# Patient Record
Sex: MALE | Race: WHITE | NOT HISPANIC OR LATINO | Employment: FULL TIME | URBAN - METROPOLITAN AREA
[De-identification: names, ages, dates, MRNs, and addresses within clinical notes are randomized per-mention and may not be internally consistent; named-entity substitution may affect disease eponyms.]

---

## 2017-03-16 ENCOUNTER — APPOINTMENT (EMERGENCY)
Dept: CT IMAGING | Facility: HOSPITAL | Age: 52
DRG: 494 | End: 2017-03-16
Payer: COMMERCIAL

## 2017-03-16 ENCOUNTER — APPOINTMENT (EMERGENCY)
Dept: RADIOLOGY | Facility: HOSPITAL | Age: 52
DRG: 494 | End: 2017-03-16
Payer: COMMERCIAL

## 2017-03-16 ENCOUNTER — HOSPITAL ENCOUNTER (INPATIENT)
Facility: HOSPITAL | Age: 52
LOS: 1 days | Discharge: HOME | DRG: 494 | End: 2017-03-17
Attending: EMERGENCY MEDICINE | Admitting: ORTHOPAEDIC SURGERY
Payer: COMMERCIAL

## 2017-03-16 ENCOUNTER — ANESTHESIA EVENT (INPATIENT)
Dept: PERIOP | Facility: HOSPITAL | Age: 52
DRG: 494 | End: 2017-03-16
Payer: COMMERCIAL

## 2017-03-16 ENCOUNTER — APPOINTMENT (INPATIENT)
Dept: RADIOLOGY | Facility: HOSPITAL | Age: 52
DRG: 494 | End: 2017-03-16
Payer: COMMERCIAL

## 2017-03-16 ENCOUNTER — ANESTHESIA (INPATIENT)
Dept: PERIOP | Facility: HOSPITAL | Age: 52
DRG: 494 | End: 2017-03-16
Payer: COMMERCIAL

## 2017-03-16 DIAGNOSIS — S82.251A CLOSED DISPLACED COMMINUTED FRACTURE OF SHAFT OF RIGHT TIBIA, INITIAL ENCOUNTER: Primary | ICD-10-CM

## 2017-03-16 PROBLEM — S82.891A CLOSED RIGHT ANKLE FRACTURE: Status: ACTIVE | Noted: 2017-03-16

## 2017-03-16 LAB
ABO GROUP BLD: NORMAL
ANION GAP SERPL CALCULATED.3IONS-SCNC: 7 MMOL/L (ref 4–13)
APTT PPP: 32 SECONDS (ref 24–36)
ATRIAL RATE: 96 BPM
BASOPHILS # BLD AUTO: 0.03 THOUSANDS/ΜL (ref 0–0.1)
BASOPHILS NFR BLD AUTO: 0 % (ref 0–1)
BLD GP AB SCN SERPL QL: NEGATIVE
BUN SERPL-MCNC: 19 MG/DL (ref 5–25)
CALCIUM SERPL-MCNC: 8.6 MG/DL (ref 8.3–10.1)
CHLORIDE SERPL-SCNC: 106 MMOL/L (ref 100–108)
CO2 SERPL-SCNC: 26 MMOL/L (ref 21–32)
CREAT SERPL-MCNC: 0.8 MG/DL (ref 0.6–1.3)
EOSINOPHIL # BLD AUTO: 0.09 THOUSAND/ΜL (ref 0–0.61)
EOSINOPHIL NFR BLD AUTO: 1 % (ref 0–6)
ERYTHROCYTE [DISTWIDTH] IN BLOOD BY AUTOMATED COUNT: 13.3 % (ref 11.6–15.1)
GFR SERPL CREATININE-BSD FRML MDRD: >60 ML/MIN/1.73SQ M
GLUCOSE SERPL-MCNC: 90 MG/DL (ref 65–140)
HCT VFR BLD AUTO: 47.9 % (ref 36.5–49.3)
HGB BLD-MCNC: 16.6 G/DL (ref 12–17)
INR PPP: 0.95 (ref 0.86–1.16)
LYMPHOCYTES # BLD AUTO: 1.92 THOUSANDS/ΜL (ref 0.6–4.47)
LYMPHOCYTES NFR BLD AUTO: 16 % (ref 14–44)
MCH RBC QN AUTO: 33.1 PG (ref 26.8–34.3)
MCHC RBC AUTO-ENTMCNC: 34.7 G/DL (ref 31.4–37.4)
MCV RBC AUTO: 95 FL (ref 82–98)
MONOCYTES # BLD AUTO: 0.73 THOUSAND/ΜL (ref 0.17–1.22)
MONOCYTES NFR BLD AUTO: 6 % (ref 4–12)
NEUTROPHILS # BLD AUTO: 9.34 THOUSANDS/ΜL (ref 1.85–7.62)
NEUTS SEG NFR BLD AUTO: 77 % (ref 43–75)
NRBC BLD AUTO-RTO: 0 /100 WBCS
P AXIS: 50 DEGREES
PLATELET # BLD AUTO: 201 THOUSANDS/UL (ref 149–390)
PMV BLD AUTO: 10.9 FL (ref 8.9–12.7)
POTASSIUM SERPL-SCNC: 4.2 MMOL/L (ref 3.5–5.3)
PR INTERVAL: 162 MS
PROTHROMBIN TIME: 12.7 SECONDS (ref 12–14.3)
QRS AXIS: 61 DEGREES
QRSD INTERVAL: 96 MS
QT INTERVAL: 336 MS
QTC INTERVAL: 424 MS
RBC # BLD AUTO: 5.02 MILLION/UL (ref 3.88–5.62)
RH BLD: POSITIVE
SODIUM SERPL-SCNC: 139 MMOL/L (ref 136–145)
T WAVE AXIS: 29 DEGREES
VENTRICULAR RATE: 96 BPM
WBC # BLD AUTO: 12.11 THOUSAND/UL (ref 4.31–10.16)

## 2017-03-16 PROCEDURE — C1713 ANCHOR/SCREW BN/BN,TIS/BN: HCPCS | Performed by: ORTHOPAEDIC SURGERY

## 2017-03-16 PROCEDURE — 86900 BLOOD TYPING SEROLOGIC ABO: CPT | Performed by: EMERGENCY MEDICINE

## 2017-03-16 PROCEDURE — 96374 THER/PROPH/DIAG INJ IV PUSH: CPT

## 2017-03-16 PROCEDURE — 73590 X-RAY EXAM OF LOWER LEG: CPT

## 2017-03-16 PROCEDURE — 0QSG36Z REPOSITION RIGHT TIBIA WITH INTRAMEDULLARY INTERNAL FIXATION DEVICE, PERCUTANEOUS APPROACH: ICD-10-PCS | Performed by: ORTHOPAEDIC SURGERY

## 2017-03-16 PROCEDURE — 85730 THROMBOPLASTIN TIME PARTIAL: CPT | Performed by: EMERGENCY MEDICINE

## 2017-03-16 PROCEDURE — 86850 RBC ANTIBODY SCREEN: CPT | Performed by: EMERGENCY MEDICINE

## 2017-03-16 PROCEDURE — 93005 ELECTROCARDIOGRAM TRACING: CPT | Performed by: EMERGENCY MEDICINE

## 2017-03-16 PROCEDURE — 96375 TX/PRO/DX INJ NEW DRUG ADDON: CPT

## 2017-03-16 PROCEDURE — 86901 BLOOD TYPING SEROLOGIC RH(D): CPT | Performed by: EMERGENCY MEDICINE

## 2017-03-16 PROCEDURE — 96376 TX/PRO/DX INJ SAME DRUG ADON: CPT

## 2017-03-16 PROCEDURE — 85610 PROTHROMBIN TIME: CPT | Performed by: EMERGENCY MEDICINE

## 2017-03-16 PROCEDURE — 99285 EMERGENCY DEPT VISIT HI MDM: CPT

## 2017-03-16 PROCEDURE — C1769 GUIDE WIRE: HCPCS | Performed by: ORTHOPAEDIC SURGERY

## 2017-03-16 PROCEDURE — 71010 HB CHEST X-RAY 1 VIEW FRONTAL: CPT

## 2017-03-16 PROCEDURE — 80048 BASIC METABOLIC PNL TOTAL CA: CPT | Performed by: EMERGENCY MEDICINE

## 2017-03-16 PROCEDURE — 36415 COLL VENOUS BLD VENIPUNCTURE: CPT | Performed by: EMERGENCY MEDICINE

## 2017-03-16 PROCEDURE — 73700 CT LOWER EXTREMITY W/O DYE: CPT

## 2017-03-16 PROCEDURE — 85025 COMPLETE CBC W/AUTO DIFF WBC: CPT | Performed by: EMERGENCY MEDICINE

## 2017-03-16 DEVICE — 5.0MM TI LOCKING SCREW W/T25 STARDRIVE 40MM F/IM NAIL-STER: Type: IMPLANTABLE DEVICE | Site: LEG | Status: FUNCTIONAL

## 2017-03-16 DEVICE — 10MM TI CANN TIBIAL NAIL-EX W/PROX BEND 360MM-STERILE
Type: IMPLANTABLE DEVICE | Site: LEG | Status: FUNCTIONAL
Brand: EXPERT NAIL

## 2017-03-16 DEVICE — 5.0MM TI LOCKING SCREW W/T25 STARDRIVE 34MM F/IM NAIL-STER: Type: IMPLANTABLE DEVICE | Site: LEG | Status: FUNCTIONAL

## 2017-03-16 DEVICE — 5.0MM TI LOCKING SCREW W/T25 STARDRIVE 48MM F/IM NAIL-STER: Type: IMPLANTABLE DEVICE | Site: LEG | Status: FUNCTIONAL

## 2017-03-16 RX ORDER — SODIUM CHLORIDE 9 MG/ML
125 INJECTION, SOLUTION INTRAVENOUS CONTINUOUS
Status: DISCONTINUED | OUTPATIENT
Start: 2017-03-16 | End: 2017-03-16

## 2017-03-16 RX ORDER — MIDAZOLAM HYDROCHLORIDE 1 MG/ML
INJECTION INTRAMUSCULAR; INTRAVENOUS AS NEEDED
Status: DISCONTINUED | OUTPATIENT
Start: 2017-03-16 | End: 2017-03-16 | Stop reason: SURG

## 2017-03-16 RX ORDER — FENTANYL CITRATE 50 UG/ML
INJECTION, SOLUTION INTRAMUSCULAR; INTRAVENOUS AS NEEDED
Status: DISCONTINUED | OUTPATIENT
Start: 2017-03-16 | End: 2017-03-16 | Stop reason: SURG

## 2017-03-16 RX ORDER — LORAZEPAM 2 MG/ML
0.5 INJECTION INTRAMUSCULAR 4 TIMES DAILY PRN
Status: DISCONTINUED | OUTPATIENT
Start: 2017-03-16 | End: 2017-03-16

## 2017-03-16 RX ORDER — QUETIAPINE 200 MG/1
200 TABLET, FILM COATED, EXTENDED RELEASE ORAL
Status: DISCONTINUED | OUTPATIENT
Start: 2017-03-16 | End: 2017-03-16

## 2017-03-16 RX ORDER — HYDRALAZINE HYDROCHLORIDE 20 MG/ML
10 INJECTION INTRAMUSCULAR; INTRAVENOUS EVERY 6 HOURS PRN
Status: DISCONTINUED | OUTPATIENT
Start: 2017-03-16 | End: 2017-03-16

## 2017-03-16 RX ORDER — ONDANSETRON 2 MG/ML
4 INJECTION INTRAMUSCULAR; INTRAVENOUS EVERY 4 HOURS PRN
Status: DISCONTINUED | OUTPATIENT
Start: 2017-03-16 | End: 2017-03-16

## 2017-03-16 RX ORDER — SENNOSIDES 8.6 MG
1 TABLET ORAL
Status: DISCONTINUED | OUTPATIENT
Start: 2017-03-16 | End: 2017-03-17 | Stop reason: HOSPADM

## 2017-03-16 RX ORDER — CLONAZEPAM 1 MG/1
1 TABLET ORAL 3 TIMES DAILY PRN
Status: DISCONTINUED | OUTPATIENT
Start: 2017-03-16 | End: 2017-03-16

## 2017-03-16 RX ORDER — ACETAMINOPHEN 325 MG/1
650 TABLET ORAL EVERY 6 HOURS PRN
Status: DISCONTINUED | OUTPATIENT
Start: 2017-03-16 | End: 2017-03-17 | Stop reason: HOSPADM

## 2017-03-16 RX ORDER — QUETIAPINE 200 MG/1
200 TABLET, FILM COATED, EXTENDED RELEASE ORAL
Status: DISCONTINUED | OUTPATIENT
Start: 2017-03-16 | End: 2017-03-17 | Stop reason: HOSPADM

## 2017-03-16 RX ORDER — LIDOCAINE HYDROCHLORIDE 10 MG/ML
INJECTION, SOLUTION INFILTRATION; PERINEURAL AS NEEDED
Status: DISCONTINUED | OUTPATIENT
Start: 2017-03-16 | End: 2017-03-16 | Stop reason: SURG

## 2017-03-16 RX ORDER — MAGNESIUM HYDROXIDE 1200 MG/15ML
LIQUID ORAL AS NEEDED
Status: DISCONTINUED | OUTPATIENT
Start: 2017-03-16 | End: 2017-03-16 | Stop reason: HOSPADM

## 2017-03-16 RX ORDER — OXYCODONE HYDROCHLORIDE 10 MG/1
10 TABLET ORAL
Status: DISCONTINUED | OUTPATIENT
Start: 2017-03-16 | End: 2017-03-17 | Stop reason: HOSPADM

## 2017-03-16 RX ORDER — ONDANSETRON 2 MG/ML
INJECTION INTRAMUSCULAR; INTRAVENOUS
Status: COMPLETED
Start: 2017-03-16 | End: 2017-03-16

## 2017-03-16 RX ORDER — BISACODYL 10 MG
10 SUPPOSITORY, RECTAL RECTAL DAILY PRN
Status: DISCONTINUED | OUTPATIENT
Start: 2017-03-16 | End: 2017-03-17 | Stop reason: HOSPADM

## 2017-03-16 RX ORDER — ONDANSETRON 2 MG/ML
4 INJECTION INTRAMUSCULAR; INTRAVENOUS ONCE
Status: COMPLETED | OUTPATIENT
Start: 2017-03-16 | End: 2017-03-16

## 2017-03-16 RX ORDER — FAMOTIDINE 20 MG/1
20 TABLET, FILM COATED ORAL DAILY
Status: DISCONTINUED | OUTPATIENT
Start: 2017-03-17 | End: 2017-03-16

## 2017-03-16 RX ORDER — NICOTINE 21 MG/24HR
21 PATCH, TRANSDERMAL 24 HOURS TRANSDERMAL DAILY
Status: DISCONTINUED | OUTPATIENT
Start: 2017-03-16 | End: 2017-03-17 | Stop reason: HOSPADM

## 2017-03-16 RX ORDER — METOCLOPRAMIDE HYDROCHLORIDE 5 MG/ML
10 INJECTION INTRAMUSCULAR; INTRAVENOUS ONCE AS NEEDED
Status: DISCONTINUED | OUTPATIENT
Start: 2017-03-16 | End: 2017-03-16

## 2017-03-16 RX ORDER — ROCURONIUM BROMIDE 10 MG/ML
INJECTION, SOLUTION INTRAVENOUS AS NEEDED
Status: DISCONTINUED | OUTPATIENT
Start: 2017-03-16 | End: 2017-03-16 | Stop reason: SURG

## 2017-03-16 RX ORDER — FENTANYL CITRATE 50 UG/ML
100 INJECTION, SOLUTION INTRAMUSCULAR; INTRAVENOUS ONCE
Status: COMPLETED | OUTPATIENT
Start: 2017-03-16 | End: 2017-03-16

## 2017-03-16 RX ORDER — GLYCOPYRROLATE 0.2 MG/ML
INJECTION INTRAMUSCULAR; INTRAVENOUS AS NEEDED
Status: DISCONTINUED | OUTPATIENT
Start: 2017-03-16 | End: 2017-03-16 | Stop reason: SURG

## 2017-03-16 RX ORDER — FENTANYL CITRATE/PF 50 MCG/ML
50 SYRINGE (ML) INJECTION
Status: DISCONTINUED | OUTPATIENT
Start: 2017-03-16 | End: 2017-03-16

## 2017-03-16 RX ORDER — PROPOFOL 10 MG/ML
INJECTION, EMULSION INTRAVENOUS AS NEEDED
Status: DISCONTINUED | OUTPATIENT
Start: 2017-03-16 | End: 2017-03-16 | Stop reason: SURG

## 2017-03-16 RX ORDER — SODIUM CHLORIDE 9 MG/ML
100 INJECTION, SOLUTION INTRAVENOUS CONTINUOUS
Status: DISCONTINUED | OUTPATIENT
Start: 2017-03-16 | End: 2017-03-17 | Stop reason: HOSPADM

## 2017-03-16 RX ORDER — FAMOTIDINE 20 MG/1
20 TABLET, FILM COATED ORAL DAILY
Status: DISCONTINUED | OUTPATIENT
Start: 2017-03-17 | End: 2017-03-17 | Stop reason: HOSPADM

## 2017-03-16 RX ORDER — ONDANSETRON 2 MG/ML
4 INJECTION INTRAMUSCULAR; INTRAVENOUS EVERY 6 HOURS PRN
Status: DISCONTINUED | OUTPATIENT
Start: 2017-03-16 | End: 2017-03-17 | Stop reason: HOSPADM

## 2017-03-16 RX ORDER — ONDANSETRON 2 MG/ML
4 INJECTION INTRAMUSCULAR; INTRAVENOUS ONCE
Status: DISCONTINUED | OUTPATIENT
Start: 2017-03-16 | End: 2017-03-16

## 2017-03-16 RX ORDER — DOCUSATE SODIUM 100 MG/1
100 CAPSULE, LIQUID FILLED ORAL 2 TIMES DAILY
Status: DISCONTINUED | OUTPATIENT
Start: 2017-03-16 | End: 2017-03-17 | Stop reason: HOSPADM

## 2017-03-16 RX ORDER — VENLAFAXINE 75 MG/1
75 TABLET ORAL 2 TIMES DAILY WITH MEALS
Status: DISCONTINUED | OUTPATIENT
Start: 2017-03-17 | End: 2017-03-17 | Stop reason: HOSPADM

## 2017-03-16 RX ORDER — HYDROMORPHONE HYDROCHLORIDE 2 MG/ML
INJECTION, SOLUTION INTRAMUSCULAR; INTRAVENOUS; SUBCUTANEOUS AS NEEDED
Status: DISCONTINUED | OUTPATIENT
Start: 2017-03-16 | End: 2017-03-16 | Stop reason: SURG

## 2017-03-16 RX ORDER — RANITIDINE 150 MG/1
150 CAPSULE ORAL DAILY
COMMUNITY
End: 2019-05-23 | Stop reason: HOSPADM

## 2017-03-16 RX ORDER — CLONAZEPAM 1 MG/1
1 TABLET ORAL 3 TIMES DAILY PRN
Status: DISCONTINUED | OUTPATIENT
Start: 2017-03-16 | End: 2017-03-17 | Stop reason: HOSPADM

## 2017-03-16 RX ORDER — OXYCODONE HYDROCHLORIDE 5 MG/1
5 TABLET ORAL
Status: DISCONTINUED | OUTPATIENT
Start: 2017-03-16 | End: 2017-03-17 | Stop reason: HOSPADM

## 2017-03-16 RX ORDER — VENLAFAXINE 75 MG/1
75 TABLET ORAL 2 TIMES DAILY
Status: DISCONTINUED | OUTPATIENT
Start: 2017-03-16 | End: 2017-03-16

## 2017-03-16 RX ORDER — HYDROMORPHONE HCL 110MG/55ML
0.5 PATIENT CONTROLLED ANALGESIA SYRINGE INTRAVENOUS
Status: DISCONTINUED | OUTPATIENT
Start: 2017-03-16 | End: 2017-03-17 | Stop reason: HOSPADM

## 2017-03-16 RX ADMIN — SODIUM CHLORIDE: 0.9 INJECTION, SOLUTION INTRAVENOUS at 18:29

## 2017-03-16 RX ADMIN — CEFAZOLIN SODIUM 2000 MG: 2 SOLUTION INTRAVENOUS at 16:16

## 2017-03-16 RX ADMIN — Medication 1 MG: at 07:25

## 2017-03-16 RX ADMIN — FENTANYL CITRATE 50 MCG: 50 INJECTION INTRAMUSCULAR; INTRAVENOUS at 20:53

## 2017-03-16 RX ADMIN — FENTANYL CITRATE 100 MCG: 50 INJECTION, SOLUTION INTRAMUSCULAR; INTRAVENOUS at 16:10

## 2017-03-16 RX ADMIN — PROPOFOL 200 MG: 10 INJECTION, EMULSION INTRAVENOUS at 16:21

## 2017-03-16 RX ADMIN — GLYCOPYRROLATE 0.4 MG: 0.2 INJECTION, SOLUTION INTRAMUSCULAR; INTRAVENOUS at 19:19

## 2017-03-16 RX ADMIN — Medication 1 MG: at 08:43

## 2017-03-16 RX ADMIN — DEXAMETHASONE SODIUM PHOSPHATE 8 MG: 10 INJECTION INTRAMUSCULAR; INTRAVENOUS at 19:02

## 2017-03-16 RX ADMIN — HYDROMORPHONE HYDROCHLORIDE 0.5 MG: 1 INJECTION, SOLUTION INTRAMUSCULAR; INTRAVENOUS; SUBCUTANEOUS at 21:09

## 2017-03-16 RX ADMIN — SODIUM CHLORIDE: 0.9 INJECTION, SOLUTION INTRAVENOUS at 17:10

## 2017-03-16 RX ADMIN — ROCURONIUM BROMIDE 50 MG: 10 INJECTION, SOLUTION INTRAVENOUS at 16:21

## 2017-03-16 RX ADMIN — PROPOFOL 100 MG: 10 INJECTION, EMULSION INTRAVENOUS at 19:32

## 2017-03-16 RX ADMIN — HYDROMORPHONE HYDROCHLORIDE 1 MG: 1 INJECTION, SOLUTION INTRAMUSCULAR; INTRAVENOUS; SUBCUTANEOUS at 08:43

## 2017-03-16 RX ADMIN — FENTANYL CITRATE 50 MCG: 50 INJECTION INTRAMUSCULAR; INTRAVENOUS at 20:43

## 2017-03-16 RX ADMIN — CEFAZOLIN SODIUM 2000 MG: 2 SOLUTION INTRAVENOUS at 22:09

## 2017-03-16 RX ADMIN — QUETIAPINE 200 MG: 200 TABLET, EXTENDED RELEASE ORAL at 23:54

## 2017-03-16 RX ADMIN — FENTANYL CITRATE 100 MCG: 50 INJECTION INTRAMUSCULAR; INTRAVENOUS at 12:48

## 2017-03-16 RX ADMIN — Medication 1 MG: at 09:17

## 2017-03-16 RX ADMIN — LORAZEPAM 0.5 MG: 2 INJECTION INTRAMUSCULAR; INTRAVENOUS at 15:01

## 2017-03-16 RX ADMIN — SODIUM CHLORIDE 125 ML/HR: 0.9 INJECTION, SOLUTION INTRAVENOUS at 15:55

## 2017-03-16 RX ADMIN — ONDANSETRON 4 MG: 2 INJECTION INTRAMUSCULAR; INTRAVENOUS at 07:25

## 2017-03-16 RX ADMIN — HYDROMORPHONE HYDROCHLORIDE 1 MG: 2 INJECTION, SOLUTION INTRAMUSCULAR; INTRAVENOUS; SUBCUTANEOUS at 17:47

## 2017-03-16 RX ADMIN — OXYCODONE HYDROCHLORIDE 5 MG: 10 TABLET ORAL at 23:55

## 2017-03-16 RX ADMIN — ONDANSETRON HYDROCHLORIDE 8 MG: 2 INJECTION, SOLUTION INTRAVENOUS at 19:02

## 2017-03-16 RX ADMIN — HYDROMORPHONE HYDROCHLORIDE 1 MG: 1 INJECTION, SOLUTION INTRAMUSCULAR; INTRAVENOUS; SUBCUTANEOUS at 07:25

## 2017-03-16 RX ADMIN — SODIUM CHLORIDE 100 ML/HR: 0.9 INJECTION, SOLUTION INTRAVENOUS at 21:30

## 2017-03-16 RX ADMIN — NEOSTIGMINE METHYLSULFATE 3 MG: 1 INJECTION INTRAMUSCULAR; INTRAVENOUS; SUBCUTANEOUS at 19:19

## 2017-03-16 RX ADMIN — DOCUSATE SODIUM 100 MG: 100 CAPSULE, LIQUID FILLED ORAL at 23:54

## 2017-03-16 RX ADMIN — HYDROMORPHONE HYDROCHLORIDE 1 MG: 1 INJECTION, SOLUTION INTRAMUSCULAR; INTRAVENOUS; SUBCUTANEOUS at 07:57

## 2017-03-16 RX ADMIN — LIDOCAINE HYDROCHLORIDE 80 MG: 10 INJECTION, SOLUTION INFILTRATION; PERINEURAL at 16:21

## 2017-03-16 RX ADMIN — HYDROMORPHONE HYDROCHLORIDE 0.5 MG: 2 INJECTION, SOLUTION INTRAMUSCULAR; INTRAVENOUS; SUBCUTANEOUS at 19:32

## 2017-03-16 RX ADMIN — ROCURONIUM BROMIDE 20 MG: 10 INJECTION, SOLUTION INTRAVENOUS at 16:50

## 2017-03-16 RX ADMIN — HYDROMORPHONE HYDROCHLORIDE 1 MG: 1 INJECTION, SOLUTION INTRAMUSCULAR; INTRAVENOUS; SUBCUTANEOUS at 09:17

## 2017-03-16 RX ADMIN — FENTANYL CITRATE 50 MCG: 50 INJECTION, SOLUTION INTRAMUSCULAR; INTRAVENOUS at 17:45

## 2017-03-16 RX ADMIN — HYDROMORPHONE HYDROCHLORIDE 1 MG: 1 INJECTION, SOLUTION INTRAMUSCULAR; INTRAVENOUS; SUBCUTANEOUS at 10:58

## 2017-03-16 RX ADMIN — FENTANYL CITRATE 50 MCG: 50 INJECTION, SOLUTION INTRAMUSCULAR; INTRAVENOUS at 16:52

## 2017-03-16 RX ADMIN — MIDAZOLAM HYDROCHLORIDE 2 MG: 1 INJECTION, SOLUTION INTRAMUSCULAR; INTRAVENOUS at 16:10

## 2017-03-16 RX ADMIN — HYDROMORPHONE HYDROCHLORIDE 0.5 MG: 2 INJECTION, SOLUTION INTRAMUSCULAR; INTRAVENOUS; SUBCUTANEOUS at 22:01

## 2017-03-17 VITALS
WEIGHT: 222 LBS | BODY MASS INDEX: 31.08 KG/M2 | HEIGHT: 71 IN | SYSTOLIC BLOOD PRESSURE: 107 MMHG | OXYGEN SATURATION: 96 % | DIASTOLIC BLOOD PRESSURE: 62 MMHG | HEART RATE: 86 BPM | RESPIRATION RATE: 16 BRPM | TEMPERATURE: 98.9 F

## 2017-03-17 LAB
ANION GAP SERPL CALCULATED.3IONS-SCNC: 11 MMOL/L (ref 4–13)
BUN SERPL-MCNC: 16 MG/DL (ref 5–25)
CALCIUM SERPL-MCNC: 8.2 MG/DL (ref 8.3–10.1)
CHLORIDE SERPL-SCNC: 107 MMOL/L (ref 100–108)
CO2 SERPL-SCNC: 22 MMOL/L (ref 21–32)
CREAT SERPL-MCNC: 0.93 MG/DL (ref 0.6–1.3)
ERYTHROCYTE [DISTWIDTH] IN BLOOD BY AUTOMATED COUNT: 13.1 % (ref 11.6–15.1)
GFR SERPL CREATININE-BSD FRML MDRD: >60 ML/MIN/1.73SQ M
GLUCOSE SERPL-MCNC: 106 MG/DL (ref 65–140)
HCT VFR BLD AUTO: 44.3 % (ref 36.5–49.3)
HGB BLD-MCNC: 14.4 G/DL (ref 12–17)
MCH RBC QN AUTO: 31.2 PG (ref 26.8–34.3)
MCHC RBC AUTO-ENTMCNC: 32.5 G/DL (ref 31.4–37.4)
MCV RBC AUTO: 96 FL (ref 82–98)
PLATELET # BLD AUTO: 222 THOUSANDS/UL (ref 149–390)
PMV BLD AUTO: 11.1 FL (ref 8.9–12.7)
POTASSIUM SERPL-SCNC: 4.5 MMOL/L (ref 3.5–5.3)
RBC # BLD AUTO: 4.62 MILLION/UL (ref 3.88–5.62)
SODIUM SERPL-SCNC: 140 MMOL/L (ref 136–145)
WBC # BLD AUTO: 16.57 THOUSAND/UL (ref 4.31–10.16)

## 2017-03-17 PROCEDURE — 85027 COMPLETE CBC AUTOMATED: CPT | Performed by: ORTHOPAEDIC SURGERY

## 2017-03-17 PROCEDURE — 97110 THERAPEUTIC EXERCISES: CPT

## 2017-03-17 PROCEDURE — G8978 MOBILITY CURRENT STATUS: HCPCS

## 2017-03-17 PROCEDURE — 80048 BASIC METABOLIC PNL TOTAL CA: CPT | Performed by: ORTHOPAEDIC SURGERY

## 2017-03-17 PROCEDURE — G8979 MOBILITY GOAL STATUS: HCPCS

## 2017-03-17 PROCEDURE — 97162 PT EVAL MOD COMPLEX 30 MIN: CPT

## 2017-03-17 RX ORDER — OXYCODONE HYDROCHLORIDE 5 MG/1
5 TABLET ORAL ONCE
Status: COMPLETED | OUTPATIENT
Start: 2017-03-17 | End: 2017-03-17

## 2017-03-17 RX ORDER — OXYCODONE HYDROCHLORIDE 5 MG/1
TABLET ORAL
Qty: 60 TABLET | Refills: 0 | Status: SHIPPED | OUTPATIENT
Start: 2017-03-17 | End: 2019-09-04

## 2017-03-17 RX ORDER — ASPIRIN 325 MG
325 TABLET, DELAYED RELEASE (ENTERIC COATED) ORAL 2 TIMES DAILY
Qty: 60 TABLET | Refills: 0 | Status: SHIPPED | OUTPATIENT
Start: 2017-03-17 | End: 2018-05-20

## 2017-03-17 RX ORDER — DOCUSATE SODIUM 100 MG/1
100 CAPSULE, LIQUID FILLED ORAL 2 TIMES DAILY PRN
Qty: 60 CAPSULE | Refills: 0 | Status: SHIPPED | OUTPATIENT
Start: 2017-03-17 | End: 2018-05-20

## 2017-03-17 RX ADMIN — HYDROMORPHONE HYDROCHLORIDE 0.5 MG: 2 INJECTION, SOLUTION INTRAMUSCULAR; INTRAVENOUS; SUBCUTANEOUS at 05:02

## 2017-03-17 RX ADMIN — OXYCODONE HYDROCHLORIDE 10 MG: 10 TABLET ORAL at 08:14

## 2017-03-17 RX ADMIN — ACETAMINOPHEN 650 MG: 325 TABLET, FILM COATED ORAL at 08:13

## 2017-03-17 RX ADMIN — VENLAFAXINE HYDROCHLORIDE 75 MG: 75 TABLET ORAL at 08:13

## 2017-03-17 RX ADMIN — HYDROMORPHONE HYDROCHLORIDE 0.5 MG: 2 INJECTION, SOLUTION INTRAMUSCULAR; INTRAVENOUS; SUBCUTANEOUS at 01:27

## 2017-03-17 RX ADMIN — OXYCODONE HYDROCHLORIDE 10 MG: 10 TABLET ORAL at 02:58

## 2017-03-17 RX ADMIN — OXYCODONE HYDROCHLORIDE 10 MG: 10 TABLET ORAL at 13:07

## 2017-03-17 RX ADMIN — CEFAZOLIN SODIUM 2000 MG: 2 SOLUTION INTRAVENOUS at 05:53

## 2017-03-17 RX ADMIN — DOCUSATE SODIUM 100 MG: 100 CAPSULE, LIQUID FILLED ORAL at 08:14

## 2017-03-17 RX ADMIN — FAMOTIDINE 20 MG: 20 TABLET ORAL at 08:14

## 2017-03-17 RX ADMIN — ENOXAPARIN SODIUM 40 MG: 40 INJECTION SUBCUTANEOUS at 08:13

## 2017-03-17 RX ADMIN — CLONAZEPAM 1 MG: 1 TABLET ORAL at 09:22

## 2017-03-17 RX ADMIN — OXYCODONE HYDROCHLORIDE 5 MG: 5 TABLET ORAL at 10:06

## 2017-03-31 ENCOUNTER — HOSPITAL ENCOUNTER (OUTPATIENT)
Dept: RADIOLOGY | Facility: CLINIC | Age: 52
Discharge: HOME | End: 2017-03-31
Payer: COMMERCIAL

## 2017-03-31 ENCOUNTER — ALLSCRIPTS OFFICE VISIT (OUTPATIENT)
Dept: OTHER | Facility: OTHER | Age: 52
End: 2017-03-31

## 2017-03-31 DIAGNOSIS — S82.251D CLOSED DISPLACED COMMINUTED FRACTURE OF SHAFT OF RIGHT TIBIA WITH ROUTINE HEALING: ICD-10-CM

## 2017-03-31 DIAGNOSIS — Z98.890 OTHER SPECIFIED POSTPROCEDURAL STATES: ICD-10-CM

## 2017-03-31 PROCEDURE — 73590 X-RAY EXAM OF LOWER LEG: CPT

## 2017-04-14 ENCOUNTER — HOSPITAL ENCOUNTER (OUTPATIENT)
Dept: RADIOLOGY | Facility: CLINIC | Age: 52
Discharge: HOME | End: 2017-04-14
Payer: COMMERCIAL

## 2017-04-14 ENCOUNTER — ALLSCRIPTS OFFICE VISIT (OUTPATIENT)
Dept: OTHER | Facility: OTHER | Age: 52
End: 2017-04-14

## 2017-04-14 DIAGNOSIS — S82.251D CLOSED DISPLACED COMMINUTED FRACTURE OF SHAFT OF RIGHT TIBIA WITH ROUTINE HEALING: ICD-10-CM

## 2017-04-14 PROCEDURE — 73590 X-RAY EXAM OF LOWER LEG: CPT

## 2017-04-28 ENCOUNTER — HOSPITAL ENCOUNTER (OUTPATIENT)
Dept: RADIOLOGY | Facility: CLINIC | Age: 52
Discharge: HOME | End: 2017-04-28
Payer: COMMERCIAL

## 2017-04-28 ENCOUNTER — ALLSCRIPTS OFFICE VISIT (OUTPATIENT)
Dept: OTHER | Facility: OTHER | Age: 52
End: 2017-04-28

## 2017-04-28 DIAGNOSIS — S82.251D CLOSED DISPLACED COMMINUTED FRACTURE OF SHAFT OF RIGHT TIBIA WITH ROUTINE HEALING: ICD-10-CM

## 2017-04-28 PROCEDURE — 73590 X-RAY EXAM OF LOWER LEG: CPT

## 2017-05-17 ENCOUNTER — APPOINTMENT (OUTPATIENT)
Dept: PHYSICAL THERAPY | Age: 52
End: 2017-05-17
Payer: COMMERCIAL

## 2017-05-19 ENCOUNTER — ALLSCRIPTS OFFICE VISIT (OUTPATIENT)
Dept: OTHER | Facility: OTHER | Age: 52
End: 2017-05-19

## 2017-05-19 ENCOUNTER — HOSPITAL ENCOUNTER (OUTPATIENT)
Dept: RADIOLOGY | Facility: CLINIC | Age: 52
Discharge: HOME | End: 2017-05-19
Payer: COMMERCIAL

## 2017-05-19 DIAGNOSIS — S82.251D CLOSED DISPLACED COMMINUTED FRACTURE OF SHAFT OF RIGHT TIBIA WITH ROUTINE HEALING: ICD-10-CM

## 2017-05-19 DIAGNOSIS — Z98.890 OTHER SPECIFIED POSTPROCEDURAL STATES: ICD-10-CM

## 2017-05-19 PROCEDURE — 73590 X-RAY EXAM OF LOWER LEG: CPT

## 2017-06-09 ENCOUNTER — ALLSCRIPTS OFFICE VISIT (OUTPATIENT)
Dept: OTHER | Facility: OTHER | Age: 52
End: 2017-06-09

## 2017-06-09 ENCOUNTER — HOSPITAL ENCOUNTER (OUTPATIENT)
Dept: RADIOLOGY | Facility: CLINIC | Age: 52
Discharge: HOME | End: 2017-06-09
Payer: COMMERCIAL

## 2017-06-09 DIAGNOSIS — S82.251D CLOSED DISPLACED COMMINUTED FRACTURE OF SHAFT OF RIGHT TIBIA WITH ROUTINE HEALING: ICD-10-CM

## 2017-06-09 PROCEDURE — 73590 X-RAY EXAM OF LOWER LEG: CPT

## 2017-06-21 ENCOUNTER — GENERIC CONVERSION - ENCOUNTER (OUTPATIENT)
Dept: OTHER | Facility: OTHER | Age: 52
End: 2017-06-21

## 2017-07-21 ENCOUNTER — APPOINTMENT (OUTPATIENT)
Dept: RADIOLOGY | Facility: CLINIC | Age: 52
End: 2017-07-21
Payer: COMMERCIAL

## 2017-07-21 ENCOUNTER — ALLSCRIPTS OFFICE VISIT (OUTPATIENT)
Dept: OTHER | Facility: OTHER | Age: 52
End: 2017-07-21

## 2017-07-21 DIAGNOSIS — S82.251D CLOSED DISPLACED COMMINUTED FRACTURE OF SHAFT OF RIGHT TIBIA WITH ROUTINE HEALING: ICD-10-CM

## 2017-07-21 PROCEDURE — 73590 X-RAY EXAM OF LOWER LEG: CPT

## 2017-08-16 ENCOUNTER — TRANSCRIBE ORDERS (OUTPATIENT)
Dept: ADMINISTRATIVE | Facility: HOSPITAL | Age: 52
End: 2017-08-16

## 2017-08-16 DIAGNOSIS — M54.16 LUMBAR RADICULOPATHY: Primary | ICD-10-CM

## 2017-08-22 ENCOUNTER — HOSPITAL ENCOUNTER (OUTPATIENT)
Dept: MRI IMAGING | Facility: HOSPITAL | Age: 52
Discharge: HOME | End: 2017-08-22
Payer: COMMERCIAL

## 2017-08-22 DIAGNOSIS — M54.16 LUMBAR RADICULOPATHY: ICD-10-CM

## 2017-08-22 PROCEDURE — A9585 GADOBUTROL INJECTION: HCPCS | Performed by: RADIOLOGY

## 2017-08-22 PROCEDURE — 72158 MRI LUMBAR SPINE W/O & W/DYE: CPT

## 2017-08-22 RX ADMIN — GADOBUTROL 10 ML: 604.72 INJECTION INTRAVENOUS at 20:49

## 2017-10-02 ENCOUNTER — ALLSCRIPTS OFFICE VISIT (OUTPATIENT)
Dept: OTHER | Facility: OTHER | Age: 52
End: 2017-10-02
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
## 2018-01-12 VITALS
DIASTOLIC BLOOD PRESSURE: 110 MMHG | BODY MASS INDEX: 33.48 KG/M2 | WEIGHT: 239.13 LBS | SYSTOLIC BLOOD PRESSURE: 157 MMHG | HEIGHT: 71 IN | HEART RATE: 123 BPM

## 2018-01-12 VITALS
WEIGHT: 236.13 LBS | BODY MASS INDEX: 33.06 KG/M2 | HEIGHT: 71 IN | DIASTOLIC BLOOD PRESSURE: 83 MMHG | SYSTOLIC BLOOD PRESSURE: 126 MMHG | HEART RATE: 125 BPM

## 2018-01-13 VITALS
BODY MASS INDEX: 32.06 KG/M2 | WEIGHT: 229 LBS | HEIGHT: 71 IN | HEART RATE: 109 BPM | SYSTOLIC BLOOD PRESSURE: 146 MMHG | DIASTOLIC BLOOD PRESSURE: 101 MMHG

## 2018-01-13 VITALS
HEART RATE: 125 BPM | WEIGHT: 232.38 LBS | HEIGHT: 71 IN | SYSTOLIC BLOOD PRESSURE: 138 MMHG | DIASTOLIC BLOOD PRESSURE: 88 MMHG | BODY MASS INDEX: 32.53 KG/M2

## 2018-01-13 VITALS
DIASTOLIC BLOOD PRESSURE: 90 MMHG | BODY MASS INDEX: 31.83 KG/M2 | HEIGHT: 71 IN | HEART RATE: 97 BPM | SYSTOLIC BLOOD PRESSURE: 134 MMHG | WEIGHT: 227.38 LBS

## 2018-01-13 VITALS
DIASTOLIC BLOOD PRESSURE: 87 MMHG | WEIGHT: 239.13 LBS | BODY MASS INDEX: 33.48 KG/M2 | SYSTOLIC BLOOD PRESSURE: 140 MMHG | HEIGHT: 71 IN | HEART RATE: 101 BPM
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
## 2018-01-14 VITALS — SYSTOLIC BLOOD PRESSURE: 160 MMHG | DIASTOLIC BLOOD PRESSURE: 87 MMHG | HEIGHT: 71 IN | HEART RATE: 110 BPM

## 2018-01-15 NOTE — MISCELLANEOUS
Message  Return to work or school:        Mr Janny Larson may return to work on sedentary duty only until follow up          Signatures   Electronically signed by : Kristin Raymundo DO; Jun 21 2017  3:38PM EST                       (Author)
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
## 2018-01-16 NOTE — MISCELLANEOUS
Provider Comments  Provider Comments:   pt was a no show today      Signatures   Electronically signed by : Jose F Bennett, ; Apr 13 2016  4:37PM EST                       (Author)    Electronically signed by : MARLY Pinedo ; Apr 13 2016  6:07PM EST                       (Author)    Electronically signed by : Kristian Rodríguez DO;  Apr 15 2016  1:28PM EST                       (Author)

## 2018-05-20 ENCOUNTER — APPOINTMENT (EMERGENCY)
Dept: CT IMAGING | Facility: HOSPITAL | Age: 53
DRG: 194 | End: 2018-05-20
Payer: COMMERCIAL

## 2018-05-20 ENCOUNTER — HOSPITAL ENCOUNTER (INPATIENT)
Facility: HOSPITAL | Age: 53
LOS: 3 days | Discharge: HOME | DRG: 194 | End: 2018-05-23
Attending: EMERGENCY MEDICINE | Admitting: HOSPITALIST
Payer: COMMERCIAL

## 2018-05-20 ENCOUNTER — APPOINTMENT (EMERGENCY)
Dept: RADIOLOGY | Facility: HOSPITAL | Age: 53
DRG: 194 | End: 2018-05-20
Payer: COMMERCIAL

## 2018-05-20 DIAGNOSIS — J98.4 CAVITARY PNEUMONIA: ICD-10-CM

## 2018-05-20 DIAGNOSIS — J18.9 CAVITARY PNEUMONIA: ICD-10-CM

## 2018-05-20 DIAGNOSIS — I48.92 ATRIAL FLUTTER (HCC): ICD-10-CM

## 2018-05-20 DIAGNOSIS — Z72.0 TOBACCO ABUSE: ICD-10-CM

## 2018-05-20 DIAGNOSIS — J18.9 PNEUMONIA: Primary | ICD-10-CM

## 2018-05-20 PROBLEM — R07.9 CHEST PAIN: Status: ACTIVE | Noted: 2018-05-20

## 2018-05-20 PROBLEM — D35.02 ADENOMA OF LEFT ADRENAL GLAND: Status: ACTIVE | Noted: 2018-05-20

## 2018-05-20 LAB
ALBUMIN SERPL BCP-MCNC: 2.9 G/DL (ref 3.5–5)
ALP SERPL-CCNC: 205 U/L (ref 46–116)
ALT SERPL W P-5'-P-CCNC: 71 U/L (ref 12–78)
ANION GAP SERPL CALCULATED.3IONS-SCNC: 9 MMOL/L (ref 4–13)
APTT PPP: 41 SECONDS (ref 24–36)
AST SERPL W P-5'-P-CCNC: 27 U/L (ref 5–45)
BASOPHILS # BLD AUTO: 0.02 THOUSANDS/ΜL (ref 0–0.1)
BASOPHILS NFR BLD AUTO: 0 % (ref 0–1)
BILIRUB SERPL-MCNC: 0.74 MG/DL (ref 0.2–1)
BUN SERPL-MCNC: 15 MG/DL (ref 5–25)
CALCIUM SERPL-MCNC: 9 MG/DL (ref 8.3–10.1)
CHLORIDE SERPL-SCNC: 102 MMOL/L (ref 100–108)
CO2 SERPL-SCNC: 26 MMOL/L (ref 21–32)
CREAT SERPL-MCNC: 1.12 MG/DL (ref 0.6–1.3)
EOSINOPHIL # BLD AUTO: 0.05 THOUSAND/ΜL (ref 0–0.61)
EOSINOPHIL NFR BLD AUTO: 0 % (ref 0–6)
ERYTHROCYTE [DISTWIDTH] IN BLOOD BY AUTOMATED COUNT: 12.8 % (ref 11.6–15.1)
ERYTHROCYTE [DISTWIDTH] IN BLOOD BY AUTOMATED COUNT: 12.8 % (ref 11.6–15.1)
GFR SERPL CREATININE-BSD FRML MDRD: 76 ML/MIN/1.73SQ M
GLUCOSE SERPL-MCNC: 130 MG/DL (ref 65–140)
HCT VFR BLD AUTO: 44.3 % (ref 36.5–49.3)
HCT VFR BLD AUTO: 47.2 % (ref 36.5–49.3)
HGB BLD-MCNC: 15.1 G/DL (ref 12–17)
HGB BLD-MCNC: 16.4 G/DL (ref 12–17)
INR PPP: 1.16 (ref 0.86–1.17)
LACTATE SERPL-SCNC: 0.7 MMOL/L (ref 0.5–2)
LYMPHOCYTES # BLD AUTO: 1.92 THOUSANDS/ΜL (ref 0.6–4.47)
LYMPHOCYTES NFR BLD AUTO: 13 % (ref 14–44)
MAGNESIUM SERPL-MCNC: 1.9 MG/DL (ref 1.6–2.6)
MCH RBC QN AUTO: 31.3 PG (ref 26.8–34.3)
MCH RBC QN AUTO: 31.9 PG (ref 26.8–34.3)
MCHC RBC AUTO-ENTMCNC: 34.1 G/DL (ref 31.4–37.4)
MCHC RBC AUTO-ENTMCNC: 34.7 G/DL (ref 31.4–37.4)
MCV RBC AUTO: 92 FL (ref 82–98)
MCV RBC AUTO: 92 FL (ref 82–98)
MONOCYTES # BLD AUTO: 1.3 THOUSAND/ΜL (ref 0.17–1.22)
MONOCYTES NFR BLD AUTO: 9 % (ref 4–12)
NEUTROPHILS # BLD AUTO: 11.16 THOUSANDS/ΜL (ref 1.85–7.62)
NEUTS SEG NFR BLD AUTO: 77 % (ref 43–75)
NRBC BLD AUTO-RTO: 0 /100 WBCS
PLATELET # BLD AUTO: 281 THOUSANDS/UL (ref 149–390)
PLATELET # BLD AUTO: 300 THOUSANDS/UL (ref 149–390)
PMV BLD AUTO: 11.5 FL (ref 8.9–12.7)
PMV BLD AUTO: 11.6 FL (ref 8.9–12.7)
POTASSIUM SERPL-SCNC: 3.7 MMOL/L (ref 3.5–5.3)
PROT SERPL-MCNC: 7.8 G/DL (ref 6.4–8.2)
PROTHROMBIN TIME: 14.9 SECONDS (ref 11.8–14.2)
RBC # BLD AUTO: 4.83 MILLION/UL (ref 3.88–5.62)
RBC # BLD AUTO: 5.14 MILLION/UL (ref 3.88–5.62)
SODIUM SERPL-SCNC: 137 MMOL/L (ref 136–145)
TROPONIN I SERPL-MCNC: <0.02 NG/ML
WBC # BLD AUTO: 11.34 THOUSAND/UL (ref 4.31–10.16)
WBC # BLD AUTO: 14.45 THOUSAND/UL (ref 4.31–10.16)

## 2018-05-20 PROCEDURE — 85027 COMPLETE CBC AUTOMATED: CPT | Performed by: PHYSICIAN ASSISTANT

## 2018-05-20 PROCEDURE — 85730 THROMBOPLASTIN TIME PARTIAL: CPT | Performed by: PHYSICIAN ASSISTANT

## 2018-05-20 PROCEDURE — 36415 COLL VENOUS BLD VENIPUNCTURE: CPT

## 2018-05-20 PROCEDURE — 85610 PROTHROMBIN TIME: CPT | Performed by: PHYSICIAN ASSISTANT

## 2018-05-20 PROCEDURE — 96375 TX/PRO/DX INJ NEW DRUG ADDON: CPT

## 2018-05-20 PROCEDURE — 83735 ASSAY OF MAGNESIUM: CPT | Performed by: EMERGENCY MEDICINE

## 2018-05-20 PROCEDURE — 83605 ASSAY OF LACTIC ACID: CPT | Performed by: EMERGENCY MEDICINE

## 2018-05-20 PROCEDURE — 96366 THER/PROPH/DIAG IV INF ADDON: CPT

## 2018-05-20 PROCEDURE — 71046 X-RAY EXAM CHEST 2 VIEWS: CPT

## 2018-05-20 PROCEDURE — 96361 HYDRATE IV INFUSION ADD-ON: CPT

## 2018-05-20 PROCEDURE — 96365 THER/PROPH/DIAG IV INF INIT: CPT

## 2018-05-20 PROCEDURE — 71275 CT ANGIOGRAPHY CHEST: CPT

## 2018-05-20 PROCEDURE — 85025 COMPLETE CBC W/AUTO DIFF WBC: CPT

## 2018-05-20 PROCEDURE — 93005 ELECTROCARDIOGRAM TRACING: CPT

## 2018-05-20 PROCEDURE — 80053 COMPREHEN METABOLIC PANEL: CPT

## 2018-05-20 PROCEDURE — 84484 ASSAY OF TROPONIN QUANT: CPT

## 2018-05-20 PROCEDURE — 96376 TX/PRO/DX INJ SAME DRUG ADON: CPT

## 2018-05-20 PROCEDURE — 87040 BLOOD CULTURE FOR BACTERIA: CPT | Performed by: EMERGENCY MEDICINE

## 2018-05-20 RX ORDER — HEPARIN SODIUM 5000 [USP'U]/ML
5000 INJECTION, SOLUTION INTRAVENOUS; SUBCUTANEOUS EVERY 8 HOURS SCHEDULED
Status: DISCONTINUED | OUTPATIENT
Start: 2018-05-20 | End: 2018-05-20 | Stop reason: SDUPTHER

## 2018-05-20 RX ORDER — SODIUM CHLORIDE 9 MG/ML
100 INJECTION, SOLUTION INTRAVENOUS CONTINUOUS
Status: DISCONTINUED | OUTPATIENT
Start: 2018-05-20 | End: 2018-05-23 | Stop reason: HOSPADM

## 2018-05-20 RX ORDER — FAMOTIDINE 20 MG/1
20 TABLET, FILM COATED ORAL DAILY
Status: DISCONTINUED | OUTPATIENT
Start: 2018-05-21 | End: 2018-05-23 | Stop reason: HOSPADM

## 2018-05-20 RX ORDER — HEPARIN SODIUM 1000 [USP'U]/ML
8000 INJECTION, SOLUTION INTRAVENOUS; SUBCUTANEOUS ONCE
Status: COMPLETED | OUTPATIENT
Start: 2018-05-20 | End: 2018-05-20

## 2018-05-20 RX ORDER — METOPROLOL TARTRATE 5 MG/5ML
5 INJECTION INTRAVENOUS ONCE
Status: COMPLETED | OUTPATIENT
Start: 2018-05-20 | End: 2018-05-20

## 2018-05-20 RX ORDER — CLONAZEPAM 0.5 MG/1
0.5 TABLET ORAL 3 TIMES DAILY PRN
Status: DISCONTINUED | OUTPATIENT
Start: 2018-05-20 | End: 2018-05-22

## 2018-05-20 RX ORDER — LEVALBUTEROL 1.25 MG/.5ML
1.25 SOLUTION, CONCENTRATE RESPIRATORY (INHALATION)
Status: DISCONTINUED | OUTPATIENT
Start: 2018-05-20 | End: 2018-05-23 | Stop reason: HOSPADM

## 2018-05-20 RX ORDER — QUETIAPINE 200 MG/1
200 TABLET, FILM COATED, EXTENDED RELEASE ORAL
Status: DISCONTINUED | OUTPATIENT
Start: 2018-05-20 | End: 2018-05-23 | Stop reason: HOSPADM

## 2018-05-20 RX ORDER — SODIUM CHLORIDE 9 MG/ML
125 INJECTION, SOLUTION INTRAVENOUS CONTINUOUS
Status: DISCONTINUED | OUTPATIENT
Start: 2018-05-20 | End: 2018-05-20

## 2018-05-20 RX ORDER — ACETAMINOPHEN 325 MG/1
650 TABLET ORAL EVERY 6 HOURS PRN
Status: DISCONTINUED | OUTPATIENT
Start: 2018-05-20 | End: 2018-05-22

## 2018-05-20 RX ORDER — VENLAFAXINE 75 MG/1
150 TABLET ORAL 2 TIMES DAILY
Status: DISCONTINUED | OUTPATIENT
Start: 2018-05-21 | End: 2018-05-23 | Stop reason: HOSPADM

## 2018-05-20 RX ORDER — NICOTINE 21 MG/24HR
1 PATCH, TRANSDERMAL 24 HOURS TRANSDERMAL DAILY
Status: DISCONTINUED | OUTPATIENT
Start: 2018-05-21 | End: 2018-05-23 | Stop reason: HOSPADM

## 2018-05-20 RX ORDER — ONDANSETRON 2 MG/ML
4 INJECTION INTRAMUSCULAR; INTRAVENOUS EVERY 6 HOURS PRN
Status: DISCONTINUED | OUTPATIENT
Start: 2018-05-20 | End: 2018-05-23 | Stop reason: HOSPADM

## 2018-05-20 RX ORDER — HEPARIN SODIUM 10000 [USP'U]/100ML
3-30 INJECTION, SOLUTION INTRAVENOUS
Status: DISCONTINUED | OUTPATIENT
Start: 2018-05-20 | End: 2018-05-21

## 2018-05-20 RX ORDER — DILTIAZEM HYDROCHLORIDE 5 MG/ML
15 INJECTION INTRAVENOUS ONCE
Status: COMPLETED | OUTPATIENT
Start: 2018-05-20 | End: 2018-05-20

## 2018-05-20 RX ADMIN — CEFEPIME HYDROCHLORIDE 2000 MG: 2 INJECTION, POWDER, FOR SOLUTION INTRAVENOUS at 20:44

## 2018-05-20 RX ADMIN — DILTIAZEM HYDROCHLORIDE 10 MG/HR: 5 INJECTION INTRAVENOUS at 18:20

## 2018-05-20 RX ADMIN — LEVALBUTEROL HYDROCHLORIDE 1.25 MG: 1.25 SOLUTION, CONCENTRATE RESPIRATORY (INHALATION) at 23:29

## 2018-05-20 RX ADMIN — IOHEXOL 100 ML: 350 INJECTION, SOLUTION INTRAVENOUS at 19:23

## 2018-05-20 RX ADMIN — SODIUM CHLORIDE 1000 ML: 0.9 INJECTION, SOLUTION INTRAVENOUS at 16:42

## 2018-05-20 RX ADMIN — DILTIAZEM HYDROCHLORIDE 15 MG: 5 INJECTION INTRAVENOUS at 16:41

## 2018-05-20 RX ADMIN — METOPROLOL TARTRATE 5 MG: 5 INJECTION, SOLUTION INTRAVENOUS at 17:06

## 2018-05-20 RX ADMIN — HEPARIN SODIUM 8000 UNITS: 1000 INJECTION, SOLUTION INTRAVENOUS; SUBCUTANEOUS at 23:39

## 2018-05-20 RX ADMIN — FAMOTIDINE 20 MG: 10 INJECTION, SOLUTION INTRAVENOUS at 19:34

## 2018-05-20 RX ADMIN — SODIUM CHLORIDE 50 ML/HR: 0.9 INJECTION, SOLUTION INTRAVENOUS at 23:28

## 2018-05-20 RX ADMIN — SODIUM CHLORIDE 125 ML/HR: 0.9 INJECTION, SOLUTION INTRAVENOUS at 19:50

## 2018-05-20 NOTE — ED NOTES
Spoke with physician , aware patient HR remains elevated in 160's, awaiting new orders        Patricia Castaneda RN  05/20/18 [id number]

## 2018-05-20 NOTE — ED PROVIDER NOTES
History  Chief Complaint   Patient presents with    Chest Pain     Patient reports left sided chest pain  Patient reports pain is mostly occurs during coughing and also during deep breathing  Patient blood is present in mucous during cough  48 YO male presents with chest discomfort and shortness of breath with cough  States this has been present for the last 3 days, constant, unchanged  States the chest discomfort has been aching, non-radiating, no known exacerbating or alleviating factors  Denies fevers or chills, no known sick contacts  Pt states he became concerned this evening when he noticed blood in his expectorant  Pt states symptoms are consistent with previous PNA  Pt denies F/C/N/V/D/C, no dysuria, burning on urination or blood in urine  History provided by:  Patient and spouse   used: No    Chest Pain   Pain location:  L chest  Pain quality: aching    Pain radiates to:  Does not radiate  Pain severity:  Mild  Onset quality:  Gradual  Duration:  3 days  Timing:  Constant  Progression:  Waxing and waning  Chronicity:  New  Relieved by:  Nothing  Worsened by:  Nothing tried  Ineffective treatments:  None tried  Associated symptoms: cough and shortness of breath    Associated symptoms: no abdominal pain, no anorexia, no claudication, no dizziness, no dysphagia, no fatigue, no fever, no heartburn, no nausea, no numbness, no palpitations, not vomiting and no weakness    Cough:     Cough characteristics:  Productive    Sputum characteristics:  Bloody    Severity:  Moderate    Onset quality:  Gradual    Duration:  3 days    Timing:  Intermittent    Progression:  Waxing and waning    Chronicity:  New  Shortness of breath:     Severity:  Moderate    Onset quality:  Gradual    Duration:  3 days    Timing:  Intermittent    Progression:  Waxing and waning      Prior to Admission Medications   Prescriptions Last Dose Informant Patient Reported? Taking?    QUEtiapine (SEROquel XR) 150 mg 24 hr tablet   Yes Yes   Sig: Take 200 mg by mouth daily at bedtime     clonazePAM (KlonoPIN) 1 mg tablet   Yes Yes   Sig: Take 1 mg by mouth 3 (three) times a day as needed for seizures  oxyCODONE (ROXICODONE) 5 mg immediate release tablet   No Yes   Sig: Earliest Fill Date: 3/17/17 Take 1 to 2 tablets by mouth every 4 hours as needed for pain   Patient taking differently: 10 mg Take 1 to 2 tablets by mouth every 4 hours as needed for pain    ranitidine (ZANTAC) 150 MG capsule   Yes Yes   Sig: Take 150 mg by mouth daily   venlafaxine (EFFEXOR) 75 mg tablet   Yes Yes   Sig: Take 150 mg by mouth 2 (two) times a day        Facility-Administered Medications: None       Past Medical History:   Diagnosis Date    Anxiety     GERD (gastroesophageal reflux disease)     History of ileus     Insomnia     MDD (major depressive disorder)     Tobacco abuse        Past Surgical History:   Procedure Laterality Date    BACK SURGERY  2015    Lumbar    KNEE SURGERY      right    ME TREAT TIBIAL SHAFT FX, INTRAMED IMPLANT Left 3/16/2017    Procedure: INSERTION NAIL IM TIBIA;  Surgeon: Mara Espinoza DO;  Location: [facility];  Service: Orthopedics       Family History   Problem Relation Age of Onset    Pancreatic cancer Mother     COPD Father     Heart attack Brother 64     decreased    Narcolepsy Brother      I have reviewed and agree with the history as documented  Social History   Substance Use Topics    Smoking status: Current Every Day Smoker     Packs/day: 1 00    Smokeless tobacco: Never Used      Comment: Pt would like counseling    Alcohol use No        Review of Systems   Constitutional: Negative for fatigue and fever  HENT: Negative for dental problem and trouble swallowing  Eyes: Negative for visual disturbance  Respiratory: Positive for cough and shortness of breath  Cardiovascular: Positive for chest pain  Negative for palpitations and claudication     Gastrointestinal: Negative for abdominal pain, anorexia, heartburn, nausea and vomiting  Genitourinary: Negative for dysuria and frequency  Musculoskeletal: Negative for neck pain and neck stiffness  Skin: Negative for rash  Neurological: Negative for dizziness, weakness, light-headedness and numbness  Psychiatric/Behavioral: Negative for agitation, behavioral problems and confusion  All other systems reviewed and are negative  Physical Exam  Physical Exam   Constitutional: He is oriented to person, place, and time  He appears well-developed and well-nourished  HENT:   Head: Normocephalic and atraumatic  Eyes: EOM are normal    Neck: Normal range of motion  Cardiovascular: Regular rhythm and normal heart sounds  Tachycardia present  Pulmonary/Chest: Effort normal and breath sounds normal    Abdominal: Soft  There is no tenderness  Musculoskeletal: Normal range of motion  Neurological: He is alert and oriented to person, place, and time  Skin: Skin is warm and dry  Psychiatric: He has a normal mood and affect  His behavior is normal    Nursing note and vitals reviewed        Vital Signs  ED Triage Vitals [05/20/18 1620]   Temperature Pulse Respirations Blood Pressure SpO2   98 2 °F (36 8 °C) 83 18 125/85 96 %      Temp Source Heart Rate Source Patient Position - Orthostatic VS BP Location FiO2 (%)   Temporal Monitor Sitting Right arm --      Pain Score       8           Vitals:    05/21/18 0045 05/21/18 0115 05/21/18 0200 05/21/18 0231   BP: 112/81 111/71 103/74 115/65   Pulse: (!) 162 (!) 162 (!) 160 (!) 162   Patient Position - Orthostatic VS: Lying Lying Lying Lying       Visual Acuity      ED Medications  Medications   cefepime (MAXIPIME) 2 g/50 mL dextrose IVPB (not administered)   diltiazem (CARDIZEM) 125 mg in sodium chloride 0 9 % 125 mL infusion (not administered)   sodium chloride 0 9 % infusion (50 mL/hr Intravenous New Bag 5/20/18 7272)   venlafaxine (EFFEXOR) tablet 150 mg (not administered) famotidine (PEPCID) tablet 20 mg (not administered)   QUEtiapine (SEROquel XR) 24 hr tablet 200 mg (200 mg Oral Given 5/21/18 0040)   clonazePAM (KlonoPIN) tablet 0 5 mg (0 5 mg Oral Given 5/21/18 0000)   nicotine (NICODERM CQ) 14 mg/24hr TD 24 hr patch 1 patch (not administered)   ondansetron (ZOFRAN) injection 4 mg (not administered)   acetaminophen (TYLENOL) tablet 650 mg (not administered)   vancomycin (VANCOCIN) 1,750 mg in sodium chloride 0 9 % 500 mL IVPB (1,750 mg Intravenous New Bag 5/21/18 0058)   levalbuterol (XOPENEX) inhalation solution 1 25 mg (1 25 mg Nebulization Given 5/20/18 2329)   heparin (porcine) 25,000 units in 250 mL infusion (premix) (18 Units/kg/hr × 100 kg (Order-Specific) Intravenous New Bag 5/21/18 0006)   tuberculin injection 5 Units (5 Units Intradermal Given 5/21/18 0107)   amiodarone 150 mg in dextrose 5 % 100 mL IV bolus (150 mg Intravenous New Bag 5/21/18 0104)     Followed by   amiodarone (CORDARONE) 900 mg in dextrose 5 % 500 mL infusion (1 mg/min Intravenous New Bag 5/21/18 0203)     Followed by   amiodarone (CORDARONE) 900 mg in dextrose 5 % 500 mL infusion (not administered)   sodium chloride 0 9 % bolus 1,000 mL (0 mL Intravenous Stopped 5/20/18 1950)   diltiazem (CARDIZEM) injection 15 mg (15 mg Intravenous Given 5/20/18 1641)   metoprolol (LOPRESSOR) injection 5 mg (5 mg Intravenous Given 5/20/18 1706)   diltiazem (CARDIZEM) 125 mg in sodium chloride 0 9 % 125 mL infusion (7 5 mg/hr Intravenous Rate/Dose Change 5/21/18 0206)   iohexol (OMNIPAQUE) 350 MG/ML injection (MULTI-DOSE) 100 mL (100 mL Intravenous Given 5/20/18 1923)   famotidine (PEPCID) injection 20 mg (20 mg Intravenous Given 5/20/18 1934)   cefepime (MAXIPIME) 2 g/50 mL dextrose IVPB (0 mg Intravenous Stopped 5/20/18 2115)   heparin (porcine) injection 8,000 Units (8,000 Units Intravenous Given 5/20/18 2769)   oxyCODONE (ROXICODONE) immediate release tablet 10 mg (10 mg Oral Given 5/21/18 0041) Diagnostic Studies  Results Reviewed     Procedure Component Value Units Date/Time    Strep Pneumoniae, Urine [[id number]] Collected:  05/21/18 0051    Lab Status: In process Specimen:  Urine from Urine, Clean Catch Updated:  05/21/18 0101    AFB Culture with Stain [[id number]] Collected:  05/21/18 0051    Lab Status: In process Specimen:  Other from Expectorated Sputum Updated:  05/21/18 0101    Legionella antigen, urine [[id number]] Collected:  05/21/18 0051    Lab Status: In process Specimen:  Urine from Urine, Clean Catch Updated:  05/21/18 0101    Troponin I [[id number]]  (Normal) Collected:  05/21/18 0006    Lab Status:  Final result Specimen:  Blood from Arm, Left Updated:  05/21/18 0035     Troponin I <0 02 ng/mL     Narrative:         S[employer] Chemistry analyzer 99% cutoff is > 0 04 ng/mL in network labs    o cTnI 99% cutoff is useful only when applied to patients in the clinical setting of myocardial ischemia  o cTnI 99% cutoff should be interpreted in the context of clinical history, ECG findings and possibly cardiac imaging to establish correct diagnosis  o cTnI 99% cutoff may be suggestive but clearly not indicative of a coronary event without the clinical setting of myocardial ischemia      Platelet count [[id number]]  (Normal) Collected:  05/21/18 0006    Lab Status:  Final result Specimen:  Blood from Arm, Left Updated:  05/21/18 0015     Platelets 170 Thousands/uL      MPV 11 5 fL     Troponin I [[id number]]     Lab Status:  No result Specimen:  Blood     APTT [[id number]]  (Abnormal) Collected:  05/20/18 2246    Lab Status:  Final result Specimen:  Blood from Arm, Left Updated:  05/20/18 2322     PTT 41 (H) seconds     Protime-INR [[id number]]  (Abnormal) Collected:  05/20/18 2246    Lab Status:  Final result Specimen:  Blood from Arm, Left Updated:  05/20/18 2321     Protime 14 9 (H) seconds      INR 1 16    CBC [[id number]]  (Abnormal) Collected:  05/20/18 2246    Lab Status:  Final result Specimen:  Blood from Arm, Left Updated:  05/20/18 2252     WBC 11 34 (H) Thousand/uL      RBC 4 83 Million/uL      Hemoglobin 15 1 g/dL      Hematocrit 44 3 %      MCV 92 fL      MCH 31 3 pg      MCHC 34 1 g/dL      RDW 12 8 %      Platelets 854 Thousands/uL      MPV 11 6 fL     Lactic acid, plasma [[id number]]  (Normal) Collected:  05/20/18 1943    Lab Status:  Final result Specimen:  Blood from Hand, Left Updated:  05/20/18 2013     LACTIC ACID 0 7 mmol/L     Narrative:         Result may be elevated if tourniquet was used during collection  Blood culture #2 [[id number]] Collected:  05/20/18 1943    Lab Status: In process Specimen:  Blood from Arm, Right Updated:  05/20/18 1947    Blood culture #1 [[id number]] Collected:  05/20/18 1943    Lab Status: In process Specimen:  Blood from Hand, Left Updated:  05/20/18 1947    Comprehensive metabolic panel [[id number]]  (Abnormal) Collected:  05/20/18 1633    Lab Status:  Final result Specimen:  Blood from Arm, Left Updated:  05/20/18 1706     Sodium 137 mmol/L      Potassium 3 7 mmol/L      Chloride 102 mmol/L      CO2 26 mmol/L      Anion Gap 9 mmol/L      BUN 15 mg/dL      Creatinine 1 12 mg/dL      Glucose 130 mg/dL      Calcium 9 0 mg/dL      AST 27 U/L      ALT 71 U/L      Alkaline Phosphatase 205 (H) U/L      Total Protein 7 8 g/dL      Albumin 2 9 (L) g/dL      Total Bilirubin 0 74 mg/dL      eGFR 76 ml/min/1 73sq m     Narrative:         National Kidney Disease Education Program recommendations are as follows:  GFR calculation is accurate only with a steady state creatinine  Chronic Kidney disease less than 60 ml/min/1 73 sq  meters  Kidney failure less than 15 ml/min/1 73 sq  meters      Magnesium [[id number]]  (Normal) Collected:  05/20/18 1633    Lab Status:  Final result Specimen:  Blood from Arm, Left Updated:  05/20/18 1706     Magnesium 1 9 mg/dL     Troponin I [[id number]]  (Normal) Collected:  05/20/18 1633    Lab Status:  Final result Specimen:  Blood from Arm, Left Updated: 05/20/18 1701     Troponin I <0 02 ng/mL     Narrative:         [employer] Chemistry analyzer 99% cutoff is > 0 04 ng/mL in network labs    o cTnI 99% cutoff is useful only when applied to patients in the clinical setting of myocardial ischemia  o cTnI 99% cutoff should be interpreted in the context of clinical history, ECG findings and possibly cardiac imaging to establish correct diagnosis  o cTnI 99% cutoff may be suggestive but clearly not indicative of a coronary event without the clinical setting of myocardial ischemia  CBC and differential [[id number]]  (Abnormal) Collected:  05/20/18 1633    Lab Status:  Final result Specimen:  Blood from Arm, Left Updated:  05/20/18 1645     WBC 14 45 (H) Thousand/uL      RBC 5 14 Million/uL      Hemoglobin 16 4 g/dL      Hematocrit 47 2 %      MCV 92 fL      MCH 31 9 pg      MCHC 34 7 g/dL      RDW 12 8 %      MPV 11 5 fL      Platelets 366 Thousands/uL      nRBC 0 /100 WBCs      Neutrophils Relative 77 (H) %      Lymphocytes Relative 13 (L) %      Monocytes Relative 9 %      Eosinophils Relative 0 %      Basophils Relative 0 %      Neutrophils Absolute 11 16 (H) Thousands/µL      Lymphocytes Absolute 1 92 Thousands/µL      Monocytes Absolute 1 30 (H) Thousand/µL      Eosinophils Absolute 0 05 Thousand/µL      Basophils Absolute 0 02 Thousands/µL                  CTA ED chest PE study   Final Result by Kimmie Recio MD (05/20 2130)      1  Left upper lobe airspace disease with central cavitary change  Findings most suggestive of a cavitary/necrotizing pneumonia  Tuberculosis precautions recommended until proven otherwise  Follow-up recommended to assure resolution   2  Mediastinal and left hilar adenopathy likely reactive, which can also be reassessed during follow-up   3   Benign left adrenal adenoma       [facility] Júnior Chavarria on 5/20/2018 at 7:48 PM    Tuberculosis pathway alerted                  Workstation performed: [id number] X-ray chest 2 views    (Results Pending)              Procedures  ECG 12 Lead Documentation  Date/Time: 5/20/2018 4:38 PM  Performed by: David Eng by: Cosme Blank     ECG reviewed by me, the ED Provider: yes    Patient location:  ED  Interpretation:     Interpretation: normal    Rate:     ECG rate:  164    ECG rate assessment: tachycardic    Rhythm:     Rhythm: atrial flutter    QRS:     QRS axis:  Normal    QRS intervals:  Normal  Conduction:     Conduction: normal    ST segments:     ST segments:  Normal  T waves:     T waves: normal               Phone Contacts  ED Phone Contact    ED Course  ED Course as of May 21 0235   Sun May 20, 2018   1756 Spoke with Dr Jess Townsend, recommends diltiazem drip and admission  MDM  Number of Diagnoses or Management Options  Atrial flutter [facility]): new and requires workup  Pneumonia: new and requires workup  Diagnosis management comments: 1  Atrial flutter - Pt with new onset atrial flutter, chest discomfort but appears generally well  Will check troponin for ACS, CXR, electrolytes, CBC  Will try diltiazem  Pt was found to have what appeared to be a cavitary lesion on CXR, a CT was ordered  Pt states his current symptoms are similar to previous PNA  Will treat PNA, continue medications for atrial flutter, this has not appeared to change despite bolus of cardizem and metoprolol and now cardizem drip  Pt will require further evaluation and management as an admission         Amount and/or Complexity of Data Reviewed  Clinical lab tests: ordered and reviewed  Tests in the radiology section of CPT®: ordered and reviewed  Obtain history from someone other than the patient: yes  Discuss the patient with other providers: yes  Independent visualization of images, tracings, or specimens: yes    Patient Progress  Patient progress: stable    CritCare Time    Disposition  Final diagnoses:   Pneumonia - With cavitary lesion rule out tuberculosis Atrial flutter ([facility] [address] )     Time reflects when diagnosis was documented in both MDM as applicable and the Disposition within this note     Time User Action Codes Description Comment    5/20/2018  8:41 PM Cleavon Randy E Add [J18 9] Pneumonia     5/20/2018  8:41 PM Cleavon Randy E Add [I48 [id number]] Atrial flutter ([facility] [address] )     5/20/2018 11:41 PM Robin Pikjey [J18 9] Pneumonia With cavitary lesion rule out tuberculosis      ED Disposition     ED Disposition Condition Comment    Admit  Case was discussed with Danielle and the patient's admission status was agreed to be Admission Status: inpatient status to the service of Dr Lucero Turcios   Follow-up Information    None         Patient's Medications   Discharge Prescriptions    No medications on file     No discharge procedures on file      ED Provider  Electronically Signed by           Sary Parker MD  05/21/18 0854

## 2018-05-21 ENCOUNTER — APPOINTMENT (INPATIENT)
Dept: NON INVASIVE DIAGNOSTICS | Facility: HOSPITAL | Age: 53
DRG: 194 | End: 2018-05-21
Payer: COMMERCIAL

## 2018-05-21 PROBLEM — J98.4 CAVITARY PNEUMONIA: Status: ACTIVE | Noted: 2018-05-20

## 2018-05-21 LAB
APTT PPP: 60 SECONDS (ref 24–36)
APTT PPP: >210 SECONDS (ref 24–36)
ATRIAL RATE: 164 BPM
ATRIAL RATE: 168 BPM
ATRIAL RATE: 328 BPM
ATRIAL RATE: 357 BPM
L PNEUMO1 AG UR QL IA.RAPID: NEGATIVE
P AXIS: -85 DEGREES
P AXIS: -87 DEGREES
P AXIS: 241 DEGREES
P AXIS: 268 DEGREES
PLATELET # BLD AUTO: 286 THOUSANDS/UL (ref 149–390)
PMV BLD AUTO: 11.5 FL (ref 8.9–12.7)
PR INTERVAL: 124 MS
QRS AXIS: 53 DEGREES
QRS AXIS: 62 DEGREES
QRS AXIS: 83 DEGREES
QRS AXIS: 91 DEGREES
QRSD INTERVAL: 78 MS
QRSD INTERVAL: 82 MS
QRSD INTERVAL: 82 MS
QRSD INTERVAL: 92 MS
QT INTERVAL: 254 MS
QT INTERVAL: 256 MS
QT INTERVAL: 256 MS
QT INTERVAL: 308 MS
QTC INTERVAL: 408 MS
QTC INTERVAL: 411 MS
QTC INTERVAL: 419 MS
QTC INTERVAL: 508 MS
S PNEUM AG UR QL: NEGATIVE
T WAVE AXIS: -14 DEGREES
T WAVE AXIS: -16 DEGREES
T WAVE AXIS: -31 DEGREES
T WAVE AXIS: -71 DEGREES
TROPONIN I SERPL-MCNC: <0.02 NG/ML
TROPONIN I SERPL-MCNC: <0.02 NG/ML
VENTRICULAR RATE: 153 BPM
VENTRICULAR RATE: 155 BPM
VENTRICULAR RATE: 164 BPM
VENTRICULAR RATE: 164 BPM

## 2018-05-21 PROCEDURE — 87081 CULTURE SCREEN ONLY: CPT | Performed by: INTERNAL MEDICINE

## 2018-05-21 PROCEDURE — 87147 CULTURE TYPE IMMUNOLOGIC: CPT | Performed by: INTERNAL MEDICINE

## 2018-05-21 PROCEDURE — 87556 M.TUBERCULO DNA AMP PROBE: CPT

## 2018-05-21 PROCEDURE — 93010 ELECTROCARDIOGRAM REPORT: CPT | Performed by: INTERNAL MEDICINE

## 2018-05-21 PROCEDURE — 87116 MYCOBACTERIA CULTURE: CPT | Performed by: HOSPITALIST

## 2018-05-21 PROCEDURE — 99254 IP/OBS CNSLTJ NEW/EST MOD 60: CPT | Performed by: INTERNAL MEDICINE

## 2018-05-21 PROCEDURE — 85049 AUTOMATED PLATELET COUNT: CPT | Performed by: PHYSICIAN ASSISTANT

## 2018-05-21 PROCEDURE — 87116 MYCOBACTERIA CULTURE: CPT | Performed by: INTERNAL MEDICINE

## 2018-05-21 PROCEDURE — 87070 CULTURE OTHR SPECIMN AEROBIC: CPT | Performed by: INTERNAL MEDICINE

## 2018-05-21 PROCEDURE — G8989 SELF CARE D/C STATUS: HCPCS

## 2018-05-21 PROCEDURE — 93306 TTE W/DOPPLER COMPLETE: CPT

## 2018-05-21 PROCEDURE — 97167 OT EVAL HIGH COMPLEX 60 MIN: CPT

## 2018-05-21 PROCEDURE — G8987 SELF CARE CURRENT STATUS: HCPCS

## 2018-05-21 PROCEDURE — 87206 SMEAR FLUORESCENT/ACID STAI: CPT | Performed by: HOSPITALIST

## 2018-05-21 PROCEDURE — 84484 ASSAY OF TROPONIN QUANT: CPT | Performed by: PHYSICIAN ASSISTANT

## 2018-05-21 PROCEDURE — 94640 AIRWAY INHALATION TREATMENT: CPT

## 2018-05-21 PROCEDURE — G8979 MOBILITY GOAL STATUS: HCPCS

## 2018-05-21 PROCEDURE — 99232 SBSQ HOSP IP/OBS MODERATE 35: CPT | Performed by: INTERNAL MEDICINE

## 2018-05-21 PROCEDURE — 97163 PT EVAL HIGH COMPLEX 45 MIN: CPT

## 2018-05-21 PROCEDURE — 87556 M.TUBERCULO DNA AMP PROBE: CPT | Performed by: INTERNAL MEDICINE

## 2018-05-21 PROCEDURE — 93306 TTE W/DOPPLER COMPLETE: CPT | Performed by: INTERNAL MEDICINE

## 2018-05-21 PROCEDURE — 99285 EMERGENCY DEPT VISIT HI MDM: CPT

## 2018-05-21 PROCEDURE — 87449 NOS EACH ORGANISM AG IA: CPT | Performed by: PHYSICIAN ASSISTANT

## 2018-05-21 PROCEDURE — 93005 ELECTROCARDIOGRAM TRACING: CPT

## 2018-05-21 PROCEDURE — 85730 THROMBOPLASTIN TIME PARTIAL: CPT | Performed by: HOSPITALIST

## 2018-05-21 PROCEDURE — G8980 MOBILITY D/C STATUS: HCPCS

## 2018-05-21 PROCEDURE — G8988 SELF CARE GOAL STATUS: HCPCS

## 2018-05-21 PROCEDURE — 87186 SC STD MICRODIL/AGAR DIL: CPT | Performed by: INTERNAL MEDICINE

## 2018-05-21 PROCEDURE — 87206 SMEAR FLUORESCENT/ACID STAI: CPT | Performed by: INTERNAL MEDICINE

## 2018-05-21 PROCEDURE — G8978 MOBILITY CURRENT STATUS: HCPCS

## 2018-05-21 PROCEDURE — 87205 SMEAR GRAM STAIN: CPT | Performed by: INTERNAL MEDICINE

## 2018-05-21 PROCEDURE — 36415 COLL VENOUS BLD VENIPUNCTURE: CPT | Performed by: PHYSICIAN ASSISTANT

## 2018-05-21 RX ORDER — OXYCODONE HYDROCHLORIDE 10 MG/1
10 TABLET ORAL ONCE
Status: COMPLETED | OUTPATIENT
Start: 2018-05-21 | End: 2018-05-21

## 2018-05-21 RX ORDER — METOPROLOL TARTRATE 5 MG/5ML
5 INJECTION INTRAVENOUS ONCE
Status: DISCONTINUED | OUTPATIENT
Start: 2018-05-21 | End: 2018-05-22

## 2018-05-21 RX ORDER — DILTIAZEM HYDROCHLORIDE 5 MG/ML
15 INJECTION INTRAVENOUS ONCE
Status: COMPLETED | OUTPATIENT
Start: 2018-05-21 | End: 2018-05-21

## 2018-05-21 RX ORDER — SODIUM CHLORIDE FOR INHALATION 0.9 %
3 VIAL, NEBULIZER (ML) INHALATION
Status: DISCONTINUED | OUTPATIENT
Start: 2018-05-21 | End: 2018-05-23 | Stop reason: HOSPADM

## 2018-05-21 RX ORDER — OXYCODONE HYDROCHLORIDE 10 MG/1
10 TABLET ORAL EVERY 6 HOURS PRN
Status: DISCONTINUED | OUTPATIENT
Start: 2018-05-21 | End: 2018-05-23 | Stop reason: HOSPADM

## 2018-05-21 RX ADMIN — OXYCODONE HYDROCHLORIDE 10 MG: 10 TABLET ORAL at 00:41

## 2018-05-21 RX ADMIN — OXYCODONE HYDROCHLORIDE 10 MG: 10 TABLET ORAL at 13:42

## 2018-05-21 RX ADMIN — CLONAZEPAM 0.5 MG: 0.5 TABLET ORAL at 10:15

## 2018-05-21 RX ADMIN — VENLAFAXINE 150 MG: 75 TABLET ORAL at 08:02

## 2018-05-21 RX ADMIN — CLONAZEPAM 0.5 MG: 0.5 TABLET ORAL at 20:42

## 2018-05-21 RX ADMIN — QUETIAPINE 200 MG: 200 TABLET, EXTENDED RELEASE ORAL at 21:24

## 2018-05-21 RX ADMIN — LEVALBUTEROL HYDROCHLORIDE 1.25 MG: 1.25 SOLUTION, CONCENTRATE RESPIRATORY (INHALATION) at 19:02

## 2018-05-21 RX ADMIN — ISODIUM CHLORIDE 3 ML: 0.03 SOLUTION RESPIRATORY (INHALATION) at 19:02

## 2018-05-21 RX ADMIN — DILTIAZEM HYDROCHLORIDE 15 MG/HR: 5 INJECTION INTRAVENOUS at 11:05

## 2018-05-21 RX ADMIN — OXYCODONE HYDROCHLORIDE 10 MG: 10 TABLET ORAL at 20:43

## 2018-05-21 RX ADMIN — METOPROLOL TARTRATE 25 MG: 25 TABLET ORAL at 20:44

## 2018-05-21 RX ADMIN — FAMOTIDINE 20 MG: 20 TABLET ORAL at 08:01

## 2018-05-21 RX ADMIN — HEPARIN SODIUM AND DEXTROSE 18 UNITS/KG/HR: 10000; 5 INJECTION INTRAVENOUS at 00:06

## 2018-05-21 RX ADMIN — VANCOMYCIN HYDROCHLORIDE 1750 MG: 1 INJECTION, POWDER, LYOPHILIZED, FOR SOLUTION INTRAVENOUS at 00:58

## 2018-05-21 RX ADMIN — AMIODARONE HYDROCHLORIDE 150 MG: 50 INJECTION, SOLUTION INTRAVENOUS at 01:04

## 2018-05-21 RX ADMIN — OXYCODONE HYDROCHLORIDE 10 MG: 10 TABLET ORAL at 05:54

## 2018-05-21 RX ADMIN — CEFTRIAXONE 1000 MG: 1 INJECTION, SOLUTION INTRAVENOUS at 11:53

## 2018-05-21 RX ADMIN — CLONAZEPAM 0.5 MG: 0.5 TABLET ORAL at 00:00

## 2018-05-21 RX ADMIN — CLONAZEPAM 0.5 MG: 0.5 TABLET ORAL at 17:03

## 2018-05-21 RX ADMIN — LEVALBUTEROL HYDROCHLORIDE 1.25 MG: 1.25 SOLUTION, CONCENTRATE RESPIRATORY (INHALATION) at 08:07

## 2018-05-21 RX ADMIN — DILTIAZEM HYDROCHLORIDE 15 MG: 5 INJECTION INTRAVENOUS at 06:50

## 2018-05-21 RX ADMIN — LEVALBUTEROL HYDROCHLORIDE 1.25 MG: 1.25 SOLUTION, CONCENTRATE RESPIRATORY (INHALATION) at 12:58

## 2018-05-21 RX ADMIN — ISODIUM CHLORIDE 3 ML: 0.03 SOLUTION RESPIRATORY (INHALATION) at 08:08

## 2018-05-21 RX ADMIN — DILTIAZEM HYDROCHLORIDE 15 MG/HR: 5 INJECTION INTRAVENOUS at 05:28

## 2018-05-21 RX ADMIN — AMIODARONE HYDROCHLORIDE 1 MG/MIN: 50 INJECTION, SOLUTION INTRAVENOUS at 02:03

## 2018-05-21 RX ADMIN — ISODIUM CHLORIDE 3 ML: 0.03 SOLUTION RESPIRATORY (INHALATION) at 12:58

## 2018-05-21 RX ADMIN — METOPROLOL TARTRATE 25 MG: 25 TABLET ORAL at 10:08

## 2018-05-21 RX ADMIN — QUETIAPINE 200 MG: 200 TABLET, EXTENDED RELEASE ORAL at 00:40

## 2018-05-21 RX ADMIN — APIXABAN 5 MG: 5 TABLET, FILM COATED ORAL at 18:58

## 2018-05-21 RX ADMIN — TUBERCULIN PURIFIED PROTEIN DERIVATIVE 5 UNITS: 5 INJECTION, SOLUTION INTRADERMAL at 01:07

## 2018-05-21 RX ADMIN — SODIUM CHLORIDE 100 ML/HR: 0.9 INJECTION, SOLUTION INTRAVENOUS at 11:11

## 2018-05-21 RX ADMIN — VENLAFAXINE 150 MG: 75 TABLET ORAL at 17:03

## 2018-05-21 RX ADMIN — APIXABAN 5 MG: 5 TABLET, FILM COATED ORAL at 11:53

## 2018-05-21 RX ADMIN — SODIUM CHLORIDE 500 ML: 0.9 INJECTION, SOLUTION INTRAVENOUS at 06:12

## 2018-05-21 NOTE — ED NOTES
Per [facility] admit Dr Santos, cardizem drip is to be titrated down to 10mg/hr then to be decreased by 2 5mg until drip can be discontinued        Osmar Dailey RN  05/21/18 9763

## 2018-05-21 NOTE — H&P
History and Physical - [facility] Internal Medicine    Patient Information: Julio Vee  47 y o  male MRN: [id number]  Unit/Bed#: ED 16 Encounter: [id number]  Admitting Physician: Mojgan Sultana MD  PCP: Sofi Louie MD  Date of Admission:  05/20/18    Assessment/Plan:    Hospital Problem List:     Principal Problem:    Atrial flutter [facility])  Active Problems:    Chest pain    Pneumonia    Adenoma of left adrenal gland      Plan for the Primary Problem(s):  Principal problem:  Atrial flutter :  Current hemodynamically stable however given propensity for hypertension and complications patient to be monitored in ICU setting  Cardiology is aware  S/p  15 mg of Cardizem bolus and metoprolol 5 mg IV x1 in the emergency  Presently on Cardizem drip which is continued  Amiodarone bolus initiated, continue his current IV amiodarone drip as per Cards consultation    1  Chest pain rule out ACS:  Appear to be likely secondary to cavitary lesion  Initial troponin is normal, will follow up on serum troponin  2   Left upper lobe cavitary pneumonia rule out TB:  Status post vancomycin and cefepime  Will continue with vancomycin and cefepime  Will place PPD and follow-up on a early morning sputum expectorant for a AFB  X3 days  Was to follow up on blood culture  Consult pulmonology, and consider  consultation with ID if warranted  Will need negative air pressure room    3  Atrial flutter with rapid ventricular response:  S/p  15 mg of Cardizem bolus and metoprolol 5 mg IV x1 in the emergency  Presently on Cardizem drip which is continued  Will place order for echocardiogram and consult Cardiology  4   Left adrenal adenoma:  Incidental finding  Will need further follow-up as outpatient  Plan for Additional Problems:   1  Depression; continue home medication with venlafaxine, quetiapine  2  Anxiety :  Will continue home medication as well with Klonopin  3  Gastroesophageal reflux disease   Appear to be stable  Continue ranitidine  4  Chronic back pain  :  Continue home oxycodone p r n  VTE Prophylaxis: Heparin Drip  / sequential compression device   Code Status:  Full code  POLST: There is no POLST form on file for this patient (pre-hospital)    Anticipated Length of Stay:  Patient will be admitted on an Inpatient basis with an anticipated length of stay of  3 2 midnights  Justification for Hospital Stay:  IV antibiotics, Cardizem drip, rule out tuberculosis    Total Time for Visit, including Counseling / Coordination of Care: 45 minutes  Greater than 50% of this total time spent on direct patient counseling and coordination of care  Chief Complaint:   Chest pain, shortness of breath, hemoptysis  History of Present Illness:  Very pleasant 69-year-old with anxiety, hypertension hyperlipidemia history of ileus history of major depressive disorder presented to us with chest pain shortness of breath as he was found to be in atrial flutter at emergency department level  Patient also reported chest discomfort and shortness of breath with cough  States this has been present for the last 3 days, constant, unchanged  States the chest discomfort has been aching, non-radiating, no known exacerbating or alleviating factors  Denies fevers or chills, no known sick contacts  Pt states he became concerned this evening when he noticed blood in his expectorant  Pt states symptoms are consistent with previous PNA  Pt denies F/C/N/V/D/C, no dysuria, burning on urination or blood in urine  Cardiology was reached out at emergency department level due to ongoing arrhythmia resistant to initial efforts patient was started on IV amiodarone drip after bolus, and diltiazem  Due to complexity of the case decision was made to monitor patient in ICU setting    Patient was also found to have cavitary lesion on CT chest on left upper lobe, placed on isolation for active TB, pulmonology consultation obtained, infectious Disease consultation will be added as well  Clyde Mac denies current weight loss nausea vomiting denies any changes in bowel or urinary habits denies fever  Review of Systems:  Twelve point system was reviewed all negative except as per HPI  Review of Systems    Past Medical and Surgical History:     Past Medical History:   Diagnosis Date    Anxiety     GERD (gastroesophageal reflux disease)     History of ileus     Insomnia     MDD (major depressive disorder)     Tobacco abuse        Past Surgical History:   Procedure Laterality Date    BACK SURGERY  2015    Lumbar    KNEE SURGERY      right    IA TREAT TIBIAL SHAFT FX, INTRAMED IMPLANT Left 3/16/2017    Procedure: INSERTION NAIL IM TIBIA;  Surgeon: Dodie Ruano DO;  Location: [facility];  Service: Orthopedics       Meds/Allergies:    Prior to Admission medications    Medication Sig Start Date End Date Taking? Authorizing Provider   clonazePAM (KlonoPIN) 1 mg tablet Take 1 mg by mouth 3 (three) times a day as needed for seizures     Yes Historical Provider, MD   oxyCODONE (ROXICODONE) 5 mg immediate release tablet Earliest Fill Date: 3/17/17 Take 1 to 2 tablets by mouth every 4 hours as needed for pain  Patient taking differently: 10 mg Take 1 to 2 tablets by mouth every 4 hours as needed for pain  3/17/17  Yes Edna Abrams DO   QUEtiapine (SEROquel XR) 150 mg 24 hr tablet Take 200 mg by mouth daily at bedtime     Yes Historical Provider, MD   ranitidine (ZANTAC) 150 MG capsule Take 150 mg by mouth daily   Yes Historical Provider, MD   venlafaxine (EFFEXOR) 75 mg tablet Take 150 mg by mouth 2 (two) times a day     Yes Historical Provider, MD   aspirin (ECOTRIN) 325 mg EC tablet Take 1 tablet by mouth 2 (two) times a day for 30 days 3/17/17 5/20/18  Dodie Ruano DO   docusate sodium (COLACE) 100 mg capsule Take 1 capsule by mouth 2 (two) times a day as needed for constipation for up to 30 days 3/17/17 5/20/18  Dodie Ruano DO     I have reviewed home medications with patient personally  Allergies: No Known Allergies    Social History:     Marital Status: Single   Occupation:  Former in the [employer]  Patient Pre-hospital Living Situation:  Lives with his wife  Patient Pre-hospital Level of Mobility:  More via  Patient Pre-hospital Diet Restrictions:  No restriction  Substance Use History:   History   Alcohol Use No     History   Smoking Status    Current Every Day Smoker    Packs/day: 1 00   Smokeless Tobacco    Never Used     Comment: Pt would like counseling     History   Drug Use No       Family History:    COPD father  Heart attack  Brother  Narcolepsy Brother  Pancreatic cancer mother    Physical Exam:     Vitals:   Blood Pressure: 108/76 (05/20/18 2315)  Pulse: (!) 150 (05/20/18 2315)  Temperature: 98 2 °F (36 8 °C) (05/20/18 1620)  Temp Source: Temporal (05/20/18 1620)  Respirations: 22 (05/20/18 2315)  SpO2: 94 % (05/20/18 2315)    Physical Exam    Physical Exam  General patient is alert awake oriented x3 in a mild to moderate distress   HEENT normocephalic atraumatic pupils equal and reactive, extraocular muscle intact  Cardiovascular; irregularly irregular  Lungs:  Clear to auscultation bilaterally no rales or wheezes  Abdomen:  Obese soft nontender, bowel sounds positive  Extremities:  No pedal edema or cyanosis, peripheral pulses are palpable no cuff tenderness  Neurologic:  No focal deficit, motor strength exam is grossly intact, extraocular muscles intact  Psychiatric:  Mood and affect appear appropriate  Additional Data:     Lab Results: I have personally reviewed pertinent reports          Results from last 7 days  Lab Units 05/20/18  2246 05/20/18  1633   WBC Thousand/uL 11 34* 14 45*   HEMOGLOBIN g/dL 15 1 16 4   HEMATOCRIT % 44 3 47 2   PLATELETS Thousands/uL 281 300   NEUTROS PCT %  --  77*   LYMPHS PCT %  --  13*   MONOS PCT %  --  9   EOS PCT %  --  0       Results from last 7 days  Lab Units 05/20/18  1633   SODIUM mmol/L 137   POTASSIUM mmol/L 3 7   CHLORIDE mmol/L 102   CO2 mmol/L 26   BUN mg/dL 15   CREATININE mg/dL 1 12   CALCIUM mg/dL 9 0   TOTAL PROTEIN g/dL 7 8   BILIRUBIN TOTAL mg/dL 0 74   ALK PHOS U/L 205*   ALT U/L 71   AST U/L 27   GLUCOSE RANDOM mg/dL 130       Results from last 7 days  Lab Units 05/20/18  2246   INR  1 16       Imaging:     Cta Ed Chest Pe Study    Result Date: 5/20/2018  Narrative: CTA - CHEST WITH IV CONTRAST - PULMONARY ANGIOGRAM INDICATION:   chest pain  Coughing COMPARISON: No prior CT studies of the chest TECHNIQUE: CTA examination of the chest was performed using angiographic technique according to a protocol specifically tailored to evaluate for pulmonary embolism  Axial, sagittal, and coronal 2D reformatted images were created from the source data and  submitted for interpretation  In addition, coronal 3D MIP postprocessing was performed on the acquisition scanner  Radiation dose length product (DLP) for this visit:  508 mGy-cm   This examination, like all CT scans performed in the [facility], was performed utilizing techniques to minimize radiation dose exposure, including the use of iterative reconstruction and automated exposure control  IV Contrast:  100 mL of iohexol (OMNIPAQUE)  FINDINGS: PULMONARY ARTERIAL TREE:  No pulmonary embolus is seen  LUNGS:  There is airspace opacification identified within the left upper lobe  Centrally within the airspace disease, cavitary changes identified, on image 3/14 measuring 1 7 x 1 5 cm in size  Additional patchy foci of groundglass opacity are seen surrounding the central opacification  PLEURA:  Unremarkable  HEART/AORTA:  Unremarkable for patient's age  MEDIASTINUM AND AYSE:  There is mediastinal and left hilar adenopathy  For example, AP window node on image 2/86 measures 2 0 x 1 3 cm  CHEST WALL AND LOWER NECK:   Unremarkable   VISUALIZED STRUCTURES IN THE UPPER ABDOMEN:  Hypodense nodule left adrenal gland measuring 2 3 cm consistent with adenoma OSSEOUS STRUCTURES:  No acute fracture or destructive osseous lesion  Impression: 1  Left upper lobe airspace disease with central cavitary change  Findings most suggestive of a cavitary/necrotizing pneumonia  Tuberculosis precautions recommended until proven otherwise  Follow-up recommended to assure resolution 2  Mediastinal and left hilar adenopathy likely reactive, which can also be reassessed during follow-up 3  Benign left adrenal adenoma  I personally discussed this study with Karolina Lala on 5/20/2018 at 7:48 PM  Tuberculosis pathway alerted Workstation performed: [id number]       EKG, Pathology, and Other Studies Reviewed on Admission:   · EKG:   a flutter with rapid response    Allscripts / Epic Records Reviewed: No     ** Please Note: This note has been constructed using a voice recognition system   **

## 2018-05-21 NOTE — CASE MANAGEMENT
Initial Clinical Review    Admission: Date/Time/Statement: 5/20/18 @ 2042     Orders Placed This Encounter   Procedures    Inpatient Admission (expected length of stay for this patient is greater than two midnights)     Standing Status:   Standing     Number of Occurrences:   1     Order Specific Question:   Admitting Physician     Answer:   Belen Concepcion [[id number]]     Order Specific Question:   Level of Care     Answer:   Med Surg [16]     Order Specific Question:   Estimated length of stay     Answer:   More than 2 Midnights     Order Specific Question:   Certification     Answer:   I certify that inpatient services are medically necessary for this patient for a duration of greater than two midnights  See H&P and MD Progress Notes for additional information about the patient's course of treatment  ED: Date/Time/Mode of Arrival:   ED Arrival Information     Expected Arrival Acuity Means of Arrival Escorted By Service Admission Type    - 5/20/2018 16:11 Urgent [facility] Urgent    Arrival Complaint    Cough          Chief Complaint:   Chief Complaint   Patient presents with    Chest Pain     Patient reports left sided chest pain  Patient reports pain is mostly occurs during coughing and also during deep breathing  Patient blood is present in mucous during cough  History of Illness:    Very pleasant 52-year-old with anxiety, hypertension hyperlipidemia history of ileus history of major depressive disorder presented to us with chest pain shortness of breath as he was found to be in atrial flutter at emergency department level  Patient also reported chest discomfort and shortness of breath with cough  States this has been present for the last 3 days, constant, unchanged  States the chest discomfort has been aching, non-radiating, no known exacerbating or alleviating factors  Denies fevers or chills, no known sick contacts   Pt states he became concerned this evening when he noticed blood in his expectorant  Pt states symptoms are consistent with previous PNA  Pt denies F/C/N/V/D/C, no dysuria, burning on urination or blood in urine  Cardiology was reached out at emergency department level due to ongoing arrhythmia resistant to initial efforts patient was started on IV amiodarone drip after bolus, and diltiazem  Due to complexity of the case decision was made to monitor patient in ICU setting  Patient was also found to have cavitary lesion on CT chest on left upper lobe, placed on isolation for active TB, pulmonology consultation obtained, infectious Disease consultation will be added as well  Royer Mae denies current weight loss nausea vomiting denies any changes in bowel or urinary habits denies fever    ED Vital Signs:   ED Triage Vitals [05/20/18 1620]   Temperature Pulse Respirations Blood Pressure SpO2   98 2 °F (36 8 °C) 83 18 125/85 96 %      Temp Source Heart Rate Source Patient Position - Orthostatic VS BP Location FiO2 (%)   Temporal Monitor Sitting Right arm --      Pain Score       8        Wt Readings from Last 1 Encounters:   05/21/18 101 kg (222 lb 7 1 oz)       Vital Signs (abnormal):    above    Abnormal Labs/Diagnostic Test Results: Albumin    2 9  Alk phos    205  WBC   14 45  Abs  neutro   11 16  Ct  Chest:    Left upper lobe airspace disease with central cavitary change   Findings most suggestive of a cavitary/necrotizing pneumonia   Tuberculosis precautions recommended until proven otherwise   Follow-up recommended to assure resolution  2  Mediastinal and left hilar adenopathy likely reactive, which can also be reassessed during follow-up  3   Benign left adrenal adenoma  CXR:     Thick-walled irregular cavitating left upper lobe lesion with air-fluid level   See above for further details        ED Treatment:   Medication Administration from 05/20/2018 1611 to 05/21/2018 0455       Date/Time Order Dose Route Action Action by Comments     05/20/2018 1950 sodium chloride 0 9 % bolus 1,000 mL 0 mL Intravenous Stopped Bernardine Kaela, RN      05/20/2018 1642 sodium chloride 0 9 % bolus 1,000 mL 1,000 mL Intravenous New Bag [facility], RN      05/20/2018 1641 diltiazem (CARDIZEM) injection 15 mg 15 mg Intravenous Given [facility], RN      05/20/2018 1706 metoprolol (LOPRESSOR) injection 5 mg 5 mg Intravenous Given [facility], RN      05/21/2018 0435 diltiazem (CARDIZEM) 125 mg in sodium chloride 0 9 % 125 mL infusion 7 5 mg/hr Intravenous Rate/Dose Change Zebedee Mainland, RN      05/21/2018 5935 diltiazem (CARDIZEM) 125 mg in sodium chloride 0 9 % 125 mL infusion 5 mg/hr Intravenous Rate/Dose Change Zebedee Mainland, RN      05/21/2018 0206 diltiazem (CARDIZEM) 125 mg in sodium chloride 0 9 % 125 mL infusion 7 5 mg/hr Intravenous Rate/Dose Change Zebedee Mainland, RN      05/21/2018 0104 diltiazem (CARDIZEM) 125 mg in sodium chloride 0 9 % 125 mL infusion 10 mg/hr Intravenous Rate/Dose Change Zebedee Mainland, RN      05/20/2018 1927 diltiazem (CARDIZEM) 125 mg in sodium chloride 0 9 % 125 mL infusion 15 mg/hr Intravenous Rate/Dose Change Bernardine Kaela, RN      05/20/2018 1820 diltiazem (CARDIZEM) 125 mg in sodium chloride 0 9 % 125 mL infusion 10 mg/hr Intravenous [address] [facility]      05/20/2018 1923 iohexol (OMNIPAQUE) 350 MG/ML injection (MULTI-DOSE) 100 mL 100 mL Intravenous Given Zenia Flegeal      05/20/2018 1934 famotidine (PEPCID) injection 20 mg 20 mg Intravenous Given Bernardine Kaela, RN      05/20/2018 2327 sodium chloride 0 9 % infusion 0 mL/hr Intravenous Stopped [facility]      05/20/2018 1950 sodium chloride 0 9 % infusion 125 mL/hr Intravenous [address] [facility]      05/20/2018 2115 cefepime (MAXIPIME) 2 g/50 mL dextrose IVPB 0 mg Intravenous Stopped Cirilo Ashraf RN      05/20/2018 2044 cefepime (MAXIPIME) 2 g/50 mL dextrose IVPB 2,000 mg Intravenous New Bag Cora Sandifer M ADAM Dillon      05/20/2018 2328 sodium chloride 0 9 % infusion 50 mL/hr Intravenous [address] [facility]      05/21/2018 0040 QUEtiapine (SEROquel XR) 24 hr tablet 200 mg 200 mg Oral Given Anika Felder RN waiting on rx     05/21/2018 0000 clonazePAM (KlonoPIN) tablet 0 5 mg 0 5 mg Oral Given Anika Felder RN      05/21/2018 0018 heparin (porcine) subcutaneous injection 5,000 Units   Subcutaneous Canceled Entry Anika Felder RN      05/21/2018 0436 vancomycin (VANCOCIN) 1,750 mg in sodium chloride 0 9 % 500 mL IVPB 0 mg/kg Intravenous Stopped Anika Felder RN      05/21/2018 0058 vancomycin (VANCOCIN) 1,750 mg in sodium chloride 0 9 % 500 mL IVPB 1,750 mg Intravenous [address] Anika Felder [facility]      05/20/2018 2329 levalbuterol [facility]) inhalation solution 1 25 mg 1 25 mg Nebulization Given Anika Felder RN      05/20/2018 2339 heparin (porcine) injection 8,000 Units 8,000 Units Intravenous Given Anika Felder RN awaiting lab results     05/21/2018 0006 heparin (porcine) 25,000 units in 250 mL infusion (premix) 18 Units/kg/hr Intravenous [address] [facility]      05/21/2018 0107 tuberculin injection 5 Units 5 Units Intradermal Given Anika Felder RN      05/21/2018 0041 oxyCODONE (ROXICODONE) immediate release tablet 10 mg 10 mg Oral Given Anika Felder RN      05/21/2018 0115 amiodarone 150 mg in dextrose 5 % 100 mL IV bolus 0 mg Intravenous Stopped Anika Felder RN      05/21/2018 0104 amiodarone 150 mg in dextrose 5 % 100 mL IV bolus 150 mg Intravenous [address] Anika Felder [facility]      05/21/2018 0203 amiodarone (CORDARONE) 900 mg in dextrose 5 % 500 mL infusion 1 mg/min Intravenous New Bag Anika Felder RN           Past Medical/Surgical History:    Active Ambulatory Problems     Diagnosis Date Noted    MDD (major depressive disorder)     Insomnia     Anxiety     Kidney stone on right side 01/31/2016    Ileus ([facility] [address] ) 51/20/5140    Umbilical hernia without obstruction and without gangrene 02/01/2016    Gallbladder calculus without cholecystitis 02/01/2016    GERD (gastroesophageal reflux disease)     Tobacco abuse     Closed right ankle fracture 03/16/2017    History of ileus      Resolved Ambulatory Problems     Diagnosis Date Noted    No Resolved Ambulatory Problems     Past Medical History:   Diagnosis Date    Anxiety     GERD (gastroesophageal reflux disease)     History of ileus     Insomnia     MDD (major depressive disorder)     Tobacco abuse        Admitting Diagnosis: Atrial flutter (HCC) [I48 92]  Cough [R05]  Pneumonia [J18 9]    Age/Sex: 47 y o  male    Assessment/Plan:    Principal problem:  Atrial flutter :  Current hemodynamically stable however given propensity for hypertension and complications patient to be monitored in ICU setting  Cardiology is aware  S/p  15 mg of Cardizem bolus and metoprolol 5 mg IV x1 in the emergency  Presently on Cardizem drip which is continued  Amiodarone bolus initiated, continue his current IV amiodarone drip as per Cards consultation     1  Chest pain rule out ACS:  Appear to be likely secondary to cavitary lesion  Initial troponin is normal, will follow up on serum troponin      2   Left upper lobe cavitary pneumonia rule out TB:  Status post vancomycin and cefepime  Will continue with vancomycin and cefepime  Will place PPD and follow-up on a early morning sputum expectorant for a AFB  X3 days  Was to follow up on blood culture  Consult pulmonology, and consider  consultation with ID if warranted  Will need negative air pressure room     3  Atrial flutter with rapid ventricular response:  S/p  15 mg of Cardizem bolus and metoprolol 5 mg IV x1 in the emergency  Presently on Cardizem drip which is continued  Will place order for echocardiogram and consult Cardiology    4   Left adrenal adenoma:  Incidental finding  Will need further follow-up as outpatient      Plan for Additional Problems:   1  Depression; continue home medication with venlafaxine, quetiapine  2  Anxiety :  Will continue home medication as well with Klonopin  3  Gastroesophageal reflux disease  Appear to be stable  Continue ranitidine  4  Chronic back pain  :  Continue home oxycodone p r n      VTE Prophylaxis: Heparin Drip  / sequential compression device   Code Status:  Full code  POLST: There is no POLST form on file for this patient (pre-hospital)     Anticipated Length of Stay:  Patient will be admitted on an Inpatient basis with an anticipated length of stay of  3 2 midnights     Justification for Hospital Stay:  IV antibiotics, Cardizem drip, rule out tuberculosis    Admission Orders:   IP    5/20  @    2043  Scheduled Meds:   Current Facility-Administered Medications:  acetaminophen 650 mg Oral Q6H PRN SIENNA Ace-C    cefepime 2,000 mg Intravenous Q24H Shellye Fili, CRNP    clonazePAM 0 5 mg Oral TID PRN SIENNA Martinez-ELIZABETH    diltiazem 1-15 mg/hr Intravenous Titrated SIENNA Martinez-ELIZABETH Last Rate: 15 mg/hr (05/21/18 0528)   famotidine 20 mg Oral Daily SIENNA Ace-ELIZABETH    heparin (porcine) 3-30 Units/kg/hr (Order-Specific) Intravenous Titrated Mavis Mullins PA-ELIZABETH Last Rate: 18 Units/kg/hr (05/21/18 0725)   levalbuterol 1 25 mg Nebulization TID Mavis Mullins PA-C    nicotine 1 patch Transdermal Daily Ofe Casas PA-C    ondansetron 4 mg Intravenous Q6H PRN SIENNA Martinez-ELIZABETH    oxyCODONE 10 mg Oral Q6H PRN Gage Gonzalez MD    QUEtiapine 200 mg Oral HS SIENNA Ace-C    sodium chloride 100 mL/hr Intravenous Continuous Shellye Fili, CRNP Last Rate: 100 mL/hr (05/21/18 0718)   sodium chloride 3 mL Nebulization TID Jo Ann Alejandro MD    tuberculin 5 Units Intradermal Once Jo Ann Alejandro MD    vancomycin 20 mg/kg (Adjusted) Intravenous Q12H Mavis Mullins, PA-C Last Rate: Stopped (05/21/18 0436)   venlafaxine 150 mg Oral BID SIENNA Martinez-C Continuous Infusions:   diltiazem 1-15 mg/hr Last Rate: 15 mg/hr (05/21/18 0528)   heparin (porcine) 3-30 Units/kg/hr (Order-Specific) Last Rate: 18 Units/kg/hr (05/21/18 0725)   sodium chloride 100 mL/hr Last Rate: 100 mL/hr (05/21/18 0718)     PRN Meds:   acetaminophen    clonazePAM    ondansetron    oxyCODONE     Airborne  Precautions  NPO  Aspiration precautions  PT/OT  Cons cardiology  Cons pulmonary  Serial troponin  Cons  ID    Thank you,  [id number] in the [facility] by [facility] for 2017  Network Utilization Review Department  Phone: [phone]; Fax [phone]  ATTENTION: The Network Utilization Review Department is now centralized for our 7 Facilities  Make a note that we have a new phone and fax numbers for our Department  Please call with any questions or concerns to [phone] and carefully follow the prompts so that you are directed to the right person  All voicemails are confidential  Fax any determinations, approvals, denials, and requests for initial or continue stay review clinical to [phone]  Due to HIGH CALL volume, it would be easier if you could please send faxed requests to expedite your requests and in part, help us provide discharge notifications faster

## 2018-05-21 NOTE — OCCUPATIONAL THERAPY NOTE
[address]  Evaluation     Patient Name: Blaze Williamson  Today's Date: 5/21/2018  Problem List  Patient Active Problem List   Diagnosis    MDD (major depressive disorder)    Insomnia    Anxiety    Kidney stone on right side    Ileus ([address] )    Umbilical hernia without obstruction and without gangrene    Gallbladder calculus without cholecystitis    GERD (gastroesophageal reflux disease)    Tobacco abuse    Closed right ankle fracture    History of ileus    Chest pain    Pneumonia    Atrial flutter (HCC)    Adenoma of left adrenal gland     Past Medical History  Past Medical History:   Diagnosis Date    Anxiety     GERD (gastroesophageal reflux disease)     History of ileus     Insomnia     MDD (major depressive disorder)     Tobacco abuse      Past Surgical History  Past Surgical History:   Procedure Laterality Date    BACK SURGERY  2015    Lumbar    KNEE SURGERY      right    [place] TREAT TIBIAL SHAFT FX, INTRAMED IMPLANT Left 3/16/2017    Procedure: INSERTION NAIL IM TIBIA;  Surgeon: Alfredo Aragon DO;  Location: [facility];  Service: Orthopedics         05/21/18 0855   Note Type   Note type Eval only   Restrictions/Precautions   Weight Bearing Precautions Per Order No   Other Precautions Fall Risk;Telemetry;Multiple lines; Airborne/isolation  (r/o TB)   Pain Assessment   Pain Assessment 0-10   Pain Score 3   Pain Type Acute pain   Pain Location Chest   Pain Orientation Left   Pain Descriptors Sharp   Pain Frequency Intermittent   Effect of Pain on Daily Activities No change in pain reported w/ activity   Patient's Stated Pain Goal No pain   Hospital Pain Intervention(s) Repositioned; Ambulation/increased activity; Emotional support   Response to Interventions Tolerated   Home Living   Type of [address] [place] [address] Two level;Bed/bath upstairs;Stairs to enter with rails  (4 TRACY; 1 FOS to bed/bath on 2nd floor)   Bathroom Shower/Tub Tub/shower unit   Bathroom Toilet Standard [facility] [address]   [address] Walker;Cane  (however reports no AD use PTA)   Additional Comments Pt lives with significant other in a two story home w/ 4 TRACY and FOS to bed/bath on 2nd floor  Pt reports significant other does not work and is able to assist as needed  Prior Function   Level of [place] Independent with ADLs and functional mobility   Lives With Significant other   Receives Help From Other (Comment)  (Significant other, Cleavon Crisp)   ADL Assistance Independent   IADLs Independent   Falls in the last 6 months 0   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[employer])   Comments At baseline, pt was I w/ ADLs, IADLs, and functional mobility/transfers w/o use of DME/AD, (+) , FT employed, and reports 0 falls PTA  Lifestyle   Autonomy At baseline, pt was I w/ ADLs, IADLs, and functional mobility/transfers w/o use of DME/AD, (+) , FT employed, and reports 0 falls PTA  Reciprocal Relationships Lives with significant other   Service 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[place] [address] Spending time with 2 grandchildren   Psychosocial   Psychosocial (WDL) WDL   ADL   Eating Assistance 7  Independent   Grooming Assistance 7  [facility] 7  Independent   [facility] 6  [facility] 6  Modified independent   [address]  6  Modified independent   Bed Mobility   Supine to Sit 7  Independent   Additional items Increased time required   Sit to Supine 7  Independent   Additional items Increased time required   Additional Comments Pt's vital signs at start of session while lying supine were as follows: BP-98/78, HR-159, O2-98% on RA; Pt's vitals at end of session while lying supine were as follows: BP-129/75, HR-156 and O2- 94% on RA   Pt lying supine at end of session per pt's request  Call bell and phone within reach  All needs met and pt reports no further questions for OT at this time   Transfers   Sit to Stand 6  Modified independent   Additional items Increased time required   Stand to Sit 6  Modified independent   Additional items Increased time required   Functional Mobility   Functional Mobility 6  Modified independent   Additional Comments w/o use of AD   Balance   Static Sitting Normal   Dynamic Sitting Good   Static Standing Fair +   Dynamic Standing Fair +   Ambulatory Fair +   Activity Tolerance   Activity Tolerance Patient tolerated treatment well   Nurse Made Aware Pt able to be seen per RN Ruby   RUE Assessment   RUE Assessment WFL   RUE Strength   RUE Overall Strength Within Functional Limits - able to perform ADL tasks with strength  (4/5)   LUE Assessment   LUE Assessment WFL   LUE Strength   LUE Overall Strength Within Functional Limits - able to perform ADL tasks with strength  (4/5)   Hand Function   Gross Motor Coordination Functional   Fine Motor Coordination Functional   Sensation   Light Touch No apparent deficits   Sharp/Dull No apparent deficits   Vision - Complex Assessment   Ocular Range of Motion WFL   Acuity Able to read clock/calendar on wall without difficulty   Perception   Inattention/Neglect Appears intact   Cognition   Overall Cognitive Status [facility]   Arousal/Participation Alert; Cooperative   Attention Within functional limits   Orientation Level Oriented X4   Memory Within functional limits   Following Commands Follows all commands and directions without difficulty   Assessment   Prognosis Fair   Assessment Pt is a 47 y o  male seen for OT evaluation s/p adm to [facility] on [facility]/20/2018 w/ increased SOB and chest pain and dx'd w/ Atrial Flutter  Pt also found to have cavitary lesion on CT of chest on L upper lobe, therefore placed on Airborne precautions for r/o TB   Comorbidities affecting pts functional performance include a significant PMH of anxiety, hx of ileus, MDD, HTN, HLD, and tobacco abuse  Pt with active OT orders and activity orders for Up and OOB as tolerated   Pt lives with significant other in a two story home w/ 4 TRACY and FOS to bed/bath on 2nd floor  Pt reports significant other does not work and is able to assist as needed  At baseline, pt was I w/ ADLs, IADLs, and functional mobility/transfers w/o use of DME/AD, (+) , FT employed, and reports 0 falls PTA  Upon evaluation, pt currently functioning at a Mod I-Independent level for overall ADLs and Mod I for functional mobility/transfers 2* the following deficits impacting occupational performance: weakness, decreased tolerance and increased pain  These impairments, however do not limit pts ability to safely engage in all baseline areas of occupation, including grooming, bathing, dressing, toileting, functional mobility/transfers, community mobility, house maintenance, social participation  and leisure activities  as pt is functioning at baseline level of performance and reports no concerns about going home  Pt scored overall 75/100 on the Barthel Index  Based on the aforementioned OT evaluation, functional performance deficits, and assessments, pt has been identified as a high complexity evaluation  No further acute OT needs identified at this time  Recommend continued mobilization with hospital staff while in the hospital to increase pts endurance and strength upon D/C  From OT standpoint, recommend D/C home with family support when medically cleared  D/C pt from OT caseload at this time      Goals   Patient Goals to get some sleep   Plan   OT Frequency Eval only   Recommendation   OT Discharge Recommendation Home with family support   OT - OK to Discharge Yes  (when medically cleared)   Barthel Index   Feeding 10   Bathing 5   Grooming Score 5   Dressing Score 10   Bladder Score 10   Bowels Score 10   Toilet Use Score 10   Transfers (Bed/Chair) Score 15   Mobility (Level Surface) Score 0   Stairs Score 0   Barthel Index Score 75   Modified [place] Scale   Modified [place] Scale 1        Antwon Romo, OTR/L

## 2018-05-21 NOTE — ED NOTES
Admitting at bedside     Lupillo Casillas, [address] [facility]  05/20/18 9560

## 2018-05-21 NOTE — PHYSICAL THERAPY NOTE
PT EVALUATION  Time-In:0845  Time-Out:0856  Total Time:11 minutes    47 y o     [id number]    Atrial flutter (HCC) [I48 92]  Cough [R05]  Pneumonia [J18 9]    Past Medical History:   Diagnosis Date    Anxiety     GERD (gastroesophageal reflux disease)     History of ileus     Insomnia     MDD (major depressive disorder)     Tobacco abuse          Past Surgical History:   Procedure Laterality Date    BACK SURGERY  2015    Lumbar    KNEE SURGERY      right    [place] TREAT TIBIAL SHAFT FX, INTRAMED IMPLANT Left 3/16/2017    Procedure: INSERTION NAIL IM TIBIA;  Surgeon: Mara Espinoza DO;  [facility];  Service: Orthopedics      05/21/18 0856   Note Type   Note type Eval only   Pain Assessment   Pain Assessment 0-10   Pain Score 3   Pain Type Acute pain   Pain Location Chest   Pain Orientation Left   Pain Descriptors Sharp   Pain Frequency Intermittent   Effect of Pain on Daily Activities no reported change w/ activity   Patient's Stated Pain Goal No pain   Hospital Pain Intervention(s) Repositioned; Ambulation/increased activity; Rest;Emotional support   Response to Interventions tolerated PT   Multiple Pain Sites No   Home Living   Type of [address] Two [address];Bed/bath upstairs;Stairs to enter with rails  (4 TRACY; 1 FOS to second floor)   Home Equipment Walker;Cane   Prior Function   Level of [place] Independent with ADLs and functional mobility   Lives With Significant other  Jacqui Ochoa, Girlfriend)   Receives Help From Jacqui Ochoa, Girlfriend)   ADL Assistance Independent   IADLs Independent   Falls in the last 6 months 0   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[address])   Comments PTA pt lives w/ significant other [facility] does not work and is able to help as needed   Pt (I) functional mobility, IADLs, and ADLS w/o need for DME, (-) recent falls, (+) drives, (+) full time employment (+) stairs   Restrictions/Precautions   Weight Bearing Precautions Per Order No   Other Precautions Fall Risk;Telemetry;Pain;Multiple lines;Contact/isolation  (r/o TB Simultaneous filing  User may not have seen previous data )   General   Family/Caregiver Present No   Cognition   Overall Cognitive Status WFL   Arousal/Participation Alert   Orientation Level Oriented X4   Memory Within functional limits   Following Commands Follows all commands and directions without difficulty   RUE Assessment   RUE Assessment WFL  (see OT eval for details)   LUE Assessment   LUE Assessment WFL  (see OT eval for details)   RLE Assessment   RLE Assessment WFL   Strength RLE   RLE Overall Strength 4+/5   LLE Assessment   LLE Assessment WFL   Strength LLE   LLE Overall Strength 4+/5   Coordination   Movements are Fluid and Coordinated 1   Sensation WFL   Light Touch   RLE Light Touch Grossly intact   LLE Light Touch Grossly intact   Sharp/Dull   RLE Sharp/Dull Not tested   LLE Sharp/Dull Not tested   Proprioception   RLE Proprioception Grossly intact   LLE Proprioception Grossly Intact   Bed Mobility   Supine to Sit 7  Independent   Additional items Increased time required   Sit to Supine 7  Independent   Additional items Increased time required   Transfers   Sit to Stand 6  Modified independent   Additional items Increased time required   Stand to Sit 6  Modified independent   Additional items Increased time required   Ambulation/Elevation   Gait pattern Wide TIEN; Decreased foot clearance; Short stride   Gait Assistance 6  Modified independent   Additional items Verbal cues  (VC's for safety of multiple line management)   Assistive Device None   Distance 8 ft just around room; limited d/t on airborne precautions   Balance   Static Sitting Normal   Dynamic Sitting Good   Static Standing Fair +   Dynamic Standing Fair +   Ambulatory Fair +   Endurance Deficit   Endurance Deficit Yes   Endurance Deficit Description general fatigue, low BP, and elevated HR   Activity Tolerance   Activity Tolerance Patient limited by fatigue;Treatment limited secondary to medical complications (Comment)   Nurse Made Aware Memo Ling RN   Assessment   Prognosis Good   Problem List Decreased endurance   Assessment Pt is 48 yo male admitted from home w/ significant other, Noa Hicks, for atrial flutter  Pt presenting to hospital w/ c/o chest pain  Pt currently on airborne precautions to r/o TB and has been having low BP and elevated HR for the AM  PTA pt (I) functional mobility, ADLS, and IADLs w/o need for DME  Pt has 2 story home w/ 4 TRACY, (-) recent falls, (+) driving, (+) full time employed in [employer] in [employer]  Pt currently demonstrates mod (I)/(I) for all functional w/o AD  Limited evaluation at this time d/t airborne precautions  Pt denies any needs for skilled PT at this time  He feels confident in his gait, balance, mobility, and stair negotiation  Pt demonstrates decreased functional endurance, but has all needs to improve endurance (I) on his own  At this time no deficits warranting skilled PT  PT discharge from service at this time  Recommend d/c home w/ family support once medically stable  PT/OT/RN and patient in agreement  Goals   Patient Goals "to get some rest"   Plan   Treatment/Interventions Spoke to nursing;Bed mobility;Gait training; Endurance training;Functional transfer training   PT Frequency One time visit   Recommendation   Recommendation Home with family support   Equipment Recommended (none at this time)   PT - OK to Discharge Yes  (once medically stable)   Modified [place] Scale   Modified [place] Scale 1   Barthel Index   Feeding 10   Bathing 5   Grooming Score 5   Dressing Score 10   Bladder Score 10   Bowels Score 10   Toilet Use Score 10   Transfers (Bed/Chair) Score 15   Mobility (Level Surface) Score 0   Stairs Score 0   Barthel Index Score 75   History: co - morbidities, fall risk, 2 story home, 4 TRACY, multiple lines  Exam: impairments in musculoskeletal, cardiac, pulmonary, functional endurance  Clinical: unstable/unpredictable; airborne precautions, multiple lines, chest pain, elevated HR, low BP  Complexity:high    At end of PT evaluation pt left supine in bed w/ all needs in reach, call bell and phone in hand, all lines intact, B SCDS donned  No further questions, concerns, or complaints for PT at this time       Vital Signs prior to PT:  BP: 98/78  HR: 159  O2 sat: 98% room air    Vital Signs post PT:  BP:129/75  HR: 156  O2 sat: 94% room air      Trent Grider, PT ,DPT

## 2018-05-21 NOTE — CONSULTS
Pulmonary Consultation   Marycarmen Espinoza  47 y o  male MRN: [id number]  Unit/Bed#: ICU 15 Encounter: [id number]      Reason for consultation:  Pneumonia    Requesting physician: Dr Diamond Pruitt    Impressions/Recommendations:    1  Abnormal chest x-ray-noted left upper lobe cavitary lesion, TB versus necrotizing pneumonia  1  Droplet isolation for rule out TB  2  AFB culture x3-will consider bronchoscopy s/p AFB negative x 3  3  Legionella and strep negative  4  Continue current antibiotic therapy, Infectious Disease consult placed  5  Check procalcitonin  6  Monitor WBC and temperatures  2  Atrial flutter with tachycardia  1  Management per Cardiology and Internal Medicine  3  Chest pain-Likely secondary cavitary lesion, although Cardiology consult placed to rule out ACS  4  Hemoptysis-none noted for the last 24 hr      History of Present Illness   HPI:  Marycarmen Espinoza  is a 47 y o  male who presented to the hospital on 05/20/2018 chief complaint of cough and shortness of Breath  He has a past medical history significant for:  Severe acid reflux, current everyday smoker 1 pack per day with a 30 pack year smoking history, depression, insomnia, hypertension, hyperlipidemia and anxiety  He reports he started to have hemoptysis this past Thursday and Friday mixed with mucus as well as left-sided chest pain  He reports that the chest pain became severe and persistent which prompted him to come to the ED on the 20th  He currently reports cough that is productive of white mucus, denies hemoptysis since the time of admission  CT completed in the ED demonstrated a left upper lobe cavitary lesion with mediastinal on left hilar adenopathy  He was also noted to be hypertensive and tachycardic, and noted to be in atrial fibrillation  He has [facility] remained afebrile since time of admission, the noted leukocytosis of 11 34  Legionella and strep antigens negative, AFB pending inpatient and contact isolation for rule out TB  He is currently on cefepime and vancomycin  At the time of assessment under was seen in bed comfortable, denied acute overnight events  Reports chest pain currently 3-4  He feels that this chest pain is similar in nature to the pain he felt when he had pneumonia in 2004  He does report night sweats but this is normal at baseline  He currently denies:  Fevers, chills, headache, dizziness, bronchospasm, or hemoptysis  He does report significant reflux but this is at his baseline  Denies any recent weight change unintentional or intentional   Denies sinus congestion or sore throat or difficulty with eating or drinking  From pulmonary standpoint he is a current 1 pack per day smoker with a 30 pack-year smoking history  He he has followed with Dr Craig Abad in past, but his last visit over 4 years prior  He denies a formal lung diagnosis, or nebulizer inhaler regimen  He also denies ever having from pulmonary function test   He has never been intubated, required prednisone treatment for his breathing  He currently reports persistent severe acid reflux symptom for which he takes Zantac only at night  He does report noted reflux with coughing in the middle of the night  He does not wear oxygen at baseline or dizzy of a diagnosis of obstructive sleep apnea require CPAP/BiPAP mask  Prior to this admission he denies any limitations in his exercise tolerance due to breathing issues  Per his past medical history his father had a diagnosis of COPD  He denies any recent sick exposures or exposures to tuberculosis  No recent travel within the and states around the [place]  Review of systems:  12 point review of systems was completed and was otherwise negative except as listed in HPI        Historical Information   Past Medical History:   Diagnosis Date    Anxiety     GERD (gastroesophageal reflux disease)     History of ileus     Insomnia     MDD (major depressive disorder)     Tobacco abuse Past Surgical History:   Procedure Laterality Date    BACK SURGERY  2015    Lumbar    KNEE SURGERY      right    MD TREAT TIBIAL SHAFT FX, INTRAMED IMPLANT Left 3/16/2017    Procedure: INSERTION NAIL IM TIBIA;  Surgeon: Avelino Butler DO;  Location: [facility];  Service: Orthopedics     Family History   Problem Relation Age of Onset    Pancreatic cancer Mother     COPD Father     Heart attack Brother 64     decreased    Narcolepsy Brother        Occupational history:  Hematuria management, 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    Tobacco history:   1 pack per day smoker, With a 30 pack year smoking history    Meds/Allergies   Current Facility-Administered Medications   Medication Dose Route Frequency    acetaminophen (TYLENOL) tablet 650 mg  650 mg Oral Q6H PRN    apixaban (ELIQUIS) tablet 5 mg  5 mg Oral BID    cefepime (MAXIPIME) 2 g/50 mL dextrose IVPB  2,000 mg Intravenous Q24H    clonazePAM (KlonoPIN) tablet 0 5 mg  0 5 mg Oral TID PRN    diltiazem (CARDIZEM) 125 mg in sodium chloride 0 9 % 125 mL infusion  1-15 mg/hr Intravenous Titrated    famotidine (PEPCID) tablet 20 mg  20 mg Oral Daily    levalbuterol (XOPENEX) inhalation solution 1 25 mg  1 25 mg Nebulization TID    metoprolol (LOPRESSOR) injection 5 mg  5 mg Intravenous Once    metoprolol tartrate (LOPRESSOR) tablet 25 mg  25 mg Oral TID    nicotine (NICODERM CQ) 14 mg/24hr TD 24 hr patch 1 patch  1 patch Transdermal Daily    ondansetron (ZOFRAN) injection 4 mg  4 mg Intravenous Q6H PRN    oxyCODONE (ROXICODONE) immediate release tablet 10 mg  10 mg Oral Q6H PRN    QUEtiapine (SEROquel XR) 24 hr tablet 200 mg  200 mg Oral HS    sodium chloride 0 9 % infusion  100 mL/hr Intravenous Continuous    sodium chloride 0 9 % inhalation solution 3 mL  3 mL Nebulization TID    tuberculin injection 5 Units  5 Units Intradermal Once    vancomycin (VANCOCIN) 1,750 mg in sodium chloride 0 9 % 500 mL IVPB  20 mg/kg (Adjusted) Intravenous Q12H    venlafaxine [facility]) tablet 150 mg  150 mg Oral BID     Prescriptions Prior to Admission   Medication    clonazePAM (KlonoPIN) 1 mg tablet    oxyCODONE (ROXICODONE) 5 mg immediate release tablet    QUEtiapine (SEROquel XR) 150 mg 24 hr tablet    ranitidine (ZANTAC) 150 MG capsule    venlafaxine (EFFEXOR) 75 mg tablet     No Known Allergies    Vitals: Blood pressure 109/79, pulse (!) 156, temperature 98 7 °F (37 1 °C), temperature source Oral, resp  rate (!) 31, height 5' 10" (1 778 m), weight 101 kg (222 lb 7 1 oz), SpO2 94 % , RA, Body mass index is 31 92 kg/m²  Intake/Output Summary (Last 24 hours) at 05/21/18 1026  Last data filed at 05/21/18 1011   Gross per 24 hour   Intake          3748 36 ml   Output             1550 ml   Net          2198 36 ml       Physical exam:    General Appearance:    Alert, cooperative, no conversational dyspnea or accessory     muscle use       Head/eyes:    Normocephalic, without obvious abnormality, atraumatic,         PERRL, extraocular muscles intact, no scleral icterus    Nose:   Nares normal, septum midline, mucosa normal, no drainage    or sinus tenderness   Throat:   Moist mucous membranes, no thrush   Neck:   Supple, trachea midline, no adenopathy; no carotid    bruit or JVD   Lungs:     Clear to auscultation bilaterally, no wheezes rhonchi or rales  Chest Wall:    No tenderness or deformity    Heart:   Atrial flutter, tachycardia, no murmur, rub   or gallop   Abdomen:     Soft, non-tender, bowel sounds active all four quadrants,     no masses, no organomegaly   Extremities:   Extremities normal, atraumatic, no cyanosis or edema   Skin:   Warm, dry, turgor normal, no rashes or lesions   Lymph nodes:   Cervical and supraclavicular nodes normal   Neurologic:   CNII-XII intact,  non-focal         Labs: I have personally reviewed pertinent lab results  , BNP: No results found for: BNP, CBC:   Lab Results   Component Value Date    WBC 11 34 (H) 05/20/2018    HGB 15 1 05/20/2018 HCT 44 3 05/20/2018    MCV 92 05/20/2018     05/21/2018    MCH 31 3 05/20/2018    MCHC 34 1 05/20/2018    RDW 12 8 05/20/2018    MPV 11 5 05/21/2018    NRBC 0 05/20/2018   , CMP:   Lab Results   Component Value Date     05/20/2018    K 3 7 05/20/2018     05/20/2018    CO2 26 05/20/2018    ANIONGAP 9 05/20/2018    BUN 15 05/20/2018    CREATININE 1 12 05/20/2018    GLUCOSE 130 05/20/2018    CALCIUM 9 0 05/20/2018    AST 27 05/20/2018    ALT 71 05/20/2018    ALKPHOS 205 (H) 05/20/2018    PROT 7 8 05/20/2018    BILITOT 0 74 05/20/2018    EGFR 76 05/20/2018   , PT/INR:   Lab Results   Component Value Date    INR 1 16 05/20/2018   , Troponin:   Lab Results   Component Value Date    TROPONINI <0 02 05/21/2018       Imaging and other studies: I have personally reviewed pertinent reports  and I have personally reviewed pertinent films in PACS    05/20/2018 chest x-ray:       FINDINGS:     Cardiomediastinal silhouette appears unremarkable      Thick-walled cavitating lesion with layering air-fluid levels seen in the left upper lobe  Findings suspicious for either necrotizing pneumonia or cavitating mass    05/20/2018 CT 80 chest PE study:      FINDINGS:     PULMONARY ARTERIAL TREE:  No pulmonary embolus is seen       LUNGS:  There is airspace opacification identified within the left upper lobe  Centrally within the airspace disease, cavitary changes identified, on image 3/14 measuring 1 7 x 1 5 cm in size  Additional patchy foci of groundglass opacity are seen   surrounding the central opacification      PLEURA:  Unremarkable      HEART/AORTA:  Unremarkable for patient's age      MEDIASTINUM AND AYSE:  There is mediastinal and left hilar adenopathy  For example, AP window node on image 2/86 measures 2 0 x 1 3 cm      CHEST WALL AND LOWER NECK:   Unremarkable      Pulmonary function testing: none    EKG, Pathology, and Other Studies: I have personally reviewed pertinent reports     and I have personally reviewed pertinent films in PACS  05/21/2018 telemetry, atrial flutter with heart rate 150s  Code Status: Level 1 - Full Code      SYEDA Perez

## 2018-05-21 NOTE — PROGRESS NOTES
[facility] Progress Note  Patient: Brissa Max  47 y o  male   MRN: [id number]  PCP: Estella Doyle MD  Unit/Bed#: ICU 15 Encounter: [id number]  Date Of Visit: 18    Assessment and plan:    Principal Problem:    Cavitary pneumonia- likely bacterial, community-acquired rule out TB  Continue isolation until TB is ruled out  Active Problems:    Atrial flutter (HCC)-currently in sinus rhythm  Now fully anticoagulated on Xarelto  Continue metoprolol 25 mg t i d  Depression with anxiety-mood is stable on Seroquel and clonazepam    Gastroesophageal reflux disease without esophagitis- continue famotidine     Adenoma of left adrenal gland- op follow up           VTE Pharmacologic Prophylaxis:   Pharmacologic: Apixaban (Eliquis)  Mechanical VTE Prophylaxis in Place: Yes    Discussions with Specialists or Other Care Team Provider: H&P and consults reviewed    Education and Discussions with Family / Patient: discussed with patient    Time Spent for Care: 20 minutes  More than 50% of total time spent on counseling and coordination of care as described above  Current Length of Stay: 1 day(s)    Current Patient Status: Inpatient   Certification Statement: The patient will continue to require additional inpatient hospital stay due to pneumonia    Discharge Plan: anticipate transfer to med surg tomorrow    Code Status: Level 1 - Full Code      Subjective:   Improved chest pain, palpitations, shortness of breath  No hemoptysis    Objective:     Vitals:   Temp (24hrs), Av 3 °F (36 8 °C), Min:97 4 °F (36 3 °C), Max:98 7 °F (37 1 °C)    HR:  [] 78  Resp:  [18-40] 24  BP: ()/(54-86) 108/61  SpO2:  [92 %-99 %] 94 %  Body mass index is 31 92 kg/m²  Input and Output Summary (last 24 hours):        Intake/Output Summary (Last 24 hours) at 18 1834  Last data filed at 18 1543   Gross per 24 hour   Intake          4428 37 ml   Output             2000 ml   Net 2428 37 ml       Physical Exam:     Physical Exam   Constitutional: He appears well-developed and well-nourished  HENT:   Head: Normocephalic and atraumatic  Eyes: No scleral icterus  Cardiovascular: Regular rhythm  No murmur heard  Pulmonary/Chest: No stridor  Course crackles and the left upper lung field   Abdominal: Soft  Bowel sounds are normal    Musculoskeletal: He exhibits no edema  Neurological: He is alert  Skin: Skin is warm and dry  Psychiatric: He has a normal mood and affect  Additional Data:     Labs:      Results from last 7 days  Lab Units 05/21/18  0006 05/20/18  2246 05/20/18  1633   WBC Thousand/uL  --  11 34* 14 45*   HEMOGLOBIN g/dL  --  15 1 16 4   HEMATOCRIT %  --  44 3 47 2   PLATELETS Thousands/uL 286 281 300   NEUTROS PCT %  --   --  77*   LYMPHS PCT %  --   --  13*   MONOS PCT %  --   --  9   EOS PCT %  --   --  0       Results from last 7 days  Lab Units 05/20/18  1633   SODIUM mmol/L 137   POTASSIUM mmol/L 3 7   CHLORIDE mmol/L 102   CO2 mmol/L 26   BUN mg/dL 15   CREATININE mg/dL 1 12   CALCIUM mg/dL 9 0   TOTAL PROTEIN g/dL 7 8   BILIRUBIN TOTAL mg/dL 0 74   ALK PHOS U/L 205*   ALT U/L 71   AST U/L 27   GLUCOSE RANDOM mg/dL 130       Results from last 7 days  Lab Units 05/20/18  2246   INR  1 16       * I Have Reviewed All Lab Data Listed Above  * Additional Pertinent Lab Tests Reviewed:  All Labs Within Last 24 Hours Reviewed    Imaging:    Imaging Reports Reviewed Today Include: ct chest with GUME cavitary infiltrate      Recent Cultures (last 7 days):       Results from last 7 days  Lab Units 05/21/18  1103 05/21/18  0051   GRAM STAIN RESULT  Rare Epithelial Cells  2+ Polys  1+ Gram positive cocci in clusters  --    LEGIONELLA URINARY ANTIGEN   --  Negative       Last 24 Hours Medication List:     Current Facility-Administered Medications:  acetaminophen 650 mg Oral Q6H PRN Ofe Casas PA-C    apixaban 5 mg Oral BID Gregory Tranel, DO    cefTRIAXone 1,000 mg Intravenous Q24H Yves Rand MD Last Rate: Stopped (05/21/18 1223)   clonazePAM 0 5 mg Oral TID PRN Ghanshyam Salinas PA-C    diltiazem 1-15 mg/hr Intravenous Titrated Ghanshyam Salinas PA-C Last Rate: Stopped (05/21/18 1543)   famotidine 20 mg Oral Daily Ofe Casas PA-C    levalbuterol 1 25 mg Nebulization TID Ghanshyam Salinas PA-C    metoprolol 5 mg Intravenous Once Rujul Smith, DO    metoprolol tartrate 25 mg Oral TID Rujul Smith, DO    nicotine 1 patch Transdermal Daily Ofe Csaas PA-C    ondansetron 4 mg Intravenous Q6H PRN Ghanshyam Salinas PA-C    oxyCODONE 10 mg Oral Q6H PRN Gage Caba MD    QUEtiapine 200 mg Oral HS Ofe Casas PA-C    sodium chloride 100 mL/hr Intravenous Continuous Janee, SYEDA Last Rate: 100 mL/hr (05/21/18 1223)   sodium chloride 3 mL Nebulization TID Devon Blandon MD    tuberculin 5 Units Intradermal Once Devon Blandon MD    venlafaxine 150 mg Oral BID Ghanshyam Salinas PA-C         Today, Patient Was Seen By: Carson Hughes MD    ** Please Note: Dragon 360 Dictation voice to text software may have been used in the creation of this document   **

## 2018-05-21 NOTE — ED NOTES
Spoke with [facility] regarding patients continued HR in the 160s   Orders received to begin titrating the cardizem back up until target HR achieved      Osmar Dailey RN  05/21/18 0287

## 2018-05-21 NOTE — CONSULTS
Consultation - Infectious Disease   Carlos Sanz  47 y o  male MRN: [id number]  Unit/Bed#: ICU 15 Encounter: [id number]      IMPRESSION & RECOMMENDATIONS:   Impression/Recommendations:  1  Sepsis  POA:  Leukocytosis and tachycardia  Due to pneumonia  Blood cultures pending  Clinically stable and nontoxic  Rec:  · Continue antibiotics as below  · Follow up blood cultures from admission  · Follow temperatures closely  · Check CBC in AM  Further workup and ultimate treatment will be determined by the above-mentioned studies and the patient's overall clinical course    2  GUME cavitary pneumonia  Acute onset suggests typical bacterial pathogen  No known risk factors for tuberculosis  Legionella and pneumococcal urinary antigens negative  No underlying immunosuppression  Stable on room air  Rec:  · Attempted tailor antibiotics to ceftriaxone  · Check sputum culture and MRSA nasal swab  · Check AFB x3 and TB PCR on sputum  Continue airborne precautions until AFB negative x3  · Trend procalcitonin  · Check HIV to risk stratify to which the patient is agreeable  · Follow respiratory status closely    3  Tobacco use  Risk factor for pneumonia  Rec:  · Patient states he is quitting tobacco as of today    Antibiotics:  Cefepime # 2   Vancomycin # 1    Thank you for this consultation  We will follow along with you  HISTORY OF PRESENT ILLNESS:  Reason for Consult: Pneumonia    HPI: Carlos Sanz  is a 47 y o  male active smoker who presented to the emergency department yesterday complaining of 3 days of acute onset of cough, shortness of breath and left-sided chest pain  He denied any fevers or chills  He did have some mild blood tinged sputum  Prior to this he was his usual self and denies any recent night sweats or weight loss  Upon presentation he was noted to be afebrile but had tachycardia and tachypnea  He had a mild leukocytosis    He underwent a chest x-ray which showed a thick walled irregular cavitating left upper lobe lesion with an air-fluid level  He subsequently underwent a CT of the chest which showed left upper lobe airspace disease with central cavity concerning for cavitary / necrotizing pneumonia  He was started on vancomycin and cefepime  We are asked to comment on further evaluation and management  Patient denies any preceding upper respiratory infection although states there was a cold going around his place of work  Patient was in long-term for several months approximately 10 years ago  He recalls having a TB skin test which was negative  He did serve in the [employer] but was stationed in [place]  He never resided overseas, and is homeless shelter, or in a nursing facility  He does not recall any prior TB exposures  He does not drink alcohol, denies any drug use or recent passing out, and has no recent dental complaints  REVIEW OF SYSTEMS:  As per HPI  A complete system-based review of systems is otherwise negative  PAST MEDICAL HISTORY:  Past Medical History:   Diagnosis Date    Anxiety     GERD (gastroesophageal reflux disease)     History of ileus     Insomnia     MDD (major depressive disorder)     Tobacco abuse      Past Surgical History:   Procedure Laterality Date    BACK SURGERY  2015    Lumbar    KNEE SURGERY      right    [place] TREAT TIBIAL SHAFT FX, INTRAMED IMPLANT Left 3/16/2017    Procedure: INSERTION NAIL IM TIBIA;  Surgeon: Consuelo Quiles DO;  Location: [facility];  Service: Orthopedics       FAMILY HISTORY:  Non-contributory    SOCIAL HISTORY:  History   Alcohol Use No     History   Drug Use No     History   Smoking Status    Current Every Day Smoker    Packs/day: 1 00   Smokeless Tobacco    Never Used     Comment: Pt would like counseling       ALLERGIES:  No Known Allergies    MEDICATIONS:  All current active medications have been reviewed      PHYSICAL EXAM:  Vitals:  HR:  [] 156  Resp:  [14-40] 31  BP: ()/(54-85) 124/83  SpO2:  [93 %-99 %] 94 %  Temp (24hrs), Av 5 °F (36 9 °C), Min:98 2 °F (36 8 °C), Max:98 7 °F (37 1 °C)  Current: Temperature: 98 7 °F (37 1 °C)     Physical Exam:  General:  Well-nourished, well-developed, in no acute distress, although appears diaphoretic  Eyes:  Conjunctive clear with no hemorrhages or effusions  Oropharynx:  No ulcers, no lesions  Neck:  Supple, no lymphadenopathy  Lungs:  Course left upper lobe breath sounds, no accessory muscle use  Cardiac:  Regular rate and rhythm, no murmurs  Abdomen:  Soft, non-tender, non-distended  Extremities:  No peripheral cyanosis, clubbing, or edema  Skin:  No rashes, no ulcers  Neurological:  Moves all four extremities spontaneously, sensation grossly intact    LABS, IMAGING, & OTHER STUDIES:  Lab Results:  I have personally reviewed pertinent labs  Results from last 7 days  Lab Units 18  1633   SODIUM mmol/L 137   POTASSIUM mmol/L 3 7   CHLORIDE mmol/L 102   CO2 mmol/L 26   ANION GAP mmol/L 9   BUN mg/dL 15   CREATININE mg/dL 1 12   EGFR ml/min/1 73sq m 76   GLUCOSE RANDOM mg/dL 130   CALCIUM mg/dL 9 0   AST U/L 27   ALT U/L 71   ALK PHOS U/L 205*   TOTAL PROTEIN g/dL 7 8   BILIRUBIN TOTAL mg/dL 0 74       Results from last 7 days  Lab Units 18  0006 18  2246 18  1633   WBC Thousand/uL  --  11 34* 14 45*   HEMOGLOBIN g/dL  --  15 1 16 4   PLATELETS Thousands/uL 286 281 300       Results from last 7 days  Lab Units 18  0051   LEGIONELLA URINARY ANTIGEN  Negative       Imaging Studies:   I have personally reviewed pertinent imaging study reports and images in PACS  CT chest Left upper lobe airspace disease with central cavitary change  Findings most suggestive of a cavitary/necrotizing pneumonia  EKG, Pathology, and Other Studies:   I have personally reviewed pertinent reports

## 2018-05-21 NOTE — ED NOTES
Per [facility] admitting Dr Santos, SBP is to remain above [address] [facility], [address] [place] [address]  05/21/18 1031

## 2018-05-21 NOTE — CONSULTS
Cardiology Consult  05/21/18    Referring Physian: MD LO DiehlIM    Chief Complain/Reason for Referal:     IMPRESSION/RECOMMENDATIONS/DISCUSSION:  1  Atrial flutter with 2-1 av block  2  Left upper lobe cavitary pneumonia, rule out tuberculosis    · Continue IV diltiazem infusion to attempt rate control  Occasionally rate does slow down to the 110's to 130s  Will give 1 dose intravenous Lopressor for now and start metoprolol 25 mg p o  t i d   Will add digoxin IV load for this afternoon, if persistent 2:1 AVB  If persistent atrial flutter, will plan for NIKOLAS/cardioversion in 24-48 hours  Discontinue IV heparin, start Eliquis 5 mg p o  b i d  to continue for 1 month post cardioversion  Chads Vasc score is 0, therefore will not a chronically  · Tuberculosis workup underway, continue tx of pneumonia which likely has triggered atrial dysrhythmia  · Echocardiogram pending        ======================================================    HPI:  I am seeing this patient in cardiology consultation for:  Atrial flutter    Ata Davis  is a 47 y o  male presenting to the emergency department yesterday evening with several days of fevers, chills, night sweats at home along with reproducible left-sided chest pain  He was noted to have leukocytosis on presentation with chest x-ray revealing left upper lobe cavitary lesion, confirmed by CT chest   He has been placed on tuberculosis precautions, and the treatment for pneumonia has been initiated  He was also noted to have atrial flutter with 2-1 av block, and has been started on diltiazem infusion  He denies any prior cardiac history, specifically denying any myocardial infarction, stroke, dysrhythmia, peripheral arterial disease, congestive heart failure  He also denies diabetes or history of thromboembolic complications    He has not had advanced cardiac testing in the past         Past Medical History:   Diagnosis Date    Anxiety     GERD (gastroesophageal reflux disease)     History of ileus     Insomnia     MDD (major depressive disorder)     Tobacco abuse          Scheduled Meds:  Current Facility-Administered Medications:  acetaminophen 650 mg Oral Q6H PRN Ofe Casas PA-C    cefepime 2,000 mg Intravenous Q24H SYEDA Warren    clonazePAM 0 5 mg Oral TID PRN SIENNA Rao-ELIZABETH    diltiazem 1-15 mg/hr Intravenous Titrated SIENNA Rao-C Last Rate: 15 mg/hr (05/21/18 0528)   famotidine 20 mg Oral Daily Ofe Casas PA-C    heparin (porcine) 3-30 Units/kg/hr (Order-Specific) Intravenous Titrated Alea Delaney PA-C Last Rate: 18 Units/kg/hr (05/21/18 0725)   levalbuterol 1 25 mg Nebulization TID Karel Casas PA-C    metoprolol 5 mg Intravenous Once Rujul Smith, DO    metoprolol tartrate 25 mg Oral TID Rujul Smith, DO    nicotine 1 patch Transdermal Daily Ofe Casas PA-C    ondansetron 4 mg Intravenous Q6H PRN SIENNA Rao-ELIZABETH    oxyCODONE 10 mg Oral Q6H PRN Gage Berrios MD    QUEtiapine 200 mg Oral HS Ofe Casas PA-C    sodium chloride 100 mL/hr Intravenous Continuous SYEDA Lopez Last Rate: 100 mL/hr (05/21/18 0718)   sodium chloride 3 mL Nebulization TID Loan Apodaca MD    tuberculin 5 Units Intradermal Once Loan Apodaca MD    vancomycin 20 mg/kg (Adjusted) Intravenous Q12H Alea Baltazar PA-C Last Rate: Stopped (05/21/18 0436)   venlafaxine 150 mg Oral BID Ofe Casas PA-C      Continuous Infusions:  diltiazem 1-15 mg/hr Last Rate: 15 mg/hr (05/21/18 0528)   heparin (porcine) 3-30 Units/kg/hr (Order-Specific) Last Rate: 18 Units/kg/hr (05/21/18 0725)   sodium chloride 100 mL/hr Last Rate: 100 mL/hr (05/21/18 0718)     PRN Meds:   acetaminophen    clonazePAM    ondansetron    oxyCODONE  No Known Allergies  I reviewed the Home Medication list in the chart       Family History   Problem Relation Age of Onset    Pancreatic cancer Mother     COPD Father     Heart attack Brother 64 decreased    Narcolepsy Brother        Social History     Social History    Marital status: Single     Spouse name: N/A    Number of children: N/A    Years of education: N/A     Occupational History   [address]     Social History Main Topics    Smoking status: Current Every Day Smoker     Packs/day: 1 00    Smokeless tobacco: Never Used      Comment: Pt would like counseling    Alcohol use No    Drug use: No    Sexual activity: Not on file     Other Topics Concern    Not on file     Social History Narrative    Denies pmh       Review of Systems - as per HPI, all others reviewed and negative  Vitals:    05/21/18 0900   BP: 124/83   Pulse: (!) 156   Resp: (!) 31   Temp:    SpO2: 94%     I/O       05/19 0701 - 05/20 0700 05/20 0701 - 05/21 0700 05/21 0701 - 05/22 0700    I V  (mL/kg)  1178 4 (11 7)     IV Piggyback  2070 500    Total Intake(mL/kg)  3248 4 (32 2) 500 (5)    Urine (mL/kg/hr)  800     Total Output   800      Net   +2448 4 +500               Weight (last 2 days)     Date/Time   Weight    05/21/18 0512  101 (222 44)              GEN: NAD, Alert  HEENT: Mucus membranes moist, pink conjunctivae  EYES: Pupils equal, sclera anicteric  NECK: No JVD/HJR, no carotid bruit  CARDIOVASCULAR:  Regular rhythm with some irregularity, tachycardic, No murmur, rub, gallops S1,S2, no parasternal heave/thrill  LUNGS: Clear To auscultation bilaterally  ABDOMEN: Soft, nondistended, no hepatic systolic pulsation  EXTREMITIES/VASCULAR: No edema    PSYCH: Normal Affect by limited examination  NEURO: Grossly intact by limited examination    HEME: No significant bleeding, bruising, petechia by limited examination  SKIN: No significant rashes by limited examination  MSK:  Normal upper extremity and trunk strengths by limited examination      EKG:  Atrial flutter  TELE:  With 2-1 av block atrial flutter with 2-1 av block  CXR:  Left upper lobe cavitary lesion    ECHO:  Pending       Results from last 7 days  Lab Units 05/21/18  0006 05/20/18  2246 05/20/18  1633   WBC Thousand/uL  --  11 34* 14 45*   HEMOGLOBIN g/dL  --  15 1 16 4   HEMATOCRIT %  --  44 3 47 2   PLATELETS Thousands/uL 286 281 300   NEUTROS PCT %  --   --  77*   MONOS PCT %  --   --  9       Results from last 7 days  Lab Units 05/20/18  1633   SODIUM mmol/L 137   POTASSIUM mmol/L 3 7   CHLORIDE mmol/L 102   CO2 mmol/L 26   BUN mg/dL 15   CREATININE mg/dL 1 12   GLUCOSE RANDOM mg/dL 130   CALCIUM mg/dL 9 0       Results from last 7 days  Lab Units 05/20/18  1633   SODIUM mmol/L 137   POTASSIUM mmol/L 3 7   CHLORIDE mmol/L 102   CO2 mmol/L 26   BUN mg/dL 15   CREATININE mg/dL 1 12   CALCIUM mg/dL 9 0   TOTAL PROTEIN g/dL 7 8   BILIRUBIN TOTAL mg/dL 0 74   ALK PHOS U/L 205*   ALT U/L 71   AST U/L 27   GLUCOSE RANDOM mg/dL 130     No results found for: TROPONINT        Results from last 7 days  Lab Units 05/21/18  0251   TROPONIN I ng/mL <0 02           Results from last 7 days  Lab Units 05/20/18  2246   INR  1 16               I have personally reviewed the EKG, CXR and Telemetry images directly        Patient Active Problem List    Diagnosis Date Noted    Chest pain 05/20/2018     Priority: Low    Pneumonia 05/20/2018     Priority: Low    [facility] 05/20/2018     Priority: Low    Adenoma of left adrenal gland 05/20/2018     Priority: Low    Closed right ankle fracture 03/16/2017     Priority: Low    GERD (gastroesophageal reflux disease)      Priority: Low    Tobacco abuse      Priority: Low    History of ileus      Priority: Low    Umbilical hernia without obstruction and without gangrene 02/01/2016     Priority: Low    Gallbladder calculus without cholecystitis 02/01/2016     Priority: Low    Kidney stone on right side 01/31/2016     Priority: Low    Ileus ([facility] ) 01/31/2016     Priority: Low    MDD (major depressive disorder)      Priority: Low    Insomnia      Priority: Low    Anxiety      Priority: Low       Portions of the record may have been created with voice recognition software  Occasional wrong word or "sound a like" substitutions may have occurred due to the inherent limitations of voice recognition software  Read the chart carefully and recognize, using context, where substitutions have occurred

## 2018-05-22 LAB
HIV 1+2 AB+HIV1 P24 AG SERPL QL IA: NORMAL
HIV1 P24 AG SER QL: NORMAL
MRSA NOSE QL CULT: NORMAL
PROCALCITONIN SERPL-MCNC: <0.05 NG/ML

## 2018-05-22 PROCEDURE — 99233 SBSQ HOSP IP/OBS HIGH 50: CPT | Performed by: INTERNAL MEDICINE

## 2018-05-22 PROCEDURE — 94640 AIRWAY INHALATION TREATMENT: CPT

## 2018-05-22 PROCEDURE — 99232 SBSQ HOSP IP/OBS MODERATE 35: CPT | Performed by: INTERNAL MEDICINE

## 2018-05-22 PROCEDURE — 94760 N-INVAS EAR/PLS OXIMETRY 1: CPT

## 2018-05-22 PROCEDURE — 87806 HIV AG W/HIV1&2 ANTB W/OPTIC: CPT | Performed by: INTERNAL MEDICINE

## 2018-05-22 PROCEDURE — 84145 PROCALCITONIN (PCT): CPT | Performed by: INTERNAL MEDICINE

## 2018-05-22 RX ORDER — CLONAZEPAM 1 MG/1
1 TABLET ORAL 3 TIMES DAILY PRN
Status: DISCONTINUED | OUTPATIENT
Start: 2018-05-22 | End: 2018-05-22

## 2018-05-22 RX ORDER — MAGNESIUM HYDROXIDE/ALUMINUM HYDROXICE/SIMETHICONE 120; 1200; 1200 MG/30ML; MG/30ML; MG/30ML
30 SUSPENSION ORAL 3 TIMES DAILY PRN
Status: DISCONTINUED | OUTPATIENT
Start: 2018-05-22 | End: 2018-05-23 | Stop reason: HOSPADM

## 2018-05-22 RX ORDER — CLONAZEPAM 1 MG/1
1 TABLET ORAL 3 TIMES DAILY
Status: DISCONTINUED | OUTPATIENT
Start: 2018-05-22 | End: 2018-05-23 | Stop reason: HOSPADM

## 2018-05-22 RX ADMIN — VENLAFAXINE 150 MG: 75 TABLET ORAL at 17:12

## 2018-05-22 RX ADMIN — OXYCODONE HYDROCHLORIDE 10 MG: 10 TABLET ORAL at 05:13

## 2018-05-22 RX ADMIN — ISODIUM CHLORIDE 3 ML: 0.03 SOLUTION RESPIRATORY (INHALATION) at 19:35

## 2018-05-22 RX ADMIN — LEVALBUTEROL HYDROCHLORIDE 1.25 MG: 1.25 SOLUTION, CONCENTRATE RESPIRATORY (INHALATION) at 19:35

## 2018-05-22 RX ADMIN — VANCOMYCIN HYDROCHLORIDE 1500 MG: 1 INJECTION, POWDER, LYOPHILIZED, FOR SOLUTION INTRAVENOUS at 11:25

## 2018-05-22 RX ADMIN — LEVALBUTEROL HYDROCHLORIDE 1.25 MG: 1.25 SOLUTION, CONCENTRATE RESPIRATORY (INHALATION) at 07:35

## 2018-05-22 RX ADMIN — APIXABAN 5 MG: 5 TABLET, FILM COATED ORAL at 08:23

## 2018-05-22 RX ADMIN — CEFTRIAXONE 1000 MG: 1 INJECTION, SOLUTION INTRAVENOUS at 10:55

## 2018-05-22 RX ADMIN — ALUMINUM HYDROXIDE, MAGNESIUM HYDROXIDE, AND SIMETHICONE 30 ML: 200; 200; 20 SUSPENSION ORAL at 22:34

## 2018-05-22 RX ADMIN — CLONAZEPAM 0.5 MG: 0.5 TABLET ORAL at 08:24

## 2018-05-22 RX ADMIN — METOPROLOL TARTRATE 25 MG: 25 TABLET ORAL at 21:56

## 2018-05-22 RX ADMIN — CLONAZEPAM 1 MG: 1 TABLET ORAL at 17:12

## 2018-05-22 RX ADMIN — OXYCODONE HYDROCHLORIDE 10 MG: 10 TABLET ORAL at 11:17

## 2018-05-22 RX ADMIN — CLONAZEPAM 1 MG: 1 TABLET ORAL at 21:55

## 2018-05-22 RX ADMIN — SODIUM CHLORIDE 100 ML/HR: 0.9 INJECTION, SOLUTION INTRAVENOUS at 01:18

## 2018-05-22 RX ADMIN — FAMOTIDINE 20 MG: 20 TABLET ORAL at 08:23

## 2018-05-22 RX ADMIN — SODIUM CHLORIDE 100 ML/HR: 0.9 INJECTION, SOLUTION INTRAVENOUS at 10:54

## 2018-05-22 RX ADMIN — QUETIAPINE 200 MG: 200 TABLET, EXTENDED RELEASE ORAL at 22:35

## 2018-05-22 RX ADMIN — LEVALBUTEROL HYDROCHLORIDE 1.25 MG: 1.25 SOLUTION, CONCENTRATE RESPIRATORY (INHALATION) at 13:08

## 2018-05-22 RX ADMIN — METOPROLOL TARTRATE 25 MG: 25 TABLET ORAL at 08:23

## 2018-05-22 RX ADMIN — VENLAFAXINE 150 MG: 75 TABLET ORAL at 08:24

## 2018-05-22 RX ADMIN — VANCOMYCIN HYDROCHLORIDE 1500 MG: 1 INJECTION, POWDER, LYOPHILIZED, FOR SOLUTION INTRAVENOUS at 22:35

## 2018-05-22 RX ADMIN — APIXABAN 5 MG: 5 TABLET, FILM COATED ORAL at 17:12

## 2018-05-22 RX ADMIN — ISODIUM CHLORIDE 3 ML: 0.03 SOLUTION RESPIRATORY (INHALATION) at 13:08

## 2018-05-22 RX ADMIN — OXYCODONE HYDROCHLORIDE 10 MG: 10 TABLET ORAL at 17:12

## 2018-05-22 RX ADMIN — ISODIUM CHLORIDE 3 ML: 0.03 SOLUTION RESPIRATORY (INHALATION) at 07:35

## 2018-05-22 NOTE — PROGRESS NOTES
Progress Note - Cardiology   [facility]  47 y o  male MRN: [id number]  Unit/Bed#: ICU 15 Encounter: [id number]      Assessment/Recommendations/Discussion:     1  Atrial flutter, back in sinus rhythm now  2  Left upper lobe cavitary pneumonia    · Patient converted back to sinus rhythm around 1:00 p m  yesterday with about 3 second conversion pause, no symptoms  · Will decrease metoprolol to 25 mg twice daily  · Echocardiogram reviewed, normal left ventricular systolic function, no significant valvular disease  · Would continue Eliquis for 1 month    Chads Vasc score is 0, therefore will not need chronic anticoagulation        Subjective:  Patient seen and examined, feels well, somewhat better      Physical Exam:  GEN:  NAD  HEENT:  MMM, NCAT, pink conjunctiva, EOMI, nonicteric sclera  CV:  NO JVD/HJR, RR, NO M/R/G, +S1/S2, NO PARASTERNAL HEAVE/THRILL, NO LE EDEMA, NO HEPATIC SYSTOLIC PULSATION, WARM EXTREMITIES  RESP:  CTAB/L  ABD:  SOFT, NT, NO GROSS ORGANOMEGALY        Vitals:   /67   Pulse 98   Temp 98 4 °F (36 9 °C) (Oral)   Resp (!) 30   Ht 5' 10" (1 778 m)   Wt 101 kg (222 lb 7 1 oz)   SpO2 95%   BMI 31 92 kg/m²   Vitals:    05/21/18 0512   Weight: 101 kg (222 lb 7 1 oz)       Intake/Output Summary (Last 24 hours) at 05/22/18 0914  Last data filed at 05/22/18 0801   Gross per 24 hour   Intake          2443 35 ml   Output             2750 ml   Net          -306 65 ml         TELEMETRY:  Sinus rhythm, 3 3 second conversion pause noted  Lab Results:    Results from last 7 days  Lab Units 05/21/18  0006 05/20/18  2246   WBC Thousand/uL  --  11 34*   HEMOGLOBIN g/dL  --  15 1   HEMATOCRIT %  --  44 3   PLATELETS Thousands/uL 286 281       Results from last 7 days  Lab Units 05/20/18  1633   SODIUM mmol/L 137   POTASSIUM mmol/L 3 7   CHLORIDE mmol/L 102   CO2 mmol/L 26   BUN mg/dL 15   CREATININE mg/dL 1 12   CALCIUM mg/dL 9 0   TOTAL PROTEIN g/dL 7 8   BILIRUBIN TOTAL mg/dL 0 74   ALK PHOS U/L 205*   ALT U/L 71   AST U/L 27   GLUCOSE RANDOM mg/dL 130       Results from last 7 days  Lab Units 05/20/18  1633   SODIUM mmol/L 137   POTASSIUM mmol/L 3 7   CHLORIDE mmol/L 102   CO2 mmol/L 26   BUN mg/dL 15   CREATININE mg/dL 1 12   GLUCOSE RANDOM mg/dL 130   CALCIUM mg/dL 9 0             Medications:    Current Facility-Administered Medications:     acetaminophen (TYLENOL) tablet 650 mg, 650 mg, Oral, Q6H PRN, Dagoberto Javier PA-C    apixaban (ELIQUIS) tablet 5 mg, 5 mg, Oral, BID, Rujul Smith, DO, 5 mg at 05/22/18 9509    cefTRIAXone (ROCEPHIN) IVPB (premix) 1,000 mg, 1,000 mg, Intravenous, Q24H, Bayron Burgos MD, Stopped at 05/21/18 1223    clonazePAM (KlonoPIN) tablet 0 5 mg, 0 5 mg, Oral, TID PRN, Dagoberto Javier PA-C, 0 5 mg at 05/22/18 0824    diltiazem (CARDIZEM) 125 mg in sodium chloride 0 9 % 125 mL infusion, 1-15 mg/hr, Intravenous, Titrated, Ofe Casas PA-C, Stopped at 05/21/18 1543    famotidine (PEPCID) tablet 20 mg, 20 mg, Oral, Daily, Ofe Casas PA-C, 20 mg at 05/22/18 3943    levalbuterol (XOPENEX) inhalation solution 1 25 mg, 1 25 mg, Nebulization, TID, Ofe Casas PA-C, 1 25 mg at 05/22/18 0735    metoprolol (LOPRESSOR) injection 5 mg, 5 mg, Intravenous, Once, Rujul Smith, DO    metoprolol tartrate (LOPRESSOR) tablet 25 mg, 25 mg, Oral, TID, Rujul Smith, DO, 25 mg at 05/22/18 0823    nicotine (NICODERM CQ) 14 mg/24hr TD 24 hr patch 1 patch, 1 patch, Transdermal, Daily, Ofe Casas PA-C    ondansetron (ZOFRAN) injection 4 mg, 4 mg, Intravenous, Q6H PRN, Dagoberto Javier PA-C    oxyCODONE (ROXICODONE) immediate release tablet 10 mg, 10 mg, Oral, Q6H PRN, Toribio Vernon MD, 10 mg at 05/22/18 0513    QUEtiapine (SEROquel XR) 24 hr tablet 200 mg, 200 mg, Oral, HS, Ofe Casas PA-C, 200 mg at 05/21/18 2124    sodium chloride 0 9 % infusion, 100 mL/hr, Intravenous, Continuous, SYEDA Warren, Last Rate: 100 mL/hr at 05/22/18 0601, 100 mL/hr at 05/22/18 0601   sodium chloride 0 9 % inhalation solution 3 mL, 3 mL, Nebulization, TID, Gage Cortez MD, 3 mL at 05/22/18 0735    tuberculin injection 5 Units, 5 Units, Intradermal, Once, Amanda Mckenzie MD, 5 Units at 05/21/18 0107    venlafaxine [facility]) tablet 150 mg, 150 mg, Oral, BID, Ofe Casas PA-C, 150 mg at 05/22/18 6303    Portions of the record may have been created with voice recognition software  Occasional wrong word or "sound a like" substitutions may have occurred due to the inherent limitations of voice recognition software  Read the chart carefully and recognize, using context, where substitutions have occurred

## 2018-05-22 NOTE — PROGRESS NOTES
Progress Note - Infectious Disease   Joao Arrington  47 y o  male MRN: [id number]  Unit/Bed#: ICU 15 Encounter: [id number]      Impression/Recommendations:  1  Sepsis  POA:  Leukocytosis and tachycardia  Due to pneumonia  Blood cultures pending  Procalcitnonin low, perhaps due to lack of profound sepsis response  Clinically stable and nontoxic  Rec:  ? Continue antibiotics as below  ? Follow up final blood cultures from admission  ? Follow temperatures closely     2  GUME cavitary pneumonia  Acute onset suggests typical bacterial pathogen  No known risk factors for tuberculosis  Legionella and pneumococcal urinary antigens negative  Sputum Gram stain with GPCs in clusters; Consider MRSA  HIV negative  Stable on room air  Rec:  ? Continue ceftriaxone for now  ? Add back vancomycin  ? Follow up sputum culture and MRSA nasal swab  ? Follow up AFB x3  If negative can D/C airborne precautions  ? Follow up TB PCR although low suspicion for positivity  ? Follow respiratory status closely     3  Tobacco use  Risk factor for pneumonia  Rec:  ? Patient states he is quitting tobacco as of this admission    Discussed in detail with the patient, Dr Nissa Chapman, and Dr Paola Tate     Antibiotics:  Ceftriaxone # 2   Antibiotics #3    Subjective:  Patient seen on AM rounds  Feels about the same, no specific improvement  Still some intermittent sweats although not currently  Denies any nausea, vomiting, or diarrhea  24 Hour Events:  No documented fevers, chills, sweats, nausea, vomiting, or diarrhea  AFB negative x1  Two more AFBs pending      Objective:  Vitals:  HR:  [] 88  Resp:  [18-37] 30  BP: ()/(44-86) 117/67  SpO2:  [92 %-95 %] 95 %  Temp (24hrs), Av 2 °F (36 8 °C), Min:97 4 °F (36 3 °C), Max:98 6 °F (37 °C)  Current: Temperature: 98 6 °F (37 °C)    Physical Exam:   General:  No acute distress  Eyes:  Normal lids and conjunctivae  ENT:  Normal external ears and nose  Neck:  Neck symmetric with midline trachea  Pulmonary:  Normal respiratory effort without accessory muscle use  Cardiovascular:  Regular rate and rhythm; no peripheral edema  Gastrointestinal:  No tenderness or distention  Musculoskeletal:  No digital clubbing or cyanosis  Skin:  No visible rashes; No palpable nodules  Neurologic:  Sensation grossly intact to light touch  Psychiatric:  Alert and oriented; Normal mood    Lab Results:  I have personally reviewed pertinent labs  Results from last 7 days  Lab Units 05/20/18  1633   SODIUM mmol/L 137   POTASSIUM mmol/L 3 7   CHLORIDE mmol/L 102   CO2 mmol/L 26   ANION GAP mmol/L 9   BUN mg/dL 15   CREATININE mg/dL 1 12   EGFR ml/min/1 73sq m 76   GLUCOSE RANDOM mg/dL 130   CALCIUM mg/dL 9 0   AST U/L 27   ALT U/L 71   ALK PHOS U/L 205*   TOTAL PROTEIN g/dL 7 8   BILIRUBIN TOTAL mg/dL 0 74       Results from last 7 days  Lab Units 05/21/18  0006 05/20/18  2246 05/20/18  1633   WBC Thousand/uL  --  11 34* 14 45*   HEMOGLOBIN g/dL  --  15 1 16 4   PLATELETS Thousands/uL 286 281 300       Results from last 7 days  Lab Units 05/21/18  1103 05/21/18  0051 05/20/18  1943   BLOOD CULTURE   --   --  No Growth at 24 hrs  No Growth at 24 hrs  GRAM STAIN RESULT  Rare Epithelial Cells  2+ Polys  1+ Gram positive cocci in clusters  --   --    LEGIONELLA URINARY ANTIGEN   --  Negative  --      Procalc <0 05    Imaging Studies:   I have personally reviewed pertinent imaging study reports and images in PACS  EKG, Pathology, and Other Studies:   I have personally reviewed pertinent reports

## 2018-05-22 NOTE — PROGRESS NOTES
[address] Internal Medicine Progress Note  Patient: Desean Bolton  47 y o  male   MRN: [id number]  PCP: Anna Lee MD  Unit/Bed#: ICU 15 Encounter: [id number]  Date Of Visit: 18    Assessment and plan:    Principal Problem:    Cavitary pneumonia-community-acquired with cavitation, currently ruling out pulmonary TB due to location  The patient now ever does not have TB risk factors  Continue empiric ceftriaxone  Vancomycin restarted due to gram-positive cocci in sputum cultures  AFB still in process  Active Problems:    Atrial flutter (HCC)-now back in sinus rhythm  Plan is for anticoagulation with Eliquis for 1 month per Cardiology  Rate controlled with metoprolol    Depression with anxiety-mood is stable on Seroquel and Effexor  PD MP website reviewed  He is on Klonopin 1 mg t i d       Will increase Klonopin to home dose 1 mg t i d  round the clock    Gastroesophageal reflux disease without esophagitis-stable on famotidine    Adenoma of left adrenal gland-outpatient follow-up/workup when acute illness has subsided          VTE Pharmacologic Prophylaxis:   Pharmacologic: Apixaban (Eliquis)  Mechanical VTE Prophylaxis in Place: No    Discussions with Specialists or Other Care Team Provider:  Discussed with Infectious Disease    Education and Discussions with Family / Patient:  Discussed with significant other at bedside    Time Spent for Care: 25 minutes  More than 50% of total time spent on counseling and coordination of care as described above  Current Length of Stay: 2 day(s)    Current Patient Status: Inpatient   Certification Statement: The patient will continue to require additional inpatient hospital stay due to Pneumonia    Discharge Plan:  Not ready for discharge    Code Status: Level 1 - Full Code      Subjective:   Improved cough fever and chills  No events overnight   No hemoptysis    Objective:     Vitals:   Temp (24hrs), Av 2 °F (36 8 °C), Min:97 4 °F (36 3 °C), Max:98 6 °F (37 °C)    HR:  [] 98  Resp:  [18-31] 30  BP: ()/(44-86) 117/67  SpO2:  [93 %-96 %] 95 %  Body mass index is 31 92 kg/m²  Input and Output Summary (last 24 hours): Intake/Output Summary (Last 24 hours) at 05/22/18 1115  Last data filed at 05/22/18 0801   Gross per 24 hour   Intake          2393 25 ml   Output             2000 ml   Net           393 25 ml       Physical Exam:     Physical Exam   Constitutional: He appears well-developed and well-nourished  HENT:   Head: Normocephalic and atraumatic  Eyes: No scleral icterus  Cardiovascular: Regular rhythm  Pulmonary/Chest: Effort normal  No stridor  No respiratory distress  He has no wheezes  He has no rales  Abdominal: Soft  Musculoskeletal: He exhibits no edema  Neurological: He is alert  Skin: Skin is warm  Psychiatric: He has a normal mood and affect  Additional Data:     Labs:      Results from last 7 days  Lab Units 05/21/18  0006 05/20/18  2246 05/20/18  1633   WBC Thousand/uL  --  11 34* 14 45*   HEMOGLOBIN g/dL  --  15 1 16 4   HEMATOCRIT %  --  44 3 47 2   PLATELETS Thousands/uL 286 281 300   NEUTROS PCT %  --   --  77*   LYMPHS PCT %  --   --  13*   MONOS PCT %  --   --  9   EOS PCT %  --   --  0       Results from last 7 days  Lab Units 05/20/18  1633   SODIUM mmol/L 137   POTASSIUM mmol/L 3 7   CHLORIDE mmol/L 102   CO2 mmol/L 26   BUN mg/dL 15   CREATININE mg/dL 1 12   CALCIUM mg/dL 9 0   TOTAL PROTEIN g/dL 7 8   BILIRUBIN TOTAL mg/dL 0 74   ALK PHOS U/L 205*   ALT U/L 71   AST U/L 27   GLUCOSE RANDOM mg/dL 130       Results from last 7 days  Lab Units 05/20/18  2246   INR  1 16       * I Have Reviewed All Lab Data Listed Above  * Additional Pertinent Lab Tests Reviewed:  All Labs Within Last 24 Hours Reviewed    Imaging:    Imaging Reports Reviewed Today Include:  No new imaging      Recent Cultures (last 7 days):       Results from last 7 days  Lab Units 05/21/18  1103 05/21/18  0051 05/20/18  1943 BLOOD CULTURE   --   --  No Growth at 24 hrs  No Growth at 24 hrs  SPUTUM CULTURE  Culture results to follow  --   --    GRAM STAIN RESULT  Rare Epithelial Cells  2+ Polys  1+ Gram positive cocci in clusters  --   --    LEGIONELLA URINARY ANTIGEN   --  Negative  --        Last 24 Hours Medication List:     Current Facility-Administered Medications:  acetaminophen 650 mg Oral Q6H PRN Timothy Keller PA-C    apixaban 5 mg Oral BID Gregory Smith DO    cefTRIAXone 1,000 mg Intravenous Q24H Archie Rodriguez MD Last Rate: 1,000 mg (05/22/18 1055)   clonazePAM 0 5 mg Oral TID PRN Timothy Keller PA-C    diltiazem 1-15 mg/hr Intravenous Titrated Timothy Keller PA-C Last Rate: Stopped (05/21/18 1543)   famotidine 20 mg Oral Daily Ofe Casas PA-C    levalbuterol 1 25 mg Nebulization TID [facility] Merritt Casas PA-C    metoprolol tartrate 25 mg Oral Q12H [facility] Gregory Smith DO    nicotine 1 patch Transdermal Daily Ofe Casas PA-C    ondansetron 4 mg Intravenous Q6H PRN Timothy Keller PA-C    oxyCODONE 10 mg Oral Q6H PRN Gage Pulliam MD    QUEtiapine 200 mg Oral HS Ofe Casas PA-C    sodium chloride 100 mL/hr Intravenous Continuous SYEDA Becker Last Rate: 100 mL/hr (05/22/18 1054)   sodium chloride 3 mL Nebulization TID Vin Dhaliwal MD    tuberculin 5 Units Intradermal Once Vin Dhaliwal MD    vancomycin 15 mg/kg Intravenous Q12H Archie Rodriguez MD    venlafaxine 150 mg Oral BID Timothy Keller PA-C         Today, Patient Was Seen By: Sam Ortega MD    ** Please Note: Dragon 360 Dictation voice to text software may have been used in the creation of this document   **

## 2018-05-22 NOTE — PROGRESS NOTES
Progress Note - Pulmonary   Marycarmen Espinoza  47 y o  male MRN: [id number]  Unit/Bed#: ICU 15 Encounter: [id number]      Assessment/Plan:    1  Abnormal chest x-ray-left upper lobe cavitary lesion, TB versus necrotizing pneumonia  1  Continue droplet isolation -rule out TB  2  AFB culture x3-will consider bronchoscopy if no clinical improvement --AFB x1 negative  3  Legionella and strep negative  4  Continue current antibiotic therapy, Infectious Disease following and added vancomycin due to sputum gram stain results  5  Procalcitonin low  6  Monitor WBC and temperatures  7  Follow blood cultures  2  Atrial flutter with tachycardia  1  Management per Cardiology and Internal Medicine  3  Chest pain-Likely secondary cavitary lesion, although Cardiology consult placed to rule out ACS  1  Pain management per primary  4  Hemoptysis-none noted since admission    *Appropriate for transfer to medical surgical bed from pulmonary standpoint    Subjective:   Meaghan Dan was seen sitting in bed upon entering the room  He denies any acute overnight events  He does report improvement in symptoms today  He does continue to have right-sided chest pain that increases with inspiration and is currently reported 4/10 in severity  He denies:  Fevers, chills, night sweats, nausea, vomiting secondary to headache or dizziness, bronchospasm, or hemoptysis    Objective:         Vitals: Blood pressure 117/67, pulse 98, temperature 98 4 °F (36 9 °C), temperature source Oral, resp  rate (!) 30, height 5' 10" (1 778 m), weight 101 kg (222 lb 7 1 oz), SpO2 99 % , RA, Body mass index is 31 92 kg/m²        Intake/Output Summary (Last 24 hours) at 05/22/18 1428  Last data filed at 05/22/18 1200   Gross per 24 hour   Intake          2319 17 ml   Output             1975 ml   Net           344 17 ml         Physical Exam  Gen: Awake, alert, oriented x 3, no acute distress  HEENT: Mucous membranes moist, no oral lesions, no thrush  NECK: No accessory muscle use, JVP not elevated  Cardiac: Regular, single S1, single S2, no murmurs, no rubs, no gallops  Lungs:  Lung sounds clear bilaterally, no wheezes rhonchi or rales appreciated  Abdomen: normoactive bowel sounds, soft nontender, nondistended, no rebound or rigidity, no guarding  Extremities: no cyanosis, no clubbing, no edema    Labs: I have personally reviewed pertinent lab results  ,     None     Imaging and other studies:  none       July Townsend

## 2018-05-22 NOTE — PROGRESS NOTES
Patient transferred from ICU to E-4  Welcomed to unit  Alert and oriented  Able to ambulate independently  Call bell and phone by bedside  Sitting comfortably in [place]

## 2018-05-23 VITALS
RESPIRATION RATE: 18 BRPM | HEART RATE: 81 BPM | SYSTOLIC BLOOD PRESSURE: 133 MMHG | WEIGHT: 222.44 LBS | BODY MASS INDEX: 31.85 KG/M2 | HEIGHT: 70 IN | OXYGEN SATURATION: 94 % | TEMPERATURE: 98.7 F | DIASTOLIC BLOOD PRESSURE: 78 MMHG

## 2018-05-23 PROBLEM — I48.92 ATRIAL FLUTTER (HCC): Status: RESOLVED | Noted: 2018-05-20 | Resolved: 2018-05-23

## 2018-05-23 LAB
ANION GAP SERPL CALCULATED.3IONS-SCNC: 7 MMOL/L (ref 4–13)
BACTERIA SPT RESP CULT: ABNORMAL
BACTERIA SPT RESP CULT: ABNORMAL
BASOPHILS # BLD AUTO: 0.02 THOUSANDS/ΜL (ref 0–0.1)
BASOPHILS NFR BLD AUTO: 0 % (ref 0–1)
BUN SERPL-MCNC: 15 MG/DL (ref 5–25)
CALCIUM SERPL-MCNC: 8.6 MG/DL (ref 8.3–10.1)
CHLORIDE SERPL-SCNC: 105 MMOL/L (ref 100–108)
CO2 SERPL-SCNC: 26 MMOL/L (ref 21–32)
CREAT SERPL-MCNC: 0.88 MG/DL (ref 0.6–1.3)
EOSINOPHIL # BLD AUTO: 0.15 THOUSAND/ΜL (ref 0–0.61)
EOSINOPHIL NFR BLD AUTO: 2 % (ref 0–6)
ERYTHROCYTE [DISTWIDTH] IN BLOOD BY AUTOMATED COUNT: 12.8 % (ref 11.6–15.1)
GFR SERPL CREATININE-BSD FRML MDRD: 99 ML/MIN/1.73SQ M
GLUCOSE SERPL-MCNC: 96 MG/DL (ref 65–140)
GRAM STN SPEC: ABNORMAL
HCT VFR BLD AUTO: 37.7 % (ref 36.5–49.3)
HGB BLD-MCNC: 12.4 G/DL (ref 12–17)
LYMPHOCYTES # BLD AUTO: 0.85 THOUSANDS/ΜL (ref 0.6–4.47)
LYMPHOCYTES NFR BLD AUTO: 11 % (ref 14–44)
MCH RBC QN AUTO: 30.5 PG (ref 26.8–34.3)
MCHC RBC AUTO-ENTMCNC: 32.9 G/DL (ref 31.4–37.4)
MCV RBC AUTO: 93 FL (ref 82–98)
MONOCYTES # BLD AUTO: 0.74 THOUSAND/ΜL (ref 0.17–1.22)
MONOCYTES NFR BLD AUTO: 9 % (ref 4–12)
NEUTROPHILS # BLD AUTO: 6.12 THOUSANDS/ΜL (ref 1.85–7.62)
NEUTS SEG NFR BLD AUTO: 78 % (ref 43–75)
NRBC BLD AUTO-RTO: 0 /100 WBCS
PLATELET # BLD AUTO: 221 THOUSANDS/UL (ref 149–390)
PMV BLD AUTO: 11.3 FL (ref 8.9–12.7)
POTASSIUM SERPL-SCNC: 4 MMOL/L (ref 3.5–5.3)
RBC # BLD AUTO: 4.06 MILLION/UL (ref 3.88–5.62)
SODIUM SERPL-SCNC: 138 MMOL/L (ref 136–145)
WBC # BLD AUTO: 7.88 THOUSAND/UL (ref 4.31–10.16)

## 2018-05-23 PROCEDURE — 80048 BASIC METABOLIC PNL TOTAL CA: CPT | Performed by: INTERNAL MEDICINE

## 2018-05-23 PROCEDURE — 99239 HOSP IP/OBS DSCHRG MGMT >30: CPT | Performed by: HOSPITALIST

## 2018-05-23 PROCEDURE — 85025 COMPLETE CBC W/AUTO DIFF WBC: CPT | Performed by: INTERNAL MEDICINE

## 2018-05-23 PROCEDURE — 94760 N-INVAS EAR/PLS OXIMETRY 1: CPT

## 2018-05-23 PROCEDURE — 99233 SBSQ HOSP IP/OBS HIGH 50: CPT | Performed by: INTERNAL MEDICINE

## 2018-05-23 PROCEDURE — 94640 AIRWAY INHALATION TREATMENT: CPT

## 2018-05-23 RX ORDER — NICOTINE 21 MG/24HR
1 PATCH, TRANSDERMAL 24 HOURS TRANSDERMAL DAILY
Qty: 28 PATCH | Refills: 0 | Status: SHIPPED | OUTPATIENT
Start: 2018-05-24 | End: 2019-05-19

## 2018-05-23 RX ORDER — DOXYCYCLINE HYCLATE 100 MG/1
100 CAPSULE ORAL EVERY 12 HOURS SCHEDULED
Qty: 20 CAPSULE | Refills: 0 | Status: SHIPPED | OUTPATIENT
Start: 2018-05-23 | End: 2018-06-02

## 2018-05-23 RX ORDER — DOXYCYCLINE HYCLATE 100 MG/1
100 CAPSULE ORAL EVERY 12 HOURS SCHEDULED
Status: DISCONTINUED | OUTPATIENT
Start: 2018-05-23 | End: 2018-05-23 | Stop reason: HOSPADM

## 2018-05-23 RX ADMIN — LEVALBUTEROL HYDROCHLORIDE 1.25 MG: 1.25 SOLUTION, CONCENTRATE RESPIRATORY (INHALATION) at 07:15

## 2018-05-23 RX ADMIN — SODIUM CHLORIDE 100 ML/HR: 0.9 INJECTION, SOLUTION INTRAVENOUS at 01:58

## 2018-05-23 RX ADMIN — DOXYCYCLINE 100 MG: 100 CAPSULE ORAL at 12:23

## 2018-05-23 RX ADMIN — OXYCODONE HYDROCHLORIDE 10 MG: 10 TABLET ORAL at 08:22

## 2018-05-23 RX ADMIN — VENLAFAXINE 150 MG: 75 TABLET ORAL at 08:23

## 2018-05-23 RX ADMIN — CLONAZEPAM 1 MG: 1 TABLET ORAL at 08:22

## 2018-05-23 RX ADMIN — OXYCODONE HYDROCHLORIDE 10 MG: 10 TABLET ORAL at 01:55

## 2018-05-23 RX ADMIN — FAMOTIDINE 20 MG: 20 TABLET ORAL at 08:22

## 2018-05-23 RX ADMIN — APIXABAN 5 MG: 5 TABLET, FILM COATED ORAL at 08:22

## 2018-05-23 RX ADMIN — METOPROLOL TARTRATE 25 MG: 25 TABLET ORAL at 08:23

## 2018-05-23 RX ADMIN — ISODIUM CHLORIDE 3 ML: 0.03 SOLUTION RESPIRATORY (INHALATION) at 07:15

## 2018-05-23 NOTE — PROGRESS NOTES
Progress Note - Infectious Disease   Ravi Mora  47 y o  male MRN: [id number]  Unit/Bed#: E4 -01 Encounter: [id number]      Impression/Recommendations: 1    Sepsis  POA:  Leukocytosis and tachycardia  Due to pneumonia  Blood cultures negative  Procalcitonin low, perhaps due to lack of profound sepsis response  Clinically stable and nontoxic; improved  Rec:  ? Continue antibiotics as below  ? Follow temperatures closely     2   GUME MRSA cavitary pneumonia  Stable on room air  Markedly improved symptoms since admission  Rec:  ? Change to PO doxycycline to continue for 14 days total through 18   ? Should have repeat CXR in 4-6 weeks to ensure clinical resolution  ? Outpatient follow-up with PMD     3  Tobacco use  Risk factor for pneumonia  Rec:  ? Patient states he is quitting tobacco as of this admission     The patient is stable from an ID standpoint for D/C home today  Discussed in detail with the patient and Dr Angelique Cabot     Antibiotics:  Vancomycin # 2  Antibiotics #4    Subjective:  Patient seen on AM rounds  Feels much better  Denies fevers, chills, sweats, nausea, vomiting, or diarrhea  24 Hour Events:  No documented fevers, chills, sweats, nausea, vomiting, or diarrhea  Sputum now growing Staph aureus and ceftriaxone discontinued by me yesterday      Objective:  Vitals:  HR:  [76-98] 81  Resp:  [17-18] 18  BP: (106-133)/(60-84) 133/78  SpO2:  [94 %-99 %] 94 %  Temp (24hrs), Av 5 °F (36 9 °C), Min:97 7 °F (36 5 °C), Max:99 2 °F (37 3 °C)  Current: Temperature: 98 7 °F (37 1 °C)    Physical Exam:   General:  No acute distress  Eyes:  Normal lids and conjunctivae  ENT:  Normal external ears and nose  Neck:  Neck symmetric with midline trachea  Pulmonary:  Normal respiratory effort without accessory muscle use, clear to auscultation bilaterally  Cardiovascular:  Regular rate and rhythm; no peripheral edema  Gastrointestinal:  No tenderness or distention  Musculoskeletal:  No digital clubbing or cyanosis  Skin:  No visible rashes; No palpable nodules  Neurologic:  Sensation grossly intact to light touch  Psychiatric:  Alert and oriented; Normal mood    Lab Results:  I have personally reviewed pertinent labs  Results from last 7 days  Lab Units 05/23/18  0525 05/20/18  1633   SODIUM mmol/L 138 137   POTASSIUM mmol/L 4 0 3 7   CHLORIDE mmol/L 105 102   CO2 mmol/L 26 26   ANION GAP mmol/L 7 9   BUN mg/dL 15 15   CREATININE mg/dL 0 88 1 12   EGFR ml/min/1 73sq m 99 76   GLUCOSE RANDOM mg/dL 96 130   CALCIUM mg/dL 8 6 9 0   AST U/L  --  27   ALT U/L  --  71   ALK PHOS U/L  --  205*   TOTAL PROTEIN g/dL  --  7 8   BILIRUBIN TOTAL mg/dL  --  0 74       Results from last 7 days  Lab Units 05/23/18  0525 05/21/18  0006 05/20/18  2246 05/20/18  1633   WBC Thousand/uL 7 88  --  11 34* 14 45*   HEMOGLOBIN g/dL 12 4  --  15 1 16 4   PLATELETS Thousands/uL 221 286 281 300       Results from last 7 days  Lab Units 05/21/18  1105 05/21/18  1103 05/21/18  0051 05/20/18  1943   BLOOD CULTURE   --   --   --  No Growth at 48 hrs  No Growth at 48 hrs  SPUTUM CULTURE   --  3+ Growth of Staphylococcus aureus*  3+ Growth of   --   --    GRAM STAIN RESULT   --  Rare Epithelial Cells  2+ Polys  1+ Gram positive cocci in clusters  --   --    MRSA CULTURE ONLY  No Methicillin Resistant Staphlyococcus aureus (MRSA) isolated  --   --   --    LEGIONELLA URINARY ANTIGEN   --   --  Negative  --        Imaging Studies:   I have personally reviewed pertinent imaging study reports and images in PACS  EKG, Pathology, and Other Studies:   I have personally reviewed pertinent reports

## 2018-05-23 NOTE — DISCHARGE INSTRUCTIONS
Repeat an x-ray chest in 4-6 weeks time(your primary care physician will receive a discharge summary requesting this test)    He will also need outpatient testing for an adrenal adenoma that was found incidentally    Take Eliquis 5 mg twice a day for 1 month then stop  Stop smoking

## 2018-05-23 NOTE — NURSING NOTE
I reviewed all Rx and discharge instructions with the patient his IVs were removed and the patient walked to the lobby with his wife

## 2018-05-23 NOTE — DISCHARGE SUMMARY
Discharge Summary - Medical Desean Bolton  47 y o  male MRN: [id number]    [address] [address] MED SURG Room / Bed: [address]/E4 -* Encounter: [id number]    BRIEF OVERVIEW      Admission Date: 5/20/2018       Discharge Date:  05/23/2018      Admitting Diagnosis:      Primary Discharge Diagnosis  Principal Problem:    Cavitary pneumonia  Active Problems:    Depression with anxiety    Gastroesophageal reflux disease without esophagitis    Atrial flutter ([address] )    Adenoma of left adrenal gland      Service:  [facility]    Consulting Providers   Pulm-Dr Kylie Freire  Cardiology-Dr Moises Stubbs  ID-Dr Bach        Assessment and plan on date of discharge  Cavitary pneumonia-community-acquired with cavitation, -much improved  Sputum AFB negative  Antibiotic changed to oral doxycycline to continue through 06/02/2018  Patient will need a repeat x-ray chest in 4-6 weeks to ensure resolution of the pneumonia  Cleared for discharge by Infectious Disease      Atrial flutter (HCC)-now back in sinus rhythm  Plan is for anticoagulation with Eliquis for 1 month per Cardiology  Neita Augie controlled with metoprolol      Depression with anxiety-mood is stable on Seroquel and Effexor   PD MP website reviewed   He is on Klonopin 1 mg t i d   Will increase Klonopin to home dose 1 mg t i d  round the clock       Gastroesophageal reflux disease without esophagitis-stable on famotidine       Adenoma of left adrenal gland-outpatient follow-up/workup when acute illness has subsided    Tobacco use-patient counseled with regards to cessation  He promises to quit  Continue nicotine patch on discharge      Stable for discharge home      Will follow up with PMD for repeat x-ray in 4-6 weeks time as well as for workup of his left adrenal gland adenoma    Discharge Condition: Improved    Discharge Disposition: Home/Self Care    Discharge summary:   Penelope Rosas is a 50-year-old male who was admitted to [facility] on 05/20/2018 with cough and shortness of breath  A CT scan in the ED demonstrated a left upper lobe cavitated lesion with mediastinal left hilar adenopathy  He was also noted to be in atrial flutter with a 2 in 1 AV block which spontaneously reverted back to normal sinus rhythm  His echocardiogram revealed normal ventricular function  The plan was for him to continue Eliquis for 1 month and since his chads Vasc score was 0 he will not need chronic anticoagulation  He was evaluated by pulmonology who felt that his findings were consistent with acute bacterial pneumonia and he was at a low risk for tuberculosis and none of the findings on the imaging were consistent with tuberculosis hence his antibiotic was changed to ceftriaxone  His symptoms have improved markedly since admission  He has been changed to doxycycline through 06/02/2018  He will need a repeat x-ray chest in 4-6 weeks time to ensure resolution of the pneumonia  He was also found to have a left at null adenoma as an incidental finding which will need workup as an outpatient to evaluate whether this is a functioning adenoma or a benign one  He remains stable for discharge          Discharge Medications   Please see Medical Reconciliation Discharge Form    Discharge Follow Up Appointments:   PCP  Pulm    Discharge  Statement   Total Time Spent today including physical exam, discussion with patient and family, and discharge arrangements/care 42 minutes

## 2018-05-23 NOTE — PROGRESS NOTES
Progress Note - Murphy Glasgow  47 y o  male MRN: [id number]    Unit/Bed#: E4 -01 Encounter: [id number]    Principal Problem:    Cavitary pneumonia  Active Problems:    Depression with anxiety    Gastroesophageal reflux disease without esophagitis    Atrial flutter (HCC)    Adenoma of left adrenal gland      Assessment/Plan:  Cavitary pneumonia-community-acquired with cavitation, -much improved  Sputum AFB negative  Antibiotic changed to oral doxycycline to continue through 06/02/2018  Patient will need a repeat x-ray chest in 4-6 weeks to ensure resolution of the pneumonia  Cleared for discharge by Infectious Disease  Atrial flutter (HCC)-now back in sinus rhythm  Plan is for anticoagulation with Eliquis for 1 month per Cardiology  Rate controlled with metoprolol     Depression with anxiety-mood is stable on Seroquel and Effexor  PD MP website reviewed  He is on Klonopin 1 mg t i d   Will increase Klonopin to home dose 1 mg t i d  round the clock      Gastroesophageal reflux disease without esophagitis-stable on famotidine      Adenoma of left adrenal gland-outpatient follow-up/workup when acute illness has subsided    Stable for discharge home  Will follow up with PMD for repeat x-ray in 4-6 weeks time as well as for workup of his left adrenal gland adenoma      Subjective:  Patient doing well  No fever or leukocytosis now  Blood cultures are negative  Procalcitonin negative  Antibiotic changed to oral doxycycline to continue through 06/02/2018  Will have repeat x-ray chest in 4-6 weeks to ensure resolution of pneumonia  Patient cleared for discharge home  Physical Exam:   Vitals: Blood pressure 133/78, pulse 81, temperature 98 7 °F (37 1 °C), temperature source Tympanic, resp  rate 18, height 5' 10" (1 778 m), weight 101 kg (222 lb 7 1 oz), SpO2 94 %  ,Body mass index is 31 92 kg/m²  Gen:  Pleasant, non-tachypnic, non-dyspnic  Conversant    Heart: regular rate and rhythm, S1S2 present, no murmur, rub or gallop  Lungs: clear to ausculatation bilaterally  No wheezing, crackless, or rhonchi  No accessory muscle use or respiratory distress  Abd: soft, non-tender, non-distended  NABS, no guarding, rebound or peritoneal signs  Extremities: no clubbing, cyanosis or edema  2+pedal pulses bilaterally  Full range of motion  Neuro: awake, alert and oriented  Cranial nerves 2-12 intact  Strength and sensation grossly intact  Skin: warm and dry: no petechiae, purpura and rash      LABS:     Results from last 7 days  Lab Units 05/23/18  0525 05/21/18  0006 05/20/18  2246 05/20/18  1633   WBC Thousand/uL 7 88  --  11 34* 14 45*   HEMOGLOBIN g/dL 12 4  --  15 1 16 4   HEMATOCRIT % 37 7  --  44 3 47 2   PLATELETS Thousands/uL 221 286 281 300       Results from last 7 days  Lab Units 05/23/18  0525 05/20/18  1633   SODIUM mmol/L 138 137   POTASSIUM mmol/L 4 0 3 7   CHLORIDE mmol/L 105 102   CO2 mmol/L 26 26   BUN mg/dL 15 15   CREATININE mg/dL 0 88 1 12   GLUCOSE RANDOM mg/dL 96 130   CALCIUM mg/dL 8 6 9 0       Intake/Output Summary (Last 24 hours) at 05/23/18 1201  Last data filed at 05/23/18 0157   Gross per 24 hour   Intake          1553 33 ml   Output              150 ml   Net          1403 33 ml           Current Facility-Administered Medications:  aluminum-magnesium hydroxide-simethicone 30 mL Oral TID PRN SYEDA Reina    apixaban 5 mg Oral BID Rujul Smith, DO    clonazePAM 1 mg Oral TID Supriya Gallardo MD    diltiazem 1-15 mg/hr Intravenous Titrated Oswald Dennis PA-C Last Rate: Stopped (05/21/18 1543)   doxycycline hyclate 100 mg Oral Q12H Luisito Rainey MD    famotidine 20 mg Oral Daily Ofe Casas PA-C    levalbuterol 1 25 mg Nebulization TID Oswald Dennis PA-C    metoprolol tartrate 25 mg Oral Q12H [address] Rujul Smith, DO    nicotine 1 patch Transdermal Daily Ofe Casas PA-C    ondansetron 4 mg Intravenous Q6H PRN Ofe Casas PA-C    oxyCODONE 10 mg Oral Q6H PRN Gage Cory Berrios MD    QUEtiapine 200 mg Oral HS Ofe Casas PA-C    sodium chloride 100 mL/hr Intravenous Continuous SYEDA Lopez Last Rate: 100 mL/hr (05/23/18 0158)   sodium chloride 3 mL Nebulization TID Loan Apodaca MD    tuberculin 5 Units Intradermal Once Loan Apodaca MD    venlafaxine 150 mg Oral BID Alea Baltazar PA-C

## 2018-05-25 LAB
BACTERIA BLD CULT: NORMAL
BACTERIA BLD CULT: NORMAL

## 2018-05-29 LAB — MISCELLANEOUS LAB TEST RESULT: NORMAL

## 2018-05-30 LAB
MYCOBACTERIUM SPEC CULT: NORMAL
RHODAMINE-AURAMINE STN SPEC: NORMAL

## 2018-07-10 LAB
MYCOBACTERIUM SPEC CULT: NORMAL
MYCOBACTERIUM SPEC CULT: NORMAL
RHODAMINE-AURAMINE STN SPEC: NORMAL
RHODAMINE-AURAMINE STN SPEC: NORMAL

## 2018-07-17 LAB — MYCOBACTERIUM SPEC CULT: NORMAL

## 2019-05-19 ENCOUNTER — APPOINTMENT (EMERGENCY)
Dept: CT IMAGING | Facility: HOSPITAL | Age: 54
DRG: 195 | End: 2019-05-19
Payer: COMMERCIAL

## 2019-05-19 ENCOUNTER — HOSPITAL ENCOUNTER (INPATIENT)
Facility: HOSPITAL | Age: 54
LOS: 4 days | Discharge: HOME | DRG: 195 | End: 2019-05-23
Attending: EMERGENCY MEDICINE | Admitting: INTERNAL MEDICINE
Payer: COMMERCIAL

## 2019-05-19 DIAGNOSIS — J98.4 CAVITARY PNEUMONIA: Primary | ICD-10-CM

## 2019-05-19 DIAGNOSIS — Z72.0 TOBACCO ABUSE: ICD-10-CM

## 2019-05-19 DIAGNOSIS — Z86.14 HISTORY OF MRSA INFECTION OF LUNGS: ICD-10-CM

## 2019-05-19 DIAGNOSIS — A41.9 SEPSIS (HCC): ICD-10-CM

## 2019-05-19 DIAGNOSIS — D72.829 LEUKOCYTOSIS, UNSPECIFIED TYPE: ICD-10-CM

## 2019-05-19 DIAGNOSIS — J18.9 CAVITARY PNEUMONIA: Primary | ICD-10-CM

## 2019-05-19 LAB
ALBUMIN SERPL BCP-MCNC: 3 G/DL (ref 3.5–5)
ALP SERPL-CCNC: 120 U/L (ref 46–116)
ALT SERPL W P-5'-P-CCNC: 39 U/L (ref 12–78)
ANION GAP SERPL CALCULATED.3IONS-SCNC: 11 MMOL/L (ref 4–13)
AST SERPL W P-5'-P-CCNC: 19 U/L (ref 5–45)
ATRIAL RATE: 98 BPM
BACTERIA UR QL AUTO: ABNORMAL /HPF
BASOPHILS # BLD AUTO: 0.03 THOUSANDS/ΜL (ref 0–0.1)
BASOPHILS NFR BLD AUTO: 0 % (ref 0–1)
BILIRUB SERPL-MCNC: 1.05 MG/DL (ref 0.2–1)
BILIRUB UR QL STRIP: NEGATIVE
BUN SERPL-MCNC: 15 MG/DL (ref 5–25)
CALCIUM SERPL-MCNC: 9.4 MG/DL (ref 8.3–10.1)
CHLORIDE SERPL-SCNC: 101 MMOL/L (ref 100–108)
CLARITY UR: CLEAR
CO2 SERPL-SCNC: 24 MMOL/L (ref 21–32)
COLOR UR: ABNORMAL
COLOR, POC: ABNORMAL
CREAT SERPL-MCNC: 0.95 MG/DL (ref 0.6–1.3)
EOSINOPHIL # BLD AUTO: 0.02 THOUSAND/ΜL (ref 0–0.61)
EOSINOPHIL NFR BLD AUTO: 0 % (ref 0–6)
ERYTHROCYTE [DISTWIDTH] IN BLOOD BY AUTOMATED COUNT: 12.7 % (ref 11.6–15.1)
GFR SERPL CREATININE-BSD FRML MDRD: 92 ML/MIN/1.73SQ M
GLUCOSE SERPL-MCNC: 107 MG/DL (ref 65–140)
GLUCOSE UR STRIP-MCNC: NEGATIVE MG/DL
HCT VFR BLD AUTO: 46.7 % (ref 36.5–49.3)
HGB BLD-MCNC: 15.5 G/DL (ref 12–17)
HGB UR QL STRIP.AUTO: ABNORMAL
IMM GRANULOCYTES # BLD AUTO: 0.05 THOUSAND/UL (ref 0–0.2)
IMM GRANULOCYTES NFR BLD AUTO: 0 % (ref 0–2)
KETONES UR STRIP-MCNC: NEGATIVE MG/DL
LACTATE SERPL-SCNC: 0.9 MMOL/L (ref 0.5–2)
LEUKOCYTE ESTERASE UR QL STRIP: NEGATIVE
LIPASE SERPL-CCNC: 60 U/L (ref 73–393)
LYMPHOCYTES # BLD AUTO: 0.75 THOUSANDS/ΜL (ref 0.6–4.47)
LYMPHOCYTES NFR BLD AUTO: 6 % (ref 14–44)
MCH RBC QN AUTO: 31.8 PG (ref 26.8–34.3)
MCHC RBC AUTO-ENTMCNC: 33.2 G/DL (ref 31.4–37.4)
MCV RBC AUTO: 96 FL (ref 82–98)
MONOCYTES # BLD AUTO: 0.84 THOUSAND/ΜL (ref 0.17–1.22)
MONOCYTES NFR BLD AUTO: 6 % (ref 4–12)
NEUTROPHILS # BLD AUTO: 11.57 THOUSANDS/ΜL (ref 1.85–7.62)
NEUTS SEG NFR BLD AUTO: 88 % (ref 43–75)
NITRITE UR QL STRIP: NEGATIVE
NON-SQ EPI CELLS URNS QL MICRO: ABNORMAL /HPF
NRBC BLD AUTO-RTO: 0 /100 WBCS
P AXIS: 47 DEGREES
PH UR STRIP.AUTO: 5.5 [PH] (ref 4.5–8)
PLATELET # BLD AUTO: 219 THOUSANDS/UL (ref 149–390)
PMV BLD AUTO: 11.2 FL (ref 8.9–12.7)
POTASSIUM SERPL-SCNC: 4.1 MMOL/L (ref 3.5–5.3)
PR INTERVAL: 162 MS
PROCALCITONIN SERPL-MCNC: <0.05 NG/ML
PROT SERPL-MCNC: 7.7 G/DL (ref 6.4–8.2)
PROT UR STRIP-MCNC: NEGATIVE MG/DL
QRS AXIS: 52 DEGREES
QRSD INTERVAL: 94 MS
QT INTERVAL: 350 MS
QTC INTERVAL: 446 MS
RBC # BLD AUTO: 4.88 MILLION/UL (ref 3.88–5.62)
RBC #/AREA URNS AUTO: ABNORMAL /HPF
SODIUM SERPL-SCNC: 136 MMOL/L (ref 136–145)
SP GR UR STRIP.AUTO: >=1.03 (ref 1–1.03)
T WAVE AXIS: 48 DEGREES
UROBILINOGEN UR QL STRIP.AUTO: 1 E.U./DL
VENTRICULAR RATE: 98 BPM
WBC # BLD AUTO: 13.26 THOUSAND/UL (ref 4.31–10.16)
WBC #/AREA URNS AUTO: ABNORMAL /HPF

## 2019-05-19 PROCEDURE — 74177 CT ABD & PELVIS W/CONTRAST: CPT

## 2019-05-19 PROCEDURE — 80053 COMPREHEN METABOLIC PANEL: CPT | Performed by: EMERGENCY MEDICINE

## 2019-05-19 PROCEDURE — 96376 TX/PRO/DX INJ SAME DRUG ADON: CPT

## 2019-05-19 PROCEDURE — 87070 CULTURE OTHR SPECIMN AEROBIC: CPT | Performed by: PHYSICIAN ASSISTANT

## 2019-05-19 PROCEDURE — 84145 PROCALCITONIN (PCT): CPT | Performed by: EMERGENCY MEDICINE

## 2019-05-19 PROCEDURE — 87040 BLOOD CULTURE FOR BACTERIA: CPT | Performed by: EMERGENCY MEDICINE

## 2019-05-19 PROCEDURE — 71275 CT ANGIOGRAPHY CHEST: CPT

## 2019-05-19 PROCEDURE — 93005 ELECTROCARDIOGRAM TRACING: CPT

## 2019-05-19 PROCEDURE — 83605 ASSAY OF LACTIC ACID: CPT | Performed by: EMERGENCY MEDICINE

## 2019-05-19 PROCEDURE — 87205 SMEAR GRAM STAIN: CPT | Performed by: PHYSICIAN ASSISTANT

## 2019-05-19 PROCEDURE — 99245 OFF/OP CONSLTJ NEW/EST HI 55: CPT | Performed by: INTERNAL MEDICINE

## 2019-05-19 PROCEDURE — 93010 ELECTROCARDIOGRAM REPORT: CPT | Performed by: INTERNAL MEDICINE

## 2019-05-19 PROCEDURE — 85025 COMPLETE CBC W/AUTO DIFF WBC: CPT | Performed by: EMERGENCY MEDICINE

## 2019-05-19 PROCEDURE — 99285 EMERGENCY DEPT VISIT HI MDM: CPT

## 2019-05-19 PROCEDURE — 81001 URINALYSIS AUTO W/SCOPE: CPT

## 2019-05-19 PROCEDURE — 96361 HYDRATE IV INFUSION ADD-ON: CPT

## 2019-05-19 PROCEDURE — 99223 1ST HOSP IP/OBS HIGH 75: CPT | Performed by: HOSPITALIST

## 2019-05-19 PROCEDURE — 99285 EMERGENCY DEPT VISIT HI MDM: CPT | Performed by: EMERGENCY MEDICINE

## 2019-05-19 PROCEDURE — 86480 TB TEST CELL IMMUN MEASURE: CPT | Performed by: PHYSICIAN ASSISTANT

## 2019-05-19 PROCEDURE — 94640 AIRWAY INHALATION TREATMENT: CPT

## 2019-05-19 PROCEDURE — 96374 THER/PROPH/DIAG INJ IV PUSH: CPT

## 2019-05-19 PROCEDURE — 83690 ASSAY OF LIPASE: CPT | Performed by: EMERGENCY MEDICINE

## 2019-05-19 PROCEDURE — 36415 COLL VENOUS BLD VENIPUNCTURE: CPT | Performed by: EMERGENCY MEDICINE

## 2019-05-19 RX ORDER — ALBUTEROL SULFATE 2.5 MG/3ML
5 SOLUTION RESPIRATORY (INHALATION) ONCE
Status: COMPLETED | OUTPATIENT
Start: 2019-05-19 | End: 2019-05-19

## 2019-05-19 RX ORDER — CLONAZEPAM 1 MG/1
1 TABLET ORAL 3 TIMES DAILY
Status: DISCONTINUED | OUTPATIENT
Start: 2019-05-19 | End: 2019-05-23 | Stop reason: HOSPADM

## 2019-05-19 RX ORDER — VENLAFAXINE 75 MG/1
150 TABLET ORAL 2 TIMES DAILY
Status: DISCONTINUED | OUTPATIENT
Start: 2019-05-19 | End: 2019-05-23 | Stop reason: HOSPADM

## 2019-05-19 RX ORDER — KETOROLAC TROMETHAMINE 30 MG/ML
15 INJECTION, SOLUTION INTRAMUSCULAR; INTRAVENOUS ONCE
Status: COMPLETED | OUTPATIENT
Start: 2019-05-19 | End: 2019-05-19

## 2019-05-19 RX ORDER — QUETIAPINE 200 MG/1
200 TABLET, FILM COATED, EXTENDED RELEASE ORAL
Status: DISCONTINUED | OUTPATIENT
Start: 2019-05-19 | End: 2019-05-23 | Stop reason: HOSPADM

## 2019-05-19 RX ORDER — FAMOTIDINE 20 MG/1
20 TABLET, FILM COATED ORAL DAILY
Status: DISCONTINUED | OUTPATIENT
Start: 2019-05-19 | End: 2019-05-23 | Stop reason: HOSPADM

## 2019-05-19 RX ORDER — LEVOFLOXACIN 5 MG/ML
750 INJECTION, SOLUTION INTRAVENOUS
Status: DISCONTINUED | OUTPATIENT
Start: 2019-05-19 | End: 2019-05-20

## 2019-05-19 RX ADMIN — OXYCODONE HYDROCHLORIDE 15 MG: 10 TABLET ORAL at 23:14

## 2019-05-19 RX ADMIN — ALBUTEROL SULFATE 5 MG: 2.5 SOLUTION RESPIRATORY (INHALATION) at 15:39

## 2019-05-19 RX ADMIN — QUETIAPINE FUMARATE 200 MG: 200 TABLET, EXTENDED RELEASE ORAL at 21:53

## 2019-05-19 RX ADMIN — IPRATROPIUM BROMIDE 0.5 MG: 0.5 SOLUTION RESPIRATORY (INHALATION) at 15:39

## 2019-05-19 RX ADMIN — ALBUTEROL SULFATE 5 MG: 2.5 SOLUTION RESPIRATORY (INHALATION) at 14:26

## 2019-05-19 RX ADMIN — METRONIDAZOLE 500 MG: 500 INJECTION, SOLUTION INTRAVENOUS at 21:53

## 2019-05-19 RX ADMIN — VANCOMYCIN HYDROCHLORIDE 1500 MG: 1 INJECTION, POWDER, LYOPHILIZED, FOR SOLUTION INTRAVENOUS at 17:07

## 2019-05-19 RX ADMIN — IPRATROPIUM BROMIDE 0.5 MG: 0.5 SOLUTION RESPIRATORY (INHALATION) at 14:26

## 2019-05-19 RX ADMIN — FAMOTIDINE 20 MG: 20 TABLET ORAL at 19:10

## 2019-05-19 RX ADMIN — IOHEXOL 85 ML: 350 INJECTION, SOLUTION INTRAVENOUS at 14:53

## 2019-05-19 RX ADMIN — SODIUM CHLORIDE 1000 ML: 0.9 INJECTION, SOLUTION INTRAVENOUS at 14:18

## 2019-05-19 RX ADMIN — LEVOFLOXACIN 750 MG: 5 INJECTION, SOLUTION INTRAVENOUS at 19:08

## 2019-05-19 RX ADMIN — CLONAZEPAM 1 MG: 1 TABLET ORAL at 21:53

## 2019-05-19 RX ADMIN — OXYCODONE HYDROCHLORIDE 15 MG: 10 TABLET ORAL at 19:08

## 2019-05-19 RX ADMIN — KETOROLAC TROMETHAMINE 15 MG: 30 INJECTION, SOLUTION INTRAMUSCULAR; INTRAVENOUS at 14:25

## 2019-05-19 RX ADMIN — CEFTRIAXONE 1000 MG: 1 INJECTION, POWDER, FOR SOLUTION INTRAMUSCULAR; INTRAVENOUS at 16:31

## 2019-05-19 RX ADMIN — KETOROLAC TROMETHAMINE 15 MG: 30 INJECTION, SOLUTION INTRAMUSCULAR; INTRAVENOUS at 15:37

## 2019-05-20 PROBLEM — D72.829 LEUKOCYTOSIS: Status: ACTIVE | Noted: 2019-05-20

## 2019-05-20 PROBLEM — Z86.14 HISTORY OF MRSA INFECTION OF LUNGS: Status: ACTIVE | Noted: 2019-05-20

## 2019-05-20 LAB
IGA SERPL-MCNC: 212 MG/DL (ref 70–400)
IGG SERPL-MCNC: 788 MG/DL (ref 700–1600)
IGM SERPL-MCNC: 131 MG/DL (ref 40–230)
L PNEUMO1 AG UR QL IA.RAPID: NEGATIVE
S PNEUM AG UR QL: NEGATIVE

## 2019-05-20 PROCEDURE — 82784 ASSAY IGA/IGD/IGG/IGM EACH: CPT | Performed by: PHYSICIAN ASSISTANT

## 2019-05-20 PROCEDURE — 99232 SBSQ HOSP IP/OBS MODERATE 35: CPT | Performed by: PHYSICIAN ASSISTANT

## 2019-05-20 PROCEDURE — 99254 IP/OBS CNSLTJ NEW/EST MOD 60: CPT | Performed by: INTERNAL MEDICINE

## 2019-05-20 PROCEDURE — 87116 MYCOBACTERIA CULTURE: CPT | Performed by: PHYSICIAN ASSISTANT

## 2019-05-20 PROCEDURE — 86255 FLUORESCENT ANTIBODY SCREEN: CPT | Performed by: PHYSICIAN ASSISTANT

## 2019-05-20 PROCEDURE — 99232 SBSQ HOSP IP/OBS MODERATE 35: CPT | Performed by: INTERNAL MEDICINE

## 2019-05-20 PROCEDURE — 87449 NOS EACH ORGANISM AG IA: CPT | Performed by: PHYSICIAN ASSISTANT

## 2019-05-20 PROCEDURE — 87389 HIV-1 AG W/HIV-1&-2 AB AG IA: CPT | Performed by: PHYSICIAN ASSISTANT

## 2019-05-20 PROCEDURE — 87206 SMEAR FLUORESCENT/ACID STAI: CPT | Performed by: PHYSICIAN ASSISTANT

## 2019-05-20 RX ORDER — ACETAMINOPHEN 325 MG/1
650 TABLET ORAL EVERY 6 HOURS PRN
Status: DISCONTINUED | OUTPATIENT
Start: 2019-05-20 | End: 2019-05-23 | Stop reason: HOSPADM

## 2019-05-20 RX ORDER — HEPARIN SODIUM 5000 [USP'U]/ML
5000 INJECTION, SOLUTION INTRAVENOUS; SUBCUTANEOUS EVERY 8 HOURS SCHEDULED
Status: DISCONTINUED | OUTPATIENT
Start: 2019-05-20 | End: 2019-05-23 | Stop reason: HOSPADM

## 2019-05-20 RX ORDER — PANTOPRAZOLE SODIUM 40 MG/1
40 TABLET, DELAYED RELEASE ORAL
Status: DISCONTINUED | OUTPATIENT
Start: 2019-05-21 | End: 2019-05-23 | Stop reason: HOSPADM

## 2019-05-20 RX ADMIN — OXYCODONE HYDROCHLORIDE 15 MG: 10 TABLET ORAL at 16:50

## 2019-05-20 RX ADMIN — OXYCODONE HYDROCHLORIDE 15 MG: 10 TABLET ORAL at 03:11

## 2019-05-20 RX ADMIN — OXYCODONE HYDROCHLORIDE 15 MG: 10 TABLET ORAL at 21:28

## 2019-05-20 RX ADMIN — OXYCODONE HYDROCHLORIDE 15 MG: 10 TABLET ORAL at 12:49

## 2019-05-20 RX ADMIN — METRONIDAZOLE 500 MG: 500 INJECTION, SOLUTION INTRAVENOUS at 05:40

## 2019-05-20 RX ADMIN — CLONAZEPAM 1 MG: 1 TABLET ORAL at 08:17

## 2019-05-20 RX ADMIN — HEPARIN SODIUM 5000 UNITS: 5000 INJECTION INTRAVENOUS; SUBCUTANEOUS at 21:28

## 2019-05-20 RX ADMIN — CLONAZEPAM 1 MG: 1 TABLET ORAL at 16:50

## 2019-05-20 RX ADMIN — FAMOTIDINE 20 MG: 20 TABLET ORAL at 08:17

## 2019-05-20 RX ADMIN — OXYCODONE HYDROCHLORIDE 15 MG: 10 TABLET ORAL at 08:17

## 2019-05-20 RX ADMIN — AMPICILLIN SODIUM AND SULBACTAM SODIUM 3 G: 2; 1 INJECTION, POWDER, FOR SOLUTION INTRAMUSCULAR; INTRAVENOUS at 17:34

## 2019-05-20 RX ADMIN — CLONAZEPAM 1 MG: 1 TABLET ORAL at 21:28

## 2019-05-20 RX ADMIN — QUETIAPINE FUMARATE 200 MG: 200 TABLET, EXTENDED RELEASE ORAL at 21:29

## 2019-05-20 RX ADMIN — VENLAFAXINE 150 MG: 75 TABLET ORAL at 16:50

## 2019-05-20 RX ADMIN — HEPARIN SODIUM 5000 UNITS: 5000 INJECTION INTRAVENOUS; SUBCUTANEOUS at 13:36

## 2019-05-20 RX ADMIN — VENLAFAXINE 150 MG: 75 TABLET ORAL at 08:17

## 2019-05-20 RX ADMIN — AMPICILLIN SODIUM AND SULBACTAM SODIUM 3 G: 2; 1 INJECTION, POWDER, FOR SOLUTION INTRAMUSCULAR; INTRAVENOUS at 13:36

## 2019-05-21 LAB
ANION GAP SERPL CALCULATED.3IONS-SCNC: 10 MMOL/L (ref 4–13)
BASOPHILS # BLD AUTO: 0.05 THOUSANDS/ΜL (ref 0–0.1)
BASOPHILS NFR BLD AUTO: 1 % (ref 0–1)
BUN SERPL-MCNC: 17 MG/DL (ref 5–25)
CALCIUM SERPL-MCNC: 9.2 MG/DL (ref 8.3–10.1)
CHLORIDE SERPL-SCNC: 103 MMOL/L (ref 100–108)
CO2 SERPL-SCNC: 25 MMOL/L (ref 21–32)
CREAT SERPL-MCNC: 0.92 MG/DL (ref 0.6–1.3)
EOSINOPHIL # BLD AUTO: 0.32 THOUSAND/ΜL (ref 0–0.61)
EOSINOPHIL NFR BLD AUTO: 4 % (ref 0–6)
ERYTHROCYTE [DISTWIDTH] IN BLOOD BY AUTOMATED COUNT: 12.8 % (ref 11.6–15.1)
GFR SERPL CREATININE-BSD FRML MDRD: 95 ML/MIN/1.73SQ M
GLUCOSE SERPL-MCNC: 97 MG/DL (ref 65–140)
HCT VFR BLD AUTO: 45.2 % (ref 36.5–49.3)
HGB BLD-MCNC: 14.7 G/DL (ref 12–17)
IMM GRANULOCYTES # BLD AUTO: 0.04 THOUSAND/UL (ref 0–0.2)
IMM GRANULOCYTES NFR BLD AUTO: 1 % (ref 0–2)
LYMPHOCYTES # BLD AUTO: 0.81 THOUSANDS/ΜL (ref 0.6–4.47)
LYMPHOCYTES NFR BLD AUTO: 10 % (ref 14–44)
MCH RBC QN AUTO: 31.7 PG (ref 26.8–34.3)
MCHC RBC AUTO-ENTMCNC: 32.5 G/DL (ref 31.4–37.4)
MCV RBC AUTO: 97 FL (ref 82–98)
MONOCYTES # BLD AUTO: 0.67 THOUSAND/ΜL (ref 0.17–1.22)
MONOCYTES NFR BLD AUTO: 8 % (ref 4–12)
NEUTROPHILS # BLD AUTO: 6.57 THOUSANDS/ΜL (ref 1.85–7.62)
NEUTS SEG NFR BLD AUTO: 76 % (ref 43–75)
NRBC BLD AUTO-RTO: 0 /100 WBCS
PLATELET # BLD AUTO: 222 THOUSANDS/UL (ref 149–390)
PMV BLD AUTO: 11 FL (ref 8.9–12.7)
POTASSIUM SERPL-SCNC: 4 MMOL/L (ref 3.5–5.3)
RBC # BLD AUTO: 4.64 MILLION/UL (ref 3.88–5.62)
SODIUM SERPL-SCNC: 138 MMOL/L (ref 136–145)
WBC # BLD AUTO: 8.46 THOUSAND/UL (ref 4.31–10.16)

## 2019-05-21 PROCEDURE — 80048 BASIC METABOLIC PNL TOTAL CA: CPT | Performed by: PHYSICIAN ASSISTANT

## 2019-05-21 PROCEDURE — 94760 N-INVAS EAR/PLS OXIMETRY 1: CPT

## 2019-05-21 PROCEDURE — 94640 AIRWAY INHALATION TREATMENT: CPT

## 2019-05-21 PROCEDURE — 85025 COMPLETE CBC W/AUTO DIFF WBC: CPT | Performed by: PHYSICIAN ASSISTANT

## 2019-05-21 PROCEDURE — 87116 MYCOBACTERIA CULTURE: CPT | Performed by: INTERNAL MEDICINE

## 2019-05-21 PROCEDURE — 99232 SBSQ HOSP IP/OBS MODERATE 35: CPT | Performed by: INTERNAL MEDICINE

## 2019-05-21 PROCEDURE — 87206 SMEAR FLUORESCENT/ACID STAI: CPT | Performed by: INTERNAL MEDICINE

## 2019-05-21 PROCEDURE — 99232 SBSQ HOSP IP/OBS MODERATE 35: CPT | Performed by: NURSE PRACTITIONER

## 2019-05-21 PROCEDURE — 99232 SBSQ HOSP IP/OBS MODERATE 35: CPT | Performed by: PHYSICIAN ASSISTANT

## 2019-05-21 RX ORDER — AMOXICILLIN AND CLAVULANATE POTASSIUM 875; 125 MG/1; MG/1
1 TABLET, FILM COATED ORAL EVERY 12 HOURS SCHEDULED
Status: DISCONTINUED | OUTPATIENT
Start: 2019-05-22 | End: 2019-05-23 | Stop reason: HOSPADM

## 2019-05-21 RX ORDER — SODIUM CHLORIDE FOR INHALATION 0.9 %
VIAL, NEBULIZER (ML) INHALATION
Status: DISPENSED
Start: 2019-05-21 | End: 2019-05-22

## 2019-05-21 RX ADMIN — CLONAZEPAM 1 MG: 1 TABLET ORAL at 15:07

## 2019-05-21 RX ADMIN — OXYCODONE HYDROCHLORIDE 15 MG: 10 TABLET ORAL at 16:48

## 2019-05-21 RX ADMIN — CLONAZEPAM 1 MG: 1 TABLET ORAL at 08:11

## 2019-05-21 RX ADMIN — HEPARIN SODIUM 5000 UNITS: 5000 INJECTION INTRAVENOUS; SUBCUTANEOUS at 15:07

## 2019-05-21 RX ADMIN — OXYCODONE HYDROCHLORIDE 15 MG: 10 TABLET ORAL at 08:11

## 2019-05-21 RX ADMIN — AMPICILLIN SODIUM AND SULBACTAM SODIUM 3 G: 2; 1 INJECTION, POWDER, FOR SOLUTION INTRAMUSCULAR; INTRAVENOUS at 00:11

## 2019-05-21 RX ADMIN — CLONAZEPAM 1 MG: 1 TABLET ORAL at 21:23

## 2019-05-21 RX ADMIN — AMPICILLIN SODIUM AND SULBACTAM SODIUM 3 G: 2; 1 INJECTION, POWDER, FOR SOLUTION INTRAMUSCULAR; INTRAVENOUS at 12:36

## 2019-05-21 RX ADMIN — OXYCODONE HYDROCHLORIDE 15 MG: 10 TABLET ORAL at 12:40

## 2019-05-21 RX ADMIN — OXYCODONE HYDROCHLORIDE 15 MG: 10 TABLET ORAL at 21:23

## 2019-05-21 RX ADMIN — HEPARIN SODIUM 5000 UNITS: 5000 INJECTION INTRAVENOUS; SUBCUTANEOUS at 21:23

## 2019-05-21 RX ADMIN — VENLAFAXINE 150 MG: 75 TABLET ORAL at 08:12

## 2019-05-21 RX ADMIN — OXYCODONE HYDROCHLORIDE 15 MG: 10 TABLET ORAL at 03:13

## 2019-05-21 RX ADMIN — VENLAFAXINE 150 MG: 75 TABLET ORAL at 18:17

## 2019-05-21 RX ADMIN — PANTOPRAZOLE SODIUM 40 MG: 40 TABLET, DELAYED RELEASE ORAL at 06:37

## 2019-05-21 RX ADMIN — HEPARIN SODIUM 5000 UNITS: 5000 INJECTION INTRAVENOUS; SUBCUTANEOUS at 06:37

## 2019-05-21 RX ADMIN — QUETIAPINE FUMARATE 200 MG: 200 TABLET, EXTENDED RELEASE ORAL at 21:23

## 2019-05-21 RX ADMIN — AMPICILLIN SODIUM AND SULBACTAM SODIUM 3 G: 2; 1 INJECTION, POWDER, FOR SOLUTION INTRAMUSCULAR; INTRAVENOUS at 06:37

## 2019-05-21 RX ADMIN — FAMOTIDINE 20 MG: 20 TABLET ORAL at 08:11

## 2019-05-21 RX ADMIN — AMPICILLIN SODIUM AND SULBACTAM SODIUM 3 G: 2; 1 INJECTION, POWDER, FOR SOLUTION INTRAMUSCULAR; INTRAVENOUS at 18:17

## 2019-05-22 LAB
ALBUMIN SERPL BCP-MCNC: 2.7 G/DL (ref 3.5–5)
ALP SERPL-CCNC: 120 U/L (ref 46–116)
ALT SERPL W P-5'-P-CCNC: 55 U/L (ref 12–78)
ANION GAP SERPL CALCULATED.3IONS-SCNC: 8 MMOL/L (ref 4–13)
AST SERPL W P-5'-P-CCNC: 25 U/L (ref 5–45)
BACTERIA SPT RESP CULT: ABNORMAL
BACTERIA SPT RESP CULT: ABNORMAL
BILIRUB SERPL-MCNC: 0.4 MG/DL (ref 0.2–1)
BUN SERPL-MCNC: 19 MG/DL (ref 5–25)
C-ANCA TITR SER IF: NORMAL TITER
CALCIUM SERPL-MCNC: 9.6 MG/DL (ref 8.3–10.1)
CHLORIDE SERPL-SCNC: 104 MMOL/L (ref 100–108)
CO2 SERPL-SCNC: 27 MMOL/L (ref 21–32)
CREAT SERPL-MCNC: 1.05 MG/DL (ref 0.6–1.3)
GAMMA INTERFERON BACKGROUND BLD IA-ACNC: 0.02 IU/ML
GFR SERPL CREATININE-BSD FRML MDRD: 81 ML/MIN/1.73SQ M
GLUCOSE SERPL-MCNC: 100 MG/DL (ref 65–140)
GRAM STN SPEC: ABNORMAL
HIV 1+2 AB+HIV1 P24 AG SERPL QL IA: NORMAL
M TB IFN-G BLD-IMP: NEGATIVE
M TB IFN-G CD4+ BCKGRND COR BLD-ACNC: -0.01 IU/ML
M TB IFN-G CD4+ BCKGRND COR BLD-ACNC: -0.01 IU/ML
MITOGEN IGNF BCKGRD COR BLD-ACNC: 0.74 IU/ML
MYELOPEROXIDASE AB SER IA-ACNC: <9 U/ML (ref 0–9)
P-ANCA ATYPICAL TITR SER IF: NORMAL TITER
P-ANCA TITR SER IF: NORMAL TITER
POTASSIUM SERPL-SCNC: 4.3 MMOL/L (ref 3.5–5.3)
PROT SERPL-MCNC: 7.4 G/DL (ref 6.4–8.2)
PROTEINASE3 AB SER IA-ACNC: <3.5 U/ML (ref 0–3.5)
SODIUM SERPL-SCNC: 139 MMOL/L (ref 136–145)

## 2019-05-22 PROCEDURE — 99232 SBSQ HOSP IP/OBS MODERATE 35: CPT | Performed by: INTERNAL MEDICINE

## 2019-05-22 PROCEDURE — 80053 COMPREHEN METABOLIC PANEL: CPT | Performed by: INTERNAL MEDICINE

## 2019-05-22 RX ADMIN — CLONAZEPAM 1 MG: 1 TABLET ORAL at 20:54

## 2019-05-22 RX ADMIN — OXYCODONE HYDROCHLORIDE 15 MG: 10 TABLET ORAL at 12:22

## 2019-05-22 RX ADMIN — OXYCODONE HYDROCHLORIDE 15 MG: 10 TABLET ORAL at 17:20

## 2019-05-22 RX ADMIN — OXYCODONE HYDROCHLORIDE 15 MG: 10 TABLET ORAL at 21:08

## 2019-05-22 RX ADMIN — HEPARIN SODIUM 5000 UNITS: 5000 INJECTION INTRAVENOUS; SUBCUTANEOUS at 05:25

## 2019-05-22 RX ADMIN — QUETIAPINE FUMARATE 200 MG: 200 TABLET, EXTENDED RELEASE ORAL at 22:31

## 2019-05-22 RX ADMIN — AMOXICILLIN AND CLAVULANATE POTASSIUM 1 TABLET: 875; 125 TABLET, FILM COATED ORAL at 08:11

## 2019-05-22 RX ADMIN — HEPARIN SODIUM 5000 UNITS: 5000 INJECTION INTRAVENOUS; SUBCUTANEOUS at 14:52

## 2019-05-22 RX ADMIN — VENLAFAXINE 150 MG: 75 TABLET ORAL at 08:12

## 2019-05-22 RX ADMIN — OXYCODONE HYDROCHLORIDE 15 MG: 10 TABLET ORAL at 03:34

## 2019-05-22 RX ADMIN — AMOXICILLIN AND CLAVULANATE POTASSIUM 1 TABLET: 875; 125 TABLET, FILM COATED ORAL at 20:54

## 2019-05-22 RX ADMIN — VENLAFAXINE 150 MG: 75 TABLET ORAL at 17:20

## 2019-05-22 RX ADMIN — OXYCODONE HYDROCHLORIDE 15 MG: 10 TABLET ORAL at 08:11

## 2019-05-22 RX ADMIN — HEPARIN SODIUM 5000 UNITS: 5000 INJECTION INTRAVENOUS; SUBCUTANEOUS at 22:31

## 2019-05-22 RX ADMIN — FAMOTIDINE 20 MG: 20 TABLET ORAL at 08:11

## 2019-05-22 RX ADMIN — PANTOPRAZOLE SODIUM 40 MG: 40 TABLET, DELAYED RELEASE ORAL at 05:25

## 2019-05-22 RX ADMIN — CLONAZEPAM 1 MG: 1 TABLET ORAL at 17:19

## 2019-05-22 RX ADMIN — CLONAZEPAM 1 MG: 1 TABLET ORAL at 08:11

## 2019-05-23 ENCOUNTER — TRANSITIONAL CARE MANAGEMENT (OUTPATIENT)
Dept: FAMILY MEDICINE CLINIC | Facility: CLINIC | Age: 54
End: 2019-05-23

## 2019-05-23 VITALS
OXYGEN SATURATION: 95 % | TEMPERATURE: 97.8 F | BODY MASS INDEX: 31.95 KG/M2 | RESPIRATION RATE: 18 BRPM | WEIGHT: 222.66 LBS | HEART RATE: 87 BPM | DIASTOLIC BLOOD PRESSURE: 94 MMHG | SYSTOLIC BLOOD PRESSURE: 130 MMHG

## 2019-05-23 DIAGNOSIS — J18.9 CAVITARY PNEUMONIA: Primary | ICD-10-CM

## 2019-05-23 DIAGNOSIS — J98.4 CAVITARY PNEUMONIA: Primary | ICD-10-CM

## 2019-05-23 LAB
ALBUMIN SERPL BCP-MCNC: 2.7 G/DL (ref 3.5–5)
ALP SERPL-CCNC: 120 U/L (ref 46–116)
ALT SERPL W P-5'-P-CCNC: 59 U/L (ref 12–78)
ANION GAP SERPL CALCULATED.3IONS-SCNC: 9 MMOL/L (ref 4–13)
AST SERPL W P-5'-P-CCNC: 24 U/L (ref 5–45)
BASOPHILS # BLD AUTO: 0.03 THOUSANDS/ΜL (ref 0–0.1)
BASOPHILS NFR BLD AUTO: 1 % (ref 0–1)
BILIRUB SERPL-MCNC: 0.25 MG/DL (ref 0.2–1)
BUN SERPL-MCNC: 22 MG/DL (ref 5–25)
CALCIUM SERPL-MCNC: 9.1 MG/DL (ref 8.3–10.1)
CHLORIDE SERPL-SCNC: 108 MMOL/L (ref 100–108)
CO2 SERPL-SCNC: 25 MMOL/L (ref 21–32)
CREAT SERPL-MCNC: 1.06 MG/DL (ref 0.6–1.3)
EOSINOPHIL # BLD AUTO: 0.18 THOUSAND/ΜL (ref 0–0.61)
EOSINOPHIL NFR BLD AUTO: 3 % (ref 0–6)
ERYTHROCYTE [DISTWIDTH] IN BLOOD BY AUTOMATED COUNT: 12.4 % (ref 11.6–15.1)
GFR SERPL CREATININE-BSD FRML MDRD: 80 ML/MIN/1.73SQ M
GLUCOSE SERPL-MCNC: 121 MG/DL (ref 65–140)
HCT VFR BLD AUTO: 45.5 % (ref 36.5–49.3)
HGB BLD-MCNC: 15 G/DL (ref 12–17)
IMM GRANULOCYTES # BLD AUTO: 0.02 THOUSAND/UL (ref 0–0.2)
IMM GRANULOCYTES NFR BLD AUTO: 0 % (ref 0–2)
LYMPHOCYTES # BLD AUTO: 1.15 THOUSANDS/ΜL (ref 0.6–4.47)
LYMPHOCYTES NFR BLD AUTO: 22 % (ref 14–44)
MCH RBC QN AUTO: 32 PG (ref 26.8–34.3)
MCHC RBC AUTO-ENTMCNC: 33 G/DL (ref 31.4–37.4)
MCV RBC AUTO: 97 FL (ref 82–98)
MONOCYTES # BLD AUTO: 0.44 THOUSAND/ΜL (ref 0.17–1.22)
MONOCYTES NFR BLD AUTO: 8 % (ref 4–12)
NEUTROPHILS # BLD AUTO: 3.46 THOUSANDS/ΜL (ref 1.85–7.62)
NEUTS SEG NFR BLD AUTO: 66 % (ref 43–75)
NRBC BLD AUTO-RTO: 0 /100 WBCS
PLATELET # BLD AUTO: 239 THOUSANDS/UL (ref 149–390)
PMV BLD AUTO: 11.2 FL (ref 8.9–12.7)
POTASSIUM SERPL-SCNC: 3.9 MMOL/L (ref 3.5–5.3)
PROT SERPL-MCNC: 7.3 G/DL (ref 6.4–8.2)
RBC # BLD AUTO: 4.69 MILLION/UL (ref 3.88–5.62)
SODIUM SERPL-SCNC: 142 MMOL/L (ref 136–145)
WBC # BLD AUTO: 5.28 THOUSAND/UL (ref 4.31–10.16)

## 2019-05-23 PROCEDURE — 99239 HOSP IP/OBS DSCHRG MGMT >30: CPT | Performed by: INTERNAL MEDICINE

## 2019-05-23 PROCEDURE — 85025 COMPLETE CBC W/AUTO DIFF WBC: CPT | Performed by: INTERNAL MEDICINE

## 2019-05-23 PROCEDURE — 80053 COMPREHEN METABOLIC PANEL: CPT | Performed by: INTERNAL MEDICINE

## 2019-05-23 RX ORDER — AMOXICILLIN AND CLAVULANATE POTASSIUM 875; 125 MG/1; MG/1
1 TABLET, FILM COATED ORAL EVERY 12 HOURS SCHEDULED
Qty: 54 TABLET | Refills: 0 | Status: SHIPPED | OUTPATIENT
Start: 2019-05-23 | End: 2019-06-18 | Stop reason: SDUPTHER

## 2019-05-23 RX ORDER — PANTOPRAZOLE SODIUM 40 MG/1
40 TABLET, DELAYED RELEASE ORAL DAILY
Qty: 30 TABLET | Refills: 0 | Status: SHIPPED | OUTPATIENT
Start: 2019-05-23 | End: 2019-08-22 | Stop reason: ALTCHOICE

## 2019-05-23 RX ADMIN — OXYCODONE HYDROCHLORIDE 15 MG: 10 TABLET ORAL at 04:59

## 2019-05-23 RX ADMIN — HEPARIN SODIUM 5000 UNITS: 5000 INJECTION INTRAVENOUS; SUBCUTANEOUS at 05:02

## 2019-05-23 RX ADMIN — CLONAZEPAM 1 MG: 1 TABLET ORAL at 08:21

## 2019-05-23 RX ADMIN — FAMOTIDINE 20 MG: 20 TABLET ORAL at 08:21

## 2019-05-23 RX ADMIN — AMOXICILLIN AND CLAVULANATE POTASSIUM 1 TABLET: 875; 125 TABLET, FILM COATED ORAL at 08:21

## 2019-05-23 RX ADMIN — PANTOPRAZOLE SODIUM 40 MG: 40 TABLET, DELAYED RELEASE ORAL at 05:03

## 2019-05-24 LAB
BACTERIA BLD CULT: NORMAL
BACTERIA BLD CULT: NORMAL

## 2019-05-28 ENCOUNTER — TELEPHONE (OUTPATIENT)
Dept: INFECTIOUS DISEASES | Facility: CLINIC | Age: 54
End: 2019-05-28

## 2019-06-04 ENCOUNTER — TELEPHONE (OUTPATIENT)
Dept: OTHER | Facility: HOSPITAL | Age: 54
End: 2019-06-04

## 2019-06-07 ENCOUNTER — TELEPHONE (OUTPATIENT)
Dept: INFECTIOUS DISEASES | Facility: CLINIC | Age: 54
End: 2019-06-07

## 2019-06-07 LAB — MISCELLANEOUS LAB TEST RESULT: NORMAL

## 2019-06-11 ENCOUNTER — TELEPHONE (OUTPATIENT)
Dept: INFECTIOUS DISEASES | Facility: CLINIC | Age: 54
End: 2019-06-11

## 2019-06-18 ENCOUNTER — TELEPHONE (OUTPATIENT)
Dept: FAMILY MEDICINE CLINIC | Facility: CLINIC | Age: 54
End: 2019-06-18

## 2019-06-18 ENCOUNTER — TELEPHONE (OUTPATIENT)
Dept: INFECTIOUS DISEASES | Facility: CLINIC | Age: 54
End: 2019-06-18

## 2019-06-18 DIAGNOSIS — J18.9 CAVITARY PNEUMONIA: ICD-10-CM

## 2019-06-18 DIAGNOSIS — J98.4 CAVITARY PNEUMONIA: ICD-10-CM

## 2019-06-18 DIAGNOSIS — D72.829 LEUKOCYTOSIS, UNSPECIFIED TYPE: ICD-10-CM

## 2019-06-18 DIAGNOSIS — Z72.0 TOBACCO ABUSE: ICD-10-CM

## 2019-06-18 RX ORDER — AMOXICILLIN AND CLAVULANATE POTASSIUM 875; 125 MG/1; MG/1
1 TABLET, FILM COATED ORAL EVERY 12 HOURS SCHEDULED
Qty: 20 TABLET | Refills: 0 | Status: SHIPPED | OUTPATIENT
Start: 2019-06-18 | End: 2019-06-24 | Stop reason: SDUPTHER

## 2019-06-24 DIAGNOSIS — J98.4 CAVITARY PNEUMONIA: ICD-10-CM

## 2019-06-24 DIAGNOSIS — Z72.0 TOBACCO ABUSE: ICD-10-CM

## 2019-06-24 DIAGNOSIS — D72.829 LEUKOCYTOSIS, UNSPECIFIED TYPE: ICD-10-CM

## 2019-06-24 DIAGNOSIS — J18.9 CAVITARY PNEUMONIA: ICD-10-CM

## 2019-06-24 RX ORDER — AMOXICILLIN AND CLAVULANATE POTASSIUM 875; 125 MG/1; MG/1
1 TABLET, FILM COATED ORAL EVERY 12 HOURS SCHEDULED
Qty: 22 TABLET | Refills: 0 | Status: SHIPPED | OUTPATIENT
Start: 2019-06-29 | End: 2019-07-10

## 2019-06-26 ENCOUNTER — HOSPITAL ENCOUNTER (OUTPATIENT)
Dept: RADIOLOGY | Age: 54
Discharge: HOME | End: 2019-06-26
Payer: COMMERCIAL

## 2019-06-26 DIAGNOSIS — J18.9 CAVITARY PNEUMONIA: ICD-10-CM

## 2019-06-26 DIAGNOSIS — J98.4 CAVITARY PNEUMONIA: ICD-10-CM

## 2019-06-26 PROCEDURE — 71250 CT THORAX DX C-: CPT

## 2019-07-02 ENCOUNTER — TELEPHONE (OUTPATIENT)
Dept: INFECTIOUS DISEASES | Facility: CLINIC | Age: 54
End: 2019-07-02

## 2019-07-02 NOTE — TELEPHONE ENCOUNTER
Dr Jaspal Nayak reviewed pt's CT results- radiographically much improved and pt can discontinue po abx  No office f/u needed  I spoke to pt to let him know of Dr Chandler Victor orders  Verbalized understanding

## 2019-07-09 LAB
MYCOBACTERIUM SPEC CULT: NORMAL
RHODAMINE-AURAMINE STN SPEC: NORMAL

## 2019-08-07 ENCOUNTER — OFFICE VISIT (OUTPATIENT)
Dept: FAMILY MEDICINE CLINIC | Facility: CLINIC | Age: 54
End: 2019-08-07

## 2019-08-07 VITALS
DIASTOLIC BLOOD PRESSURE: 100 MMHG | BODY MASS INDEX: 32.21 KG/M2 | SYSTOLIC BLOOD PRESSURE: 160 MMHG | RESPIRATION RATE: 18 BRPM | WEIGHT: 225 LBS | TEMPERATURE: 98.7 F | HEIGHT: 70 IN | HEART RATE: 88 BPM

## 2019-08-07 DIAGNOSIS — F33.1 MODERATE EPISODE OF RECURRENT MAJOR DEPRESSIVE DISORDER (HCC): ICD-10-CM

## 2019-08-07 DIAGNOSIS — G47.09 OTHER INSOMNIA: Primary | ICD-10-CM

## 2019-08-07 PROCEDURE — 99214 OFFICE O/P EST MOD 30 MIN: CPT | Performed by: NURSE PRACTITIONER

## 2019-08-07 RX ORDER — ZOLPIDEM TARTRATE 10 MG/1
5 TABLET ORAL
Qty: 15 TABLET | Refills: 0 | Status: SHIPPED | OUTPATIENT
Start: 2019-08-07 | End: 2019-08-22 | Stop reason: ALTCHOICE

## 2019-08-07 RX ORDER — VENLAFAXINE 75 MG/1
75 TABLET ORAL 2 TIMES DAILY
Qty: 60 TABLET | Refills: 0 | Status: SHIPPED | OUTPATIENT
Start: 2019-08-07 | End: 2019-09-11 | Stop reason: SDUPTHER

## 2019-08-07 NOTE — ASSESSMENT & PLAN NOTE
Referral given for psychiatry  RX Ambien for sleep to use for short term  Refilled Effexor and advised to restart today  PHQ-7  No thoughts of hurting self or others

## 2019-08-07 NOTE — PROGRESS NOTES
Assessment/Plan:    MDD (major depressive disorder)  Referral given for psychiatry  RX Ambien for sleep to use for short term  Refilled Effexor and advised to restart today  PHQ-7  No thoughts of hurting self or others  Problem List Items Addressed This Visit        Other    Other insomnia - Primary    Relevant Medications    zolpidem (AMBIEN) 10 mg tablet    Moderate episode of recurrent major depressive disorder (HCC)    Relevant Medications    venlafaxine (EFFEXOR) 75 mg tablet    zolpidem (AMBIEN) 10 mg tablet    Other Relevant Orders    Ambulatory referral to Psychiatry            Subjective:      Patient ID: Rafaela Odom  is a 49 y o  male  49year old presents c/o unable to sleep due to stress  His ex-girlfriend issued a PFA on him and it was denied but he is under stress dealing with the situation  He is currently living with his son  Moved out of his apartment and states he does not have his medication  He was seeing psychiatry, Dr Lewis Us but he does not want to see him any longer because he missed his appointments and owes him money  He is out of Seroquel and Effexor and he needs a refill on Effexor  He would like to get medication for sleep because he has not been able to sleep for the past few weeks  He will also need to get a psych referral   He continues to work full time as a   The following portions of the patient's history were reviewed and updated as appropriate: allergies, current medications, past family history, past medical history, past social history, past surgical history and problem list     Review of Systems   Constitutional: Negative  HENT: Negative  Respiratory: Negative  Cardiovascular: Negative  Neurological: Negative  Psychiatric/Behavioral: Positive for sleep disturbance  Negative for self-injury and suicidal ideas  The patient is nervous/anxious            Objective:      /100   Pulse 88   Temp 98 7 °F (37 1 °C) Resp 18   Ht 5' 10" (1 778 m)   Wt 102 kg (225 lb)   BMI 32 28 kg/m²          Physical Exam   Constitutional: He is oriented to person, place, and time  He appears well-developed and well-nourished  HENT:   Head: Normocephalic and atraumatic  Cardiovascular: Normal rate, regular rhythm and normal heart sounds  Pulmonary/Chest: Effort normal and breath sounds normal    Neurological: He is alert and oriented to person, place, and time  Psychiatric: His speech is normal and behavior is normal  Judgment and thought content normal  His mood appears anxious (crying)   Cognition and memory are normal

## 2019-08-07 NOTE — PATIENT INSTRUCTIONS
Call and make appt   With psychiatrist asap  I RX Ambien for sleep to use if needed  I refilled Effexor and can start it today

## 2019-08-22 ENCOUNTER — OFFICE VISIT (OUTPATIENT)
Dept: FAMILY MEDICINE CLINIC | Facility: CLINIC | Age: 54
End: 2019-08-22

## 2019-08-22 VITALS
HEIGHT: 70 IN | HEART RATE: 88 BPM | TEMPERATURE: 99.2 F | RESPIRATION RATE: 16 BRPM | DIASTOLIC BLOOD PRESSURE: 90 MMHG | WEIGHT: 220.4 LBS | BODY MASS INDEX: 31.55 KG/M2 | SYSTOLIC BLOOD PRESSURE: 130 MMHG

## 2019-08-22 DIAGNOSIS — F41.9 ANXIETY: Primary | ICD-10-CM

## 2019-08-22 PROCEDURE — 99213 OFFICE O/P EST LOW 20 MIN: CPT | Performed by: FAMILY MEDICINE

## 2019-08-22 RX ORDER — QUETIAPINE FUMARATE 50 MG/1
250 TABLET, FILM COATED ORAL
COMMUNITY
Start: 2019-08-15 | End: 2019-09-04 | Stop reason: ALTCHOICE

## 2019-08-22 RX ORDER — RANITIDINE HCL 75 MG
75 TABLET ORAL 2 TIMES DAILY
COMMUNITY
End: 2020-04-29

## 2019-08-22 RX ORDER — CLONAZEPAM 1 MG/1
1 TABLET ORAL 2 TIMES DAILY
Qty: 30 TABLET | Refills: 0 | Status: SHIPPED | OUTPATIENT
Start: 2019-08-22 | End: 2019-09-11 | Stop reason: SDUPTHER

## 2019-08-22 RX ORDER — BUSPIRONE HYDROCHLORIDE 10 MG/1
10 TABLET ORAL 2 TIMES DAILY
Qty: 90 TABLET | Refills: 1 | Status: SHIPPED | OUTPATIENT
Start: 2019-08-22 | End: 2019-10-09

## 2019-08-22 NOTE — PROGRESS NOTES
Assessment/Plan:       Diagnoses and all orders for this visit:    Anxiety:  · Worsening anxiety, DARREL score: 16  Denies any suicidal or homicidal ideation  · Check PD MP, last refill of clonazepam was on 8/15/2019, 15 pills  · Stop gabapentin as not helping with anxiety  · Prescribed BuSpar 10 mg b i d  For 1 week and increase to 20 mg b i d  Starting from next week  · Continue Effexor 75 mg b i d  And Seroquel 250 mg once daily  · Discussed with Darien Gómez (Office staff) to schedule patient with psychiatry  She will schedule patient with Psychiatry and will call patient to let him know about his appointment  · Also given other behavioral health providers information in this area to patient in case patient does not get sooner appointment with Psychiatry  · Discussed return to ER and office precautions with patient  · Follow-up with PCP in 2 weeks    -     clonazePAM (KlonoPIN) 1 mg tablet; Take 1 tablet (1 mg total) by mouth 2 (two) times a day  -     busPIRone (BUSPAR) 10 mg tablet; Take 1 tablet (10 mg total) by mouth 2 (two) times a day              Subjective:      Patient ID: Verona Gomez  is a 49 y o  male here for hospital follow-up  HPI     49 old male was admitted to adult inpatient unit on voluntary commitment for suicidal ideation  Patient was continued on Effexor, Seroquel was increased  Gabapentin was started off label for anxiety as well as for pain  Patient's symptoms improved during hospitalization  He is on gabapentin 100 m capsules t i d , quetiapine 250 mg at bedtime, venlafaxine 75 mg once daily, clonazepam 0 5 mg b i d  As needed for anxiety  At today's visit, patient reports very bad anxiety  He reports that he is not able to do his work because of anxiety  He works as supervisor at [employer]  He has been taking clonazepam 0 5 mg b i d  Was prescribed 15 tablets  He was discharged from hospital on 08/15/19    He reports that he has been struggling with anxiety since 2006  Patient reports that he was following with outpatient psychiatry but now they do not take his insurance anymore  At last visit patient was referred to psychiatry  He called to make appointment, but was not able to receive call back as he was at work  He reports that he was started on new medication gabapentin 200 mg t i d  He reports that it is not helping with anxiety and he feels sedated  He reports that previously he was on clonazepam 1 mg t i d  At today's visit, he denies any suicidal or homicidal ideation  DARREL-7 Flowsheet Screening      Most Recent Value   Over the last two weeks, how often have you been bothered by the following problems? Feeling nervous, anxious, or on edge  3   Not being able to stop or control worrying  3   Worrying too much about different things  3   Trouble relaxing   3   Being so restless that it's hard to sit still  3   Becoming easily annoyed or irritable   1   Feeling afraid as if something awful might happen  0   How difficult have these problems made it for you to do your work, take care of things at home, or get along with other people?    Very difficult   DARREL Score   16          The following portions of the patient's history were reviewed and updated as appropriate: allergies, current medications, past family history, past medical history, past social history, past surgical history and problem list       Current Outpatient Medications:     clonazePAM (KlonoPIN) 1 mg tablet, Take 1 tablet (1 mg total) by mouth 2 (two) times a day, Disp: 30 tablet, Rfl: 0    oxyCODONE (ROXICODONE) 5 mg immediate release tablet, Earliest Fill Date: 3/17/17 Take 1 to 2 tablets by mouth every 4 hours as needed for pain (Patient taking differently: 15 mg 3 (three) times a day Take 1 to 2 tablets by mouth every 4 hours as needed for pain), Disp: 60 tablet, Rfl: 0    QUEtiapine (SEROquel) 50 mg tablet, Take 250 mg by mouth, Disp: , Rfl:     ranitidine (ZANTAC) 75 MG tablet, Take 75 mg by mouth 2 (two) times a day, Disp: , Rfl:     venlafaxine (EFFEXOR) 75 mg tablet, Take 1 tablet (75 mg total) by mouth 2 (two) times a day, Disp: 60 tablet, Rfl: 0    busPIRone (BUSPAR) 10 mg tablet, Take 1 tablet (10 mg total) by mouth 2 (two) times a day, Disp: 90 tablet, Rfl: 1     Review of Systems   Constitutional: Negative for chills, fatigue and fever  Respiratory: Negative for cough, shortness of breath and wheezing  Cardiovascular: Negative for chest pain, palpitations and leg swelling  Gastrointestinal: Negative for abdominal pain, diarrhea, nausea and vomiting  Psychiatric/Behavioral: Positive for decreased concentration, dysphoric mood and sleep disturbance  Negative for agitation and suicidal ideas  The patient is nervous/anxious  Objective:      /90   Pulse 88   Temp 99 2 °F (37 3 °C)   Resp 16   Ht 5' 10 2" (1 783 m)   Wt 100 kg (220 lb 6 4 oz)   BMI 31 44 kg/m²          Physical Exam   Constitutional: He appears well-developed and well-nourished  HENT:   Head: Normocephalic  Eyes: Conjunctivae are normal    Neck: Neck supple  Cardiovascular: Normal rate, regular rhythm, normal heart sounds and intact distal pulses  Pulmonary/Chest: Effort normal and breath sounds normal    Musculoskeletal: Normal range of motion  Neurological: He is alert  Psychiatric: His mood appears anxious  He is hyperactive  He expresses no homicidal and no suicidal ideation  Patient appears very anxious, moving his legs and shaking while talking

## 2019-08-23 ENCOUNTER — TELEPHONE (OUTPATIENT)
Dept: FAMILY MEDICINE CLINIC | Facility: CLINIC | Age: 54
End: 2019-08-23

## 2019-08-23 NOTE — TELEPHONE ENCOUNTER
This patient has been referred to [address] [place] [address] Psychiatry by Adolfo Nicholson  I attempted to schedule appt  With Dr Uziel Dhillon  This provider is not seeing patient's at this time  He is a 2st  Year  I've call ed  Psychiatry to schedule an appt  I've left a message for them to give me a call back

## 2019-09-04 ENCOUNTER — OFFICE VISIT (OUTPATIENT)
Dept: FAMILY MEDICINE CLINIC | Facility: CLINIC | Age: 54
End: 2019-09-04

## 2019-09-04 VITALS
HEART RATE: 84 BPM | SYSTOLIC BLOOD PRESSURE: 132 MMHG | RESPIRATION RATE: 16 BRPM | HEIGHT: 70 IN | BODY MASS INDEX: 30.32 KG/M2 | DIASTOLIC BLOOD PRESSURE: 82 MMHG | TEMPERATURE: 99.7 F | WEIGHT: 211.8 LBS

## 2019-09-04 DIAGNOSIS — F41.8 DEPRESSION WITH ANXIETY: Primary | ICD-10-CM

## 2019-09-04 DIAGNOSIS — G47.09 OTHER INSOMNIA: ICD-10-CM

## 2019-09-04 PROCEDURE — 99213 OFFICE O/P EST LOW 20 MIN: CPT | Performed by: NURSE PRACTITIONER

## 2019-09-04 RX ORDER — QUETIAPINE FUMARATE 300 MG/1
300 TABLET, FILM COATED ORAL
Qty: 30 TABLET | Refills: 1 | Status: SHIPPED | OUTPATIENT
Start: 2019-09-04 | End: 2019-10-09 | Stop reason: SDUPTHER

## 2019-09-04 RX ORDER — OXYCODONE HYDROCHLORIDE 10 MG/1
10 TABLET ORAL EVERY 6 HOURS PRN
Refills: 0 | COMMUNITY
Start: 2019-08-30 | End: 2019-09-04 | Stop reason: ALTCHOICE

## 2019-09-04 NOTE — PATIENT INSTRUCTIONS
I started you on 300 mg of Seroquel at night and stay on all meds as directed  Follow up with counselor and psychiatry for mental health  Call here if you cannot get in to see a psychiatrist before meds run out

## 2019-09-04 NOTE — PROGRESS NOTES
Assessment/Plan:    Depression with anxiety  Continue with Buspar and Effexor  Follow up with mental health for assessment and medication management  Other insomnia  Increased Seroquel to 300 mg at hs         Problem List Items Addressed This Visit        Other    Other insomnia     Increased Seroquel to 300 mg at hs         Relevant Medications    QUEtiapine (SEROquel) 300 mg tablet    Depression with anxiety - Primary     Continue with Buspar and Effexor  Follow up with mental health for assessment and medication management  Relevant Medications    QUEtiapine (SEROquel) 300 mg tablet            Subjective:      Patient ID: Addi Calix  is a 49 y o  male  49year old presents for management of anxiety and depression and insomnia  He is set up with a counselor this week and then will be scheduled with psychiatry  He is taking meds as directed but would like to increase Seroquel to 300 mg at night  He was on this dose in the past and it was effected for sleep  Overall his home situation is working out  He is still living with his son and he will be going back to his own home soon  He was here last week and put on Buspar and it is helping him control anxiety  He is going to [employer] for [facility],  states he could not get into [employer]  He states he called them many times and they did not return the call  The following portions of the patient's history were reviewed and updated as appropriate: allergies, current medications, past family history, past medical history, past social history, past surgical history and problem list     Review of Systems   Constitutional: Negative  Respiratory: Negative  Cardiovascular: Negative  Psychiatric/Behavioral: Positive for sleep disturbance  Negative for suicidal ideas  The patient is nervous/anxious            Objective:      /82 (BP Location: Left arm, Patient Position: Sitting, Cuff Size: Large)   Pulse 84   Temp 99 7 °F (37 6 °C) (Tympanic)   Resp 16   Ht 5' 10" (1 778 m)   Wt 96 1 kg (211 lb 12 8 oz)   BMI 30 39 kg/m²          Physical Exam   Constitutional: He appears well-developed and well-nourished  Cardiovascular: Normal rate, regular rhythm and normal heart sounds  Pulmonary/Chest: Effort normal and breath sounds normal    Psychiatric: His speech is normal and behavior is normal  Thought content normal  His mood appears anxious   Cognition and memory are normal

## 2019-09-11 DIAGNOSIS — F33.1 MODERATE EPISODE OF RECURRENT MAJOR DEPRESSIVE DISORDER (HCC): ICD-10-CM

## 2019-09-11 DIAGNOSIS — F41.9 ANXIETY: ICD-10-CM

## 2019-09-11 RX ORDER — VENLAFAXINE 75 MG/1
75 TABLET ORAL 2 TIMES DAILY
Qty: 60 TABLET | Refills: 0 | Status: SHIPPED | OUTPATIENT
Start: 2019-09-11 | End: 2019-10-09 | Stop reason: SDUPTHER

## 2019-09-11 RX ORDER — CLONAZEPAM 1 MG/1
1 TABLET ORAL 2 TIMES DAILY
Qty: 30 TABLET | Refills: 0 | Status: SHIPPED | OUTPATIENT
Start: 2019-09-11 | End: 2019-09-27 | Stop reason: SDUPTHER

## 2019-09-11 NOTE — TELEPHONE ENCOUNTER
Patient has appointment scheduled in 3 weeks with psychiatrist  Requesting refill of Effexor and Klonopin  Order entered for sign off if you approve

## 2019-09-16 NOTE — TELEPHONE ENCOUNTER
Spoke to Antionette Mendez, He will be filling out a Release of health form for 09/20 so we can receive records

## 2019-09-16 NOTE — TELEPHONE ENCOUNTER
PATIENT SCHEDULED APPT FOR 09/20 AT [facility]  PATIENT NEEDS TO SIGN A RELEASE OF HEALTH FORM FOR OUR OFFICE WHEN HE GOES TO THE APPT

## 2019-09-25 ENCOUNTER — TELEPHONE (OUTPATIENT)
Dept: FAMILY MEDICINE CLINIC | Facility: CLINIC | Age: 54
End: 2019-09-25

## 2019-09-25 DIAGNOSIS — F41.9 ANXIETY: ICD-10-CM

## 2019-09-26 ENCOUNTER — TELEPHONE (OUTPATIENT)
Dept: FAMILY MEDICINE CLINIC | Facility: CLINIC | Age: 54
End: 2019-09-26

## 2019-09-26 NOTE — TELEPHONE ENCOUNTER
Patient calling for update on his refill for Clonazapam 1mg  Patient indicated that the earliest appointment he was able to schedule for the Psychologist was 11/12/2019 at [facility]   Patient does not have enough medication until the appointment for 11/12/2019

## 2019-09-26 NOTE — TELEPHONE ENCOUNTER
On [facility] behalf,    Checked PDMP    Last fill was on 9/12/2019  30 tablets  15 days  By Ronny Coe  Please refill if you are willing to hold him over till 11/12 appt  Thanks!

## 2019-09-27 RX ORDER — CLONAZEPAM 1 MG/1
1 TABLET ORAL 2 TIMES DAILY PRN
Qty: 30 TABLET | Refills: 0 | Status: SHIPPED | OUTPATIENT
Start: 2019-09-27 | End: 2019-10-09

## 2019-09-27 NOTE — TELEPHONE ENCOUNTER
Please schedule an appointment with his PCP on a monthly basis at least until he is established with psychiatry  Reviewing his PDMP and he is also on chronic narcotics and the combination with his clonazepam is not advised  Please schedule an appointment for him to re-evaluate this medication choice as the discharge paperwork from the hospital wanted him to take is as needed, not twice daily every day as he has been  30 tabs ordered, but needs appt for further refills

## 2019-10-09 ENCOUNTER — OFFICE VISIT (OUTPATIENT)
Dept: FAMILY MEDICINE CLINIC | Facility: CLINIC | Age: 54
End: 2019-10-09

## 2019-10-09 VITALS
WEIGHT: 214.2 LBS | DIASTOLIC BLOOD PRESSURE: 86 MMHG | HEIGHT: 70 IN | SYSTOLIC BLOOD PRESSURE: 138 MMHG | RESPIRATION RATE: 18 BRPM | BODY MASS INDEX: 30.67 KG/M2 | HEART RATE: 88 BPM | TEMPERATURE: 98.6 F

## 2019-10-09 DIAGNOSIS — G47.09 OTHER INSOMNIA: ICD-10-CM

## 2019-10-09 DIAGNOSIS — F33.1 MODERATE EPISODE OF RECURRENT MAJOR DEPRESSIVE DISORDER (HCC): ICD-10-CM

## 2019-10-09 DIAGNOSIS — F41.9 ANXIETY: ICD-10-CM

## 2019-10-09 PROCEDURE — 99213 OFFICE O/P EST LOW 20 MIN: CPT | Performed by: NURSE PRACTITIONER

## 2019-10-09 RX ORDER — QUETIAPINE FUMARATE 300 MG/1
300 TABLET, FILM COATED ORAL
Qty: 30 TABLET | Refills: 1 | Status: SHIPPED | OUTPATIENT
Start: 2019-10-09 | End: 2021-01-08 | Stop reason: SDUPTHER

## 2019-10-09 RX ORDER — VENLAFAXINE 75 MG/1
75 TABLET ORAL 2 TIMES DAILY
Qty: 60 TABLET | Refills: 0 | Status: SHIPPED | OUTPATIENT
Start: 2019-10-09 | End: 2019-11-06 | Stop reason: SDUPTHER

## 2019-10-09 RX ORDER — CLONAZEPAM 1 MG/1
1 TABLET ORAL 2 TIMES DAILY
Qty: 30 TABLET | Refills: 1 | Status: SHIPPED | OUTPATIENT
Start: 2019-10-09 | End: 2019-11-06 | Stop reason: SDUPTHER

## 2019-10-09 NOTE — PROGRESS NOTES
Assessment/Plan:    Anxiety  Continue with Klonopin 1 mg BID  PDMP reviewed  Moderate episode of recurrent major depressive disorder ([address] )  Continue with Effexor 75 mg BID and follow up with counselor and psychiatry  Will refill meds until he sees psychiatry in 5 weeks  This was the earliest appointment that he could get  He continues to see a counselor weekly  Problem List Items Addressed This Visit        Other    Other insomnia    Relevant Medications    QUEtiapine (SEROquel) 300 mg tablet    Anxiety     Continue with Klonopin 1 mg BID  PDMP reviewed  Relevant Medications    clonazePAM (KlonoPIN) 1 mg tablet    MDD (major depressive disorder)    Relevant Medications    venlafaxine (EFFEXOR) 75 mg tablet    QUEtiapine (SEROquel) 300 mg tablet            Subjective:      Patient ID: Hudson Peng  is a 49 y o  male  Patient is seeing psychiatry on 11/12/19 and continues to see counselor  He is taking Effexor 75 mg BID and he is using Klonopin 1 mg BID and Seroquel 300 mg at bedtime  We are covering this these meds until he sees psychiatry  He continues to have anxiety and has a history of depression  States medications are working well  He was on this plan for several years, insurance was changed and he needed to find another psychiatrist  No thoughts of hurting self or others  His home situation is improving and he continues to work full-time  He is not taking opiods any longer for chronic back pain  The following portions of the patient's history were reviewed and updated as appropriate: allergies, current medications, past family history, past medical history, past social history, past surgical history and problem list     Review of Systems   Constitutional: Negative  Respiratory: Negative  Cardiovascular: Negative  Psychiatric/Behavioral: Positive for sleep disturbance   Negative for behavioral problems, confusion, decreased concentration, dysphoric mood, hallucinations, self-injury and suicidal ideas  The patient is nervous/anxious  The patient is not hyperactive  Objective:      /86   Pulse 88   Temp 98 6 °F (37 °C) (Tympanic)   Resp 18   Ht 5' 10" (1 778 m)   Wt 97 2 kg (214 lb 3 2 oz)   BMI 30 73 kg/m²          Physical Exam   Constitutional: He is oriented to person, place, and time  He appears well-developed and well-nourished  Cardiovascular: Normal rate, regular rhythm and normal heart sounds  Pulmonary/Chest: Effort normal and breath sounds normal    Neurological: He is alert and oriented to person, place, and time  Skin: Skin is warm and dry  Psychiatric: He has a normal mood and affect   His behavior is normal  Judgment and thought content normal

## 2019-10-10 NOTE — ASSESSMENT & PLAN NOTE
Continue with Effexor 75 mg BID and follow up with counselor and psychiatry  Will refill meds until he sees psychiatry in 5 weeks  This was the earliest appointment that he could get  He continues to see a counselor weekly

## 2019-11-05 RX ORDER — CLONAZEPAM 1 MG/1
TABLET ORAL
Refills: 0 | COMMUNITY
Start: 2019-09-12 | End: 2019-11-06

## 2019-11-06 ENCOUNTER — OFFICE VISIT (OUTPATIENT)
Dept: FAMILY MEDICINE CLINIC | Facility: CLINIC | Age: 54
End: 2019-11-06

## 2019-11-06 VITALS
WEIGHT: 222 LBS | DIASTOLIC BLOOD PRESSURE: 90 MMHG | BODY MASS INDEX: 31.78 KG/M2 | HEIGHT: 70 IN | HEART RATE: 82 BPM | SYSTOLIC BLOOD PRESSURE: 140 MMHG | RESPIRATION RATE: 18 BRPM | TEMPERATURE: 98.3 F

## 2019-11-06 DIAGNOSIS — Z12.11 COLON CANCER SCREENING: ICD-10-CM

## 2019-11-06 DIAGNOSIS — R03.0 ELEVATED BLOOD PRESSURE READING: ICD-10-CM

## 2019-11-06 DIAGNOSIS — F41.9 ANXIETY: Primary | ICD-10-CM

## 2019-11-06 DIAGNOSIS — F33.1 MODERATE EPISODE OF RECURRENT MAJOR DEPRESSIVE DISORDER (HCC): ICD-10-CM

## 2019-11-06 PROCEDURE — 99213 OFFICE O/P EST LOW 20 MIN: CPT | Performed by: NURSE PRACTITIONER

## 2019-11-06 PROCEDURE — 3008F BODY MASS INDEX DOCD: CPT | Performed by: NURSE PRACTITIONER

## 2019-11-06 RX ORDER — CLONAZEPAM 1 MG/1
1 TABLET ORAL 2 TIMES DAILY
Qty: 30 TABLET | Refills: 0 | Status: SHIPPED | OUTPATIENT
Start: 2019-11-06 | End: 2021-01-08 | Stop reason: ALTCHOICE

## 2019-11-06 RX ORDER — VENLAFAXINE 75 MG/1
75 TABLET ORAL 2 TIMES DAILY
Qty: 60 TABLET | Refills: 0 | Status: SHIPPED | OUTPATIENT
Start: 2019-11-06 | End: 2020-09-21 | Stop reason: ALTCHOICE

## 2019-11-06 NOTE — PROGRESS NOTES
Assessment/Plan: Moderate episode of recurrent major depressive disorder ([address] )  Continue on present meds and follow up with psychiatry next week and will  continue with weekly  Counseling  Getting his personal life stressors under control and doing well on current medications  Elevated blood pressure reading  Advised to lose weight and exercise  Low salt diet  Will reevaluate in 3 months  Problem List Items Addressed This Visit        Other    Anxiety - Primary    Relevant Medications    clonazePAM (KlonoPIN) 1 mg tablet    MDD (major depressive disorder)    Relevant Medications    venlafaxine (EFFEXOR) 75 mg tablet    Colon cancer screening    Relevant Orders    Ambulatory referral to colonoscopy screening    Elevated blood pressure reading     Advised to lose weight and exercise  Low salt diet  Will reevaluate in 3 months  Subjective:      Patient ID: Suraj Woody  is a 49 y o  male  49year old male presents for follow up anxiety and depression  He sees a counselor weekly and has an appointment with psychiatry next Tuesday with Dr Tiffanie Ye at [facility]  He needs a refill on Effexor and Klonopin since he will not have enough meds to cover him until Tuesday  No new c/o      The following portions of the patient's history were reviewed and updated as appropriate: allergies, current medications, past family history, past medical history, past social history, past surgical history and problem list     Review of Systems   Constitutional: Negative  Respiratory: Negative  Cardiovascular: Negative  Psychiatric/Behavioral: Negative for agitation, behavioral problems, self-injury, sleep disturbance and suicidal ideas  The patient is nervous/anxious  All other systems reviewed and are negative          Objective:      /90 (BP Location: Right arm, Patient Position: Sitting, Cuff Size: Large)   Pulse 82   Temp 98 3 °F (36 8 °C) (Tympanic)   Resp 18   Ht 5' 10" (1 778 m)   Wt 101 kg (222 lb)   BMI 31 85 kg/m²          Physical Exam   Constitutional: He is oriented to person, place, and time  He appears well-developed and well-nourished  HENT:   Head: Normocephalic and atraumatic  Cardiovascular: Normal rate, regular rhythm and normal heart sounds  Pulmonary/Chest: Effort normal and breath sounds normal    Neurological: He is alert and oriented to person, place, and time  Psychiatric: He has a normal mood and affect   His behavior is normal  Judgment and thought content normal

## 2019-11-07 NOTE — ASSESSMENT & PLAN NOTE
Continue on present meds and follow up with psychiatry next week and will  continue with weekly  Counseling  Getting his personal life stressors under control and doing well on current medications

## 2019-11-26 ENCOUNTER — TRANSCRIBE ORDERS (OUTPATIENT)
Dept: FAMILY MEDICINE CLINIC | Facility: CLINIC | Age: 54
End: 2019-11-26

## 2019-11-26 DIAGNOSIS — Z12.11 COLON CANCER SCREENING: Primary | ICD-10-CM

## 2020-04-28 ENCOUNTER — TELEMEDICINE (OUTPATIENT)
Dept: FAMILY MEDICINE CLINIC | Facility: CLINIC | Age: 55
End: 2020-04-28

## 2020-04-28 ENCOUNTER — TELEPHONE (OUTPATIENT)
Dept: FAMILY MEDICINE CLINIC | Facility: CLINIC | Age: 55
End: 2020-04-28

## 2020-04-28 ENCOUNTER — AMB VIDEO VISIT (OUTPATIENT)
Dept: OTHER | Facility: HOSPITAL | Age: 55
End: 2020-04-28

## 2020-04-28 VITALS — BODY MASS INDEX: 31.85 KG/M2 | HEIGHT: 70 IN

## 2020-04-28 DIAGNOSIS — Z20.828 EXPOSURE TO SARS-ASSOCIATED CORONAVIRUS: Primary | ICD-10-CM

## 2020-04-28 DIAGNOSIS — Z20.828 EXPOSURE TO SARS-ASSOCIATED CORONAVIRUS: ICD-10-CM

## 2020-04-28 PROCEDURE — EVISIT: Performed by: FAMILY MEDICINE

## 2020-04-28 PROCEDURE — 99214 OFFICE O/P EST MOD 30 MIN: CPT | Performed by: FAMILY MEDICINE

## 2020-04-28 PROCEDURE — U0003 INFECTIOUS AGENT DETECTION BY NUCLEIC ACID (DNA OR RNA); SEVERE ACUTE RESPIRATORY SYNDROME CORONAVIRUS 2 (SARS-COV-2) (CORONAVIRUS DISEASE [COVID-19]), AMPLIFIED PROBE TECHNIQUE, MAKING USE OF HIGH THROUGHPUT TECHNOLOGIES AS DESCRIBED BY CMS-2020-01-R: HCPCS

## 2020-04-29 ENCOUNTER — TELEMEDICINE (OUTPATIENT)
Dept: FAMILY MEDICINE CLINIC | Facility: CLINIC | Age: 55
End: 2020-04-29

## 2020-04-29 VITALS — HEIGHT: 70 IN | WEIGHT: 222 LBS | BODY MASS INDEX: 31.78 KG/M2

## 2020-04-29 DIAGNOSIS — Z20.828 EXPOSURE TO SARS-ASSOCIATED CORONAVIRUS: Primary | ICD-10-CM

## 2020-04-29 LAB — SARS-COV-2 RNA SPEC QL NAA+PROBE: NOT DETECTED

## 2020-04-29 PROCEDURE — 99213 OFFICE O/P EST LOW 20 MIN: CPT | Performed by: FAMILY MEDICINE

## 2020-04-29 RX ORDER — ALBUTEROL SULFATE 90 UG/1
2 AEROSOL, METERED RESPIRATORY (INHALATION) EVERY 6 HOURS PRN
COMMUNITY
End: 2021-06-09 | Stop reason: SDUPTHER

## 2020-04-30 ENCOUNTER — TELEMEDICINE (OUTPATIENT)
Dept: FAMILY MEDICINE CLINIC | Facility: CLINIC | Age: 55
End: 2020-04-30

## 2020-04-30 ENCOUNTER — TELEPHONE (OUTPATIENT)
Dept: FAMILY MEDICINE CLINIC | Facility: CLINIC | Age: 55
End: 2020-04-30

## 2020-04-30 DIAGNOSIS — Z20.828 EXPOSURE TO SARS-ASSOCIATED CORONAVIRUS: Primary | ICD-10-CM

## 2020-04-30 PROCEDURE — 99442 PR PHYS/QHP TELEPHONE EVALUATION 11-20 MIN: CPT | Performed by: FAMILY MEDICINE

## 2020-05-04 ENCOUNTER — TELEMEDICINE (OUTPATIENT)
Dept: FAMILY MEDICINE CLINIC | Facility: CLINIC | Age: 55
End: 2020-05-04

## 2020-05-04 DIAGNOSIS — Z20.828 EXPOSURE TO SARS-ASSOCIATED CORONAVIRUS: Primary | ICD-10-CM

## 2020-05-04 PROCEDURE — 99441 PR PHYS/QHP TELEPHONE EVALUATION 5-10 MIN: CPT | Performed by: FAMILY MEDICINE

## 2020-06-11 ENCOUNTER — OFFICE VISIT (OUTPATIENT)
Dept: GASTROENTEROLOGY | Facility: CLINIC | Age: 55
End: 2020-06-11
Payer: COMMERCIAL

## 2020-06-11 VITALS
SYSTOLIC BLOOD PRESSURE: 191 MMHG | TEMPERATURE: 99 F | HEART RATE: 89 BPM | BODY MASS INDEX: 35.14 KG/M2 | WEIGHT: 251 LBS | HEIGHT: 71 IN | DIASTOLIC BLOOD PRESSURE: 124 MMHG

## 2020-06-11 DIAGNOSIS — Z12.11 COLON CANCER SCREENING: ICD-10-CM

## 2020-06-11 DIAGNOSIS — K21.9 GASTROESOPHAGEAL REFLUX DISEASE WITHOUT ESOPHAGITIS: Primary | ICD-10-CM

## 2020-06-11 PROCEDURE — 3008F BODY MASS INDEX DOCD: CPT | Performed by: INTERNAL MEDICINE

## 2020-06-11 PROCEDURE — 3008F BODY MASS INDEX DOCD: CPT | Performed by: FAMILY MEDICINE

## 2020-06-11 PROCEDURE — 99204 OFFICE O/P NEW MOD 45 MIN: CPT | Performed by: INTERNAL MEDICINE

## 2020-06-11 RX ORDER — VENLAFAXINE HYDROCHLORIDE 150 MG/1
CAPSULE, EXTENDED RELEASE ORAL
COMMUNITY
Start: 2020-05-30 | End: 2021-01-08 | Stop reason: SDUPTHER

## 2020-06-11 RX ORDER — SODIUM, POTASSIUM,MAG SULFATES 17.5-3.13G
180 SOLUTION, RECONSTITUTED, ORAL ORAL ONCE
Qty: 180 ML | Refills: 0 | Status: SHIPPED | OUTPATIENT
Start: 2020-06-11 | End: 2020-09-21 | Stop reason: ALTCHOICE

## 2020-06-16 ENCOUNTER — TELEPHONE (OUTPATIENT)
Dept: GASTROENTEROLOGY | Facility: CLINIC | Age: 55
End: 2020-06-16

## 2020-09-09 ENCOUNTER — ANESTHESIA EVENT (OUTPATIENT)
Dept: GASTROENTEROLOGY | Facility: AMBULARY SURGERY CENTER | Age: 55
End: 2020-09-09

## 2020-09-20 NOTE — ANESTHESIA PREPROCEDURE EVALUATION
Procedure:  COLONOSCOPY    Relevant Problems   ANESTHESIA (within normal limits)      CARDIO (within normal limits)      ENDO (within normal limits)      GI/HEPATIC   (+) GERD (gastroesophageal reflux disease)   (+) Ileus (HCC)      /RENAL   (+) Kidney stone on right side      HEMATOLOGY (within normal limits)      NEURO/PSYCH   (+) Anxiety   (+) Depression with anxiety   (+) History of MRSA infection of lungs   (+) History of ileus   (+) MDD (major depressive disorder)   (+) Moderate episode of recurrent major depressive disorder (HCC)      PULMONARY   (+) Asthma (PRN albuterol approximately 1x per week, last used 5 days ago)   (+) Cavitary pneumonia      Other   (+) Adenoma of left adrenal gland   (+) Tobacco abuse      EKG 5/2019:  Normal sinus rhythm  Normal ECG  When compared with ECG of 21-MAY-2018 02:51,  Sinus rhythm has replaced Atrial flutter  Vent  rate has decreased BY  55 BPM    TTE 5/2018:  LEFT VENTRICLE:  Systolic function was normal  Ejection fraction was estimated to be 60 %  There were no regional wall motion abnormalities  Wall thickness was mildly increased    There was mild concentric hypertrophy      LEFT ATRIUM:  The atrium was mildly dilated      MITRAL VALVE:  There was mild regurgitation    Lab Results   Component Value Date    WBC 5 28 05/23/2019    HGB 15 0 05/23/2019    HCT 45 5 05/23/2019    MCV 97 05/23/2019     05/23/2019     Lab Results   Component Value Date    SODIUM 142 05/23/2019    K 3 9 05/23/2019     05/23/2019    CO2 25 05/23/2019    BUN 22 05/23/2019    CREATININE 1 06 05/23/2019    GLUC 121 05/23/2019    CALCIUM 9 1 05/23/2019     Lab Results   Component Value Date    INR 1 16 05/20/2018    INR 0 95 03/16/2017    INR 1 02 08/24/2015    PROTIME 14 9 (H) 05/20/2018    PROTIME 12 7 03/16/2017    PROTIME 13 2 08/24/2015     Lab Results   Component Value Date    HGBA1C 4 8 08/12/2019          Physical Exam    Airway    Mallampati score: II  TM Distance: >3 FB  Neck ROM: full     Dental       Cardiovascular  Cardiovascular exam normal    Pulmonary  Pulmonary exam normal     Other Findings        Anesthesia Plan  ASA Score- 3     Anesthesia Type- IV sedation with anesthesia with ASA Monitors  Additional Monitors:   Airway Plan:           Plan Factors-Exercise tolerance (METS): >4 METS  Chart reviewed  EKG reviewed  Existing labs reviewed  Patient summary reviewed  Induction- intravenous  Postoperative Plan-     Informed Consent- Anesthetic plan and risks discussed with patient  I personally reviewed this patient with the CRNA  Discussed and agreed on the Anesthesia Plan with the CRNA  Karrie Nissen

## 2020-09-21 ENCOUNTER — ANESTHESIA (OUTPATIENT)
Dept: GASTROENTEROLOGY | Facility: AMBULARY SURGERY CENTER | Age: 55
End: 2020-09-21

## 2020-09-21 ENCOUNTER — HOSPITAL ENCOUNTER (OUTPATIENT)
Dept: GASTROENTEROLOGY | Facility: AMBULARY SURGERY CENTER | Age: 55
Setting detail: OUTPATIENT SURGERY
Discharge: HOME | End: 2020-09-21
Attending: INTERNAL MEDICINE
Payer: COMMERCIAL

## 2020-09-21 VITALS
DIASTOLIC BLOOD PRESSURE: 97 MMHG | BODY MASS INDEX: 34.3 KG/M2 | RESPIRATION RATE: 18 BRPM | HEIGHT: 71 IN | OXYGEN SATURATION: 96 % | WEIGHT: 245 LBS | SYSTOLIC BLOOD PRESSURE: 180 MMHG | HEART RATE: 82 BPM | TEMPERATURE: 97.2 F

## 2020-09-21 VITALS — HEART RATE: 80 BPM

## 2020-09-21 DIAGNOSIS — Z12.11 COLON CANCER SCREENING: ICD-10-CM

## 2020-09-21 PROBLEM — J45.909 ASTHMA: Status: ACTIVE | Noted: 2020-09-21

## 2020-09-21 PROCEDURE — 45380 COLONOSCOPY AND BIOPSY: CPT | Performed by: INTERNAL MEDICINE

## 2020-09-21 PROCEDURE — 45385 COLONOSCOPY W/LESION REMOVAL: CPT | Performed by: INTERNAL MEDICINE

## 2020-09-21 PROCEDURE — 88305 TISSUE EXAM BY PATHOLOGIST: CPT | Performed by: PATHOLOGY

## 2020-09-21 RX ORDER — SODIUM CHLORIDE, SODIUM LACTATE, POTASSIUM CHLORIDE, CALCIUM CHLORIDE 600; 310; 30; 20 MG/100ML; MG/100ML; MG/100ML; MG/100ML
INJECTION, SOLUTION INTRAVENOUS CONTINUOUS PRN
Status: DISCONTINUED | OUTPATIENT
Start: 2020-09-21 | End: 2020-09-21

## 2020-09-21 RX ORDER — LIDOCAINE HYDROCHLORIDE 10 MG/ML
INJECTION, SOLUTION EPIDURAL; INFILTRATION; INTRACAUDAL; PERINEURAL AS NEEDED
Status: DISCONTINUED | OUTPATIENT
Start: 2020-09-21 | End: 2020-09-21

## 2020-09-21 RX ORDER — PROPOFOL 10 MG/ML
INJECTION, EMULSION INTRAVENOUS AS NEEDED
Status: DISCONTINUED | OUTPATIENT
Start: 2020-09-21 | End: 2020-09-21

## 2020-09-21 RX ADMIN — PROPOFOL 50 MG: 10 INJECTION, EMULSION INTRAVENOUS at 10:57

## 2020-09-21 RX ADMIN — PROPOFOL 50 MG: 10 INJECTION, EMULSION INTRAVENOUS at 10:46

## 2020-09-21 RX ADMIN — PROPOFOL 50 MG: 10 INJECTION, EMULSION INTRAVENOUS at 10:51

## 2020-09-21 RX ADMIN — PROPOFOL 100 MG: 10 INJECTION, EMULSION INTRAVENOUS at 10:44

## 2020-09-21 RX ADMIN — LIDOCAINE HYDROCHLORIDE 50 MG: 10 INJECTION, SOLUTION EPIDURAL; INFILTRATION; INTRACAUDAL; PERINEURAL at 10:44

## 2020-09-21 RX ADMIN — PROPOFOL 50 MG: 10 INJECTION, EMULSION INTRAVENOUS at 10:48

## 2020-09-21 RX ADMIN — SODIUM CHLORIDE, SODIUM LACTATE, POTASSIUM CHLORIDE, AND CALCIUM CHLORIDE: .6; .31; .03; .02 INJECTION, SOLUTION INTRAVENOUS at 09:47

## 2020-09-21 NOTE — ANESTHESIA POSTPROCEDURE EVALUATION
Post-Op Assessment Note    CV Status:  Stable  Pain Score: 0    Pain management: adequate     Mental Status:  Alert and awake   Hydration Status:  Euvolemic   PONV Controlled:  Controlled   Airway Patency:  Patent      Post Op Vitals Reviewed: Yes      Staff: Anesthesiologist, CRNA   Comments: VSS        No complications documented      BP     Temp     Pulse     Resp      SpO2

## 2020-09-21 NOTE — H&P
History and Physical - [facility]  Kwame Ross  48 y o  male MRN: [id number]                  HPI: Kwame Ross  is a 48y o  year old male who presents for colonsocopy      REVIEW OF SYSTEMS: Per the HPI, and otherwise unremarkable      Historical Information   Past Medical History:   Diagnosis Date    Anxiety     Asthma     Colon polyp     GERD (gastroesophageal reflux disease)     History of ileus     Insomnia     Lumbar herniated disc     last assessed: 3/27/2015    MDD (major depressive disorder)     Patella fracture     last assessed: 3/27/2015    Tobacco abuse      Past Surgical History:   Procedure Laterality Date    BACK SURGERY  2015    Lumbar    COLONOSCOPY      KNEE SURGERY      right    LUMBAR FUSION      last assessed: 3/27/2015    NV TREAT TIBIAL SHAFT FX, INTRAMED IMPLANT Left 3/16/2017    Procedure: INSERTION NAIL IM TIBIA;  Surgeon: Elayne Zazueta DO;  Location: [facility];  Service: Orthopedics     Social History   Social History     Substance and Sexual Activity   Alcohol Use No    Binge frequency: Never     Social History     Substance and Sexual Activity   Drug Use No     Social History     Tobacco Use   Smoking Status Current Every Day Smoker    Packs/day: 0 50    Years: 34 00    Pack years: 17 00    Types: Cigarettes   Smokeless Tobacco Never Used   Tobacco Comment    Pt would like counseling     Family History   Problem Relation Age of Onset    Pancreatic cancer Mother     COPD Father     Heart attack Brother 64        decreased    Narcolepsy Brother     Drug abuse Brother     Alcohol abuse Brother        Meds/Allergies     (Not in a hospital admission)      No Known Allergies    Objective     BP (!) 180/109   Pulse 80   Temp (!) 97 3 °F (36 3 °C) (Temporal)   Resp 18   Ht 5' 11" (1 803 m)   Wt 111 kg (245 lb)   SpO2 97%   BMI 34 17 kg/m²       PHYSICAL EXAM    Gen: NAD  CV: RRR  CHEST: Clear  ABD: soft, NT/ND  EXT: no edema      ASSESSMENT/PLAN:  This is a 48y o  year old male here for colonoscopy, and he is stable and optimized for his procedure

## 2020-10-06 ENCOUNTER — TELEPHONE (OUTPATIENT)
Dept: GASTROENTEROLOGY | Facility: CLINIC | Age: 55
End: 2020-10-06

## 2020-12-28 ENCOUNTER — HOSPITAL ENCOUNTER (EMERGENCY)
Facility: HOSPITAL | Age: 55
Discharge: HOME | End: 2020-12-28
Attending: EMERGENCY MEDICINE | Admitting: EMERGENCY MEDICINE
Payer: COMMERCIAL

## 2020-12-28 ENCOUNTER — APPOINTMENT (EMERGENCY)
Dept: CT IMAGING | Facility: HOSPITAL | Age: 55
End: 2020-12-28
Payer: COMMERCIAL

## 2020-12-28 ENCOUNTER — APPOINTMENT (EMERGENCY)
Dept: RADIOLOGY | Facility: HOSPITAL | Age: 55
End: 2020-12-28
Payer: COMMERCIAL

## 2020-12-28 VITALS
SYSTOLIC BLOOD PRESSURE: 180 MMHG | TEMPERATURE: 98.3 F | RESPIRATION RATE: 18 BRPM | HEART RATE: 100 BPM | OXYGEN SATURATION: 95 % | DIASTOLIC BLOOD PRESSURE: 107 MMHG

## 2020-12-28 DIAGNOSIS — R11.0 NAUSEA: ICD-10-CM

## 2020-12-28 DIAGNOSIS — I10 HIGH BLOOD PRESSURE: ICD-10-CM

## 2020-12-28 DIAGNOSIS — R00.2 PALPITATIONS: Primary | ICD-10-CM

## 2020-12-28 DIAGNOSIS — F41.9 ANXIETY: ICD-10-CM

## 2020-12-28 DIAGNOSIS — E86.0 DEHYDRATION: ICD-10-CM

## 2020-12-28 DIAGNOSIS — N39.0 UTI (URINARY TRACT INFECTION): ICD-10-CM

## 2020-12-28 LAB
ALBUMIN SERPL BCP-MCNC: 4.2 G/DL (ref 3.5–5)
ALP SERPL-CCNC: 152 U/L (ref 46–116)
ALT SERPL W P-5'-P-CCNC: 67 U/L (ref 12–78)
ANION GAP SERPL CALCULATED.3IONS-SCNC: 14 MMOL/L (ref 4–13)
AST SERPL W P-5'-P-CCNC: 43 U/L (ref 5–45)
ATRIAL RATE: 122 BPM
BACTERIA UR QL AUTO: ABNORMAL /HPF
BASOPHILS # BLD AUTO: 0.04 THOUSANDS/ΜL (ref 0–0.1)
BASOPHILS NFR BLD AUTO: 1 % (ref 0–1)
BILIRUB SERPL-MCNC: 1.58 MG/DL (ref 0.2–1)
BILIRUB UR QL STRIP: ABNORMAL
BUN SERPL-MCNC: 9 MG/DL (ref 5–25)
CALCIUM SERPL-MCNC: 9.2 MG/DL (ref 8.3–10.1)
CHLORIDE SERPL-SCNC: 98 MMOL/L (ref 100–108)
CLARITY UR: CLEAR
CO2 SERPL-SCNC: 20 MMOL/L (ref 21–32)
COLOR UR: ABNORMAL
CREAT SERPL-MCNC: 1.03 MG/DL (ref 0.6–1.3)
D DIMER PPP FEU-MCNC: 0.39 UG/ML FEU
EOSINOPHIL # BLD AUTO: 0.07 THOUSAND/ΜL (ref 0–0.61)
EOSINOPHIL NFR BLD AUTO: 1 % (ref 0–6)
ERYTHROCYTE [DISTWIDTH] IN BLOOD BY AUTOMATED COUNT: 14.6 % (ref 11.6–15.1)
FINE GRAN CASTS URNS QL MICRO: ABNORMAL /LPF
FLUAV RNA RESP QL NAA+PROBE: NEGATIVE
FLUBV RNA RESP QL NAA+PROBE: NEGATIVE
GFR SERPL CREATININE-BSD FRML MDRD: 82 ML/MIN/1.73SQ M
GLUCOSE SERPL-MCNC: 117 MG/DL (ref 65–140)
GLUCOSE UR STRIP-MCNC: NEGATIVE MG/DL
HCT VFR BLD AUTO: 52.7 % (ref 36.5–49.3)
HGB BLD-MCNC: 18.3 G/DL (ref 12–17)
HGB UR QL STRIP.AUTO: ABNORMAL
HYALINE CASTS #/AREA URNS LPF: ABNORMAL /LPF
IMM GRANULOCYTES # BLD AUTO: 0.04 THOUSAND/UL (ref 0–0.2)
IMM GRANULOCYTES NFR BLD AUTO: 1 % (ref 0–2)
KETONES UR STRIP-MCNC: NEGATIVE MG/DL
LEUKOCYTE ESTERASE UR QL STRIP: NEGATIVE
LYMPHOCYTES # BLD AUTO: 1.73 THOUSANDS/ΜL (ref 0.6–4.47)
LYMPHOCYTES NFR BLD AUTO: 21 % (ref 14–44)
MCH RBC QN AUTO: 32.8 PG (ref 26.8–34.3)
MCHC RBC AUTO-ENTMCNC: 34.7 G/DL (ref 31.4–37.4)
MCV RBC AUTO: 94 FL (ref 82–98)
MONOCYTES # BLD AUTO: 0.51 THOUSAND/ΜL (ref 0.17–1.22)
MONOCYTES NFR BLD AUTO: 6 % (ref 4–12)
MUCOUS THREADS UR QL AUTO: ABNORMAL
NEUTROPHILS # BLD AUTO: 6.06 THOUSANDS/ΜL (ref 1.85–7.62)
NEUTS SEG NFR BLD AUTO: 70 % (ref 43–75)
NITRITE UR QL STRIP: POSITIVE
NON-SQ EPI CELLS URNS QL MICRO: ABNORMAL /HPF
NRBC BLD AUTO-RTO: 0 /100 WBCS
P AXIS: 48 DEGREES
PH UR STRIP.AUTO: 5.5 [PH] (ref 4.5–8)
PLATELET # BLD AUTO: 249 THOUSANDS/UL (ref 149–390)
PMV BLD AUTO: 9.5 FL (ref 8.9–12.7)
POTASSIUM SERPL-SCNC: 3.9 MMOL/L (ref 3.5–5.3)
PR INTERVAL: 158 MS
PROT SERPL-MCNC: 8.3 G/DL (ref 6.4–8.2)
PROT UR STRIP-MCNC: >=300 MG/DL
QRS AXIS: 50 DEGREES
QRSD INTERVAL: 90 MS
QT INTERVAL: 324 MS
QTC INTERVAL: 461 MS
RBC # BLD AUTO: 5.58 MILLION/UL (ref 3.88–5.62)
RBC #/AREA URNS AUTO: ABNORMAL /HPF
RSV RNA RESP QL NAA+PROBE: NEGATIVE
SARS-COV-2 RNA RESP QL NAA+PROBE: NEGATIVE
SODIUM SERPL-SCNC: 132 MMOL/L (ref 136–145)
SP GR UR STRIP.AUTO: >=1.03 (ref 1–1.03)
T WAVE AXIS: 12 DEGREES
TROPONIN I SERPL-MCNC: <0.02 NG/ML
TSH SERPL DL<=0.05 MIU/L-ACNC: 3.35 UIU/ML (ref 0.36–3.74)
UROBILINOGEN UR QL STRIP.AUTO: 4 E.U./DL
VENTRICULAR RATE: 122 BPM
WBC # BLD AUTO: 8.45 THOUSAND/UL (ref 4.31–10.16)
WBC #/AREA URNS AUTO: ABNORMAL /HPF
WBC CASTS URNS QL MICRO: ABNORMAL /LPF

## 2020-12-28 PROCEDURE — 71045 X-RAY EXAM CHEST 1 VIEW: CPT

## 2020-12-28 PROCEDURE — 99285 EMERGENCY DEPT VISIT HI MDM: CPT

## 2020-12-28 PROCEDURE — 36415 COLL VENOUS BLD VENIPUNCTURE: CPT | Performed by: EMERGENCY MEDICINE

## 2020-12-28 PROCEDURE — 96375 TX/PRO/DX INJ NEW DRUG ADDON: CPT

## 2020-12-28 PROCEDURE — 96374 THER/PROPH/DIAG INJ IV PUSH: CPT

## 2020-12-28 PROCEDURE — 93010 ELECTROCARDIOGRAM REPORT: CPT | Performed by: INTERNAL MEDICINE

## 2020-12-28 PROCEDURE — 85025 COMPLETE CBC W/AUTO DIFF WBC: CPT | Performed by: EMERGENCY MEDICINE

## 2020-12-28 PROCEDURE — 81001 URINALYSIS AUTO W/SCOPE: CPT

## 2020-12-28 PROCEDURE — 93005 ELECTROCARDIOGRAM TRACING: CPT

## 2020-12-28 PROCEDURE — 96361 HYDRATE IV INFUSION ADD-ON: CPT

## 2020-12-28 PROCEDURE — 85379 FIBRIN DEGRADATION QUANT: CPT | Performed by: EMERGENCY MEDICINE

## 2020-12-28 PROCEDURE — 74177 CT ABD & PELVIS W/CONTRAST: CPT

## 2020-12-28 PROCEDURE — 0241U HB NFCT DS VIR RESP RNA 4 TRGT: CPT | Performed by: EMERGENCY MEDICINE

## 2020-12-28 PROCEDURE — 80053 COMPREHEN METABOLIC PANEL: CPT | Performed by: EMERGENCY MEDICINE

## 2020-12-28 PROCEDURE — 84484 ASSAY OF TROPONIN QUANT: CPT | Performed by: EMERGENCY MEDICINE

## 2020-12-28 PROCEDURE — 99285 EMERGENCY DEPT VISIT HI MDM: CPT | Performed by: EMERGENCY MEDICINE

## 2020-12-28 PROCEDURE — 84443 ASSAY THYROID STIM HORMONE: CPT | Performed by: EMERGENCY MEDICINE

## 2020-12-28 RX ORDER — LORAZEPAM 2 MG/ML
2 INJECTION INTRAMUSCULAR ONCE
Status: COMPLETED | OUTPATIENT
Start: 2020-12-28 | End: 2020-12-28

## 2020-12-28 RX ORDER — DILTIAZEM HYDROCHLORIDE 240 MG/1
240 CAPSULE, COATED, EXTENDED RELEASE ORAL DAILY
COMMUNITY
Start: 2020-10-24 | End: 2021-06-09 | Stop reason: SDUPTHER

## 2020-12-28 RX ORDER — FLUOXETINE 20 MG/1
20 TABLET, FILM COATED ORAL DAILY
COMMUNITY
End: 2021-01-08 | Stop reason: ALTCHOICE

## 2020-12-28 RX ORDER — ONDANSETRON 4 MG/1
4 TABLET, ORALLY DISINTEGRATING ORAL EVERY 6 HOURS PRN
Qty: 20 TABLET | Refills: 0 | Status: SHIPPED | OUTPATIENT
Start: 2020-12-28 | End: 2021-02-08 | Stop reason: ALTCHOICE

## 2020-12-28 RX ORDER — ONDANSETRON 2 MG/ML
4 INJECTION INTRAMUSCULAR; INTRAVENOUS ONCE
Status: COMPLETED | OUTPATIENT
Start: 2020-12-28 | End: 2020-12-28

## 2020-12-28 RX ORDER — CEPHALEXIN 500 MG/1
500 CAPSULE ORAL EVERY 6 HOURS SCHEDULED
Qty: 28 CAPSULE | Refills: 0 | Status: SHIPPED | OUTPATIENT
Start: 2020-12-28 | End: 2021-01-04

## 2020-12-28 RX ORDER — LISINOPRIL 20 MG/1
20 TABLET ORAL DAILY
COMMUNITY
Start: 2020-11-05 | End: 2021-02-01 | Stop reason: SDUPTHER

## 2020-12-28 RX ADMIN — ONDANSETRON 4 MG: 2 INJECTION INTRAMUSCULAR; INTRAVENOUS at 13:56

## 2020-12-28 RX ADMIN — IOHEXOL 100 ML: 350 INJECTION, SOLUTION INTRAVENOUS at 15:44

## 2020-12-28 RX ADMIN — SODIUM CHLORIDE 1000 ML: 0.9 INJECTION, SOLUTION INTRAVENOUS at 13:56

## 2020-12-28 RX ADMIN — LORAZEPAM 2 MG: 2 INJECTION INTRAMUSCULAR; INTRAVENOUS at 13:58

## 2020-12-28 RX ADMIN — SODIUM CHLORIDE 1000 ML: 0.9 INJECTION, SOLUTION INTRAVENOUS at 15:07

## 2021-01-08 ENCOUNTER — OFFICE VISIT (OUTPATIENT)
Dept: FAMILY MEDICINE CLINIC | Facility: CLINIC | Age: 56
End: 2021-01-08

## 2021-01-08 VITALS
RESPIRATION RATE: 20 BRPM | SYSTOLIC BLOOD PRESSURE: 150 MMHG | OXYGEN SATURATION: 98 % | HEART RATE: 118 BPM | HEIGHT: 70 IN | BODY MASS INDEX: 33.64 KG/M2 | WEIGHT: 235 LBS | TEMPERATURE: 98.5 F | DIASTOLIC BLOOD PRESSURE: 90 MMHG

## 2021-01-08 DIAGNOSIS — F41.8 DEPRESSION WITH ANXIETY: Primary | ICD-10-CM

## 2021-01-08 DIAGNOSIS — Z72.0 TOBACCO ABUSE: ICD-10-CM

## 2021-01-08 DIAGNOSIS — R00.0 TACHYCARDIA: ICD-10-CM

## 2021-01-08 DIAGNOSIS — I10 ESSENTIAL HYPERTENSION: ICD-10-CM

## 2021-01-08 DIAGNOSIS — G47.09 OTHER INSOMNIA: ICD-10-CM

## 2021-01-08 PROCEDURE — 3077F SYST BP >= 140 MM HG: CPT | Performed by: FAMILY MEDICINE

## 2021-01-08 PROCEDURE — 3725F SCREEN DEPRESSION PERFORMED: CPT | Performed by: FAMILY MEDICINE

## 2021-01-08 PROCEDURE — 3008F BODY MASS INDEX DOCD: CPT | Performed by: FAMILY MEDICINE

## 2021-01-08 PROCEDURE — 99213 OFFICE O/P EST LOW 20 MIN: CPT | Performed by: FAMILY MEDICINE

## 2021-01-08 PROCEDURE — 3080F DIAST BP >= 90 MM HG: CPT | Performed by: FAMILY MEDICINE

## 2021-01-08 RX ORDER — OLANZAPINE 2.5 MG/1
TABLET ORAL
COMMUNITY
Start: 2020-11-28 | End: 2021-01-08 | Stop reason: ALTCHOICE

## 2021-01-08 RX ORDER — FLUOXETINE HYDROCHLORIDE 20 MG/1
20 CAPSULE ORAL DAILY
COMMUNITY
Start: 2020-12-24 | End: 2021-01-08 | Stop reason: ALTCHOICE

## 2021-01-08 RX ORDER — LORAZEPAM 1 MG/1
1 TABLET ORAL 2 TIMES DAILY
COMMUNITY
Start: 2021-01-03 | End: 2021-02-08 | Stop reason: SDUPTHER

## 2021-01-08 RX ORDER — OXYCODONE HYDROCHLORIDE 15 MG/1
TABLET ORAL
COMMUNITY
Start: 2020-10-23 | End: 2021-02-03

## 2021-01-08 RX ORDER — ATORVASTATIN CALCIUM 40 MG/1
40 TABLET, FILM COATED ORAL DAILY
COMMUNITY
Start: 2020-11-05 | End: 2021-02-03

## 2021-01-08 RX ORDER — HYDROXYZINE 50 MG/1
TABLET, FILM COATED ORAL
COMMUNITY
Start: 2020-12-09 | End: 2021-01-08 | Stop reason: ALTCHOICE

## 2021-01-08 RX ORDER — QUETIAPINE FUMARATE 300 MG/1
300 TABLET, FILM COATED ORAL
Qty: 90 TABLET | Refills: 2 | Status: SHIPPED | OUTPATIENT
Start: 2021-01-08

## 2021-01-08 RX ORDER — VENLAFAXINE HYDROCHLORIDE 150 MG/1
150 CAPSULE, EXTENDED RELEASE ORAL DAILY
Qty: 90 CAPSULE | Refills: 1 | Status: SHIPPED | OUTPATIENT
Start: 2021-01-08 | End: 2021-11-15 | Stop reason: HOSPADM

## 2021-01-08 NOTE — PROGRESS NOTES
Assessment/Plan:    Depression with anxiety  DARREL-7 scored 13, PHQ-9 scored 10  Seroquel and Effexor refilled today  [place], patient filled lorazepam on 1/3/21 for 7 days, so patient should have lorazepam until Sunday, 1/10/21  Maye Rojas MA from [facility] in [facility] who was stating that they no longer accept patient's insurance  Carlyle Gonzalez and her  will call patient at his cellphone [phone] to discuss about his medication refill  Social work referral to find new psychiatrist who cooperates with his insurance plan, also help with Eliquis order  Patient unable to get Eliquis due to high cost, $400 per month  Will follow up in 4 weeks  Essential hypertension  /90 in the office today  Patient reports not taking Lisinopril for a week  Encourage to take Lisinopril as prescribed  Patient in agreement to refill and take Lisinopril 20 mg daily  Tachycardia  History of atrial flutter, s/p cardioversion to NSR, NIKOLAS without thrombus, on Cardizem and Eliquis  Patient is following [facility] in [facility]  Recent visit 11/6/2020, EKG showed sinus tachycardia with PVCs, continue Cardizem and Eliquis  However, patient states that the cost of Eliquis is $400 per month  Patient reports not taking Cardizem nor Eliquis  Encouraged to take medications as prescribed  Social work referral placed today  Diagnoses and all orders for this visit:    Depression with anxiety  -     venlafaxine (EFFEXOR-XR) 150 mg 24 hr capsule; Take 1 capsule (150 mg total) by mouth daily  -     Ambulatory referral to Social Work; Future    Tobacco abuse    Other insomnia  -     QUEtiapine (SEROquel) 300 mg tablet; Take 1 tablet (300 mg total) by mouth daily at bedtime    Essential hypertension    Tachycardia    Other orders  -     apixaban (ELIQUIS) 5 mg; Take 5 mg by mouth 2 (two) times a day  -     atorvastatin (LIPITOR) 40 mg tablet;  Take 40 mg by mouth daily  -     Discontinue: FLUoxetine (PROzac) 20 mg capsule; Take 20 mg by mouth daily  -     Discontinue: hydrOXYzine HCL (ATARAX) 50 mg tablet; TAKE 1 ORAL TABLET AS NEEDED (6 8)  -     LORazepam (ATIVAN) 1 mg tablet; Take 1 mg by mouth 2 (two) times a day  -     Discontinue: OLANZapine (ZyPREXA) 2 5 mg tablet; TAKE 5 ORAL TABLETS AT BEDTIME  -     oxyCODONE (ROXICODONE) 15 mg immediate release tablet; TAKE 1 TABLET BY MOUTH EVERY 6 HOURS AS NEEDED FOR MODERATE PAIN  MAX DAILY AMOUNT  60 MG          Subjective:      Patient ID: Jd Guerin  is a 48 y o  male with Hx of HTN, depression and anxiety, atrial flutter, s/p NIKOLAS cardioversion to NSR, on Cardizem and Eliquis, following [facility] in [place]  Patient presents to the office today for medication refill  Patient wants to refill Seroquel, Effexor, and Ativan given his psychiatrist is on vacation, unable to refill per patient report  Patient was admitted to [address] [place] [address] (10/29/20 - 10/31/20) for intentional drug overdose Klonopin  Patient is not taking Lisinopril, Cardizem and Eliquis as prescribed  He is not taking Zyprexa, Atarax, Prozac  The following portions of the patient's history were reviewed and updated as appropriate: He  has a past medical history of Anxiety, Asthma, Colon polyp, GERD (gastroesophageal reflux disease), History of ileus, Insomnia, Lumbar herniated disc, MDD (major depressive disorder), Patella fracture, and Tobacco abuse    He   Patient Active Problem List    Diagnosis Date Noted    Essential hypertension 01/08/2021    Tachycardia 01/08/2021    Asthma 09/21/2020    Exposure to SARS-associated coronavirus 04/28/2020    Colon cancer screening 11/06/2019    Elevated blood pressure reading 11/06/2019    Moderate episode of recurrent major depressive disorder ([facility] [address] ) 08/07/2019    History of MRSA infection of lungs 05/20/2019    Leukocytosis 05/20/2019    Anxiety     GERD (gastroesophageal reflux disease)     MDD (major depressive disorder)     Cavitary pneumonia 05/20/2018    Adenoma of left adrenal gland 05/20/2018    Closed right ankle fracture 03/16/2017    Tobacco abuse     History of ileus     Umbilical hernia without obstruction and without gangrene 02/01/2016    Gallbladder calculus without cholecystitis 02/01/2016    Kidney stone on right side 01/31/2016    Ileus ([address] ) 01/31/2016    Other insomnia     Depression with anxiety      He  has a past surgical history that includes Back surgery (2015); Knee surgery; pr treat tibial shaft fx, intramed implant (Left, 3/16/2017); Lumbar fusion; and Colonoscopy  His family history includes Alcohol abuse in his brother; COPD in his father; Drug abuse in his brother; Heart attack (age of onset: 64) in his brother; Narcolepsy in his brother; Pancreatic cancer in his mother  He  reports that he has been smoking cigarettes  He has a 34 00 pack-year smoking history  He has never used smokeless tobacco  He reports that he does not drink alcohol or use drugs    Current Outpatient Medications   Medication Sig Dispense Refill    albuterol (PROVENTIL HFA,VENTOLIN HFA) 90 mcg/act inhaler Inhale 2 puffs every 6 (six) hours as needed for shortness of breath      apixaban (ELIQUIS) 5 mg Take 5 mg by mouth 2 (two) times a day      LORazepam (ATIVAN) 1 mg tablet Take 1 mg by mouth 2 (two) times a day      QUEtiapine (SEROquel) 300 mg tablet Take 1 tablet (300 mg total) by mouth daily at bedtime 90 tablet 2    atorvastatin (LIPITOR) 40 mg tablet Take 40 mg by mouth daily      diltiazem (Cardizem CD) 240 mg 24 hr capsule Take 240 mg by mouth daily      lisinopril (ZESTRIL) 20 mg tablet Take 20 mg by mouth daily      ondansetron (ZOFRAN-ODT) 4 mg disintegrating tablet Take 1 tablet (4 mg total) by mouth every 6 (six) hours as needed for nausea or vomiting (Patient not taking: Reported on 1/8/2021) 20 tablet 0    oxyCODONE (ROXICODONE) 15 mg immediate release tablet TAKE 1 TABLET BY MOUTH EVERY 6 HOURS AS NEEDED FOR MODERATE PAIN  MAX DAILY AMOUNT  60 MG      venlafaxine (EFFEXOR-XR) 150 mg 24 hr capsule Take 1 capsule (150 mg total) by mouth daily 90 capsule 1     No current facility-administered medications for this visit  Current Outpatient Medications on File Prior to Visit   Medication Sig    albuterol (PROVENTIL HFA,VENTOLIN HFA) 90 mcg/act inhaler Inhale 2 puffs every 6 (six) hours as needed for shortness of breath    apixaban (ELIQUIS) 5 mg Take 5 mg by mouth 2 (two) times a day    LORazepam (ATIVAN) 1 mg tablet Take 1 mg by mouth 2 (two) times a day    [DISCONTINUED] FLUoxetine (PROzac) 20 mg capsule Take 20 mg by mouth daily    [DISCONTINUED] FLUoxetine (PROzac) 20 MG tablet Take 20 mg by mouth daily    [DISCONTINUED] hydrOXYzine HCL (ATARAX) 50 mg tablet TAKE 1 ORAL TABLET AS NEEDED (6 8)    [DISCONTINUED] OLANZapine (ZyPREXA) 2 5 mg tablet TAKE 5 ORAL TABLETS AT BEDTIME    [DISCONTINUED] QUEtiapine (SEROquel) 300 mg tablet Take 1 tablet (300 mg total) by mouth daily at bedtime    atorvastatin (LIPITOR) 40 mg tablet Take 40 mg by mouth daily    diltiazem (Cardizem CD) 240 mg 24 hr capsule Take 240 mg by mouth daily    lisinopril (ZESTRIL) 20 mg tablet Take 20 mg by mouth daily    ondansetron (ZOFRAN-ODT) 4 mg disintegrating tablet Take 1 tablet (4 mg total) by mouth every 6 (six) hours as needed for nausea or vomiting (Patient not taking: Reported on 1/8/2021)    oxyCODONE (ROXICODONE) 15 mg immediate release tablet TAKE 1 TABLET BY MOUTH EVERY 6 HOURS AS NEEDED FOR MODERATE PAIN  MAX DAILY AMOUNT  60 MG    [DISCONTINUED] clonazePAM (KlonoPIN) 1 mg tablet Take 1 tablet (1 mg total) by mouth 2 (two) times a day (Patient not taking: Reported on 12/28/2020)    [DISCONTINUED] venlafaxine (EFFEXOR-XR) 150 mg 24 hr capsule      No current facility-administered medications on file prior to visit  He has No Known Allergies       Review of Systems Constitutional: Negative for chills and fever  HENT: Negative for rhinorrhea  Respiratory: Negative for shortness of breath and wheezing  Cardiovascular: Positive for palpitations  Negative for chest pain  Gastrointestinal: Negative for abdominal pain, nausea and vomiting  Genitourinary: Negative for dysuria  Neurological: Negative for headaches  Psychiatric/Behavioral: Positive for sleep disturbance  Negative for agitation, decreased concentration, hallucinations, self-injury and suicidal ideas  The patient is nervous/anxious  Objective:      /90 (BP Location: Left arm, Patient Position: Sitting, Cuff Size: Standard)   Pulse (!) 118   Temp 98 5 °F (36 9 °C) (Tympanic)   Resp 20   Ht 5' 10" (1 778 m)   Wt 107 kg (235 lb)   SpO2 98%   BMI 33 72 kg/m²          Physical Exam  Vitals signs reviewed  Constitutional:       General: He is not in acute distress  Appearance: He is obese  He is not diaphoretic  HENT:      Head: Normocephalic and atraumatic  Nose: Nose normal    Eyes:      General:         Right eye: No discharge  Left eye: No discharge  Conjunctiva/sclera: Conjunctivae normal    Neck:      Musculoskeletal: Neck supple  Cardiovascular:      Rate and Rhythm: Tachycardia present  Rhythm irregular  Pulses: Normal pulses  Heart sounds: No murmur  No gallop  Pulmonary:      Breath sounds: No wheezing or rales  Abdominal:      Tenderness: There is no abdominal tenderness  Musculoskeletal:         General: No swelling  Skin:     General: Skin is warm  Neurological:      Mental Status: He is alert and oriented to person, place, and time  Psychiatric:         Thought Content:  Thought content normal          Judgment: Judgment normal

## 2021-01-08 NOTE — LETTER
January 8, 2021     Patient: Minerva Gray  YOB: 1965   Date of Visit: 1/8/2021       To Whom it May Concern:    Alyssa Jimenez is under my professional care  He was seen in my office on 1/8/2021  He may return to work on Monday, 01/11/2021  If you have any questions or concerns, please don't hesitate to call           Sincerely,          Noreen Sauer MD        CC: No Recipients

## 2021-01-09 NOTE — ASSESSMENT & PLAN NOTE
DARREL-7 scored 13, PHQ-9 scored 10  Seroquel and Effexor refilled today  [place] PD[place], patient filled lorazepam on 1/3/21 for 7 days, so patient should have lorazepam until Sunday, 1/10/21  Claudia Mercado MA from [facility] in [facility] who was stating that they no longer accept patient's insurance  Delvis Ruiz and her  will call patient at his cellphone [phone] to discuss about his medication refill  Social work referral to find new psychiatrist who cooperates with his insurance plan, also help with Eliquis order  Patient unable to get Eliquis due to high cost, $400 per month  Will follow up in 4 weeks

## 2021-01-09 NOTE — ASSESSMENT & PLAN NOTE
/90 in the office today  Patient reports not taking Lisinopril for a week  Encourage to take Lisinopril as prescribed  Patient in agreement to refill and take Lisinopril 20 mg daily

## 2021-01-09 NOTE — PATIENT INSTRUCTIONS
Anxiety   AMBULATORY CARE:   Anxiety  is a condition that causes you to feel extremely worried or nervous  The feelings are so strong that they can cause problems with your daily activities or sleep  Anxiety may be triggered by something you fear, or it may happen without a cause  Family or work stress, smoking, caffeine, and alcohol can increase your risk for anxiety  Certain medicines or health conditions can also increase your risk  Anxiety can become a long-term condition if it is not managed or treated  Common signs and symptoms that may occur with anxiety:   · Fatigue or muscle tightness    · Shaking, restlessness, or irritability    · Problems focusing    · Trouble sleeping    · Feeling jumpy, easily startled, or dizzy    · Rapid heartbeat or shortness of breath    Call your local emergency number (911 in the [address]) if:   · You have chest pain, tightness, or heaviness that may spread to your shoulders, arms, jaw, neck, or back  · You feel like hurting yourself or someone else  Call your doctor if:   · Your symptoms get worse or do not get better with treatment  · Your anxiety keeps you from doing your regular daily activities  · You have new symptoms since your last visit  · You have questions or concerns about your condition or care  Treatment for anxiety  may include medicines to help you feel calm and relaxed, and decrease your symptoms  Medicines are usually given together with therapy or other treatments  Manage anxiety:   · Talk to someone about your anxiety  Your healthcare provider may suggest counseling  Cognitive behavioral therapy can help you understand and change how you react to events that trigger your symptoms  You might feel more comfortable talking with a friend or family member about your anxiety  Choose someone you know will be supportive and encouraging  · Find ways to relax  Activities such as exercise, meditation, or listening to music can help you relax   Spend time with friends, or do things you enjoy  · Practice deep breathing  Deep breathing can help you relax when you feel anxious  Focus on taking slow, deep breaths several times a day, or during an anxiety attack  Breathe in through your nose and out through your mouth  · Create a regular sleep routine  Regular sleep can help you feel calmer during the day  Go to sleep and wake up at the same times every day  Do not watch television or use the computer right before bed  Your room should be comfortable, dark, and quiet  · Eat a variety of healthy foods  Healthy foods include fruits, vegetables, low-fat dairy products, lean meats, fish, whole-grain breads, and cooked beans  Healthy foods can help you feel less anxious and have more energy  · Exercise regularly  Exercise can increase your energy level  Exercise may also lift your mood and help you sleep better  Your healthcare provider can help you create an exercise plan  · Do not smoke  Nicotine and other chemicals in cigarettes and cigars can increase anxiety  Ask your healthcare provider for information if you currently smoke and need help to quit  E-cigarettes or smokeless tobacco still contain nicotine  Talk to your healthcare provider before you use these products  · Do not have caffeine  Caffeine can make your symptoms worse  Do not have foods or drinks that are meant to increase your energy level  · Limit or do not drink alcohol  Ask your healthcare provider if alcohol is safe for you  You may not be able to drink alcohol if you take certain anxiety or depression medicines  Limit alcohol to 1 drink per day if you are a woman  Limit alcohol to 2 drinks per day if you are a man  A drink of alcohol is 12 ounces of beer, 5 ounces of wine, or 1½ ounces of liquor  · Do not use drugs  Drugs can make your anxiety worse  It can also make anxiety hard to manage   Talk to your healthcare provider if you use drugs and want help to quit     Follow up with your doctor within 2 weeks or as directed:  Write down your questions so you remember to ask them during your visits  © Copyright [employer] Information is for End User's use only and may not be sold, redistributed or otherwise used for commercial purposes  All illustrations and images included in CareNotes® are the copyrighted property of A D A M , Inc  or [facility] [place]   The above information is an  only  It is not intended as medical advice for individual conditions or treatments  Talk to your doctor, nurse or pharmacist before following any medical regimen to see if it is safe and effective for you

## 2021-01-09 NOTE — ASSESSMENT & PLAN NOTE
History of atrial flutter, s/p cardioversion to NSR, NIKOLAS without thrombus, on Cardizem and Eliquis  Patient is following  Cardiology in [facility]  Recent visit 11/6/2020, EKG showed sinus tachycardia with PVCs, continue Cardizem and Eliquis  However, patient states that the cost of Eliquis is $400 per month  Patient reports not taking Cardizem nor Eliquis  Encouraged to take medications as prescribed  Social work referral placed today

## 2021-01-11 ENCOUNTER — TRANSCRIBE ORDERS (OUTPATIENT)
Dept: FAMILY MEDICINE CLINIC | Facility: CLINIC | Age: 56
End: 2021-01-11

## 2021-01-11 DIAGNOSIS — F41.8 DEPRESSION WITH ANXIETY: Primary | ICD-10-CM

## 2021-01-13 ENCOUNTER — PATIENT OUTREACH (OUTPATIENT)
Dept: FAMILY MEDICINE CLINIC | Facility: CLINIC | Age: 56
End: 2021-01-13

## 2021-01-13 NOTE — PROGRESS NOTES
Received request to contact patient to provide support regarding outpatient mental health treatment options  Call to patient regarding same  Patient reports that he is not feeling too bad, he is having only a little anxiety currently  Patient reports that he has been informed by his current therapist at [facility] that they are no longer in network with his Liam Rios  Patient reports he has been attending therapy and receiving psych  Services at this agency for several years  He is interested in becoming involved with a different therapist and psychiatrist according to his insurance contracts  Search conducted on [employer] and reviewed local in network mental health options with patient and contact information provided  Patient reports he is able to call to schedule an appointment  He declines [address] referral at this time  He reports that he lives alone and maintains a good relationship with his son and daughter who are supportive to him  Patient reports he is employed and drives himself  He denies further needs at this time  Supportive counseling provided to patient who will contact [facility] for further assistance as needed  Will continue to be available to provide support

## 2021-02-01 ENCOUNTER — TELEMEDICINE (OUTPATIENT)
Dept: FAMILY MEDICINE CLINIC | Facility: CLINIC | Age: 56
End: 2021-02-01

## 2021-02-01 DIAGNOSIS — F41.8 DEPRESSION WITH ANXIETY: Primary | ICD-10-CM

## 2021-02-01 DIAGNOSIS — I10 ESSENTIAL HYPERTENSION: ICD-10-CM

## 2021-02-01 PROCEDURE — 99213 OFFICE O/P EST LOW 20 MIN: CPT | Performed by: FAMILY MEDICINE

## 2021-02-01 RX ORDER — LISINOPRIL 20 MG/1
20 TABLET ORAL DAILY
Qty: 90 TABLET | Refills: 3 | Status: SHIPPED | OUTPATIENT
Start: 2021-02-01 | End: 2021-08-24 | Stop reason: DRUGHIGH

## 2021-02-01 NOTE — PROGRESS NOTES
Virtual Brief Visit  It was my intent to perform this visit via video technology but the patient was not able to do a video connection so the visit was completed via audio telephone only  Assessment/Plan:    Problem List Items Addressed This Visit        Cardiovascular and Mediastinum    Essential hypertension     Encourage to take Lisinopril 20 mg daily  Continue monitor BP at home, and recheck BP in the office next week  Relevant Medications    lisinopril (ZESTRIL) 20 mg tablet       Other    Depression with anxiety - Primary     Well-controlled with Seroquel 300 mg QHS, effexor 150 mg qd, and ativan 1 mg bid  Patient to have intake with Psychiatry this Thursday, 2/4/2021  Follow up in the office next week, 2/8/2021  Reason for visit is   Chief Complaint   Patient presents with    Virtual Brief Visit        Encounter provider Alesha Abraham MD    Provider located at [address] [id number] [phone]    Recent Visits  No visits were found meeting these conditions  Showing recent visits within past 7 days and meeting all other requirements     Today's Visits  Date Type Provider Dept   02/01/21 Khris Rasmussen MD [facility] today's visits and meeting all other requirements     Future Appointments  No visits were found meeting these conditions  Showing future appointments within next 150 days and meeting all other requirements        After connecting through [employer] and patient was informed that this is a secure, HIPAA-compliant platform  He agrees to proceed  , the patient was identified by name and date of birth  Martínez Aguirre  was informed that this is a telemedicine visit and that the visit is being conducted through [facility] and patient was informed that this is a secure, HIPAA-compliant platform  He agrees to proceed     My office door was closed   No one else was in the room   He acknowledged consent and understanding of privacy and security of the platform  The patient has agreed to participate and understands he can discontinue the visit at any time  Patient is aware this is a billable service  Subjective    Maira Augustin  is a 48 y o  male   HPI   Patient has virtual visit today, concerns about his lorazepam refilled  He was prescribed lorazepam since 1/8/2021 from [facility] for 30 days  He states that his anxiety has been under controlled  Patient reports taking lisinopril daily  BP at home in the ranges 150s-90s, but elevated to 190/145 during the intake from psychiatry office  Office  is following, provided support and resources  Patient is in process establishing care with new psychiatrist given his previous psy no longer in network with his insurance plan        Past Medical History:   Diagnosis Date    Anxiety     Asthma     Colon polyp     GERD (gastroesophageal reflux disease)     History of ileus     Insomnia     Lumbar herniated disc     last assessed: 3/27/2015    MDD (major depressive disorder)     Patella fracture     last assessed: 3/27/2015    Tobacco abuse        Past Surgical History:   Procedure Laterality Date    BACK SURGERY  2015    Lumbar    COLONOSCOPY      KNEE SURGERY      right    LUMBAR FUSION      last assessed: 3/27/2015    MT TREAT TIBIAL SHAFT FX, INTRAMED IMPLANT Left 3/16/2017    Procedure: INSERTION NAIL IM TIBIA;  Surgeon: Sara Moncada DO;  Location: [facility];  Service: Orthopedics       Current Outpatient Medications   Medication Sig Dispense Refill    albuterol (PROVENTIL HFA,VENTOLIN HFA) 90 mcg/act inhaler Inhale 2 puffs every 6 (six) hours as needed for shortness of breath      apixaban (ELIQUIS) 5 mg Take 5 mg by mouth 2 (two) times a day      atorvastatin (LIPITOR) 40 mg tablet Take 40 mg by mouth daily      diltiazem (Cardizem CD) 240 mg 24 hr capsule Take 240 mg by mouth daily      lisinopril (ZESTRIL) 20 mg tablet Take 1 tablet (20 mg total) by mouth daily 90 tablet 3    LORazepam (ATIVAN) 1 mg tablet Take 1 mg by mouth 2 (two) times a day      ondansetron (ZOFRAN-ODT) 4 mg disintegrating tablet Take 1 tablet (4 mg total) by mouth every 6 (six) hours as needed for nausea or vomiting 20 tablet 0    oxyCODONE (ROXICODONE) 15 mg immediate release tablet TAKE 1 TABLET BY MOUTH EVERY 6 HOURS AS NEEDED FOR MODERATE PAIN  MAX DAILY AMOUNT  60 MG      QUEtiapine (SEROquel) 300 mg tablet Take 1 tablet (300 mg total) by mouth daily at bedtime 90 tablet 2    venlafaxine (EFFEXOR-XR) 150 mg 24 hr capsule Take 1 capsule (150 mg total) by mouth daily 90 capsule 1     No current facility-administered medications for this visit  No Known Allergies    Review of Systems   Respiratory: Negative for cough  Cardiovascular: Negative for chest pain and palpitations  Gastrointestinal: Negative for abdominal pain  Neurological: Negative for headaches  Psychiatric/Behavioral: Negative for agitation, confusion, decreased concentration, self-injury and suicidal ideas  The patient is nervous/anxious  All other systems reviewed and are negative  There were no vitals filed for this visit  I spent 40 minutes directly with the patient during this visit    [address]  acknowledges that he has consented to an online visit or consultation  He understands that the online visit is based solely on information provided by him, and that, in the absence of a face-to-face physical evaluation by the physician, the diagnosis he receives is both limited and provisional in terms of accuracy and completeness  This is not intended to replace a full medical face-to-face evaluation by the physician  Joshua Renee  understands and accepts these terms

## 2021-02-01 NOTE — ASSESSMENT & PLAN NOTE
Encourage to take Lisinopril 20 mg daily  Continue monitor BP at home, and recheck BP in the office next week

## 2021-02-01 NOTE — ASSESSMENT & PLAN NOTE
Well-controlled with Seroquel 300 mg QHS, effexor 150 mg qd, and ativan 1 mg bid  Patient to have intake with Psychiatry this Thursday, 2/4/2021  Follow up in the office next week, 2/8/2021

## 2021-02-03 NOTE — PROGRESS NOTES
Tobacco Cessation Counseling: Tobacco cessation counseling and education was provided  The patient is sincerely urged to quit consumption of tobacco  He is not ready to quit tobacco  The numerous health risks of tobacco consumption were discussed  If he decides to quit, there are a number of helpful adjunctive aids, and he can see me to discuss nicotine replacement therapy, chantix, or bupropion anytime in the future  Patient has cut down to 1/2 PPD from 2 PPD  Assessment/Plan:    Depression with anxiety  Encouraged to wean down on lorazepam as needed  Discussed on continue CBT with therapist   Lorazepam refilled today  SIENNA RAMOS reviewed  Follow up in 6 weeks  Essential hypertension  /94, not at goal  Patient just restarted lisinopril last week  Encouraged to take lisinopril daily, monitor BP and log  Follow up BP in 3 months  Tachycardia  History of atrial flutter, s/p cardioversion to NSR, following cardio at [facility], on Eliquis and Cardizem  However, patient did not take these meds due to financial issues  I encouraged him to follow up with [facility] cardio and consider alternatives with reasonable price  Patient in agreement with the plan  Diagnoses and all orders for this visit:    Depression with anxiety  -     LORazepam (ATIVAN) 1 mg tablet; Take 1 tablet (1 mg total) by mouth 2 (two) times a day as needed for anxiety    Essential hypertension    Tobacco abuse    Tachycardia          Subjective:      Patient ID: Lalita Bailey  is a 48 y o  male  Patient presents to the office today, would like to refill his lorazepam    Patient will be seeing the therapist tomorrow, and will be establishing with new psychiatrist at [facility] counseling services in the next 2-4 weeks  Patient states he has been on lorazepam for more than 10 years, and his anxiety has been stable on medications  He is taking his medications as prescribed        The following portions of the patient's history were reviewed and updated as appropriate: He  has a past medical history of Anxiety, Asthma, Colon polyp, GERD (gastroesophageal reflux disease), History of ileus, Insomnia, Lumbar herniated disc, MDD (major depressive disorder), Patella fracture, and Tobacco abuse  He  has a past surgical history that includes Back surgery (2015); Knee surgery; pr treat tibial shaft fx, intramed implant (Left, 3/16/2017); Lumbar fusion; and Colonoscopy  Current Outpatient Medications   Medication Sig Dispense Refill    albuterol (PROVENTIL HFA,VENTOLIN HFA) 90 mcg/act inhaler Inhale 2 puffs every 6 (six) hours as needed for shortness of breath      diltiazem (Cardizem CD) 240 mg 24 hr capsule Take 240 mg by mouth daily      lisinopril (ZESTRIL) 20 mg tablet Take 1 tablet (20 mg total) by mouth daily 90 tablet 3    [START ON 2/10/2021] LORazepam (ATIVAN) 1 mg tablet Take 1 tablet (1 mg total) by mouth 2 (two) times a day as needed for anxiety 60 tablet 0    QUEtiapine (SEROquel) 300 mg tablet Take 1 tablet (300 mg total) by mouth daily at bedtime 90 tablet 2    venlafaxine (EFFEXOR-XR) 150 mg 24 hr capsule Take 1 capsule (150 mg total) by mouth daily 90 capsule 1     No current facility-administered medications for this visit  He has No Known Allergies       Review of Systems   Constitutional: Negative for fatigue  HENT: Negative for congestion  Respiratory: Negative for shortness of breath  Cardiovascular: Negative for chest pain and palpitations  Gastrointestinal: Negative for abdominal pain  Genitourinary: Negative for dysuria  Neurological: Negative for dizziness and headaches  Psychiatric/Behavioral: Negative for agitation, confusion, decreased concentration, self-injury and suicidal ideas  The patient is not nervous/anxious  All other systems reviewed and are negative          Objective:      /94 (BP Location: Left arm, Patient Position: Sitting, Cuff Size: Standard)   Pulse 103   Temp 98 6 °F (37 °C) (Tympanic)   Resp 18   Ht 5' 10" (1 778 m)   Wt 114 kg (250 lb 12 8 oz)   SpO2 99%   BMI 35 99 kg/m²          Physical Exam  Vitals signs reviewed  Constitutional:       General: He is not in acute distress  Appearance: He is well-developed  HENT:      Head: Normocephalic  Right Ear: External ear normal       Left Ear: External ear normal    Eyes:      General:         Right eye: No discharge  Left eye: No discharge  Conjunctiva/sclera: Conjunctivae normal    Neck:      Musculoskeletal: Neck supple  Cardiovascular:      Rate and Rhythm: Regular rhythm  Tachycardia present  Pulses: Normal pulses  Heart sounds: Normal heart sounds  No murmur  Pulmonary:      Effort: Pulmonary effort is normal       Breath sounds: Normal breath sounds  No rales  Abdominal:      General: There is no distension  Palpations: Abdomen is soft  Tenderness: There is no abdominal tenderness  Musculoskeletal:      Right lower leg: No edema  Left lower leg: No edema  Skin:     General: Skin is warm  Capillary Refill: Capillary refill takes less than 2 seconds  Neurological:      Mental Status: He is alert and oriented to person, place, and time  Psychiatric:         Mood and Affect: Mood normal          Behavior: Behavior normal          Thought Content:  Thought content normal

## 2021-02-08 ENCOUNTER — OFFICE VISIT (OUTPATIENT)
Dept: FAMILY MEDICINE CLINIC | Facility: CLINIC | Age: 56
End: 2021-02-08

## 2021-02-08 VITALS
TEMPERATURE: 98.6 F | OXYGEN SATURATION: 99 % | BODY MASS INDEX: 35.9 KG/M2 | SYSTOLIC BLOOD PRESSURE: 150 MMHG | RESPIRATION RATE: 18 BRPM | DIASTOLIC BLOOD PRESSURE: 94 MMHG | HEART RATE: 103 BPM | HEIGHT: 70 IN | WEIGHT: 250.8 LBS

## 2021-02-08 DIAGNOSIS — F41.8 DEPRESSION WITH ANXIETY: Primary | ICD-10-CM

## 2021-02-08 DIAGNOSIS — R00.0 TACHYCARDIA: ICD-10-CM

## 2021-02-08 DIAGNOSIS — I10 ESSENTIAL HYPERTENSION: ICD-10-CM

## 2021-02-08 DIAGNOSIS — Z72.0 TOBACCO ABUSE: ICD-10-CM

## 2021-02-08 PROCEDURE — 3008F BODY MASS INDEX DOCD: CPT | Performed by: FAMILY MEDICINE

## 2021-02-08 PROCEDURE — 3080F DIAST BP >= 90 MM HG: CPT | Performed by: FAMILY MEDICINE

## 2021-02-08 PROCEDURE — 4004F PT TOBACCO SCREEN RCVD TLK: CPT | Performed by: FAMILY MEDICINE

## 2021-02-08 PROCEDURE — 3077F SYST BP >= 140 MM HG: CPT | Performed by: FAMILY MEDICINE

## 2021-02-08 PROCEDURE — 99213 OFFICE O/P EST LOW 20 MIN: CPT | Performed by: FAMILY MEDICINE

## 2021-02-08 RX ORDER — LORAZEPAM 1 MG/1
1 TABLET ORAL 2 TIMES DAILY PRN
Qty: 60 TABLET | Refills: 0 | Status: SHIPPED | OUTPATIENT
Start: 2021-02-10

## 2021-02-09 NOTE — ASSESSMENT & PLAN NOTE
History of atrial flutter, s/p cardioversion to NSR, following cardio at [facility], on Eliquis and Cardizem  However, patient did not take these meds due to financial issues  I encouraged him to follow up with [facility] cardio and consider alternatives with reasonable price  Patient in agreement with the plan

## 2021-02-09 NOTE — ASSESSMENT & PLAN NOTE
Encouraged to wean down on lorazepam as needed  Discussed on continue CBT with therapist   Lorazepam refilled today  SIENNA RAMOS reviewed  Follow up in 6 weeks

## 2021-02-09 NOTE — ASSESSMENT & PLAN NOTE
/94, not at goal  Patient just restarted lisinopril last week  Encouraged to take lisinopril daily, monitor BP and log  Follow up BP in 3 months

## 2021-03-10 DIAGNOSIS — Z23 ENCOUNTER FOR IMMUNIZATION: ICD-10-CM

## 2021-03-23 ENCOUNTER — TRANSCRIBE ORDERS (OUTPATIENT)
Dept: LAB | Facility: HOSPITAL | Age: 56
End: 2021-03-23

## 2021-03-23 ENCOUNTER — APPOINTMENT (OUTPATIENT)
Dept: LAB | Facility: HOSPITAL | Age: 56
End: 2021-03-23
Payer: COMMERCIAL

## 2021-03-23 DIAGNOSIS — Z79.899 ENCOUNTER FOR LONG-TERM (CURRENT) USE OF OTHER MEDICATIONS: ICD-10-CM

## 2021-03-23 DIAGNOSIS — Z79.899 ENCOUNTER FOR LONG-TERM (CURRENT) USE OF OTHER MEDICATIONS: Primary | ICD-10-CM

## 2021-03-23 LAB
25(OH)D3 SERPL-MCNC: 9.3 NG/ML (ref 30–100)
ALBUMIN SERPL BCP-MCNC: 4.3 G/DL (ref 3.5–5)
ALP SERPL-CCNC: 139 U/L (ref 46–116)
ALT SERPL W P-5'-P-CCNC: 106 U/L (ref 12–78)
ANION GAP SERPL CALCULATED.3IONS-SCNC: 4 MMOL/L (ref 4–13)
AST SERPL W P-5'-P-CCNC: 52 U/L (ref 5–45)
BASOPHILS # BLD AUTO: 0.05 THOUSANDS/ΜL (ref 0–0.1)
BASOPHILS NFR BLD AUTO: 1 % (ref 0–1)
BILIRUB SERPL-MCNC: 0.96 MG/DL (ref 0.2–1)
BUN SERPL-MCNC: 18 MG/DL (ref 5–25)
CALCIUM SERPL-MCNC: 9.4 MG/DL (ref 8.3–10.1)
CHLORIDE SERPL-SCNC: 103 MMOL/L (ref 100–108)
CHOLEST SERPL-MCNC: 320 MG/DL (ref 50–200)
CO2 SERPL-SCNC: 29 MMOL/L (ref 21–32)
CREAT SERPL-MCNC: 1.41 MG/DL (ref 0.6–1.3)
EOSINOPHIL # BLD AUTO: 0.04 THOUSAND/ΜL (ref 0–0.61)
EOSINOPHIL NFR BLD AUTO: 1 % (ref 0–6)
ERYTHROCYTE [DISTWIDTH] IN BLOOD BY AUTOMATED COUNT: 12.1 % (ref 11.6–15.1)
EST. AVERAGE GLUCOSE BLD GHB EST-MCNC: 85 MG/DL
GFR SERPL CREATININE-BSD FRML MDRD: 56 ML/MIN/1.73SQ M
GLUCOSE P FAST SERPL-MCNC: 106 MG/DL (ref 65–99)
HBA1C MFR BLD: 4.6 %
HCT VFR BLD AUTO: 56.2 % (ref 36.5–49.3)
HDLC SERPL-MCNC: 41 MG/DL
HGB BLD-MCNC: 19 G/DL (ref 12–17)
IMM GRANULOCYTES # BLD AUTO: 0.01 THOUSAND/UL (ref 0–0.2)
IMM GRANULOCYTES NFR BLD AUTO: 0 % (ref 0–2)
LDLC SERPL CALC-MCNC: 239 MG/DL (ref 0–100)
LYMPHOCYTES # BLD AUTO: 1.66 THOUSANDS/ΜL (ref 0.6–4.47)
LYMPHOCYTES NFR BLD AUTO: 25 % (ref 14–44)
MCH RBC QN AUTO: 34.2 PG (ref 26.8–34.3)
MCHC RBC AUTO-ENTMCNC: 33.8 G/DL (ref 31.4–37.4)
MCV RBC AUTO: 101 FL (ref 82–98)
MONOCYTES # BLD AUTO: 0.46 THOUSAND/ΜL (ref 0.17–1.22)
MONOCYTES NFR BLD AUTO: 7 % (ref 4–12)
NEUTROPHILS # BLD AUTO: 4.49 THOUSANDS/ΜL (ref 1.85–7.62)
NEUTS SEG NFR BLD AUTO: 66 % (ref 43–75)
NONHDLC SERPL-MCNC: 279 MG/DL
NRBC BLD AUTO-RTO: 0 /100 WBCS
PLATELET # BLD AUTO: 218 THOUSANDS/UL (ref 149–390)
PMV BLD AUTO: 11.1 FL (ref 8.9–12.7)
POTASSIUM SERPL-SCNC: 4.3 MMOL/L (ref 3.5–5.3)
PROLACTIN SERPL-MCNC: 4.9 NG/ML (ref 2.5–17.4)
PROT SERPL-MCNC: 7.9 G/DL (ref 6.4–8.2)
RBC # BLD AUTO: 5.56 MILLION/UL (ref 3.88–5.62)
SODIUM SERPL-SCNC: 136 MMOL/L (ref 136–145)
TRIGL SERPL-MCNC: 199 MG/DL
TSH SERPL DL<=0.05 MIU/L-ACNC: 1.66 UIU/ML (ref 0.36–3.74)
WBC # BLD AUTO: 6.71 THOUSAND/UL (ref 4.31–10.16)

## 2021-03-23 PROCEDURE — 36415 COLL VENOUS BLD VENIPUNCTURE: CPT

## 2021-03-23 PROCEDURE — 84443 ASSAY THYROID STIM HORMONE: CPT

## 2021-03-23 PROCEDURE — 80061 LIPID PANEL: CPT

## 2021-03-23 PROCEDURE — 83036 HEMOGLOBIN GLYCOSYLATED A1C: CPT

## 2021-03-23 PROCEDURE — 80053 COMPREHEN METABOLIC PANEL: CPT

## 2021-03-23 PROCEDURE — 84146 ASSAY OF PROLACTIN: CPT

## 2021-03-23 PROCEDURE — 85025 COMPLETE CBC W/AUTO DIFF WBC: CPT

## 2021-03-23 PROCEDURE — 82306 VITAMIN D 25 HYDROXY: CPT

## 2021-03-29 ENCOUNTER — IMMUNIZATIONS (OUTPATIENT)
Dept: FAMILY MEDICINE CLINIC | Facility: HOSPITAL | Age: 56
End: 2021-03-29

## 2021-03-29 DIAGNOSIS — Z23 ENCOUNTER FOR IMMUNIZATION: Primary | ICD-10-CM

## 2021-03-29 PROCEDURE — 91300 SARS-COV-2 / COVID-19 MRNA VACCINE (PFIZER-BIONTECH) 30 MCG: CPT

## 2021-03-29 PROCEDURE — 0001A SARS-COV-2 / COVID-19 MRNA VACCINE (PFIZER-BIONTECH) 30 MCG: CPT

## 2021-04-19 ENCOUNTER — IMMUNIZATIONS (OUTPATIENT)
Dept: FAMILY MEDICINE CLINIC | Facility: HOSPITAL | Age: 56
End: 2021-04-19

## 2021-04-19 DIAGNOSIS — Z23 ENCOUNTER FOR IMMUNIZATION: Primary | ICD-10-CM

## 2021-04-19 PROCEDURE — 0002A SARS-COV-2 / COVID-19 MRNA VACCINE (PFIZER-BIONTECH) 30 MCG: CPT

## 2021-04-19 PROCEDURE — 91300 SARS-COV-2 / COVID-19 MRNA VACCINE (PFIZER-BIONTECH) 30 MCG: CPT

## 2021-06-09 ENCOUNTER — OFFICE VISIT (OUTPATIENT)
Dept: FAMILY MEDICINE CLINIC | Facility: CLINIC | Age: 56
End: 2021-06-09

## 2021-06-09 VITALS
TEMPERATURE: 97.8 F | WEIGHT: 269.8 LBS | HEIGHT: 70 IN | BODY MASS INDEX: 38.63 KG/M2 | RESPIRATION RATE: 16 BRPM | DIASTOLIC BLOOD PRESSURE: 100 MMHG | SYSTOLIC BLOOD PRESSURE: 160 MMHG | HEART RATE: 78 BPM

## 2021-06-09 DIAGNOSIS — Z72.0 TOBACCO ABUSE: ICD-10-CM

## 2021-06-09 DIAGNOSIS — D58.2 ELEVATED HEMOGLOBIN (HCC): ICD-10-CM

## 2021-06-09 DIAGNOSIS — R06.2 WHEEZING: Primary | ICD-10-CM

## 2021-06-09 DIAGNOSIS — I10 ESSENTIAL HYPERTENSION: ICD-10-CM

## 2021-06-09 DIAGNOSIS — E78.5 DYSLIPIDEMIA WITH ELEVATED LOW DENSITY LIPOPROTEIN (LDL) CHOLESTEROL AND ABNORMALLY LOW HIGH DENSITY LIPOPROTEIN CHOLESTEROL: ICD-10-CM

## 2021-06-09 DIAGNOSIS — Z72.0 TOBACCO ABUSE DISORDER: ICD-10-CM

## 2021-06-09 DIAGNOSIS — I48.20 CHRONIC ATRIAL FIBRILLATION (HCC): ICD-10-CM

## 2021-06-09 DIAGNOSIS — R74.8 ELEVATED LIVER ENZYMES: ICD-10-CM

## 2021-06-09 PROCEDURE — 3080F DIAST BP >= 90 MM HG: CPT | Performed by: NURSE PRACTITIONER

## 2021-06-09 PROCEDURE — 4004F PT TOBACCO SCREEN RCVD TLK: CPT | Performed by: NURSE PRACTITIONER

## 2021-06-09 PROCEDURE — 3008F BODY MASS INDEX DOCD: CPT | Performed by: NURSE PRACTITIONER

## 2021-06-09 PROCEDURE — 3077F SYST BP >= 140 MM HG: CPT | Performed by: NURSE PRACTITIONER

## 2021-06-09 PROCEDURE — 99214 OFFICE O/P EST MOD 30 MIN: CPT | Performed by: NURSE PRACTITIONER

## 2021-06-09 RX ORDER — ATORVASTATIN CALCIUM 40 MG/1
40 TABLET, FILM COATED ORAL DAILY
Qty: 90 TABLET | Refills: 3 | Status: SHIPPED | OUTPATIENT
Start: 2021-06-09 | End: 2022-06-07

## 2021-06-09 RX ORDER — DILTIAZEM HYDROCHLORIDE 240 MG/1
240 CAPSULE, COATED, EXTENDED RELEASE ORAL DAILY
Qty: 90 CAPSULE | Refills: 3 | Status: SHIPPED | OUTPATIENT
Start: 2021-06-09 | End: 2021-08-24 | Stop reason: CLARIF

## 2021-06-09 RX ORDER — ALBUTEROL SULFATE 90 UG/1
2 AEROSOL, METERED RESPIRATORY (INHALATION) EVERY 6 HOURS PRN
Qty: 1 G | Refills: 2 | Status: SHIPPED | OUTPATIENT
Start: 2021-06-09 | End: 2021-10-05 | Stop reason: SDUPTHER

## 2021-06-09 NOTE — PROGRESS NOTES
Assessment/Plan:    Dyslipidemia with elevated low density lipoprotein (LDL) cholesterol and abnormally low high density lipoprotein cholesterol  Contains abnormal data Lipid panel  Order: [id number]    Ref Range & Units 3/23/21 9:06 AM   Cholesterol 50 - 200 mg/dL 320High     Comment: Cholesterol:       Desirable         <200 mg/dl       Borderline         200-239 mg/dl       High              >239          Triglycerides <=150 mg/dL 199High     Comment: Triglyceride:      Normal          <150 mg/dl      Borderline High 150-199 mg/dl      High            200-499 mg/dl        Very High       >499 mg/dl     Specimen collection should occur prior to N-Acetylcysteine or Metamizole administration due to the potential for falsely depressed results  HDL, Direct >=40 mg/dL 41    Comment: HDL Cholesterol:       Low     <41 mg/dL   Specimen collection should occur prior to Metamizole administration due to the potential for falsley depressed results  LDL Calculated 0 - 100 mg/dL 239High     Comment: LDL Cholesterol:     Optimal           <100 mg/dl     Near Optimal      100-129 mg/dl     Above Optimal       Borderline High 130-159 mg/dl       High            160-189 mg/dl       Very High       >189 mg/dl            Started on statin therapy atorvastatin 40 mg/d, will most likely need to increase to 80 mg but wanted to make sure he could tolerate statin therapy  ASCVD risk is high 69%  Referral to cardiology  Ordered cardiac stress test   Discussed diet and weigh loss and smoking cessation  Gave DASH diet  Tobacco abuse  Discussed smoking cessation using Chantix  He will check with psychologist and see if this would interfere with any of his other mental health medications  Counseled and strongly advised to quit smoking  25 pack year smoker  Sent for PFT    Essential hypertension  /100  Restart antihypertensive medication, lisinopril and Cardizem      Elevated liver enzymes  Ordered US of abdomen    Most likely fatty liver and discussed weight loss using DASH diet, gave handout  The other concern is high H/H and concern for polycythemia vera and referral given for hematology  AST 5 - 45 U/L 52High   43 CM    Comment: Specimen collection should occur prior to Sulfasalazine administration due to the potential for falsely depressed results  ALT 12 - 78 U/L 106High   67 CM    Comment: Specimen collection should occur prior to Sulfasalazine and/or Sulfapyridine administration due to the potential for falsely depressed results  Alkaline Phosphatase 46 - 116 U/L 139High                  Elevated hemoglobin (HCC)  H/H trending up    Ref Range & Units 3/23/21 9:06 AM 12/28/20 1:55 PM   WBC 4 31 - 10 16 Thousand/uL 6 71  8 45    RBC 3 88 - 5 62 Million/uL 5 56  5 58    Hemoglobin 12 0 - 17 0 g/dL 19 0High   18  3High     Hematocrit 36 5 - 49 3 % 56  2High   52  7High     MCV 82 - 98 fL 101High   94      Sent to hematology for consult  Denies familial polycythemia vera  Chronic atrial fibrillation (HCC)  Restart Cardizem and ASA therapy  Referral given for Cardiology  Problem List Items Addressed This Visit        Cardiovascular and Mediastinum    Essential hypertension     /100  Restart antihypertensive medication, lisinopril and Cardizem  Relevant Medications    diltiazem (Cardizem CD) 240 mg 24 hr capsule    Chronic atrial fibrillation (HCC)     Restart Cardizem and ASA therapy  Referral given for Cardiology  Relevant Medications    diltiazem (Cardizem CD) 240 mg 24 hr capsule    Other Relevant Orders    Echo stress test w contrast if indicated    Ambulatory referral to Cardiology       Other    Tobacco abuse     Discussed smoking cessation using Chantix  He will check with psychologist and see if this would interfere with any of his other mental health medications  Counseled and strongly advised to quit smoking  25 pack year smoker    Sent for PFT         Dyslipidemia with elevated low density lipoprotein (LDL) cholesterol and abnormally low high density lipoprotein cholesterol     Contains abnormal data Lipid panel  Order: [id number]    Ref Range & Units 3/23/21 9:06 AM   Cholesterol 50 - 200 mg/dL 320High     Comment: Cholesterol:       Desirable         <200 mg/dl       Borderline         200-239 mg/dl       High              >239          Triglycerides <=150 mg/dL 199High     Comment: Triglyceride:      Normal          <150 mg/dl      Borderline High 150-199 mg/dl      High            200-499 mg/dl        Very High       >499 mg/dl     Specimen collection should occur prior to N-Acetylcysteine or Metamizole administration due to the potential for falsely depressed results  HDL, Direct >=40 mg/dL 41    Comment: HDL Cholesterol:       Low     <41 mg/dL   Specimen collection should occur prior to Metamizole administration due to the potential for falsley depressed results  LDL Calculated 0 - 100 mg/dL 239High     Comment: LDL Cholesterol:     Optimal           <100 mg/dl     Near Optimal      100-129 mg/dl     Above Optimal       Borderline High 130-159 mg/dl       High            160-189 mg/dl       Very High       >189 mg/dl            Started on statin therapy atorvastatin 40 mg/d, will most likely need to increase to 80 mg but wanted to make sure he could tolerate statin therapy  ASCVD risk is high 69%  Referral to cardiology  Ordered cardiac stress test   Discussed diet and weigh loss and smoking cessation  Gave DASH diet  Relevant Medications    atorvastatin (LIPITOR) 40 mg tablet    Other Relevant Orders    Ambulatory referral to Cardiology    Elevated liver enzymes     Ordered US of abdomen    Most likely fatty liver and discussed weight loss using DASH diet, gave handout  The other concern is high H/H and concern for polycythemia vera and referral given for hematology       AST 5 - 45 U/L 52High   43 CM    Comment: Specimen collection should occur prior to Sulfasalazine administration due to the potential for falsely depressed results  ALT 12 - 78 U/L 106High   67 CM    Comment: Specimen collection should occur prior to Sulfasalazine and/or Sulfapyridine administration due to the potential for falsely depressed results  Alkaline Phosphatase 46 - 116 U/L 139High                       Relevant Orders    US abdomen complete    Elevated hemoglobin (HCC)     H/H trending up    Ref Range & Units 3/23/21 9:06 AM 12/28/20 1:55 PM   WBC 4 31 - 10 16 Thousand/uL 6 71  8 45    RBC 3 88 - 5 62 Million/uL 5 56  5 58    Hemoglobin 12 0 - 17 0 g/dL 19 0High   18  3High     Hematocrit 36 5 - 49 3 % 56  2High   52  7High     MCV 82 - 98 fL 101High   94      Sent to hematology for consult  Denies familial polycythemia vera  Relevant Orders    Ambulatory referral to Hematology / Oncology      Other Visit Diagnoses     Wheezing    -  Primary    Relevant Medications    albuterol (PROVENTIL HFA,VENTOLIN HFA) 90 mcg/act inhaler    Tobacco abuse disorder        Relevant Orders    Complete PFT with post bronchodilator            Subjective:      Patient ID: Debby Lo  is a 48 y o  male  48year old presents for medication management and review of labs  C/O wheezing and is using inhaler 2-3 times a week  He is a 25ppd smoker, has not had a PFT to confirm diagnosis of COPD  He gained 19 lbs since his last visit and is concerned about the weight gain  He is seeing psychiatry for mental health management  Mr Rickey Villarreal presents with multiple co morbidities that need to be addressed and managed  History of A-fib and was cardioverted once  Did not follow up and is not on anticoagulation therapy  He was on Cardizem but stopped taking it           The following portions of the patient's history were reviewed and updated as appropriate: allergies, current medications, past family history, past medical history, past social history, past surgical history and problem list     Review of Systems   Constitutional: Negative  Respiratory: Negative  Cardiovascular: Negative  Psychiatric/Behavioral: Negative for self-injury and suicidal ideas  The patient is nervous/anxious  All other systems reviewed and are negative  Objective:      /100 (BP Location: Left arm, Patient Position: Sitting, Cuff Size: Large)   Pulse 78   Temp 97 8 °F (36 6 °C) (Temporal)   Resp 16   Ht 5' 10" (1 778 m)   Wt 122 kg (269 lb 12 8 oz)   BMI 38 71 kg/m²          Physical Exam  Vitals signs reviewed  Constitutional:       Appearance: He is obese  HENT:      Head: Normocephalic and atraumatic  Neck:      Musculoskeletal: Normal range of motion and neck supple  Cardiovascular:      Rate and Rhythm: Normal rate and regular rhythm  Heart sounds: Normal heart sounds  Pulmonary:      Effort: Pulmonary effort is normal       Breath sounds: Normal breath sounds  Abdominal:      General: Abdomen is protuberant  Bowel sounds are normal       Palpations: Abdomen is soft  Tenderness: There is no abdominal tenderness  Comments: Large abdominal girth  Musculoskeletal: Normal range of motion  Skin:     General: Skin is warm and dry  Neurological:      General: No focal deficit present  Mental Status: He is alert and oriented to person, place, and time  Psychiatric:         Mood and Affect: Mood normal          Behavior: Behavior normal          Thought Content:  Thought content normal          Judgment: Judgment normal

## 2021-06-09 NOTE — PATIENT INSTRUCTIONS
DASH Eating Plan   WHAT YOU NEED TO KNOW:   The DASH (Dietary Approaches to Stop Hypertension) Eating Plan is designed to help prevent or lower high blood pressure  It can also help to lower LDL (bad) cholesterol and decrease your risk of heart disease  The plan is low in sodium, sugar, unhealthy fats, and total fat  It is high in potassium, calcium, magnesium, and fiber  These nutrients are added when you eat more fruits, vegetables, and whole grains  DISCHARGE INSTRUCTIONS:   Your sodium limit each day: Your dietitian will tell you how much sodium is safe for you to have each day  People with high blood pressure should have no more than 1,500 to 2,300 mg of sodium in a day  A teaspoon (tsp) of salt has 2,300 mg of sodium  This may seem like a difficult goal, but small changes to the foods you eat can make a big difference  Your healthcare provider or dietitian can help you create a meal plan that follows your sodium limit  How to limit sodium:   · Read food labels  Food labels can help you choose foods that are low in sodium  The amount of sodium is listed in milligrams (mg)  The % Daily Value (DV) column tells you how much of your daily needs are met by 1 serving of the food for each nutrient listed  Choose foods that have less than 5% of the DV of sodium  These foods are considered low in sodium  Foods that have 20% or more of the DV of sodium are considered high in sodium  Avoid foods that have more than 300 mg of sodium in each serving  Choose foods that say low-sodium, reduced-sodium, or no salt added on the food label  · Avoid salt  Do not salt food at the table, and add very little salt to foods during cooking  Use herbs and spices, such as onions, garlic, and salt-free seasonings to add flavor to foods  Try lemon or lime juice or vinegar to give foods a tart flavor  Use hot peppers or a small amount of hot pepper sauce to add a spicy flavor to foods  · Ask about salt substitutes    Ask your healthcare provider if you may use salt substitutes  Some salt substitutes have ingredients that can be harmful if you have certain health conditions  · Choose foods carefully at restaurants  Meals from restaurants, especially fast food restaurants, are often high in sodium  Some restaurants have nutrition information that tells you the amount of sodium in their foods  Ask to have your food prepared with less, or no salt  What you need to know about fats:   · Include healthy fats  Examples are unsaturated fats and omega-3 fatty acids  Unsaturated fats are found in soybean, canola, olive, or sunflower oil, and liquid and soft tub margarines  Omega-3 fatty acids are found in fatty fish, such as salmon, tuna, mackerel, and sardines  It is also found in flaxseed oil and ground flaxseed  · Avoid unhealthy fats  Do not eat unhealthy fats, such as saturated fats and trans fats  Saturated fats are found in foods that contain fat from animals  Examples are fatty meats, whole milk, butter, cream, and other dairy foods  It is also found in shortening, stick margarine, palm oil, and coconut oil  Trans fats are found in fried foods, crackers, chips, and baked goods made with margarine or shortening  Foods to include: With the DASH eating plan, you need to eat a certain number of servings from each food group  This will help you get enough of certain nutrients and limit others  The amount of servings you should eat depends on how many calories you need  Your dietitian can tell you how many calories you need  The number of servings listed next to the food groups below are for people who need about 2,000 calories each day  · Grains:  6 to 8 servings (3 of these servings should be whole-grain foods)    ? 1 slice of whole-grain bread     ? 1 ounce of dry cereal    ? ½ cup of cooked cereal, pasta, or brown rice    · Vegetables and fruits:  4 to 5 servings of fruits and 4 to 5 servings of vegetables    ?  1 medium fruit    ? ½ cup of frozen, canned (no added salt), or chopped fresh vegetables     ? ½ cup of fresh, frozen, dried, or canned fruit (canned in light syrup or fruit juice)    ? ½ cup of vegetable or fruit juice    · Dairy:  2 to 3 servings    ? 1 cup of nonfat (skim) or 1% milk    ? 1½ ounces of fat-free or low-fat cheese    ? 6 ounces of nonfat or low-fat yogurt    · Lean meat, poultry, and fish:  6 ounces or less    ? Poultry (chicken, turkey) with no skin    ? Fish (especially fatty fish, such as salmon, fresh tuna, or mackerel)    ? Lean beef and pork (loin, round, extra lean hamburger)    ? Egg whites and egg substitutes    · Nuts, seeds, and legumes:  4 to 5 servings each week    ? ½ cup of cooked beans and peas    ? 1½ ounces of unsalted nuts    ? 2 tablespoons of peanut butter or seeds    · Sweets and added sugars:  5 or less each week    ? 1 tablespoon of sugar, jelly, or jam    ? ½ cup of sorbet or gelatin    ? 1 cup of lemonade    · Fats:  2 to 3 servings each week    ? 1 teaspoon of soft margarine or vegetable oil    ? 1 tablespoon of mayonnaise    ? 2 tablespoons of salad dressing    Foods to avoid:   · Grains:      ? Baked goods, such as doughnuts, pastries, cookies, and biscuits (high in fat and sugar)    ? Mixes for cornbread and biscuits, packaged foods, such as bread stuffing, rice and pasta mixes, macaroni and cheese, and instant cereals (high in sodium)    · Fruits and vegetables:      ? Regular, canned vegetables (high in sodium)    ? Sauerkraut, pickled vegetables, and other foods prepared in brine (high in sodium)    ? Fried vegetables or vegetables in butter or high-fat sauces    ? Fruit in cream or butter sauce (high in fat)    · Dairy:      ? Whole milk, 2% milk, and cream (high in fat)    ?  Regular cheese and processed cheese (high in fat and sodium)    · Meats and protein foods:      ? Smoked or cured meat, such as corned beef, barboza, ham, hot dogs, and sausage (high in fat and sodium)    ? Canned beans and canned meats or spreads, such as potted meats, sardines, anchovies, and imitation seafood (high in sodium)    ? Deli or lunch meats, such as bologna, ham, turkey, and roast beef (high in sodium)    ? High-fat meat (T-bone steak, regular hamburger, and ribs)    ? Whole eggs and egg yolks (high in fat)    · Other:      ? Seasonings made with salt, such as garlic salt, celery salt, onion salt, seasoned salt, meat tenderizers, and monosodium glutamate (MSG)    ? Miso soup and canned or dried soup mixes (high in sodium)    ? Regular soy sauce, barbecue sauce, teriyaki sauce, steak sauce, Worcestershire sauce, and most flavored vinegars (high in sodium)    ? Regular condiments, such as mustard, ketchup, and salad dressings (high in sodium)    ? Gravy and sauces, such as [place] or cheese sauces (high in sodium and fat)    ? Drinks high in sugar, such as soda or fruit drinks    ? Snack foods, such as salted chips, popcorn, pretzels, pork rinds, salted crackers, and salted nuts    ? Frozen foods, such as dinners, entrees, vegetables with sauces, and breaded meats (high in sodium)    Other guidelines to follow:   · Maintain a healthy weight  Your risk for heart disease is higher if you are overweight  Your healthcare provider may suggest that you lose weight if you are overweight  You can lose weight by eating fewer calories and foods that have added sugars and fat  The DASH meal plan can help you do this  Decrease calories by eating smaller portions at each meal and fewer snacks  Ask your healthcare provider for more information about how to lose weight  · Exercise regularly  Regular exercise can help you reach or maintain a healthy weight  Regular exercise can also help decrease your blood pressure and improve your cholesterol levels  Get 30 minutes or more of moderate exercise each day of the week  To lose weight, get at least 60 minutes of exercise   Talk to your healthcare provider about the best exercise program for you  · Limit alcohol  Women should limit alcohol to 1 drink a day  Men should limit alcohol to 2 drinks a day  A drink of alcohol is 12 ounces of beer, 5 ounces of wine, or 1½ ounces of liquor  © Copyright [address] Information is for End User's use only and may not be sold, redistributed or otherwise used for commercial purposes  All illustrations and images included in CareNotes® are the copyrighted property of [employer]  or [address] [place] [address]  The above information is an  only  It is not intended as medical advice for individual conditions or treatments  Talk to your doctor, nurse or pharmacist before following any medical regimen to see if it is safe and effective for you        Set up appointment for stress test and call cardiology  Set up breathing test   Ask psychiatrist if you can start Chantix  Set up ultrasound of abdomen  Try to start walking   Restart BP medications and start on atorvastatin for cholesterol

## 2021-06-10 PROBLEM — E78.5 DYSLIPIDEMIA WITH ELEVATED LOW DENSITY LIPOPROTEIN (LDL) CHOLESTEROL AND ABNORMALLY LOW HIGH DENSITY LIPOPROTEIN CHOLESTEROL: Status: ACTIVE | Noted: 2021-06-10

## 2021-06-10 PROBLEM — I48.20 CHRONIC ATRIAL FIBRILLATION (HCC): Status: ACTIVE | Noted: 2021-06-10

## 2021-06-10 PROBLEM — D58.2 ELEVATED HEMOGLOBIN (HCC): Status: ACTIVE | Noted: 2021-06-10

## 2021-06-10 PROBLEM — R74.8 ELEVATED LIVER ENZYMES: Status: ACTIVE | Noted: 2021-06-10

## 2021-06-10 NOTE — ASSESSMENT & PLAN NOTE
H/H trending up    Ref Range & Units 3/23/21 9:06 AM 12/28/20 1:55 PM   WBC 4 31 - 10 16 Thousand/uL 6 71  8 45    RBC 3 88 - 5 62 Million/uL 5 56  5 58    Hemoglobin 12 0 - 17 0 g/dL 19 0High   18  3High     Hematocrit 36 5 - 49 3 % 56  2High   52  7High     MCV 82 - 98 fL 101High   94      Sent to hematology for consult  Denies familial polycythemia vera

## 2021-06-10 NOTE — ASSESSMENT & PLAN NOTE
Ordered US of abdomen    Most likely fatty liver and discussed weight loss using DASH diet, gave handout  The other concern is high H/H and concern for polycythemia vera and referral given for hematology  AST 5 - 45 U/L 52High   43 CM    Comment: Specimen collection should occur prior to Sulfasalazine administration due to the potential for falsely depressed results  ALT 12 - 78 U/L 106High   67 CM    Comment: Specimen collection should occur prior to Sulfasalazine and/or Sulfapyridine administration due to the potential for falsely depressed results      Alkaline Phosphatase 46 - 116 U/L 139High

## 2021-06-10 NOTE — ASSESSMENT & PLAN NOTE
Contains abnormal data Lipid panel  Order: [id number]    Ref Range & Units 3/23/21 9:06 AM   Cholesterol 50 - 200 mg/dL 320High     Comment: Cholesterol:       Desirable         <200 mg/dl       Borderline         200-239 mg/dl       High              >239          Triglycerides <=150 mg/dL 199High     Comment: Triglyceride:      Normal          <150 mg/dl      Borderline High 150-199 mg/dl      High            200-499 mg/dl        Very High       >499 mg/dl     Specimen collection should occur prior to N-Acetylcysteine or Metamizole administration due to the potential for falsely depressed results  HDL, Direct >=40 mg/dL 41    Comment: HDL Cholesterol:       Low     <41 mg/dL   Specimen collection should occur prior to Metamizole administration due to the potential for falsley depressed results  LDL Calculated 0 - 100 mg/dL 239High     Comment: LDL Cholesterol:     Optimal           <100 mg/dl     Near Optimal      100-129 mg/dl     Above Optimal       Borderline High 130-159 mg/dl       High            160-189 mg/dl       Very High       >189 mg/dl            Started on statin therapy atorvastatin 40 mg/d, will most likely need to increase to 80 mg but wanted to make sure he could tolerate statin therapy  ASCVD risk is high 69%  Referral to cardiology  Ordered cardiac stress test   Discussed diet and weigh loss and smoking cessation  Gave DASH diet

## 2021-06-10 NOTE — ASSESSMENT & PLAN NOTE
Discussed smoking cessation using Chantix  He will check with psychologist and see if this would interfere with any of his other mental health medications  Counseled and strongly advised to quit smoking  25 pack year smoker    Sent for PFT

## 2021-06-11 ENCOUNTER — TELEPHONE (OUTPATIENT)
Dept: FAMILY MEDICINE CLINIC | Facility: CLINIC | Age: 56
End: 2021-06-11

## 2021-06-11 NOTE — TELEPHONE ENCOUNTER
Pt calling, Steven Golden referred him to hematology/oncology but they told him they will need updated bloodwork to be ordered by Steven Golden, he said she would know which ones to order   Would like a call when put into Epic

## 2021-06-29 ENCOUNTER — HOSPITAL ENCOUNTER (OUTPATIENT)
Dept: ULTRASOUND IMAGING | Facility: HOSPITAL | Age: 56
Discharge: HOME | End: 2021-06-29
Payer: COMMERCIAL

## 2021-06-29 DIAGNOSIS — R74.8 ELEVATED LIVER ENZYMES: ICD-10-CM

## 2021-06-29 PROCEDURE — 76700 US EXAM ABDOM COMPLETE: CPT

## 2021-07-16 ENCOUNTER — HOSPITAL ENCOUNTER (OUTPATIENT)
Dept: NON INVASIVE DIAGNOSTICS | Facility: HOSPITAL | Age: 56
Discharge: HOME | End: 2021-07-16
Payer: COMMERCIAL

## 2021-07-16 DIAGNOSIS — I48.20 CHRONIC ATRIAL FIBRILLATION (HCC): ICD-10-CM

## 2021-07-16 LAB
MAX DIASTOLIC BP: 120 MMHG
MAX HEART RATE: 117 BPM
MAX PREDICTED HEART RATE: 166 BPM
MAX. SYSTOLIC BP: 220 MMHG
PROTOCOL NAME: NORMAL
REASON FOR TERMINATION: NORMAL
TARGET HR FORMULA: NORMAL
TEST INDICATION: NORMAL
TIME IN EXERCISE PHASE: NORMAL

## 2021-07-16 PROCEDURE — 93351 STRESS TTE COMPLETE: CPT | Performed by: INTERNAL MEDICINE

## 2021-07-16 PROCEDURE — 93350 STRESS TTE ONLY: CPT

## 2021-07-28 ENCOUNTER — CONSULT (OUTPATIENT)
Dept: CARDIOLOGY CLINIC | Facility: CLINIC | Age: 56
End: 2021-07-28
Payer: COMMERCIAL

## 2021-07-28 VITALS
BODY MASS INDEX: 38.4 KG/M2 | HEART RATE: 116 BPM | SYSTOLIC BLOOD PRESSURE: 160 MMHG | WEIGHT: 267.6 LBS | DIASTOLIC BLOOD PRESSURE: 100 MMHG

## 2021-07-28 DIAGNOSIS — R00.0 SINUS TACHYCARDIA: Primary | ICD-10-CM

## 2021-07-28 DIAGNOSIS — E78.5 DYSLIPIDEMIA WITH ELEVATED LOW DENSITY LIPOPROTEIN (LDL) CHOLESTEROL AND ABNORMALLY LOW HIGH DENSITY LIPOPROTEIN CHOLESTEROL: ICD-10-CM

## 2021-07-28 DIAGNOSIS — I48.92 ATRIAL FLUTTER, UNSPECIFIED TYPE (HCC): ICD-10-CM

## 2021-07-28 DIAGNOSIS — I10 ESSENTIAL HYPERTENSION: ICD-10-CM

## 2021-07-28 PROCEDURE — 3077F SYST BP >= 140 MM HG: CPT | Performed by: INTERNAL MEDICINE

## 2021-07-28 PROCEDURE — 4004F PT TOBACCO SCREEN RCVD TLK: CPT | Performed by: INTERNAL MEDICINE

## 2021-07-28 PROCEDURE — 93000 ELECTROCARDIOGRAM COMPLETE: CPT | Performed by: INTERNAL MEDICINE

## 2021-07-28 PROCEDURE — 3080F DIAST BP >= 90 MM HG: CPT | Performed by: INTERNAL MEDICINE

## 2021-07-28 PROCEDURE — 99204 OFFICE O/P NEW MOD 45 MIN: CPT | Performed by: INTERNAL MEDICINE

## 2021-07-29 RX ORDER — HYDROCHLOROTHIAZIDE 12.5 MG/1
12.5 TABLET ORAL DAILY
Qty: 30 TABLET | Refills: 11 | Status: SHIPPED | OUTPATIENT
Start: 2021-07-29 | End: 2021-09-07 | Stop reason: CLARIF

## 2021-07-29 NOTE — PROGRESS NOTES
Cardiology Follow Up    Glorious Book   1965  [id number]  [address] CARDIOLOGY [place] [address] [address]  [place] [address] [place] Shaw [id number] [phone]    1  Sinus tachycardia  Ambulatory referral to Cardiology    POCT ECG   2  Atrial flutter, unspecified type ([address] )     3  Dyslipidemia with elevated low density lipoprotein (LDL) cholesterol and abnormally low high density lipoprotein cholesterol  Ambulatory referral to Cardiology   4  Essential hypertension        History:  A 52-year-old male with a history of hypertension hyperlipidemia tobacco abuse in atrial flutter who presents for evaluation secondary to elevated blood pressure when he presented for his stress test   Stress test was ordered by his primary care provider secondary to dyspnea and multiple risk factors and including a recently elevated LDL cholesterol  He presented for his stress echocardiogram on July 16th and his initial blood pressure was 220/120  Diltiazem 240 mg daily was added to his lisinopril 20 mg daily  He was asked to see a cardiologist secondary to hypertension and hyperlipidemia  As mentioned he is a smoker who has mild dyspnea on exertion  He denies chest pain orthopnea paroxysmal nocturnal dyspnea or syncope  He has a history of atrial flutter  He was hospitalized at [facility] in 2018 with pneumonia and at that time noted to have transient atrial flutter  In addition he was hospitalized at [facility] in October of 2020 again in atrial flutter he underwent NIKOLAS and cardioversion plate  He was placed on Eliquis but has never taken this medication secondary to its cost   While hospitalized at [facility] he was also noted to be in sinus tachycardia which does continue      Patient Active Problem List   Diagnosis    Other insomnia    Depression with anxiety    Kidney stone on right side    Ileus ([facility] [address]    Umbilical hernia without obstruction and without gangrene    Gallbladder calculus without cholecystitis    Tobacco abuse    Closed right ankle fracture    History of ileus    Cavitary pneumonia    Adenoma of left adrenal gland    Anxiety    GERD (gastroesophageal reflux disease)    MDD (major depressive disorder)    History of MRSA infection of lungs    Leukocytosis    Moderate episode of recurrent major depressive disorder (HCC)    Colon cancer screening    Elevated blood pressure reading    Exposure to SARS-associated coronavirus    Asthma    Essential hypertension    Tachycardia    Dyslipidemia with elevated low density lipoprotein (LDL) cholesterol and abnormally low high density lipoprotein cholesterol    Elevated liver enzymes    Elevated hemoglobin (HCC)     Past Medical History:   Diagnosis Date    Anxiety     Asthma     Colon polyp     GERD (gastroesophageal reflux disease)     History of ileus     Insomnia     Lumbar herniated disc     last assessed: 3/27/2015    MDD (major depressive disorder)     Patella fracture     last assessed: 3/27/2015    Tobacco abuse      Social History     Socioeconomic History    Marital status: Single     Spouse name: Not on file    Number of children: Not on file    Years of education: Not on file    Highest education level: Not on file   Occupational History     Employer: [employer]   Tobacco Use    Smoking status: Current Every Day Smoker     Packs/day: 0 50     Years: 34 00     Pack years: 17 00     Types: Cigarettes    Smokeless tobacco: Never Used    Tobacco comment: Pt would like counseling   Vaping Use    Vaping Use: Never used   Substance and Sexual Activity    Alcohol use: No    Drug use: No    Sexual activity: Not Currently     Partners: Female   Other Topics Concern    Not on file   Social History Narrative    Denies pmh     Social Determinants of Health     Financial Resource Strain: Low Risk     Difficulty of Paying Living Expenses: Not hard at all Food Insecurity: No Food Insecurity    Worried About Running Out of Food in the Last Year: Never true    Kathy of Food in the Last Year: Never true   Transportation Needs: No Transportation Needs    Lack of Transportation (Medical): No    Lack of Transportation (Non-Medical): No   Physical Activity:     Days of Exercise per Week:     Minutes of Exercise per Session:    Stress: Stress Concern Present    Feeling of Stress :  To some extent   Social Connections:     Frequency of Communication with Friends and Family:     Frequency of Social Gatherings with Friends and Family:     Attends Hoahaoism Services:     Active Member of Clubs or Organizations:     Attends Club or Organization Meetings:     Marital Status:    Intimate Partner Violence:     Fear of Current or Ex-Partner:     Emotionally Abused:     Physically Abused:     Sexually Abused:       Family History   Problem Relation Age of Onset    Pancreatic cancer Mother     COPD Father     Heart attack Brother 64        decreased    Narcolepsy Brother     Drug abuse Brother     Alcohol abuse Brother      Past Surgical History:   Procedure Laterality Date    BACK SURGERY  2015    Lumbar    COLONOSCOPY      KNEE SURGERY      right    LUMBAR FUSION      last assessed: 3/27/2015    MT TREAT TIBIAL SHAFT FX, INTRAMED IMPLANT Left 3/16/2017    Procedure: INSERTION NAIL IM TIBIA;  Surgeon: Bob He DO;  Location: [facility];  Service: Orthopedics       Current Outpatient Medications:     albuterol (PROVENTIL HFA,VENTOLIN HFA) 90 mcg/act inhaler, Inhale 2 puffs every 6 (six) hours as needed for shortness of breath, Disp: 1 g, Rfl: 2    atorvastatin (LIPITOR) 40 mg tablet, Take 1 tablet (40 mg total) by mouth daily, Disp: 90 tablet, Rfl: 3    diltiazem (Cardizem CD) 240 mg 24 hr capsule, Take 1 capsule (240 mg total) by mouth daily, Disp: 90 capsule, Rfl: 3    lisinopril (ZESTRIL) 20 mg tablet, Take 1 tablet (20 mg total) by mouth daily (Patient taking differently: Take 40 mg by mouth daily ), Disp: 90 tablet, Rfl: 3    LORazepam (ATIVAN) 1 mg tablet, Take 1 tablet (1 mg total) by mouth 2 (two) times a day as needed for anxiety, Disp: 60 tablet, Rfl: 0    QUEtiapine (SEROquel) 300 mg tablet, Take 1 tablet (300 mg total) by mouth daily at bedtime, Disp: 90 tablet, Rfl: 2    venlafaxine (EFFEXOR-XR) 150 mg 24 hr capsule, Take 1 capsule (150 mg total) by mouth daily, Disp: 90 capsule, Rfl: 1  No Known Allergies    Labs:  Hospital Outpatient Visit on 07/16/2021   Component Date Value    Protocol Name 07/16/2021 LALA     Time In Exercise Phase 07/16/2021 00:00:00     MAX   SYSTOLIC BP 47/29/2956 323     Max Diastolic Bp 82/32/5712 197     Max Heart Rate 07/16/2021 117     Max Predicted Heart Rate 07/16/2021 166     Reason for Termination 07/16/2021 TEST CANCELLED HYPERTENSION     Test Indication 07/16/2021 Dyspnea with Exercise     Target Hr Formular 07/16/2021 (220 - Age)*85%    Appointment on 03/23/2021   Component Date Value    WBC 03/23/2021 6 71     RBC 03/23/2021 5 56     Hemoglobin 03/23/2021 19 0*    Hematocrit 03/23/2021 56 2*    MCV 03/23/2021 101*    MCH 03/23/2021 34 2     MCHC 03/23/2021 33 8     RDW 03/23/2021 12 1     MPV 03/23/2021 11 1     Platelets 29/97/3070 218     nRBC 03/23/2021 0     Neutrophils Relative 03/23/2021 66     Immat GRANS % 03/23/2021 0     Lymphocytes Relative 03/23/2021 25     Monocytes Relative 03/23/2021 7     Eosinophils Relative 03/23/2021 1     Basophils Relative 03/23/2021 1     Neutrophils Absolute 03/23/2021 4 49     Immature Grans Absolute 03/23/2021 0 01     Lymphocytes Absolute 03/23/2021 1 66     Monocytes Absolute 03/23/2021 0 46     Eosinophils Absolute 03/23/2021 0 04     Basophils Absolute 03/23/2021 0 05     Sodium 03/23/2021 136     Potassium 03/23/2021 4 3     Chloride 03/23/2021 103     CO2 03/23/2021 29     ANION GAP 03/23/2021 4     BUN 03/23/2021 18     Creatinine 2021 1 41*    Glucose, Fasting 2021 106*    Calcium 2021 9 4     AST 2021 52*    ALT 2021 106*    Alkaline Phosphatase 2021 139*    Total Protein 2021 7 9     Albumin 2021 4 3     Total Bilirubin 2021 0 96     eGFR 2021 56     Cholesterol 2021 320*    Triglycerides 2021 199*    HDL, Direct 2021 41     LDL Calculated 2021 239*    Non-HDL-Chol (CHOL-HDL) 2021 279     TSH 3RD GENERATON 2021 1 660     Hemoglobin A1C 2021 4 6     EAG 2021 85     Vit D, 25-Hydroxy 2021 9 3*    Prolactin 2021 4 9      Imaging: Stress strip    Result Date: 2021  Narrative: Confirmed by Keanu Rodas ([id number]),  Juan R Hilliard ([id number]) on 2021 2:31:53 PM    Echo stress test w contrast if indicated    Result Date: 2021  Narrative: [address] [place] [address], [address] [facility] [phone] Study date:  2021 Patient: Akash Dominguez MR number: [id number] Account number: [de-identified] : 1965 Age: 48 years Gender: Male Study date: 2021 Status: Outpatient Location: Stress lab Height: 70 in Weight: 169 lb BP: 220// 120 mmHg Indications: Detection of coronary artery disease  Diagnosis: R06 00 - Dyspnea, unspecified Sonographer:  ADEOLA Miller Interpreting Physician:  Lo Cordon MD Primary Physician:  SYEDA Reid Referring Physician:  SYEDA Reid Group:  [facility] RN:  Jada Cerda RN BSN Report Prepared By:  Jada Cerda RN BSN IMPRESSIONS: STRESS ECHOCARDIOGRAM WAS NOT PERFORMED AS PATIENT IS NOTED TO HAVE UNCONTROLLED HYPERTENSION AT REST  1  Patient presented with initial blood pressure of 220/120 mmHg  After waiting 15 minutes his blood pressure was 228/128 mm Hg  2  Patient reported no symptoms suggestive of hypertensive urgency   3  Resting left ventricular function is normal  Ejection fraction is visually estimated as around 65%  Normal left ventricular diastolic function  Other echocardiographic findings as noted below  4  Nonspecific resting ECG abnormalities with intermittent isolated premature ventricular contractions noted at rest  Recommend optimization of patient's antihypertensive regimen with addition of beta-blocker or calcium channel blocker  Stress echocardiogram or pharmacologic stress test may be considered in the future when blood pressure is controlled  HISTORY: The patient is a 48year old  male  Chest pain status: no chest pain  Other symptoms: dyspnea and decreased effort tolerance  Coronary artery disease risk factors: dyslipidemia, hypertension, and smoking  Cardiovascular history: arrhythmia( PAF s/p CV,tachycardia)  Co-morbidity: history of lung disease and obesity  Medications: an ACE inhibitor/ARB and a lipid lowering agent  PHYSICAL EXAM: Baseline physical exam screening: normal lung exam  REST ECG: Sinus rhythm with borderline sinus tachycardia with intermittent isolated premature ventricular contractions  Heart rate 101-103 beats per minute  Nonspecific ST T-wave abnormalities  PROCEDURE: The study was performed at the Stress lab  B/p repeated post resting images 192/108 lying, 210/128 sitting  Dr Cisco Augustin in to evaluate and stress portion of test cancelled  STRESS RESULTS: ECG CONCLUSIONS: Stress test not performed  STRESS 2D ECHO RESULTS: BASELINE: Normal left ventricular cavity size, mildly increased wall thickness, normal left ventricular systolic function  Although there is no obvious regional wall motion abnormality at rest this cannot be completely excluded based on the quality of present imaging  Ejection fraction is visually estimated as around 65%  OTHER ECHO FINDINGS: Normal left ventricular diastolic function  Normal right ventricular size and systolic function  Visually normal left and right atrial cavity size   Tricuspid aortic valve with mild sclerosis, no aortic valve stenosis or regurgitation  Mitral valve sclerosis with trace mitral valve regurgitation  Trace tricuspid valve regurgitation  No obvious pulmonary hypertension  No pericardial effusion  SYSTEM MEASUREMENT TABLES CW AV Env  Ti: 255 8 ms AV VTI: 21 9 cm AV Vmax: 1 2 m/s AV Vmean: 0 9 m/s AV maxP 9 mmHg AV meanPG: 3 3 mmHg MM TAPSE: 2 3 cm PW DVI: 0 8 E' Lat: 0 1 m/s LVOT Env  Ti: 246 1 ms LVOT VTI: 16 9 cm LVOT Vmax: 1 m/s LVOT Vmean: 0 7 m/s LVOT maxP 2 mmHg LVOT meanP 2 mmHg RV S': 0 1 m/s Prepared and electronically signed by Alka Chase MD Signed 2021 20:43:01       Review of Systems:  Review of Systems   Constitutional: Negative for fatigue  HENT: Negative for congestion  Eyes: Negative for discharge  Respiratory: Positive for shortness of breath  Cardiovascular: Positive for chest pain  Gastrointestinal: Positive for abdominal distention  Endocrine: Negative for polyuria  Genitourinary: Positive for dysuria  Musculoskeletal: Positive for arthralgias  Psychiatric/Behavioral: Positive for dysphoric mood  The patient is nervous/anxious  Physical Exam:  Physical Exam  Constitutional:       Appearance: He is well-developed  HENT:      Head: Normocephalic and atraumatic  Nose: Nose normal    Eyes:      Pupils: Pupils are equal, round, and reactive to light  Cardiovascular:      Rate and Rhythm: Normal rate and regular rhythm  Heart sounds: Normal heart sounds  Pulmonary:      Effort: Pulmonary effort is normal       Breath sounds: Normal breath sounds  Abdominal:      General: Bowel sounds are normal       Palpations: Abdomen is soft  Musculoskeletal:         General: Normal range of motion  Cervical back: Neck supple  Skin:     General: Skin is warm and dry  Neurological:      Mental Status: He is alert and oriented to person, place, and time     Psychiatric:         Behavior: Behavior normal  Thought Content: Thought content normal          Judgment: Judgment normal          Discussion/Summary:  Multiple risk factors for coronary artery disease and history of atrial flutter  Today blood pressure 160/100 on diltiazem 240 mg daily and lisinopril 20 mg daily  I will add a hydrochlorothiazide 12 5 mg daily to his medical regimen  I will reorder his stress echocardiogram and we will follow up with an office visit at which time I will recheck his blood pressure  I encouraged him to take an aspirin tablet daily  Long-term management of potential anticoagulation can be made based on his clinical course  He is on Lipitor 40 mg daily for a significantly elevated LDL  Two episodes of atrial flutter 2018 and last fall with cardioversion  Baseline sinus tachycardia  At that presentation for stress echocardiogram his ejection fraction was 65%  The etiology of this is unclear however it is chronic  May possibly be related to his albuterol  I will see him after the stress echocardiogram and make further long-term recommendations

## 2021-08-16 ENCOUNTER — TELEPHONE (OUTPATIENT)
Dept: CARDIOLOGY CLINIC | Facility: CLINIC | Age: 56
End: 2021-08-16

## 2021-08-16 ENCOUNTER — HOSPITAL ENCOUNTER (OUTPATIENT)
Dept: NON INVASIVE DIAGNOSTICS | Facility: HOSPITAL | Age: 56
Discharge: HOME | End: 2021-08-16
Attending: INTERNAL MEDICINE
Payer: COMMERCIAL

## 2021-08-16 DIAGNOSIS — E78.5 DYSLIPIDEMIA WITH ELEVATED LOW DENSITY LIPOPROTEIN (LDL) CHOLESTEROL AND ABNORMALLY LOW HIGH DENSITY LIPOPROTEIN CHOLESTEROL: ICD-10-CM

## 2021-08-16 LAB
CHEST PAIN STATEMENT: NORMAL
MAX DIASTOLIC BP: 112 MMHG
MAX HEART RATE: 116 BPM
MAX PREDICTED HEART RATE: 165 BPM
MAX. SYSTOLIC BP: 166 MMHG
PROTOCOL NAME: NORMAL
REASON FOR TERMINATION: NORMAL
TARGET HR FORMULA: NORMAL
TEST INDICATION: NORMAL
TIME IN EXERCISE PHASE: NORMAL

## 2021-08-16 PROCEDURE — 93350 STRESS TTE ONLY: CPT

## 2021-08-16 PROCEDURE — 93351 STRESS TTE COMPLETE: CPT

## 2021-08-16 NOTE — NURSING NOTE
Exercise stress echo aborted due to elevated blood pressure  B/P on arrival 170/110, repeated after rest echo images-166/112  Pt took all prescribed medications today  He will follow up with Dr Deleon's office for further instruction  Message left on nurse voicemail re above

## 2021-08-16 NOTE — TELEPHONE ENCOUNTER
E Mail sent to Dr Briana Argueta letting him know of message received from Mady Zazueta, at [facility] cardiology  Unable to complete echo stress test today, due to BP too high  Mr Rock Casas is a patient in the [facility] office

## 2021-08-24 ENCOUNTER — OFFICE VISIT (OUTPATIENT)
Dept: CARDIOLOGY CLINIC | Facility: CLINIC | Age: 56
End: 2021-08-24
Payer: COMMERCIAL

## 2021-08-24 VITALS
WEIGHT: 274.5 LBS | OXYGEN SATURATION: 99 % | BODY MASS INDEX: 39.3 KG/M2 | SYSTOLIC BLOOD PRESSURE: 184 MMHG | DIASTOLIC BLOOD PRESSURE: 126 MMHG | HEART RATE: 85 BPM | HEIGHT: 70 IN

## 2021-08-24 DIAGNOSIS — E66.9 OBESITY (BMI 35.0-39.9 WITHOUT COMORBIDITY): ICD-10-CM

## 2021-08-24 DIAGNOSIS — I10 HYPERTENSION, UNSPECIFIED TYPE: Primary | ICD-10-CM

## 2021-08-24 DIAGNOSIS — R79.89 ELEVATED LIVER FUNCTION TESTS: ICD-10-CM

## 2021-08-24 DIAGNOSIS — I48.92 ATRIAL FLUTTER, UNSPECIFIED TYPE (HCC): ICD-10-CM

## 2021-08-24 DIAGNOSIS — F33.9 RECURRENT MAJOR DEPRESSIVE DISORDER, REMISSION STATUS UNSPECIFIED (HCC): ICD-10-CM

## 2021-08-24 DIAGNOSIS — Z72.0 TOBACCO ABUSE: ICD-10-CM

## 2021-08-24 DIAGNOSIS — N18.31 STAGE 3A CHRONIC KIDNEY DISEASE (HCC): ICD-10-CM

## 2021-08-24 DIAGNOSIS — E78.5 DYSLIPIDEMIA: ICD-10-CM

## 2021-08-24 PROCEDURE — 3008F BODY MASS INDEX DOCD: CPT | Performed by: INTERNAL MEDICINE

## 2021-08-24 PROCEDURE — 99215 OFFICE O/P EST HI 40 MIN: CPT | Performed by: NURSE PRACTITIONER

## 2021-08-24 RX ORDER — CARVEDILOL 6.25 MG/1
6.25 TABLET ORAL 2 TIMES DAILY WITH MEALS
Qty: 60 TABLET | Refills: 3 | Status: SHIPPED | OUTPATIENT
Start: 2021-08-24 | End: 2021-09-07 | Stop reason: SDUPTHER

## 2021-08-24 RX ORDER — LISINOPRIL 40 MG/1
40 TABLET ORAL DAILY
Qty: 30 TABLET | Refills: 3
Start: 2021-08-24 | End: 2021-10-05 | Stop reason: SDUPTHER

## 2021-08-24 RX ORDER — CARVEDILOL 3.12 MG/1
3.12 TABLET ORAL 2 TIMES DAILY WITH MEALS
Qty: 60 TABLET | Refills: 3 | Status: SHIPPED | OUTPATIENT
Start: 2021-08-24 | End: 2021-08-24 | Stop reason: DRUGHIGH

## 2021-08-24 NOTE — PROGRESS NOTES
Cardiology Follow Up    Clarissa Duong   1965  [id number]  [facility]  [address]  [address]  [phone]    1  Hypertension, unspecified type  Basic metabolic panel    carvedilol (COREG) 6 25 mg tablet    DISCONTINUED: carvedilol (COREG) 3 125 mg tablet   2  Atrial flutter, unspecified type [facility]  Ambulatory referral to Sleep Medicine    lisinopril (ZESTRIL) 40 mg tablet    Basic metabolic panel    carvedilol (COREG) 6 25 mg tablet       Interval History:   Mr Tyrone Betancur presents to our office for a follow up visit due to continued hypertension  On 8/16/21 echo stress test was not done due to uncontrolled baseline hypertension 170/110  Gabriela Huang to slight headache when his BP is high  He admits to slight lightheadedness, dizziness and slight shortness of breath  He denies CP  Hermes Edmondson does not think he is taking Cardizem CD as a part of his medical regimen  He is taking Lisinopril 40mg daily and HCTZ 12 5mg daily  Hermes Edmondson denies a high sodium diet  He admits a weight gain of 47 pounds in 6 months since taking care of his father  HPI:  7/16/21 echo stress test Not performed due to uncontrolled hypertension at rest 220/120 and 228/128,  resting ECG with intermittent isolated premature ventricular contractions at rest, resting LVEF 65% normal left ventricular diastolic function    4/04/86 echo stress test  Uncontrolled baseline hypertension 170/110, exercise portion of study was not done      Patient Active Problem List   Diagnosis    Other insomnia    Depression with anxiety    Kidney stone on right side    Ileus ([address] )    Umbilical hernia without obstruction and without gangrene    Gallbladder calculus without cholecystitis    Tobacco abuse    Closed right ankle fracture    History of ileus    Cavitary pneumonia    Adenoma of left adrenal gland    Anxiety    GERD (gastroesophageal reflux disease)    MDD (major depressive disorder)    History of MRSA infection of lungs    Leukocytosis    Moderate episode of recurrent major depressive disorder (HCC)    Colon cancer screening    Elevated blood pressure reading    Exposure to SARS-associated coronavirus    Asthma    Essential hypertension    Tachycardia    Dyslipidemia with elevated low density lipoprotein (LDL) cholesterol and abnormally low high density lipoprotein cholesterol    Elevated liver enzymes    Elevated hemoglobin (HCC)     Past Medical History:   Diagnosis Date    Anxiety     Asthma     Colon polyp     GERD (gastroesophageal reflux disease)     History of ileus     Insomnia     Lumbar herniated disc     last assessed: 3/27/2015    MDD (major depressive disorder)     Patella fracture     last assessed: 3/27/2015    Tobacco abuse      Social History     Socioeconomic History    Marital status: Single     Spouse name: Not on file    Number of children: Not on file    Years of education: Not on file    Highest education level: Not on file   Occupational History     Employer: [employer]   Tobacco Use    Smoking status: Current Every Day Smoker     Packs/day: 0 50     Years: 34 00     Pack years: 17 00     Types: Cigarettes    Smokeless tobacco: Never Used    Tobacco comment: Pt would like counseling   Vaping Use    Vaping Use: Never used   Substance and Sexual Activity    Alcohol use: No    Drug use: No    Sexual activity: Not Currently     Partners: Female   Other Topics Concern    Not on file   Social History Narrative    Denies pmh     Social Determinants of Health     Financial Resource Strain: Low Risk     Difficulty of Paying Living Expenses: Not hard at all   Food Insecurity: No Food Insecurity    Worried About Running Out of Food in the Last Year: Never true    Kathy of Food in the Last Year: Never true   Transportation Needs: No Transportation Needs    Lack of Transportation (Medical): No    Lack of Transportation (Non-Medical): No   Physical Activity:     Days of Exercise per Week:     Minutes of Exercise per Session:    Stress: Stress Concern Present    Feeling of Stress :  To some extent   Social Connections:     Frequency of Communication with Friends and Family:     Frequency of Social Gatherings with Friends and Family:     Attends Mosque Services:     Active Member of Clubs or Organizations:     Attends Club or Organization Meetings:     Marital Status:    Intimate Partner Violence:     Fear of Current or Ex-Partner:     Emotionally Abused:     Physically Abused:     Sexually Abused:       Family History   Problem Relation Age of Onset    Pancreatic cancer Mother     COPD Father     Heart attack Brother 64        decreased    Narcolepsy Brother     Drug abuse Brother     Alcohol abuse Brother      Past Surgical History:   Procedure Laterality Date    BACK SURGERY  2015    Lumbar    COLONOSCOPY      KNEE SURGERY      right    LUMBAR FUSION      last assessed: 3/27/2015    TN TREAT TIBIAL SHAFT FX, INTRAMED IMPLANT Left 3/16/2017    Procedure: INSERTION NAIL IM TIBIA;  Surgeon: Donita Bundy DO;  Location: [facility];  Service: Orthopedics       Current Outpatient Medications:     albuterol (PROVENTIL HFA,VENTOLIN HFA) 90 mcg/act inhaler, Inhale 2 puffs every 6 (six) hours as needed for shortness of breath, Disp: 1 g, Rfl: 2    atorvastatin (LIPITOR) 40 mg tablet, Take 1 tablet (40 mg total) by mouth daily, Disp: 90 tablet, Rfl: 3    hydrochlorothiazide (HYDRODIURIL) 12 5 mg tablet, Take 1 tablet (12 5 mg total) by mouth daily, Disp: 30 tablet, Rfl: 11    lisinopril (ZESTRIL) 20 mg tablet, Take 1 tablet (20 mg total) by mouth daily (Patient taking differently: Take 40 mg by mouth daily ), Disp: 90 tablet, Rfl: 3    LORazepam (ATIVAN) 1 mg tablet, Take 1 tablet (1 mg total) by mouth 2 (two) times a day as needed for anxiety, Disp: 60 tablet, Rfl: 0    QUEtiapine (SEROquel) 300 mg tablet, Take 1 tablet (300 mg total) by mouth daily at bedtime, Disp: 90 tablet, Rfl: 2    venlafaxine (EFFEXOR-XR) 150 mg 24 hr capsule, Take 1 capsule (150 mg total) by mouth daily, Disp: 90 capsule, Rfl: 1    diltiazem (Cardizem CD) 240 mg 24 hr capsule, Take 1 capsule (240 mg total) by mouth daily (Patient not taking: Reported on 2021), Disp: 90 capsule, Rfl: 3  No Known Allergies    Labs:  Hospital Outpatient Visit on 2021   Component Date Value    Protocol Name 2021 LALA     Time In Exercise Phase 2021 00:00:00     MAX  SYSTOLIC BP  637     Max Diastolic Bp  365     Max Heart Rate 2021 116     Max Predicted Heart Rate 2021 165     Reason for Termination 2021 Test Complete     Test Indication 2021 SHORTNESS OF BREATH, ABNORMAL EKG     Target Hr Formular 2021 (220 - Age)*85%     Chest Pain Statement 2021 none    Hospital Outpatient Visit on 2021   Component Date Value    Protocol Name 2021 LALA     Time In Exercise Phase 2021 00:00:00     MAX  SYSTOLIC BP  061     Max Diastolic Bp  958     Max Heart Rate 2021 117     Max Predicted Heart Rate 2021 166     Reason for Termination 2021 TEST CANCELLED HYPERTENSION     Test Indication 2021 Dyspnea with Exercise     Target Hr Formular 2021 (220 - Age)*85%      Imaging: Stress strip    Result Date: 2021  Narrative: Confirmed by Jefferson Cuellar ([id number]),  Higinio Faith ([id number]) on 2021 3:10:40 PM    Echo stress test w contrast if indicated    Result Date: 2021  Narrative: [address] [place] [address] [address] Yousif [address]   [place], [address] [phone] Study date:  16-Aug-2021 Patient: Vidal Barroso MR number: [id number] Account number: [de-identified] : 1965 Age: 55 years Gender: Male Study date: 16-Aug-2021 Status: Outpatient Location: [facility] lab Height: 70 in Weight: 267 lb BP: 170// 110 mmHg Indications: Detection of coronary artery disease  Diagnosis: E78 5 - Hyperlipidemia, unspecified, I10  - Essential (primary) hypertension, R06 00 - Dyspnea, unspecified Sonographer:  [address] Primary Physician:  SYEDA Mcgovern Referring Physician:  Handy Day MD Group:  [facility] RN:  Rosario Son RN Report Prepared By:  Rosario Son RN Interpreting Physician:  JENNA Morgan  SUMMARY STRESS RESULTS: Resting echo images were obtained  Exercise portion of stress echocardiogram was not completed due to elevated resting blood pressure  Initial blood pressure was 170/110  Repeat blood pressure obtained after rest images acquired with reading of 166/112  Exercise portion of study aborted per Dr Ally Collazo  BASELINE: There were no regional wall motion abnormalities  Left ventricular size was normal  Overall left ventricular systolic function was normal  Estimated left ventricular ejection fraction was 60 %   The patient was found to have significantly uncontrolled baseline hypertension and the stress test was canceled as diastolic blood pressure was greater than 110 mm of Hg persistently  Recommend intensification of antihypertensive regimen with plan to reschedule stress echo at a later date once blood pressure under better control  HISTORY: The patient is a 55year old  male  Chest pain status: no chest pain  Other symptoms: dyspnea  Coronary artery disease risk factors: dyslipidemia, hypertension, smoking, and family history of coronary artery disease  Cardiovascular history: arrhythmia(tachycardia, hx atrial flutter s/p cardioversion) Co-morbidity: history of lung disease(asthma) and obesity  Medications: include a calcium channel blocker, an ACE inhibitor/ARB, a diuretic, and a lipid lowering agent  Previous test results: abnormal ECG   PHYSICAL EXAM: Baseline physical exam screening: no wheezes audible, no audible heart murmur, tachycardia REST ECG: Normal sinus rhythm With poor anterior R-wave progression, cannot rule out age undetermined anterior infarct, nonspecific ST T-wave abnormalities in the inferior leads  PROCEDURE: The study was performed at the Stress lab  STRESS RESULTS: Resting echo images were obtained  Exercise portion of stress echocardiogram was not completed due to elevated resting blood pressure  Initial blood pressure was 170/110  Repeat blood pressure obtained after rest images acquired with reading of 166/112  Exercise portion of study aborted per Dr Courtney Fry  STRESS 2D ECHO RESULTS: BASELINE: There were no regional wall motion abnormalities  Left ventricular size was normal  Overall left ventricular systolic function was normal  Estimated left ventricular ejection fraction was 60 %   The patient was found to have significantly uncontrolled baseline hypertension and the stress test was canceled as diastolic blood pressure was greater than 110 mm of Hg persistently  Recommend intensification of antihypertensive regimen with plan to reschedule stress echo at a later date once blood pressure under better control  Prepared and electronically signed by JENNA Altman  Signed 16-Aug-2021 18:46:58       Review of Systems:  Review of Systems   Neurological: Positive for dizziness, light-headedness and headaches  Psychiatric/Behavioral: The patient is nervous/anxious  All other systems reviewed and are negative  Physical Exam:  Physical Exam  Vitals reviewed  Constitutional:       Appearance: He is obese  HENT:      Head: Normocephalic  Eyes:      Pupils: Pupils are equal, round, and reactive to light  Cardiovascular:      Rate and Rhythm: Regular rhythm  Tachycardia present  Pulses: Normal pulses  Heart sounds: Normal heart sounds     Pulmonary:      Effort: Pulmonary effort is normal       Breath sounds: Normal breath sounds  Abdominal:      General: Bowel sounds are normal       Palpations: Abdomen is soft  Musculoskeletal:         General: Normal range of motion  Cervical back: Normal range of motion and neck supple  Right lower leg: No edema  Left lower leg: No edema  Skin:     General: Skin is warm and dry  Capillary Refill: Capillary refill takes less than 2 seconds  Neurological:      General: No focal deficit present  Mental Status: He is alert and oriented to person, place, and time  Psychiatric:         Mood and Affect: Mood normal          Behavior: Behavior normal          AP  BPM    Discussion/Summary:  1  Hypertension- /126,  BPM, start Coreg 6 25mg BID with meal, continue on Lisinopril 40mg daily, HCTZ 12 5mg daily, BMP this week to  Evaluate electrolytes and renal function  Creatinine stable will increase hydrochlorothiazide to 25 mg daily  Instructed on continued home BP and HR monitoring  Continue on a 2gm sodium diet  Effexor XR possibly contributing to HTN  Adrianna Bonnie will discuss possibly changing this medication with his physiatrist   Esperanza hawthorne on further stress testing until BP controlled  2  Paroxysmal Atrial flutter 10/21/20, sp cardioversion RRR Not on AC, on ASA 81mg daily, [id number]   3  Dyslipidemia 3/23/21 , , HDL 41, - continue on Lipitor 40mg daily, low fat low cholesterol diet, follow up CMP in one month  4  Tobacco abuse continues to smoke 1/2 pack a day, actively working on smoking cessation  5  CKD III vs CAROLYN on 3/23/21 creat 1 41, GFR 56- follow up BMP   6  Elevated LFT's  3/23/21 ,  - CMP in near future, follow up with PCP  7  MDD followed by Physiatry On Effexor XR and Seroquel  8  Obesity BMI 39 3- counseled on weight loss, ambulatory referral to sleep medicine R/O sleep apnea

## 2021-08-24 NOTE — PATIENT INSTRUCTIONS
2gm sodium diet, low fat low cholesterol diet, eating fresh is best    Call [phone] if you are taking the Diltiazem/Cardizem     Start Coreg 6 25mg BID with meals today   BMP this week   Ambulatory referral to sleep specialist

## 2021-08-25 ENCOUNTER — LAB (OUTPATIENT)
Dept: LAB | Facility: HOSPITAL | Age: 56
End: 2021-08-25
Payer: COMMERCIAL

## 2021-08-25 LAB
ANION GAP SERPL CALCULATED.3IONS-SCNC: 10 MMOL/L (ref 4–13)
BUN SERPL-MCNC: 25 MG/DL (ref 5–25)
CALCIUM SERPL-MCNC: 9.6 MG/DL (ref 8.3–10.1)
CHLORIDE SERPL-SCNC: 101 MMOL/L (ref 100–108)
CO2 SERPL-SCNC: 26 MMOL/L (ref 21–32)
CREAT SERPL-MCNC: 1.35 MG/DL (ref 0.6–1.3)
GFR SERPL CREATININE-BSD FRML MDRD: 59 ML/MIN/1.73SQ M
GLUCOSE SERPL-MCNC: 120 MG/DL (ref 65–140)
POTASSIUM SERPL-SCNC: 4.1 MMOL/L (ref 3.5–5.3)
SODIUM SERPL-SCNC: 137 MMOL/L (ref 136–145)

## 2021-08-25 PROCEDURE — 80048 BASIC METABOLIC PNL TOTAL CA: CPT | Performed by: NURSE PRACTITIONER

## 2021-08-25 PROCEDURE — 36415 COLL VENOUS BLD VENIPUNCTURE: CPT | Performed by: NURSE PRACTITIONER

## 2021-09-02 ENCOUNTER — TELEPHONE (OUTPATIENT)
Dept: FAMILY MEDICINE CLINIC | Facility: CLINIC | Age: 56
End: 2021-09-02

## 2021-09-07 ENCOUNTER — APPOINTMENT (OUTPATIENT)
Dept: LAB | Facility: CLINIC | Age: 56
End: 2021-09-07
Payer: COMMERCIAL

## 2021-09-07 ENCOUNTER — OFFICE VISIT (OUTPATIENT)
Dept: CARDIOLOGY CLINIC | Facility: CLINIC | Age: 56
End: 2021-09-07
Payer: COMMERCIAL

## 2021-09-07 VITALS
BODY MASS INDEX: 39.8 KG/M2 | SYSTOLIC BLOOD PRESSURE: 140 MMHG | OXYGEN SATURATION: 98 % | WEIGHT: 278 LBS | HEIGHT: 70 IN | HEART RATE: 98 BPM | DIASTOLIC BLOOD PRESSURE: 110 MMHG

## 2021-09-07 DIAGNOSIS — D58.2 ELEVATED HEMOGLOBIN (HCC): ICD-10-CM

## 2021-09-07 DIAGNOSIS — R74.8 ELEVATED LIVER ENZYMES: ICD-10-CM

## 2021-09-07 DIAGNOSIS — K76.0 STEATOSIS OF LIVER: ICD-10-CM

## 2021-09-07 DIAGNOSIS — N18.31 STAGE 3A CHRONIC KIDNEY DISEASE (HCC): ICD-10-CM

## 2021-09-07 DIAGNOSIS — E78.5 DYSLIPIDEMIA WITH ELEVATED LOW DENSITY LIPOPROTEIN (LDL) CHOLESTEROL AND ABNORMALLY LOW HIGH DENSITY LIPOPROTEIN CHOLESTEROL: ICD-10-CM

## 2021-09-07 DIAGNOSIS — E66.9 OBESITY (BMI 35.0-39.9 WITHOUT COMORBIDITY): ICD-10-CM

## 2021-09-07 DIAGNOSIS — I10 HYPERTENSION, UNSPECIFIED TYPE: Primary | ICD-10-CM

## 2021-09-07 DIAGNOSIS — I48.92 ATRIAL FLUTTER, UNSPECIFIED TYPE (HCC): ICD-10-CM

## 2021-09-07 DIAGNOSIS — K21.9 GASTROESOPHAGEAL REFLUX DISEASE, UNSPECIFIED WHETHER ESOPHAGITIS PRESENT: ICD-10-CM

## 2021-09-07 LAB
ALBUMIN SERPL BCP-MCNC: 3.6 G/DL (ref 3.5–5)
ALP SERPL-CCNC: 149 U/L (ref 46–116)
ALT SERPL W P-5'-P-CCNC: 136 U/L (ref 12–78)
ANION GAP SERPL CALCULATED.3IONS-SCNC: 6 MMOL/L (ref 4–13)
AST SERPL W P-5'-P-CCNC: 65 U/L (ref 5–45)
BASOPHILS # BLD AUTO: 0.05 THOUSANDS/ΜL (ref 0–0.1)
BASOPHILS NFR BLD AUTO: 1 % (ref 0–1)
BILIRUB SERPL-MCNC: 0.7 MG/DL (ref 0.2–1)
BUN SERPL-MCNC: 18 MG/DL (ref 5–25)
CALCIUM SERPL-MCNC: 9 MG/DL (ref 8.3–10.1)
CHLORIDE SERPL-SCNC: 99 MMOL/L (ref 100–108)
CO2 SERPL-SCNC: 30 MMOL/L (ref 21–32)
CREAT SERPL-MCNC: 1.52 MG/DL (ref 0.6–1.3)
EOSINOPHIL # BLD AUTO: 0.16 THOUSAND/ΜL (ref 0–0.61)
EOSINOPHIL NFR BLD AUTO: 2 % (ref 0–6)
ERYTHROCYTE [DISTWIDTH] IN BLOOD BY AUTOMATED COUNT: 13.2 % (ref 11.6–15.1)
GFR SERPL CREATININE-BSD FRML MDRD: 51 ML/MIN/1.73SQ M
GLUCOSE SERPL-MCNC: 101 MG/DL (ref 65–140)
HCT VFR BLD AUTO: 50.2 % (ref 36.5–49.3)
HGB BLD-MCNC: 16.8 G/DL (ref 12–17)
IMM GRANULOCYTES # BLD AUTO: 0.05 THOUSAND/UL (ref 0–0.2)
IMM GRANULOCYTES NFR BLD AUTO: 1 % (ref 0–2)
LYMPHOCYTES # BLD AUTO: 1.26 THOUSANDS/ΜL (ref 0.6–4.47)
LYMPHOCYTES NFR BLD AUTO: 15 % (ref 14–44)
MCH RBC QN AUTO: 34.5 PG (ref 26.8–34.3)
MCHC RBC AUTO-ENTMCNC: 33.5 G/DL (ref 31.4–37.4)
MCV RBC AUTO: 103 FL (ref 82–98)
MONOCYTES # BLD AUTO: 0.54 THOUSAND/ΜL (ref 0.17–1.22)
MONOCYTES NFR BLD AUTO: 6 % (ref 4–12)
NEUTROPHILS # BLD AUTO: 6.45 THOUSANDS/ΜL (ref 1.85–7.62)
NEUTS SEG NFR BLD AUTO: 75 % (ref 43–75)
NRBC BLD AUTO-RTO: 0 /100 WBCS
PLATELET # BLD AUTO: 205 THOUSANDS/UL (ref 149–390)
PMV BLD AUTO: 11.1 FL (ref 8.9–12.7)
POTASSIUM SERPL-SCNC: 4.5 MMOL/L (ref 3.5–5.3)
PROT SERPL-MCNC: 6.9 G/DL (ref 6.4–8.2)
RBC # BLD AUTO: 4.87 MILLION/UL (ref 3.88–5.62)
SODIUM SERPL-SCNC: 135 MMOL/L (ref 136–145)
WBC # BLD AUTO: 8.51 THOUSAND/UL (ref 4.31–10.16)

## 2021-09-07 PROCEDURE — 80053 COMPREHEN METABOLIC PANEL: CPT

## 2021-09-07 PROCEDURE — 36415 COLL VENOUS BLD VENIPUNCTURE: CPT

## 2021-09-07 PROCEDURE — 3077F SYST BP >= 140 MM HG: CPT | Performed by: NURSE PRACTITIONER

## 2021-09-07 PROCEDURE — 99215 OFFICE O/P EST HI 40 MIN: CPT | Performed by: NURSE PRACTITIONER

## 2021-09-07 PROCEDURE — 3008F BODY MASS INDEX DOCD: CPT | Performed by: NURSE PRACTITIONER

## 2021-09-07 PROCEDURE — 85025 COMPLETE CBC W/AUTO DIFF WBC: CPT

## 2021-09-07 PROCEDURE — 3080F DIAST BP >= 90 MM HG: CPT | Performed by: NURSE PRACTITIONER

## 2021-09-07 RX ORDER — OMEPRAZOLE 40 MG/1
40 CAPSULE, DELAYED RELEASE ORAL DAILY
Qty: 60 CAPSULE | Refills: 3 | Status: SHIPPED | OUTPATIENT
Start: 2021-09-07 | End: 2022-06-07

## 2021-09-07 RX ORDER — CARVEDILOL 6.25 MG/1
12.5 TABLET ORAL 2 TIMES DAILY WITH MEALS
Qty: 60 TABLET | Refills: 3 | Status: SHIPPED | OUTPATIENT
Start: 2021-09-07 | End: 2021-10-11

## 2021-09-07 NOTE — PROGRESS NOTES
Cardiology Follow Up    Addi Calix   1965  [id number]  [facility]  [address]  [address]  [phone]    1  Hypertension, unspecified type  carvedilol (COREG) 6 25 mg tablet    omeprazole (PriLOSEC) 40 MG capsule    Comprehensive metabolic panel   2  Atrial flutter, unspecified type (HCC)  carvedilol (COREG) 6 25 mg tablet   3  Dyslipidemia with elevated low density lipoprotein (LDL) cholesterol and abnormally low high density lipoprotein cholesterol     4  Stage 3a chronic kidney disease (HCC)     5  Elevated liver enzymes     6  Gastroesophageal reflux disease, unspecified whether esophagitis present     7  Obesity (BMI 35 0-39 9 without comorbidity)         Interval History:   On 8/24/21 Mr Linn Hardwick was seen in our  office for a follow up visit due to continued hypertension  On 8/16/21 echo stress test was not done due to uncontrolled baseline hypertension 170/110  Maria C Azarard to slight headache when his BP is high  He admits to slight lightheadedness, dizziness and slight shortness of breath  He denies CP  Brian Figueredo does not think he is taking Cardizem CD as a part of his medical regimen  He is taking Lisinopril 40mg daily and HCTZ 12 5mg daily  Brian Figueredo denied a high sodium diet  He admits a weight gain of 47 pounds in 6 months since taking care of his father  BMP ordered  Coreg 6 25mg BID stared  9/07/21  Sodium 135 potassium 4 5 BUN 18 creatinine 1 52 GFR 51,  AST 65, , Alkaline phosphatase  149       Mr Linn Hardwick presents to our office for a follow up visit  Home BP running 140/90  He is following a 2gm sodium heart healthy diet  He is experiencing upper mid sternal squeezing pain after taking Lipitor at night  Pain last 30 minutes not associated with dyspnea or diaphoresis    He feels the pain in related to esophagus irritation        HPI:  7/16/21 echo stress test Not performed due to uncontrolled hypertension at rest 220/120 and 228/128,  resting ECG with intermittent isolated premature ventricular contractions at rest, resting LVEF 65% normal left ventricular diastolic function     7/02/30 echo stress test  Uncontrolled baseline hypertension 170/110, exercise portion of study was not done      Patient Active Problem List   Diagnosis    Other insomnia    Depression with anxiety    Kidney stone on right side    Ileus ([address] )    Umbilical hernia without obstruction and without gangrene    Gallbladder calculus without cholecystitis    Tobacco abuse    Closed right ankle fracture    History of ileus    Cavitary pneumonia    Adenoma of left adrenal gland    Anxiety    GERD (gastroesophageal reflux disease)    MDD (major depressive disorder)    History of MRSA infection of lungs    Leukocytosis    Moderate episode of recurrent major depressive disorder (HCC)    Colon cancer screening    Elevated blood pressure reading    Exposure to SARS-associated coronavirus    Asthma    Essential hypertension    Tachycardia    Dyslipidemia with elevated low density lipoprotein (LDL) cholesterol and abnormally low high density lipoprotein cholesterol    Elevated liver enzymes    Elevated hemoglobin (HCC)     Past Medical History:   Diagnosis Date    Anxiety     Asthma     Colon polyp     GERD (gastroesophageal reflux disease)     History of ileus     Insomnia     Lumbar herniated disc     last assessed: 3/27/2015    MDD (major depressive disorder)     Patella fracture     last assessed: 3/27/2015    Tobacco abuse      Social History     Socioeconomic History    Marital status: Single     Spouse name: Not on file    Number of children: Not on file    Years of education: Not on file    Highest education level: Not on file   Occupational History     Employer: [employer]   Tobacco Use    Smoking status: Current Every Day Smoker     Packs/day: 0 50     Years: 34 00     Pack years: 17 00     Types: Cigarettes    Smokeless tobacco: Never Used    Tobacco comment: Pt would like counseling   Vaping Use    Vaping Use: Never used   Substance and Sexual Activity    Alcohol use: No    Drug use: No    Sexual activity: Not Currently     Partners: Female   Other Topics Concern    Not on file   Social History Narrative    Denies pmh     Social Determinants of Health     Financial Resource Strain: Low Risk     Difficulty of Paying Living Expenses: Not hard at all   Food Insecurity: No Food Insecurity    Worried About Running Out of Food in the Last Year: Never true    Kathy of Food in the Last Year: Never true   Transportation Needs: No Transportation Needs    Lack of Transportation (Medical): No    Lack of Transportation (Non-Medical): No   Physical Activity:     Days of Exercise per Week:     Minutes of Exercise per Session:    Stress: Stress Concern Present    Feeling of Stress :  To some extent   Social Connections:     Frequency of Communication with Friends and Family:     Frequency of Social Gatherings with Friends and Family:     Attends Hoahaoism Services:     Active Member of Clubs or Organizations:     Attends Club or Organization Meetings:     Marital Status:    Intimate Partner Violence:     Fear of Current or Ex-Partner:     Emotionally Abused:     Physically Abused:     Sexually Abused:       Family History   Problem Relation Age of Onset    Pancreatic cancer Mother     COPD Father     Heart attack Brother 64        decreased    Narcolepsy Brother     Drug abuse Brother     Alcohol abuse Brother      Past Surgical History:   Procedure Laterality Date    BACK SURGERY  2015    Lumbar    COLONOSCOPY      KNEE SURGERY      right    LUMBAR FUSION      last assessed: 3/27/2015    ND TREAT TIBIAL SHAFT FX, INTRAMED IMPLANT Left 3/16/2017    Procedure: INSERTION NAIL IM TIBIA;  Surgeon: Pearle Meigs, DO;  Location: [facility];  Service: Orthopedics       Current Outpatient Medications:     albuterol (PROVENTIL HFA,VENTOLIN HFA) 90 mcg/act inhaler, Inhale 2 puffs every 6 (six) hours as needed for shortness of breath, Disp: 1 g, Rfl: 2    atorvastatin (LIPITOR) 40 mg tablet, Take 1 tablet (40 mg total) by mouth daily, Disp: 90 tablet, Rfl: 3    carvedilol (COREG) 6 25 mg tablet, Take 1 tablet (6 25 mg total) by mouth 2 (two) times a day with meals, Disp: 60 tablet, Rfl: 3    hydrochlorothiazide (HYDRODIURIL) 12 5 mg tablet, Take 1 tablet (12 5 mg total) by mouth daily, Disp: 30 tablet, Rfl: 11    lisinopril (ZESTRIL) 40 mg tablet, Take 1 tablet (40 mg total) by mouth daily, Disp: 30 tablet, Rfl: 3    LORazepam (ATIVAN) 1 mg tablet, Take 1 tablet (1 mg total) by mouth 2 (two) times a day as needed for anxiety, Disp: 60 tablet, Rfl: 0    QUEtiapine (SEROquel) 300 mg tablet, Take 1 tablet (300 mg total) by mouth daily at bedtime, Disp: 90 tablet, Rfl: 2    venlafaxine (EFFEXOR-XR) 150 mg 24 hr capsule, Take 1 capsule (150 mg total) by mouth daily, Disp: 90 capsule, Rfl: 1  No Known Allergies    Labs:  Appointment on 09/07/2021   Component Date Value    WBC 09/07/2021 8 51     RBC 09/07/2021 4 87     Hemoglobin 09/07/2021 16 8     Hematocrit 09/07/2021 50 2*    MCV 09/07/2021 103*    MCH 09/07/2021 34 5*    MCHC 09/07/2021 33 5     RDW 09/07/2021 13 2     MPV 09/07/2021 11 1     Platelets 99/64/9269 205     nRBC 09/07/2021 0     Neutrophils Relative 09/07/2021 75     Immat GRANS % 09/07/2021 1     Lymphocytes Relative 09/07/2021 15     Monocytes Relative 09/07/2021 6     Eosinophils Relative 09/07/2021 2     Basophils Relative 09/07/2021 1     Neutrophils Absolute 09/07/2021 6 45     Immature Grans Absolute 09/07/2021 0 05     Lymphocytes Absolute 09/07/2021 1 26     Monocytes Absolute 09/07/2021 0 54     Eosinophils Absolute 09/07/2021 0 16     Basophils Absolute 09/07/2021 0 05     Sodium 09/07/2021 135*    Potassium 09/07/2021 4 5     Chloride 09/07/2021 99*    CO2 09/07/2021 30     ANION GAP 09/07/2021 6     BUN 09/07/2021 18     Creatinine 09/07/2021 1 52*    Glucose 09/07/2021 101     Calcium 09/07/2021 9 0     AST 09/07/2021 65*    ALT 09/07/2021 136*    Alkaline Phosphatase 09/07/2021 149*    Total Protein 09/07/2021 6 9     Albumin 09/07/2021 3 6     Total Bilirubin 09/07/2021 0 70     eGFR 09/07/2021 51    Office Visit on 08/24/2021   Component Date Value    Sodium 08/25/2021 137     Potassium 08/25/2021 4 1     Chloride 08/25/2021 101     CO2 08/25/2021 26     ANION GAP 08/25/2021 10     BUN 08/25/2021 25     Creatinine 08/25/2021 1 35*    Glucose 08/25/2021 120     Calcium 08/25/2021 9 6     eGFR 08/25/2021 59    Hospital Outpatient Visit on 08/16/2021   Component Date Value    Protocol Name 08/16/2021 LALA     Time In Exercise Phase 08/16/2021 00:00:00     MAX  SYSTOLIC BP 80/82/9056 525     Max Diastolic Bp 47/62/4386 243     Max Heart Rate 08/16/2021 116     Max Predicted Heart Rate 08/16/2021 165     Reason for Termination 08/16/2021 Test Complete     Test Indication 08/16/2021 SHORTNESS OF BREATH, ABNORMAL EKG     Target Hr Formular 08/16/2021 (220 - Age)*85%     Chest Pain Statement 08/16/2021 none    Hospital Outpatient Visit on 07/16/2021   Component Date Value    Protocol Name 07/16/2021 LALA     Time In Exercise Phase 07/16/2021 00:00:00     MAX   SYSTOLIC BP 66/21/2244 425     Max Diastolic Bp 03/86/1995 964     Max Heart Rate 07/16/2021 117     Max Predicted Heart Rate 07/16/2021 166     Reason for Termination 07/16/2021 TEST CANCELLED HYPERTENSION     Test Indication 07/16/2021 Dyspnea with Exercise     Target Hr Formular 07/16/2021 (220 - Age)*85%      Imaging: Stress strip    Result Date: 8/16/2021  Narrative: Confirmed by Merle Salinas259),  Sana Narayanan ([id number] on 8/16/2021 3:10:40 PM    Echo stress test w contrast if indicated    Result Date: 2021  Narrative: [address] [place] [address]  [place], [address] [facility] [address] [facility] [address] [phone] Study date:  16-Aug-2021 Patient: Mallory Pace MR number: [id number] Account number: [de-identified] : 1965 Age: 55 years Gender: Male Study date: 16-Aug-2021 Status: Outpatient Location: Stress lab Height: 70 in Weight: 267 lb BP: 170// 110 mmHg Indications: Detection of coronary artery disease  Diagnosis: E78 5 - Hyperlipidemia, unspecified, I10  - Essential (primary) hypertension, R06 00 - Dyspnea, unspecified [address]ographer:  [address] Primary Physician:  Mortimer Lather, CRNP Referring Physician:  Lord Antonio MD Group:  [facility] RN:  Jovany Solano RN Report Prepared By:  Jovany Solano RN Interpreting Physician:  JENNA Titus  SUMMARY STRESS RESULTS: Resting echo images were obtained  Exercise portion of stress echocardiogram was not completed due to elevated resting blood pressure  Initial blood pressure was 170/110  Repeat blood pressure obtained after rest images acquired with reading of 166/112  Exercise portion of study aborted per Dr Levi Thompson  BASELINE: There were no regional wall motion abnormalities  Left ventricular size was normal  Overall left ventricular systolic function was normal  Estimated left ventricular ejection fraction was 60 %   The patient was found to have significantly uncontrolled baseline hypertension and the stress test was canceled as diastolic blood pressure was greater than 110 mm of Hg persistently  Recommend intensification of antihypertensive regimen with plan to reschedule stress echo at a later date once blood pressure under better control  HISTORY: The patient is a 55year old  male  Chest pain status: no chest pain  Other symptoms: dyspnea  Coronary artery disease risk factors: dyslipidemia, hypertension, smoking, and family history of coronary artery disease   Cardiovascular history: arrhythmia(tachycardia, hx atrial flutter s/p cardioversion) Co-morbidity: history of lung disease(asthma) and obesity  Medications: include a calcium channel blocker, an ACE inhibitor/ARB, a diuretic, and a lipid lowering agent  Previous test results: abnormal ECG  PHYSICAL EXAM: Baseline physical exam screening: no wheezes audible, no audible heart murmur, tachycardia REST ECG: Normal sinus rhythm With poor anterior R-wave progression, cannot rule out age undetermined anterior infarct, nonspecific ST T-wave abnormalities in the inferior leads  PROCEDURE: The study was performed at the Stress lab  STRESS RESULTS: Resting echo images were obtained  Exercise portion of stress echocardiogram was not completed due to elevated resting blood pressure  Initial blood pressure was 170/110  Repeat blood pressure obtained after rest images acquired with reading of 166/112  Exercise portion of study aborted per Dr Feng Andrews  STRESS 2D ECHO RESULTS: BASELINE: There were no regional wall motion abnormalities  Left ventricular size was normal  Overall left ventricular systolic function was normal  Estimated left ventricular ejection fraction was 60 %   The patient was found to have significantly uncontrolled baseline hypertension and the stress test was canceled as diastolic blood pressure was greater than 110 mm of Hg persistently  Recommend intensification of antihypertensive regimen with plan to reschedule stress echo at a later date once blood pressure under better control  Prepared and electronically signed by JENNA Mullen  Signed 16-Aug-2021 18:46:58       Review of Systems:  Review of Systems   Gastrointestinal:        GERD     All other systems reviewed and are negative  Physical Exam:  Physical Exam  Vitals reviewed  Constitutional:       Appearance: He is obese  HENT:      Head: Normocephalic  Eyes:      Pupils: Pupils are equal, round, and reactive to light     Cardiovascular:      Rate and Rhythm: Normal rate and regular rhythm  Pulses: Normal pulses  Heart sounds: Normal heart sounds  Pulmonary:      Effort: Pulmonary effort is normal       Breath sounds: Normal breath sounds  Abdominal:      General: Bowel sounds are normal       Palpations: Abdomen is soft  Musculoskeletal:         General: Normal range of motion  Cervical back: Normal range of motion and neck supple  Right lower leg: No edema  Left lower leg: No edema  Skin:     General: Skin is warm and dry  Capillary Refill: Capillary refill takes less than 2 seconds  Neurological:      General: No focal deficit present  Mental Status: He is alert and oriented to person, place, and time  Psychiatric:         Mood and Affect: Mood normal          Behavior: Behavior normal          Discussion/Summary:  1  Hypertension RUE sitting 150/100, stop HCTZ due to worsening creat, increase Coreg from 6 25mg BID to 12 5mg BID, continue on Lisinopril 40mg daily  2  Paroxysmal Atrial Flutter 10 21/20 sp DCCV to NSR , [id number] continue on ASA 81mg daily   3  Dyslipidemia 3/23/21 , ,  continue on Lipitor 40mg daily follow up lipid profile after next office visit  Continue on a low fat low cholesterol diet  4  CAROLYN on ? CKD IIIa 8/25/21 creat 1 35, 9/07/21 creat 1 52- stop HCTZ , follow up CMP in 4 weeks   5  Elevated liver enzymes  AST 65, , 6/29/21 ultrasound of abdomen showed enlarged liver, PCP referred Mr Ephraim Laguerre to GI  6  GERD stat Prilosec 40mg daily follow up with GI  7   Obesity BMI 39 89 follow up with sleep medicine R/O sleep apnea

## 2021-09-07 NOTE — PATIENT INSTRUCTIONS
2gm sodium low fat low cholesterol diet, eating fresh is best, fresh fruit, fresh vegetables, lean protein    Coreg 12 5mg BID with meals  Stop HCTZ  Prilosec 40mg daily for 30 days  CMP in 2 weeks    Sleep medicine [phone]

## 2021-10-05 ENCOUNTER — OFFICE VISIT (OUTPATIENT)
Dept: CARDIOLOGY CLINIC | Facility: CLINIC | Age: 56
End: 2021-10-05
Payer: COMMERCIAL

## 2021-10-05 VITALS
SYSTOLIC BLOOD PRESSURE: 136 MMHG | HEART RATE: 91 BPM | WEIGHT: 281.5 LBS | OXYGEN SATURATION: 96 % | HEIGHT: 70 IN | DIASTOLIC BLOOD PRESSURE: 100 MMHG | BODY MASS INDEX: 40.3 KG/M2

## 2021-10-05 DIAGNOSIS — I48.92 ATRIAL FLUTTER, UNSPECIFIED TYPE (HCC): ICD-10-CM

## 2021-10-05 DIAGNOSIS — E66.01 CLASS 3 SEVERE OBESITY WITH SERIOUS COMORBIDITY AND BODY MASS INDEX (BMI) OF 40.0 TO 44.9 IN ADULT, UNSPECIFIED OBESITY TYPE (HCC): ICD-10-CM

## 2021-10-05 DIAGNOSIS — R06.02 SHORTNESS OF BREATH: ICD-10-CM

## 2021-10-05 DIAGNOSIS — N17.9 AKI (ACUTE KIDNEY INJURY) (HCC): ICD-10-CM

## 2021-10-05 DIAGNOSIS — I10 HYPERTENSION, UNSPECIFIED TYPE: Primary | ICD-10-CM

## 2021-10-05 DIAGNOSIS — E78.5 DYSLIPIDEMIA: ICD-10-CM

## 2021-10-05 PROCEDURE — 99215 OFFICE O/P EST HI 40 MIN: CPT | Performed by: NURSE PRACTITIONER

## 2021-10-05 RX ORDER — LISINOPRIL 40 MG/1
40 TABLET ORAL DAILY
Qty: 30 TABLET | Refills: 3
Start: 2021-10-05 | End: 2021-11-15 | Stop reason: HOSPADM

## 2021-10-05 RX ORDER — ALBUTEROL SULFATE 90 UG/1
2 AEROSOL, METERED RESPIRATORY (INHALATION) EVERY 6 HOURS PRN
Qty: 1 G | Refills: 2 | Status: SHIPPED | OUTPATIENT
Start: 2021-10-05 | End: 2022-04-28 | Stop reason: SDUPTHER

## 2021-10-11 ENCOUNTER — TELEPHONE (OUTPATIENT)
Dept: PULMONOLOGY | Facility: CLINIC | Age: 56
End: 2021-10-11

## 2021-10-11 DIAGNOSIS — I48.92 ATRIAL FLUTTER, UNSPECIFIED TYPE (HCC): ICD-10-CM

## 2021-10-11 DIAGNOSIS — I10 HYPERTENSION, UNSPECIFIED TYPE: ICD-10-CM

## 2021-10-11 RX ORDER — CARVEDILOL 6.25 MG/1
12.5 TABLET ORAL 2 TIMES DAILY WITH MEALS
Qty: 60 TABLET | Refills: 3 | Status: SHIPPED | OUTPATIENT
Start: 2021-10-11 | End: 2021-10-22 | Stop reason: SDUPTHER

## 2021-10-14 ENCOUNTER — TELEPHONE (OUTPATIENT)
Dept: CARDIOLOGY CLINIC | Facility: CLINIC | Age: 56
End: 2021-10-14

## 2021-10-14 ENCOUNTER — APPOINTMENT (OUTPATIENT)
Dept: LAB | Facility: HOSPITAL | Age: 56
End: 2021-10-14
Payer: COMMERCIAL

## 2021-10-14 DIAGNOSIS — I10 HYPERTENSION, UNSPECIFIED TYPE: ICD-10-CM

## 2021-10-14 LAB
ALBUMIN SERPL BCP-MCNC: 3.5 G/DL (ref 3.5–5)
ALP SERPL-CCNC: 154 U/L (ref 46–116)
ALT SERPL W P-5'-P-CCNC: 103 U/L (ref 12–78)
ANION GAP SERPL CALCULATED.3IONS-SCNC: 7 MMOL/L (ref 4–13)
AST SERPL W P-5'-P-CCNC: 75 U/L (ref 5–45)
BILIRUB SERPL-MCNC: 0.78 MG/DL (ref 0.2–1)
BUN SERPL-MCNC: 12 MG/DL (ref 5–25)
CALCIUM SERPL-MCNC: 9.2 MG/DL (ref 8.3–10.1)
CHLORIDE SERPL-SCNC: 103 MMOL/L (ref 100–108)
CO2 SERPL-SCNC: 29 MMOL/L (ref 21–32)
CREAT SERPL-MCNC: 1.02 MG/DL (ref 0.6–1.3)
GFR SERPL CREATININE-BSD FRML MDRD: 82 ML/MIN/1.73SQ M
GLUCOSE SERPL-MCNC: 106 MG/DL (ref 65–140)
POTASSIUM SERPL-SCNC: 4.3 MMOL/L (ref 3.5–5.3)
PROT SERPL-MCNC: 7.4 G/DL (ref 6.4–8.2)
SODIUM SERPL-SCNC: 139 MMOL/L (ref 136–145)

## 2021-10-14 PROCEDURE — 36415 COLL VENOUS BLD VENIPUNCTURE: CPT

## 2021-10-14 PROCEDURE — 80053 COMPREHEN METABOLIC PANEL: CPT

## 2021-10-20 ENCOUNTER — OFFICE VISIT (OUTPATIENT)
Dept: PULMONOLOGY | Facility: CLINIC | Age: 56
End: 2021-10-20
Payer: COMMERCIAL

## 2021-10-20 VITALS
WEIGHT: 282 LBS | RESPIRATION RATE: 18 BRPM | OXYGEN SATURATION: 97 % | HEART RATE: 110 BPM | HEIGHT: 70 IN | SYSTOLIC BLOOD PRESSURE: 160 MMHG | BODY MASS INDEX: 40.37 KG/M2 | DIASTOLIC BLOOD PRESSURE: 90 MMHG

## 2021-10-20 DIAGNOSIS — F17.200 TOBACCO USE DISORDER: ICD-10-CM

## 2021-10-20 DIAGNOSIS — Z23 NEEDS FLU SHOT: ICD-10-CM

## 2021-10-20 DIAGNOSIS — E66.01 CLASS 3 SEVERE OBESITY DUE TO EXCESS CALORIES WITH BODY MASS INDEX (BMI) OF 40.0 TO 44.9 IN ADULT, UNSPECIFIED WHETHER SERIOUS COMORBIDITY PRESENT (HCC): ICD-10-CM

## 2021-10-20 DIAGNOSIS — E78.5 DYSLIPIDEMIA: ICD-10-CM

## 2021-10-20 DIAGNOSIS — R06.02 SHORT OF BREATH ON EXERTION: Primary | ICD-10-CM

## 2021-10-20 DIAGNOSIS — F41.8 DEPRESSION WITH ANXIETY: ICD-10-CM

## 2021-10-20 DIAGNOSIS — G47.19 EXCESSIVE DAYTIME SLEEPINESS: ICD-10-CM

## 2021-10-20 DIAGNOSIS — I10 HYPERTENSION, UNSPECIFIED TYPE: ICD-10-CM

## 2021-10-20 PROBLEM — E66.813 CLASS 3 SEVERE OBESITY DUE TO EXCESS CALORIES IN ADULT (HCC): Status: ACTIVE | Noted: 2021-10-20

## 2021-10-20 PROBLEM — J98.4 CAVITARY PNEUMONIA: Status: RESOLVED | Noted: 2018-05-20 | Resolved: 2021-10-20

## 2021-10-20 PROBLEM — J18.9 CAVITARY PNEUMONIA: Status: RESOLVED | Noted: 2018-05-20 | Resolved: 2021-10-20

## 2021-10-20 PROCEDURE — 99215 OFFICE O/P EST HI 40 MIN: CPT | Performed by: INTERNAL MEDICINE

## 2021-10-20 PROCEDURE — 90471 IMMUNIZATION ADMIN: CPT

## 2021-10-20 PROCEDURE — 3080F DIAST BP >= 90 MM HG: CPT | Performed by: INTERNAL MEDICINE

## 2021-10-20 PROCEDURE — 3077F SYST BP >= 140 MM HG: CPT | Performed by: INTERNAL MEDICINE

## 2021-10-20 PROCEDURE — 4004F PT TOBACCO SCREEN RCVD TLK: CPT | Performed by: INTERNAL MEDICINE

## 2021-10-20 PROCEDURE — 90682 RIV4 VACC RECOMBINANT DNA IM: CPT

## 2021-10-20 PROCEDURE — 94010 BREATHING CAPACITY TEST: CPT | Performed by: INTERNAL MEDICINE

## 2021-10-20 PROCEDURE — 99406 BEHAV CHNG SMOKING 3-10 MIN: CPT | Performed by: INTERNAL MEDICINE

## 2021-10-20 RX ORDER — NICOTINE 21 MG/24HR
1 PATCH, TRANSDERMAL 24 HOURS TRANSDERMAL EVERY 24 HOURS
Qty: 28 PATCH | Refills: 5 | Status: SHIPPED | OUTPATIENT
Start: 2021-10-20 | End: 2021-11-15 | Stop reason: HOSPADM

## 2021-10-21 ENCOUNTER — OFFICE VISIT (OUTPATIENT)
Dept: FAMILY MEDICINE CLINIC | Facility: CLINIC | Age: 56
End: 2021-10-21

## 2021-10-21 VITALS
OXYGEN SATURATION: 99 % | WEIGHT: 282 LBS | HEIGHT: 70 IN | TEMPERATURE: 98 F | DIASTOLIC BLOOD PRESSURE: 100 MMHG | RESPIRATION RATE: 18 BRPM | SYSTOLIC BLOOD PRESSURE: 148 MMHG | BODY MASS INDEX: 40.37 KG/M2 | HEART RATE: 104 BPM

## 2021-10-21 DIAGNOSIS — R74.8 ELEVATED LIVER ENZYMES: ICD-10-CM

## 2021-10-21 DIAGNOSIS — K21.9 GASTROESOPHAGEAL REFLUX DISEASE WITHOUT ESOPHAGITIS: ICD-10-CM

## 2021-10-21 DIAGNOSIS — F17.200 TOBACCO USE DISORDER: ICD-10-CM

## 2021-10-21 DIAGNOSIS — R06.02 SHORT OF BREATH ON EXERTION: ICD-10-CM

## 2021-10-21 DIAGNOSIS — I10 PRIMARY HYPERTENSION: Primary | ICD-10-CM

## 2021-10-21 PROCEDURE — 99214 OFFICE O/P EST MOD 30 MIN: CPT | Performed by: FAMILY MEDICINE

## 2021-10-21 PROCEDURE — 3725F SCREEN DEPRESSION PERFORMED: CPT | Performed by: FAMILY MEDICINE

## 2021-10-21 PROCEDURE — 3008F BODY MASS INDEX DOCD: CPT | Performed by: FAMILY MEDICINE

## 2021-10-21 PROCEDURE — 4004F PT TOBACCO SCREEN RCVD TLK: CPT | Performed by: FAMILY MEDICINE

## 2021-10-21 PROCEDURE — 3008F BODY MASS INDEX DOCD: CPT | Performed by: INTERNAL MEDICINE

## 2021-10-21 RX ORDER — CHLORTHALIDONE 25 MG/1
25 TABLET ORAL DAILY
Qty: 90 TABLET | Refills: 1 | Status: SHIPPED | OUTPATIENT
Start: 2021-10-21 | End: 2021-11-15 | Stop reason: HOSPADM

## 2021-10-22 DIAGNOSIS — I10 HYPERTENSION, UNSPECIFIED TYPE: ICD-10-CM

## 2021-10-22 DIAGNOSIS — I48.92 ATRIAL FLUTTER, UNSPECIFIED TYPE (HCC): ICD-10-CM

## 2021-10-22 RX ORDER — CARVEDILOL 6.25 MG/1
12.5 TABLET ORAL 2 TIMES DAILY WITH MEALS
Qty: 120 TABLET | Refills: 3 | Status: SHIPPED | OUTPATIENT
Start: 2021-10-22 | End: 2021-11-15 | Stop reason: HOSPADM

## 2021-10-26 ENCOUNTER — TELEPHONE (OUTPATIENT)
Dept: SLEEP CENTER | Facility: CLINIC | Age: 56
End: 2021-10-26

## 2021-11-04 ENCOUNTER — OFFICE VISIT (OUTPATIENT)
Dept: FAMILY MEDICINE CLINIC | Facility: CLINIC | Age: 56
End: 2021-11-04

## 2021-11-04 ENCOUNTER — OFFICE VISIT (OUTPATIENT)
Dept: GASTROENTEROLOGY | Facility: CLINIC | Age: 56
End: 2021-11-04
Payer: COMMERCIAL

## 2021-11-04 VITALS
TEMPERATURE: 97.2 F | HEIGHT: 70 IN | BODY MASS INDEX: 40.06 KG/M2 | WEIGHT: 279.8 LBS | SYSTOLIC BLOOD PRESSURE: 120 MMHG | HEART RATE: 90 BPM | DIASTOLIC BLOOD PRESSURE: 80 MMHG

## 2021-11-04 VITALS
RESPIRATION RATE: 18 BRPM | BODY MASS INDEX: 40.03 KG/M2 | TEMPERATURE: 97.1 F | DIASTOLIC BLOOD PRESSURE: 98 MMHG | SYSTOLIC BLOOD PRESSURE: 138 MMHG | HEART RATE: 68 BPM | WEIGHT: 279.6 LBS | HEIGHT: 70 IN | OXYGEN SATURATION: 96 %

## 2021-11-04 DIAGNOSIS — Z12.11 COLON CANCER SCREENING: Primary | ICD-10-CM

## 2021-11-04 DIAGNOSIS — R10.13 EPIGASTRIC PAIN: ICD-10-CM

## 2021-11-04 DIAGNOSIS — R74.8 ELEVATED LIVER ENZYMES: ICD-10-CM

## 2021-11-04 DIAGNOSIS — I10 HYPERTENSION, UNSPECIFIED TYPE: Primary | ICD-10-CM

## 2021-11-04 PROCEDURE — 4004F PT TOBACCO SCREEN RCVD TLK: CPT | Performed by: FAMILY MEDICINE

## 2021-11-04 PROCEDURE — 99214 OFFICE O/P EST MOD 30 MIN: CPT | Performed by: PHYSICIAN ASSISTANT

## 2021-11-04 PROCEDURE — 99213 OFFICE O/P EST LOW 20 MIN: CPT | Performed by: FAMILY MEDICINE

## 2021-11-04 RX ORDER — SUCRALFATE ORAL 1 G/10ML
1 SUSPENSION ORAL 4 TIMES DAILY
Qty: 420 ML | Refills: 3 | Status: SHIPPED | OUTPATIENT
Start: 2021-11-04 | End: 2021-11-05

## 2021-11-04 RX ORDER — FAMOTIDINE 40 MG/1
40 TABLET, FILM COATED ORAL DAILY
Qty: 30 TABLET | Refills: 2 | Status: SHIPPED | OUTPATIENT
Start: 2021-11-04 | End: 2021-11-23 | Stop reason: CLARIF

## 2021-11-05 ENCOUNTER — HOSPITAL ENCOUNTER (OUTPATIENT)
Dept: NON INVASIVE DIAGNOSTICS | Facility: CLINIC | Age: 56
Discharge: HOME | End: 2021-11-05
Payer: COMMERCIAL

## 2021-11-05 ENCOUNTER — HOSPITAL ENCOUNTER (INPATIENT)
Facility: HOSPITAL | Age: 56
LOS: 10 days | Discharge: HOME | DRG: 286 | End: 2021-11-15
Attending: EMERGENCY MEDICINE | Admitting: INTERNAL MEDICINE
Payer: COMMERCIAL

## 2021-11-05 ENCOUNTER — APPOINTMENT (INPATIENT)
Dept: RADIOLOGY | Facility: HOSPITAL | Age: 56
DRG: 286 | End: 2021-11-05
Payer: COMMERCIAL

## 2021-11-05 ENCOUNTER — APPOINTMENT (OUTPATIENT)
Dept: LAB | Facility: HOSPITAL | Age: 56
End: 2021-11-05
Attending: FAMILY MEDICINE
Payer: COMMERCIAL

## 2021-11-05 DIAGNOSIS — R74.8 ELEVATED LIVER ENZYMES: ICD-10-CM

## 2021-11-05 DIAGNOSIS — R07.81 RIB PAIN: ICD-10-CM

## 2021-11-05 DIAGNOSIS — F10.20 ALCOHOL DEPENDENCE (HCC): ICD-10-CM

## 2021-11-05 DIAGNOSIS — I73.9 VASOSPASM (HCC): ICD-10-CM

## 2021-11-05 DIAGNOSIS — I10 HYPERTENSION, UNSPECIFIED TYPE: ICD-10-CM

## 2021-11-05 DIAGNOSIS — I46.9 CARDIAC ARREST (HCC): Primary | ICD-10-CM

## 2021-11-05 DIAGNOSIS — I10 PRIMARY HYPERTENSION: ICD-10-CM

## 2021-11-05 DIAGNOSIS — I20.1 PRINZMETAL VARIANT ANGINA (HCC): ICD-10-CM

## 2021-11-05 LAB
ALBUMIN SERPL BCP-MCNC: 3.1 G/DL (ref 3.5–5)
ALBUMIN SERPL BCP-MCNC: 3.7 G/DL (ref 3.5–5)
ALP SERPL-CCNC: 133 U/L (ref 46–116)
ALP SERPL-CCNC: 152 U/L (ref 46–116)
ALT SERPL W P-5'-P-CCNC: 263 U/L (ref 12–78)
ALT SERPL W P-5'-P-CCNC: 94 U/L (ref 12–78)
AMPHETAMINES SERPL QL SCN: NEGATIVE
ANION GAP SERPL CALCULATED.3IONS-SCNC: 5 MMOL/L (ref 4–13)
ANION GAP SERPL CALCULATED.3IONS-SCNC: 6 MMOL/L (ref 4–13)
ANION GAP SERPL CALCULATED.3IONS-SCNC: 8 MMOL/L (ref 4–13)
APTT PPP: 30 SECONDS (ref 23–37)
AST SERPL W P-5'-P-CCNC: 336 U/L (ref 5–45)
AST SERPL W P-5'-P-CCNC: 68 U/L (ref 5–45)
BARBITURATES UR QL: NEGATIVE
BASE EX.OXY STD BLDV CALC-SCNC: 83.5 % (ref 60–80)
BASE EXCESS BLDV CALC-SCNC: -3 MMOL/L
BASOPHILS # BLD AUTO: 0.1 THOUSANDS/ΜL (ref 0–0.1)
BASOPHILS NFR BLD AUTO: 1 % (ref 0–1)
BENZODIAZ UR QL: POSITIVE
BILIRUB SERPL-MCNC: 1.11 MG/DL (ref 0.2–1)
BILIRUB SERPL-MCNC: 1.24 MG/DL (ref 0.2–1)
BUN SERPL-MCNC: 17 MG/DL (ref 5–25)
BUN SERPL-MCNC: 20 MG/DL (ref 5–25)
BUN SERPL-MCNC: 22 MG/DL (ref 5–25)
CALCIUM ALBUM COR SERPL-MCNC: 9.2 MG/DL (ref 8.3–10.1)
CALCIUM SERPL-MCNC: 8.2 MG/DL (ref 8.3–10.1)
CALCIUM SERPL-MCNC: 8.5 MG/DL (ref 8.3–10.1)
CALCIUM SERPL-MCNC: 9.1 MG/DL (ref 8.3–10.1)
CHLORIDE SERPL-SCNC: 101 MMOL/L (ref 100–108)
CHLORIDE SERPL-SCNC: 98 MMOL/L (ref 100–108)
CHLORIDE SERPL-SCNC: 99 MMOL/L (ref 100–108)
CO2 SERPL-SCNC: 23 MMOL/L (ref 21–32)
CO2 SERPL-SCNC: 24 MMOL/L (ref 21–32)
CO2 SERPL-SCNC: 28 MMOL/L (ref 21–32)
COCAINE UR QL: NEGATIVE
CREAT SERPL-MCNC: 1.32 MG/DL (ref 0.6–1.3)
CREAT SERPL-MCNC: 1.64 MG/DL (ref 0.6–1.3)
CREAT SERPL-MCNC: 1.78 MG/DL (ref 0.6–1.3)
EOSINOPHIL # BLD AUTO: 0.07 THOUSAND/ΜL (ref 0–0.61)
EOSINOPHIL NFR BLD AUTO: 0 % (ref 0–6)
ERYTHROCYTE [DISTWIDTH] IN BLOOD BY AUTOMATED COUNT: 13.4 % (ref 11.6–15.1)
FERRITIN SERPL-MCNC: 673 NG/ML (ref 8–388)
GFR SERPL CREATININE-BSD FRML MDRD: 42 ML/MIN/1.73SQ M
GFR SERPL CREATININE-BSD FRML MDRD: 46 ML/MIN/1.73SQ M
GFR SERPL CREATININE-BSD FRML MDRD: 60 ML/MIN/1.73SQ M
GLUCOSE P FAST SERPL-MCNC: 111 MG/DL (ref 65–99)
GLUCOSE SERPL-MCNC: 169 MG/DL (ref 65–140)
GLUCOSE SERPL-MCNC: 196 MG/DL (ref 65–140)
HAV IGM SER QL: NORMAL
HBV CORE AB SER QL: NORMAL
HBV CORE IGM SER QL: NORMAL
HBV CORE IGM SER QL: NORMAL
HBV SURFACE AG SER QL: NORMAL
HBV SURFACE AG SER QL: NORMAL
HCO3 BLDV-SCNC: 24.9 MMOL/L (ref 24–30)
HCT VFR BLD AUTO: 45.3 % (ref 36.5–49.3)
HCV AB SER QL: NORMAL
HCV AB SER QL: NORMAL
HGB BLD-MCNC: 15.3 G/DL (ref 12–17)
HOROWITZ INDEX BLDA+IHG-RTO: 100 MM[HG]
IMM GRANULOCYTES # BLD AUTO: 0.38 THOUSAND/UL (ref 0–0.2)
IMM GRANULOCYTES NFR BLD AUTO: 2 % (ref 0–2)
INR PPP: 1.13 (ref 0.84–1.19)
IRON SATN MFR SERPL: 28 % (ref 20–50)
IRON SERPL-MCNC: 104 UG/DL (ref 65–175)
LYMPHOCYTES # BLD AUTO: 2.42 THOUSANDS/ΜL (ref 0.6–4.47)
LYMPHOCYTES NFR BLD AUTO: 15 % (ref 14–44)
MAGNESIUM SERPL-MCNC: 2.2 MG/DL (ref 1.6–2.6)
MCH RBC QN AUTO: 35.7 PG (ref 26.8–34.3)
MCHC RBC AUTO-ENTMCNC: 33.8 G/DL (ref 31.4–37.4)
MCV RBC AUTO: 106 FL (ref 82–98)
METHADONE UR QL: NEGATIVE
MONOCYTES # BLD AUTO: 0.86 THOUSAND/ΜL (ref 0.17–1.22)
MONOCYTES NFR BLD AUTO: 5 % (ref 4–12)
NEUTROPHILS # BLD AUTO: 12.75 THOUSANDS/ΜL (ref 1.85–7.62)
NEUTS SEG NFR BLD AUTO: 77 % (ref 43–75)
NRBC BLD AUTO-RTO: 0 /100 WBCS
O2 CT BLDV-SCNC: 17 ML/DL
OPIATES UR QL SCN: NEGATIVE
OXYCODONE+OXYMORPHONE UR QL SCN: NEGATIVE
PCO2 BLDV: 56.6 MM HG (ref 42–50)
PCP UR QL: NEGATIVE
PEEP RESPIRATORY: 8 CM[H2O]
PH BLDV: 7.26 [PH] (ref 7.3–7.4)
PLATELET # BLD AUTO: 268 THOUSANDS/UL (ref 149–390)
PMV BLD AUTO: 11 FL (ref 8.9–12.7)
PO2 BLDV: 51.2 MM HG (ref 35–45)
POTASSIUM SERPL-SCNC: 4 MMOL/L (ref 3.5–5.3)
POTASSIUM SERPL-SCNC: 5 MMOL/L (ref 3.5–5.3)
POTASSIUM SERPL-SCNC: 6.5 MMOL/L (ref 3.5–5.3)
PROT SERPL-MCNC: 6.7 G/DL (ref 6.4–8.2)
PROT SERPL-MCNC: 7.8 G/DL (ref 6.4–8.2)
PROTHROMBIN TIME: 14 SECONDS (ref 11.6–14.5)
RBC # BLD AUTO: 4.29 MILLION/UL (ref 3.88–5.62)
SODIUM SERPL-SCNC: 130 MMOL/L (ref 136–145)
SODIUM SERPL-SCNC: 130 MMOL/L (ref 136–145)
SODIUM SERPL-SCNC: 132 MMOL/L (ref 136–145)
THC UR QL: NEGATIVE
TIBC SERPL-MCNC: 377 UG/DL (ref 250–450)
TROPONIN I SERPL-MCNC: 0.03 NG/ML
VENT AC: 14
VENT- AC: AC
VT SETTING VENT: 500 ML
WBC # BLD AUTO: 16.58 THOUSAND/UL (ref 4.31–10.16)

## 2021-11-05 PROCEDURE — 96374 THER/PROPH/DIAG INJ IV PUSH: CPT

## 2021-11-05 PROCEDURE — C1894 INTRO/SHEATH, NON-LASER: HCPCS | Performed by: INTERNAL MEDICINE

## 2021-11-05 PROCEDURE — 84484 ASSAY OF TROPONIN QUANT: CPT | Performed by: EMERGENCY MEDICINE

## 2021-11-05 PROCEDURE — 83735 ASSAY OF MAGNESIUM: CPT | Performed by: PHYSICIAN ASSISTANT

## 2021-11-05 PROCEDURE — 86704 HEP B CORE ANTIBODY TOTAL: CPT

## 2021-11-05 PROCEDURE — NC001 PR NO CHARGE: Performed by: STUDENT IN AN ORGANIZED HEALTH CARE EDUCATION/TRAINING PROGRAM

## 2021-11-05 PROCEDURE — 36415 COLL VENOUS BLD VENIPUNCTURE: CPT

## 2021-11-05 PROCEDURE — 0BJ08ZZ INSPECTION OF TRACHEOBRONCHIAL TREE, VIA NATURAL OR ARTIFICIAL OPENING ENDOSCOPIC: ICD-10-PCS | Performed by: STUDENT IN AN ORGANIZED HEALTH CARE EDUCATION/TRAINING PROGRAM

## 2021-11-05 PROCEDURE — 83540 ASSAY OF IRON: CPT

## 2021-11-05 PROCEDURE — 85610 PROTHROMBIN TIME: CPT | Performed by: EMERGENCY MEDICINE

## 2021-11-05 PROCEDURE — 93458 L HRT ARTERY/VENTRICLE ANGIO: CPT | Performed by: INTERNAL MEDICINE

## 2021-11-05 PROCEDURE — 84100 ASSAY OF PHOSPHORUS: CPT | Performed by: PHYSICIAN ASSISTANT

## 2021-11-05 PROCEDURE — 82805 BLOOD GASES W/O2 SATURATION: CPT | Performed by: STUDENT IN AN ORGANIZED HEALTH CARE EDUCATION/TRAINING PROGRAM

## 2021-11-05 PROCEDURE — 03HY32Z INSERTION OF MONITORING DEVICE INTO UPPER ARTERY, PERCUTANEOUS APPROACH: ICD-10-PCS | Performed by: STUDENT IN AN ORGANIZED HEALTH CARE EDUCATION/TRAINING PROGRAM

## 2021-11-05 PROCEDURE — 93005 ELECTROCARDIOGRAM TRACING: CPT

## 2021-11-05 PROCEDURE — 80048 BASIC METABOLIC PNL TOTAL CA: CPT | Performed by: PHYSICIAN ASSISTANT

## 2021-11-05 PROCEDURE — C1769 GUIDE WIRE: HCPCS | Performed by: INTERNAL MEDICINE

## 2021-11-05 PROCEDURE — 83605 ASSAY OF LACTIC ACID: CPT | Performed by: PHYSICIAN ASSISTANT

## 2021-11-05 PROCEDURE — 36620 INSERTION CATHETER ARTERY: CPT | Performed by: STUDENT IN AN ORGANIZED HEALTH CARE EDUCATION/TRAINING PROGRAM

## 2021-11-05 PROCEDURE — 82784 ASSAY IGA/IGD/IGG/IGM EACH: CPT

## 2021-11-05 PROCEDURE — 76937 US GUIDE VASCULAR ACCESS: CPT | Performed by: STUDENT IN AN ORGANIZED HEALTH CARE EDUCATION/TRAINING PROGRAM

## 2021-11-05 PROCEDURE — 82330 ASSAY OF CALCIUM: CPT | Performed by: PHYSICIAN ASSISTANT

## 2021-11-05 PROCEDURE — 93350 STRESS TTE ONLY: CPT

## 2021-11-05 PROCEDURE — 80053 COMPREHEN METABOLIC PANEL: CPT | Performed by: EMERGENCY MEDICINE

## 2021-11-05 PROCEDURE — 99291 CRITICAL CARE FIRST HOUR: CPT | Performed by: STUDENT IN AN ORGANIZED HEALTH CARE EDUCATION/TRAINING PROGRAM

## 2021-11-05 PROCEDURE — 99285 EMERGENCY DEPT VISIT HI MDM: CPT

## 2021-11-05 PROCEDURE — 4A023N7 MEASUREMENT OF CARDIAC SAMPLING AND PRESSURE, LEFT HEART, PERCUTANEOUS APPROACH: ICD-10-PCS | Performed by: INTERNAL MEDICINE

## 2021-11-05 PROCEDURE — 71045 X-RAY EXAM CHEST 1 VIEW: CPT

## 2021-11-05 PROCEDURE — 99152 MOD SED SAME PHYS/QHP 5/>YRS: CPT | Performed by: INTERNAL MEDICINE

## 2021-11-05 PROCEDURE — 36556 INSERT NON-TUNNEL CV CATH: CPT | Performed by: STUDENT IN AN ORGANIZED HEALTH CARE EDUCATION/TRAINING PROGRAM

## 2021-11-05 PROCEDURE — 80053 COMPREHEN METABOLIC PANEL: CPT

## 2021-11-05 PROCEDURE — 80307 DRUG TEST PRSMV CHEM ANLYZR: CPT | Performed by: STUDENT IN AN ORGANIZED HEALTH CARE EDUCATION/TRAINING PROGRAM

## 2021-11-05 PROCEDURE — 4A133J1 MONITORING OF ARTERIAL PULSE, PERIPHERAL, PERCUTANEOUS APPROACH: ICD-10-PCS | Performed by: STUDENT IN AN ORGANIZED HEALTH CARE EDUCATION/TRAINING PROGRAM

## 2021-11-05 PROCEDURE — 83550 IRON BINDING TEST: CPT

## 2021-11-05 PROCEDURE — 99153 MOD SED SAME PHYS/QHP EA: CPT | Performed by: INTERNAL MEDICINE

## 2021-11-05 PROCEDURE — 83735 ASSAY OF MAGNESIUM: CPT | Performed by: EMERGENCY MEDICINE

## 2021-11-05 PROCEDURE — 02HV33Z INSERTION OF INFUSION DEVICE INTO SUPERIOR VENA CAVA, PERCUTANEOUS APPROACH: ICD-10-PCS | Performed by: STUDENT IN AN ORGANIZED HEALTH CARE EDUCATION/TRAINING PROGRAM

## 2021-11-05 PROCEDURE — C1887 CATHETER, GUIDING: HCPCS | Performed by: INTERNAL MEDICINE

## 2021-11-05 PROCEDURE — 86038 ANTINUCLEAR ANTIBODIES: CPT

## 2021-11-05 PROCEDURE — 83516 IMMUNOASSAY NONANTIBODY: CPT

## 2021-11-05 PROCEDURE — 85730 THROMBOPLASTIN TIME PARTIAL: CPT | Performed by: EMERGENCY MEDICINE

## 2021-11-05 PROCEDURE — 85025 COMPLETE CBC W/AUTO DIFF WBC: CPT | Performed by: EMERGENCY MEDICINE

## 2021-11-05 PROCEDURE — 3E073GC INTRODUCTION OF OTHER THERAPEUTIC SUBSTANCE INTO CORONARY ARTERY, PERCUTANEOUS APPROACH: ICD-10-PCS | Performed by: INTERNAL MEDICINE

## 2021-11-05 PROCEDURE — 86235 NUCLEAR ANTIGEN ANTIBODY: CPT

## 2021-11-05 PROCEDURE — 94760 N-INVAS EAR/PLS OXIMETRY 1: CPT

## 2021-11-05 PROCEDURE — 85027 COMPLETE CBC AUTOMATED: CPT | Performed by: PHYSICIAN ASSISTANT

## 2021-11-05 PROCEDURE — 82728 ASSAY OF FERRITIN: CPT

## 2021-11-05 PROCEDURE — 94002 VENT MGMT INPAT INIT DAY: CPT

## 2021-11-05 PROCEDURE — 96375 TX/PRO/DX INJ NEW DRUG ADDON: CPT

## 2021-11-05 PROCEDURE — 86255 FLUORESCENT ANTIBODY SCREEN: CPT

## 2021-11-05 PROCEDURE — 4A133B1 MONITORING OF ARTERIAL PRESSURE, PERIPHERAL, PERCUTANEOUS APPROACH: ICD-10-PCS | Performed by: STUDENT IN AN ORGANIZED HEALTH CARE EDUCATION/TRAINING PROGRAM

## 2021-11-05 PROCEDURE — 99285 EMERGENCY DEPT VISIT HI MDM: CPT | Performed by: EMERGENCY MEDICINE

## 2021-11-05 PROCEDURE — 80074 ACUTE HEPATITIS PANEL: CPT

## 2021-11-05 PROCEDURE — 5A1955Z RESPIRATORY VENTILATION, GREATER THAN 96 CONSECUTIVE HOURS: ICD-10-PCS | Performed by: EMERGENCY MEDICINE

## 2021-11-05 PROCEDURE — 82103 ALPHA-1-ANTITRYPSIN TOTAL: CPT

## 2021-11-05 PROCEDURE — 86256 FLUORESCENT ANTIBODY TITER: CPT

## 2021-11-05 PROCEDURE — 82390 ASSAY OF CERULOPLASMIN: CPT

## 2021-11-05 PROCEDURE — C1760 CLOSURE DEV, VASC: HCPCS | Performed by: INTERNAL MEDICINE

## 2021-11-05 PROCEDURE — B211YZZ FLUOROSCOPY OF MULTIPLE CORONARY ARTERIES USING OTHER CONTRAST: ICD-10-PCS | Performed by: INTERNAL MEDICINE

## 2021-11-05 DEVICE — PERCLOSE™ PROSTYLE™ SUTURE-MEDIATED CLOSURE AND REPAIR SYSTEM
Type: IMPLANTABLE DEVICE | Status: FUNCTIONAL
Brand: PERCLOSE™ PROSTYLE™

## 2021-11-05 RX ORDER — FENTANYL CITRATE 50 UG/ML
100 INJECTION, SOLUTION INTRAMUSCULAR; INTRAVENOUS ONCE
Status: COMPLETED | OUTPATIENT
Start: 2021-11-06 | End: 2021-11-06

## 2021-11-05 RX ORDER — HEPARIN SODIUM 5000 [USP'U]/ML
5000 INJECTION, SOLUTION INTRAVENOUS; SUBCUTANEOUS EVERY 8 HOURS SCHEDULED
Status: DISCONTINUED | OUTPATIENT
Start: 2021-11-05 | End: 2021-11-15 | Stop reason: HOSPADM

## 2021-11-05 RX ORDER — PHENYLEPHRINE HYDROCHLORIDE 10 MG/ML
INJECTION INTRAVENOUS
Status: COMPLETED
Start: 2021-11-05 | End: 2021-11-05

## 2021-11-05 RX ORDER — ALBUTEROL SULFATE 2.5 MG/3ML
2.5 SOLUTION RESPIRATORY (INHALATION) EVERY 4 HOURS PRN
Status: DISCONTINUED | OUTPATIENT
Start: 2021-11-05 | End: 2021-11-15 | Stop reason: HOSPADM

## 2021-11-05 RX ORDER — NITROGLYCERIN 20 MG/100ML
INJECTION INTRAVENOUS
Status: COMPLETED | OUTPATIENT
Start: 2021-11-05 | End: 2021-11-05

## 2021-11-05 RX ORDER — EPINEPHRINE 1 MG/ML
0.1 INJECTION, SOLUTION, CONCENTRATE INTRAVENOUS ONCE
Status: DISCONTINUED | OUTPATIENT
Start: 2021-11-05 | End: 2021-11-06 | Stop reason: HOSPADM

## 2021-11-05 RX ORDER — LIDOCAINE HYDROCHLORIDE 10 MG/ML
INJECTION, SOLUTION EPIDURAL; INFILTRATION; INTRACAUDAL; PERINEURAL
Status: COMPLETED
Start: 2021-11-05 | End: 2021-11-06

## 2021-11-05 RX ORDER — ACETAMINOPHEN 325 MG/1
975 TABLET ORAL EVERY 6 HOURS
Status: DISCONTINUED | OUTPATIENT
Start: 2021-11-05 | End: 2021-11-10

## 2021-11-05 RX ORDER — NOREPINEPHRINE BITARTRATE 1 MG/ML
INJECTION, SOLUTION INTRAVENOUS
Status: COMPLETED
Start: 2021-11-05 | End: 2021-11-05

## 2021-11-05 RX ORDER — BUSPIRONE HYDROCHLORIDE 10 MG/1
30 TABLET ORAL EVERY 8 HOURS
Status: DISCONTINUED | OUTPATIENT
Start: 2021-11-05 | End: 2021-11-05

## 2021-11-05 RX ORDER — HEPARIN SODIUM 1000 [USP'U]/ML
INJECTION, SOLUTION INTRAVENOUS; SUBCUTANEOUS CODE/TRAUMA/SEDATION MEDICATION
Status: COMPLETED | OUTPATIENT
Start: 2021-11-05 | End: 2021-11-05

## 2021-11-05 RX ORDER — CHLORHEXIDINE GLUCONATE 0.12 MG/ML
15 RINSE ORAL EVERY 12 HOURS SCHEDULED
Status: DISCONTINUED | OUTPATIENT
Start: 2021-11-05 | End: 2021-11-11

## 2021-11-05 RX ORDER — ATROPINE SULFATE 0.1 MG/ML
INJECTION, SOLUTION ENDOTRACHEAL; INTRAMUSCULAR; INTRAVENOUS; SUBCUTANEOUS AS NEEDED
Status: DISCONTINUED | OUTPATIENT
Start: 2021-11-05 | End: 2021-11-05 | Stop reason: HOSPADM

## 2021-11-05 RX ORDER — NITROGLYCERIN 20 MG/100ML
5-200 INJECTION INTRAVENOUS
Status: DISCONTINUED | OUTPATIENT
Start: 2021-11-05 | End: 2021-11-11

## 2021-11-05 RX ORDER — MIDAZOLAM HYDROCHLORIDE 2 MG/2ML
2 INJECTION, SOLUTION INTRAMUSCULAR; INTRAVENOUS ONCE
Status: COMPLETED | OUTPATIENT
Start: 2021-11-05 | End: 2021-11-05

## 2021-11-05 RX ORDER — FENTANYL CITRATE 50 UG/ML
100 INJECTION, SOLUTION INTRAMUSCULAR; INTRAVENOUS EVERY 2 HOUR PRN
Status: DISCONTINUED | OUTPATIENT
Start: 2021-11-05 | End: 2021-11-06

## 2021-11-05 RX ORDER — SODIUM CHLORIDE, SODIUM GLUCONATE, SODIUM ACETATE, POTASSIUM CHLORIDE, MAGNESIUM CHLORIDE, SODIUM PHOSPHATE, DIBASIC, AND POTASSIUM PHOSPHATE .53; .5; .37; .037; .03; .012; .00082 G/100ML; G/100ML; G/100ML; G/100ML; G/100ML; G/100ML; G/100ML
1000 INJECTION, SOLUTION INTRAVENOUS ONCE
Status: COMPLETED | OUTPATIENT
Start: 2021-11-05 | End: 2021-11-05

## 2021-11-05 RX ORDER — LIDOCAINE HYDROCHLORIDE 10 MG/ML
INJECTION, SOLUTION EPIDURAL; INFILTRATION; INTRACAUDAL; PERINEURAL AS NEEDED
Status: DISCONTINUED | OUTPATIENT
Start: 2021-11-05 | End: 2021-11-05 | Stop reason: HOSPADM

## 2021-11-05 RX ORDER — NITROGLYCERIN 20 MG/100ML
INJECTION INTRAVENOUS AS NEEDED
Status: DISCONTINUED | OUTPATIENT
Start: 2021-11-05 | End: 2021-11-05 | Stop reason: HOSPADM

## 2021-11-05 RX ORDER — FENTANYL CITRATE-0.9 % NACL/PF 10 MCG/ML
200 PLASTIC BAG, INJECTION (ML) INTRAVENOUS CONTINUOUS
Status: DISCONTINUED | OUTPATIENT
Start: 2021-11-05 | End: 2021-11-06

## 2021-11-05 RX ORDER — SODIUM CHLORIDE 9 MG/ML
INJECTION, SOLUTION INTRAVENOUS
Status: COMPLETED | OUTPATIENT
Start: 2021-11-05 | End: 2021-11-05

## 2021-11-05 RX ORDER — ETOMIDATE 2 MG/ML
2 INJECTION INTRAVENOUS ONCE
Status: COMPLETED | OUTPATIENT
Start: 2021-11-05 | End: 2021-11-05

## 2021-11-05 RX ORDER — VECURONIUM BROMIDE 1 MG/ML
INJECTION, POWDER, LYOPHILIZED, FOR SOLUTION INTRAVENOUS AS NEEDED
Status: DISCONTINUED | OUTPATIENT
Start: 2021-11-05 | End: 2021-11-05 | Stop reason: HOSPADM

## 2021-11-05 RX ORDER — SODIUM CHLORIDE 9 MG/ML
125 INJECTION, SOLUTION INTRAVENOUS ONCE
Status: DISCONTINUED | OUTPATIENT
Start: 2021-11-05 | End: 2021-11-06 | Stop reason: HOSPADM

## 2021-11-05 RX ORDER — PROPOFOL 10 MG/ML
5-50 INJECTION, EMULSION INTRAVENOUS
Status: DISCONTINUED | OUTPATIENT
Start: 2021-11-05 | End: 2021-11-06

## 2021-11-05 RX ORDER — MIDAZOLAM HYDROCHLORIDE 2 MG/2ML
INJECTION, SOLUTION INTRAMUSCULAR; INTRAVENOUS
Status: COMPLETED
Start: 2021-11-05 | End: 2021-11-05

## 2021-11-05 RX ORDER — AMIODARONE HYDROCHLORIDE 50 MG/ML
2 INJECTION, SOLUTION INTRAVENOUS ONCE
Status: COMPLETED | OUTPATIENT
Start: 2021-11-05 | End: 2021-11-05

## 2021-11-05 RX ORDER — FENTANYL CITRATE 50 UG/ML
INJECTION, SOLUTION INTRAMUSCULAR; INTRAVENOUS AS NEEDED
Status: DISCONTINUED | OUTPATIENT
Start: 2021-11-05 | End: 2021-11-05 | Stop reason: HOSPADM

## 2021-11-05 RX ORDER — FENTANYL CITRATE 50 UG/ML
50 INJECTION, SOLUTION INTRAMUSCULAR; INTRAVENOUS EVERY 2 HOUR PRN
Status: DISCONTINUED | OUTPATIENT
Start: 2021-11-05 | End: 2021-11-05

## 2021-11-05 RX ORDER — MIDAZOLAM HYDROCHLORIDE 1 MG/ML
1 INJECTION INTRAMUSCULAR; INTRAVENOUS ONCE
Status: COMPLETED | OUTPATIENT
Start: 2021-11-05 | End: 2021-11-05

## 2021-11-05 RX ORDER — MIDAZOLAM HYDROCHLORIDE 2 MG/2ML
2 INJECTION, SOLUTION INTRAMUSCULAR; INTRAVENOUS ONCE
Status: COMPLETED | OUTPATIENT
Start: 2021-11-06 | End: 2021-11-06

## 2021-11-05 RX ORDER — SODIUM CHLORIDE 9 MG/ML
100 INJECTION, SOLUTION INTRAVENOUS CONTINUOUS
Status: DISCONTINUED | OUTPATIENT
Start: 2021-11-05 | End: 2021-11-05

## 2021-11-05 RX ORDER — EPINEPHRINE 0.1 MG/ML
SYRINGE (ML) INJECTION AS NEEDED
Status: DISCONTINUED | OUTPATIENT
Start: 2021-11-05 | End: 2021-11-05 | Stop reason: HOSPADM

## 2021-11-05 RX ORDER — MIDAZOLAM HYDROCHLORIDE 2 MG/2ML
INJECTION, SOLUTION INTRAMUSCULAR; INTRAVENOUS AS NEEDED
Status: DISCONTINUED | OUTPATIENT
Start: 2021-11-05 | End: 2021-11-05 | Stop reason: HOSPADM

## 2021-11-05 RX ORDER — FENTANYL CITRATE 50 UG/ML
1 INJECTION, SOLUTION INTRAMUSCULAR; INTRAVENOUS ONCE
Status: COMPLETED | OUTPATIENT
Start: 2021-11-05 | End: 2021-11-05

## 2021-11-05 RX ORDER — EPINEPHRINE 0.1 MG/ML
1 SYRINGE (ML) INJECTION ONCE
Status: COMPLETED | OUTPATIENT
Start: 2021-11-05 | End: 2021-11-05

## 2021-11-05 RX ORDER — HEPARIN SODIUM 1000 [USP'U]/ML
INJECTION, SOLUTION INTRAVENOUS; SUBCUTANEOUS AS NEEDED
Status: DISCONTINUED | OUTPATIENT
Start: 2021-11-05 | End: 2021-11-05 | Stop reason: HOSPADM

## 2021-11-05 RX ORDER — MAGNESIUM SULFATE HEPTAHYDRATE 40 MG/ML
2 INJECTION, SOLUTION INTRAVENOUS EVERY 6 HOURS PRN
Status: DISCONTINUED | OUTPATIENT
Start: 2021-11-05 | End: 2021-11-06

## 2021-11-05 RX ORDER — ATROPINE SULFATE 1 MG/ML
0.1 INJECTION, SOLUTION INTRAMUSCULAR; INTRAVENOUS; SUBCUTANEOUS ONCE
Status: DISCONTINUED | OUTPATIENT
Start: 2021-11-05 | End: 2021-11-06 | Stop reason: HOSPADM

## 2021-11-05 RX ORDER — ATORVASTATIN CALCIUM 40 MG/1
40 TABLET, FILM COATED ORAL DAILY
Status: DISCONTINUED | OUTPATIENT
Start: 2021-11-06 | End: 2021-11-15 | Stop reason: HOSPADM

## 2021-11-05 RX ORDER — MIDAZOLAM HYDROCHLORIDE 2 MG/2ML
2 INJECTION, SOLUTION INTRAMUSCULAR; INTRAVENOUS EVERY 4 HOURS PRN
Status: DISCONTINUED | OUTPATIENT
Start: 2021-11-05 | End: 2021-11-06

## 2021-11-05 RX ORDER — ONDANSETRON 2 MG/ML
4 INJECTION INTRAMUSCULAR; INTRAVENOUS EVERY 6 HOURS PRN
Status: DISCONTINUED | OUTPATIENT
Start: 2021-11-05 | End: 2021-11-15 | Stop reason: HOSPADM

## 2021-11-05 RX ADMIN — MIDAZOLAM HYDROCHLORIDE 2 MG: 2 INJECTION, SOLUTION INTRAMUSCULAR; INTRAVENOUS at 17:28

## 2021-11-05 RX ADMIN — NOREPINEPHRINE BITARTRATE: 1 INJECTION, SOLUTION, CONCENTRATE INTRAVENOUS at 17:29

## 2021-11-05 RX ADMIN — MIDAZOLAM HYDROCHLORIDE 2 MG: 1 INJECTION, SOLUTION INTRAMUSCULAR; INTRAVENOUS at 17:28

## 2021-11-05 RX ADMIN — SODIUM CHLORIDE, SODIUM GLUCONATE, SODIUM ACETATE, POTASSIUM CHLORIDE, MAGNESIUM CHLORIDE, SODIUM PHOSPHATE, DIBASIC, AND POTASSIUM PHOSPHATE 1000 ML: .53; .5; .37; .037; .03; .012; .00082 INJECTION, SOLUTION INTRAVENOUS at 19:11

## 2021-11-05 RX ADMIN — HEPARIN SODIUM 4000 UNITS: 1000 INJECTION INTRAVENOUS; SUBCUTANEOUS at 14:14

## 2021-11-05 RX ADMIN — FENTANYL CITRATE 100 MCG: 0.05 INJECTION, SOLUTION INTRAMUSCULAR; INTRAVENOUS at 22:12

## 2021-11-05 RX ADMIN — NOREPINEPHRINE BITARTRATE 20 MCG/MIN: 1 INJECTION, SOLUTION, CONCENTRATE INTRAVENOUS at 22:11

## 2021-11-05 RX ADMIN — FENTANYL CITRATE 50 MCG: 0.05 INJECTION, SOLUTION INTRAMUSCULAR; INTRAVENOUS at 16:50

## 2021-11-05 RX ADMIN — PHENYLEPHRINE HYDROCHLORIDE: 10 INJECTION INTRAVENOUS at 17:29

## 2021-11-05 RX ADMIN — NOREPINEPHRINE BITARTRATE 15 MCG/MIN: 1 INJECTION, SOLUTION, CONCENTRATE INTRAVENOUS at 17:29

## 2021-11-05 RX ADMIN — SODIUM CHLORIDE 100 ML/HR: 0.9 INJECTION, SOLUTION INTRAVENOUS at 16:51

## 2021-11-05 RX ADMIN — Medication 2 MG/HR: at 20:20

## 2021-11-05 RX ADMIN — HEPARIN SODIUM 5000 UNITS: 5000 INJECTION INTRAVENOUS; SUBCUTANEOUS at 17:33

## 2021-11-05 RX ADMIN — NITROGLYCERIN 20 MCG/MIN: 20 INJECTION INTRAVENOUS at 16:22

## 2021-11-05 RX ADMIN — SODIUM CHLORIDE 1000 ML: 0.9 INJECTION, SOLUTION INTRAVENOUS at 14:14

## 2021-11-06 ENCOUNTER — APPOINTMENT (INPATIENT)
Dept: GASTROENTEROLOGY | Facility: HOSPITAL | Age: 56
DRG: 286 | End: 2021-11-06
Attending: INTERNAL MEDICINE
Payer: COMMERCIAL

## 2021-11-06 ENCOUNTER — APPOINTMENT (INPATIENT)
Dept: RADIOLOGY | Facility: HOSPITAL | Age: 56
DRG: 286 | End: 2021-11-06
Payer: COMMERCIAL

## 2021-11-06 ENCOUNTER — APPOINTMENT (INPATIENT)
Dept: NON INVASIVE DIAGNOSTICS | Facility: HOSPITAL | Age: 56
DRG: 286 | End: 2021-11-06
Payer: COMMERCIAL

## 2021-11-06 PROBLEM — F10.20 ALCOHOL DEPENDENCE (HCC): Status: ACTIVE | Noted: 2021-11-06

## 2021-11-06 LAB
A1AT SERPL-MCNC: 167 MG/DL (ref 101–187)
ACTIN IGG SERPL-ACNC: 11 UNITS (ref 0–19)
ALBUMIN SERPL BCP-MCNC: 3.1 G/DL (ref 3.5–5)
ALP SERPL-CCNC: 133 U/L (ref 46–116)
ALT SERPL W P-5'-P-CCNC: 215 U/L (ref 12–78)
ANION GAP SERPL CALCULATED.3IONS-SCNC: 4 MMOL/L (ref 4–13)
ANION GAP SERPL CALCULATED.3IONS-SCNC: 5 MMOL/L (ref 4–13)
AORTIC ROOT: 3.2 CM
APICAL FOUR CHAMBER EJECTION FRACTION: 68 %
ASCENDING AORTA: 3.7 CM
AST SERPL W P-5'-P-CCNC: 263 U/L (ref 5–45)
ATRIAL RATE: 33 BPM
ATRIAL RATE: 42 BPM
ATRIAL RATE: 72 BPM
BACTERIA UR QL AUTO: ABNORMAL /HPF
BASE EXCESS BLDA CALC-SCNC: -0.6 MMOL/L
BASE EXCESS BLDA CALC-SCNC: -1.7 MMOL/L
BASE EXCESS BLDA CALC-SCNC: -8.4 MMOL/L
BASE EXCESS BLDA CALC-SCNC: 1.4 MMOL/L
BASE EXCESS BLDA CALC-SCNC: 2.2 MMOL/L
BASE EXCESS BLDA CALC-SCNC: 2.5 MMOL/L
BASELINE ST DEPRESSION: 0 MM
BILIRUB SERPL-MCNC: 1.06 MG/DL (ref 0.2–1)
BILIRUB UR QL STRIP: NEGATIVE
BODY TEMPERATURE: 37.2 DEGREES FEHRENHEIT
BODY TEMPERATURE: 37.7 DEGREES FEHRENHEIT
BUN SERPL-MCNC: 21 MG/DL (ref 5–25)
BUN SERPL-MCNC: 23 MG/DL (ref 5–25)
CA-I BLD-SCNC: 1.11 MMOL/L (ref 1.12–1.32)
CALCIUM ALBUM COR SERPL-MCNC: 8.6 MG/DL (ref 8.3–10.1)
CALCIUM SERPL-MCNC: 7.9 MG/DL (ref 8.3–10.1)
CALCIUM SERPL-MCNC: 8.1 MG/DL (ref 8.3–10.1)
CERULOPLASMIN SERPL-MCNC: 24.4 MG/DL (ref 16–31)
CHLORIDE SERPL-SCNC: 101 MMOL/L (ref 100–108)
CHLORIDE SERPL-SCNC: 103 MMOL/L (ref 100–108)
CHOLEST SERPL-MCNC: 184 MG/DL (ref 50–200)
CLARITY UR: ABNORMAL
CO2 SERPL-SCNC: 27 MMOL/L (ref 21–32)
CO2 SERPL-SCNC: 28 MMOL/L (ref 21–32)
COLOR UR: YELLOW
CORTIS SERPL-MCNC: 13.3 UG/DL
CREAT SERPL-MCNC: 1.69 MG/DL (ref 0.6–1.3)
CREAT SERPL-MCNC: 2.12 MG/DL (ref 0.6–1.3)
E WAVE DECELERATION TIME: 258 MS
ENDOMYSIUM IGA SER QL: NEGATIVE
ERYTHROCYTE [DISTWIDTH] IN BLOOD BY AUTOMATED COUNT: 13.7 % (ref 11.6–15.1)
ERYTHROCYTE [DISTWIDTH] IN BLOOD BY AUTOMATED COUNT: 13.8 % (ref 11.6–15.1)
FRACTIONAL SHORTENING: 26 % (ref 28–44)
GFR SERPL CREATININE-BSD FRML MDRD: 34 ML/MIN/1.73SQ M
GFR SERPL CREATININE-BSD FRML MDRD: 45 ML/MIN/1.73SQ M
GLIADIN PEPTIDE IGA SER-ACNC: 6 UNITS (ref 0–19)
GLIADIN PEPTIDE IGG SER-ACNC: 2 UNITS (ref 0–19)
GLUCOSE SERPL-MCNC: 130 MG/DL (ref 65–140)
GLUCOSE SERPL-MCNC: 131 MG/DL (ref 65–140)
GLUCOSE SERPL-MCNC: 149 MG/DL (ref 65–140)
GLUCOSE SERPL-MCNC: 89 MG/DL (ref 65–140)
GLUCOSE UR STRIP-MCNC: NEGATIVE MG/DL
HCO3 BLDA-SCNC: 23 MMOL/L (ref 22–28)
HCO3 BLDA-SCNC: 24.6 MMOL/L (ref 22–28)
HCO3 BLDA-SCNC: 25.4 MMOL/L (ref 22–28)
HCO3 BLDA-SCNC: 25.6 MMOL/L (ref 22–28)
HCO3 BLDA-SCNC: 29 MMOL/L (ref 22–28)
HCO3 BLDA-SCNC: 30.2 MMOL/L (ref 22–28)
HCT VFR BLD AUTO: 46 % (ref 36.5–49.3)
HCT VFR BLD AUTO: 46.5 % (ref 36.5–49.3)
HDLC SERPL-MCNC: 29 MG/DL
HGB BLD-MCNC: 15.2 G/DL (ref 12–17)
HGB BLD-MCNC: 15.4 G/DL (ref 12–17)
HGB UR QL STRIP.AUTO: ABNORMAL
HOROWITZ INDEX BLDA+IHG-RTO: 100 MM[HG]
HOROWITZ INDEX BLDA+IHG-RTO: 60 MM[HG]
I-TIME: 0.8
IGA SERPL-MCNC: 229 MG/DL (ref 90–386)
INR PPP: 0.99 (ref 0.84–1.19)
INTERVENTRICULAR SEPTUM IN DIASTOLE (PARASTERNAL SHORT AXIS VIEW): 2 CM
KETONES UR STRIP-MCNC: ABNORMAL MG/DL
LAAS-AP4: 22.8 CM2
LACTATE SERPL-SCNC: 0.8 MMOL/L (ref 0.5–2)
LACTATE SERPL-SCNC: 1.2 MMOL/L (ref 0.5–2)
LDLC SERPL CALC-MCNC: 94 MG/DL (ref 0–100)
LEFT INTERNAL DIMENSION IN SYSTOLE: 3.2 CM (ref 2.1–4)
LEFT VENTRICULAR INTERNAL DIMENSION IN DIASTOLE: 4.3 CM (ref 10.83–16.15)
LEFT VENTRICULAR POSTERIOR WALL IN END DIASTOLE: 1.4 CM
LEFT VENTRICULAR STROKE VOLUME: 41 ML
LEUKOCYTE ESTERASE UR QL STRIP: ABNORMAL
MAGNESIUM SERPL-MCNC: 2.4 MG/DL (ref 1.6–2.6)
MAGNESIUM SERPL-MCNC: 2.5 MG/DL (ref 1.6–2.6)
MAGNESIUM SERPL-MCNC: 2.6 MG/DL (ref 1.6–2.6)
MAX HR: 140 BPM
MCH RBC QN AUTO: 35.6 PG (ref 26.8–34.3)
MCH RBC QN AUTO: 35.7 PG (ref 26.8–34.3)
MCHC RBC AUTO-ENTMCNC: 33 G/DL (ref 31.4–37.4)
MCHC RBC AUTO-ENTMCNC: 33.1 G/DL (ref 31.4–37.4)
MCV RBC AUTO: 108 FL (ref 82–98)
MCV RBC AUTO: 108 FL (ref 82–98)
MITOCHONDRIA M2 IGG SER-ACNC: <20 UNITS (ref 0–20)
MV E'TISSUE VEL-LAT: 7 CM/S
MV E'TISSUE VEL-SEP: 5 CM/S
MV PEAK A VEL: 0.54 M/S
MV PEAK E VEL: 48 CM/S
MV STENOSIS PRESSURE HALF TIME: 0 MS
NITRITE UR QL STRIP: NEGATIVE
NON-SQ EPI CELLS URNS QL MICRO: ABNORMAL /HPF
NONHDLC SERPL-MCNC: 155 MG/DL
O2 CT BLDA-SCNC: 18.4 ML/DL (ref 16–23)
O2 CT BLDA-SCNC: 18.5 ML/DL (ref 16–23)
O2 CT BLDA-SCNC: 20.6 ML/DL (ref 16–23)
O2 CT BLDA-SCNC: 21 ML/DL (ref 16–23)
O2 CT BLDA-SCNC: 21.1 ML/DL (ref 16–23)
O2 CT BLDA-SCNC: 22.5 ML/DL (ref 16–23)
OXYHGB MFR BLDA: 92.3 % (ref 94–97)
OXYHGB MFR BLDA: 97.3 % (ref 94–97)
OXYHGB MFR BLDA: 97.7 % (ref 94–97)
OXYHGB MFR BLDA: 97.9 % (ref 94–97)
OXYHGB MFR BLDA: 98 % (ref 94–97)
OXYHGB MFR BLDA: 98.7 % (ref 94–97)
P AXIS: 13 DEGREES
P AXIS: 50 DEGREES
P AXIS: 57 DEGREES
PCO2 BLDA: 30.8 MM HG (ref 36–44)
PCO2 BLDA: 35.2 MM HG (ref 36–44)
PCO2 BLDA: 47.9 MM HG (ref 36–44)
PCO2 BLDA: 58.2 MM HG (ref 36–44)
PCO2 BLDA: 74.6 MM HG (ref 36–44)
PCO2 BLDA: 88.8 MM HG (ref 36–44)
PEEP RESPIRATORY: 10 CM[H2O]
PH BLDA: 7.11 [PH] (ref 7.35–7.45)
PH BLDA: 7.15 [PH] (ref 7.35–7.45)
PH BLDA: 7.32 [PH] (ref 7.35–7.45)
PH BLDA: 7.35 [PH] (ref 7.35–7.45)
PH BLDA: 7.48 [PH] (ref 7.35–7.45)
PH BLDA: 7.52 [PH] (ref 7.35–7.45)
PH UR STRIP.AUTO: 5.5 [PH]
PHOSPHATE SERPL-MCNC: 4.7 MG/DL (ref 2.7–4.5)
PHOSPHATE SERPL-MCNC: 5.3 MG/DL (ref 2.7–4.5)
PLATELET # BLD AUTO: 235 THOUSANDS/UL (ref 149–390)
PLATELET # BLD AUTO: 289 THOUSANDS/UL (ref 149–390)
PMV BLD AUTO: 10.5 FL (ref 8.9–12.7)
PMV BLD AUTO: 10.8 FL (ref 8.9–12.7)
PO2 BLDA: 102.7 MM HG (ref 75–129)
PO2 BLDA: 122.9 MM HG (ref 75–129)
PO2 BLDA: 125.5 MM HG (ref 75–129)
PO2 BLDA: 178.7 MM HG (ref 75–129)
PO2 BLDA: 306.9 MM HG (ref 75–129)
PO2 BLDA: 77.3 MM HG (ref 75–129)
POTASSIUM SERPL-SCNC: 4.5 MMOL/L (ref 3.5–5.3)
POTASSIUM SERPL-SCNC: 5.3 MMOL/L (ref 3.5–5.3)
PR INTERVAL: 160 MS
PR INTERVAL: 176 MS
PR INTERVAL: 178 MS
PRESSURE CONTROL: 24
PRESSURE CONTROL: 26
PRESSURE CONTROL: 26
PROCALCITONIN SERPL-MCNC: 0.67 NG/ML
PROT SERPL-MCNC: 6.6 G/DL (ref 6.4–8.2)
PROT UR STRIP-MCNC: NEGATIVE MG/DL
PROTHROMBIN TIME: 12.7 SECONDS (ref 11.6–14.5)
QRS AXIS: 35 DEGREES
QRS AXIS: 44 DEGREES
QRS AXIS: 47 DEGREES
QRSD INTERVAL: 104 MS
QRSD INTERVAL: 104 MS
QRSD INTERVAL: 90 MS
QT INTERVAL: 438 MS
QT INTERVAL: 526 MS
QT INTERVAL: 538 MS
QTC INTERVAL: 398 MS
QTC INTERVAL: 439 MS
QTC INTERVAL: 479 MS
RBC # BLD AUTO: 4.26 MILLION/UL (ref 3.88–5.62)
RBC # BLD AUTO: 4.32 MILLION/UL (ref 3.88–5.62)
RBC #/AREA URNS AUTO: ABNORMAL /HPF
RIGHT ATRIAL 2D VOLUME: 53 ML
RIGHT VENTRICLE ID DIMENSION: 3.4 CM
SL CV LV EF: 60
SL CV LV EF: 70
SL CV PED ECHO LEFT VENTRICLE DIASTOLIC VOLUME (MOD BIPLANE) 2D: 84 ML
SL CV PED ECHO LEFT VENTRICLE SYSTOLIC VOLUME (MOD BIPLANE) 2D: 42 ML
SODIUM SERPL-SCNC: 132 MMOL/L (ref 136–145)
SODIUM SERPL-SCNC: 136 MMOL/L (ref 136–145)
SP GR UR STRIP.AUTO: 1.02 (ref 1–1.03)
SPECIMEN SOURCE: ABNORMAL
STRESS ANGINA INDEX: 0
STRESS BASELINE BP: NORMAL MMHG
STRESS BASELINE HR: 74 BPM
STRESS O2 SAT REST: 96 %
STRESS PERCENT HR: 51 %
STRESS POST ESTIMATED WORKLOAD: 1 METS
STRESS POST EXERCISE DUR MIN: 2 MIN
STRESS POST EXERCISE DUR SEC: 49 SEC
STRESS TARGET HR: 85 BPM
T WAVE AXIS: 145 DEGREES
T WAVE AXIS: 149 DEGREES
T WAVE AXIS: 55 DEGREES
TRICUSPID VALVE S': 63 CM/S
TRIGL SERPL-MCNC: 306 MG/DL
TSH SERPL DL<=0.05 MIU/L-ACNC: 0.89 UIU/ML (ref 0.36–3.74)
TTG IGA SER-ACNC: <2 U/ML (ref 0–3)
TTG IGG SER-ACNC: <2 U/ML (ref 0–5)
URATE CRY URNS QL MICRO: ABNORMAL /HPF
UROBILINOGEN UR QL STRIP.AUTO: 1 E.U./DL
VENT AC: 22
VENT AC: 26
VENT- AC: AC
VENTRICULAR RATE: 33 BPM
VENTRICULAR RATE: 42 BPM
VENTRICULAR RATE: 72 BPM
WBC # BLD AUTO: 15.65 THOUSAND/UL (ref 4.31–10.16)
WBC # BLD AUTO: 17.07 THOUSAND/UL (ref 4.31–10.16)
WBC #/AREA URNS AUTO: ABNORMAL /HPF
Z-SCORE OF LEFT VENTRICULAR DIMENSION IN END SYSTOLE: -11.24

## 2021-11-06 PROCEDURE — 87040 BLOOD CULTURE FOR BACTERIA: CPT | Performed by: PHYSICIAN ASSISTANT

## 2021-11-06 PROCEDURE — 71045 X-RAY EXAM CHEST 1 VIEW: CPT

## 2021-11-06 PROCEDURE — 80053 COMPREHEN METABOLIC PANEL: CPT | Performed by: STUDENT IN AN ORGANIZED HEALTH CARE EDUCATION/TRAINING PROGRAM

## 2021-11-06 PROCEDURE — 85610 PROTHROMBIN TIME: CPT | Performed by: PHYSICIAN ASSISTANT

## 2021-11-06 PROCEDURE — 80061 LIPID PANEL: CPT | Performed by: STUDENT IN AN ORGANIZED HEALTH CARE EDUCATION/TRAINING PROGRAM

## 2021-11-06 PROCEDURE — 84100 ASSAY OF PHOSPHORUS: CPT | Performed by: STUDENT IN AN ORGANIZED HEALTH CARE EDUCATION/TRAINING PROGRAM

## 2021-11-06 PROCEDURE — 93005 ELECTROCARDIOGRAM TRACING: CPT

## 2021-11-06 PROCEDURE — 31622 DX BRONCHOSCOPE/WASH: CPT | Performed by: INTERNAL MEDICINE

## 2021-11-06 PROCEDURE — 82533 TOTAL CORTISOL: CPT | Performed by: PHYSICIAN ASSISTANT

## 2021-11-06 PROCEDURE — 94760 N-INVAS EAR/PLS OXIMETRY 1: CPT

## 2021-11-06 PROCEDURE — 99292 CRITICAL CARE ADDL 30 MIN: CPT | Performed by: STUDENT IN AN ORGANIZED HEALTH CARE EDUCATION/TRAINING PROGRAM

## 2021-11-06 PROCEDURE — 87077 CULTURE AEROBIC IDENTIFY: CPT | Performed by: PHYSICIAN ASSISTANT

## 2021-11-06 PROCEDURE — 84145 PROCALCITONIN (PCT): CPT | Performed by: PHYSICIAN ASSISTANT

## 2021-11-06 PROCEDURE — 93306 TTE W/DOPPLER COMPLETE: CPT | Performed by: INTERNAL MEDICINE

## 2021-11-06 PROCEDURE — 84443 ASSAY THYROID STIM HORMONE: CPT | Performed by: PHYSICIAN ASSISTANT

## 2021-11-06 PROCEDURE — 83605 ASSAY OF LACTIC ACID: CPT | Performed by: PHYSICIAN ASSISTANT

## 2021-11-06 PROCEDURE — 82805 BLOOD GASES W/O2 SATURATION: CPT | Performed by: PHYSICIAN ASSISTANT

## 2021-11-06 PROCEDURE — 83735 ASSAY OF MAGNESIUM: CPT | Performed by: STUDENT IN AN ORGANIZED HEALTH CARE EDUCATION/TRAINING PROGRAM

## 2021-11-06 PROCEDURE — 83735 ASSAY OF MAGNESIUM: CPT | Performed by: PHYSICIAN ASSISTANT

## 2021-11-06 PROCEDURE — 93351 STRESS TTE COMPLETE: CPT | Performed by: INTERNAL MEDICINE

## 2021-11-06 PROCEDURE — 94003 VENT MGMT INPAT SUBQ DAY: CPT

## 2021-11-06 PROCEDURE — 94640 AIRWAY INHALATION TREATMENT: CPT

## 2021-11-06 PROCEDURE — 87070 CULTURE OTHR SPECIMN AEROBIC: CPT | Performed by: PHYSICIAN ASSISTANT

## 2021-11-06 PROCEDURE — 93010 ELECTROCARDIOGRAM REPORT: CPT | Performed by: INTERNAL MEDICINE

## 2021-11-06 PROCEDURE — 99292 CRITICAL CARE ADDL 30 MIN: CPT | Performed by: PHYSICIAN ASSISTANT

## 2021-11-06 PROCEDURE — 85027 COMPLETE CBC AUTOMATED: CPT | Performed by: STUDENT IN AN ORGANIZED HEALTH CARE EDUCATION/TRAINING PROGRAM

## 2021-11-06 PROCEDURE — 87186 SC STD MICRODIL/AGAR DIL: CPT | Performed by: PHYSICIAN ASSISTANT

## 2021-11-06 PROCEDURE — 87205 SMEAR GRAM STAIN: CPT | Performed by: PHYSICIAN ASSISTANT

## 2021-11-06 PROCEDURE — 0B9B8ZZ DRAINAGE OF LEFT LOWER LOBE BRONCHUS, VIA NATURAL OR ARTIFICIAL OPENING ENDOSCOPIC: ICD-10-PCS | Performed by: INTERNAL MEDICINE

## 2021-11-06 PROCEDURE — 99291 CRITICAL CARE FIRST HOUR: CPT | Performed by: PHYSICIAN ASSISTANT

## 2021-11-06 PROCEDURE — 87081 CULTURE SCREEN ONLY: CPT | Performed by: PHYSICIAN ASSISTANT

## 2021-11-06 PROCEDURE — 82948 REAGENT STRIP/BLOOD GLUCOSE: CPT

## 2021-11-06 PROCEDURE — 99231 SBSQ HOSP IP/OBS SF/LOW 25: CPT | Performed by: INTERNAL MEDICINE

## 2021-11-06 PROCEDURE — 81001 URINALYSIS AUTO W/SCOPE: CPT | Performed by: PHYSICIAN ASSISTANT

## 2021-11-06 PROCEDURE — 93306 TTE W/DOPPLER COMPLETE: CPT

## 2021-11-06 RX ORDER — HYDROMORPHONE HCL 110MG/55ML
2 PATIENT CONTROLLED ANALGESIA SYRINGE INTRAVENOUS
Status: DISCONTINUED | OUTPATIENT
Start: 2021-11-06 | End: 2021-11-07

## 2021-11-06 RX ORDER — MIDAZOLAM HYDROCHLORIDE 2 MG/2ML
2 INJECTION, SOLUTION INTRAMUSCULAR; INTRAVENOUS ONCE
Status: COMPLETED | OUTPATIENT
Start: 2021-11-06 | End: 2021-11-06

## 2021-11-06 RX ORDER — KETAMINE HCL IN NACL, ISO-OSM 100MG/10ML
SYRINGE (ML) INJECTION
Status: COMPLETED
Start: 2021-11-06 | End: 2021-11-06

## 2021-11-06 RX ORDER — PHENOBARBITAL 20 MG/5ML
12 ELIXIR ORAL
Status: COMPLETED | OUTPATIENT
Start: 2021-11-11 | End: 2021-11-11

## 2021-11-06 RX ORDER — SODIUM CHLORIDE FOR INHALATION 0.9 %
VIAL, NEBULIZER (ML) INHALATION
Status: COMPLETED
Start: 2021-11-06 | End: 2021-11-06

## 2021-11-06 RX ORDER — LANOLIN ALCOHOL/MO/W.PET/CERES
100 CREAM (GRAM) TOPICAL DAILY
Status: DISCONTINUED | OUTPATIENT
Start: 2021-11-06 | End: 2021-11-15 | Stop reason: HOSPADM

## 2021-11-06 RX ORDER — MINERAL OIL AND PETROLATUM 150; 830 MG/G; MG/G
OINTMENT OPHTHALMIC
Status: DISCONTINUED | OUTPATIENT
Start: 2021-11-06 | End: 2021-11-07

## 2021-11-06 RX ORDER — VANCOMYCIN HYDROCHLORIDE 1 G/200ML
10 INJECTION, SOLUTION INTRAVENOUS EVERY 12 HOURS
Status: DISCONTINUED | OUTPATIENT
Start: 2021-11-06 | End: 2021-11-07

## 2021-11-06 RX ORDER — LORAZEPAM 0.5 MG/1
1 TABLET ORAL 2 TIMES DAILY
Status: DISCONTINUED | OUTPATIENT
Start: 2021-11-06 | End: 2021-11-06

## 2021-11-06 RX ORDER — QUETIAPINE FUMARATE 100 MG/1
200 TABLET, FILM COATED ORAL 2 TIMES DAILY
Status: DISCONTINUED | OUTPATIENT
Start: 2021-11-06 | End: 2021-11-15 | Stop reason: HOSPADM

## 2021-11-06 RX ORDER — FENTANYL CITRATE 50 UG/ML
100 INJECTION, SOLUTION INTRAMUSCULAR; INTRAVENOUS ONCE
Status: COMPLETED | OUTPATIENT
Start: 2021-11-06 | End: 2021-11-06

## 2021-11-06 RX ORDER — MIDAZOLAM HYDROCHLORIDE 2 MG/2ML
4 INJECTION, SOLUTION INTRAMUSCULAR; INTRAVENOUS
Status: DISCONTINUED | OUTPATIENT
Start: 2021-11-06 | End: 2021-11-07

## 2021-11-06 RX ORDER — VECURONIUM BROMIDE 1 MG/ML
10 INJECTION, POWDER, LYOPHILIZED, FOR SOLUTION INTRAVENOUS ONCE
Status: DISCONTINUED | OUTPATIENT
Start: 2021-11-06 | End: 2021-11-07

## 2021-11-06 RX ORDER — PROPOFOL 10 MG/ML
INJECTION, EMULSION INTRAVENOUS
Status: DISPENSED
Start: 2021-11-06 | End: 2021-11-07

## 2021-11-06 RX ORDER — PHENOBARBITAL 20 MG/5ML
56 ELIXIR ORAL
Status: COMPLETED | OUTPATIENT
Start: 2021-11-07 | End: 2021-11-08

## 2021-11-06 RX ORDER — SODIUM CHLORIDE FOR INHALATION 0.9 %
3 VIAL, NEBULIZER (ML) INHALATION
Status: DISCONTINUED | OUTPATIENT
Start: 2021-11-06 | End: 2021-11-15 | Stop reason: HOSPADM

## 2021-11-06 RX ORDER — HYDROMORPHONE HCL/PF 1 MG/ML
1 SYRINGE (ML) INJECTION ONCE
Status: COMPLETED | OUTPATIENT
Start: 2021-11-06 | End: 2021-11-06

## 2021-11-06 RX ORDER — MINERAL OIL AND PETROLATUM 150; 830 MG/G; MG/G
OINTMENT OPHTHALMIC
Status: DISCONTINUED | OUTPATIENT
Start: 2021-11-06 | End: 2021-11-06

## 2021-11-06 RX ORDER — PHENOBARBITAL 20 MG/5ML
6 ELIXIR ORAL
Status: COMPLETED | OUTPATIENT
Start: 2021-11-12 | End: 2021-11-12

## 2021-11-06 RX ORDER — SODIUM CHLORIDE, SODIUM GLUCONATE, SODIUM ACETATE, POTASSIUM CHLORIDE, MAGNESIUM CHLORIDE, SODIUM PHOSPHATE, DIBASIC, AND POTASSIUM PHOSPHATE .53; .5; .37; .037; .03; .012; .00082 G/100ML; G/100ML; G/100ML; G/100ML; G/100ML; G/100ML; G/100ML
1000 INJECTION, SOLUTION INTRAVENOUS ONCE
Status: COMPLETED | OUTPATIENT
Start: 2021-11-06 | End: 2021-11-06

## 2021-11-06 RX ORDER — VECURONIUM BROMIDE 1 MG/ML
10 INJECTION, POWDER, LYOPHILIZED, FOR SOLUTION INTRAVENOUS ONCE
Status: COMPLETED | OUTPATIENT
Start: 2021-11-06 | End: 2021-11-06

## 2021-11-06 RX ORDER — HYDROMORPHONE HCL 110MG/55ML
2 PATIENT CONTROLLED ANALGESIA SYRINGE INTRAVENOUS ONCE
Status: COMPLETED | OUTPATIENT
Start: 2021-11-06 | End: 2021-11-06

## 2021-11-06 RX ORDER — FOLIC ACID 1 MG/1
1 TABLET ORAL DAILY
Status: DISCONTINUED | OUTPATIENT
Start: 2021-11-06 | End: 2021-11-15 | Stop reason: HOSPADM

## 2021-11-06 RX ORDER — LORAZEPAM 0.5 MG/1
1 TABLET ORAL EVERY 8 HOURS
Status: DISCONTINUED | OUTPATIENT
Start: 2021-11-06 | End: 2021-11-07

## 2021-11-06 RX ORDER — ASPIRIN 81 MG/1
81 TABLET, CHEWABLE ORAL DAILY
Status: DISCONTINUED | OUTPATIENT
Start: 2021-11-06 | End: 2021-11-15 | Stop reason: HOSPADM

## 2021-11-06 RX ORDER — KETAMINE HYDROCHLORIDE 50 MG/ML
50 INJECTION, SOLUTION, CONCENTRATE INTRAMUSCULAR; INTRAVENOUS ONCE
Status: COMPLETED | OUTPATIENT
Start: 2021-11-06 | End: 2021-11-06

## 2021-11-06 RX ORDER — PROPOFOL 10 MG/ML
5-50 INJECTION, EMULSION INTRAVENOUS
Status: DISCONTINUED | OUTPATIENT
Start: 2021-11-06 | End: 2021-11-06

## 2021-11-06 RX ORDER — HYDROMORPHONE HCL/PF 1 MG/ML
1 SYRINGE (ML) INJECTION
Status: DISCONTINUED | OUTPATIENT
Start: 2021-11-06 | End: 2021-11-06

## 2021-11-06 RX ORDER — LEVALBUTEROL 1.25 MG/.5ML
1.25 SOLUTION, CONCENTRATE RESPIRATORY (INHALATION)
Status: DISCONTINUED | OUTPATIENT
Start: 2021-11-06 | End: 2021-11-15 | Stop reason: HOSPADM

## 2021-11-06 RX ORDER — MIDAZOLAM HYDROCHLORIDE 2 MG/2ML
2 INJECTION, SOLUTION INTRAMUSCULAR; INTRAVENOUS
Status: DISCONTINUED | OUTPATIENT
Start: 2021-11-06 | End: 2021-11-06

## 2021-11-06 RX ORDER — PHENOBARBITAL 20 MG/5ML
28 ELIXIR ORAL
Status: COMPLETED | OUTPATIENT
Start: 2021-11-09 | End: 2021-11-10

## 2021-11-06 RX ORDER — MIDAZOLAM HYDROCHLORIDE 2 MG/2ML
4 INJECTION, SOLUTION INTRAMUSCULAR; INTRAVENOUS ONCE
Status: COMPLETED | OUTPATIENT
Start: 2021-11-06 | End: 2021-11-06

## 2021-11-06 RX ADMIN — Medication 3 MG/HR: at 12:36

## 2021-11-06 RX ADMIN — ASPIRIN 81 MG CHEWABLE TABLET 81 MG: 81 TABLET CHEWABLE at 08:58

## 2021-11-06 RX ADMIN — ATORVASTATIN CALCIUM 40 MG: 40 TABLET, FILM COATED ORAL at 08:58

## 2021-11-06 RX ADMIN — ISODIUM CHLORIDE 3 ML: 0.03 SOLUTION RESPIRATORY (INHALATION) at 19:38

## 2021-11-06 RX ADMIN — WHITE PETROLATUM 57.7 %-MINERAL OIL 31.9 % EYE OINTMENT: at 22:56

## 2021-11-06 RX ADMIN — Medication 8 MG/HR: at 12:36

## 2021-11-06 RX ADMIN — ISODIUM CHLORIDE 3 ML: 0.03 SOLUTION RESPIRATORY (INHALATION) at 13:34

## 2021-11-06 RX ADMIN — HYDROMORPHONE HYDROCHLORIDE 1 MG: 1 INJECTION, SOLUTION INTRAMUSCULAR; INTRAVENOUS; SUBCUTANEOUS at 15:04

## 2021-11-06 RX ADMIN — FENTANYL CITRATE 100 MCG: 50 INJECTION INTRAMUSCULAR; INTRAVENOUS at 00:02

## 2021-11-06 RX ADMIN — LEVALBUTEROL HYDROCHLORIDE 1.25 MG: 1.25 SOLUTION, CONCENTRATE RESPIRATORY (INHALATION) at 13:34

## 2021-11-06 RX ADMIN — LEVALBUTEROL HYDROCHLORIDE 1.25 MG: 1.25 SOLUTION, CONCENTRATE RESPIRATORY (INHALATION) at 08:06

## 2021-11-06 RX ADMIN — CEFEPIME HYDROCHLORIDE 2000 MG: 2 INJECTION, POWDER, FOR SOLUTION INTRAVENOUS at 20:53

## 2021-11-06 RX ADMIN — PHENOBARBITAL SODIUM 360 MG: 65 INJECTION INTRAMUSCULAR at 20:14

## 2021-11-06 RX ADMIN — LORAZEPAM 1 MG: 0.5 TABLET ORAL at 17:45

## 2021-11-06 RX ADMIN — VANCOMYCIN HYDROCHLORIDE 1000 MG: 1 INJECTION, SOLUTION INTRAVENOUS at 18:41

## 2021-11-06 RX ADMIN — HEPARIN SODIUM 5000 UNITS: 5000 INJECTION INTRAVENOUS; SUBCUTANEOUS at 22:55

## 2021-11-06 RX ADMIN — CHLORHEXIDINE GLUCONATE 15 ML: 1.2 SOLUTION ORAL at 22:55

## 2021-11-06 RX ADMIN — Medication 10 MG/HR: at 18:49

## 2021-11-06 RX ADMIN — LEVALBUTEROL HYDROCHLORIDE 1.25 MG: 1.25 SOLUTION, CONCENTRATE RESPIRATORY (INHALATION) at 19:38

## 2021-11-06 RX ADMIN — HEPARIN SODIUM 5000 UNITS: 5000 INJECTION INTRAVENOUS; SUBCUTANEOUS at 16:04

## 2021-11-06 RX ADMIN — FENTANYL CITRATE 100 MCG: 0.05 INJECTION, SOLUTION INTRAMUSCULAR; INTRAVENOUS at 01:21

## 2021-11-06 RX ADMIN — VECURONIUM BROMIDE 10 MG: 1 INJECTION, POWDER, LYOPHILIZED, FOR SOLUTION INTRAVENOUS at 05:25

## 2021-11-06 RX ADMIN — HEPARIN SODIUM 5000 UNITS: 5000 INJECTION INTRAVENOUS; SUBCUTANEOUS at 05:29

## 2021-11-06 RX ADMIN — WHITE PETROLATUM 57.7 %-MINERAL OIL 31.9 % EYE OINTMENT: at 09:15

## 2021-11-06 RX ADMIN — HYDROMORPHONE HYDROCHLORIDE 2 MG: 2 INJECTION, SOLUTION INTRAMUSCULAR; INTRAVENOUS; SUBCUTANEOUS at 21:40

## 2021-11-06 RX ADMIN — FENTANYL CITRATE 100 MCG: 50 INJECTION INTRAMUSCULAR; INTRAVENOUS at 00:03

## 2021-11-06 RX ADMIN — WHITE PETROLATUM 57.7 %-MINERAL OIL 31.9 % EYE OINTMENT: at 20:30

## 2021-11-06 RX ADMIN — NOREPINEPHRINE BITARTRATE 12 MCG/MIN: 1 INJECTION, SOLUTION, CONCENTRATE INTRAVENOUS at 06:23

## 2021-11-06 RX ADMIN — VANCOMYCIN HYDROCHLORIDE 2000 MG: 1 INJECTION, POWDER, LYOPHILIZED, FOR SOLUTION INTRAVENOUS at 10:56

## 2021-11-06 RX ADMIN — ACETAMINOPHEN 975 MG: 325 TABLET, FILM COATED ORAL at 23:29

## 2021-11-06 RX ADMIN — HYDROMORPHONE HYDROCHLORIDE 1 MG: 1 INJECTION, SOLUTION INTRAMUSCULAR; INTRAVENOUS; SUBCUTANEOUS at 17:15

## 2021-11-06 RX ADMIN — ACETAMINOPHEN 975 MG: 325 TABLET, FILM COATED ORAL at 04:11

## 2021-11-06 RX ADMIN — CHLORHEXIDINE GLUCONATE 15 ML: 1.2 SOLUTION ORAL at 00:06

## 2021-11-06 RX ADMIN — HYDROMORPHONE HYDROCHLORIDE 1 MG: 1 INJECTION, SOLUTION INTRAMUSCULAR; INTRAVENOUS; SUBCUTANEOUS at 03:03

## 2021-11-06 RX ADMIN — Medication 20 MG: at 08:58

## 2021-11-06 RX ADMIN — THIAMINE HCL TAB 100 MG 100 MG: 100 TAB at 12:47

## 2021-11-06 RX ADMIN — PHENOBARBITAL SODIUM 262 MG: 65 INJECTION INTRAMUSCULAR at 22:46

## 2021-11-06 RX ADMIN — WHITE PETROLATUM 57.7 %-MINERAL OIL 31.9 % EYE OINTMENT: at 18:37

## 2021-11-06 RX ADMIN — ACETAMINOPHEN 975 MG: 325 TABLET, FILM COATED ORAL at 17:57

## 2021-11-06 RX ADMIN — HYDROMORPHONE HYDROCHLORIDE 2 MG: 2 INJECTION, SOLUTION INTRAMUSCULAR; INTRAVENOUS; SUBCUTANEOUS at 04:33

## 2021-11-06 RX ADMIN — CHLORHEXIDINE GLUCONATE 15 ML: 1.2 SOLUTION ORAL at 09:15

## 2021-11-06 RX ADMIN — ACETAMINOPHEN 975 MG: 325 TABLET, FILM COATED ORAL at 00:28

## 2021-11-06 RX ADMIN — FENTANYL CITRATE 100 MCG: 0.05 INJECTION, SOLUTION INTRAMUSCULAR; INTRAVENOUS at 03:30

## 2021-11-06 RX ADMIN — HEPARIN SODIUM 5000 UNITS: 5000 INJECTION INTRAVENOUS; SUBCUTANEOUS at 00:06

## 2021-11-06 RX ADMIN — CISATRACURIUM BESYLATE 0.5 MCG/KG/MIN: 10 INJECTION, SOLUTION INTRAVENOUS at 05:51

## 2021-11-06 RX ADMIN — NOREPINEPHRINE BITARTRATE 15 MCG/MIN: 1 INJECTION, SOLUTION, CONCENTRATE INTRAVENOUS at 01:20

## 2021-11-06 RX ADMIN — ACETAMINOPHEN 975 MG: 325 TABLET, FILM COATED ORAL at 12:47

## 2021-11-06 RX ADMIN — CEFEPIME HYDROCHLORIDE 2000 MG: 2 INJECTION, POWDER, FOR SOLUTION INTRAVENOUS at 09:38

## 2021-11-06 RX ADMIN — NOREPINEPHRINE BITARTRATE 14 MCG/MIN: 1 INJECTION, SOLUTION, CONCENTRATE INTRAVENOUS at 22:20

## 2021-11-06 RX ADMIN — NOREPINEPHRINE BITARTRATE 15 MCG/MIN: 1 INJECTION, SOLUTION, CONCENTRATE INTRAVENOUS at 17:42

## 2021-11-06 RX ADMIN — WHITE PETROLATUM 57.7 %-MINERAL OIL 31.9 % EYE OINTMENT: at 12:47

## 2021-11-06 RX ADMIN — PROPOFOL 20 MCG/KG/MIN: 10 INJECTION, EMULSION INTRAVENOUS at 04:56

## 2021-11-06 RX ADMIN — FOLIC ACID 1 MG: 1 TABLET ORAL at 12:47

## 2021-11-06 RX ADMIN — Medication 3 MG/HR: at 04:55

## 2021-11-06 RX ADMIN — Medication 8 MG/HR: at 05:02

## 2021-11-06 RX ADMIN — QUETIAPINE FUMARATE 200 MG: 100 TABLET ORAL at 17:45

## 2021-11-07 ENCOUNTER — APPOINTMENT (INPATIENT)
Dept: RADIOLOGY | Facility: HOSPITAL | Age: 56
DRG: 286 | End: 2021-11-07
Payer: COMMERCIAL

## 2021-11-07 LAB
ALBUMIN SERPL BCP-MCNC: 2.6 G/DL (ref 3.5–5)
ALBUMIN SERPL BCP-MCNC: 3.2 G/DL (ref 3.5–5)
ALP SERPL-CCNC: 107 U/L (ref 46–116)
ALP SERPL-CCNC: 108 U/L (ref 46–116)
ALT SERPL W P-5'-P-CCNC: 111 U/L (ref 12–78)
ALT SERPL W P-5'-P-CCNC: 130 U/L (ref 12–78)
ANION GAP SERPL CALCULATED.3IONS-SCNC: 5 MMOL/L (ref 4–13)
ANION GAP SERPL CALCULATED.3IONS-SCNC: 5 MMOL/L (ref 4–13)
APTT PPP: 37 SECONDS (ref 23–37)
AST SERPL W P-5'-P-CCNC: 102 U/L (ref 5–45)
AST SERPL W P-5'-P-CCNC: 189 U/L (ref 5–45)
ATRIAL RATE: 96 BPM
BASE EX.OXY STD BLDV CALC-SCNC: 64.5 % (ref 60–80)
BASE EXCESS BLDA CALC-SCNC: -1 MMOL/L
BASE EXCESS BLDA CALC-SCNC: -1.5 MMOL/L
BASE EXCESS BLDA CALC-SCNC: -3.8 MMOL/L
BASE EXCESS BLDA CALC-SCNC: -5 MMOL/L
BASE EXCESS BLDA CALC-SCNC: 1.8 MMOL/L
BASE EXCESS BLDV CALC-SCNC: -1.9 MMOL/L
BASOPHILS # BLD AUTO: 0.02 THOUSANDS/ΜL (ref 0–0.1)
BASOPHILS # BLD AUTO: 0.04 THOUSANDS/ΜL (ref 0–0.1)
BASOPHILS NFR BLD AUTO: 0 % (ref 0–1)
BASOPHILS NFR BLD AUTO: 0 % (ref 0–1)
BILIRUB SERPL-MCNC: 1.16 MG/DL (ref 0.2–1)
BILIRUB SERPL-MCNC: 1.3 MG/DL (ref 0.2–1)
BUN SERPL-MCNC: 16 MG/DL (ref 5–25)
BUN SERPL-MCNC: 22 MG/DL (ref 5–25)
CA-I BLD-SCNC: 1.09 MMOL/L (ref 1.12–1.32)
CALCIUM ALBUM COR SERPL-MCNC: 8.7 MG/DL (ref 8.3–10.1)
CALCIUM ALBUM COR SERPL-MCNC: 8.8 MG/DL (ref 8.3–10.1)
CALCIUM SERPL-MCNC: 7.7 MG/DL (ref 8.3–10.1)
CALCIUM SERPL-MCNC: 8.1 MG/DL (ref 8.3–10.1)
CHLORIDE SERPL-SCNC: 104 MMOL/L (ref 100–108)
CHLORIDE SERPL-SCNC: 105 MMOL/L (ref 100–108)
CK MB SERPL-MCNC: 38.7 NG/ML (ref 0–5)
CK MB SERPL-MCNC: <1 % (ref 0–2.5)
CK SERPL-CCNC: 6934 U/L (ref 39–308)
CO2 SERPL-SCNC: 27 MMOL/L (ref 21–32)
CO2 SERPL-SCNC: 28 MMOL/L (ref 21–32)
CREAT SERPL-MCNC: 1.19 MG/DL (ref 0.6–1.3)
CREAT SERPL-MCNC: 1.79 MG/DL (ref 0.6–1.3)
EOSINOPHIL # BLD AUTO: 0.07 THOUSAND/ΜL (ref 0–0.61)
EOSINOPHIL # BLD AUTO: 0.16 THOUSAND/ΜL (ref 0–0.61)
EOSINOPHIL NFR BLD AUTO: 1 % (ref 0–6)
EOSINOPHIL NFR BLD AUTO: 2 % (ref 0–6)
ERYTHROCYTE [DISTWIDTH] IN BLOOD BY AUTOMATED COUNT: 13.9 % (ref 11.6–15.1)
ERYTHROCYTE [DISTWIDTH] IN BLOOD BY AUTOMATED COUNT: 14 % (ref 11.6–15.1)
FIBRINOGEN PPP-MCNC: 502 MG/DL (ref 227–495)
GFR SERPL CREATININE-BSD FRML MDRD: 42 ML/MIN/1.73SQ M
GFR SERPL CREATININE-BSD FRML MDRD: 68 ML/MIN/1.73SQ M
GLUCOSE SERPL-MCNC: 107 MG/DL (ref 65–140)
GLUCOSE SERPL-MCNC: 122 MG/DL (ref 65–140)
GLUCOSE SERPL-MCNC: 125 MG/DL (ref 65–140)
GLUCOSE SERPL-MCNC: 125 MG/DL (ref 65–140)
HCO3 BLDA-SCNC: 22.7 MMOL/L (ref 22–28)
HCO3 BLDA-SCNC: 23.9 MMOL/L (ref 22–28)
HCO3 BLDA-SCNC: 24.5 MMOL/L (ref 22–28)
HCO3 BLDA-SCNC: 26.1 MMOL/L (ref 22–28)
HCO3 BLDA-SCNC: 26.6 MMOL/L (ref 22–28)
HCO3 BLDV-SCNC: 27.2 MMOL/L (ref 24–30)
HCT VFR BLD AUTO: 37.4 % (ref 36.5–49.3)
HCT VFR BLD AUTO: 39.3 % (ref 36.5–49.3)
HGB BLD-MCNC: 12.4 G/DL (ref 12–17)
HGB BLD-MCNC: 12.9 G/DL (ref 12–17)
HOROWITZ INDEX BLDA+IHG-RTO: 40 MM[HG]
HOROWITZ INDEX BLDA+IHG-RTO: 50 MM[HG]
I-TIME: 0.85
IMM GRANULOCYTES # BLD AUTO: 0.04 THOUSAND/UL (ref 0–0.2)
IMM GRANULOCYTES # BLD AUTO: 0.05 THOUSAND/UL (ref 0–0.2)
IMM GRANULOCYTES NFR BLD AUTO: 0 % (ref 0–2)
IMM GRANULOCYTES NFR BLD AUTO: 1 % (ref 0–2)
INR PPP: 1.06 (ref 0.84–1.19)
LACTATE SERPL-SCNC: 0.8 MMOL/L (ref 0.5–2)
LYMPHOCYTES # BLD AUTO: 0.52 THOUSANDS/ΜL (ref 0.6–4.47)
LYMPHOCYTES # BLD AUTO: 0.68 THOUSANDS/ΜL (ref 0.6–4.47)
LYMPHOCYTES NFR BLD AUTO: 5 % (ref 14–44)
LYMPHOCYTES NFR BLD AUTO: 6 % (ref 14–44)
MAGNESIUM SERPL-MCNC: 2.1 MG/DL (ref 1.6–2.6)
MAGNESIUM SERPL-MCNC: 2.5 MG/DL (ref 1.6–2.6)
MCH RBC QN AUTO: 35.6 PG (ref 26.8–34.3)
MCH RBC QN AUTO: 35.8 PG (ref 26.8–34.3)
MCHC RBC AUTO-ENTMCNC: 32.8 G/DL (ref 31.4–37.4)
MCHC RBC AUTO-ENTMCNC: 33.2 G/DL (ref 31.4–37.4)
MCV RBC AUTO: 108 FL (ref 82–98)
MCV RBC AUTO: 109 FL (ref 82–98)
MONOCYTES # BLD AUTO: 0.48 THOUSAND/ΜL (ref 0.17–1.22)
MONOCYTES # BLD AUTO: 0.76 THOUSAND/ΜL (ref 0.17–1.22)
MONOCYTES NFR BLD AUTO: 5 % (ref 4–12)
MONOCYTES NFR BLD AUTO: 6 % (ref 4–12)
MRSA NOSE QL CULT: NORMAL
NEUTROPHILS # BLD AUTO: 12.79 THOUSANDS/ΜL (ref 1.85–7.62)
NEUTROPHILS # BLD AUTO: 7.2 THOUSANDS/ΜL (ref 1.85–7.62)
NEUTS SEG NFR BLD AUTO: 85 % (ref 43–75)
NEUTS SEG NFR BLD AUTO: 89 % (ref 43–75)
NRBC BLD AUTO-RTO: 0 /100 WBCS
NRBC BLD AUTO-RTO: 0 /100 WBCS
O2 CT BLDA-SCNC: 17 ML/DL (ref 16–23)
O2 CT BLDA-SCNC: 17.3 ML/DL (ref 16–23)
O2 CT BLDA-SCNC: 17.7 ML/DL (ref 16–23)
O2 CT BLDA-SCNC: 18.3 ML/DL (ref 16–23)
O2 CT BLDA-SCNC: 18.9 ML/DL (ref 16–23)
O2 CT BLDV-SCNC: 11.8 ML/DL
OXYHGB MFR BLDA: 94.9 % (ref 94–97)
OXYHGB MFR BLDA: 96.3 % (ref 94–97)
OXYHGB MFR BLDA: 97 % (ref 94–97)
OXYHGB MFR BLDA: 97.2 % (ref 94–97)
OXYHGB MFR BLDA: 98.1 % (ref 94–97)
PCO2 BLDA: 42.5 MM HG (ref 36–44)
PCO2 BLDA: 43.5 MM HG (ref 36–44)
PCO2 BLDA: 52.7 MM HG (ref 36–44)
PCO2 BLDA: 54.7 MM HG (ref 36–44)
PCO2 BLDA: 56.5 MM HG (ref 36–44)
PCO2 BLDV: 68.2 MM HG (ref 42–50)
PEEP RESPIRATORY: 10 CM[H2O]
PEEP RESPIRATORY: 12 CM[H2O]
PEEP RESPIRATORY: 9 CM[H2O]
PH BLDA: 7.25 [PH] (ref 7.35–7.45)
PH BLDA: 7.26 [PH] (ref 7.35–7.45)
PH BLDA: 7.28 [PH] (ref 7.35–7.45)
PH BLDA: 7.37 [PH] (ref 7.35–7.45)
PH BLDA: 7.42 [PH] (ref 7.35–7.45)
PH BLDV: 7.22 [PH] (ref 7.3–7.4)
PHOSPHATE SERPL-MCNC: 4.5 MG/DL (ref 2.7–4.5)
PLATELET # BLD AUTO: 125 THOUSANDS/UL (ref 149–390)
PLATELET # BLD AUTO: 154 THOUSANDS/UL (ref 149–390)
PMV BLD AUTO: 10.3 FL (ref 8.9–12.7)
PMV BLD AUTO: 11 FL (ref 8.9–12.7)
PO2 BLDA: 111.8 MM HG (ref 75–129)
PO2 BLDA: 126.5 MM HG (ref 75–129)
PO2 BLDA: 145.9 MM HG (ref 75–129)
PO2 BLDA: 69.6 MM HG (ref 75–129)
PO2 BLDA: 91 MM HG (ref 75–129)
PO2 BLDV: 35.5 MM HG (ref 35–45)
POTASSIUM SERPL-SCNC: 4.1 MMOL/L (ref 3.5–5.3)
POTASSIUM SERPL-SCNC: 4.2 MMOL/L (ref 3.5–5.3)
PRESSURE CONTROL: 20
PROCALCITONIN SERPL-MCNC: 1.14 NG/ML
PROT SERPL-MCNC: 6 G/DL (ref 6.4–8.2)
PROT SERPL-MCNC: 6.5 G/DL (ref 6.4–8.2)
PROTHROMBIN TIME: 13.4 SECONDS (ref 11.6–14.5)
QRS AXIS: 80 DEGREES
QRSD INTERVAL: 110 MS
QT INTERVAL: 396 MS
QTC INTERVAL: 467 MS
RBC # BLD AUTO: 3.48 MILLION/UL (ref 3.88–5.62)
RBC # BLD AUTO: 3.6 MILLION/UL (ref 3.88–5.62)
SODIUM SERPL-SCNC: 136 MMOL/L (ref 136–145)
SODIUM SERPL-SCNC: 138 MMOL/L (ref 136–145)
SPECIMEN SOURCE: ABNORMAL
SPECIMEN SOURCE: NORMAL
T WAVE AXIS: 108 DEGREES
VANCOMYCIN TROUGH SERPL-MCNC: 22.6 UG/ML (ref 10–20)
VENT AC: 20
VENT AC: 22
VENT AC: 28
VENT- AC: AC
VENTRICULAR RATE: 84 BPM
VT SETTING VENT: 550 ML
WBC # BLD AUTO: 14.39 THOUSAND/UL (ref 4.31–10.16)
WBC # BLD AUTO: 8.42 THOUSAND/UL (ref 4.31–10.16)

## 2021-11-07 PROCEDURE — 80053 COMPREHEN METABOLIC PANEL: CPT | Performed by: PHYSICIAN ASSISTANT

## 2021-11-07 PROCEDURE — 84100 ASSAY OF PHOSPHORUS: CPT | Performed by: PHYSICIAN ASSISTANT

## 2021-11-07 PROCEDURE — 82553 CREATINE MB FRACTION: CPT | Performed by: PHYSICIAN ASSISTANT

## 2021-11-07 PROCEDURE — 82948 REAGENT STRIP/BLOOD GLUCOSE: CPT

## 2021-11-07 PROCEDURE — 85384 FIBRINOGEN ACTIVITY: CPT | Performed by: PHYSICIAN ASSISTANT

## 2021-11-07 PROCEDURE — 99291 CRITICAL CARE FIRST HOUR: CPT | Performed by: STUDENT IN AN ORGANIZED HEALTH CARE EDUCATION/TRAINING PROGRAM

## 2021-11-07 PROCEDURE — 83735 ASSAY OF MAGNESIUM: CPT | Performed by: PHYSICIAN ASSISTANT

## 2021-11-07 PROCEDURE — 80202 ASSAY OF VANCOMYCIN: CPT | Performed by: STUDENT IN AN ORGANIZED HEALTH CARE EDUCATION/TRAINING PROGRAM

## 2021-11-07 PROCEDURE — 83605 ASSAY OF LACTIC ACID: CPT | Performed by: PHYSICIAN ASSISTANT

## 2021-11-07 PROCEDURE — 82805 BLOOD GASES W/O2 SATURATION: CPT | Performed by: PHYSICIAN ASSISTANT

## 2021-11-07 PROCEDURE — 99232 SBSQ HOSP IP/OBS MODERATE 35: CPT | Performed by: INTERNAL MEDICINE

## 2021-11-07 PROCEDURE — 82550 ASSAY OF CK (CPK): CPT | Performed by: PHYSICIAN ASSISTANT

## 2021-11-07 PROCEDURE — 94003 VENT MGMT INPAT SUBQ DAY: CPT

## 2021-11-07 PROCEDURE — 94760 N-INVAS EAR/PLS OXIMETRY 1: CPT

## 2021-11-07 PROCEDURE — 93010 ELECTROCARDIOGRAM REPORT: CPT | Performed by: INTERNAL MEDICINE

## 2021-11-07 PROCEDURE — 85610 PROTHROMBIN TIME: CPT | Performed by: PHYSICIAN ASSISTANT

## 2021-11-07 PROCEDURE — 94640 AIRWAY INHALATION TREATMENT: CPT

## 2021-11-07 PROCEDURE — 93005 ELECTROCARDIOGRAM TRACING: CPT

## 2021-11-07 PROCEDURE — 85025 COMPLETE CBC W/AUTO DIFF WBC: CPT | Performed by: PHYSICIAN ASSISTANT

## 2021-11-07 PROCEDURE — 82805 BLOOD GASES W/O2 SATURATION: CPT | Performed by: STUDENT IN AN ORGANIZED HEALTH CARE EDUCATION/TRAINING PROGRAM

## 2021-11-07 PROCEDURE — 82330 ASSAY OF CALCIUM: CPT | Performed by: PHYSICIAN ASSISTANT

## 2021-11-07 PROCEDURE — 85730 THROMBOPLASTIN TIME PARTIAL: CPT | Performed by: PHYSICIAN ASSISTANT

## 2021-11-07 PROCEDURE — 99292 CRITICAL CARE ADDL 30 MIN: CPT | Performed by: PHYSICIAN ASSISTANT

## 2021-11-07 PROCEDURE — 84145 PROCALCITONIN (PCT): CPT | Performed by: PHYSICIAN ASSISTANT

## 2021-11-07 PROCEDURE — 71045 X-RAY EXAM CHEST 1 VIEW: CPT

## 2021-11-07 RX ORDER — ALBUMIN, HUMAN INJ 5% 5 %
12.5 SOLUTION INTRAVENOUS ONCE
Status: COMPLETED | OUTPATIENT
Start: 2021-11-07 | End: 2021-11-07

## 2021-11-07 RX ORDER — LORAZEPAM 2 MG/ML
2 INJECTION INTRAMUSCULAR ONCE
Status: COMPLETED | OUTPATIENT
Start: 2021-11-07 | End: 2021-11-07

## 2021-11-07 RX ORDER — POLYETHYLENE GLYCOL 3350 17 G/17G
17 POWDER, FOR SOLUTION ORAL DAILY
Status: DISCONTINUED | OUTPATIENT
Start: 2021-11-07 | End: 2021-11-15 | Stop reason: HOSPADM

## 2021-11-07 RX ORDER — SODIUM CHLORIDE, SODIUM GLUCONATE, SODIUM ACETATE, POTASSIUM CHLORIDE, MAGNESIUM CHLORIDE, SODIUM PHOSPHATE, DIBASIC, AND POTASSIUM PHOSPHATE .53; .5; .37; .037; .03; .012; .00082 G/100ML; G/100ML; G/100ML; G/100ML; G/100ML; G/100ML; G/100ML
100 INJECTION, SOLUTION INTRAVENOUS CONTINUOUS
Status: DISCONTINUED | OUTPATIENT
Start: 2021-11-07 | End: 2021-11-09

## 2021-11-07 RX ORDER — MIDAZOLAM HYDROCHLORIDE 2 MG/2ML
2 INJECTION, SOLUTION INTRAMUSCULAR; INTRAVENOUS
Status: DISCONTINUED | OUTPATIENT
Start: 2021-11-07 | End: 2021-11-11

## 2021-11-07 RX ORDER — CALCIUM GLUCONATE 20 MG/ML
2 INJECTION, SOLUTION INTRAVENOUS ONCE
Status: COMPLETED | OUTPATIENT
Start: 2021-11-07 | End: 2021-11-07

## 2021-11-07 RX ORDER — ALBUMIN, HUMAN INJ 5% 5 %
SOLUTION INTRAVENOUS
Status: COMPLETED
Start: 2021-11-07 | End: 2021-11-07

## 2021-11-07 RX ORDER — EPINEPHRINE 1 MG/ML
INJECTION, SOLUTION, CONCENTRATE INTRAVENOUS
Status: COMPLETED
Start: 2021-11-07 | End: 2021-11-07

## 2021-11-07 RX ORDER — PROPOFOL 10 MG/ML
5-50 INJECTION, EMULSION INTRAVENOUS
Status: DISCONTINUED | OUTPATIENT
Start: 2021-11-07 | End: 2021-11-07

## 2021-11-07 RX ORDER — SODIUM CHLORIDE, SODIUM GLUCONATE, SODIUM ACETATE, POTASSIUM CHLORIDE, MAGNESIUM CHLORIDE, SODIUM PHOSPHATE, DIBASIC, AND POTASSIUM PHOSPHATE .53; .5; .37; .037; .03; .012; .00082 G/100ML; G/100ML; G/100ML; G/100ML; G/100ML; G/100ML; G/100ML
500 INJECTION, SOLUTION INTRAVENOUS ONCE
Status: COMPLETED | OUTPATIENT
Start: 2021-11-07 | End: 2021-11-07

## 2021-11-07 RX ORDER — AMOXICILLIN 250 MG
1 CAPSULE ORAL
Status: DISCONTINUED | OUTPATIENT
Start: 2021-11-07 | End: 2021-11-08

## 2021-11-07 RX ORDER — LORAZEPAM 0.5 MG/1
1 TABLET ORAL EVERY 8 HOURS
Status: DISCONTINUED | OUTPATIENT
Start: 2021-11-07 | End: 2021-11-08

## 2021-11-07 RX ORDER — ALBUMIN, HUMAN INJ 5% 5 %
25 SOLUTION INTRAVENOUS ONCE
Status: COMPLETED | OUTPATIENT
Start: 2021-11-07 | End: 2021-11-07

## 2021-11-07 RX ORDER — FUROSEMIDE 10 MG/ML
20 INJECTION INTRAMUSCULAR; INTRAVENOUS ONCE
Status: COMPLETED | OUTPATIENT
Start: 2021-11-07 | End: 2021-11-07

## 2021-11-07 RX ORDER — HYDROMORPHONE HCL/PF 1 MG/ML
1 SYRINGE (ML) INJECTION
Status: DISCONTINUED | OUTPATIENT
Start: 2021-11-07 | End: 2021-11-10

## 2021-11-07 RX ADMIN — WHITE PETROLATUM 57.7 %-MINERAL OIL 31.9 % EYE OINTMENT: at 03:45

## 2021-11-07 RX ADMIN — ALBUMIN (HUMAN) 12.5 G: 12.5 INJECTION, SOLUTION INTRAVENOUS at 10:15

## 2021-11-07 RX ADMIN — ISODIUM CHLORIDE 3 ML: 0.03 SOLUTION RESPIRATORY (INHALATION) at 13:25

## 2021-11-07 RX ADMIN — ALBUMIN (HUMAN) 12.5 G: 12.5 INJECTION, SOLUTION INTRAVENOUS at 22:34

## 2021-11-07 RX ADMIN — SODIUM CHLORIDE, SODIUM GLUCONATE, SODIUM ACETATE, POTASSIUM CHLORIDE, MAGNESIUM CHLORIDE, SODIUM PHOSPHATE, DIBASIC, AND POTASSIUM PHOSPHATE 500 ML: .53; .5; .37; .037; .03; .012; .00082 INJECTION, SOLUTION INTRAVENOUS at 01:15

## 2021-11-07 RX ADMIN — LEVALBUTEROL HYDROCHLORIDE 1.25 MG: 1.25 SOLUTION, CONCENTRATE RESPIRATORY (INHALATION) at 07:32

## 2021-11-07 RX ADMIN — DOCUSATE SODIUM AND SENNOSIDES 1 TABLET: 8.6; 5 TABLET ORAL at 21:42

## 2021-11-07 RX ADMIN — MIDAZOLAM 2 MG: 1 INJECTION INTRAMUSCULAR; INTRAVENOUS at 18:39

## 2021-11-07 RX ADMIN — ISODIUM CHLORIDE 3 ML: 0.03 SOLUTION RESPIRATORY (INHALATION) at 07:32

## 2021-11-07 RX ADMIN — POLYETHYLENE GLYCOL 3350 17 G: 17 POWDER, FOR SOLUTION ORAL at 08:50

## 2021-11-07 RX ADMIN — HEPARIN SODIUM 5000 UNITS: 5000 INJECTION INTRAVENOUS; SUBCUTANEOUS at 13:54

## 2021-11-07 RX ADMIN — QUETIAPINE FUMARATE 200 MG: 100 TABLET ORAL at 17:11

## 2021-11-07 RX ADMIN — CHLORHEXIDINE GLUCONATE 15 ML: 1.2 SOLUTION ORAL at 08:50

## 2021-11-07 RX ADMIN — NITROGLYCERIN 30 MCG/MIN: 20 INJECTION INTRAVENOUS at 01:00

## 2021-11-07 RX ADMIN — QUETIAPINE FUMARATE 200 MG: 100 TABLET ORAL at 08:50

## 2021-11-07 RX ADMIN — WHITE PETROLATUM 57.7 %-MINERAL OIL 31.9 % EYE OINTMENT: at 00:15

## 2021-11-07 RX ADMIN — ACETAMINOPHEN 975 MG: 325 TABLET, FILM COATED ORAL at 17:11

## 2021-11-07 RX ADMIN — ALBUMIN, HUMAN INJ 5% 12.5 G: 5 SOLUTION at 12:33

## 2021-11-07 RX ADMIN — NITROGLYCERIN 10 MCG/MIN: 20 INJECTION INTRAVENOUS at 22:16

## 2021-11-07 RX ADMIN — CEFEPIME HYDROCHLORIDE 2000 MG: 2 INJECTION, POWDER, FOR SOLUTION INTRAVENOUS at 21:36

## 2021-11-07 RX ADMIN — FUROSEMIDE 20 MG: 10 INJECTION, SOLUTION INTRAMUSCULAR; INTRAVENOUS at 03:30

## 2021-11-07 RX ADMIN — ACETAMINOPHEN 975 MG: 325 TABLET, FILM COATED ORAL at 22:40

## 2021-11-07 RX ADMIN — LORAZEPAM 2 MG: 2 INJECTION INTRAMUSCULAR; INTRAVENOUS at 21:01

## 2021-11-07 RX ADMIN — HEPARIN SODIUM 5000 UNITS: 5000 INJECTION INTRAVENOUS; SUBCUTANEOUS at 06:07

## 2021-11-07 RX ADMIN — Medication 3 MG/HR: at 00:34

## 2021-11-07 RX ADMIN — CEFEPIME HYDROCHLORIDE 2000 MG: 2 INJECTION, POWDER, FOR SOLUTION INTRAVENOUS at 08:55

## 2021-11-07 RX ADMIN — HYDROMORPHONE HYDROCHLORIDE 1 MG: 1 INJECTION, SOLUTION INTRAMUSCULAR; INTRAVENOUS; SUBCUTANEOUS at 16:20

## 2021-11-07 RX ADMIN — SODIUM CHLORIDE, SODIUM GLUCONATE, SODIUM ACETATE, POTASSIUM CHLORIDE, MAGNESIUM CHLORIDE, SODIUM PHOSPHATE, DIBASIC, AND POTASSIUM PHOSPHATE 100 ML/HR: .53; .5; .37; .037; .03; .012; .00082 INJECTION, SOLUTION INTRAVENOUS at 13:42

## 2021-11-07 RX ADMIN — LORAZEPAM 2 MG: 2 INJECTION INTRAMUSCULAR; INTRAVENOUS at 18:05

## 2021-11-07 RX ADMIN — SODIUM CHLORIDE 0.4 MCG/KG/HR: 9 INJECTION, SOLUTION INTRAVENOUS at 21:20

## 2021-11-07 RX ADMIN — HYDROMORPHONE HYDROCHLORIDE 1 MG: 1 INJECTION, SOLUTION INTRAMUSCULAR; INTRAVENOUS; SUBCUTANEOUS at 18:05

## 2021-11-07 RX ADMIN — ASPIRIN 81 MG CHEWABLE TABLET 81 MG: 81 TABLET CHEWABLE at 08:50

## 2021-11-07 RX ADMIN — ISODIUM CHLORIDE 3 ML: 0.03 SOLUTION RESPIRATORY (INHALATION) at 20:27

## 2021-11-07 RX ADMIN — ALBUMIN (HUMAN) 25 G: 12.5 INJECTION, SOLUTION INTRAVENOUS at 17:07

## 2021-11-07 RX ADMIN — Medication 10 MG/HR: at 00:34

## 2021-11-07 RX ADMIN — ALBUMIN, HUMAN INJ 5% 12.5 G: 5 SOLUTION at 10:15

## 2021-11-07 RX ADMIN — FOLIC ACID 1 MG: 1 TABLET ORAL at 08:50

## 2021-11-07 RX ADMIN — EPINEPHRINE 1 MG: 1 INJECTION, SOLUTION, CONCENTRATE INTRAVENOUS at 22:33

## 2021-11-07 RX ADMIN — LORAZEPAM 2 MG: 2 INJECTION INTRAMUSCULAR; INTRAVENOUS at 19:11

## 2021-11-07 RX ADMIN — Medication 20 MG: at 08:55

## 2021-11-07 RX ADMIN — CHLORHEXIDINE GLUCONATE 15 ML: 1.2 SOLUTION ORAL at 21:41

## 2021-11-07 RX ADMIN — NOREPINEPHRINE BITARTRATE 16 MCG/MIN: 1 INJECTION, SOLUTION, CONCENTRATE INTRAVENOUS at 13:48

## 2021-11-07 RX ADMIN — LEVALBUTEROL HYDROCHLORIDE 1.25 MG: 1.25 SOLUTION, CONCENTRATE RESPIRATORY (INHALATION) at 20:27

## 2021-11-07 RX ADMIN — ALBUMIN (HUMAN) 12.5 G: 12.5 INJECTION, SOLUTION INTRAVENOUS at 12:33

## 2021-11-07 RX ADMIN — NOREPINEPHRINE BITARTRATE 16 MCG/MIN: 1 INJECTION, SOLUTION, CONCENTRATE INTRAVENOUS at 04:21

## 2021-11-07 RX ADMIN — THIAMINE HCL TAB 100 MG 100 MG: 100 TAB at 08:55

## 2021-11-07 RX ADMIN — ACETAMINOPHEN 975 MG: 325 TABLET, FILM COATED ORAL at 12:32

## 2021-11-07 RX ADMIN — CALCIUM GLUCONATE 2 G: 20 INJECTION, SOLUTION INTRAVENOUS at 05:14

## 2021-11-07 RX ADMIN — Medication 4 MG/HR: at 18:55

## 2021-11-07 RX ADMIN — MIDAZOLAM 2 MG: 1 INJECTION INTRAMUSCULAR; INTRAVENOUS at 14:52

## 2021-11-07 RX ADMIN — LORAZEPAM 1 MG: 0.5 TABLET ORAL at 03:48

## 2021-11-07 RX ADMIN — ATORVASTATIN CALCIUM 40 MG: 40 TABLET, FILM COATED ORAL at 08:50

## 2021-11-07 RX ADMIN — PHENOBARBITAL 56 MG: 20 ELIXIR ORAL at 20:28

## 2021-11-07 RX ADMIN — PHENOBARBITAL SODIUM 262 MG: 65 INJECTION INTRAMUSCULAR at 01:25

## 2021-11-07 RX ADMIN — NOREPINEPHRINE BITARTRATE 4 MCG/MIN: 1 INJECTION, SOLUTION, CONCENTRATE INTRAVENOUS at 18:05

## 2021-11-07 RX ADMIN — SODIUM CHLORIDE, SODIUM GLUCONATE, SODIUM ACETATE, POTASSIUM CHLORIDE, MAGNESIUM CHLORIDE, SODIUM PHOSPHATE, DIBASIC, AND POTASSIUM PHOSPHATE 500 ML: .53; .5; .37; .037; .03; .012; .00082 INJECTION, SOLUTION INTRAVENOUS at 00:05

## 2021-11-07 RX ADMIN — VANCOMYCIN HYDROCHLORIDE 1000 MG: 1 INJECTION, SOLUTION INTRAVENOUS at 17:11

## 2021-11-07 RX ADMIN — ACETAMINOPHEN 975 MG: 325 TABLET, FILM COATED ORAL at 03:48

## 2021-11-07 RX ADMIN — VANCOMYCIN HYDROCHLORIDE 1000 MG: 1 INJECTION, SOLUTION INTRAVENOUS at 06:07

## 2021-11-07 RX ADMIN — MIDAZOLAM 2 MG: 1 INJECTION INTRAMUSCULAR; INTRAVENOUS at 17:22

## 2021-11-07 RX ADMIN — WHITE PETROLATUM 57.7 %-MINERAL OIL 31.9 % EYE OINTMENT: at 02:45

## 2021-11-07 RX ADMIN — NOREPINEPHRINE BITARTRATE 16 MCG/MIN: 1 INJECTION, SOLUTION, CONCENTRATE INTRAVENOUS at 09:05

## 2021-11-07 RX ADMIN — HEPARIN SODIUM 5000 UNITS: 5000 INJECTION INTRAVENOUS; SUBCUTANEOUS at 21:42

## 2021-11-07 RX ADMIN — Medication 6 MG/HR: at 06:07

## 2021-11-07 RX ADMIN — LEVALBUTEROL HYDROCHLORIDE 1.25 MG: 1.25 SOLUTION, CONCENTRATE RESPIRATORY (INHALATION) at 13:25

## 2021-11-07 RX ADMIN — CALCIUM GLUCONATE 3 G: 98 INJECTION, SOLUTION INTRAVENOUS at 22:37

## 2021-11-08 ENCOUNTER — APPOINTMENT (INPATIENT)
Dept: RADIOLOGY | Facility: HOSPITAL | Age: 56
DRG: 286 | End: 2021-11-08
Payer: COMMERCIAL

## 2021-11-08 LAB
ANION GAP SERPL CALCULATED.3IONS-SCNC: 3 MMOL/L (ref 4–13)
ATRIAL RATE: 48 BPM
ATRIAL RATE: 57 BPM
ATRIAL RATE: 82 BPM
BACTERIA SPT RESP CULT: ABNORMAL
BACTERIA SPT RESP CULT: ABNORMAL
BASE EX.OXY STD BLDV CALC-SCNC: 70 % (ref 60–80)
BASE EXCESS BLDA CALC-SCNC: -0.6 MMOL/L
BASE EXCESS BLDA CALC-SCNC: 1.5 MMOL/L
BASE EXCESS BLDV CALC-SCNC: 1.6 MMOL/L
BASOPHILS # BLD AUTO: 0.02 THOUSANDS/ΜL (ref 0–0.1)
BASOPHILS NFR BLD AUTO: 0 % (ref 0–1)
BUN SERPL-MCNC: 16 MG/DL (ref 5–25)
CALCIUM SERPL-MCNC: 8.7 MG/DL (ref 8.3–10.1)
CHLORIDE SERPL-SCNC: 107 MMOL/L (ref 100–108)
CO2 SERPL-SCNC: 26 MMOL/L (ref 21–32)
CREAT SERPL-MCNC: 1.04 MG/DL (ref 0.6–1.3)
EOSINOPHIL # BLD AUTO: 0.15 THOUSAND/ΜL (ref 0–0.61)
EOSINOPHIL NFR BLD AUTO: 2 % (ref 0–6)
ERYTHROCYTE [DISTWIDTH] IN BLOOD BY AUTOMATED COUNT: 13.8 % (ref 11.6–15.1)
GFR SERPL CREATININE-BSD FRML MDRD: 80 ML/MIN/1.73SQ M
GLUCOSE SERPL-MCNC: 114 MG/DL (ref 65–140)
GLUCOSE SERPL-MCNC: 127 MG/DL (ref 65–140)
GLUCOSE SERPL-MCNC: 133 MG/DL (ref 65–140)
GRAM STN SPEC: ABNORMAL
GRAM STN SPEC: ABNORMAL
HCO3 BLDA-SCNC: 25.3 MMOL/L (ref 22–28)
HCO3 BLDA-SCNC: 25.7 MMOL/L (ref 22–28)
HCO3 BLDV-SCNC: 29 MMOL/L (ref 24–30)
HCT VFR BLD AUTO: 33.9 % (ref 36.5–49.3)
HGB BLD-MCNC: 11.5 G/DL (ref 12–17)
HOROWITZ INDEX BLDA+IHG-RTO: 50 MM[HG]
IMM GRANULOCYTES # BLD AUTO: 0.02 THOUSAND/UL (ref 0–0.2)
IMM GRANULOCYTES NFR BLD AUTO: 0 % (ref 0–2)
LYMPHOCYTES # BLD AUTO: 0.51 THOUSANDS/ΜL (ref 0.6–4.47)
LYMPHOCYTES NFR BLD AUTO: 6 % (ref 14–44)
MAGNESIUM SERPL-MCNC: 2 MG/DL (ref 1.6–2.6)
MCH RBC QN AUTO: 35.9 PG (ref 26.8–34.3)
MCHC RBC AUTO-ENTMCNC: 33.9 G/DL (ref 31.4–37.4)
MCV RBC AUTO: 106 FL (ref 82–98)
MONOCYTES # BLD AUTO: 0.49 THOUSAND/ΜL (ref 0.17–1.22)
MONOCYTES NFR BLD AUTO: 6 % (ref 4–12)
NEUTROPHILS # BLD AUTO: 7.03 THOUSANDS/ΜL (ref 1.85–7.62)
NEUTS SEG NFR BLD AUTO: 86 % (ref 43–75)
NRBC BLD AUTO-RTO: 0 /100 WBCS
O2 CT BLDA-SCNC: 16.5 ML/DL (ref 16–23)
O2 CT BLDA-SCNC: 16.9 ML/DL (ref 16–23)
O2 CT BLDV-SCNC: 12.2 ML/DL
OXYHGB MFR BLDA: 94.9 % (ref 94–97)
OXYHGB MFR BLDA: 98.4 % (ref 94–97)
P AXIS: 17 DEGREES
P AXIS: 41 DEGREES
P AXIS: 54 DEGREES
PCO2 BLDA: 37 MM HG (ref 36–44)
PCO2 BLDA: 48.9 MM HG (ref 36–44)
PCO2 BLDV: 59.1 MM HG (ref 42–50)
PEEP RESPIRATORY: 10 CM[H2O]
PEEP RESPIRATORY: 10 CM[H2O]
PEEP RESPIRATORY: 6 CM[H2O]
PH BLDA: 7.34 [PH] (ref 7.35–7.45)
PH BLDA: 7.45 [PH] (ref 7.35–7.45)
PH BLDV: 7.31 [PH] (ref 7.3–7.4)
PHOSPHATE SERPL-MCNC: 1.5 MG/DL (ref 2.7–4.5)
PLATELET # BLD AUTO: 120 THOUSANDS/UL (ref 149–390)
PMV BLD AUTO: 11 FL (ref 8.9–12.7)
PO2 BLDA: 155.9 MM HG (ref 75–129)
PO2 BLDA: 77 MM HG (ref 75–129)
PO2 BLDV: 36.3 MM HG (ref 35–45)
POTASSIUM SERPL-SCNC: 3.7 MMOL/L (ref 3.5–5.3)
PR INTERVAL: 184 MS
PR INTERVAL: 230 MS
PR INTERVAL: 234 MS
QRS AXIS: 51 DEGREES
QRS AXIS: 63 DEGREES
QRS AXIS: 72 DEGREES
QRSD INTERVAL: 102 MS
QRSD INTERVAL: 110 MS
QRSD INTERVAL: 96 MS
QT INTERVAL: 424 MS
QT INTERVAL: 432 MS
QT INTERVAL: 484 MS
QTC INTERVAL: 420 MS
QTC INTERVAL: 432 MS
QTC INTERVAL: 495 MS
RBC # BLD AUTO: 3.2 MILLION/UL (ref 3.88–5.62)
RYE IGE QN: NEGATIVE
SODIUM SERPL-SCNC: 136 MMOL/L (ref 136–145)
SPECIMEN SOURCE: ABNORMAL
SPECIMEN SOURCE: ABNORMAL
T WAVE AXIS: 102 DEGREES
T WAVE AXIS: 102 DEGREES
T WAVE AXIS: 99 DEGREES
VENT AC: 22
VENT AC: 22
VENT AC: 24
VENT- AC: AC
VENTRICULAR RATE: 48 BPM
VENTRICULAR RATE: 57 BPM
VENTRICULAR RATE: 82 BPM
VT SETTING VENT: 550 ML
WBC # BLD AUTO: 8.22 THOUSAND/UL (ref 4.31–10.16)

## 2021-11-08 PROCEDURE — 94640 AIRWAY INHALATION TREATMENT: CPT

## 2021-11-08 PROCEDURE — 82805 BLOOD GASES W/O2 SATURATION: CPT | Performed by: PHYSICIAN ASSISTANT

## 2021-11-08 PROCEDURE — 99232 SBSQ HOSP IP/OBS MODERATE 35: CPT | Performed by: INTERNAL MEDICINE

## 2021-11-08 PROCEDURE — 84100 ASSAY OF PHOSPHORUS: CPT | Performed by: PHYSICIAN ASSISTANT

## 2021-11-08 PROCEDURE — 83735 ASSAY OF MAGNESIUM: CPT | Performed by: PHYSICIAN ASSISTANT

## 2021-11-08 PROCEDURE — 99292 CRITICAL CARE ADDL 30 MIN: CPT | Performed by: ANESTHESIOLOGY

## 2021-11-08 PROCEDURE — 94003 VENT MGMT INPAT SUBQ DAY: CPT

## 2021-11-08 PROCEDURE — 82948 REAGENT STRIP/BLOOD GLUCOSE: CPT

## 2021-11-08 PROCEDURE — 99291 CRITICAL CARE FIRST HOUR: CPT | Performed by: PHYSICIAN ASSISTANT

## 2021-11-08 PROCEDURE — 80048 BASIC METABOLIC PNL TOTAL CA: CPT | Performed by: PHYSICIAN ASSISTANT

## 2021-11-08 PROCEDURE — 93010 ELECTROCARDIOGRAM REPORT: CPT | Performed by: INTERNAL MEDICINE

## 2021-11-08 PROCEDURE — 85025 COMPLETE CBC W/AUTO DIFF WBC: CPT | Performed by: PHYSICIAN ASSISTANT

## 2021-11-08 PROCEDURE — 94760 N-INVAS EAR/PLS OXIMETRY 1: CPT

## 2021-11-08 PROCEDURE — 71045 X-RAY EXAM CHEST 1 VIEW: CPT

## 2021-11-08 RX ORDER — LORAZEPAM 0.5 MG/1
2 TABLET ORAL EVERY 8 HOURS
Status: DISCONTINUED | OUTPATIENT
Start: 2021-11-08 | End: 2021-11-12

## 2021-11-08 RX ORDER — ISOSORBIDE DINITRATE 10 MG/1
10 TABLET ORAL
Status: DISCONTINUED | OUTPATIENT
Start: 2021-11-08 | End: 2021-11-11

## 2021-11-08 RX ORDER — LORAZEPAM 0.5 MG/1
2 TABLET ORAL EVERY 8 HOURS
Status: DISCONTINUED | OUTPATIENT
Start: 2021-11-08 | End: 2021-11-08

## 2021-11-08 RX ORDER — AMOXICILLIN 250 MG
2 CAPSULE ORAL 2 TIMES DAILY
Status: DISCONTINUED | OUTPATIENT
Start: 2021-11-08 | End: 2021-11-15 | Stop reason: HOSPADM

## 2021-11-08 RX ORDER — POTASSIUM CHLORIDE 29.8 MG/ML
40 INJECTION INTRAVENOUS ONCE
Status: COMPLETED | OUTPATIENT
Start: 2021-11-08 | End: 2021-11-08

## 2021-11-08 RX ORDER — AMLODIPINE BESYLATE 5 MG/1
5 TABLET ORAL DAILY
Status: DISCONTINUED | OUTPATIENT
Start: 2021-11-08 | End: 2021-11-08

## 2021-11-08 RX ORDER — LORAZEPAM 0.5 MG/1
2 TABLET ORAL ONCE
Status: COMPLETED | OUTPATIENT
Start: 2021-11-08 | End: 2021-11-08

## 2021-11-08 RX ORDER — ACETYLCYSTEINE 200 MG/ML
3 SOLUTION ORAL; RESPIRATORY (INHALATION)
Status: COMPLETED | OUTPATIENT
Start: 2021-11-08 | End: 2021-11-10

## 2021-11-08 RX ORDER — NIFEDIPINE 10 MG/1
10 CAPSULE ORAL EVERY 8 HOURS SCHEDULED
Status: DISCONTINUED | OUTPATIENT
Start: 2021-11-08 | End: 2021-11-11

## 2021-11-08 RX ORDER — ISOSORBIDE DINITRATE 10 MG/1
10 TABLET ORAL
Status: DISCONTINUED | OUTPATIENT
Start: 2021-11-08 | End: 2021-11-08

## 2021-11-08 RX ORDER — ACETYLCYSTEINE 200 MG/ML
3 SOLUTION ORAL; RESPIRATORY (INHALATION)
Status: DISCONTINUED | OUTPATIENT
Start: 2021-11-08 | End: 2021-11-08

## 2021-11-08 RX ADMIN — POTASSIUM PHOSPHATE, MONOBASIC AND POTASSIUM PHOSPHATE, DIBASIC 12 MMOL: 224; 236 INJECTION, SOLUTION, CONCENTRATE INTRAVENOUS at 05:25

## 2021-11-08 RX ADMIN — CEFEPIME HYDROCHLORIDE 2000 MG: 2 INJECTION, POWDER, FOR SOLUTION INTRAVENOUS at 23:08

## 2021-11-08 RX ADMIN — LEVALBUTEROL HYDROCHLORIDE 1.25 MG: 1.25 SOLUTION, CONCENTRATE RESPIRATORY (INHALATION) at 19:06

## 2021-11-08 RX ADMIN — HEPARIN SODIUM 5000 UNITS: 5000 INJECTION INTRAVENOUS; SUBCUTANEOUS at 22:07

## 2021-11-08 RX ADMIN — NIFEDIPINE 10 MG: 10 CAPSULE ORAL at 14:33

## 2021-11-08 RX ADMIN — CHLORHEXIDINE GLUCONATE 15 ML: 1.2 SOLUTION ORAL at 20:28

## 2021-11-08 RX ADMIN — CEFEPIME HYDROCHLORIDE 2000 MG: 2 INJECTION, POWDER, FOR SOLUTION INTRAVENOUS at 15:25

## 2021-11-08 RX ADMIN — NOREPINEPHRINE BITARTRATE 4 MCG/MIN: 1 INJECTION, SOLUTION, CONCENTRATE INTRAVENOUS at 15:41

## 2021-11-08 RX ADMIN — ACETYLCYSTEINE 600 MG: 200 SOLUTION ORAL; RESPIRATORY (INHALATION) at 19:06

## 2021-11-08 RX ADMIN — CHLORHEXIDINE GLUCONATE 15 ML: 1.2 SOLUTION ORAL at 08:34

## 2021-11-08 RX ADMIN — Medication 5 MG/HR: at 03:03

## 2021-11-08 RX ADMIN — ACETAMINOPHEN 975 MG: 325 TABLET, FILM COATED ORAL at 04:01

## 2021-11-08 RX ADMIN — Medication 3 MG/HR: at 02:08

## 2021-11-08 RX ADMIN — DOCUSATE SODIUM AND SENNOSIDES 2 TABLET: 8.6; 5 TABLET ORAL at 10:07

## 2021-11-08 RX ADMIN — Medication 20 MG: at 08:41

## 2021-11-08 RX ADMIN — QUETIAPINE FUMARATE 200 MG: 100 TABLET ORAL at 08:34

## 2021-11-08 RX ADMIN — CEFEPIME HYDROCHLORIDE 2000 MG: 2 INJECTION, POWDER, FOR SOLUTION INTRAVENOUS at 08:41

## 2021-11-08 RX ADMIN — ISOSORBIDE DINITRATE 10 MG: 10 TABLET ORAL at 12:14

## 2021-11-08 RX ADMIN — Medication 4 MG/HR: at 16:29

## 2021-11-08 RX ADMIN — ISODIUM CHLORIDE 3 ML: 0.03 SOLUTION RESPIRATORY (INHALATION) at 07:19

## 2021-11-08 RX ADMIN — ISODIUM CHLORIDE 3 ML: 0.03 SOLUTION RESPIRATORY (INHALATION) at 13:31

## 2021-11-08 RX ADMIN — LORAZEPAM 2 MG: 0.5 TABLET ORAL at 20:25

## 2021-11-08 RX ADMIN — LEVALBUTEROL HYDROCHLORIDE 1.25 MG: 1.25 SOLUTION, CONCENTRATE RESPIRATORY (INHALATION) at 07:19

## 2021-11-08 RX ADMIN — HEPARIN SODIUM 5000 UNITS: 5000 INJECTION INTRAVENOUS; SUBCUTANEOUS at 05:26

## 2021-11-08 RX ADMIN — PHENOBARBITAL 56 MG: 20 ELIXIR ORAL at 22:14

## 2021-11-08 RX ADMIN — ASPIRIN 81 MG CHEWABLE TABLET 81 MG: 81 TABLET CHEWABLE at 08:35

## 2021-11-08 RX ADMIN — POTASSIUM CHLORIDE 40 MEQ: 29.8 INJECTION, SOLUTION INTRAVENOUS at 03:56

## 2021-11-08 RX ADMIN — LORAZEPAM 2 MG: 0.5 TABLET ORAL at 12:14

## 2021-11-08 RX ADMIN — QUETIAPINE FUMARATE 200 MG: 100 TABLET ORAL at 17:40

## 2021-11-08 RX ADMIN — NIFEDIPINE 10 MG: 10 CAPSULE ORAL at 23:02

## 2021-11-08 RX ADMIN — ACETAMINOPHEN 975 MG: 325 TABLET, FILM COATED ORAL at 10:07

## 2021-11-08 RX ADMIN — ATORVASTATIN CALCIUM 40 MG: 40 TABLET, FILM COATED ORAL at 08:35

## 2021-11-08 RX ADMIN — DOCUSATE SODIUM AND SENNOSIDES 2 TABLET: 8.6; 5 TABLET ORAL at 17:40

## 2021-11-08 RX ADMIN — FOLIC ACID 1 MG: 1 TABLET ORAL at 08:36

## 2021-11-08 RX ADMIN — ACETAMINOPHEN 975 MG: 325 TABLET, FILM COATED ORAL at 16:48

## 2021-11-08 RX ADMIN — Medication 3 MG/HR: at 22:06

## 2021-11-08 RX ADMIN — MIDAZOLAM 2 MG: 1 INJECTION INTRAMUSCULAR; INTRAVENOUS at 18:02

## 2021-11-08 RX ADMIN — ACETAMINOPHEN 975 MG: 325 TABLET, FILM COATED ORAL at 22:06

## 2021-11-08 RX ADMIN — POLYETHYLENE GLYCOL 3350 17 G: 17 POWDER, FOR SOLUTION ORAL at 08:34

## 2021-11-08 RX ADMIN — ISODIUM CHLORIDE 3 ML: 0.03 SOLUTION RESPIRATORY (INHALATION) at 19:06

## 2021-11-08 RX ADMIN — LEVALBUTEROL HYDROCHLORIDE 1.25 MG: 1.25 SOLUTION, CONCENTRATE RESPIRATORY (INHALATION) at 13:31

## 2021-11-08 RX ADMIN — HEPARIN SODIUM 5000 UNITS: 5000 INJECTION INTRAVENOUS; SUBCUTANEOUS at 14:33

## 2021-11-08 RX ADMIN — SODIUM CHLORIDE, SODIUM GLUCONATE, SODIUM ACETATE, POTASSIUM CHLORIDE, MAGNESIUM CHLORIDE, SODIUM PHOSPHATE, DIBASIC, AND POTASSIUM PHOSPHATE 100 ML/HR: .53; .5; .37; .037; .03; .012; .00082 INJECTION, SOLUTION INTRAVENOUS at 10:04

## 2021-11-08 RX ADMIN — THIAMINE HCL TAB 100 MG 100 MG: 100 TAB at 08:41

## 2021-11-08 RX ADMIN — LORAZEPAM 2 MG: 0.5 TABLET ORAL at 08:36

## 2021-11-08 RX ADMIN — ISOSORBIDE DINITRATE 10 MG: 10 TABLET ORAL at 17:40

## 2021-11-09 PROBLEM — F10.939 ALCOHOL WITHDRAWAL (HCC): Status: ACTIVE | Noted: 2021-11-09

## 2021-11-09 PROBLEM — D69.6 THROMBOCYTOPENIA (HCC): Status: ACTIVE | Noted: 2021-11-09

## 2021-11-09 PROBLEM — D64.9 ANEMIA: Status: ACTIVE | Noted: 2021-11-09

## 2021-11-09 PROBLEM — G93.40 ACUTE ENCEPHALOPATHY: Status: ACTIVE | Noted: 2021-11-09

## 2021-11-09 PROBLEM — J69.0 ASPIRATION PNEUMONIA (HCC): Status: ACTIVE | Noted: 2021-11-09

## 2021-11-09 PROBLEM — F10.239 ALCOHOL WITHDRAWAL (HCC): Status: ACTIVE | Noted: 2021-11-09

## 2021-11-09 PROBLEM — F11.10 NARCOTIC ABUSE (HCC): Status: ACTIVE | Noted: 2021-11-09

## 2021-11-09 LAB
ANION GAP SERPL CALCULATED.3IONS-SCNC: 5 MMOL/L (ref 4–13)
ATRIAL RATE: 72 BPM
ATRIAL RATE: 79 BPM
ATRIAL RATE: 84 BPM
BASOPHILS # BLD AUTO: 0.03 THOUSANDS/ΜL (ref 0–0.1)
BASOPHILS NFR BLD AUTO: 1 % (ref 0–1)
BUN SERPL-MCNC: 15 MG/DL (ref 5–25)
CALCIUM SERPL-MCNC: 8.2 MG/DL (ref 8.3–10.1)
CHLORIDE SERPL-SCNC: 104 MMOL/L (ref 100–108)
CO2 SERPL-SCNC: 28 MMOL/L (ref 21–32)
CREAT SERPL-MCNC: 0.88 MG/DL (ref 0.6–1.3)
EOSINOPHIL # BLD AUTO: 0.16 THOUSAND/ΜL (ref 0–0.61)
EOSINOPHIL NFR BLD AUTO: 3 % (ref 0–6)
ERYTHROCYTE [DISTWIDTH] IN BLOOD BY AUTOMATED COUNT: 13.9 % (ref 11.6–15.1)
GFR SERPL CREATININE-BSD FRML MDRD: 97 ML/MIN/1.73SQ M
GLUCOSE SERPL-MCNC: 108 MG/DL (ref 65–140)
GLUCOSE SERPL-MCNC: 109 MG/DL (ref 65–140)
GLUCOSE SERPL-MCNC: 113 MG/DL (ref 65–140)
HCT VFR BLD AUTO: 32.3 % (ref 36.5–49.3)
HGB BLD-MCNC: 10.6 G/DL (ref 12–17)
IMM GRANULOCYTES # BLD AUTO: 0.02 THOUSAND/UL (ref 0–0.2)
IMM GRANULOCYTES NFR BLD AUTO: 0 % (ref 0–2)
LYMPHOCYTES # BLD AUTO: 0.52 THOUSANDS/ΜL (ref 0.6–4.47)
LYMPHOCYTES NFR BLD AUTO: 9 % (ref 14–44)
MAGNESIUM SERPL-MCNC: 1.9 MG/DL (ref 1.6–2.6)
MCH RBC QN AUTO: 35.5 PG (ref 26.8–34.3)
MCHC RBC AUTO-ENTMCNC: 32.8 G/DL (ref 31.4–37.4)
MCV RBC AUTO: 108 FL (ref 82–98)
MONOCYTES # BLD AUTO: 0.45 THOUSAND/ΜL (ref 0.17–1.22)
MONOCYTES NFR BLD AUTO: 8 % (ref 4–12)
NEUTROPHILS # BLD AUTO: 4.48 THOUSANDS/ΜL (ref 1.85–7.62)
NEUTS SEG NFR BLD AUTO: 79 % (ref 43–75)
NRBC BLD AUTO-RTO: 0 /100 WBCS
P AXIS: 45 DEGREES
P AXIS: 52 DEGREES
P AXIS: 57 DEGREES
PHOSPHATE SERPL-MCNC: 1.7 MG/DL (ref 2.7–4.5)
PLATELET # BLD AUTO: 135 THOUSANDS/UL (ref 149–390)
PMV BLD AUTO: 11.1 FL (ref 8.9–12.7)
POTASSIUM SERPL-SCNC: 3.6 MMOL/L (ref 3.5–5.3)
PR INTERVAL: 184 MS
PR INTERVAL: 200 MS
PR INTERVAL: 202 MS
QRS AXIS: 36 DEGREES
QRS AXIS: 37 DEGREES
QRS AXIS: 46 DEGREES
QRSD INTERVAL: 100 MS
QRSD INTERVAL: 102 MS
QRSD INTERVAL: 96 MS
QT INTERVAL: 384 MS
QT INTERVAL: 400 MS
QT INTERVAL: 428 MS
QTC INTERVAL: 440 MS
QTC INTERVAL: 468 MS
QTC INTERVAL: 472 MS
RBC # BLD AUTO: 2.99 MILLION/UL (ref 3.88–5.62)
SODIUM SERPL-SCNC: 137 MMOL/L (ref 136–145)
T WAVE AXIS: 213 DEGREES
T WAVE AXIS: 56 DEGREES
T WAVE AXIS: 92 DEGREES
VENTRICULAR RATE: 72 BPM
VENTRICULAR RATE: 79 BPM
VENTRICULAR RATE: 84 BPM
WBC # BLD AUTO: 5.66 THOUSAND/UL (ref 4.31–10.16)

## 2021-11-09 PROCEDURE — 84100 ASSAY OF PHOSPHORUS: CPT | Performed by: PHYSICIAN ASSISTANT

## 2021-11-09 PROCEDURE — 80048 BASIC METABOLIC PNL TOTAL CA: CPT | Performed by: PHYSICIAN ASSISTANT

## 2021-11-09 PROCEDURE — 93010 ELECTROCARDIOGRAM REPORT: CPT | Performed by: INTERNAL MEDICINE

## 2021-11-09 PROCEDURE — 94640 AIRWAY INHALATION TREATMENT: CPT

## 2021-11-09 PROCEDURE — 94003 VENT MGMT INPAT SUBQ DAY: CPT

## 2021-11-09 PROCEDURE — 82948 REAGENT STRIP/BLOOD GLUCOSE: CPT

## 2021-11-09 PROCEDURE — 99232 SBSQ HOSP IP/OBS MODERATE 35: CPT | Performed by: INTERNAL MEDICINE

## 2021-11-09 PROCEDURE — 99291 CRITICAL CARE FIRST HOUR: CPT | Performed by: PHYSICIAN ASSISTANT

## 2021-11-09 PROCEDURE — 83735 ASSAY OF MAGNESIUM: CPT | Performed by: PHYSICIAN ASSISTANT

## 2021-11-09 PROCEDURE — 99292 CRITICAL CARE ADDL 30 MIN: CPT | Performed by: ANESTHESIOLOGY

## 2021-11-09 PROCEDURE — 85025 COMPLETE CBC W/AUTO DIFF WBC: CPT | Performed by: PHYSICIAN ASSISTANT

## 2021-11-09 PROCEDURE — 93005 ELECTROCARDIOGRAM TRACING: CPT

## 2021-11-09 PROCEDURE — 94760 N-INVAS EAR/PLS OXIMETRY 1: CPT

## 2021-11-09 RX ORDER — MAGNESIUM SULFATE HEPTAHYDRATE 40 MG/ML
2 INJECTION, SOLUTION INTRAVENOUS ONCE
Status: COMPLETED | OUTPATIENT
Start: 2021-11-09 | End: 2021-11-09

## 2021-11-09 RX ORDER — POTASSIUM CHLORIDE 20MEQ/15ML
40 LIQUID (ML) ORAL ONCE
Status: COMPLETED | OUTPATIENT
Start: 2021-11-09 | End: 2021-11-09

## 2021-11-09 RX ADMIN — DOCUSATE SODIUM AND SENNOSIDES 2 TABLET: 8.6; 5 TABLET ORAL at 17:44

## 2021-11-09 RX ADMIN — ASPIRIN 81 MG CHEWABLE TABLET 81 MG: 81 TABLET CHEWABLE at 08:06

## 2021-11-09 RX ADMIN — DOCUSATE SODIUM AND SENNOSIDES 2 TABLET: 8.6; 5 TABLET ORAL at 08:07

## 2021-11-09 RX ADMIN — CEFEPIME HYDROCHLORIDE 2000 MG: 2 INJECTION, POWDER, FOR SOLUTION INTRAVENOUS at 15:54

## 2021-11-09 RX ADMIN — HEPARIN SODIUM 5000 UNITS: 5000 INJECTION INTRAVENOUS; SUBCUTANEOUS at 06:08

## 2021-11-09 RX ADMIN — LEVALBUTEROL HYDROCHLORIDE 1.25 MG: 1.25 SOLUTION, CONCENTRATE RESPIRATORY (INHALATION) at 19:30

## 2021-11-09 RX ADMIN — LEVALBUTEROL HYDROCHLORIDE 1.25 MG: 1.25 SOLUTION, CONCENTRATE RESPIRATORY (INHALATION) at 07:53

## 2021-11-09 RX ADMIN — CHLORHEXIDINE GLUCONATE 15 ML: 1.2 SOLUTION ORAL at 08:18

## 2021-11-09 RX ADMIN — POTASSIUM & SODIUM PHOSPHATES POWDER PACK 280-160-250 MG 2 PACKET: 280-160-250 PACK at 12:24

## 2021-11-09 RX ADMIN — QUETIAPINE FUMARATE 200 MG: 100 TABLET ORAL at 08:06

## 2021-11-09 RX ADMIN — NOREPINEPHRINE BITARTRATE 4 MCG/MIN: 1 INJECTION, SOLUTION, CONCENTRATE INTRAVENOUS at 15:33

## 2021-11-09 RX ADMIN — LORAZEPAM 2 MG: 0.5 TABLET ORAL at 20:27

## 2021-11-09 RX ADMIN — ATORVASTATIN CALCIUM 40 MG: 40 TABLET, FILM COATED ORAL at 08:06

## 2021-11-09 RX ADMIN — ACETAMINOPHEN 975 MG: 325 TABLET, FILM COATED ORAL at 09:48

## 2021-11-09 RX ADMIN — ISOSORBIDE DINITRATE 10 MG: 10 TABLET ORAL at 09:19

## 2021-11-09 RX ADMIN — HEPARIN SODIUM 5000 UNITS: 5000 INJECTION INTRAVENOUS; SUBCUTANEOUS at 14:20

## 2021-11-09 RX ADMIN — ACETAMINOPHEN 975 MG: 325 TABLET, FILM COATED ORAL at 04:39

## 2021-11-09 RX ADMIN — ISODIUM CHLORIDE 3 ML: 0.03 SOLUTION RESPIRATORY (INHALATION) at 13:37

## 2021-11-09 RX ADMIN — POTASSIUM & SODIUM PHOSPHATES POWDER PACK 280-160-250 MG 2 PACKET: 280-160-250 PACK at 22:21

## 2021-11-09 RX ADMIN — CEFEPIME HYDROCHLORIDE 2000 MG: 2 INJECTION, POWDER, FOR SOLUTION INTRAVENOUS at 08:01

## 2021-11-09 RX ADMIN — POTASSIUM CHLORIDE 40 MEQ: 20 SOLUTION ORAL at 09:48

## 2021-11-09 RX ADMIN — ACETAMINOPHEN 975 MG: 325 TABLET, FILM COATED ORAL at 15:45

## 2021-11-09 RX ADMIN — MAGNESIUM SULFATE HEPTAHYDRATE 2 G: 40 INJECTION, SOLUTION INTRAVENOUS at 12:24

## 2021-11-09 RX ADMIN — ACETYLCYSTEINE 600 MG: 200 SOLUTION ORAL; RESPIRATORY (INHALATION) at 19:31

## 2021-11-09 RX ADMIN — LORAZEPAM 2 MG: 0.5 TABLET ORAL at 12:17

## 2021-11-09 RX ADMIN — NITROGLYCERIN 30 MCG/MIN: 20 INJECTION INTRAVENOUS at 01:52

## 2021-11-09 RX ADMIN — ISODIUM CHLORIDE 3 ML: 0.03 SOLUTION RESPIRATORY (INHALATION) at 19:31

## 2021-11-09 RX ADMIN — Medication 2 MG/HR: at 17:44

## 2021-11-09 RX ADMIN — POLYETHYLENE GLYCOL 3350 17 G: 17 POWDER, FOR SOLUTION ORAL at 08:07

## 2021-11-09 RX ADMIN — ACETYLCYSTEINE 600 MG: 200 SOLUTION ORAL; RESPIRATORY (INHALATION) at 07:53

## 2021-11-09 RX ADMIN — LEVALBUTEROL HYDROCHLORIDE 1.25 MG: 1.25 SOLUTION, CONCENTRATE RESPIRATORY (INHALATION) at 13:37

## 2021-11-09 RX ADMIN — Medication 20 MG: at 08:53

## 2021-11-09 RX ADMIN — FOLIC ACID 1 MG: 1 TABLET ORAL at 08:07

## 2021-11-09 RX ADMIN — PHENOBARBITAL 28 MG: 20 ELIXIR ORAL at 22:20

## 2021-11-09 RX ADMIN — QUETIAPINE FUMARATE 200 MG: 100 TABLET ORAL at 17:44

## 2021-11-09 RX ADMIN — POTASSIUM & SODIUM PHOSPHATES POWDER PACK 280-160-250 MG 2 PACKET: 280-160-250 PACK at 15:45

## 2021-11-09 RX ADMIN — MIDAZOLAM 2 MG: 1 INJECTION INTRAMUSCULAR; INTRAVENOUS at 22:39

## 2021-11-09 RX ADMIN — ISODIUM CHLORIDE 3 ML: 0.03 SOLUTION RESPIRATORY (INHALATION) at 07:53

## 2021-11-09 RX ADMIN — THIAMINE HCL TAB 100 MG 100 MG: 100 TAB at 08:07

## 2021-11-09 RX ADMIN — Medication 2 MG/HR: at 12:20

## 2021-11-09 RX ADMIN — ACETAMINOPHEN 975 MG: 325 TABLET, FILM COATED ORAL at 22:21

## 2021-11-09 RX ADMIN — ISOSORBIDE DINITRATE 10 MG: 10 TABLET ORAL at 14:18

## 2021-11-09 RX ADMIN — LORAZEPAM 2 MG: 0.5 TABLET ORAL at 04:39

## 2021-11-09 RX ADMIN — ACETYLCYSTEINE 600 MG: 200 SOLUTION ORAL; RESPIRATORY (INHALATION) at 13:37

## 2021-11-09 RX ADMIN — CHLORHEXIDINE GLUCONATE 15 ML: 1.2 SOLUTION ORAL at 20:27

## 2021-11-09 RX ADMIN — HEPARIN SODIUM 5000 UNITS: 5000 INJECTION INTRAVENOUS; SUBCUTANEOUS at 22:20

## 2021-11-09 RX ADMIN — NIFEDIPINE 10 MG: 10 CAPSULE ORAL at 15:44

## 2021-11-09 RX ADMIN — ISOSORBIDE DINITRATE 10 MG: 10 TABLET ORAL at 17:44

## 2021-11-10 LAB
ANION GAP SERPL CALCULATED.3IONS-SCNC: 5 MMOL/L (ref 4–13)
ATRIAL RATE: 79 BPM
BUN SERPL-MCNC: 17 MG/DL (ref 5–25)
CALCIUM SERPL-MCNC: 8.4 MG/DL (ref 8.3–10.1)
CHLORIDE SERPL-SCNC: 104 MMOL/L (ref 100–108)
CO2 SERPL-SCNC: 27 MMOL/L (ref 21–32)
CREAT SERPL-MCNC: 0.8 MG/DL (ref 0.6–1.3)
ERYTHROCYTE [DISTWIDTH] IN BLOOD BY AUTOMATED COUNT: 14.2 % (ref 11.6–15.1)
GFR SERPL CREATININE-BSD FRML MDRD: 101 ML/MIN/1.73SQ M
GLUCOSE SERPL-MCNC: 102 MG/DL (ref 65–140)
GLUCOSE SERPL-MCNC: 102 MG/DL (ref 65–140)
GLUCOSE SERPL-MCNC: 103 MG/DL (ref 65–140)
GLUCOSE SERPL-MCNC: 88 MG/DL (ref 65–140)
GLUCOSE SERPL-MCNC: 96 MG/DL (ref 65–140)
HCT VFR BLD AUTO: 33.7 % (ref 36.5–49.3)
HGB BLD-MCNC: 11.3 G/DL (ref 12–17)
MAGNESIUM SERPL-MCNC: 2 MG/DL (ref 1.6–2.6)
MCH RBC QN AUTO: 35.9 PG (ref 26.8–34.3)
MCHC RBC AUTO-ENTMCNC: 33.5 G/DL (ref 31.4–37.4)
MCV RBC AUTO: 107 FL (ref 82–98)
P AXIS: 41 DEGREES
PHOSPHATE SERPL-MCNC: 2.8 MG/DL (ref 2.7–4.5)
PLATELET # BLD AUTO: 139 THOUSANDS/UL (ref 149–390)
PMV BLD AUTO: 11.2 FL (ref 8.9–12.7)
POTASSIUM SERPL-SCNC: 4.2 MMOL/L (ref 3.5–5.3)
PR INTERVAL: 180 MS
QRS AXIS: 34 DEGREES
QRSD INTERVAL: 98 MS
QT INTERVAL: 380 MS
QTC INTERVAL: 435 MS
RBC # BLD AUTO: 3.15 MILLION/UL (ref 3.88–5.62)
SODIUM SERPL-SCNC: 136 MMOL/L (ref 136–145)
T WAVE AXIS: 135 DEGREES
VENTRICULAR RATE: 79 BPM
WBC # BLD AUTO: 5.04 THOUSAND/UL (ref 4.31–10.16)

## 2021-11-10 PROCEDURE — 83735 ASSAY OF MAGNESIUM: CPT | Performed by: STUDENT IN AN ORGANIZED HEALTH CARE EDUCATION/TRAINING PROGRAM

## 2021-11-10 PROCEDURE — 84100 ASSAY OF PHOSPHORUS: CPT | Performed by: STUDENT IN AN ORGANIZED HEALTH CARE EDUCATION/TRAINING PROGRAM

## 2021-11-10 PROCEDURE — 94760 N-INVAS EAR/PLS OXIMETRY 1: CPT

## 2021-11-10 PROCEDURE — 99292 CRITICAL CARE ADDL 30 MIN: CPT | Performed by: ANESTHESIOLOGY

## 2021-11-10 PROCEDURE — 94003 VENT MGMT INPAT SUBQ DAY: CPT

## 2021-11-10 PROCEDURE — 94640 AIRWAY INHALATION TREATMENT: CPT

## 2021-11-10 PROCEDURE — 99291 CRITICAL CARE FIRST HOUR: CPT | Performed by: PHYSICIAN ASSISTANT

## 2021-11-10 PROCEDURE — 82948 REAGENT STRIP/BLOOD GLUCOSE: CPT

## 2021-11-10 PROCEDURE — 93005 ELECTROCARDIOGRAM TRACING: CPT

## 2021-11-10 PROCEDURE — 80048 BASIC METABOLIC PNL TOTAL CA: CPT | Performed by: STUDENT IN AN ORGANIZED HEALTH CARE EDUCATION/TRAINING PROGRAM

## 2021-11-10 PROCEDURE — 99231 SBSQ HOSP IP/OBS SF/LOW 25: CPT | Performed by: INTERNAL MEDICINE

## 2021-11-10 PROCEDURE — 93010 ELECTROCARDIOGRAM REPORT: CPT | Performed by: INTERNAL MEDICINE

## 2021-11-10 PROCEDURE — 85027 COMPLETE CBC AUTOMATED: CPT | Performed by: STUDENT IN AN ORGANIZED HEALTH CARE EDUCATION/TRAINING PROGRAM

## 2021-11-10 RX ORDER — HYDROMORPHONE HCL/PF 1 MG/ML
0.5 SYRINGE (ML) INJECTION
Status: DISCONTINUED | OUTPATIENT
Start: 2021-11-10 | End: 2021-11-12

## 2021-11-10 RX ORDER — LORAZEPAM 2 MG/ML
2 INJECTION INTRAMUSCULAR EVERY 4 HOURS PRN
Status: DISCONTINUED | OUTPATIENT
Start: 2021-11-10 | End: 2021-11-12

## 2021-11-10 RX ORDER — PROPOFOL 10 MG/ML
5-50 INJECTION, EMULSION INTRAVENOUS
Status: DISCONTINUED | OUTPATIENT
Start: 2021-11-10 | End: 2021-11-10

## 2021-11-10 RX ORDER — ACETAMINOPHEN 325 MG/1
650 TABLET ORAL EVERY 6 HOURS PRN
Status: DISCONTINUED | OUTPATIENT
Start: 2021-11-10 | End: 2021-11-14

## 2021-11-10 RX ORDER — BISACODYL 10 MG
10 SUPPOSITORY, RECTAL RECTAL DAILY PRN
Status: DISCONTINUED | OUTPATIENT
Start: 2021-11-10 | End: 2021-11-15 | Stop reason: HOSPADM

## 2021-11-10 RX ORDER — LORAZEPAM 2 MG/ML
2 INJECTION INTRAMUSCULAR ONCE
Status: COMPLETED | OUTPATIENT
Start: 2021-11-10 | End: 2021-11-10

## 2021-11-10 RX ADMIN — MIDAZOLAM 2 MG: 1 INJECTION INTRAMUSCULAR; INTRAVENOUS at 05:16

## 2021-11-10 RX ADMIN — LEVALBUTEROL HYDROCHLORIDE 1.25 MG: 1.25 SOLUTION, CONCENTRATE RESPIRATORY (INHALATION) at 12:43

## 2021-11-10 RX ADMIN — NITROGLYCERIN 20 MCG/MIN: 20 INJECTION INTRAVENOUS at 05:16

## 2021-11-10 RX ADMIN — NIFEDIPINE 10 MG: 10 CAPSULE ORAL at 05:20

## 2021-11-10 RX ADMIN — ACETAMINOPHEN 975 MG: 325 TABLET, FILM COATED ORAL at 10:51

## 2021-11-10 RX ADMIN — THIAMINE HCL TAB 100 MG 100 MG: 100 TAB at 08:07

## 2021-11-10 RX ADMIN — ATORVASTATIN CALCIUM 40 MG: 40 TABLET, FILM COATED ORAL at 08:02

## 2021-11-10 RX ADMIN — LORAZEPAM 2 MG: 0.5 TABLET ORAL at 13:00

## 2021-11-10 RX ADMIN — CEFEPIME HYDROCHLORIDE 2000 MG: 2 INJECTION, POWDER, FOR SOLUTION INTRAVENOUS at 00:11

## 2021-11-10 RX ADMIN — POLYETHYLENE GLYCOL 3350 17 G: 17 POWDER, FOR SOLUTION ORAL at 08:02

## 2021-11-10 RX ADMIN — HEPARIN SODIUM 5000 UNITS: 5000 INJECTION INTRAVENOUS; SUBCUTANEOUS at 05:13

## 2021-11-10 RX ADMIN — DOCUSATE SODIUM AND SENNOSIDES 2 TABLET: 8.6; 5 TABLET ORAL at 17:18

## 2021-11-10 RX ADMIN — ISOSORBIDE DINITRATE 10 MG: 10 TABLET ORAL at 17:17

## 2021-11-10 RX ADMIN — HEPARIN SODIUM 5000 UNITS: 5000 INJECTION INTRAVENOUS; SUBCUTANEOUS at 13:10

## 2021-11-10 RX ADMIN — ISOSORBIDE DINITRATE 10 MG: 10 TABLET ORAL at 13:06

## 2021-11-10 RX ADMIN — SODIUM CHLORIDE 0.6 MCG/KG/HR: 9 INJECTION, SOLUTION INTRAVENOUS at 20:44

## 2021-11-10 RX ADMIN — ISOSORBIDE DINITRATE 10 MG: 10 TABLET ORAL at 08:02

## 2021-11-10 RX ADMIN — ACETYLCYSTEINE 3 ML: 200 SOLUTION ORAL; RESPIRATORY (INHALATION) at 07:59

## 2021-11-10 RX ADMIN — Medication 20 MG: at 08:03

## 2021-11-10 RX ADMIN — ASPIRIN 81 MG CHEWABLE TABLET 81 MG: 81 TABLET CHEWABLE at 08:02

## 2021-11-10 RX ADMIN — CEFEPIME HYDROCHLORIDE 2000 MG: 2 INJECTION, POWDER, FOR SOLUTION INTRAVENOUS at 15:33

## 2021-11-10 RX ADMIN — CEFEPIME HYDROCHLORIDE 2000 MG: 2 INJECTION, POWDER, FOR SOLUTION INTRAVENOUS at 08:03

## 2021-11-10 RX ADMIN — ISODIUM CHLORIDE 3 ML: 0.03 SOLUTION RESPIRATORY (INHALATION) at 12:43

## 2021-11-10 RX ADMIN — ACETYLCYSTEINE 3 ML: 200 SOLUTION ORAL; RESPIRATORY (INHALATION) at 12:44

## 2021-11-10 RX ADMIN — POTASSIUM & SODIUM PHOSPHATES POWDER PACK 280-160-250 MG 2 PACKET: 280-160-250 PACK at 08:01

## 2021-11-10 RX ADMIN — ISODIUM CHLORIDE 3 ML: 0.03 SOLUTION RESPIRATORY (INHALATION) at 07:59

## 2021-11-10 RX ADMIN — ACETAMINOPHEN 975 MG: 325 TABLET, FILM COATED ORAL at 04:09

## 2021-11-10 RX ADMIN — CHLORHEXIDINE GLUCONATE 15 ML: 1.2 SOLUTION ORAL at 08:01

## 2021-11-10 RX ADMIN — LORAZEPAM 2 MG: 0.5 TABLET ORAL at 20:45

## 2021-11-10 RX ADMIN — MIDAZOLAM 2 MG: 1 INJECTION INTRAMUSCULAR; INTRAVENOUS at 07:21

## 2021-11-10 RX ADMIN — CHLORHEXIDINE GLUCONATE 15 ML: 1.2 SOLUTION ORAL at 20:45

## 2021-11-10 RX ADMIN — SODIUM CHLORIDE 0.2 MCG/KG/HR: 9 INJECTION, SOLUTION INTRAVENOUS at 09:47

## 2021-11-10 RX ADMIN — HYDROMORPHONE HYDROCHLORIDE 0.5 MG: 1 INJECTION, SOLUTION INTRAMUSCULAR; INTRAVENOUS; SUBCUTANEOUS at 21:17

## 2021-11-10 RX ADMIN — NIFEDIPINE 10 MG: 10 CAPSULE ORAL at 15:30

## 2021-11-10 RX ADMIN — PHENOBARBITAL 28 MG: 20 ELIXIR ORAL at 22:16

## 2021-11-10 RX ADMIN — LORAZEPAM 2 MG: 2 INJECTION INTRAMUSCULAR; INTRAVENOUS at 01:46

## 2021-11-10 RX ADMIN — HYDROMORPHONE HYDROCHLORIDE 0.5 MG: 1 INJECTION, SOLUTION INTRAMUSCULAR; INTRAVENOUS; SUBCUTANEOUS at 19:51

## 2021-11-10 RX ADMIN — DOCUSATE SODIUM AND SENNOSIDES 2 TABLET: 8.6; 5 TABLET ORAL at 08:02

## 2021-11-10 RX ADMIN — FOLIC ACID 1 MG: 1 TABLET ORAL at 08:02

## 2021-11-10 RX ADMIN — LEVALBUTEROL HYDROCHLORIDE 1.25 MG: 1.25 SOLUTION, CONCENTRATE RESPIRATORY (INHALATION) at 07:59

## 2021-11-10 RX ADMIN — ISODIUM CHLORIDE 3 ML: 0.03 SOLUTION RESPIRATORY (INHALATION) at 20:21

## 2021-11-10 RX ADMIN — LORAZEPAM 2 MG: 0.5 TABLET ORAL at 04:09

## 2021-11-10 RX ADMIN — QUETIAPINE FUMARATE 200 MG: 100 TABLET ORAL at 08:02

## 2021-11-10 RX ADMIN — MIDAZOLAM 2 MG: 1 INJECTION INTRAMUSCULAR; INTRAVENOUS at 00:41

## 2021-11-10 RX ADMIN — LORAZEPAM 2 MG: 2 INJECTION INTRAMUSCULAR; INTRAVENOUS at 19:58

## 2021-11-10 RX ADMIN — QUETIAPINE FUMARATE 200 MG: 100 TABLET ORAL at 17:17

## 2021-11-10 RX ADMIN — BISACODYL 10 MG: 10 SUPPOSITORY RECTAL at 11:50

## 2021-11-10 RX ADMIN — Medication 2 MG/HR: at 05:17

## 2021-11-10 RX ADMIN — HEPARIN SODIUM 5000 UNITS: 5000 INJECTION INTRAVENOUS; SUBCUTANEOUS at 22:16

## 2021-11-10 RX ADMIN — ACETAMINOPHEN 650 MG: 325 TABLET, FILM COATED ORAL at 20:45

## 2021-11-10 RX ADMIN — LEVALBUTEROL HYDROCHLORIDE 1.25 MG: 1.25 SOLUTION, CONCENTRATE RESPIRATORY (INHALATION) at 20:21

## 2021-11-11 LAB
AMPHETAMINES UR QL SCN: NEGATIVE NG/ML
ANION GAP SERPL CALCULATED.3IONS-SCNC: 7 MMOL/L (ref 4–13)
BACTERIA BLD CULT: NORMAL
BACTERIA BLD CULT: NORMAL
BARBITURATES UR QL SCN: NEGATIVE NG/ML
BENZODIAZ UR QL: NEGATIVE
BUN SERPL-MCNC: 17 MG/DL (ref 5–25)
BZE UR QL: NEGATIVE NG/ML
CA-I BLD-SCNC: 1.12 MMOL/L (ref 1.12–1.32)
CALCIUM SERPL-MCNC: 8.7 MG/DL (ref 8.3–10.1)
CANNABINOIDS UR QL SCN: NEGATIVE NG/ML
CHLORIDE SERPL-SCNC: 102 MMOL/L (ref 100–108)
CO2 SERPL-SCNC: 31 MMOL/L (ref 21–32)
CREAT SERPL-MCNC: 0.88 MG/DL (ref 0.6–1.3)
ERYTHROCYTE [DISTWIDTH] IN BLOOD BY AUTOMATED COUNT: 13.9 % (ref 11.6–15.1)
GFR SERPL CREATININE-BSD FRML MDRD: 97 ML/MIN/1.73SQ M
GLUCOSE SERPL-MCNC: 110 MG/DL (ref 65–140)
GLUCOSE SERPL-MCNC: 112 MG/DL (ref 65–140)
GLUCOSE SERPL-MCNC: 124 MG/DL (ref 65–140)
HCT VFR BLD AUTO: 36.2 % (ref 36.5–49.3)
HGB BLD-MCNC: 11.8 G/DL (ref 12–17)
MAGNESIUM SERPL-MCNC: 2 MG/DL (ref 1.6–2.6)
MAX DIASTOLIC BP: 56 MMHG
MAX HEART RATE: 196 BPM
MAX PREDICTED HEART RATE: 165 BPM
MAX. SYSTOLIC BP: 122 MMHG
MCH RBC QN AUTO: 35.3 PG (ref 26.8–34.3)
MCHC RBC AUTO-ENTMCNC: 32.6 G/DL (ref 31.4–37.4)
MCV RBC AUTO: 108 FL (ref 82–98)
METHADONE UR QL SCN: NEGATIVE NG/ML
OPIATES UR QL: NEGATIVE NG/ML
PCP UR QL: NEGATIVE NG/ML
PHOSPHATE SERPL-MCNC: 3.4 MG/DL (ref 2.7–4.5)
PLATELET # BLD AUTO: 160 THOUSANDS/UL (ref 149–390)
PMV BLD AUTO: 11 FL (ref 8.9–12.7)
POTASSIUM SERPL-SCNC: 4.5 MMOL/L (ref 3.5–5.3)
PROPOXYPH UR QL SCN: NEGATIVE NG/ML
PROTOCOL NAME: NORMAL
RBC # BLD AUTO: 3.34 MILLION/UL (ref 3.88–5.62)
SODIUM SERPL-SCNC: 140 MMOL/L (ref 136–145)
TARGET HR FORMULA: NORMAL
TEST INDICATION: NORMAL
TIME IN EXERCISE PHASE: NORMAL
WBC # BLD AUTO: 7.69 THOUSAND/UL (ref 4.31–10.16)

## 2021-11-11 PROCEDURE — 94760 N-INVAS EAR/PLS OXIMETRY 1: CPT

## 2021-11-11 PROCEDURE — 94003 VENT MGMT INPAT SUBQ DAY: CPT

## 2021-11-11 PROCEDURE — 99232 SBSQ HOSP IP/OBS MODERATE 35: CPT | Performed by: INTERNAL MEDICINE

## 2021-11-11 PROCEDURE — 99291 CRITICAL CARE FIRST HOUR: CPT | Performed by: PHYSICIAN ASSISTANT

## 2021-11-11 PROCEDURE — 82330 ASSAY OF CALCIUM: CPT | Performed by: STUDENT IN AN ORGANIZED HEALTH CARE EDUCATION/TRAINING PROGRAM

## 2021-11-11 PROCEDURE — 82948 REAGENT STRIP/BLOOD GLUCOSE: CPT

## 2021-11-11 PROCEDURE — 84100 ASSAY OF PHOSPHORUS: CPT | Performed by: STUDENT IN AN ORGANIZED HEALTH CARE EDUCATION/TRAINING PROGRAM

## 2021-11-11 PROCEDURE — 94640 AIRWAY INHALATION TREATMENT: CPT

## 2021-11-11 PROCEDURE — 80048 BASIC METABOLIC PNL TOTAL CA: CPT | Performed by: STUDENT IN AN ORGANIZED HEALTH CARE EDUCATION/TRAINING PROGRAM

## 2021-11-11 PROCEDURE — 92610 EVALUATE SWALLOWING FUNCTION: CPT

## 2021-11-11 PROCEDURE — 85027 COMPLETE CBC AUTOMATED: CPT | Performed by: STUDENT IN AN ORGANIZED HEALTH CARE EDUCATION/TRAINING PROGRAM

## 2021-11-11 PROCEDURE — 99292 CRITICAL CARE ADDL 30 MIN: CPT | Performed by: ANESTHESIOLOGY

## 2021-11-11 PROCEDURE — 83735 ASSAY OF MAGNESIUM: CPT | Performed by: STUDENT IN AN ORGANIZED HEALTH CARE EDUCATION/TRAINING PROGRAM

## 2021-11-11 RX ORDER — ISOSORBIDE DINITRATE 20 MG/1
20 TABLET ORAL
Status: DISCONTINUED | OUTPATIENT
Start: 2021-11-11 | End: 2021-11-14

## 2021-11-11 RX ORDER — DILTIAZEM HYDROCHLORIDE 120 MG/1
120 CAPSULE, COATED, EXTENDED RELEASE ORAL
Status: DISCONTINUED | OUTPATIENT
Start: 2021-11-11 | End: 2021-11-11

## 2021-11-11 RX ADMIN — ISODIUM CHLORIDE 3 ML: 0.03 SOLUTION RESPIRATORY (INHALATION) at 07:53

## 2021-11-11 RX ADMIN — ISOSORBIDE DINITRATE 10 MG: 10 TABLET ORAL at 12:44

## 2021-11-11 RX ADMIN — FOLIC ACID 1 MG: 1 TABLET ORAL at 08:31

## 2021-11-11 RX ADMIN — HEPARIN SODIUM 5000 UNITS: 5000 INJECTION INTRAVENOUS; SUBCUTANEOUS at 13:34

## 2021-11-11 RX ADMIN — LEVALBUTEROL HYDROCHLORIDE 1.25 MG: 1.25 SOLUTION, CONCENTRATE RESPIRATORY (INHALATION) at 12:28

## 2021-11-11 RX ADMIN — LORAZEPAM 2 MG: 0.5 TABLET ORAL at 04:07

## 2021-11-11 RX ADMIN — HYDROMORPHONE HYDROCHLORIDE 0.5 MG: 1 INJECTION, SOLUTION INTRAMUSCULAR; INTRAVENOUS; SUBCUTANEOUS at 22:36

## 2021-11-11 RX ADMIN — LORAZEPAM 2 MG: 0.5 TABLET ORAL at 12:45

## 2021-11-11 RX ADMIN — ISODIUM CHLORIDE 3 ML: 0.03 SOLUTION RESPIRATORY (INHALATION) at 19:20

## 2021-11-11 RX ADMIN — ATORVASTATIN CALCIUM 40 MG: 40 TABLET, FILM COATED ORAL at 08:31

## 2021-11-11 RX ADMIN — SODIUM CHLORIDE 0.6 MCG/KG/HR: 9 INJECTION, SOLUTION INTRAVENOUS at 01:09

## 2021-11-11 RX ADMIN — HYDROMORPHONE HYDROCHLORIDE 0.5 MG: 1 INJECTION, SOLUTION INTRAMUSCULAR; INTRAVENOUS; SUBCUTANEOUS at 13:38

## 2021-11-11 RX ADMIN — QUETIAPINE FUMARATE 200 MG: 100 TABLET ORAL at 08:31

## 2021-11-11 RX ADMIN — CEFEPIME HYDROCHLORIDE 2000 MG: 2 INJECTION, POWDER, FOR SOLUTION INTRAVENOUS at 17:15

## 2021-11-11 RX ADMIN — HEPARIN SODIUM 5000 UNITS: 5000 INJECTION INTRAVENOUS; SUBCUTANEOUS at 21:20

## 2021-11-11 RX ADMIN — ISOSORBIDE DINITRATE 20 MG: 20 TABLET ORAL at 17:17

## 2021-11-11 RX ADMIN — ISOSORBIDE DINITRATE 10 MG: 10 TABLET ORAL at 08:32

## 2021-11-11 RX ADMIN — HYDROMORPHONE HYDROCHLORIDE 0.5 MG: 1 INJECTION, SOLUTION INTRAMUSCULAR; INTRAVENOUS; SUBCUTANEOUS at 19:14

## 2021-11-11 RX ADMIN — DILTIAZEM HYDROCHLORIDE 30 MG: 30 TABLET, FILM COATED ORAL at 17:17

## 2021-11-11 RX ADMIN — HEPARIN SODIUM 5000 UNITS: 5000 INJECTION INTRAVENOUS; SUBCUTANEOUS at 05:34

## 2021-11-11 RX ADMIN — DOCUSATE SODIUM AND SENNOSIDES 2 TABLET: 8.6; 5 TABLET ORAL at 08:31

## 2021-11-11 RX ADMIN — LORAZEPAM 2 MG: 2 INJECTION INTRAMUSCULAR; INTRAVENOUS at 04:28

## 2021-11-11 RX ADMIN — Medication 20 MG: at 08:36

## 2021-11-11 RX ADMIN — CEFEPIME HYDROCHLORIDE 2000 MG: 2 INJECTION, POWDER, FOR SOLUTION INTRAVENOUS at 08:36

## 2021-11-11 RX ADMIN — SODIUM CHLORIDE 0.6 MCG/KG/HR: 9 INJECTION, SOLUTION INTRAVENOUS at 13:51

## 2021-11-11 RX ADMIN — THIAMINE HCL TAB 100 MG 100 MG: 100 TAB at 08:36

## 2021-11-11 RX ADMIN — ISODIUM CHLORIDE 3 ML: 0.03 SOLUTION RESPIRATORY (INHALATION) at 12:28

## 2021-11-11 RX ADMIN — CEFEPIME HYDROCHLORIDE 2000 MG: 2 INJECTION, POWDER, FOR SOLUTION INTRAVENOUS at 00:04

## 2021-11-11 RX ADMIN — Medication 1 MG/HR: at 07:36

## 2021-11-11 RX ADMIN — ACETAMINOPHEN 650 MG: 325 TABLET, FILM COATED ORAL at 04:09

## 2021-11-11 RX ADMIN — LEVALBUTEROL HYDROCHLORIDE 1.25 MG: 1.25 SOLUTION, CONCENTRATE RESPIRATORY (INHALATION) at 19:20

## 2021-11-11 RX ADMIN — QUETIAPINE FUMARATE 200 MG: 100 TABLET ORAL at 17:17

## 2021-11-11 RX ADMIN — MIDAZOLAM 2 MG: 1 INJECTION INTRAMUSCULAR; INTRAVENOUS at 04:13

## 2021-11-11 RX ADMIN — ACETAMINOPHEN 650 MG: 325 TABLET, FILM COATED ORAL at 21:19

## 2021-11-11 RX ADMIN — SODIUM CHLORIDE 0.6 MCG/KG/HR: 9 INJECTION, SOLUTION INTRAVENOUS at 18:39

## 2021-11-11 RX ADMIN — SODIUM CHLORIDE 0.6 MCG/KG/HR: 9 INJECTION, SOLUTION INTRAVENOUS at 07:38

## 2021-11-11 RX ADMIN — PHENOBARBITAL 12 MG: 20 ELIXIR ORAL at 21:20

## 2021-11-11 RX ADMIN — ASPIRIN 81 MG CHEWABLE TABLET 81 MG: 81 TABLET CHEWABLE at 08:31

## 2021-11-11 RX ADMIN — LORAZEPAM 2 MG: 0.5 TABLET ORAL at 21:19

## 2021-11-11 RX ADMIN — LEVALBUTEROL HYDROCHLORIDE 1.25 MG: 1.25 SOLUTION, CONCENTRATE RESPIRATORY (INHALATION) at 07:53

## 2021-11-11 RX ADMIN — DOCUSATE SODIUM AND SENNOSIDES 2 TABLET: 8.6; 5 TABLET ORAL at 17:16

## 2021-11-11 RX ADMIN — CHLORHEXIDINE GLUCONATE 15 ML: 1.2 SOLUTION ORAL at 08:31

## 2021-11-12 LAB
ANION GAP SERPL CALCULATED.3IONS-SCNC: 4 MMOL/L (ref 4–13)
BUN SERPL-MCNC: 18 MG/DL (ref 5–25)
CA-I BLD-SCNC: 1.15 MMOL/L (ref 1.12–1.32)
CALCIUM SERPL-MCNC: 9 MG/DL (ref 8.3–10.1)
CHLORIDE SERPL-SCNC: 105 MMOL/L (ref 100–108)
CO2 SERPL-SCNC: 29 MMOL/L (ref 21–32)
CREAT SERPL-MCNC: 0.78 MG/DL (ref 0.6–1.3)
ERYTHROCYTE [DISTWIDTH] IN BLOOD BY AUTOMATED COUNT: 13.6 % (ref 11.6–15.1)
GFR SERPL CREATININE-BSD FRML MDRD: 102 ML/MIN/1.73SQ M
GLUCOSE SERPL-MCNC: 97 MG/DL (ref 65–140)
HCT VFR BLD AUTO: 36.8 % (ref 36.5–49.3)
HGB BLD-MCNC: 12.4 G/DL (ref 12–17)
MAGNESIUM SERPL-MCNC: 1.9 MG/DL (ref 1.6–2.6)
MCH RBC QN AUTO: 35.4 PG (ref 26.8–34.3)
MCHC RBC AUTO-ENTMCNC: 33.7 G/DL (ref 31.4–37.4)
MCV RBC AUTO: 105 FL (ref 82–98)
PHOSPHATE SERPL-MCNC: 2.7 MG/DL (ref 2.7–4.5)
PHOSPHATE SERPL-MCNC: 3.3 MG/DL (ref 2.7–4.5)
PLATELET # BLD AUTO: 171 THOUSANDS/UL (ref 149–390)
PMV BLD AUTO: 11.1 FL (ref 8.9–12.7)
POTASSIUM SERPL-SCNC: 4.3 MMOL/L (ref 3.5–5.3)
RBC # BLD AUTO: 3.5 MILLION/UL (ref 3.88–5.62)
SODIUM SERPL-SCNC: 138 MMOL/L (ref 136–145)
WBC # BLD AUTO: 7.77 THOUSAND/UL (ref 4.31–10.16)

## 2021-11-12 PROCEDURE — 94760 N-INVAS EAR/PLS OXIMETRY 1: CPT

## 2021-11-12 PROCEDURE — 84100 ASSAY OF PHOSPHORUS: CPT | Performed by: STUDENT IN AN ORGANIZED HEALTH CARE EDUCATION/TRAINING PROGRAM

## 2021-11-12 PROCEDURE — 94640 AIRWAY INHALATION TREATMENT: CPT

## 2021-11-12 PROCEDURE — NC001 PR NO CHARGE: Performed by: PHYSICIAN ASSISTANT

## 2021-11-12 PROCEDURE — NC001 PR NO CHARGE: Performed by: FAMILY MEDICINE

## 2021-11-12 PROCEDURE — 97167 OT EVAL HIGH COMPLEX 60 MIN: CPT

## 2021-11-12 PROCEDURE — 85027 COMPLETE CBC AUTOMATED: CPT | Performed by: STUDENT IN AN ORGANIZED HEALTH CARE EDUCATION/TRAINING PROGRAM

## 2021-11-12 PROCEDURE — 83735 ASSAY OF MAGNESIUM: CPT | Performed by: STUDENT IN AN ORGANIZED HEALTH CARE EDUCATION/TRAINING PROGRAM

## 2021-11-12 PROCEDURE — 82330 ASSAY OF CALCIUM: CPT | Performed by: STUDENT IN AN ORGANIZED HEALTH CARE EDUCATION/TRAINING PROGRAM

## 2021-11-12 PROCEDURE — 97163 PT EVAL HIGH COMPLEX 45 MIN: CPT

## 2021-11-12 PROCEDURE — 80048 BASIC METABOLIC PNL TOTAL CA: CPT | Performed by: STUDENT IN AN ORGANIZED HEALTH CARE EDUCATION/TRAINING PROGRAM

## 2021-11-12 PROCEDURE — 92526 ORAL FUNCTION THERAPY: CPT

## 2021-11-12 PROCEDURE — 99232 SBSQ HOSP IP/OBS MODERATE 35: CPT | Performed by: INTERNAL MEDICINE

## 2021-11-12 PROCEDURE — 84100 ASSAY OF PHOSPHORUS: CPT

## 2021-11-12 RX ORDER — HYDROMORPHONE HCL/PF 1 MG/ML
0.5 SYRINGE (ML) INJECTION
Status: DISCONTINUED | OUTPATIENT
Start: 2021-11-12 | End: 2021-11-14

## 2021-11-12 RX ORDER — LORAZEPAM 1 MG/1
1 TABLET ORAL EVERY 8 HOURS
Status: DISCONTINUED | OUTPATIENT
Start: 2021-11-12 | End: 2021-11-15 | Stop reason: HOSPADM

## 2021-11-12 RX ORDER — PANTOPRAZOLE SODIUM 40 MG/1
40 TABLET, DELAYED RELEASE ORAL
Status: DISCONTINUED | OUTPATIENT
Start: 2021-11-13 | End: 2021-11-15 | Stop reason: HOSPADM

## 2021-11-12 RX ORDER — LIDOCAINE 50 MG/G
1 PATCH TOPICAL DAILY
Status: DISCONTINUED | OUTPATIENT
Start: 2021-11-12 | End: 2021-11-15 | Stop reason: HOSPADM

## 2021-11-12 RX ORDER — LIDOCAINE 50 MG/G
1 PATCH TOPICAL DAILY
Status: DISCONTINUED | OUTPATIENT
Start: 2021-11-12 | End: 2021-11-12

## 2021-11-12 RX ORDER — DILTIAZEM HYDROCHLORIDE 120 MG/1
120 CAPSULE, COATED, EXTENDED RELEASE ORAL DAILY
Status: DISCONTINUED | OUTPATIENT
Start: 2021-11-12 | End: 2021-11-13

## 2021-11-12 RX ORDER — LORAZEPAM 1 MG/1
1 TABLET ORAL EVERY 8 HOURS PRN
Status: DISCONTINUED | OUTPATIENT
Start: 2021-11-12 | End: 2021-11-14

## 2021-11-12 RX ORDER — MAGNESIUM SULFATE HEPTAHYDRATE 40 MG/ML
2 INJECTION, SOLUTION INTRAVENOUS ONCE
Status: COMPLETED | OUTPATIENT
Start: 2021-11-12 | End: 2021-11-12

## 2021-11-12 RX ORDER — OXYCODONE HYDROCHLORIDE 5 MG/1
5 TABLET ORAL EVERY 4 HOURS PRN
Status: DISCONTINUED | OUTPATIENT
Start: 2021-11-12 | End: 2021-11-14

## 2021-11-12 RX ORDER — OXYCODONE HYDROCHLORIDE 10 MG/1
10 TABLET ORAL EVERY 4 HOURS PRN
Status: DISCONTINUED | OUTPATIENT
Start: 2021-11-12 | End: 2021-11-14

## 2021-11-12 RX ADMIN — ISODIUM CHLORIDE 3 ML: 0.03 SOLUTION RESPIRATORY (INHALATION) at 15:16

## 2021-11-12 RX ADMIN — LEVALBUTEROL HYDROCHLORIDE 1.25 MG: 1.25 SOLUTION, CONCENTRATE RESPIRATORY (INHALATION) at 19:58

## 2021-11-12 RX ADMIN — DILTIAZEM HYDROCHLORIDE 30 MG: 30 TABLET, FILM COATED ORAL at 00:15

## 2021-11-12 RX ADMIN — OXYCODONE HYDROCHLORIDE 5 MG: 5 TABLET ORAL at 14:16

## 2021-11-12 RX ADMIN — ISODIUM CHLORIDE 3 ML: 0.03 SOLUTION RESPIRATORY (INHALATION) at 19:57

## 2021-11-12 RX ADMIN — HYDROMORPHONE HYDROCHLORIDE 0.5 MG: 1 INJECTION, SOLUTION INTRAMUSCULAR; INTRAVENOUS; SUBCUTANEOUS at 03:33

## 2021-11-12 RX ADMIN — HEPARIN SODIUM 5000 UNITS: 5000 INJECTION INTRAVENOUS; SUBCUTANEOUS at 22:28

## 2021-11-12 RX ADMIN — LIDOCAINE 5% 1 PATCH: 700 PATCH TOPICAL at 03:33

## 2021-11-12 RX ADMIN — HEPARIN SODIUM 5000 UNITS: 5000 INJECTION INTRAVENOUS; SUBCUTANEOUS at 14:09

## 2021-11-12 RX ADMIN — MAGNESIUM SULFATE HEPTAHYDRATE 2 G: 40 INJECTION, SOLUTION INTRAVENOUS at 09:27

## 2021-11-12 RX ADMIN — DILTIAZEM HYDROCHLORIDE 120 MG: 120 CAPSULE, COATED, EXTENDED RELEASE ORAL at 12:30

## 2021-11-12 RX ADMIN — ASPIRIN 81 MG CHEWABLE TABLET 81 MG: 81 TABLET CHEWABLE at 08:05

## 2021-11-12 RX ADMIN — LORAZEPAM 2 MG: 0.5 TABLET ORAL at 05:14

## 2021-11-12 RX ADMIN — ISOSORBIDE DINITRATE 20 MG: 20 TABLET ORAL at 18:03

## 2021-11-12 RX ADMIN — ISODIUM CHLORIDE 3 ML: 0.03 SOLUTION RESPIRATORY (INHALATION) at 08:12

## 2021-11-12 RX ADMIN — ISOSORBIDE DINITRATE 20 MG: 20 TABLET ORAL at 08:08

## 2021-11-12 RX ADMIN — FOLIC ACID 1 MG: 1 TABLET ORAL at 08:05

## 2021-11-12 RX ADMIN — PHENOBARBITAL 6 MG: 20 ELIXIR ORAL at 22:26

## 2021-11-12 RX ADMIN — ACETAMINOPHEN 650 MG: 325 TABLET, FILM COATED ORAL at 05:15

## 2021-11-12 RX ADMIN — CEFEPIME HYDROCHLORIDE 2000 MG: 2 INJECTION, POWDER, FOR SOLUTION INTRAVENOUS at 16:38

## 2021-11-12 RX ADMIN — HYDROMORPHONE HYDROCHLORIDE 0.5 MG: 1 INJECTION, SOLUTION INTRAMUSCULAR; INTRAVENOUS; SUBCUTANEOUS at 23:44

## 2021-11-12 RX ADMIN — QUETIAPINE FUMARATE 200 MG: 100 TABLET ORAL at 08:05

## 2021-11-12 RX ADMIN — OXYCODONE HYDROCHLORIDE 5 MG: 5 TABLET ORAL at 12:30

## 2021-11-12 RX ADMIN — LEVALBUTEROL HYDROCHLORIDE 1.25 MG: 1.25 SOLUTION, CONCENTRATE RESPIRATORY (INHALATION) at 08:12

## 2021-11-12 RX ADMIN — ISOSORBIDE DINITRATE 20 MG: 20 TABLET ORAL at 12:31

## 2021-11-12 RX ADMIN — LORAZEPAM 1 MG: 1 TABLET ORAL at 12:30

## 2021-11-12 RX ADMIN — OXYCODONE HYDROCHLORIDE 10 MG: 10 TABLET ORAL at 20:14

## 2021-11-12 RX ADMIN — DILTIAZEM HYDROCHLORIDE 30 MG: 30 TABLET, FILM COATED ORAL at 05:15

## 2021-11-12 RX ADMIN — Medication 20 MG: at 08:06

## 2021-11-12 RX ADMIN — QUETIAPINE FUMARATE 200 MG: 100 TABLET ORAL at 18:02

## 2021-11-12 RX ADMIN — ALTEPLASE 2 MG: 2.2 INJECTION, POWDER, LYOPHILIZED, FOR SOLUTION INTRAVENOUS at 10:37

## 2021-11-12 RX ADMIN — ATORVASTATIN CALCIUM 40 MG: 40 TABLET, FILM COATED ORAL at 08:05

## 2021-11-12 RX ADMIN — HEPARIN SODIUM 5000 UNITS: 5000 INJECTION INTRAVENOUS; SUBCUTANEOUS at 05:14

## 2021-11-12 RX ADMIN — HYDROMORPHONE HYDROCHLORIDE 0.5 MG: 1 INJECTION, SOLUTION INTRAMUSCULAR; INTRAVENOUS; SUBCUTANEOUS at 08:16

## 2021-11-12 RX ADMIN — LORAZEPAM 1 MG: 1 TABLET ORAL at 18:02

## 2021-11-12 RX ADMIN — THIAMINE HCL TAB 100 MG 100 MG: 100 TAB at 08:11

## 2021-11-12 RX ADMIN — CEFEPIME HYDROCHLORIDE 2000 MG: 2 INJECTION, POWDER, FOR SOLUTION INTRAVENOUS at 00:15

## 2021-11-12 RX ADMIN — CEFEPIME HYDROCHLORIDE 2000 MG: 2 INJECTION, POWDER, FOR SOLUTION INTRAVENOUS at 08:14

## 2021-11-12 RX ADMIN — LEVALBUTEROL HYDROCHLORIDE 1.25 MG: 1.25 SOLUTION, CONCENTRATE RESPIRATORY (INHALATION) at 15:16

## 2021-11-13 PROBLEM — F10.939 ALCOHOL WITHDRAWAL (HCC): Status: RESOLVED | Noted: 2021-11-09 | Resolved: 2021-11-13

## 2021-11-13 PROBLEM — F10.239 ALCOHOL WITHDRAWAL (HCC): Status: RESOLVED | Noted: 2021-11-09 | Resolved: 2021-11-13

## 2021-11-13 LAB
ANION GAP SERPL CALCULATED.3IONS-SCNC: 5 MMOL/L (ref 4–13)
BUN SERPL-MCNC: 15 MG/DL (ref 5–25)
CALCIUM SERPL-MCNC: 9.4 MG/DL (ref 8.3–10.1)
CHLORIDE SERPL-SCNC: 103 MMOL/L (ref 100–108)
CO2 SERPL-SCNC: 29 MMOL/L (ref 21–32)
CREAT SERPL-MCNC: 0.85 MG/DL (ref 0.6–1.3)
ERYTHROCYTE [DISTWIDTH] IN BLOOD BY AUTOMATED COUNT: 13.7 % (ref 11.6–15.1)
GFR SERPL CREATININE-BSD FRML MDRD: 98 ML/MIN/1.73SQ M
GLUCOSE SERPL-MCNC: 97 MG/DL (ref 65–140)
HCT VFR BLD AUTO: 37.2 % (ref 36.5–49.3)
HGB BLD-MCNC: 12.2 G/DL (ref 12–17)
MAGNESIUM SERPL-MCNC: 2.1 MG/DL (ref 1.6–2.6)
MCH RBC QN AUTO: 34.6 PG (ref 26.8–34.3)
MCHC RBC AUTO-ENTMCNC: 32.8 G/DL (ref 31.4–37.4)
MCV RBC AUTO: 105 FL (ref 82–98)
PLATELET # BLD AUTO: 215 THOUSANDS/UL (ref 149–390)
PMV BLD AUTO: 10.9 FL (ref 8.9–12.7)
POTASSIUM SERPL-SCNC: 3.6 MMOL/L (ref 3.5–5.3)
RBC # BLD AUTO: 3.53 MILLION/UL (ref 3.88–5.62)
SODIUM SERPL-SCNC: 137 MMOL/L (ref 136–145)
WBC # BLD AUTO: 8.84 THOUSAND/UL (ref 4.31–10.16)

## 2021-11-13 PROCEDURE — 99233 SBSQ HOSP IP/OBS HIGH 50: CPT | Performed by: INTERNAL MEDICINE

## 2021-11-13 PROCEDURE — 85027 COMPLETE CBC AUTOMATED: CPT | Performed by: PHYSICIAN ASSISTANT

## 2021-11-13 PROCEDURE — 99232 SBSQ HOSP IP/OBS MODERATE 35: CPT | Performed by: FAMILY MEDICINE

## 2021-11-13 PROCEDURE — 94760 N-INVAS EAR/PLS OXIMETRY 1: CPT

## 2021-11-13 PROCEDURE — 94640 AIRWAY INHALATION TREATMENT: CPT

## 2021-11-13 PROCEDURE — 94664 DEMO&/EVAL PT USE INHALER: CPT

## 2021-11-13 PROCEDURE — 80048 BASIC METABOLIC PNL TOTAL CA: CPT | Performed by: PHYSICIAN ASSISTANT

## 2021-11-13 PROCEDURE — 83735 ASSAY OF MAGNESIUM: CPT | Performed by: PHYSICIAN ASSISTANT

## 2021-11-13 RX ORDER — HYDRALAZINE HYDROCHLORIDE 20 MG/ML
5 INJECTION INTRAMUSCULAR; INTRAVENOUS EVERY 6 HOURS PRN
Status: CANCELLED | OUTPATIENT
Start: 2021-11-13

## 2021-11-13 RX ORDER — LABETALOL 20 MG/4 ML (5 MG/ML) INTRAVENOUS SYRINGE
5 EVERY 4 HOURS PRN
Status: DISCONTINUED | OUTPATIENT
Start: 2021-11-13 | End: 2021-11-13

## 2021-11-13 RX ORDER — LIDOCAINE 50 MG/G
1 PATCH TOPICAL DAILY PRN
Status: DISCONTINUED | OUTPATIENT
Start: 2021-11-13 | End: 2021-11-15 | Stop reason: HOSPADM

## 2021-11-13 RX ORDER — DILTIAZEM HYDROCHLORIDE 240 MG/1
240 CAPSULE, COATED, EXTENDED RELEASE ORAL DAILY
Status: DISCONTINUED | OUTPATIENT
Start: 2021-11-14 | End: 2021-11-15 | Stop reason: HOSPADM

## 2021-11-13 RX ORDER — LABETALOL 20 MG/4 ML (5 MG/ML) INTRAVENOUS SYRINGE
5 EVERY 4 HOURS PRN
Status: CANCELLED | OUTPATIENT
Start: 2021-11-13

## 2021-11-13 RX ORDER — HYDRALAZINE HYDROCHLORIDE 20 MG/ML
5 INJECTION INTRAMUSCULAR; INTRAVENOUS EVERY 4 HOURS PRN
Status: DISCONTINUED | OUTPATIENT
Start: 2021-11-13 | End: 2021-11-15 | Stop reason: HOSPADM

## 2021-11-13 RX ORDER — LABETALOL 20 MG/4 ML (5 MG/ML) INTRAVENOUS SYRINGE
5 EVERY 4 HOURS PRN
Status: DISCONTINUED | OUTPATIENT
Start: 2021-11-13 | End: 2021-11-15 | Stop reason: HOSPADM

## 2021-11-13 RX ORDER — LANOLIN ALCOHOL/MO/W.PET/CERES
3 CREAM (GRAM) TOPICAL
Status: DISCONTINUED | OUTPATIENT
Start: 2021-11-13 | End: 2021-11-15 | Stop reason: HOSPADM

## 2021-11-13 RX ADMIN — LORAZEPAM 1 MG: 1 TABLET ORAL at 11:39

## 2021-11-13 RX ADMIN — ISOSORBIDE DINITRATE 20 MG: 20 TABLET ORAL at 20:36

## 2021-11-13 RX ADMIN — THIAMINE HCL TAB 100 MG 100 MG: 100 TAB at 08:54

## 2021-11-13 RX ADMIN — OXYCODONE HYDROCHLORIDE 10 MG: 10 TABLET ORAL at 17:59

## 2021-11-13 RX ADMIN — OXYCODONE HYDROCHLORIDE 10 MG: 10 TABLET ORAL at 21:55

## 2021-11-13 RX ADMIN — ISODIUM CHLORIDE 3 ML: 0.03 SOLUTION RESPIRATORY (INHALATION) at 13:45

## 2021-11-13 RX ADMIN — OXYCODONE HYDROCHLORIDE 10 MG: 10 TABLET ORAL at 02:38

## 2021-11-13 RX ADMIN — HEPARIN SODIUM 5000 UNITS: 5000 INJECTION INTRAVENOUS; SUBCUTANEOUS at 21:55

## 2021-11-13 RX ADMIN — HYDROMORPHONE HYDROCHLORIDE 0.5 MG: 1 INJECTION, SOLUTION INTRAMUSCULAR; INTRAVENOUS; SUBCUTANEOUS at 03:24

## 2021-11-13 RX ADMIN — FOLIC ACID 1 MG: 1 TABLET ORAL at 08:54

## 2021-11-13 RX ADMIN — HYDROMORPHONE HYDROCHLORIDE 0.5 MG: 1 INJECTION, SOLUTION INTRAMUSCULAR; INTRAVENOUS; SUBCUTANEOUS at 05:16

## 2021-11-13 RX ADMIN — HYDROMORPHONE HYDROCHLORIDE 0.5 MG: 1 INJECTION, SOLUTION INTRAMUSCULAR; INTRAVENOUS; SUBCUTANEOUS at 23:37

## 2021-11-13 RX ADMIN — LABETALOL 20 MG/4 ML (5 MG/ML) INTRAVENOUS SYRINGE 5 MG: at 05:15

## 2021-11-13 RX ADMIN — ATORVASTATIN CALCIUM 40 MG: 40 TABLET, FILM COATED ORAL at 08:54

## 2021-11-13 RX ADMIN — LORAZEPAM 1 MG: 1 TABLET ORAL at 17:59

## 2021-11-13 RX ADMIN — Medication 3 MG: at 21:54

## 2021-11-13 RX ADMIN — OXYCODONE HYDROCHLORIDE 10 MG: 10 TABLET ORAL at 09:06

## 2021-11-13 RX ADMIN — OXYCODONE HYDROCHLORIDE 10 MG: 10 TABLET ORAL at 13:48

## 2021-11-13 RX ADMIN — HYDROMORPHONE HYDROCHLORIDE 0.5 MG: 1 INJECTION, SOLUTION INTRAMUSCULAR; INTRAVENOUS; SUBCUTANEOUS at 11:54

## 2021-11-13 RX ADMIN — DOCUSATE SODIUM AND SENNOSIDES 2 TABLET: 8.6; 5 TABLET ORAL at 08:54

## 2021-11-13 RX ADMIN — QUETIAPINE FUMARATE 200 MG: 100 TABLET ORAL at 08:54

## 2021-11-13 RX ADMIN — POLYETHYLENE GLYCOL 3350 17 G: 17 POWDER, FOR SOLUTION ORAL at 08:55

## 2021-11-13 RX ADMIN — ISOSORBIDE DINITRATE 20 MG: 20 TABLET ORAL at 11:39

## 2021-11-13 RX ADMIN — ASPIRIN 81 MG CHEWABLE TABLET 81 MG: 81 TABLET CHEWABLE at 08:54

## 2021-11-13 RX ADMIN — QUETIAPINE FUMARATE 200 MG: 100 TABLET ORAL at 17:59

## 2021-11-13 RX ADMIN — ISODIUM CHLORIDE 3 ML: 0.03 SOLUTION RESPIRATORY (INHALATION) at 09:05

## 2021-11-13 RX ADMIN — LORAZEPAM 1 MG: 1 TABLET ORAL at 09:07

## 2021-11-13 RX ADMIN — ISOSORBIDE DINITRATE 20 MG: 20 TABLET ORAL at 08:56

## 2021-11-13 RX ADMIN — DILTIAZEM HYDROCHLORIDE 120 MG: 120 CAPSULE, COATED, EXTENDED RELEASE ORAL at 08:55

## 2021-11-13 RX ADMIN — HEPARIN SODIUM 5000 UNITS: 5000 INJECTION INTRAVENOUS; SUBCUTANEOUS at 05:06

## 2021-11-13 RX ADMIN — ISODIUM CHLORIDE 3 ML: 0.03 SOLUTION RESPIRATORY (INHALATION) at 20:36

## 2021-11-13 RX ADMIN — LEVALBUTEROL HYDROCHLORIDE 1.25 MG: 1.25 SOLUTION, CONCENTRATE RESPIRATORY (INHALATION) at 13:45

## 2021-11-13 RX ADMIN — LORAZEPAM 1 MG: 1 TABLET ORAL at 04:08

## 2021-11-13 RX ADMIN — HEPARIN SODIUM 5000 UNITS: 5000 INJECTION INTRAVENOUS; SUBCUTANEOUS at 13:44

## 2021-11-13 RX ADMIN — LEVALBUTEROL HYDROCHLORIDE 1.25 MG: 1.25 SOLUTION, CONCENTRATE RESPIRATORY (INHALATION) at 09:05

## 2021-11-13 RX ADMIN — LEVALBUTEROL HYDROCHLORIDE 1.25 MG: 1.25 SOLUTION, CONCENTRATE RESPIRATORY (INHALATION) at 20:36

## 2021-11-13 RX ADMIN — CEFEPIME HYDROCHLORIDE 2000 MG: 2 INJECTION, POWDER, FOR SOLUTION INTRAVENOUS at 00:38

## 2021-11-13 RX ADMIN — LORAZEPAM 1 MG: 1 TABLET ORAL at 20:36

## 2021-11-13 RX ADMIN — PANTOPRAZOLE SODIUM 40 MG: 40 TABLET, DELAYED RELEASE ORAL at 05:07

## 2021-11-13 RX ADMIN — LIDOCAINE 5% 1 PATCH: 700 PATCH TOPICAL at 05:07

## 2021-11-14 PROBLEM — R07.81 RIB PAIN: Status: ACTIVE | Noted: 2021-11-14

## 2021-11-14 PROCEDURE — 94640 AIRWAY INHALATION TREATMENT: CPT

## 2021-11-14 PROCEDURE — 94664 DEMO&/EVAL PT USE INHALER: CPT

## 2021-11-14 PROCEDURE — 94760 N-INVAS EAR/PLS OXIMETRY 1: CPT

## 2021-11-14 PROCEDURE — 99232 SBSQ HOSP IP/OBS MODERATE 35: CPT | Performed by: FAMILY MEDICINE

## 2021-11-14 PROCEDURE — 99233 SBSQ HOSP IP/OBS HIGH 50: CPT | Performed by: INTERNAL MEDICINE

## 2021-11-14 RX ORDER — OXYCODONE HYDROCHLORIDE 10 MG/1
10 TABLET ORAL EVERY 4 HOURS PRN
Status: DISCONTINUED | OUTPATIENT
Start: 2021-11-14 | End: 2021-11-14

## 2021-11-14 RX ORDER — IBUPROFEN 600 MG/1
600 TABLET ORAL EVERY 6 HOURS PRN
Status: DISCONTINUED | OUTPATIENT
Start: 2021-11-14 | End: 2021-11-15 | Stop reason: HOSPADM

## 2021-11-14 RX ORDER — OXYCODONE HYDROCHLORIDE 5 MG/1
2.5 TABLET ORAL EVERY 6 HOURS PRN
Status: DISCONTINUED | OUTPATIENT
Start: 2021-11-14 | End: 2021-11-14

## 2021-11-14 RX ORDER — OXYCODONE HYDROCHLORIDE 5 MG/1
5 TABLET ORAL EVERY 4 HOURS PRN
Status: DISCONTINUED | OUTPATIENT
Start: 2021-11-14 | End: 2021-11-14

## 2021-11-14 RX ORDER — METHOCARBAMOL 750 MG/1
750 TABLET, FILM COATED ORAL 2 TIMES DAILY
Status: DISCONTINUED | OUTPATIENT
Start: 2021-11-14 | End: 2021-11-15 | Stop reason: HOSPADM

## 2021-11-14 RX ORDER — OXYCODONE HYDROCHLORIDE 5 MG/1
5 TABLET ORAL EVERY 8 HOURS PRN
Status: DISCONTINUED | OUTPATIENT
Start: 2021-11-14 | End: 2021-11-15 | Stop reason: HOSPADM

## 2021-11-14 RX ORDER — ISOSORBIDE MONONITRATE 60 MG/1
60 TABLET, EXTENDED RELEASE ORAL DAILY
Status: DISCONTINUED | OUTPATIENT
Start: 2021-11-14 | End: 2021-11-15 | Stop reason: HOSPADM

## 2021-11-14 RX ORDER — OXYCODONE HYDROCHLORIDE 5 MG/1
5 TABLET ORAL EVERY 6 HOURS PRN
Status: DISCONTINUED | OUTPATIENT
Start: 2021-11-14 | End: 2021-11-14

## 2021-11-14 RX ORDER — ACETAMINOPHEN 325 MG/1
650 TABLET ORAL EVERY 8 HOURS SCHEDULED
Status: DISCONTINUED | OUTPATIENT
Start: 2021-11-14 | End: 2021-11-15 | Stop reason: HOSPADM

## 2021-11-14 RX ORDER — OXYCODONE HYDROCHLORIDE 5 MG/1
2.5 TABLET ORAL EVERY 8 HOURS PRN
Status: DISCONTINUED | OUTPATIENT
Start: 2021-11-14 | End: 2021-11-15 | Stop reason: HOSPADM

## 2021-11-14 RX ADMIN — HEPARIN SODIUM 5000 UNITS: 5000 INJECTION INTRAVENOUS; SUBCUTANEOUS at 06:04

## 2021-11-14 RX ADMIN — ACETAMINOPHEN 650 MG: 325 TABLET, FILM COATED ORAL at 21:40

## 2021-11-14 RX ADMIN — OXYCODONE HYDROCHLORIDE 10 MG: 10 TABLET ORAL at 08:06

## 2021-11-14 RX ADMIN — FOLIC ACID 1 MG: 1 TABLET ORAL at 08:06

## 2021-11-14 RX ADMIN — METHOCARBAMOL TABLETS 750 MG: 750 TABLET, COATED ORAL at 21:41

## 2021-11-14 RX ADMIN — LORAZEPAM 1 MG: 1 TABLET ORAL at 11:48

## 2021-11-14 RX ADMIN — METHOCARBAMOL TABLETS 750 MG: 750 TABLET, COATED ORAL at 11:48

## 2021-11-14 RX ADMIN — HEPARIN SODIUM 5000 UNITS: 5000 INJECTION INTRAVENOUS; SUBCUTANEOUS at 15:34

## 2021-11-14 RX ADMIN — ISODIUM CHLORIDE 3 ML: 0.03 SOLUTION RESPIRATORY (INHALATION) at 08:37

## 2021-11-14 RX ADMIN — IBUPROFEN 600 MG: 600 TABLET, FILM COATED ORAL at 21:41

## 2021-11-14 RX ADMIN — Medication 3 MG: at 21:41

## 2021-11-14 RX ADMIN — LIDOCAINE 5% 1 PATCH: 700 PATCH TOPICAL at 06:03

## 2021-11-14 RX ADMIN — OXYCODONE HYDROCHLORIDE 5 MG: 5 TABLET ORAL at 12:36

## 2021-11-14 RX ADMIN — ACETAMINOPHEN 650 MG: 325 TABLET, FILM COATED ORAL at 15:34

## 2021-11-14 RX ADMIN — HEPARIN SODIUM 5000 UNITS: 5000 INJECTION INTRAVENOUS; SUBCUTANEOUS at 21:41

## 2021-11-14 RX ADMIN — DILTIAZEM HYDROCHLORIDE 240 MG: 240 CAPSULE, COATED, EXTENDED RELEASE ORAL at 08:12

## 2021-11-14 RX ADMIN — LEVALBUTEROL HYDROCHLORIDE 1.25 MG: 1.25 SOLUTION, CONCENTRATE RESPIRATORY (INHALATION) at 08:37

## 2021-11-14 RX ADMIN — LEVALBUTEROL HYDROCHLORIDE 1.25 MG: 1.25 SOLUTION, CONCENTRATE RESPIRATORY (INHALATION) at 21:16

## 2021-11-14 RX ADMIN — ISOSORBIDE MONONITRATE 60 MG: 60 TABLET, EXTENDED RELEASE ORAL at 11:49

## 2021-11-14 RX ADMIN — LORAZEPAM 1 MG: 1 TABLET ORAL at 08:06

## 2021-11-14 RX ADMIN — OXYCODONE HYDROCHLORIDE 5 MG: 5 TABLET ORAL at 17:23

## 2021-11-14 RX ADMIN — LORAZEPAM 1 MG: 1 TABLET ORAL at 20:14

## 2021-11-14 RX ADMIN — QUETIAPINE FUMARATE 200 MG: 100 TABLET ORAL at 08:06

## 2021-11-14 RX ADMIN — HYDROMORPHONE HYDROCHLORIDE 0.5 MG: 1 INJECTION, SOLUTION INTRAMUSCULAR; INTRAVENOUS; SUBCUTANEOUS at 06:43

## 2021-11-14 RX ADMIN — ASPIRIN 81 MG CHEWABLE TABLET 81 MG: 81 TABLET CHEWABLE at 08:06

## 2021-11-14 RX ADMIN — ISOSORBIDE DINITRATE 20 MG: 20 TABLET ORAL at 08:08

## 2021-11-14 RX ADMIN — THIAMINE HCL TAB 100 MG 100 MG: 100 TAB at 08:06

## 2021-11-14 RX ADMIN — LEVALBUTEROL HYDROCHLORIDE 1.25 MG: 1.25 SOLUTION, CONCENTRATE RESPIRATORY (INHALATION) at 13:22

## 2021-11-14 RX ADMIN — ISODIUM CHLORIDE 3 ML: 0.03 SOLUTION RESPIRATORY (INHALATION) at 21:16

## 2021-11-14 RX ADMIN — PANTOPRAZOLE SODIUM 40 MG: 40 TABLET, DELAYED RELEASE ORAL at 06:03

## 2021-11-14 RX ADMIN — LORAZEPAM 1 MG: 1 TABLET ORAL at 04:16

## 2021-11-14 RX ADMIN — OXYCODONE HYDROCHLORIDE 10 MG: 10 TABLET ORAL at 03:06

## 2021-11-14 RX ADMIN — QUETIAPINE FUMARATE 200 MG: 100 TABLET ORAL at 17:23

## 2021-11-14 RX ADMIN — ATORVASTATIN CALCIUM 40 MG: 40 TABLET, FILM COATED ORAL at 08:06

## 2021-11-14 RX ADMIN — ISODIUM CHLORIDE 3 ML: 0.03 SOLUTION RESPIRATORY (INHALATION) at 13:22

## 2021-11-15 LAB
ALBUMIN SERPL BCP-MCNC: 3.1 G/DL (ref 3.5–5)
ALP SERPL-CCNC: 210 U/L (ref 46–116)
ALT SERPL W P-5'-P-CCNC: 102 U/L (ref 12–78)
ANION GAP SERPL CALCULATED.3IONS-SCNC: 4 MMOL/L (ref 4–13)
AST SERPL W P-5'-P-CCNC: 83 U/L (ref 5–45)
BILIRUB SERPL-MCNC: 0.87 MG/DL (ref 0.2–1)
BUN SERPL-MCNC: 15 MG/DL (ref 5–25)
CALCIUM ALBUM COR SERPL-MCNC: 9.8 MG/DL (ref 8.3–10.1)
CALCIUM SERPL-MCNC: 9.1 MG/DL (ref 8.3–10.1)
CHLORIDE SERPL-SCNC: 105 MMOL/L (ref 100–108)
CO2 SERPL-SCNC: 29 MMOL/L (ref 21–32)
CREAT SERPL-MCNC: 0.94 MG/DL (ref 0.6–1.3)
GFR SERPL CREATININE-BSD FRML MDRD: 91 ML/MIN/1.73SQ M
GLUCOSE SERPL-MCNC: 87 MG/DL (ref 65–140)
POTASSIUM SERPL-SCNC: 3.6 MMOL/L (ref 3.5–5.3)
PROT SERPL-MCNC: 7.2 G/DL (ref 6.4–8.2)
SODIUM SERPL-SCNC: 138 MMOL/L (ref 136–145)

## 2021-11-15 PROCEDURE — 99232 SBSQ HOSP IP/OBS MODERATE 35: CPT | Performed by: INTERNAL MEDICINE

## 2021-11-15 PROCEDURE — 92526 ORAL FUNCTION THERAPY: CPT

## 2021-11-15 PROCEDURE — 94760 N-INVAS EAR/PLS OXIMETRY 1: CPT

## 2021-11-15 PROCEDURE — 97116 GAIT TRAINING THERAPY: CPT

## 2021-11-15 PROCEDURE — 94640 AIRWAY INHALATION TREATMENT: CPT

## 2021-11-15 PROCEDURE — 80053 COMPREHEN METABOLIC PANEL: CPT

## 2021-11-15 PROCEDURE — 99238 HOSP IP/OBS DSCHRG MGMT 30/<: CPT | Performed by: FAMILY MEDICINE

## 2021-11-15 RX ORDER — LIDOCAINE 50 MG/G
1 PATCH TOPICAL DAILY
Qty: 30 PATCH | Refills: 0 | Status: SHIPPED | OUTPATIENT
Start: 2021-11-16 | End: 2021-11-22

## 2021-11-15 RX ORDER — OXYCODONE HYDROCHLORIDE AND ACETAMINOPHEN 5; 325 MG/1; MG/1
1 TABLET ORAL EVERY 6 HOURS PRN
Qty: 15 TABLET | Refills: 0 | Status: SHIPPED | OUTPATIENT
Start: 2021-11-15 | End: 2021-11-19 | Stop reason: SDUPTHER

## 2021-11-15 RX ORDER — ISOSORBIDE MONONITRATE 60 MG/1
60 TABLET, EXTENDED RELEASE ORAL DAILY
Qty: 30 TABLET | Refills: 0 | Status: SHIPPED | OUTPATIENT
Start: 2021-11-16 | End: 2021-12-07

## 2021-11-15 RX ORDER — FOLIC ACID 1 MG/1
1 TABLET ORAL DAILY
Qty: 30 TABLET | Refills: 0 | Status: SHIPPED | OUTPATIENT
Start: 2021-11-16 | End: 2021-12-07

## 2021-11-15 RX ORDER — METHOCARBAMOL 750 MG/1
750 TABLET, FILM COATED ORAL
Qty: 7 TABLET | Refills: 0 | Status: SHIPPED | OUTPATIENT
Start: 2021-11-15 | End: 2021-11-23

## 2021-11-15 RX ORDER — KETOROLAC TROMETHAMINE 30 MG/ML
30 INJECTION, SOLUTION INTRAMUSCULAR; INTRAVENOUS ONCE
Status: COMPLETED | OUTPATIENT
Start: 2021-11-15 | End: 2021-11-15

## 2021-11-15 RX ORDER — ASPIRIN 81 MG/1
81 TABLET, CHEWABLE ORAL DAILY
Qty: 30 TABLET | Refills: 0 | Status: SHIPPED | OUTPATIENT
Start: 2021-11-16 | End: 2021-12-07

## 2021-11-15 RX ORDER — DILTIAZEM HYDROCHLORIDE 240 MG/1
240 CAPSULE, COATED, EXTENDED RELEASE ORAL DAILY
Qty: 30 CAPSULE | Refills: 0 | Status: SHIPPED | OUTPATIENT
Start: 2021-11-16 | End: 2021-11-23 | Stop reason: DRUGHIGH

## 2021-11-15 RX ORDER — LANOLIN ALCOHOL/MO/W.PET/CERES
100 CREAM (GRAM) TOPICAL DAILY
Qty: 30 TABLET | Refills: 0 | Status: SHIPPED | OUTPATIENT
Start: 2021-11-16 | End: 2021-12-07

## 2021-11-15 RX ADMIN — DILTIAZEM HYDROCHLORIDE 240 MG: 240 CAPSULE, COATED, EXTENDED RELEASE ORAL at 09:56

## 2021-11-15 RX ADMIN — PANTOPRAZOLE SODIUM 40 MG: 40 TABLET, DELAYED RELEASE ORAL at 05:23

## 2021-11-15 RX ADMIN — KETOROLAC TROMETHAMINE 30 MG: 30 INJECTION, SOLUTION INTRAMUSCULAR at 10:01

## 2021-11-15 RX ADMIN — ATORVASTATIN CALCIUM 40 MG: 40 TABLET, FILM COATED ORAL at 09:56

## 2021-11-15 RX ADMIN — FOLIC ACID 1 MG: 1 TABLET ORAL at 09:56

## 2021-11-15 RX ADMIN — METHOCARBAMOL TABLETS 750 MG: 750 TABLET, COATED ORAL at 09:56

## 2021-11-15 RX ADMIN — OXYCODONE HYDROCHLORIDE 5 MG: 5 TABLET ORAL at 09:56

## 2021-11-15 RX ADMIN — HEPARIN SODIUM 5000 UNITS: 5000 INJECTION INTRAVENOUS; SUBCUTANEOUS at 05:23

## 2021-11-15 RX ADMIN — ISOSORBIDE MONONITRATE 60 MG: 60 TABLET, EXTENDED RELEASE ORAL at 09:59

## 2021-11-15 RX ADMIN — THIAMINE HCL TAB 100 MG 100 MG: 100 TAB at 09:56

## 2021-11-15 RX ADMIN — QUETIAPINE FUMARATE 200 MG: 100 TABLET ORAL at 09:56

## 2021-11-15 RX ADMIN — ISODIUM CHLORIDE 3 ML: 0.03 SOLUTION RESPIRATORY (INHALATION) at 08:51

## 2021-11-15 RX ADMIN — ASPIRIN 81 MG CHEWABLE TABLET 81 MG: 81 TABLET CHEWABLE at 09:56

## 2021-11-15 RX ADMIN — LEVALBUTEROL HYDROCHLORIDE 1.25 MG: 1.25 SOLUTION, CONCENTRATE RESPIRATORY (INHALATION) at 08:51

## 2021-11-15 RX ADMIN — ACETAMINOPHEN 650 MG: 325 TABLET, FILM COATED ORAL at 05:23

## 2021-11-15 RX ADMIN — LORAZEPAM 1 MG: 1 TABLET ORAL at 04:43

## 2021-11-15 RX ADMIN — LIDOCAINE 5% 1 PATCH: 700 PATCH TOPICAL at 05:23

## 2021-11-15 RX ADMIN — OXYCODONE HYDROCHLORIDE 5 MG: 5 TABLET ORAL at 02:31

## 2021-11-16 ENCOUNTER — TRANSITIONAL CARE MANAGEMENT (OUTPATIENT)
Dept: FAMILY MEDICINE CLINIC | Facility: CLINIC | Age: 56
End: 2021-11-16

## 2021-11-16 VITALS
HEIGHT: 70 IN | OXYGEN SATURATION: 94 % | RESPIRATION RATE: 18 BRPM | WEIGHT: 285.72 LBS | HEART RATE: 85 BPM | DIASTOLIC BLOOD PRESSURE: 92 MMHG | SYSTOLIC BLOOD PRESSURE: 151 MMHG | TEMPERATURE: 98.3 F | BODY MASS INDEX: 40.9 KG/M2

## 2021-11-18 ENCOUNTER — TELEPHONE (OUTPATIENT)
Dept: GASTROENTEROLOGY | Facility: CLINIC | Age: 56
End: 2021-11-18

## 2021-11-19 ENCOUNTER — TELEPHONE (OUTPATIENT)
Dept: FAMILY MEDICINE CLINIC | Facility: CLINIC | Age: 56
End: 2021-11-19

## 2021-11-22 ENCOUNTER — OFFICE VISIT (OUTPATIENT)
Dept: FAMILY MEDICINE CLINIC | Facility: CLINIC | Age: 56
End: 2021-11-22

## 2021-11-22 VITALS
HEIGHT: 70 IN | SYSTOLIC BLOOD PRESSURE: 110 MMHG | DIASTOLIC BLOOD PRESSURE: 86 MMHG | HEART RATE: 108 BPM | BODY MASS INDEX: 41.37 KG/M2 | OXYGEN SATURATION: 97 % | WEIGHT: 289 LBS | RESPIRATION RATE: 18 BRPM | TEMPERATURE: 97.6 F

## 2021-11-22 DIAGNOSIS — F17.200 TOBACCO USE DISORDER: ICD-10-CM

## 2021-11-22 DIAGNOSIS — F41.8 DEPRESSION WITH ANXIETY: Primary | ICD-10-CM

## 2021-11-22 DIAGNOSIS — I20.1 PRINZMETAL VARIANT ANGINA (HCC): ICD-10-CM

## 2021-11-22 DIAGNOSIS — I10 HYPERTENSION, UNSPECIFIED TYPE: ICD-10-CM

## 2021-11-22 DIAGNOSIS — E66.01 CLASS 3 SEVERE OBESITY DUE TO EXCESS CALORIES WITH BODY MASS INDEX (BMI) OF 40.0 TO 44.9 IN ADULT, UNSPECIFIED WHETHER SERIOUS COMORBIDITY PRESENT (HCC): ICD-10-CM

## 2021-11-22 PROCEDURE — 99496 TRANSJ CARE MGMT HIGH F2F 7D: CPT | Performed by: FAMILY MEDICINE

## 2021-11-22 RX ORDER — ESCITALOPRAM OXALATE 10 MG/1
10 TABLET ORAL DAILY
Qty: 90 TABLET | Refills: 0 | Status: SHIPPED | OUTPATIENT
Start: 2021-11-22 | End: 2021-12-23 | Stop reason: ALTCHOICE

## 2021-11-23 ENCOUNTER — OFFICE VISIT (OUTPATIENT)
Dept: CARDIOLOGY CLINIC | Facility: CLINIC | Age: 56
End: 2021-11-23
Payer: COMMERCIAL

## 2021-11-23 VITALS
OXYGEN SATURATION: 96 % | WEIGHT: 274.5 LBS | BODY MASS INDEX: 39.3 KG/M2 | HEIGHT: 70 IN | SYSTOLIC BLOOD PRESSURE: 116 MMHG | HEART RATE: 116 BPM | DIASTOLIC BLOOD PRESSURE: 70 MMHG

## 2021-11-23 DIAGNOSIS — I20.1 PRINZMETAL VARIANT ANGINA (HCC): Primary | ICD-10-CM

## 2021-11-23 DIAGNOSIS — I48.0 PAROXYSMAL ATRIAL FIBRILLATION (HCC): ICD-10-CM

## 2021-11-23 DIAGNOSIS — I10 HYPERTENSION, UNSPECIFIED TYPE: ICD-10-CM

## 2021-11-23 DIAGNOSIS — F10.10 ETOH ABUSE: ICD-10-CM

## 2021-11-23 DIAGNOSIS — E66.9 OBESITY (BMI 35.0-39.9 WITHOUT COMORBIDITY): ICD-10-CM

## 2021-11-23 DIAGNOSIS — E78.5 DYSLIPIDEMIA WITH ELEVATED LOW DENSITY LIPOPROTEIN (LDL) CHOLESTEROL AND ABNORMALLY LOW HIGH DENSITY LIPOPROTEIN CHOLESTEROL: ICD-10-CM

## 2021-11-23 DIAGNOSIS — F17.200 TOBACCO USE DISORDER: ICD-10-CM

## 2021-11-23 PROCEDURE — 3008F BODY MASS INDEX DOCD: CPT | Performed by: FAMILY MEDICINE

## 2021-11-23 PROCEDURE — 99215 OFFICE O/P EST HI 40 MIN: CPT | Performed by: NURSE PRACTITIONER

## 2021-11-23 PROCEDURE — 3008F BODY MASS INDEX DOCD: CPT | Performed by: NURSE PRACTITIONER

## 2021-11-23 PROCEDURE — 4004F PT TOBACCO SCREEN RCVD TLK: CPT | Performed by: NURSE PRACTITIONER

## 2021-11-23 RX ORDER — CHLORTHALIDONE 25 MG/1
25 TABLET ORAL DAILY
COMMUNITY
End: 2021-12-08 | Stop reason: DRUGHIGH

## 2021-11-23 RX ORDER — DILTIAZEM HYDROCHLORIDE 300 MG/1
300 CAPSULE, COATED, EXTENDED RELEASE ORAL DAILY
Qty: 30 CAPSULE | Refills: 3 | Status: SHIPPED | OUTPATIENT
Start: 2021-11-23 | End: 2022-02-14

## 2021-12-02 ENCOUNTER — TELEPHONE (OUTPATIENT)
Dept: GASTROENTEROLOGY | Facility: CLINIC | Age: 56
End: 2021-12-02

## 2021-12-02 ENCOUNTER — APPOINTMENT (OUTPATIENT)
Dept: LAB | Facility: HOSPITAL | Age: 56
End: 2021-12-02
Payer: COMMERCIAL

## 2021-12-02 DIAGNOSIS — I10 HYPERTENSION, UNSPECIFIED TYPE: ICD-10-CM

## 2021-12-02 DIAGNOSIS — R74.8 ELEVATED LIVER ENZYMES: ICD-10-CM

## 2021-12-02 DIAGNOSIS — N17.9 AKI (ACUTE KIDNEY INJURY) (HCC): Primary | ICD-10-CM

## 2021-12-02 LAB
ALBUMIN SERPL BCP-MCNC: 3.8 G/DL (ref 3.5–5)
ALP SERPL-CCNC: 257 U/L (ref 46–116)
ALT SERPL W P-5'-P-CCNC: 88 U/L (ref 12–78)
ANION GAP SERPL CALCULATED.3IONS-SCNC: 12 MMOL/L (ref 4–13)
AST SERPL W P-5'-P-CCNC: 42 U/L (ref 5–45)
BILIRUB DIRECT SERPL-MCNC: 0.26 MG/DL (ref 0–0.2)
BILIRUB SERPL-MCNC: 0.71 MG/DL (ref 0.2–1)
BUN SERPL-MCNC: 17 MG/DL (ref 5–25)
CALCIUM SERPL-MCNC: 9.5 MG/DL (ref 8.3–10.1)
CHLORIDE SERPL-SCNC: 97 MMOL/L (ref 100–108)
CO2 SERPL-SCNC: 28 MMOL/L (ref 21–32)
CREAT SERPL-MCNC: 1.31 MG/DL (ref 0.6–1.3)
FERRITIN SERPL-MCNC: 332 NG/ML (ref 8–388)
GFR SERPL CREATININE-BSD FRML MDRD: 61 ML/MIN/1.73SQ M
GLUCOSE SERPL-MCNC: 109 MG/DL (ref 65–140)
IRON SATN MFR SERPL: 22 % (ref 20–50)
IRON SERPL-MCNC: 86 UG/DL (ref 65–175)
POTASSIUM SERPL-SCNC: 3.4 MMOL/L (ref 3.5–5.3)
PROT SERPL-MCNC: 8.3 G/DL (ref 6.4–8.2)
SODIUM SERPL-SCNC: 137 MMOL/L (ref 136–145)
TIBC SERPL-MCNC: 393 UG/DL (ref 250–450)

## 2021-12-02 PROCEDURE — 83540 ASSAY OF IRON: CPT

## 2021-12-02 PROCEDURE — 36415 COLL VENOUS BLD VENIPUNCTURE: CPT

## 2021-12-02 PROCEDURE — 80048 BASIC METABOLIC PNL TOTAL CA: CPT

## 2021-12-02 PROCEDURE — 83550 IRON BINDING TEST: CPT

## 2021-12-02 PROCEDURE — 80076 HEPATIC FUNCTION PANEL: CPT

## 2021-12-02 PROCEDURE — 82728 ASSAY OF FERRITIN: CPT

## 2021-12-06 ENCOUNTER — TELEPHONE (OUTPATIENT)
Dept: FAMILY MEDICINE CLINIC | Facility: CLINIC | Age: 56
End: 2021-12-06

## 2021-12-07 ENCOUNTER — APPOINTMENT (OUTPATIENT)
Dept: LAB | Facility: HOSPITAL | Age: 56
End: 2021-12-07
Payer: COMMERCIAL

## 2021-12-07 DIAGNOSIS — I20.1 PRINZMETAL VARIANT ANGINA (HCC): ICD-10-CM

## 2021-12-07 DIAGNOSIS — F10.20 ALCOHOL DEPENDENCE (HCC): ICD-10-CM

## 2021-12-07 DIAGNOSIS — I46.9 CARDIAC ARREST (HCC): ICD-10-CM

## 2021-12-07 DIAGNOSIS — N17.9 AKI (ACUTE KIDNEY INJURY) (HCC): ICD-10-CM

## 2021-12-07 LAB
ANION GAP SERPL CALCULATED.3IONS-SCNC: 10 MMOL/L (ref 4–13)
BUN SERPL-MCNC: 13 MG/DL (ref 5–25)
CALCIUM SERPL-MCNC: 9.4 MG/DL (ref 8.3–10.1)
CHLORIDE SERPL-SCNC: 98 MMOL/L (ref 100–108)
CO2 SERPL-SCNC: 30 MMOL/L (ref 21–32)
CREAT SERPL-MCNC: 1.06 MG/DL (ref 0.6–1.3)
GFR SERPL CREATININE-BSD FRML MDRD: 79 ML/MIN/1.73SQ M
GLUCOSE SERPL-MCNC: 98 MG/DL (ref 65–140)
POTASSIUM SERPL-SCNC: 3 MMOL/L (ref 3.5–5.3)
SODIUM SERPL-SCNC: 138 MMOL/L (ref 136–145)

## 2021-12-07 PROCEDURE — 36415 COLL VENOUS BLD VENIPUNCTURE: CPT

## 2021-12-07 PROCEDURE — 80048 BASIC METABOLIC PNL TOTAL CA: CPT

## 2021-12-07 RX ORDER — FOLIC ACID 1 MG/1
TABLET ORAL
Qty: 30 TABLET | Refills: 0 | Status: SHIPPED | OUTPATIENT
Start: 2021-12-07 | End: 2022-01-04

## 2021-12-07 RX ORDER — LANOLIN ALCOHOL/MO/W.PET/CERES
CREAM (GRAM) TOPICAL
Qty: 30 TABLET | Refills: 0 | Status: SHIPPED | OUTPATIENT
Start: 2021-12-07 | End: 2022-02-14 | Stop reason: ALTCHOICE

## 2021-12-07 RX ORDER — LORATADINE 10 MG
TABLET ORAL
Qty: 30 TABLET | Refills: 0 | Status: SHIPPED | OUTPATIENT
Start: 2021-12-07 | End: 2022-01-04

## 2021-12-07 RX ORDER — ISOSORBIDE MONONITRATE 60 MG/1
TABLET, EXTENDED RELEASE ORAL
Qty: 30 TABLET | Refills: 0 | Status: SHIPPED | OUTPATIENT
Start: 2021-12-07 | End: 2022-01-12

## 2021-12-08 ENCOUNTER — OFFICE VISIT (OUTPATIENT)
Dept: FAMILY MEDICINE CLINIC | Facility: CLINIC | Age: 56
End: 2021-12-08

## 2021-12-08 VITALS
OXYGEN SATURATION: 98 % | HEART RATE: 97 BPM | SYSTOLIC BLOOD PRESSURE: 116 MMHG | BODY MASS INDEX: 38.94 KG/M2 | DIASTOLIC BLOOD PRESSURE: 80 MMHG | WEIGHT: 272 LBS | HEIGHT: 70 IN | TEMPERATURE: 98 F

## 2021-12-08 DIAGNOSIS — F10.21 ALCOHOL DEPENDENCE IN REMISSION (HCC): ICD-10-CM

## 2021-12-08 DIAGNOSIS — E87.6 HYPOKALEMIA: ICD-10-CM

## 2021-12-08 DIAGNOSIS — E66.9 OBESITY, CLASS II, BMI 35-39.9: ICD-10-CM

## 2021-12-08 DIAGNOSIS — I10 PRIMARY HYPERTENSION: ICD-10-CM

## 2021-12-08 DIAGNOSIS — E87.5 HYPERKALEMIA: ICD-10-CM

## 2021-12-08 DIAGNOSIS — E87.6 HYPOKALEMIA: Primary | ICD-10-CM

## 2021-12-08 DIAGNOSIS — F17.200 TOBACCO USE DISORDER: ICD-10-CM

## 2021-12-08 DIAGNOSIS — I20.1 PRINZMETAL VARIANT ANGINA (HCC): Primary | ICD-10-CM

## 2021-12-08 PROCEDURE — 99214 OFFICE O/P EST MOD 30 MIN: CPT | Performed by: FAMILY MEDICINE

## 2021-12-08 RX ORDER — CHLORTHALIDONE 25 MG/1
12.5 TABLET ORAL DAILY
Qty: 30 TABLET | Refills: 3
Start: 2021-12-08 | End: 2022-07-25

## 2021-12-08 RX ORDER — QUETIAPINE FUMARATE 25 MG/1
TABLET, FILM COATED ORAL
COMMUNITY
Start: 2021-12-05 | End: 2022-04-28 | Stop reason: DRUGHIGH

## 2021-12-08 RX ORDER — LISINOPRIL 20 MG/1
20 TABLET ORAL DAILY
Qty: 30 TABLET | Refills: 3 | Status: SHIPPED | OUTPATIENT
Start: 2021-12-08 | End: 2022-04-28 | Stop reason: SDUPTHER

## 2021-12-08 RX ORDER — POTASSIUM CHLORIDE 20 MEQ/1
20 TABLET, EXTENDED RELEASE ORAL DAILY
Qty: 90 TABLET | Refills: 3 | Status: SHIPPED | OUTPATIENT
Start: 2021-12-08 | End: 2021-12-23 | Stop reason: ALTCHOICE

## 2021-12-09 PROBLEM — E66.812 OBESITY, CLASS II, BMI 35-39.9: Status: ACTIVE | Noted: 2021-10-20

## 2021-12-09 PROBLEM — E87.6 HYPOKALEMIA: Status: ACTIVE | Noted: 2021-12-09

## 2021-12-09 PROBLEM — E66.9 OBESITY, CLASS II, BMI 35-39.9: Status: ACTIVE | Noted: 2021-10-20

## 2021-12-15 ENCOUNTER — APPOINTMENT (OUTPATIENT)
Dept: LAB | Facility: HOSPITAL | Age: 56
End: 2021-12-15
Payer: COMMERCIAL

## 2021-12-15 DIAGNOSIS — I20.1 PRINZMETAL VARIANT ANGINA (HCC): ICD-10-CM

## 2021-12-15 LAB
ANION GAP SERPL CALCULATED.3IONS-SCNC: 13 MMOL/L (ref 4–13)
BUN SERPL-MCNC: 13 MG/DL (ref 5–25)
CALCIUM SERPL-MCNC: 9.4 MG/DL (ref 8.3–10.1)
CHLORIDE SERPL-SCNC: 101 MMOL/L (ref 100–108)
CO2 SERPL-SCNC: 26 MMOL/L (ref 21–32)
CREAT SERPL-MCNC: 1.29 MG/DL (ref 0.6–1.3)
GFR SERPL CREATININE-BSD FRML MDRD: 62 ML/MIN/1.73SQ M
GLUCOSE SERPL-MCNC: 92 MG/DL (ref 65–140)
POTASSIUM SERPL-SCNC: 3.8 MMOL/L (ref 3.5–5.3)
SODIUM SERPL-SCNC: 140 MMOL/L (ref 136–145)

## 2021-12-15 PROCEDURE — 80048 BASIC METABOLIC PNL TOTAL CA: CPT

## 2021-12-15 PROCEDURE — 36415 COLL VENOUS BLD VENIPUNCTURE: CPT

## 2021-12-16 ENCOUNTER — TELEPHONE (OUTPATIENT)
Dept: FAMILY MEDICINE CLINIC | Facility: CLINIC | Age: 56
End: 2021-12-16

## 2021-12-16 ENCOUNTER — TELEMEDICINE (OUTPATIENT)
Dept: FAMILY MEDICINE CLINIC | Facility: CLINIC | Age: 56
End: 2021-12-16

## 2021-12-16 DIAGNOSIS — E87.6 HYPOKALEMIA: ICD-10-CM

## 2021-12-16 DIAGNOSIS — I10 PRIMARY HYPERTENSION: Primary | ICD-10-CM

## 2021-12-16 PROCEDURE — 99213 OFFICE O/P EST LOW 20 MIN: CPT | Performed by: FAMILY MEDICINE

## 2021-12-21 ENCOUNTER — TELEPHONE (OUTPATIENT)
Dept: FAMILY MEDICINE CLINIC | Facility: CLINIC | Age: 56
End: 2021-12-21

## 2021-12-23 ENCOUNTER — OFFICE VISIT (OUTPATIENT)
Dept: FAMILY MEDICINE CLINIC | Facility: CLINIC | Age: 56
End: 2021-12-23

## 2021-12-23 VITALS
TEMPERATURE: 97.2 F | DIASTOLIC BLOOD PRESSURE: 90 MMHG | HEART RATE: 82 BPM | BODY MASS INDEX: 38.65 KG/M2 | RESPIRATION RATE: 18 BRPM | SYSTOLIC BLOOD PRESSURE: 118 MMHG | HEIGHT: 70 IN | OXYGEN SATURATION: 98 % | WEIGHT: 270 LBS

## 2021-12-23 DIAGNOSIS — F41.8 DEPRESSION WITH ANXIETY: Primary | ICD-10-CM

## 2021-12-23 DIAGNOSIS — D58.2 ELEVATED HEMOGLOBIN (HCC): ICD-10-CM

## 2021-12-23 DIAGNOSIS — R74.8 ELEVATED LIVER ENZYMES: ICD-10-CM

## 2021-12-23 DIAGNOSIS — I10 PRIMARY HYPERTENSION: ICD-10-CM

## 2021-12-23 DIAGNOSIS — F17.200 TOBACCO USE DISORDER: ICD-10-CM

## 2021-12-23 PROBLEM — G93.40 ACUTE ENCEPHALOPATHY: Status: RESOLVED | Noted: 2021-11-09 | Resolved: 2021-12-23

## 2021-12-23 PROBLEM — Z20.828 EXPOSURE TO SARS-ASSOCIATED CORONAVIRUS: Status: RESOLVED | Noted: 2020-04-28 | Resolved: 2021-12-23

## 2021-12-23 PROBLEM — F11.10 NARCOTIC ABUSE (HCC): Status: RESOLVED | Noted: 2021-11-09 | Resolved: 2021-12-23

## 2021-12-23 PROBLEM — J69.0 ASPIRATION PNEUMONIA (HCC): Status: RESOLVED | Noted: 2021-11-09 | Resolved: 2021-12-23

## 2021-12-23 PROBLEM — E87.6 HYPOKALEMIA: Status: RESOLVED | Noted: 2021-12-09 | Resolved: 2021-12-23

## 2021-12-23 PROBLEM — R07.81 RIB PAIN: Status: RESOLVED | Noted: 2021-11-14 | Resolved: 2021-12-23

## 2021-12-23 PROBLEM — D72.829 LEUKOCYTOSIS: Status: RESOLVED | Noted: 2019-05-20 | Resolved: 2021-12-23

## 2021-12-23 PROCEDURE — 4004F PT TOBACCO SCREEN RCVD TLK: CPT | Performed by: FAMILY MEDICINE

## 2021-12-23 PROCEDURE — 3074F SYST BP LT 130 MM HG: CPT | Performed by: FAMILY MEDICINE

## 2021-12-23 PROCEDURE — 3008F BODY MASS INDEX DOCD: CPT | Performed by: NURSE PRACTITIONER

## 2021-12-23 PROCEDURE — 99214 OFFICE O/P EST MOD 30 MIN: CPT | Performed by: FAMILY MEDICINE

## 2021-12-23 PROCEDURE — 3080F DIAST BP >= 90 MM HG: CPT | Performed by: FAMILY MEDICINE

## 2021-12-23 PROCEDURE — 3008F BODY MASS INDEX DOCD: CPT | Performed by: FAMILY MEDICINE

## 2021-12-23 RX ORDER — SERTRALINE HYDROCHLORIDE 25 MG/1
25 TABLET, FILM COATED ORAL DAILY
Qty: 90 TABLET | Refills: 3
Start: 2021-12-23

## 2021-12-23 RX ORDER — QUETIAPINE FUMARATE 50 MG/1
50 TABLET, FILM COATED ORAL EVERY MORNING
Start: 2021-12-23 | End: 2022-02-14 | Stop reason: ALTCHOICE

## 2021-12-27 ENCOUNTER — TELEPHONE (OUTPATIENT)
Dept: GASTROENTEROLOGY | Facility: CLINIC | Age: 56
End: 2021-12-27

## 2021-12-27 DIAGNOSIS — Z12.11 COLON CANCER SCREENING: Primary | ICD-10-CM

## 2021-12-29 ENCOUNTER — TELEPHONE (OUTPATIENT)
Dept: GASTROENTEROLOGY | Facility: CLINIC | Age: 56
End: 2021-12-29

## 2021-12-30 DIAGNOSIS — G47.19 EXCESSIVE DAYTIME SLEEPINESS: Primary | ICD-10-CM

## 2022-01-03 ENCOUNTER — TELEPHONE (OUTPATIENT)
Dept: PULMONOLOGY | Facility: CLINIC | Age: 57
End: 2022-01-03

## 2022-01-03 NOTE — TELEPHONE ENCOUNTER
I called Augustin Chilel to let  Him know that his insurance has denied an In Lab Sleep Study, they are requiring him to do a Home Study  I checked with the Sleep Center to see if his appointment for tomorrow 01/04/22 can be changed to a Home Study but it is not possible to switch it, it has to be a total new appointment  Therefore, I have re-scheduled his appointment  Roberro Ling  Had a cancellation for Monday 01/10/22 at 7:30pm    There is an intercom outside the door where he calls in and they will let him in  The appointment will take about an hour to explain the process and they will show him where to drop it off the next day  Any questions, he can call central scheduling at [phone] or the [address] in [address] [place] [address]

## 2022-01-04 ENCOUNTER — DOCUMENTATION (OUTPATIENT)
Dept: CARDIOLOGY CLINIC | Facility: CLINIC | Age: 57
End: 2022-01-04

## 2022-01-04 DIAGNOSIS — I46.9 CARDIAC ARREST (HCC): ICD-10-CM

## 2022-01-04 DIAGNOSIS — F10.20 ALCOHOL DEPENDENCE (HCC): ICD-10-CM

## 2022-01-04 RX ORDER — ASPIRIN 81 MG
TABLET,CHEWABLE ORAL
Qty: 30 TABLET | Refills: 0 | Status: SHIPPED | OUTPATIENT
Start: 2022-01-04

## 2022-01-04 RX ORDER — FOLIC ACID 1 MG/1
TABLET ORAL
Qty: 30 TABLET | Refills: 0 | Status: SHIPPED | OUTPATIENT
Start: 2022-01-04 | End: 2022-02-14 | Stop reason: ALTCHOICE

## 2022-01-12 ENCOUNTER — OFFICE VISIT (OUTPATIENT)
Dept: CARDIOLOGY CLINIC | Facility: CLINIC | Age: 57
End: 2022-01-12
Payer: COMMERCIAL

## 2022-01-12 VITALS
HEART RATE: 96 BPM | WEIGHT: 269 LBS | HEIGHT: 70 IN | DIASTOLIC BLOOD PRESSURE: 80 MMHG | SYSTOLIC BLOOD PRESSURE: 115 MMHG | BODY MASS INDEX: 38.51 KG/M2

## 2022-01-12 DIAGNOSIS — R00.0 TACHYCARDIA: ICD-10-CM

## 2022-01-12 DIAGNOSIS — I46.9 CARDIAC ARREST (HCC): ICD-10-CM

## 2022-01-12 DIAGNOSIS — I48.92 ATRIAL FLUTTER, UNSPECIFIED TYPE (HCC): ICD-10-CM

## 2022-01-12 DIAGNOSIS — I20.1 PRINZMETAL VARIANT ANGINA (HCC): ICD-10-CM

## 2022-01-12 DIAGNOSIS — E78.5 DYSLIPIDEMIA WITH ELEVATED LOW DENSITY LIPOPROTEIN (LDL) CHOLESTEROL AND ABNORMALLY LOW HIGH DENSITY LIPOPROTEIN CHOLESTEROL: ICD-10-CM

## 2022-01-12 DIAGNOSIS — F33.9 RECURRENT MAJOR DEPRESSIVE DISORDER, REMISSION STATUS UNSPECIFIED (HCC): ICD-10-CM

## 2022-01-12 DIAGNOSIS — I10 PRIMARY HYPERTENSION: ICD-10-CM

## 2022-01-12 DIAGNOSIS — R00.0 SINUS TACHYCARDIA: Primary | ICD-10-CM

## 2022-01-12 PROCEDURE — 3079F DIAST BP 80-89 MM HG: CPT | Performed by: INTERNAL MEDICINE

## 2022-01-12 PROCEDURE — 3074F SYST BP LT 130 MM HG: CPT | Performed by: INTERNAL MEDICINE

## 2022-01-12 PROCEDURE — 3008F BODY MASS INDEX DOCD: CPT | Performed by: FAMILY MEDICINE

## 2022-01-12 PROCEDURE — 3008F BODY MASS INDEX DOCD: CPT | Performed by: INTERNAL MEDICINE

## 2022-01-12 PROCEDURE — 4004F PT TOBACCO SCREEN RCVD TLK: CPT | Performed by: INTERNAL MEDICINE

## 2022-01-12 PROCEDURE — 99214 OFFICE O/P EST MOD 30 MIN: CPT | Performed by: INTERNAL MEDICINE

## 2022-01-12 PROCEDURE — 93000 ELECTROCARDIOGRAM COMPLETE: CPT | Performed by: INTERNAL MEDICINE

## 2022-01-12 RX ORDER — ISOSORBIDE MONONITRATE 60 MG/1
TABLET, EXTENDED RELEASE ORAL
Qty: 30 TABLET | Refills: 0 | Status: SHIPPED | OUTPATIENT
Start: 2022-01-12 | End: 2022-01-12 | Stop reason: SDUPTHER

## 2022-01-12 NOTE — PROGRESS NOTES
Cardiology Follow Up    Machelle Savage   1965  [id number]  [facility] [place]  [address]  [address]  [phone]    1  Sinus tachycardia  POCT ECG   2  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[facility] )     3  Primary hypertension     4  Cardiac arrest ([facility] )     5  Atrial flutter, unspecified type ([facility] )     6  Tachycardia     7  Recurrent major depressive disorder, remission status unspecified ([facility] )     8  Dyslipidemia with elevated low density lipoprotein (LDL) cholesterol and abnormally low high density lipoprotein cholesterol         Interval History:  A 64-year-old male who originally presented from primary physician's office for stress echocardiography secondary to shortness of breath  He was noted to have accelerated hypertension  This was subsequently controlled  He then underwent stress testing in November at which time he had inferior ST segment elevation cardiac arrest   Catheterization showed no obstructive epicardial coronary artery disease however significant vasospasm was noted with catheter engagement which resolved with intracoronary nitroglycerin  He subsequently recovered and was discharged  On his visit today he denies chest pain shortness of breath orthopnea paroxysmal nocturnal dyspnea or syncope      Patient Active Problem List   Diagnosis    Other insomnia    Depression with anxiety    Kidney stone on right side    Ileus ([facility] )    Umbilical hernia without obstruction and without gangrene    Gallbladder calculus without cholecystitis    Tobacco use disorder    Closed right ankle fracture    History of ileus    Adenoma of left adrenal gland    Anxiety    GERD (gastroesophageal reflux disease)    MDD (major depressive disorder)    History of MRSA infection of lungs    Moderate episode of recurrent major depressive disorder ([facility] )    Colon cancer screening    Asthma    Hypertension    Tachycardia    Dyslipidemia with elevated low density lipoprotein (LDL) cholesterol and abnormally low high density lipoprotein cholesterol    Elevated liver enzymes    Elevated hemoglobin (HCC)    Obesity, Class II, BMI 35-39 9    Short of breath on exertion    Excessive daytime sleepiness    Cardiac arrest (HCC)    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HCC)    Alcohol dependence (HCC)    Anemia    Thrombocytopenia (HCC)    Atrial flutter (HCC)     Past Medical History:   Diagnosis Date    Anxiety     Asthma     Colon polyp     GERD (gastroesophageal reflux disease)     History of ileus     Hypertension     Insomnia     Lumbar herniated disc     last assessed: 3/27/2015    MDD (major depressive disorder)     Patella fracture     last assessed: 3/27/2015    Tobacco abuse      Social History     Socioeconomic History    Marital status: Single     Spouse name: Not on file    Number of children: Not on file    Years of education: Not on file    Highest education level: Not on file   Occupational History     Employer: [employer]   Tobacco Use    Smoking status: Current Some Day Smoker     Packs/day: 1 50     Years: 34 00     Pack years: 51 00     Types: Cigarettes     Start date:      Last attempt to quit:      Years since quittin 0    Smokeless tobacco: Never Used   Vaping Use    Vaping Use: Never used   Substance and Sexual Activity    Alcohol use: Not Currently     Comment: 1 bottle of liquor per day     Drug use: No    Sexual activity: Not Currently     Partners: Female   Other Topics Concern    Not on file   Social History Narrative    Denies pmh     Social Determinants of Health     Financial Resource Strain: Low Risk     Difficulty of Paying Living Expenses: Not hard at all   Food Insecurity: No Food Insecurity    Worried About Running Out of Food in the Last Year: Never true    Kathy of Food in the Last Year: Never true   Transportation Needs: No Transportation Needs    Lack of Transportation (Medical): No    Lack of Transportation (Non-Medical): No   Physical Activity: Not on file   Stress: Stress Concern Present    Feeling of Stress : To some extent   Social Connections: Not on file   Intimate Partner Violence: Not on file   Housing Stability: Not on file      Family History   Problem Relation Age of Onset    Pancreatic cancer Mother     COPD Father     Heart attack Brother 64        decreased    Narcolepsy Brother     Drug abuse Brother     Alcohol abuse Brother      Past Surgical History:   Procedure Laterality Date    BACK SURGERY  2015    Lumbar    CARDIAC CATHETERIZATION N/A 11/5/2021    Procedure: CARDIAC CATHETERIZATION;  Surgeon: Sally Alaniz MD;  [facility]: [facility];   Service: Cardiology    COLONOSCOPY      KNEE SURGERY      right    LUMBAR FUSION      last assessed: 3/27/2015    FL TREAT TIBIAL SHAFT FX, INTRAMED IMPLANT Left 3/16/2017    Procedure: INSERTION NAIL IM TIBIA;  Surgeon: Moises Velez DO;  Location: [facility];  Service: Orthopedics       Current Outpatient Medications:     albuterol (PROVENTIL HFA,VENTOLIN HFA) 90 mcg/act inhaler, Inhale 2 puffs every 6 (six) hours as needed for shortness of breath, Disp: 1 g, Rfl: 2    Aspirin Low Dose 81 MG chewable tablet, CHEW 1 TABLET BY MOUTH EVERY DAY, Disp: 30 tablet, Rfl: 0    atorvastatin (LIPITOR) 40 mg tablet, Take 1 tablet (40 mg total) by mouth daily, Disp: 90 tablet, Rfl: 3    chlorthalidone 25 mg tablet, Take 0 5 tablets (12 5 mg total) by mouth daily, Disp: 30 tablet, Rfl: 3    diltiazem (CARDIZEM CD) 300 mg 24 hr capsule, Take 1 capsule (300 mg total) by mouth daily, Disp: 30 capsule, Rfl: 3    folic acid (FOLVITE) 1 mg tablet, TAKE 1 TABLET BY MOUTH EVERY DAY, Disp: 30 tablet, Rfl: 0    isosorbide mononitrate (IMDUR) 60 mg 24 hr tablet, TAKE 1 TABLET BY MOUTH EVERY DAY, Disp: 30 tablet, Rfl: 0    lisinopril (ZESTRIL) 20 mg tablet, Take 1 tablet (20 mg total) by mouth daily, Disp: 30 tablet, Rfl: 3    LORazepam (ATIVAN) 1 mg tablet, Take 1 tablet (1 mg total) by mouth 2 (two) times a day as needed for anxiety, Disp: 60 tablet, Rfl: 0    omeprazole (PriLOSEC) 40 MG capsule, Take 1 capsule (40 mg total) by mouth daily, Disp: 60 capsule, Rfl: 3    QUEtiapine (SEROquel) 300 mg tablet, Take 1 tablet (300 mg total) by mouth daily at bedtime, Disp: 90 tablet, Rfl: 2    QUEtiapine (SEROquel) 50 mg tablet, Take 1 tablet (50 mg total) by mouth every morning, Disp: , Rfl:     sertraline (Zoloft) 25 mg tablet, Take 1 tablet (25 mg total) by mouth daily, Disp: 90 tablet, Rfl: 3    polyethylene glycol (GOLYTELY) 4000 mL solution, Please follow preparation instructions provided by the office   (Patient not taking: Reported on 1/12/2022 ), Disp: 4000 mL, Rfl: 0    QUEtiapine (SEROquel) 25 mg tablet, , Disp: , Rfl:     thiamine 100 MG tablet, TAKE 1 TABLET BY MOUTH EVERY DAY (Patient not taking: Reported on 1/12/2022), Disp: 30 tablet, Rfl: 0  No Known Allergies    Labs:  Appointment on 12/15/2021   Component Date Value    Sodium 12/15/2021 140     Potassium 12/15/2021 3 8     Chloride 12/15/2021 101     CO2 12/15/2021 26     ANION GAP 12/15/2021 13     BUN 12/15/2021 13     Creatinine 12/15/2021 1 29     Glucose 12/15/2021 92     Calcium 12/15/2021 9 4     eGFR 12/15/2021 62    Appointment on 12/07/2021   Component Date Value    Sodium 12/07/2021 138     Potassium 12/07/2021 3 0*    Chloride 12/07/2021 98*    CO2 12/07/2021 30     ANION GAP 12/07/2021 10     BUN 12/07/2021 13     Creatinine 12/07/2021 1 06     Glucose 12/07/2021 98     Calcium 12/07/2021 9 4     eGFR 12/07/2021 79    Appointment on 12/02/2021   Component Date Value    Total Bilirubin 12/02/2021 0 71     Bilirubin, Direct 12/02/2021 0 26*    Alkaline Phosphatase 12/02/2021 257*    AST 12/02/2021 42     ALT 12/02/2021 88*    Total Protein 12/02/2021 8 3*    Albumin 12/02/2021 3 8     Sodium 12/02/2021 137     Potassium 12/02/2021 3 4*    Chloride 12/02/2021 97*    CO2 12/02/2021 28     ANION GAP 12/02/2021 12     BUN 12/02/2021 17     Creatinine 12/02/2021 1 31*    Glucose 12/02/2021 109     Calcium 12/02/2021 9 5     eGFR 12/02/2021 61     Iron Saturation 12/02/2021 22     TIBC 12/02/2021 393     Iron 12/02/2021 86     Ferritin 12/02/2021 332    No results displayed because visit has over 200 results  Imaging: No results found  Review of Systems:  Review of Systems   Constitutional: Negative for fatigue  HENT: Negative for congestion  Eyes: Negative for discharge  Respiratory: Negative for shortness of breath  Cardiovascular: Negative for chest pain  Gastrointestinal: Negative for abdominal distention  Endocrine: Negative for polyuria  Genitourinary: Negative for dysuria  Musculoskeletal: Negative for arthralgias  Psychiatric/Behavioral: Positive for dysphoric mood  The patient is nervous/anxious  Physical Exam:  Physical Exam  Constitutional:       Appearance: He is well-developed  HENT:      Head: Normocephalic and atraumatic  Nose: Nose normal    Eyes:      Pupils: Pupils are equal, round, and reactive to light  Cardiovascular:      Rate and Rhythm: Normal rate and regular rhythm  Heart sounds: Normal heart sounds  Pulmonary:      Effort: Pulmonary effort is normal       Breath sounds: Normal breath sounds  Abdominal:      General: Bowel sounds are normal       Palpations: Abdomen is soft  Musculoskeletal:         General: Normal range of motion  Cervical back: Neck supple  Skin:     General: Skin is warm and dry  Neurological:      Mental Status: He is alert and oriented to person, place, and time  Psychiatric:         Behavior: Behavior normal          Thought Content:  Thought content normal          Judgment: Judgment normal          Discussion/Summary:  Coronary vasospasm with stress testing and cardiac arrest in November as noted  Since then has been treated with diltiazem and Imdur without any issues  He will continue on these  Twelve lead EKG today shows sinus rhythm at 97 with nonspecific ST T wave abnormalities which were unchanged from his previous  He also has a history of atrial flutter that is been [facility] at [facility] as well as sinus tachycardia  He has significant hyperlipidemia and is currently on high-dose statin  He has a lipid profile pending  He also has known elevation of his AST and ALT which continue to be followed  It is not clear if this is related to statin use  LDLs in the past have been over 200 in statin therapy is a significant part of his treatment regimen  He is due to have a colonoscopy an EGD in March which showed present he is cleared for  He continues to treat his major depressive disorder  I will see him again in 6 months

## 2022-01-20 ENCOUNTER — LAB (OUTPATIENT)
Dept: LAB | Facility: HOSPITAL | Age: 57
End: 2022-01-20
Attending: FAMILY MEDICINE
Payer: COMMERCIAL

## 2022-01-20 DIAGNOSIS — D58.2 ELEVATED HEMOGLOBIN (HCC): ICD-10-CM

## 2022-01-20 DIAGNOSIS — I10 PRIMARY HYPERTENSION: ICD-10-CM

## 2022-01-20 LAB
ALBUMIN SERPL BCP-MCNC: 3.4 G/DL (ref 3.5–5)
ALP SERPL-CCNC: 186 U/L (ref 46–116)
ALT SERPL W P-5'-P-CCNC: 37 U/L (ref 12–78)
ANION GAP SERPL CALCULATED.3IONS-SCNC: 8 MMOL/L (ref 4–13)
AST SERPL W P-5'-P-CCNC: 24 U/L (ref 5–45)
BASOPHILS # BLD AUTO: 0.05 THOUSANDS/ΜL (ref 0–0.1)
BASOPHILS NFR BLD AUTO: 1 % (ref 0–1)
BILIRUB DIRECT SERPL-MCNC: 0.06 MG/DL (ref 0–0.2)
BILIRUB SERPL-MCNC: 0.22 MG/DL (ref 0.2–1)
BUN SERPL-MCNC: 24 MG/DL (ref 5–25)
CALCIUM SERPL-MCNC: 8.8 MG/DL (ref 8.3–10.1)
CHLORIDE SERPL-SCNC: 101 MMOL/L (ref 100–108)
CHOLEST SERPL-MCNC: 198 MG/DL
CO2 SERPL-SCNC: 28 MMOL/L (ref 21–32)
CREAT SERPL-MCNC: 1.4 MG/DL (ref 0.6–1.3)
EOSINOPHIL # BLD AUTO: 0.11 THOUSAND/ΜL (ref 0–0.61)
EOSINOPHIL NFR BLD AUTO: 1 % (ref 0–6)
ERYTHROCYTE [DISTWIDTH] IN BLOOD BY AUTOMATED COUNT: 12.4 % (ref 11.6–15.1)
GFR SERPL CREATININE-BSD FRML MDRD: 56 ML/MIN/1.73SQ M
GLUCOSE SERPL-MCNC: 83 MG/DL (ref 65–140)
HCT VFR BLD AUTO: 42.9 % (ref 36.5–49.3)
HDLC SERPL-MCNC: 32 MG/DL
HGB BLD-MCNC: 14.4 G/DL (ref 12–17)
IMM GRANULOCYTES # BLD AUTO: 0.08 THOUSAND/UL (ref 0–0.2)
IMM GRANULOCYTES NFR BLD AUTO: 1 % (ref 0–2)
LDLC SERPL CALC-MCNC: 138 MG/DL (ref 0–100)
LYMPHOCYTES # BLD AUTO: 1.62 THOUSANDS/ΜL (ref 0.6–4.47)
LYMPHOCYTES NFR BLD AUTO: 15 % (ref 14–44)
MCH RBC QN AUTO: 32.6 PG (ref 26.8–34.3)
MCHC RBC AUTO-ENTMCNC: 33.6 G/DL (ref 31.4–37.4)
MCV RBC AUTO: 97 FL (ref 82–98)
MONOCYTES # BLD AUTO: 0.61 THOUSAND/ΜL (ref 0.17–1.22)
MONOCYTES NFR BLD AUTO: 6 % (ref 4–12)
NEUTROPHILS # BLD AUTO: 8.15 THOUSANDS/ΜL (ref 1.85–7.62)
NEUTS SEG NFR BLD AUTO: 76 % (ref 43–75)
NONHDLC SERPL-MCNC: 166 MG/DL
NRBC BLD AUTO-RTO: 0 /100 WBCS
PLATELET # BLD AUTO: 247 THOUSANDS/UL (ref 149–390)
PMV BLD AUTO: 11.2 FL (ref 8.9–12.7)
POTASSIUM SERPL-SCNC: 4.4 MMOL/L (ref 3.5–5.3)
PROT SERPL-MCNC: 7.3 G/DL (ref 6.4–8.2)
RBC # BLD AUTO: 4.42 MILLION/UL (ref 3.88–5.62)
SODIUM SERPL-SCNC: 137 MMOL/L (ref 136–145)
TRIGL SERPL-MCNC: 139 MG/DL
WBC # BLD AUTO: 10.62 THOUSAND/UL (ref 4.31–10.16)

## 2022-01-20 PROCEDURE — 80048 BASIC METABOLIC PNL TOTAL CA: CPT

## 2022-01-20 PROCEDURE — 80061 LIPID PANEL: CPT

## 2022-01-20 PROCEDURE — 36415 COLL VENOUS BLD VENIPUNCTURE: CPT

## 2022-01-20 PROCEDURE — 80076 HEPATIC FUNCTION PANEL: CPT

## 2022-01-20 PROCEDURE — 85025 COMPLETE CBC W/AUTO DIFF WBC: CPT

## 2022-02-02 NOTE — RESULT ENCOUNTER NOTE
Tried calling patient regarding recent blood work, but he did not answer  Please call patient regarding results  Creatinine is elevated at 1 4, I think this might be his new baseline  ALT has improved, alkaline phosphatase is trending down as well  Will need repeat CMP in 2 months  As far as lipid panel is concerned, cholesterol has improved from 320-198  Continue lifestyle modification with diet and exercise  Patient was supposed to see me, but canceled appointment  Please schedule follow-up with me for review of results in detail

## 2022-02-03 ENCOUNTER — TELEPHONE (OUTPATIENT)
Dept: FAMILY MEDICINE CLINIC | Facility: CLINIC | Age: 57
End: 2022-02-03

## 2022-02-04 DIAGNOSIS — I20.1 PRINZMETAL VARIANT ANGINA (HCC): ICD-10-CM

## 2022-02-07 RX ORDER — ISOSORBIDE MONONITRATE 60 MG/1
TABLET, EXTENDED RELEASE ORAL
Qty: 30 TABLET | Refills: 0 | Status: SHIPPED | OUTPATIENT
Start: 2022-02-07 | End: 2022-02-23

## 2022-02-12 DIAGNOSIS — I10 HYPERTENSION, UNSPECIFIED TYPE: ICD-10-CM

## 2022-02-12 DIAGNOSIS — I20.1 PRINZMETAL VARIANT ANGINA (HCC): ICD-10-CM

## 2022-02-14 ENCOUNTER — OFFICE VISIT (OUTPATIENT)
Dept: FAMILY MEDICINE CLINIC | Facility: CLINIC | Age: 57
End: 2022-02-14

## 2022-02-14 VITALS
WEIGHT: 273.8 LBS | HEART RATE: 100 BPM | SYSTOLIC BLOOD PRESSURE: 115 MMHG | RESPIRATION RATE: 18 BRPM | BODY MASS INDEX: 39.2 KG/M2 | HEIGHT: 70 IN | OXYGEN SATURATION: 96 % | DIASTOLIC BLOOD PRESSURE: 76 MMHG | TEMPERATURE: 98 F

## 2022-02-14 DIAGNOSIS — M25.559 HIP PAIN: ICD-10-CM

## 2022-02-14 DIAGNOSIS — R74.8 ELEVATED LIVER ENZYMES: ICD-10-CM

## 2022-02-14 DIAGNOSIS — I10 PRIMARY HYPERTENSION: Primary | ICD-10-CM

## 2022-02-14 PROCEDURE — 3008F BODY MASS INDEX DOCD: CPT | Performed by: FAMILY MEDICINE

## 2022-02-14 PROCEDURE — 3078F DIAST BP <80 MM HG: CPT | Performed by: FAMILY MEDICINE

## 2022-02-14 PROCEDURE — 3074F SYST BP LT 130 MM HG: CPT | Performed by: FAMILY MEDICINE

## 2022-02-14 PROCEDURE — 3008F BODY MASS INDEX DOCD: CPT | Performed by: INTERNAL MEDICINE

## 2022-02-14 PROCEDURE — 99214 OFFICE O/P EST MOD 30 MIN: CPT | Performed by: FAMILY MEDICINE

## 2022-02-14 PROCEDURE — 4004F PT TOBACCO SCREEN RCVD TLK: CPT | Performed by: FAMILY MEDICINE

## 2022-02-14 RX ORDER — DILTIAZEM HYDROCHLORIDE 300 MG/1
CAPSULE, COATED, EXTENDED RELEASE ORAL
Qty: 90 CAPSULE | Refills: 1 | Status: SHIPPED | OUTPATIENT
Start: 2022-02-14 | End: 2022-06-07

## 2022-02-14 NOTE — ASSESSMENT & PLAN NOTE
AST and ALT are normal in recent labs, alkaline phosphatase is still elevated, but patient was not fasting  Advised to get the next set of labs in fasting state

## 2022-02-14 NOTE — ASSESSMENT & PLAN NOTE
Within goal   Patient is currently on chlorthalidone 25 milligrams daily, Imdur 60 milligrams daily, lisinopril 20 milligrams daily, Cardizem 300 milligrams daily  She does report dizziness with standing at times, he does check his blood pressure at home, and reports they are usually in the range of 110s to 120s over 70s, advised to continue to monitor blood pressure at home, keep a log and follow up in 4 weeks  Also reviewed recent blood work, creatinine is at 1 4, this appears his new baseline, will repeat labs in 2 months

## 2022-02-14 NOTE — PROGRESS NOTES
Assessment/Plan     Hypertension  Within goal   Patient is currently on chlorthalidone 25 milligrams daily, Imdur 60 milligrams daily, lisinopril 20 milligrams daily, Cardizem 300 milligrams daily  She does report dizziness with standing at times, he does check his blood pressure at home, and reports they are usually in the range of 110s to 120s over 70s, advised to continue to monitor blood pressure at home, keep a log and follow up in 4 weeks  Also reviewed recent blood work, creatinine is at 1 4, this appears his new baseline, will repeat labs in 2 months  Elevated liver enzymes  AST and ALT are normal in recent labs, alkaline phosphatase is still elevated, but patient was not fasting  Advised to get the next set of labs in fasting state  Hip pain  New, bilateral hip pain  Discussed option of physical therapy, the patient wants to hold off at this time, will re-evaluate at next visit  Diagnoses and all orders for this visit:    Primary hypertension  -     Comprehensive metabolic panel; Future    Elevated liver enzymes    Hip pain         Subjective     Chief Complaint   Patient presents with    Hypertension    Depression       Radha Downey presented to office for follow-up of hypertension and review of blood work  He today reports that he has been feeling pain in both hips, this pain is new, not able to walk long distances because of hip pain  He also feels more tired than usual, usually his blood pressure is in the range of 110s to 120s over 70s at home  He saw Cardiology recently and will be getting a colonoscopy done next month  He wants to hold off on physical therapy for hip pain at this time  Hypertension  Pertinent negatives include no chest pain, headaches or shortness of breath  Depression  Associated symptoms include arthralgias  Pertinent negatives include no chest pain, chills, congestion, fever, headaches, nausea or vomiting         The following portions of the patient's history were reviewed and updated as appropriate: allergies, current medications, past family history, past medical history, past social history, past surgical history and problem list     Review of Systems   Constitutional: Positive for activity change  Negative for chills and fever  HENT: Negative for congestion  Respiratory: Negative for shortness of breath  Cardiovascular: Negative for chest pain  Gastrointestinal: Negative for diarrhea, nausea and vomiting  Genitourinary: Negative for difficulty urinating  Musculoskeletal: Positive for arthralgias  Neurological: Negative for headaches  Psychiatric/Behavioral: Positive for depression  Objective     Vitals:Blood pressure 115/76, pulse 100, temperature 98 °F (36 7 °C), temperature source Temporal, resp  rate 18, height 5' 10" (1 778 m), weight 124 kg (273 lb 12 8 oz), SpO2 96 %  Physical Exam:  Physical Exam  Constitutional:       Appearance: He is well-developed  HENT:      Head: Normocephalic and atraumatic  Right Ear: External ear normal       Left Ear: External ear normal    Eyes:      Conjunctiva/sclera: Conjunctivae normal    Cardiovascular:      Rate and Rhythm: Normal rate and regular rhythm  Heart sounds: Normal heart sounds  No murmur heard  No friction rub  Pulmonary:      Effort: Pulmonary effort is normal  No respiratory distress  Breath sounds: Normal breath sounds  No wheezing or rales  Musculoskeletal:         General: No swelling, tenderness, deformity or signs of injury  Normal range of motion  Cervical back: Normal range of motion and neck supple  Skin:     General: Skin is warm and dry  Neurological:      Mental Status: He is alert and oriented to person, place, and time

## 2022-02-14 NOTE — ASSESSMENT & PLAN NOTE
New, bilateral hip pain  Discussed option of physical therapy, the patient wants to hold off at this time, will re-evaluate at next visit

## 2022-02-21 DIAGNOSIS — I20.1 PRINZMETAL VARIANT ANGINA (HCC): ICD-10-CM

## 2022-02-23 RX ORDER — ISOSORBIDE MONONITRATE 60 MG/1
TABLET, EXTENDED RELEASE ORAL
Qty: 90 TABLET | Refills: 1 | Status: SHIPPED | OUTPATIENT
Start: 2022-02-23 | End: 2022-06-14

## 2022-03-17 ENCOUNTER — TELEPHONE (OUTPATIENT)
Dept: OTHER | Facility: OTHER | Age: 57
End: 2022-03-17

## 2022-03-17 NOTE — TELEPHONE ENCOUNTER
Patient reporting he now has NEW INSURANCE since 2-27-22     [facility] PPO plan [id number]    ID # [id number]  [id number]      Patient is scheduled for Monday, he might have to reschedule

## 2022-03-18 NOTE — TELEPHONE ENCOUNTER
Called and spoke to patient and let him know that I checked his card on [employer] and no prior Armin Peres is required for his new insurance for his colon/egd so he is good to go

## 2022-03-20 ENCOUNTER — ANESTHESIA EVENT (OUTPATIENT)
Dept: ANESTHESIOLOGY | Facility: HOSPITAL | Age: 57
End: 2022-03-20

## 2022-03-20 ENCOUNTER — ANESTHESIA (OUTPATIENT)
Dept: ANESTHESIOLOGY | Facility: HOSPITAL | Age: 57
End: 2022-03-20

## 2022-03-21 ENCOUNTER — ANESTHESIA EVENT (OUTPATIENT)
Dept: GASTROENTEROLOGY | Facility: HOSPITAL | Age: 57
End: 2022-03-21

## 2022-03-21 ENCOUNTER — ANESTHESIA (OUTPATIENT)
Dept: GASTROENTEROLOGY | Facility: HOSPITAL | Age: 57
End: 2022-03-21

## 2022-03-21 ENCOUNTER — HOSPITAL ENCOUNTER (OUTPATIENT)
Dept: GASTROENTEROLOGY | Facility: HOSPITAL | Age: 57
Setting detail: OUTPATIENT SURGERY
Discharge: HOME | End: 2022-03-21
Attending: INTERNAL MEDICINE | Admitting: INTERNAL MEDICINE
Payer: COMMERCIAL

## 2022-03-21 VITALS
BODY MASS INDEX: 38.65 KG/M2 | HEIGHT: 70 IN | HEART RATE: 87 BPM | WEIGHT: 270 LBS | TEMPERATURE: 98.8 F | RESPIRATION RATE: 18 BRPM | SYSTOLIC BLOOD PRESSURE: 109 MMHG | DIASTOLIC BLOOD PRESSURE: 63 MMHG | OXYGEN SATURATION: 94 %

## 2022-03-21 DIAGNOSIS — R10.13 EPIGASTRIC PAIN: ICD-10-CM

## 2022-03-21 DIAGNOSIS — Z12.11 COLON CANCER SCREENING: ICD-10-CM

## 2022-03-21 PROCEDURE — 45385 COLONOSCOPY W/LESION REMOVAL: CPT | Performed by: INTERNAL MEDICINE

## 2022-03-21 PROCEDURE — 88305 TISSUE EXAM BY PATHOLOGIST: CPT | Performed by: PATHOLOGY

## 2022-03-21 PROCEDURE — 43239 EGD BIOPSY SINGLE/MULTIPLE: CPT | Performed by: INTERNAL MEDICINE

## 2022-03-21 RX ORDER — PROPOFOL 10 MG/ML
INJECTION, EMULSION INTRAVENOUS AS NEEDED
Status: DISCONTINUED | OUTPATIENT
Start: 2022-03-21 | End: 2022-03-21

## 2022-03-21 RX ORDER — SODIUM CHLORIDE 9 MG/ML
125 INJECTION, SOLUTION INTRAVENOUS CONTINUOUS
Status: DISCONTINUED | OUTPATIENT
Start: 2022-03-21 | End: 2022-03-25 | Stop reason: HOSPADM

## 2022-03-21 RX ORDER — SODIUM CHLORIDE 9 MG/ML
INJECTION, SOLUTION INTRAVENOUS CONTINUOUS PRN
Status: DISCONTINUED | OUTPATIENT
Start: 2022-03-21 | End: 2022-03-21

## 2022-03-21 RX ORDER — ALBUTEROL SULFATE 90 UG/1
AEROSOL, METERED RESPIRATORY (INHALATION) AS NEEDED
Status: DISCONTINUED | OUTPATIENT
Start: 2022-03-21 | End: 2022-03-21

## 2022-03-21 RX ADMIN — SODIUM CHLORIDE 125 ML/HR: 0.9 INJECTION, SOLUTION INTRAVENOUS at 11:50

## 2022-03-21 RX ADMIN — PROPOFOL 50 MG: 10 INJECTION, EMULSION INTRAVENOUS at 12:59

## 2022-03-21 RX ADMIN — SODIUM CHLORIDE: 0.9 INJECTION, SOLUTION INTRAVENOUS at 12:22

## 2022-03-21 RX ADMIN — PROPOFOL 50 MG: 10 INJECTION, EMULSION INTRAVENOUS at 12:53

## 2022-03-21 RX ADMIN — PROPOFOL 50 MG: 10 INJECTION, EMULSION INTRAVENOUS at 12:36

## 2022-03-21 RX ADMIN — ALBUTEROL SULFATE 2 PUFF: 90 AEROSOL, METERED RESPIRATORY (INHALATION) at 12:04

## 2022-03-21 RX ADMIN — PROPOFOL 50 MG: 10 INJECTION, EMULSION INTRAVENOUS at 12:39

## 2022-03-21 RX ADMIN — PROPOFOL 50 MG: 10 INJECTION, EMULSION INTRAVENOUS at 12:45

## 2022-03-21 RX ADMIN — PROPOFOL 50 MG: 10 INJECTION, EMULSION INTRAVENOUS at 12:29

## 2022-03-21 RX ADMIN — PROPOFOL 50 MG: 10 INJECTION, EMULSION INTRAVENOUS at 12:33

## 2022-03-21 RX ADMIN — PROPOFOL 50 MG: 10 INJECTION, EMULSION INTRAVENOUS at 12:50

## 2022-03-21 RX ADMIN — PROPOFOL 100 MG: 10 INJECTION, EMULSION INTRAVENOUS at 12:22

## 2022-03-21 RX ADMIN — PROPOFOL 50 MG: 10 INJECTION, EMULSION INTRAVENOUS at 12:26

## 2022-03-21 NOTE — ANESTHESIA POSTPROCEDURE EVALUATION
Post-Op Assessment Note    CV Status:  Stable  Pain Score: 0    Pain management: adequate     Mental Status:  Alert and awake   Hydration Status:  Euvolemic   PONV Controlled:  Controlled   Airway Patency:  Patent      Post Op Vitals Reviewed: Yes      Staff: CRNA, Anesthesiologist         No complications documented      BP   123/65   Temp   98 8   Pulse  82   Resp   12   SpO2   95

## 2022-03-21 NOTE — ANESTHESIA PREPROCEDURE EVALUATION
Procedure:  COLONOSCOPY  EGD    Relevant Problems   CARDIO   (+) Atrial flutter (HCC)   (+) Cardiac arrest (HCC)   (+) Hypertension   (+) 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HCC)   (+) Short of breath on exertion      GI/HEPATIC   (+) GERD (gastroesophageal reflux disease)   (+) Ileus (HCC)      /RENAL   (+) Kidney stone on right side      HEMATOLOGY   (+) Anemia   (+) Thrombocytopenia (HCC)      MUSCULOSKELETAL   (+) 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HCC)      NEURO/PSYCH   (+) Anxiety   (+) Depression with anxiety   (+) History of MRSA infection of lungs   (+) History of ileus   (+) MDD (major depressive disorder)   (+) Moderate episode of recurrent major depressive disorder (HCC)      PULMONARY   (+) Asthma   (+) Short of breath on exertion      11/2022  EF 70, DD1, normal RV systolic function  4925: [facility] - no CAD       Anesthesia Plan  ASA Score- 3     Anesthesia Type- IV sedation with anesthesia with ASA Monitors  Additional Monitors:   Airway Plan:     Comment: IV sedation,  standard ASA monitors  Risks and benefits discussed with patient; patient consented and agrees to proceed  I saw and evaluated the patient  If seen with CRNA, we have discussed the anesthetic plan and I am in agreement that the plan is appropriate for the patient          Plan Factors-    Chart reviewed  Existing labs reviewed  Induction- intravenous  Postoperative Plan-     Informed Consent- Anesthetic plan and risks discussed with patient  I personally reviewed this patient with the CRNA  Discussed and agreed on the Anesthesia Plan with the CRNA  Johnathan Oliva

## 2022-03-21 NOTE — H&P
History and Physical - [facility]  Amelia Lynch  55 y o  male MRN: [id number]                  HPI: Amelia Lynch  is a 55y o  year old male who presents for egd/colon      REVIEW OF SYSTEMS: Per the HPI, and otherwise unremarkable  Historical Information   Past Medical History:   Diagnosis Date    Anxiety     Asthma     Colon polyp     GERD (gastroesophageal reflux disease)     History of ileus     Hypertension     Insomnia     Lumbar herniated disc     last assessed: 3/27/2015    MDD (major depressive disorder)     Patella fracture     last assessed: 3/27/2015    Tobacco abuse      Past Surgical History:   Procedure Laterality Date    BACK SURGERY      Lumbar    CARDIAC CATHETERIZATION N/A 2021    Procedure: CARDIAC CATHETERIZATION;  Surgeon: Wanda Adhikari MD;  Location:  CARDIAC CATH LAB;   Service: Cardiology    COLONOSCOPY      KNEE SURGERY      right    LUMBAR FUSION      last assessed: 3/27/2015    UT TREAT TIBIAL SHAFT FX, INTRAMED IMPLANT Left 3/16/2017    Procedure: INSERTION NAIL IM TIBIA;  Surgeon: Marline Vieira DO;  Location: [facility];  Service: Orthopedics     Social History   Social History     Substance and Sexual Activity   Alcohol Use Not Currently    Comment: 1 bottle of liquor per day      Social History     Substance and Sexual Activity   Drug Use No     Social History     Tobacco Use   Smoking Status Current Some Day Smoker    Packs/day: 1 50    Years: 34 00    Pack years: 51 00    Types: Cigarettes    Start date:     Last attempt to quit:     Years since quittin 2   Smokeless Tobacco Never Used     Family History   Problem Relation Age of Onset    Pancreatic cancer Mother     COPD Father     Heart attack Brother 64        decreased    Narcolepsy Brother     Drug abuse Brother     Alcohol abuse Brother        Meds/Allergies       Current Outpatient Medications:     albuterol (PROVENTIL HFA,VENTOLIN HFA) 90 mcg/act inhaler    Aspirin Low Dose 81 MG chewable tablet    atorvastatin (LIPITOR) 40 mg tablet    chlorthalidone 25 mg tablet    diltiazem (CARDIZEM CD) 300 mg 24 hr capsule    isosorbide mononitrate (IMDUR) 60 mg 24 hr tablet    lisinopril (ZESTRIL) 20 mg tablet    LORazepam (ATIVAN) 1 mg tablet    omeprazole (PriLOSEC) 40 MG capsule    polyethylene glycol (GOLYTELY) 4000 mL solution    QUEtiapine (SEROquel) 25 mg tablet    QUEtiapine (SEROquel) 300 mg tablet    sertraline (Zoloft) 25 mg tablet    No Known Allergies    Objective     There were no vitals taken for this visit  PHYSICAL EXAM    Gen: NAD  Head: NCAT  CV: RRR  CHEST: Clear  ABD: soft, NT/ND  EXT: no edema      ASSESSMENT/PLAN:  This is a 55y o  year old male here for egd/colon, and he is stable and optimized for his procedure

## 2022-04-01 NOTE — RESULT ENCOUNTER NOTE
Left [facility] message  Gastric and duodenal biopsies negative  3/4 polyps were TA  Inadequate prep so recommended recall colonoscopy in 1 year  Staff - Patient will need recall colonoscopy in 1 year  Thank you  JENNA Rivera  Chief Gastroenterology Fellow  [facility]  Available on [facility] [email or web address]

## 2022-04-28 ENCOUNTER — OFFICE VISIT (OUTPATIENT)
Dept: FAMILY MEDICINE CLINIC | Facility: CLINIC | Age: 57
End: 2022-04-28

## 2022-04-28 VITALS
HEART RATE: 103 BPM | TEMPERATURE: 98.4 F | DIASTOLIC BLOOD PRESSURE: 83 MMHG | BODY MASS INDEX: 40.66 KG/M2 | OXYGEN SATURATION: 95 % | SYSTOLIC BLOOD PRESSURE: 131 MMHG | WEIGHT: 284 LBS | HEIGHT: 70 IN | RESPIRATION RATE: 20 BRPM

## 2022-04-28 DIAGNOSIS — I10 PRIMARY HYPERTENSION: ICD-10-CM

## 2022-04-28 DIAGNOSIS — E78.5 DYSLIPIDEMIA: ICD-10-CM

## 2022-04-28 DIAGNOSIS — F17.200 TOBACCO USE DISORDER: ICD-10-CM

## 2022-04-28 DIAGNOSIS — E87.5 HYPERKALEMIA: ICD-10-CM

## 2022-04-28 DIAGNOSIS — F41.8 DEPRESSION WITH ANXIETY: ICD-10-CM

## 2022-04-28 DIAGNOSIS — R06.02 SHORT OF BREATH ON EXERTION: ICD-10-CM

## 2022-04-28 DIAGNOSIS — M25.559 HIP PAIN: Primary | ICD-10-CM

## 2022-04-28 PROCEDURE — 99214 OFFICE O/P EST MOD 30 MIN: CPT | Performed by: FAMILY MEDICINE

## 2022-04-28 RX ORDER — ALBUTEROL SULFATE 90 UG/1
2 AEROSOL, METERED RESPIRATORY (INHALATION) EVERY 6 HOURS PRN
Qty: 1 G | Refills: 2 | Status: SHIPPED | OUTPATIENT
Start: 2022-04-28

## 2022-04-28 RX ORDER — LISINOPRIL 20 MG/1
20 TABLET ORAL DAILY
Qty: 90 TABLET | Refills: 3 | Status: SHIPPED | OUTPATIENT
Start: 2022-04-28

## 2022-04-28 NOTE — ASSESSMENT & PLAN NOTE
Patient reports SOB with long-distance walking  Increased albuterol use recently since cardiac arrest  No cough with sputum production  No wheezing on exam  Recent weight gain with decreased physical activity  Shortness of breath multifactorial, patient did have cardiac catheterization which did not show any coronary artery disease    Continue follow-up with Cardiology, will also get PFT, albuterol refill  Counseled for smoking cessation

## 2022-04-28 NOTE — ASSESSMENT & PLAN NOTE
Patient reports smoking 5 cigarettes on average daily  Most smoking happens in evening around dinner  This is decreased from 1 ppd for 35 years  Interested in lung cancer screening      Plan:  · Low-dose CT lung  · Continue smoking cessation discussion

## 2022-04-28 NOTE — ASSESSMENT & PLAN NOTE
Progressive chronic bilateral hip pain  Pain with prolonged standing and sitting  Relief with heating pad, icy hot, rest  Pain intermittent, described as sharp, not associated with paresthesia, radiation to leg, perineal numbness, changes in urination  Patient requesting referral to Orthopedics    Referral provided, wants to hold off on physical therapy until he has seen orthopedic

## 2022-04-28 NOTE — ASSESSMENT & PLAN NOTE
Within goal   Patient is currently on chlorthalidone 25 milligrams daily, Imdur 60 milligrams daily, lisinopril 20 milligrams daily, Cardizem 300 milligrams daily      Plan:  · Continue current regimen  · Continue home BP monitoring

## 2022-04-28 NOTE — PROGRESS NOTES
Family Practice  Bonnie Leon  55 y o  male MRN: [id number]  Encounter: [id number]  4/28/2022      Assessment/Plan:     Bonnie Leon  is a 55 y o  male     Problem List Items Addressed This Visit        Cardiovascular and Mediastinum    Hypertension     Within goal   Patient is currently on chlorthalidone 25 milligrams daily, Imdur 60 milligrams daily, lisinopril 20 milligrams daily, Cardizem 300 milligrams daily  Plan:  · Continue current regimen  · Continue home BP monitoring          Relevant Medications    lisinopril (ZESTRIL) 20 mg tablet       Other    Depression with anxiety     Stable  Continue to follow with psychiatry          Tobacco use disorder     Patient reports smoking 5 cigarettes on average daily  Most smoking happens in evening around dinner  This is decreased from 1 ppd for 35 years  Interested in lung cancer screening  Plan:  · Low-dose CT lung  · Continue smoking cessation discussion         Relevant Orders    CT lung screening program    Complete PFT with post bronchodilator    Short of breath on exertion     Patient reports SOB with long-distance walking  Increased albuterol use recently since cardiac arrest  No cough with sputum production  No wheezing on exam  Recent weight gain with decreased physical activity  Shortness of breath multifactorial, patient did have cardiac catheterization which did not show any coronary artery disease  Continue follow-up with Cardiology, will also get PFT, albuterol refill  Counseled for smoking cessation         Relevant Orders    Complete PFT with post bronchodilator    Hip pain - Primary     Progressive chronic bilateral hip pain  Pain with prolonged standing and sitting  Relief with heating pad, icy hot, rest  Pain intermittent, described as sharp, not associated with paresthesia, radiation to leg, perineal numbness, changes in urination  Patient requesting referral to Orthopedics    Referral provided, wants to hold off on physical therapy until he has seen orthopedic           Relevant Orders    Ambulatory Referral to Orthopedic Surgery      Other Visit Diagnoses     Hyperkalemia        Relevant Medications    lisinopril (ZESTRIL) 20 mg tablet    Dyslipidemia        Relevant Medications    albuterol (PROVENTIL HFA,VENTOLIN HFA) 90 mcg/act inhaler                Follow up appointment: 3 mo for annual physical   ________________________________________________________________________  Subjective:       HPI:  Samantha Luciano  is a 55 y o  male presents to the office for follow up on hip pain, BP  Main complaint: chronic hip pain  Patient reports  Antonio Ferreira presented with chronic progressive bilateral hip pain  Pain is brought on by walking or sitting too long  The left side usually hurts more  The pain is described as sharp  It does not move anywhere else and is not associated with numbness, tingling, changes in urination  The pain is not worse in the night or the morning  The pain has affected the patients gait with a wide-based gait observed  It is intermittent  It is relieved by heating pad, icy hot, ice packs, but never completely subsides  The patients has not used any medication for relief  Is he does not have any other complaints  He is tolerating his medications okay  Additional complaint: BP follow up   Patient reports   BP is under control based on home BP measurements    The reads are similar to in office today, ~130s/70-80s   Patient is not experiencing light headedness, dizziness, chest pain, pitting edema   Forgot to do CMP ordered at last visit, will complete before next visit       Preventative care questions:   Patient reports   Interested in annual lung cancer screen with low-dose CT    Not interested in COVID booster shot at this time    Repeat colonoscopy in 1 yr due to inadequate prep at last colonoscopy          Review of Systems   Constitutional: Negative for appetite change, fatigue and fever     HENT: Negative for congestion, sinus pressure and sinus pain  Eyes: Negative for pain, itching and visual disturbance  Respiratory: Positive for shortness of breath  Cardiovascular: Negative for chest pain and palpitations  Gastrointestinal: Negative for abdominal distention, abdominal pain, constipation and diarrhea  Genitourinary: Negative for difficulty urinating and dysuria  Musculoskeletal: Positive for arthralgias  Negative for joint swelling  Neurological: Negative for dizziness, syncope, weakness, light-headedness and headaches  Hematological: Does not bruise/bleed easily  Psychiatric/Behavioral: Negative for confusion and decreased concentration               Current Outpatient Medications:     albuterol (PROVENTIL HFA,VENTOLIN HFA) 90 mcg/act inhaler, Inhale 2 puffs every 6 (six) hours as needed for shortness of breath, Disp: 1 g, Rfl: 2    Aspirin Low Dose 81 MG chewable tablet, CHEW 1 TABLET BY MOUTH EVERY DAY, Disp: 30 tablet, Rfl: 0    atorvastatin (LIPITOR) 40 mg tablet, Take 1 tablet (40 mg total) by mouth daily, Disp: 90 tablet, Rfl: 3    chlorthalidone 25 mg tablet, Take 0 5 tablets (12 5 mg total) by mouth daily, Disp: 30 tablet, Rfl: 3    diltiazem (CARDIZEM CD) 300 mg 24 hr capsule, TAKE 1 CAPSULE BY MOUTH EVERY DAY, Disp: 90 capsule, Rfl: 1    isosorbide mononitrate (IMDUR) 60 mg 24 hr tablet, TAKE 1 TABLET BY MOUTH EVERY DAY, Disp: 90 tablet, Rfl: 1    lisinopril (ZESTRIL) 20 mg tablet, Take 1 tablet (20 mg total) by mouth daily, Disp: 90 tablet, Rfl: 3    LORazepam (ATIVAN) 1 mg tablet, Take 1 tablet (1 mg total) by mouth 2 (two) times a day as needed for anxiety, Disp: 60 tablet, Rfl: 0    omeprazole (PriLOSEC) 40 MG capsule, Take 1 capsule (40 mg total) by mouth daily, Disp: 60 capsule, Rfl: 3    QUEtiapine (SEROquel) 300 mg tablet, Take 1 tablet (300 mg total) by mouth daily at bedtime, Disp: 90 tablet, Rfl: 2    sertraline (Zoloft) 25 mg tablet, Take 1 tablet (25 mg total) by mouth daily, Disp: 90 tablet, Rfl: 3         Historical Information   Past Medical History:   Diagnosis Date    Anxiety     Asthma     Colon polyp     GERD (gastroesophageal reflux disease)     History of ileus     Hypertension     Insomnia     Lumbar herniated disc     last assessed: 3/27/2015    MDD (major depressive disorder)     Patella fracture     last assessed: 3/27/2015    Tobacco abuse      Past Surgical History:   Procedure Laterality Date    BACK SURGERY      Lumbar    CARDIAC CATHETERIZATION N/A 2021    Procedure: CARDIAC CATHETERIZATION;  Surgeon: Sammi Alvarez MD;  [facility]; Service: Cardiology    COLONOSCOPY      KNEE SURGERY      right    LUMBAR FUSION      last assessed: 3/27/2015    PA TREAT TIBIAL SHAFT FX, INTRAMED IMPLANT Left 3/16/2017    Procedure: INSERTION NAIL IM TIBIA;  Surgeon: Renetta Bah DO;  [facility];  Service: Orthopedics     Social History   Social History     Substance and Sexual Activity   Alcohol Use Not Currently    Comment: Hx of a couple of drinks a night, or everyother night     Social History     Substance and Sexual Activity   Drug Use No     Social History     Tobacco Use   Smoking Status Former Smoker    Packs/day: 1 50    Years: 34 00    Pack years: 51 00    Types: Cigarettes    Start date:     Quit date:     Years since quittin 3   Smokeless Tobacco Never Used     Family History   Problem Relation Age of Onset    Pancreatic cancer Mother     COPD Father     Heart attack Brother 64        decreased    Narcolepsy Brother     Drug abuse Brother     Alcohol abuse Brother            Meds/Allergies   (Not in a hospital admission)    No current facility-administered medications for this visit  No Known Allergies      Objective:     Blood pressure 131/83, pulse 103, temperature 98 4 °F (36 9 °C), temperature source Temporal, resp   rate 20, height 5' 10" (1 778 m), weight 129 kg (284 lb), SpO2 95 %  Body mass index is 40 75 kg/m²  Physical Exam  Constitutional:       General: He is not in acute distress  Appearance: Normal appearance  He is obese  HENT:      Head: Normocephalic and atraumatic  Right Ear: External ear normal       Left Ear: External ear normal       Nose: Nose normal       Mouth/Throat:      Mouth: Mucous membranes are moist       Pharynx: Oropharynx is clear  Eyes:      Extraocular Movements: Extraocular movements intact  Cardiovascular:      Rate and Rhythm: Normal rate and regular rhythm  Heart sounds: Normal heart sounds  Pulmonary:      Effort: Pulmonary effort is normal       Breath sounds: Normal breath sounds  No wheezing  Musculoskeletal:      Cervical back: Normal range of motion  Right lower leg: No edema  Left lower leg: No edema  Skin:     General: Skin is warm and dry  Neurological:      Mental Status: He is alert and oriented to person, place, and time  Psychiatric:         Mood and Affect: Mood normal          Behavior: Behavior normal            LAB RESULTS:   Discussed recent pertinent labs with patient  RADIOLOGY RESULTS:   I have personally reviewed pertinent imaging studies

## 2022-05-12 ENCOUNTER — APPOINTMENT (OUTPATIENT)
Dept: LAB | Facility: HOSPITAL | Age: 57
End: 2022-05-12
Attending: FAMILY MEDICINE
Payer: COMMERCIAL

## 2022-05-12 DIAGNOSIS — I10 PRIMARY HYPERTENSION: ICD-10-CM

## 2022-05-12 LAB
ALBUMIN SERPL BCP-MCNC: 3.5 G/DL (ref 3.5–5)
ALP SERPL-CCNC: 148 U/L (ref 46–116)
ALT SERPL W P-5'-P-CCNC: 50 U/L (ref 12–78)
ANION GAP SERPL CALCULATED.3IONS-SCNC: 6 MMOL/L (ref 4–13)
AST SERPL W P-5'-P-CCNC: 32 U/L (ref 5–45)
BILIRUB SERPL-MCNC: 0.3 MG/DL (ref 0.2–1)
BUN SERPL-MCNC: 13 MG/DL (ref 5–25)
CALCIUM SERPL-MCNC: 8.7 MG/DL (ref 8.3–10.1)
CHLORIDE SERPL-SCNC: 104 MMOL/L (ref 100–108)
CO2 SERPL-SCNC: 30 MMOL/L (ref 21–32)
CREAT SERPL-MCNC: 1.22 MG/DL (ref 0.6–1.3)
GFR SERPL CREATININE-BSD FRML MDRD: 66 ML/MIN/1.73SQ M
GLUCOSE SERPL-MCNC: 90 MG/DL (ref 65–140)
POTASSIUM SERPL-SCNC: 3.9 MMOL/L (ref 3.5–5.3)
PROT SERPL-MCNC: 7.3 G/DL (ref 6.4–8.2)
SODIUM SERPL-SCNC: 140 MMOL/L (ref 136–145)

## 2022-05-12 PROCEDURE — 80053 COMPREHEN METABOLIC PANEL: CPT

## 2022-05-12 PROCEDURE — 36415 COLL VENOUS BLD VENIPUNCTURE: CPT

## 2022-05-19 ENCOUNTER — OFFICE VISIT (OUTPATIENT)
Dept: OBGYN CLINIC | Facility: MEDICAL CENTER | Age: 57
End: 2022-05-19
Payer: COMMERCIAL

## 2022-05-19 ENCOUNTER — APPOINTMENT (OUTPATIENT)
Dept: RADIOLOGY | Facility: MEDICAL CENTER | Age: 57
End: 2022-05-19
Payer: COMMERCIAL

## 2022-05-19 VITALS
HEART RATE: 97 BPM | SYSTOLIC BLOOD PRESSURE: 159 MMHG | DIASTOLIC BLOOD PRESSURE: 106 MMHG | HEIGHT: 70 IN | BODY MASS INDEX: 42.23 KG/M2 | WEIGHT: 295 LBS

## 2022-05-19 DIAGNOSIS — M70.61 TROCHANTERIC BURSITIS OF BOTH HIPS: ICD-10-CM

## 2022-05-19 DIAGNOSIS — M25.552 BILATERAL HIP PAIN: ICD-10-CM

## 2022-05-19 DIAGNOSIS — M16.10 ARTHRITIS OF HIP: Primary | ICD-10-CM

## 2022-05-19 DIAGNOSIS — M70.62 TROCHANTERIC BURSITIS OF BOTH HIPS: ICD-10-CM

## 2022-05-19 DIAGNOSIS — M25.551 BILATERAL HIP PAIN: ICD-10-CM

## 2022-05-19 DIAGNOSIS — M54.50 LOW BACK PAIN WITHOUT SCIATICA, UNSPECIFIED BACK PAIN LATERALITY, UNSPECIFIED CHRONICITY: ICD-10-CM

## 2022-05-19 DIAGNOSIS — M25.559 HIP PAIN: ICD-10-CM

## 2022-05-19 PROCEDURE — 1036F TOBACCO NON-USER: CPT | Performed by: ORTHOPAEDIC SURGERY

## 2022-05-19 PROCEDURE — 99204 OFFICE O/P NEW MOD 45 MIN: CPT | Performed by: ORTHOPAEDIC SURGERY

## 2022-05-19 PROCEDURE — 3008F BODY MASS INDEX DOCD: CPT | Performed by: ORTHOPAEDIC SURGERY

## 2022-05-19 PROCEDURE — 73521 X-RAY EXAM HIPS BI 2 VIEWS: CPT

## 2022-05-19 PROCEDURE — 3008F BODY MASS INDEX DOCD: CPT | Performed by: FAMILY MEDICINE

## 2022-05-19 RX ORDER — METHYLPREDNISOLONE 4 MG/1
TABLET ORAL
Qty: 21 TABLET | Refills: 0 | Status: SHIPPED | OUTPATIENT
Start: 2022-05-19

## 2022-05-19 NOTE — PROGRESS NOTES
Assessment/Plan     1  Arthritis of hip    2  Hip pain    3  Bilateral hip pain    4  Trochanteric bursitis of both hips    5  Low back pain without sciatica, unspecified back pain laterality, unspecified chronicity      Orders Placed This Encounter   Procedures    XR hips bilateral 2 vw w pelvis if performed    Ambulatory referral to Physical Therapy    Ambulatory referral to Pain Management     I believe most of Mr Renita Lee pain is coming from his back, hx of lumbar spine surgery  Will try a medrol dose hans   PT- bilateral hip mild arthritis, back pain  Will refer to spine and pain mgt for follow up  Recommended for him to get records if possible  One surgery done at [facility] [place] [facility], other one elsewhere    Return if symptoms worsen or fail to improve  Follow up with spine and pain mgt    I answered all of the patient's questions during the visit and provided education of the patient's condition during the visit  The patient verbalized understanding of the information given and agrees with the plan  This note was dictated using SqueezeCMM software  It may contain errors including improperly dictated words  Please contact physician directly for any questions  History of Present Illness   Chief complaint:   Chief Complaint   Patient presents with    Left Hip - Pain    Right Hip - Pain       HPI: Angeli Pate  is a 55 y o  male that c/o bilateral hip pain  He was referred by his PCP Dr Danna Arceo  He states he has been  having bilateral lateral hip pain for last 6-7 months has progressively getting worse  Pain is worse on the left,denies any injury  He is having pain on the lateral left hip traveling to the low back and to  right lateral hip  He notes occasional radiating pain down to ateral thigh  on the left and knee on the right  Denies leg length discrepancy  Pain is worse with prolong weight bearing and sitting, and laying on the left side   He is taking OTC IBU and Tylenol as needed for pain relief  Does have history of a left greater trochanteric bursitis steroid injection in the past per his pain management physician at [facility]  He has no history having injections, physical therapy or surgeries on bilateral hips  Has history of lumbar laminectomy and fusion by Dr Yennifer Cintron ( [facility])  and Dr Mikal Perales (at [facility])  Of note his R tibia is doing well  ROS:    See HPI for musculoskeletal review  All other systems reviewed are negative     Historical Information   Past Medical History:   Diagnosis Date    Anxiety     Asthma     Colon polyp     GERD (gastroesophageal reflux disease)     History of ileus     Hypertension     Insomnia     Lumbar herniated disc     last assessed: 3/27/2015    MDD (major depressive disorder)     Patella fracture     last assessed: 3/27/2015    Tobacco abuse      Past Surgical History:   Procedure Laterality Date    BACK SURGERY      Lumbar    CARDIAC CATHETERIZATION N/A 2021    Procedure: CARDIAC CATHETERIZATION;  Surgeon: Neva Quintero MD;  Location:  CARDIAC CATH LAB;   Service: Cardiology    COLONOSCOPY      KNEE SURGERY      right    LUMBAR FUSION      last assessed: 3/27/2015    DC TREAT TIBIAL SHAFT FX, INTRAMED IMPLANT Left 3/16/2017    Procedure: INSERTION NAIL IM TIBIA;  Surgeon: Danya Unger DO;  [facility];  Service: Orthopedics     Social History   Social History     Substance and Sexual Activity   Alcohol Use Not Currently    Comment: Hx of a couple of drinks a night, or everyother night     Social History     Substance and Sexual Activity   Drug Use No     Social History     Tobacco Use   Smoking Status Former Smoker    Packs/day: 1 50    Years: 34 00    Pack years: 51 00    Types: Cigarettes    Start date:     Quit date:     Years since quittin 3   Smokeless Tobacco Never Used     Family History:   Family History   Problem Relation Age of Onset    Pancreatic cancer Mother     COPD Father     Heart attack Brother 64        decreased    Narcolepsy Brother     Drug abuse Brother     Alcohol abuse Brother        Current Outpatient Medications on File Prior to Visit   Medication Sig Dispense Refill    albuterol (PROVENTIL HFA,VENTOLIN HFA) 90 mcg/act inhaler Inhale 2 puffs every 6 (six) hours as needed for shortness of breath 1 g 2    Aspirin Low Dose 81 MG chewable tablet CHEW 1 TABLET BY MOUTH EVERY DAY 30 tablet 0    atorvastatin (LIPITOR) 40 mg tablet Take 1 tablet (40 mg total) by mouth daily 90 tablet 3    chlorthalidone 25 mg tablet Take 0 5 tablets (12 5 mg total) by mouth daily 30 tablet 3    diltiazem (CARDIZEM CD) 300 mg 24 hr capsule TAKE 1 CAPSULE BY MOUTH EVERY DAY 90 capsule 1    isosorbide mononitrate (IMDUR) 60 mg 24 hr tablet TAKE 1 TABLET BY MOUTH EVERY DAY 90 tablet 1    lisinopril (ZESTRIL) 20 mg tablet Take 1 tablet (20 mg total) by mouth daily 90 tablet 3    LORazepam (ATIVAN) 1 mg tablet Take 1 tablet (1 mg total) by mouth 2 (two) times a day as needed for anxiety 60 tablet 0    omeprazole (PriLOSEC) 40 MG capsule Take 1 capsule (40 mg total) by mouth daily 60 capsule 3    QUEtiapine (SEROquel) 300 mg tablet Take 1 tablet (300 mg total) by mouth daily at bedtime 90 tablet 2    sertraline (Zoloft) 25 mg tablet Take 1 tablet (25 mg total) by mouth daily 90 tablet 3     No current facility-administered medications on file prior to visit  No Known Allergies    Objective   Vitals: Blood pressure (!) 159/106, pulse 97, height 5' 10" (1 778 m), weight 134 kg (295 lb)  ,Body mass index is 42 33 kg/m²  PE:  AAOx 3  WDWN  Hearing intact, no drainage from eyes  Regular rate  no audible wheezing  no abdominal distension  LE compartments soft, skin intact    right hip:   No dislocation/deformity  Neg  Stinchfield  ROM: FF: 0-100, IR 0-20, ER: 0-45 no pain  Neg  Robby Test  Neg  Impingement test  mild TTP over greater trochanter  Abduction: 5/5  Neg  Luis Daniel's test  No TTP over SIJ    left hip:   No dislocation/deformity  Neg  Stinchfield  ROM: FF: 0-110 no pain, IR: 0-20 with mild pain, ER: 0-45 with mild pain  + Robby Test  + Impingement test  Mild TTP over greater trochanter  Abduction: 5/5  Neg   Luis Daniel's test  No TTP over SIJ    bilateralLE:    Sensation grossly intact   Palpable   pulse  AT/GS intact    Back:    No TTP over lumbar spinous processes, paraspinal musculature  SLR: Neg      RLE:  EHL/AT/GS/quads/hamstrings/iliopsoas 5/5, sensation grossly intact L4, L5, S1, palpable pedal pulse  LLE:  EHL/AT/GS/quads/hamstrings/iliopsoas 5/5, sensation grossly intact L4, L5, S1, palpable pedal pulse      Imaging Studies: I have personally reviewed pertinent films in PACS   xR bilateralhip:  No acute osseous abnormality, mild DJD        Scribe Attestation    I,:  Matthew Mendoza am acting as a scribe while in the presence of the attending physician :       I,:  Jatin Chaudhry DO personally performed the services described in this documentation    as scribed in my presence :

## 2022-05-19 NOTE — LETTER
May 19, 2022     João Zhong, [address] [facility] [address]  [address] [address]    Patient: Ramone Resendiz  YOB: 1965   Date of Visit: 5/19/2022       Dear Dr Jud Rivera: Thank you for referring Adithya Pedraza to me for evaluation  Below are my notes for this consultation  If you have questions, please do not hesitate to call me  I look forward to following your patient along with you  Sincerely,        Jarret Arias DO        CC: No Recipients  Yahir Son Srikatnh wyatt   5/19/2022 12:27 PM  Sign when Signing Visit   Assessment/Plan     1  Arthritis of hip    2  Hip pain    3  Bilateral hip pain    4  Trochanteric bursitis of both hips    5  Low back pain without sciatica, unspecified back pain laterality, unspecified chronicity      Orders Placed This Encounter   Procedures    XR hips bilateral 2 vw w pelvis if performed    Ambulatory referral to Physical Therapy    Ambulatory referral to Pain Management     I believe most of Mr Tr Rios pain is coming from his back, hx of lumbar spine surgery  Will try a medrol dose hans   PT- bilateral hip mild arthritis, back pain  Will refer to spine and pain mgt for follow up  Recommended for him to get records if possible  One surgery done at [facility], other one elsewhere    Return if symptoms worsen or fail to improve  Follow up with spine and pain mgt    I answered all of the patient's questions during the visit and provided education of the patient's condition during the visit  The patient verbalized understanding of the information given and agrees with the plan  This note was dictated using Codelearn*Vettery software  It may contain errors including improperly dictated words  Please contact physician directly for any questions  History of Present Illness   Chief complaint:   Chief Complaint   Patient presents with    Left Hip - Pain    Right Hip - Pain       HPI: Ramone Resendiz  is a 55 y o  male that c/o bilateral hip pain  He was referred by his PCP Dr Dimas Butler  He states he has been  having bilateral lateral hip pain for last 6-7 months has progressively getting worse  Pain is worse on the left,denies any injury  He is having pain on the lateral left hip traveling to the low back and to  right lateral hip  He notes occasional radiating pain down to ateral thigh  on the left and knee on the right  Denies leg length discrepancy  Pain is worse with prolong weight bearing and sitting, and laying on the left side  He is taking OTC IBU and Tylenol as needed for pain relief  Does have history of a left greater trochanteric bursitis steroid injection in the past per his pain management physician at [facility]  He has no history having injections, physical therapy or surgeries on bilateral hips  Has history of lumbar laminectomy and fusion by Dr Sakina Marshall ( [facility])  and Dr Soila Bedolla (at [facility])  Of note his R tibia is doing well  ROS:    See HPI for musculoskeletal review  All other systems reviewed are negative     Historical Information   Past Medical History:   Diagnosis Date    Anxiety     Asthma     Colon polyp     GERD (gastroesophageal reflux disease)     History of ileus     Hypertension     Insomnia     Lumbar herniated disc     last assessed: 3/27/2015    MDD (major depressive disorder)     Patella fracture     last assessed: 3/27/2015    Tobacco abuse      Past Surgical History:   Procedure Laterality Date    BACK SURGERY  2015    Lumbar    CARDIAC CATHETERIZATION N/A 11/5/2021    Procedure: CARDIAC CATHETERIZATION;  Surgeon: Parker Duane, MD;  [facility];   Service: Cardiology    COLONOSCOPY      KNEE SURGERY      right    LUMBAR FUSION      last assessed: 3/27/2015    SD TREAT TIBIAL SHAFT FX, INTRAMED IMPLANT Left 3/16/2017    Procedure: INSERTION NAIL IM TIBIA;  Surgeon: Lauernce Greer DO;  [facility];  Service: Orthopedics     Social History Social History     Substance and Sexual Activity   Alcohol Use Not Currently    Comment: Hx of a couple of drinks a night, or everyother night     Social History     Substance and Sexual Activity   Drug Use No     Social History     Tobacco Use   Smoking Status Former Smoker    Packs/day: 1 50    Years: 34 00    Pack years: 51 00    Types: Cigarettes    Start date:     Quit date:     Years since quittin 3   Smokeless Tobacco Never Used     Family History:   Family History   Problem Relation Age of Onset    Pancreatic cancer Mother     COPD Father     Heart attack Brother 64        decreased    Narcolepsy Brother     Drug abuse Brother     Alcohol abuse Brother        Current Outpatient Medications on File Prior to Visit   Medication Sig Dispense Refill    albuterol (PROVENTIL HFA,VENTOLIN HFA) 90 mcg/act inhaler Inhale 2 puffs every 6 (six) hours as needed for shortness of breath 1 g 2    Aspirin Low Dose 81 MG chewable tablet CHEW 1 TABLET BY MOUTH EVERY DAY 30 tablet 0    atorvastatin (LIPITOR) 40 mg tablet Take 1 tablet (40 mg total) by mouth daily 90 tablet 3    chlorthalidone 25 mg tablet Take 0 5 tablets (12 5 mg total) by mouth daily 30 tablet 3    diltiazem (CARDIZEM CD) 300 mg 24 hr capsule TAKE 1 CAPSULE BY MOUTH EVERY DAY 90 capsule 1    isosorbide mononitrate (IMDUR) 60 mg 24 hr tablet TAKE 1 TABLET BY MOUTH EVERY DAY 90 tablet 1    lisinopril (ZESTRIL) 20 mg tablet Take 1 tablet (20 mg total) by mouth daily 90 tablet 3    LORazepam (ATIVAN) 1 mg tablet Take 1 tablet (1 mg total) by mouth 2 (two) times a day as needed for anxiety 60 tablet 0    omeprazole (PriLOSEC) 40 MG capsule Take 1 capsule (40 mg total) by mouth daily 60 capsule 3    QUEtiapine (SEROquel) 300 mg tablet Take 1 tablet (300 mg total) by mouth daily at bedtime 90 tablet 2    sertraline (Zoloft) 25 mg tablet Take 1 tablet (25 mg total) by mouth daily 90 tablet 3     No current facility-administered medications on file prior to visit  No Known Allergies    Objective   Vitals: Blood pressure (!) 159/106, pulse 97, height 5' 10" (1 778 m), weight 134 kg (295 lb)  ,Body mass index is 42 33 kg/m²  PE:  AAOx 3  WDWN  Hearing intact, no drainage from eyes  Regular rate  no audible wheezing  no abdominal distension  LE compartments soft, skin intact    right hip:   No dislocation/deformity  Neg  Stinchfield  ROM: FF: 0-100, IR 0-20, ER: 0-45 no pain  Neg  Robby Test  Neg  Impingement test  mild TTP over greater trochanter  Abduction: 5/5  Neg  Luis Daniel's test  No TTP over SIJ    left hip:   No dislocation/deformity  Neg  Stinchfield  ROM: FF: 0-110 no pain, IR: 0-20 with mild pain, ER: 0-45 with mild pain  + Robby Test  + Impingement test  Mild TTP over greater trochanter  Abduction: 5/5  Neg   Luis Daniel's test  No TTP over SIJ    bilateralLE:    Sensation grossly intact   Palpable   pulse  AT/GS intact    Back:    No TTP over lumbar spinous processes, paraspinal musculature  SLR: Neg      RLE:  EHL/AT/GS/quads/hamstrings/iliopsoas 5/5, sensation grossly intact L4, L5, S1, palpable pedal pulse  LLE:  EHL/AT/GS/quads/hamstrings/iliopsoas 5/5, sensation grossly intact L4, L5, S1, palpable pedal pulse      Imaging Studies: I have personally reviewed pertinent films in PACS   xR bilateralhip:  No acute osseous abnormality, mild DJD        Scribe Attestation    I,:  Yvrose Mendoza am acting as a scribe while in the presence of the attending physician :       I,:  Jan Christian DO personally performed the services described in this documentation    as scribed in my presence :

## 2022-06-07 DIAGNOSIS — E78.5 DYSLIPIDEMIA WITH ELEVATED LOW DENSITY LIPOPROTEIN (LDL) CHOLESTEROL AND ABNORMALLY LOW HIGH DENSITY LIPOPROTEIN CHOLESTEROL: ICD-10-CM

## 2022-06-07 DIAGNOSIS — I20.1 PRINZMETAL VARIANT ANGINA (HCC): ICD-10-CM

## 2022-06-07 DIAGNOSIS — I10 HYPERTENSION, UNSPECIFIED TYPE: ICD-10-CM

## 2022-06-07 RX ORDER — ATORVASTATIN CALCIUM 40 MG/1
TABLET, FILM COATED ORAL
Qty: 90 TABLET | Refills: 0 | Status: SHIPPED | OUTPATIENT
Start: 2022-06-07 | End: 2022-06-13

## 2022-06-07 RX ORDER — DILTIAZEM HYDROCHLORIDE 300 MG/1
CAPSULE, COATED, EXTENDED RELEASE ORAL
Qty: 90 CAPSULE | Refills: 0 | Status: SHIPPED | OUTPATIENT
Start: 2022-06-07 | End: 2022-06-14

## 2022-06-07 RX ORDER — OMEPRAZOLE 40 MG/1
CAPSULE, DELAYED RELEASE ORAL
Qty: 60 CAPSULE | Refills: 0 | Status: SHIPPED | OUTPATIENT
Start: 2022-06-07 | End: 2022-06-14

## 2022-06-13 DIAGNOSIS — I20.1 PRINZMETAL VARIANT ANGINA (HCC): ICD-10-CM

## 2022-06-13 DIAGNOSIS — E78.5 DYSLIPIDEMIA WITH ELEVATED LOW DENSITY LIPOPROTEIN (LDL) CHOLESTEROL AND ABNORMALLY LOW HIGH DENSITY LIPOPROTEIN CHOLESTEROL: ICD-10-CM

## 2022-06-13 DIAGNOSIS — I10 HYPERTENSION, UNSPECIFIED TYPE: ICD-10-CM

## 2022-06-13 RX ORDER — ATORVASTATIN CALCIUM 40 MG/1
TABLET, FILM COATED ORAL
Qty: 90 TABLET | Refills: 0 | Status: SHIPPED | OUTPATIENT
Start: 2022-06-13 | End: 2022-06-16

## 2022-06-14 RX ORDER — OMEPRAZOLE 40 MG/1
CAPSULE, DELAYED RELEASE ORAL
Qty: 60 CAPSULE | Refills: 0 | Status: SHIPPED | OUTPATIENT
Start: 2022-06-14

## 2022-06-14 RX ORDER — DILTIAZEM HYDROCHLORIDE 300 MG/1
CAPSULE, COATED, EXTENDED RELEASE ORAL
Qty: 90 CAPSULE | Refills: 0 | Status: SHIPPED | OUTPATIENT
Start: 2022-06-14 | End: 2022-06-16

## 2022-06-14 RX ORDER — ISOSORBIDE MONONITRATE 60 MG/1
TABLET, EXTENDED RELEASE ORAL
Qty: 90 TABLET | Refills: 1 | Status: SHIPPED | OUTPATIENT
Start: 2022-06-14 | End: 2022-06-16

## 2022-06-16 DIAGNOSIS — E78.5 DYSLIPIDEMIA WITH ELEVATED LOW DENSITY LIPOPROTEIN (LDL) CHOLESTEROL AND ABNORMALLY LOW HIGH DENSITY LIPOPROTEIN CHOLESTEROL: ICD-10-CM

## 2022-06-16 DIAGNOSIS — I20.1 PRINZMETAL VARIANT ANGINA (HCC): ICD-10-CM

## 2022-06-16 DIAGNOSIS — I10 HYPERTENSION, UNSPECIFIED TYPE: ICD-10-CM

## 2022-06-16 RX ORDER — OMEPRAZOLE 40 MG/1
CAPSULE, DELAYED RELEASE ORAL
Qty: 60 CAPSULE | Refills: 0 | OUTPATIENT
Start: 2022-06-16

## 2022-06-16 RX ORDER — ISOSORBIDE MONONITRATE 60 MG/1
TABLET, EXTENDED RELEASE ORAL
Qty: 90 TABLET | Refills: 1 | Status: SHIPPED | OUTPATIENT
Start: 2022-06-16

## 2022-06-16 RX ORDER — ATORVASTATIN CALCIUM 40 MG/1
TABLET, FILM COATED ORAL
Qty: 90 TABLET | Refills: 0 | Status: SHIPPED | OUTPATIENT
Start: 2022-06-16

## 2022-06-16 RX ORDER — DILTIAZEM HYDROCHLORIDE 300 MG/1
CAPSULE, COATED, EXTENDED RELEASE ORAL
Qty: 90 CAPSULE | Refills: 0 | Status: SHIPPED | OUTPATIENT
Start: 2022-06-16

## 2022-07-12 ENCOUNTER — RA CDI HCC (OUTPATIENT)
Dept: OTHER | Facility: HOSPITAL | Age: 57
End: 2022-07-12

## 2022-07-12 NOTE — PROGRESS NOTES
Please review if the following dx  is applicable to the patient's condition and assess and document, if applicable in next visit on 07/25/2022    E66 01: Morbid (severe) obesity due to excess calories ([address] ) -     Per CMS/ICD 10 coding guidelines, BMI of 40 or higher; Class 3 obesity is sometimes categorized as "morbid," "extreme," or "severe" obesity      [address]  coding opportunities          Chart Reviewed number of suggestions sent to Provider: 1     Patients Insurance        Commercial Insurance: [address] [place] [address]

## 2022-07-25 ENCOUNTER — OFFICE VISIT (OUTPATIENT)
Dept: FAMILY MEDICINE CLINIC | Facility: CLINIC | Age: 57
End: 2022-07-25

## 2022-07-25 VITALS
HEIGHT: 70 IN | BODY MASS INDEX: 41.66 KG/M2 | SYSTOLIC BLOOD PRESSURE: 146 MMHG | TEMPERATURE: 98.5 F | OXYGEN SATURATION: 96 % | HEART RATE: 102 BPM | DIASTOLIC BLOOD PRESSURE: 95 MMHG | WEIGHT: 291 LBS | RESPIRATION RATE: 21 BRPM

## 2022-07-25 DIAGNOSIS — E87.6 HYPOKALEMIA: ICD-10-CM

## 2022-07-25 DIAGNOSIS — I10 PRIMARY HYPERTENSION: ICD-10-CM

## 2022-07-25 DIAGNOSIS — K42.9 UMBILICAL HERNIA WITHOUT OBSTRUCTION AND WITHOUT GANGRENE: Chronic | ICD-10-CM

## 2022-07-25 DIAGNOSIS — G47.19 EXCESSIVE DAYTIME SLEEPINESS: ICD-10-CM

## 2022-07-25 DIAGNOSIS — R74.8 ELEVATED LIVER ENZYMES: ICD-10-CM

## 2022-07-25 DIAGNOSIS — R06.02 SHORT OF BREATH ON EXERTION: ICD-10-CM

## 2022-07-25 DIAGNOSIS — F17.200 TOBACCO USE DISORDER: ICD-10-CM

## 2022-07-25 DIAGNOSIS — M25.559 HIP PAIN: Primary | ICD-10-CM

## 2022-07-25 PROCEDURE — 99214 OFFICE O/P EST MOD 30 MIN: CPT | Performed by: FAMILY MEDICINE

## 2022-07-25 PROCEDURE — 1036F TOBACCO NON-USER: CPT | Performed by: FAMILY MEDICINE

## 2022-07-25 PROCEDURE — 3008F BODY MASS INDEX DOCD: CPT | Performed by: FAMILY MEDICINE

## 2022-07-25 RX ORDER — QUETIAPINE FUMARATE 25 MG/1
TABLET, FILM COATED ORAL
COMMUNITY
Start: 2022-06-26 | End: 2022-09-09

## 2022-07-25 NOTE — PROGRESS NOTES
Assessment/Plan:    Hip pain  Hip pain likely due to back pain  Pain has improved with methylprednisolone and he is able to walk further than before  Patient is not interested in PT at this time, but will follow up with pain management  Hypertension  Blood pressure readings in office today are 146/95  Home readings have been [id number] and diastolic   Goal blood pressure of < 140/90  Patient is currently on Imdur 60 milligrams daily, lisinopril 20 mliligrams daily, Cardizem 300 milligrams daily  Patient has not been taking chlorthalidone  Plan:  · Start chlorthalidone 15 milligram daily   · CMP ordered to monitor potassium levels  · Continue home BP monitoring   · F/u in 2 weeks    Umbilical hernia without obstruction and without gangrene  Referral for surgical consult  Tobacco use disorder  Patient reports smoking 5-7 cigarettes on average daily  Most smoking happens in evening around dinner  This is decreased from 1 ppd for 35 years  Continue smoking cessation discussion  Patient's lung CT scan referral  so he was given a new referral       Short of breath on exertion  Patient reports SOB with long-distance walking  Increased albuterol use recently since cardiac arrest  No cough with sputum production  No wheezing on exam  Recent weight gain with decreased physical activity  Shortness of breath multifactorial, patient did have cardiac catheterization which did not show any coronary artery disease  Continue follow-up with Cardiology and Pulmonology  PFTs were advised in the last visit at 2022  Counseled for smoking cessation and daily 30-minute exercise  Excessive daytime sleepiness  Patient reports daytime tiredness and falling asleep at work, but not on a regular basis  Sleep apnea likely given patient's history of waking up gasping at night  Sleep study referral was given        Elevated liver enzymes  Will check CMP       Diagnoses and all orders for this visit:    Hip pain    Short of breath on exertion    Tobacco use disorder    Umbilical hernia without obstruction and without gangrene  -     Ambulatory Referral to General Surgery; Future    Excessive daytime sleepiness  -     Ambulatory Referral to Sleep Medicine; Future    Hypokalemia  -     chlorthalidone (HYGROTEN) 15 MG tablet; Take 1 tablet (15 mg total) by mouth daily    Primary hypertension  -     Comprehensive metabolic panel; Future    Elevated liver enzymes    Other orders  -     QUEtiapine (SEROquel) 25 mg tablet; TAKE 2 TABLETS EVERY MORNING AND ONE TABLET AT BEDTIME WITH 300MG FOR 325MG TOTAL DOSE          Subjective:      Patient ID: Peter Luna  is a 65 y o  male  HPI  Patient reports that the hip pain has improved after methylprednisolone and that he is able to "walk better "  He is not currently taking pain medications for the hip pain and is not interested in physical therapy at this time  At his orthopedic visit, patient was advised that the back pain is likely due to arthritis and that there were no structural abnormalities  Per patient, his home blood pressure readings have been 125-130/  He has been taking Imdur, lisinopril, and Cardizem daily but has not been taking chlorthalidone  He denies lightheadedness, chest pain, and pitting edema  Patient notes that he still has shortness of breath and has been using his inhaler every few days  He reports that he finds himself waking up at night gasping for air  He would like a new referral for sleep medicine  Patient reports smoking 5-7 cigarettes per day and is trying to decrease smoking  He would like a new referral for lung CT scan since his old order has   Patient is concerned about para/umbilical hernia, which has recently gotten larger  The hernia used to be the size of a marble, but has gotten larger after coughing    Per patient, he heard a crunching sound after coughing and then noticed the enlargement of the hernia  He notes some pain in the hernia region especially with coughing  The following portions of the patient's history were reviewed and updated as appropriate: allergies, current medications, past family history, past medical history, past social history, past surgical history and problem list     Review of Systems   Constitutional: Negative for activity change  Respiratory: Positive for shortness of breath  Gastrointestinal: Negative for nausea and vomiting  Musculoskeletal: Positive for back pain  Neurological: Negative for dizziness, light-headedness and headaches  Objective:      /95 (BP Location: Left arm, Patient Position: Sitting, Cuff Size: Large)   Pulse 102   Temp 98 5 °F (36 9 °C) (Temporal)   Resp 21   Ht 5' 10" (1 778 m)   Wt 132 kg (291 lb)   SpO2 96%   BMI 41 75 kg/m²          Physical Exam  Constitutional:       General: He is not in acute distress  Appearance: Normal appearance  He is obese  HENT:      Head: Normocephalic and atraumatic  Nose: No congestion or rhinorrhea  Cardiovascular:      Rate and Rhythm: Normal rate and regular rhythm  Heart sounds: Normal heart sounds  No murmur heard  Pulmonary:      Effort: Pulmonary effort is normal       Breath sounds: Normal breath sounds  Abdominal:      Hernia: A hernia is present  Comments: Para/umbilical hernia, positive cough reflex   Musculoskeletal:         General: No swelling or deformity  Right lower leg: No edema  Left lower leg: No edema  Skin:     General: Skin is warm and dry  Capillary Refill: Capillary refill takes 2 to 3 seconds  Neurological:      Mental Status: He is alert and oriented to person, place, and time  Psychiatric:         Mood and Affect: Mood normal          Behavior: Behavior normal          Thought Content:  Thought content normal          Judgment: Judgment normal

## 2022-07-25 NOTE — ASSESSMENT & PLAN NOTE
Patient reports daytime tiredness and falling asleep at work, but not on a regular basis  Sleep apnea likely given patient's history of waking up gasping at night  Sleep study referral was given

## 2022-07-25 NOTE — ASSESSMENT & PLAN NOTE
Patient reports smoking 5-7 cigarettes on average daily  Most smoking happens in evening around dinner  This is decreased from 1 ppd for 35 years  Continue smoking cessation discussion    Patient's lung CT scan referral  so he was given a new referral

## 2022-07-25 NOTE — ASSESSMENT & PLAN NOTE
Patient reports SOB with long-distance walking  Increased albuterol use recently since cardiac arrest  No cough with sputum production  No wheezing on exam  Recent weight gain with decreased physical activity  Shortness of breath multifactorial, patient did have cardiac catheterization which did not show any coronary artery disease  Continue follow-up with Cardiology and Pulmonology  PFTs were advised in the last visit at 4/28/2022  Counseled for smoking cessation and daily 30-minute exercise

## 2022-07-25 NOTE — ASSESSMENT & PLAN NOTE
Hip pain likely due to back pain  Pain has improved with methylprednisolone and he is able to walk further than before  Patient is not interested in PT at this time, but will follow up with pain management

## 2022-07-25 NOTE — ASSESSMENT & PLAN NOTE
Blood pressure readings in office today are 146/95  Home readings have been [id number] and diastolic   Goal blood pressure of < 140/90  Patient is currently on Imdur 60 milligrams daily, lisinopril 20 mliligrams daily, Cardizem 300 milligrams daily  Patient has not been taking chlorthalidone      Plan:  · Start chlorthalidone 15 milligram daily   · CMP ordered to monitor potassium levels  · Continue home BP monitoring   · F/u in 2 weeks

## 2022-08-11 ENCOUNTER — OFFICE VISIT (OUTPATIENT)
Dept: FAMILY MEDICINE CLINIC | Facility: CLINIC | Age: 57
End: 2022-08-11

## 2022-08-11 VITALS
WEIGHT: 292.4 LBS | TEMPERATURE: 96.5 F | HEIGHT: 70 IN | RESPIRATION RATE: 18 BRPM | DIASTOLIC BLOOD PRESSURE: 98 MMHG | OXYGEN SATURATION: 96 % | HEART RATE: 106 BPM | SYSTOLIC BLOOD PRESSURE: 140 MMHG | BODY MASS INDEX: 41.86 KG/M2

## 2022-08-11 DIAGNOSIS — H10.32 ACUTE CONJUNCTIVITIS OF LEFT EYE, UNSPECIFIED ACUTE CONJUNCTIVITIS TYPE: Primary | ICD-10-CM

## 2022-08-11 DIAGNOSIS — Z59.9 FINANCIAL DIFFICULTIES: ICD-10-CM

## 2022-08-11 PROCEDURE — 99213 OFFICE O/P EST LOW 20 MIN: CPT | Performed by: PHYSICIAN ASSISTANT

## 2022-08-11 RX ORDER — POLYMYXIN B SULFATE AND TRIMETHOPRIM 1; 10000 MG/ML; [USP'U]/ML
1 SOLUTION OPHTHALMIC EVERY 4 HOURS
Qty: 10 ML | Refills: 0 | Status: SHIPPED | OUTPATIENT
Start: 2022-08-11 | End: 2022-08-18

## 2022-08-11 SDOH — ECONOMIC STABILITY - INCOME SECURITY: PROBLEM RELATED TO HOUSING AND ECONOMIC CIRCUMSTANCES, UNSPECIFIED: Z59.9

## 2022-08-11 NOTE — ASSESSMENT & PLAN NOTE
Left eye with swelling and redness since yesterday    Viral vs bacterial  Advised to start warm compress  If swelling and redness persist start Polytrim  1 drop into left eye q4hrs x 7 days  Avoid touch or rubbing eye  Discussed frequent hand washing

## 2022-08-11 NOTE — ASSESSMENT & PLAN NOTE
He reports financial difficulties after his father  in 10/2021 since his dad helped pay 1/2 the bills  Financially is doing better now   Handout provided and pt referred to

## 2022-08-11 NOTE — PROGRESS NOTES
Assessment/Plan:    Financial difficulties  He reports financial difficulties after his father  in 10/2021 since his dad helped pay 1/2 the bills  Financially is doing better now  Handout provided and pt referred to     Acute conjunctivitis of left eye  Left eye with swelling and redness since yesterday  Viral vs bacterial  Advised to start warm compress  If swelling and redness persist start Polytrim  1 drop into left eye q4hrs x 7 days  Avoid touch or rubbing eye  Discussed frequent hand washing         Diagnoses and all orders for this visit:    Acute conjunctivitis of left eye, unspecified acute conjunctivitis type  -     polymyxin b-trimethoprim (POLYTRIM) ophthalmic solution; Administer 1 drop into the left eye every 4 (four) hours for 7 days    Financial difficulties  -     Ambulatory referral to social work care management program; Future      Subjective:      Patient ID: Bianca Burgess  is a 65 y o  male here today for c/o left eye swelling and redness x 1 day  Conjunctivitis   The current episode started yesterday  The onset was sudden  The problem has been gradually worsening  Nothing relieves the symptoms  Associated symptoms include eye itching, eye discharge and eye redness  Pertinent negatives include no fever, no decreased vision, no double vision, no photophobia, no abdominal pain, no congestion, no ear discharge, no ear pain, no headaches, no hearing loss, no mouth sores, no sore throat, no cough and no eye pain  The right eye is affected  The eyelid exhibits redness and swelling  The following portions of the patient's history were reviewed and updated as appropriate: allergies, current medications, past family history, past medical history, past social history, past surgical history and problem list     Review of Systems   Constitutional: Negative for fever  HENT: Negative for congestion, ear discharge, ear pain, hearing loss, mouth sores and sore throat      Eyes: Positive for discharge, redness and itching  Negative for double vision, photophobia and pain  Respiratory: Negative for cough  Gastrointestinal: Negative for abdominal pain  Neurological: Negative for headaches  Objective:      /98 (BP Location: Left arm, Patient Position: Sitting, Cuff Size: Large)   Pulse (!) 106   Temp (!) 96 5 °F (35 8 °C) (Temporal)   Resp 18   Ht 5' 10" (1 778 m)   Wt 133 kg (292 lb 6 4 oz)   SpO2 96%   BMI 41 96 kg/m²          Physical Exam  Constitutional:       General: He is not in acute distress  Appearance: Normal appearance  He is obese  He is not ill-appearing or toxic-appearing  Eyes:      Conjunctiva/sclera:      Right eye: Right conjunctiva is not injected  Left eye: Left conjunctiva is injected  No exudate or hemorrhage  Comments: Left eyelid with swelling    Pulmonary:      Effort: Pulmonary effort is normal  No respiratory distress  Neurological:      Mental Status: He is alert and oriented to person, place, and time     Psychiatric:         Mood and Affect: Mood normal          Behavior: Behavior normal

## 2022-08-11 NOTE — LETTER
August 11, 2022     Patient: Machelle Savage  YOB: 1965  Date of Visit: 8/11/2022      To Whom it May Concern:    Charles Ellis is under my professional care  Sandee Lindsey was seen in my office on 8/11/2022  Sandee Lindsey may return to work on 8/15/2022  If you have any questions or concerns, please don't hesitate to call           Sincerely,          Ange Garcia PA-C        CC: No Recipients

## 2022-08-12 DIAGNOSIS — E78.5 DYSLIPIDEMIA: ICD-10-CM

## 2022-08-12 DIAGNOSIS — I10 HYPERTENSION, UNSPECIFIED TYPE: ICD-10-CM

## 2022-08-12 RX ORDER — OMEPRAZOLE 40 MG/1
CAPSULE, DELAYED RELEASE ORAL
Qty: 60 CAPSULE | Refills: 0 | Status: SHIPPED | OUTPATIENT
Start: 2022-08-12

## 2022-08-15 RX ORDER — ALBUTEROL SULFATE 90 UG/1
AEROSOL, METERED RESPIRATORY (INHALATION)
Qty: 8.5 G | Refills: 2 | Status: SHIPPED | OUTPATIENT
Start: 2022-08-15

## 2022-08-17 ENCOUNTER — PATIENT OUTREACH (OUTPATIENT)
Dept: FAMILY MEDICINE CLINIC | Facility: CLINIC | Age: 57
End: 2022-08-17

## 2022-08-17 NOTE — PROGRESS NOTES
EVERETT LEBRON received a referral from provider [place] to assist patient with financial difficulties  EVERETT LEBRON completed chart review and noted that patient's financial difficulties occurred after his father passed away, however, he reported that he is currently doing better  EVERETT ELBRON outreached patient to follow up and determine if he is in need of any further assistance  EVERETT LEBRON introduced self and explained role  Patient reported that he is doing well financially now and denied any further social needs/support  EVERETT LEBRON provided him with contact information if he ever has any future needs  EVERETT LEBRON will continue to be available to provide psychosocial support as necessary

## 2022-08-18 ENCOUNTER — OFFICE VISIT (OUTPATIENT)
Dept: FAMILY MEDICINE CLINIC | Facility: CLINIC | Age: 57
End: 2022-08-18

## 2022-08-18 ENCOUNTER — CONSULT (OUTPATIENT)
Dept: SURGERY | Facility: CLINIC | Age: 57
End: 2022-08-18
Payer: COMMERCIAL

## 2022-08-18 ENCOUNTER — TELEPHONE (OUTPATIENT)
Dept: FAMILY MEDICINE CLINIC | Facility: CLINIC | Age: 57
End: 2022-08-18

## 2022-08-18 ENCOUNTER — LAB (OUTPATIENT)
Dept: LAB | Facility: CLINIC | Age: 57
End: 2022-08-18
Payer: COMMERCIAL

## 2022-08-18 VITALS
HEART RATE: 90 BPM | DIASTOLIC BLOOD PRESSURE: 93 MMHG | WEIGHT: 293 LBS | SYSTOLIC BLOOD PRESSURE: 164 MMHG | BODY MASS INDEX: 41.95 KG/M2 | OXYGEN SATURATION: 97 % | HEIGHT: 70 IN | TEMPERATURE: 97.3 F | RESPIRATION RATE: 20 BRPM

## 2022-08-18 VITALS
OXYGEN SATURATION: 97 % | DIASTOLIC BLOOD PRESSURE: 107 MMHG | SYSTOLIC BLOOD PRESSURE: 159 MMHG | TEMPERATURE: 96.5 F | HEIGHT: 70 IN | WEIGHT: 293 LBS | RESPIRATION RATE: 14 BRPM | BODY MASS INDEX: 41.95 KG/M2 | HEART RATE: 80 BPM

## 2022-08-18 DIAGNOSIS — Z00.00 ANNUAL PHYSICAL EXAM: Primary | ICD-10-CM

## 2022-08-18 DIAGNOSIS — K42.9 UMBILICAL HERNIA: Primary | ICD-10-CM

## 2022-08-18 DIAGNOSIS — K42.9 UMBILICAL HERNIA WITHOUT OBSTRUCTION AND WITHOUT GANGRENE: Chronic | ICD-10-CM

## 2022-08-18 DIAGNOSIS — I10 PRIMARY HYPERTENSION: ICD-10-CM

## 2022-08-18 DIAGNOSIS — K42.9 UMBILICAL HERNIA: ICD-10-CM

## 2022-08-18 LAB
ALBUMIN SERPL BCP-MCNC: 3.3 G/DL (ref 3.5–5)
ALP SERPL-CCNC: 169 U/L (ref 46–116)
ALT SERPL W P-5'-P-CCNC: 52 U/L (ref 12–78)
ANION GAP SERPL CALCULATED.3IONS-SCNC: 6 MMOL/L (ref 4–13)
AST SERPL W P-5'-P-CCNC: 35 U/L (ref 5–45)
BASOPHILS # BLD AUTO: 0.04 THOUSANDS/ΜL (ref 0–0.1)
BASOPHILS NFR BLD AUTO: 1 % (ref 0–1)
BILIRUB SERPL-MCNC: 0.71 MG/DL (ref 0.2–1)
BUN SERPL-MCNC: 11 MG/DL (ref 5–25)
CALCIUM ALBUM COR SERPL-MCNC: 9.5 MG/DL (ref 8.3–10.1)
CALCIUM SERPL-MCNC: 8.9 MG/DL (ref 8.3–10.1)
CHLORIDE SERPL-SCNC: 103 MMOL/L (ref 96–108)
CO2 SERPL-SCNC: 26 MMOL/L (ref 21–32)
CREAT SERPL-MCNC: 1.26 MG/DL (ref 0.6–1.3)
EOSINOPHIL # BLD AUTO: 0.06 THOUSAND/ΜL (ref 0–0.61)
EOSINOPHIL NFR BLD AUTO: 1 % (ref 0–6)
ERYTHROCYTE [DISTWIDTH] IN BLOOD BY AUTOMATED COUNT: 14.1 % (ref 11.6–15.1)
GFR SERPL CREATININE-BSD FRML MDRD: 63 ML/MIN/1.73SQ M
GLUCOSE SERPL-MCNC: 120 MG/DL (ref 65–140)
HCT VFR BLD AUTO: 48.3 % (ref 36.5–49.3)
HGB BLD-MCNC: 15.7 G/DL (ref 12–17)
IMM GRANULOCYTES # BLD AUTO: 0.04 THOUSAND/UL (ref 0–0.2)
IMM GRANULOCYTES NFR BLD AUTO: 1 % (ref 0–2)
INR PPP: 0.96 (ref 0.84–1.19)
LYMPHOCYTES # BLD AUTO: 1.39 THOUSANDS/ΜL (ref 0.6–4.47)
LYMPHOCYTES NFR BLD AUTO: 16 % (ref 14–44)
MCH RBC QN AUTO: 32.6 PG (ref 26.8–34.3)
MCHC RBC AUTO-ENTMCNC: 32.5 G/DL (ref 31.4–37.4)
MCV RBC AUTO: 100 FL (ref 82–98)
MONOCYTES # BLD AUTO: 0.47 THOUSAND/ΜL (ref 0.17–1.22)
MONOCYTES NFR BLD AUTO: 5 % (ref 4–12)
NEUTROPHILS # BLD AUTO: 6.73 THOUSANDS/ΜL (ref 1.85–7.62)
NEUTS SEG NFR BLD AUTO: 76 % (ref 43–75)
NRBC BLD AUTO-RTO: 0 /100 WBCS
PLATELET # BLD AUTO: 187 THOUSANDS/UL (ref 149–390)
PMV BLD AUTO: 11.4 FL (ref 8.9–12.7)
POTASSIUM SERPL-SCNC: 3.8 MMOL/L (ref 3.5–5.3)
PROT SERPL-MCNC: 7.6 G/DL (ref 6.4–8.4)
PROTHROMBIN TIME: 13 SECONDS (ref 11.6–14.5)
RBC # BLD AUTO: 4.81 MILLION/UL (ref 3.88–5.62)
SODIUM SERPL-SCNC: 135 MMOL/L (ref 135–147)
WBC # BLD AUTO: 8.73 THOUSAND/UL (ref 4.31–10.16)

## 2022-08-18 PROCEDURE — 85610 PROTHROMBIN TIME: CPT

## 2022-08-18 PROCEDURE — 90471 IMMUNIZATION ADMIN: CPT | Performed by: FAMILY MEDICINE

## 2022-08-18 PROCEDURE — 99214 OFFICE O/P EST MOD 30 MIN: CPT | Performed by: FAMILY MEDICINE

## 2022-08-18 PROCEDURE — 36415 COLL VENOUS BLD VENIPUNCTURE: CPT

## 2022-08-18 PROCEDURE — 85025 COMPLETE CBC W/AUTO DIFF WBC: CPT

## 2022-08-18 PROCEDURE — 80053 COMPREHEN METABOLIC PANEL: CPT

## 2022-08-18 PROCEDURE — 93005 ELECTROCARDIOGRAM TRACING: CPT

## 2022-08-18 PROCEDURE — 3725F SCREEN DEPRESSION PERFORMED: CPT | Performed by: FAMILY MEDICINE

## 2022-08-18 PROCEDURE — 90715 TDAP VACCINE 7 YRS/> IM: CPT | Performed by: FAMILY MEDICINE

## 2022-08-18 PROCEDURE — 99202 OFFICE O/P NEW SF 15 MIN: CPT | Performed by: SURGERY

## 2022-08-18 NOTE — PROGRESS NOTES
Office Visit - General Surgery  Kwame Ross  MRN: [id number]  Encounter: [id number]    Assessment and Plan  Problem List Items Addressed This Visit        Other    Umbilical hernia without obstruction and without gangrene (Chronic)          Chief Complaint:  Kwame Ross  is a 65 y o  male who presents for Hernia (Consult umbilical hernia )    Subjective   66 yo male who presents for evaluation of umbilical hernia  No f/c/n/v  No changes in bowel/urinary habits  Past Medical History:   Diagnosis Date    Anxiety     Asthma     Colon polyp     GERD (gastroesophageal reflux disease)     History of ileus     Hypertension     Insomnia     Lumbar herniated disc     last assessed: 3/27/2015    MDD (major depressive disorder)     Patella fracture     last assessed: 3/27/2015    Tobacco abuse        Past Surgical History:   Procedure Laterality Date    BACK SURGERY      Lumbar    CARDIAC CATHETERIZATION N/A 2021    Procedure: CARDIAC CATHETERIZATION;  Surgeon: Chandu Lagunas MD;  Location:  CARDIAC CATH LAB;   Service: Cardiology    COLONOSCOPY      KNEE SURGERY      right    LUMBAR FUSION      last assessed: 3/27/2015    PA TREAT TIBIAL SHAFT FX, INTRAMED IMPLANT Left 3/16/2017    Procedure: INSERTION NAIL IM TIBIA;  Surgeon: Elayne Zazueta DO;  Location: [facility];  Service: Orthopedics       Family History   Problem Relation Age of Onset    Pancreatic cancer Mother     COPD Father     Heart attack Brother 64        decreased    Narcolepsy Brother     Drug abuse Brother     Alcohol abuse Brother        Social History     Tobacco Use    Smoking status: Former Smoker     Packs/day: 1 50     Years: 34 00     Pack years: 51 00     Types: Cigarettes     Start date:      Quit date:      Years since quittin 6    Smokeless tobacco: Never Used   Vaping Use    Vaping Use: Never used   Substance Use Topics    Alcohol use: Not Currently     Comment: Hx of a couple of drinks a night, or everyother night    Drug use: No        Medications  Current Outpatient Medications on File Prior to Visit   Medication Sig Dispense Refill    albuterol (PROVENTIL HFA,VENTOLIN HFA) 90 mcg/act inhaler INHALE TWO PUFFS EVERY 6 HOURS AS NEEDED FOR SHORTNESS OF BREATH  8 5 g 2    Aspirin Low Dose 81 MG chewable tablet CHEW 1 TABLET BY MOUTH EVERY DAY 30 tablet 0    atorvastatin (LIPITOR) 40 mg tablet TAKE ONE TABLET BY MOUTH EVERY DAY 90 tablet 0    chlorthalidone (HYGROTEN) 15 MG tablet Take 1 tablet (15 mg total) by mouth daily 30 tablet 1    diltiazem (CARDIZEM CD) 300 mg 24 hr capsule TAKE ONE CAPSULE BY MOUTH EVERY DAY 90 capsule 0    isosorbide mononitrate (IMDUR) 60 mg 24 hr tablet TAKE ONE TABLET BY MOUTH ONCE DAILY 90 tablet 1    lisinopril (ZESTRIL) 20 mg tablet Take 1 tablet (20 mg total) by mouth daily 90 tablet 3    LORazepam (ATIVAN) 1 mg tablet Take 1 tablet (1 mg total) by mouth 2 (two) times a day as needed for anxiety 60 tablet 0    methylPREDNISolone 4 MG tablet therapy pack Use as directed on package 21 tablet 0    omeprazole (PriLOSEC) 40 MG capsule TAKE ONE CAPSULE BY MOUTH ONCE DAILY 60 capsule 0    polymyxin b-trimethoprim (POLYTRIM) ophthalmic solution Administer 1 drop into the left eye every 4 (four) hours for 7 days 10 mL 0    QUEtiapine (SEROquel) 300 mg tablet Take 1 tablet (300 mg total) by mouth daily at bedtime 90 tablet 2    sertraline (Zoloft) 25 mg tablet Take 1 tablet (25 mg total) by mouth daily 90 tablet 3    QUEtiapine (SEROquel) 25 mg tablet TAKE 2 TABLETS EVERY MORNING AND ONE TABLET AT BEDTIME WITH 300MG FOR 325MG TOTAL DOSE       No current facility-administered medications on file prior to visit  Allergies  No Known Allergies    Review of Systems   Respiratory: Negative  Cardiovascular: Negative  Gastrointestinal: Negative  Musculoskeletal: Negative  Skin: Negative      All other systems reviewed and are negative  Objective  Vitals:    08/18/22 1606   BP: (!) 159/107   Pulse: 80   Resp: 14   Temp: (!) 96 5 °F (35 8 °C)   SpO2: 97%       Physical Exam  Constitutional:       Appearance: Normal appearance  He is obese  Cardiovascular:      Rate and Rhythm: Normal rate  Pulses: Normal pulses  Pulmonary:      Effort: Pulmonary effort is normal    Abdominal:      General: There is no distension  Palpations: Abdomen is soft  Tenderness: There is no abdominal tenderness  There is no guarding or rebound  Hernia: A hernia is present  Comments: Reducible fat-containing umbilical hernia with approx 2cm fascial defect, no overlying skin changes   Neurological:      Mental Status: He is alert

## 2022-08-18 NOTE — TELEPHONE ENCOUNTER
Folder (To be completed by) - Family practice resident      Name of Form - Learner's permit     Color folder Lanae Aase)    Form to be Handed to patient     Patient was made aware of the 7 business day form policy

## 2022-08-18 NOTE — ASSESSMENT & PLAN NOTE
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  Discussed with the patient that it would be more appropriate for his primary care physician to complete this paper work, as that physician has a much better picture of his overall health and situation, and would be able to more accurately medically clear him for license reinstatement  Advised that when he returns on 09/08/2022 for appointment with his PCP that he should bring the paperwork with him then  Patient was amenable to this plan and verbalized understanding  Other issues addressed at today's visit include immunizations  Patient was lacking Tdap which he was agreeable with receiving today  Patient has lung cancer screening scheduled for 09/01/2022  Last colonoscopy was March 2022  Patient is otherwise up-to-date with all other screening modalities  Had discussion with patient about benefits of weight loss and regular exercise and diet improvements given his BMI of 42 and past cardiac medical history  Patient verbalized understanding, states that he is exercising about 20 minutes a day  He continues to smoke cigarettes, about 5 per day  Counseled patient about smoking cessation  Patient states that he is in the process of quitting      Patient has blood work scheduled for later today, will go over results with his PCP on 09/08/2022  Patient's blood pressure elevated today  Patient states that his systolic pressure usually runs in the 130s to 140s  It is 164 today in the clinic  His diastolic pressures are usually 90  Is 80 today  The patient is asymptomatic no headache vision changes or any other complaints  Advised patient to continue to monitor blood pressures at home if it continues to remain elevated to please call the office for further evaluation and management      Plan  -Tdap today  -'s license physical paperwork on 9/8/22 w/ PCP  -counseled smoking cessation  -counseled diet and exercise for weight loss  -blood work scheduled for later today, f/u results with PCP on 9/8/22  -Lung cancer screening 9/1/22  -continue to monitor home BP

## 2022-08-18 NOTE — H&P (VIEW-ONLY)
Office Visit - General Surgery  Beto Plata  MRN: [id number]  Encounter: [id number]    Assessment and Plan  Problem List Items Addressed This Visit        Other    Umbilical hernia without obstruction and without gangrene (Chronic)          Chief Complaint:  Beto Plata  is a 65 y o  male who presents for Hernia (Consult umbilical hernia )    Subjective   66 yo male who presents for evaluation of umbilical hernia  No f/c/n/v  No changes in bowel/urinary habits  Past Medical History:   Diagnosis Date    Anxiety     Asthma     Colon polyp     GERD (gastroesophageal reflux disease)     History of ileus     Hypertension     Insomnia     Lumbar herniated disc     last assessed: 3/27/2015    MDD (major depressive disorder)     Patella fracture     last assessed: 3/27/2015    Tobacco abuse        Past Surgical History:   Procedure Laterality Date    BACK SURGERY      Lumbar    CARDIAC CATHETERIZATION N/A 2021    Procedure: CARDIAC CATHETERIZATION;  Surgeon: Damion Munson MD;  [facility];   Service: Cardiology    COLONOSCOPY      KNEE SURGERY      right    LUMBAR FUSION      last assessed: 3/27/2015    ME TREAT TIBIAL SHAFT FX, INTRAMED IMPLANT Left 3/16/2017    Procedure: INSERTION NAIL IM TIBIA;  Surgeon: Jessica Hinds DO;  Location: [facility];  Service: Orthopedics       Family History   Problem Relation Age of Onset    Pancreatic cancer Mother     COPD Father     Heart attack Brother 64        decreased    Narcolepsy Brother     Drug abuse Brother     Alcohol abuse Brother        Social History     Tobacco Use    Smoking status: Former Smoker     Packs/day: 1 50     Years: 34 00     Pack years: 51 00     Types: Cigarettes     Start date:      Quit date:      Years since quittin 6    Smokeless tobacco: Never Used   Vaping Use    Vaping Use: Never used   Substance Use Topics    Alcohol use: Not Currently     Comment: Hx of a couple of drinks a night, or everyother night    Drug use: No        Medications  Current Outpatient Medications on File Prior to Visit   Medication Sig Dispense Refill    albuterol (PROVENTIL HFA,VENTOLIN HFA) 90 mcg/act inhaler INHALE TWO PUFFS EVERY 6 HOURS AS NEEDED FOR SHORTNESS OF BREATH  8 5 g 2    Aspirin Low Dose 81 MG chewable tablet CHEW 1 TABLET BY MOUTH EVERY DAY 30 tablet 0    atorvastatin (LIPITOR) 40 mg tablet TAKE ONE TABLET BY MOUTH EVERY DAY 90 tablet 0    chlorthalidone (HYGROTEN) 15 MG tablet Take 1 tablet (15 mg total) by mouth daily 30 tablet 1    diltiazem (CARDIZEM CD) 300 mg 24 hr capsule TAKE ONE CAPSULE BY MOUTH EVERY DAY 90 capsule 0    isosorbide mononitrate (IMDUR) 60 mg 24 hr tablet TAKE ONE TABLET BY MOUTH ONCE DAILY 90 tablet 1    lisinopril (ZESTRIL) 20 mg tablet Take 1 tablet (20 mg total) by mouth daily 90 tablet 3    LORazepam (ATIVAN) 1 mg tablet Take 1 tablet (1 mg total) by mouth 2 (two) times a day as needed for anxiety 60 tablet 0    methylPREDNISolone 4 MG tablet therapy pack Use as directed on package 21 tablet 0    omeprazole (PriLOSEC) 40 MG capsule TAKE ONE CAPSULE BY MOUTH ONCE DAILY 60 capsule 0    polymyxin b-trimethoprim (POLYTRIM) ophthalmic solution Administer 1 drop into the left eye every 4 (four) hours for 7 days 10 mL 0    QUEtiapine (SEROquel) 300 mg tablet Take 1 tablet (300 mg total) by mouth daily at bedtime 90 tablet 2    sertraline (Zoloft) 25 mg tablet Take 1 tablet (25 mg total) by mouth daily 90 tablet 3    QUEtiapine (SEROquel) 25 mg tablet TAKE 2 TABLETS EVERY MORNING AND ONE TABLET AT BEDTIME WITH 300MG FOR 325MG TOTAL DOSE       No current facility-administered medications on file prior to visit  Allergies  No Known Allergies    Review of Systems   Respiratory: Negative  Cardiovascular: Negative  Gastrointestinal: Negative  Musculoskeletal: Negative  Skin: Negative      All other systems reviewed and are negative  Objective  Vitals:    08/18/22 1606   BP: (!) 159/107   Pulse: 80   Resp: 14   Temp: (!) 96 5 °F (35 8 °C)   SpO2: 97%       Physical Exam  Constitutional:       Appearance: Normal appearance  He is obese  Cardiovascular:      Rate and Rhythm: Normal rate  Pulses: Normal pulses  Pulmonary:      Effort: Pulmonary effort is normal    Abdominal:      General: There is no distension  Palpations: Abdomen is soft  Tenderness: There is no abdominal tenderness  There is no guarding or rebound  Hernia: A hernia is present  Comments: Reducible fat-containing umbilical hernia with approx 2cm fascial defect, no overlying skin changes   Neurological:      Mental Status: He is alert

## 2022-08-18 NOTE — ASSESSMENT & PLAN NOTE
- Will plan for umbilical hernia repair with mesh  - smoking cessation  - will need medical optimization prior to surgery

## 2022-08-18 NOTE — PROGRESS NOTES
[facility]    NAME: Ravi Mora  AGE: 65 y o  SEX: male  : 1965     DATE: 2022     Assessment and Plan:     Problem List Items Addressed This Visit        Other    Annual physical exam - Primary     Patient is a 52-year-old male past medical history hypertension hyperlipidemia obesity cardiac arrest 2021 secondary to Prinzmetal angina presenting to the clinic for annual physical exam and 's license physical     Patient reports that he received multiple DUIs in  and   He was eligible for reinstatement of his license in  however he did not get his new license at that time  He states that now he needs to get his license due to change in his social circumstances that require him to have a means of transportation now  He needs the physical form filled out so that 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  Discussed with the patient that it would be more appropriate for his primary care physician to complete this paper work, as that physician has a much better picture of his overall health and situation, and would be able to more accurately medically clear him for license reinstatement  Advised that when he returns on 2022 for appointment with his PCP that he should bring the paperwork with him then  Patient was amenable to this plan and verbalized understanding  Other issues addressed at today's visit include immunizations  Patient was lacking Tdap which he was agreeable with receiving today  Patient has lung cancer screening scheduled for 2022  Last colonoscopy was 2022  Patient is otherwise up-to-date with all other screening modalities  Had discussion with patient about benefits of weight loss and regular exercise and diet improvements given his BMI of 42 and past cardiac medical history    Patient verbalized understanding, states that he is exercising about 20 minutes a day  He continues to smoke cigarettes, about 5 per day  Counseled patient about smoking cessation  Patient states that he is in the process of quitting  Patient has blood work scheduled for later today, will go over results with his PCP on 09/08/2022  Patient's blood pressure elevated today  Patient states that his systolic pressure usually runs in the 130s to 140s  It is 164 today in the clinic  His diastolic pressures are usually 90  Is 80 today  The patient is asymptomatic no headache vision changes or any other complaints  Advised patient to continue to monitor blood pressures at home if it continues to remain elevated to please call the office for further evaluation and management  Plan  -Tdap today  -'s license physical paperwork on 9/8/22 w/ PCP  -counseled smoking cessation  -counseled diet and exercise for weight loss  -blood work scheduled for later today, f/u results with PCP on 9/8/22  -Lung cancer screening 9/1/22  -continue to monitor home BP          Relevant Orders    TDAP VACCINE GREATER THAN OR EQUAL TO 8YO IM (Completed)          Immunizations and preventive care screenings were discussed with patient today  Appropriate education was printed on patient's after visit summary  Counseling:  Alcohol/drug use: discussed moderation in alcohol intake, the recommendations for healthy alcohol use, and avoidance of illicit drug use  · Exercise: the importance of regular exercise/physical activity was discussed  Recommend exercise 3-5 times per week for at least 30 minutes  BMI Counseling: Body mass index is 42 04 kg/m²  The BMI is above normal  Nutrition recommendations include encouraging healthy choices of fruits and vegetables, limiting drinks that contain sugar, increasing intake of lean protein and reducing intake of saturated and trans fat   Exercise recommendations include moderate physical activity 150 minutes/week and exercising 3-5 times per week  Rationale for BMI follow-up plan is due to patient being overweight or obese  No follow-ups on file  Chief Complaint:     Chief Complaint   Patient presents with    Annual Exam     's physical      History of Present Illness:     Adult Annual Physical   Patient here for a comprehensive physical exam  The patient reports no problems  Diet and Physical Activity  · Diet/Nutrition: poor diet, limited junk food, low fat diet and limited fruits/vegetables  · Exercise: walking, 3-4 times a week on average and less than 30 minutes on average  Depression Screening  PHQ-2/9 Depression Screening    Little interest or pleasure in doing things: 0 - not at all  Feeling down, depressed, or hopeless: 1 - several days  Trouble falling or staying asleep, or sleeping too much: 0 - not at all  Feeling tired or having little energy: 0 - not at all  Poor appetite or overeatin - not at all  Feeling bad about yourself - or that you are a failure or have let yourself or your family down: 0 - not at all  Trouble concentrating on things, such as reading the newspaper or watching television: 0 - not at all  Moving or speaking so slowly that other people could have noticed  Or the opposite - being so fidgety or restless that you have been moving around a lot more than usual: 0 - not at all  Thoughts that you would be better off dead, or of hurting yourself in some way: 0 - not at all  PHQ-9 Score: 1   PHQ-9 Interpretation: No or Minimal depression        General Health  · Sleep: sleeps well, gets 4-6 hours of sleep on average and snores loudly  · Hearing: normal   · Vision: no vision problems  · Dental: regular dental visits and brushes teeth twice daily   Health  · Symptoms include: nocturia     Review of Systems:     Review of Systems   Constitutional: Negative for chills and fever  HENT: Negative for rhinorrhea and sore throat      Eyes: Negative for visual disturbance  Respiratory: Negative for shortness of breath  Cardiovascular: Negative for chest pain  Gastrointestinal: Negative for abdominal pain, constipation, diarrhea, nausea and vomiting  Genitourinary: Negative for difficulty urinating  Musculoskeletal: Negative for back pain  Skin: Negative for rash  Neurological: Negative for headaches  All other systems reviewed and are negative  Past Medical History:     Past Medical History:   Diagnosis Date    Anxiety     Asthma     Colon polyp     GERD (gastroesophageal reflux disease)     History of ileus     Hypertension     Insomnia     Lumbar herniated disc     last assessed: 3/27/2015    MDD (major depressive disorder)     Patella fracture     last assessed: 3/27/2015    Tobacco abuse       Past Surgical History:     Past Surgical History:   Procedure Laterality Date    BACK SURGERY  2015    Lumbar    CARDIAC CATHETERIZATION N/A 11/5/2021    Procedure: CARDIAC CATHETERIZATION;  Surgeon: Romelle Gaucher, MD;  [facility];   Service: Cardiology    COLONOSCOPY      KNEE SURGERY      right    LUMBAR FUSION      last assessed: 3/27/2015    NH TREAT TIBIAL SHAFT FX, INTRAMED IMPLANT Left 3/16/2017    Procedure: INSERTION NAIL IM TIBIA;  Surgeon: Stiven Osborn DO;  [facility];  Service: Orthopedics      Family History:     Family History   Problem Relation Age of Onset    Pancreatic cancer Mother     COPD Father     Heart attack Brother 64        decreased    Narcolepsy Brother     Drug abuse Brother     Alcohol abuse Brother       Social History:     Social History     Socioeconomic History    Marital status: Single     Spouse name: None    Number of children: None    Years of education: None    Highest education level: None   Occupational History     Employer: [employer]   Tobacco Use    Smoking status: Former Smoker     Packs/day: 1 50     Years: 34 00     Pack years: 51 00     Types: Cigarettes     Start date:      Quit date:      Years since quittin 6    Smokeless tobacco: Never Used   Vaping Use    Vaping Use: Never used   Substance and Sexual Activity    Alcohol use: Not Currently     Comment: Hx of a couple of drinks a night, or everyother night    Drug use: No    Sexual activity: Not Currently     Partners: Female   Other Topics Concern    None   Social History Narrative    Denies pmh     Social Determinants of Health     Financial Resource Strain: Medium Risk    Difficulty of Paying Living Expenses: Somewhat hard   Food Insecurity: No Food Insecurity    Worried About Running Out of Food in the Last Year: Never true    Kathy of Food in the Last Year: Never true   Transportation Needs: Unmet Transportation Needs    Lack of Transportation (Medical): Yes    Lack of Transportation (Non-Medical): No   Physical Activity: Inactive    Days of Exercise per Week: 0 days    Minutes of Exercise per Session: 0 min   Stress: No Stress Concern Present    Feeling of Stress : Not at all   Social Connections:  Moderately Isolated    Frequency of Communication with Friends and Family: More than three times a week    Frequency of Social Gatherings with Friends and Family: More than three times a week    Attends Nondenominational Services: More than 4 times per year    Active Member of PVPower Group or Organizations: No    Attends Club or Organization Meetings: Never    Marital Status: Never    Intimate Partner Violence: Not At Risk    Fear of Current or Ex-Partner: No    Emotionally Abused: No    Physically Abused: No    Sexually Abused: No   Housing Stability: Low Risk     Unable to Pay for Housing in the Last Year: No    Number of [place] in the Last Year: 1    Unstable Housing in the Last Year: No      Current Medications:     Current Outpatient Medications   Medication Sig Dispense Refill    albuterol (PROVENTIL HFA,VENTOLIN HFA) 90 mcg/act inhaler INHALE TWO PUFFS EVERY 6 HOURS AS NEEDED FOR SHORTNESS OF BREATH  8 5 g 2    Aspirin Low Dose 81 MG chewable tablet CHEW 1 TABLET BY MOUTH EVERY DAY 30 tablet 0    atorvastatin (LIPITOR) 40 mg tablet TAKE ONE TABLET BY MOUTH EVERY DAY 90 tablet 0    chlorthalidone (HYGROTEN) 15 MG tablet Take 1 tablet (15 mg total) by mouth daily 30 tablet 1    diltiazem (CARDIZEM CD) 300 mg 24 hr capsule TAKE ONE CAPSULE BY MOUTH EVERY DAY 90 capsule 0    isosorbide mononitrate (IMDUR) 60 mg 24 hr tablet TAKE ONE TABLET BY MOUTH ONCE DAILY 90 tablet 1    lisinopril (ZESTRIL) 20 mg tablet Take 1 tablet (20 mg total) by mouth daily 90 tablet 3    LORazepam (ATIVAN) 1 mg tablet Take 1 tablet (1 mg total) by mouth 2 (two) times a day as needed for anxiety 60 tablet 0    methylPREDNISolone 4 MG tablet therapy pack Use as directed on package 21 tablet 0    omeprazole (PriLOSEC) 40 MG capsule TAKE ONE CAPSULE BY MOUTH ONCE DAILY 60 capsule 0    polymyxin b-trimethoprim (POLYTRIM) ophthalmic solution Administer 1 drop into the left eye every 4 (four) hours for 7 days 10 mL 0    QUEtiapine (SEROquel) 300 mg tablet Take 1 tablet (300 mg total) by mouth daily at bedtime 90 tablet 2    sertraline (Zoloft) 25 mg tablet Take 1 tablet (25 mg total) by mouth daily 90 tablet 3    QUEtiapine (SEROquel) 25 mg tablet TAKE 2 TABLETS EVERY MORNING AND ONE TABLET AT BEDTIME WITH 300MG FOR 325MG TOTAL DOSE       No current facility-administered medications for this visit  Allergies:     No Known Allergies   Physical Exam:     /93 (BP Location: Left arm, Patient Position: Sitting, Cuff Size: Large)   Pulse 90   Temp (!) 97 3 °F (36 3 °C) (Temporal)   Resp 20   Ht 5' 10" (1 778 m)   Wt 133 kg (293 lb)   SpO2 97%   BMI 42 04 kg/m²     Physical Exam  Vitals and nursing note reviewed  Constitutional:       General: He is not in acute distress  Appearance: Normal appearance  He is obese   He is not ill-appearing or toxic-appearing  HENT:      Head: Normocephalic and atraumatic  Right Ear: Tympanic membrane normal       Left Ear: Tympanic membrane normal       Nose: Nose normal       Mouth/Throat:      Mouth: Mucous membranes are moist    Eyes:      Extraocular Movements: Extraocular movements intact  Cardiovascular:      Rate and Rhythm: Normal rate and regular rhythm  Heart sounds: Normal heart sounds  No murmur heard  No friction rub  No gallop  Pulmonary:      Effort: Pulmonary effort is normal       Breath sounds: Normal breath sounds  No wheezing, rhonchi or rales  Abdominal:      Palpations: Abdomen is soft  Tenderness: There is no abdominal tenderness  Musculoskeletal:         General: Normal range of motion  Cervical back: Normal range of motion  Skin:     General: Skin is warm and dry  Neurological:      General: No focal deficit present  Mental Status: He is alert and oriented to person, place, and time            Eli Leo DO  [address]

## 2022-08-19 ENCOUNTER — TELEPHONE (OUTPATIENT)
Dept: FAMILY MEDICINE CLINIC | Facility: CLINIC | Age: 57
End: 2022-08-19

## 2022-08-19 LAB
ATRIAL RATE: 87 BPM
P AXIS: 17 DEGREES
PR INTERVAL: 190 MS
QRS AXIS: 9 DEGREES
QRSD INTERVAL: 104 MS
QT INTERVAL: 384 MS
QTC INTERVAL: 462 MS
T WAVE AXIS: 90 DEGREES
VENTRICULAR RATE: 87 BPM

## 2022-08-19 PROCEDURE — 93010 ELECTROCARDIOGRAM REPORT: CPT | Performed by: INTERNAL MEDICINE

## 2022-08-29 NOTE — TELEPHONE ENCOUNTER
After chart review, form was not completed at patient's appointment; Dr Ryan Zhong would like patient's PCP to complete form and clear him  Per office note, patient is agreeable to this  Patient has appointment with PCP on 09/08/2022

## 2022-09-07 ENCOUNTER — HOSPITAL ENCOUNTER (OUTPATIENT)
Dept: RADIOLOGY | Facility: HOSPITAL | Age: 57
Discharge: HOME | End: 2022-09-07
Payer: COMMERCIAL

## 2022-09-07 DIAGNOSIS — K42.9 UMBILICAL HERNIA: ICD-10-CM

## 2022-09-07 PROCEDURE — 71046 X-RAY EXAM CHEST 2 VIEWS: CPT

## 2022-09-08 ENCOUNTER — OFFICE VISIT (OUTPATIENT)
Dept: FAMILY MEDICINE CLINIC | Facility: CLINIC | Age: 57
End: 2022-09-08

## 2022-09-08 VITALS
HEART RATE: 98 BPM | WEIGHT: 294.6 LBS | OXYGEN SATURATION: 96 % | SYSTOLIC BLOOD PRESSURE: 144 MMHG | TEMPERATURE: 98 F | DIASTOLIC BLOOD PRESSURE: 93 MMHG | HEIGHT: 70 IN | BODY MASS INDEX: 42.18 KG/M2

## 2022-09-08 DIAGNOSIS — R74.8 ELEVATED ALKALINE PHOSPHATASE LEVEL: ICD-10-CM

## 2022-09-08 DIAGNOSIS — Z01.818 PREOP EXAMINATION: ICD-10-CM

## 2022-09-08 DIAGNOSIS — I10 PRIMARY HYPERTENSION: Primary | ICD-10-CM

## 2022-09-08 DIAGNOSIS — E87.5 HYPERKALEMIA: ICD-10-CM

## 2022-09-08 PROCEDURE — 99213 OFFICE O/P EST LOW 20 MIN: CPT | Performed by: FAMILY MEDICINE

## 2022-09-08 RX ORDER — LISINOPRIL 40 MG/1
40 TABLET ORAL DAILY
Qty: 90 TABLET | Refills: 1 | Status: SHIPPED | OUTPATIENT
Start: 2022-09-08

## 2022-09-08 NOTE — TELEPHONE ENCOUNTER
Form completed by Dr Kamla Peng and original given to patient  Copy was placed in the [address] clerical folder to be scanned to patient's chart

## 2022-09-08 NOTE — ASSESSMENT & PLAN NOTE
Blood pressure 144/93  Goal blood pressure less than 140/90  Patient is currently on Imdur 60 mg daily, Cardizem 300 mg daily, lisinopril 20 mg daily  He was advised to start chlorthalidone in previous visit, which he never did because of insurance issues  Advised to increase lisinopril to 40 mg daily  We will repeat CMP in 4 weeks  Patient may proceed with surgery as planned

## 2022-09-08 NOTE — PROGRESS NOTES
[facility]   1965    This preop evaluation is being done at the request of Dr Siddiqui  Irvin Orlando  is a 65 y o  male with umblical hernia who is planning to undergo hernia repair under general by Dr Siddiqui on 9/14/22  Patient has not had complications with anesthesia in the past     ROS:   Chest pain: no   Shortness of breath: no  Shortness of breath with exertion: no  Orthopnea: no  Dizziness: no  Unexplained weight change: no    PMH:  CAD: no, prinzmetal angina  HTN: yes  CKD: yes  DM: no on insulin: no  History of CVA: no     reports that he quit smoking about 20 months ago  His smoking use included cigarettes  He started smoking about 35 years ago  He has a 51 00 pack-year smoking history  He has never used smokeless tobacco  He reports previous alcohol use  He reports that he does not use drugs  /93 (BP Location: Right arm, Patient Position: Sitting, Cuff Size: Large)   Pulse 98   Temp 98 °F (36 7 °C) (Temporal)   Ht 5' 10" (1 778 m)   Wt 134 kg (294 lb 9 6 oz)   SpO2 96%   BMI 42 27 kg/m²   Physical Exam  Vitals and nursing note reviewed  Constitutional:       Appearance: He is well-developed  HENT:      Head: Normocephalic and atraumatic  Right Ear: External ear normal       Left Ear: External ear normal    Eyes:      Conjunctiva/sclera: Conjunctivae normal    Cardiovascular:      Rate and Rhythm: Normal rate and regular rhythm  Heart sounds: No murmur heard  Pulmonary:      Effort: Pulmonary effort is normal  No respiratory distress  Breath sounds: Normal breath sounds  Abdominal:      Palpations: Abdomen is soft  Tenderness: There is no abdominal tenderness  Comments: Umbilical hernia   Musculoskeletal:      Cervical back: Neck supple  Skin:     General: Skin is warm and dry  Neurological:      General: No focal deficit present  Mental Status: He is alert           Revised Cardiac Risk Index (RCRI) for Pre-Operative Risk   (estimates risk of cardiac complications after noncardiac surgery)    · High-risk surgery:no  · Score 1: Intraperitoneal, intrathoracic, suprainguinal vascular  · History of ischemic heart disease: no  · Score 1: Hx of MI, (+) exercise test, current chest pain considered due to myocardial ischemia, use of nitrate therapy or ECG with pathological Q waves)  · History of CHF: no  · Score 1: Pulmonary edema, B/L rales or S3 gallop; MCLEAN, orthopnea, PND, CXR showing pulmonary vascular redistribution)  · History of cerebrovascular disease: no  · Score 1: Prior TIA or stroke  · Pre-operative treatment with insulin: no        Score 1: for yes  · Pre-operative creatinine >2 mg/dL: no         Score 1: for yes    RCRI Scoring:  · 0 points: Class I Risk, 3 9% Risk of Major Cardiac Event  · 1 point: Class II Risk, 6 0% Risk of Major Cardiac Event  · 2 points: Class III Risk, 10 1% Risk of Major Cardiac Event  · 3 points: Class IV Risk, 15% Risk of Major Cardiac Event  · 4 points: Class IV Risk, 15% Risk of Major Cardiac Event  · 5 points: Class IV Risk, 15% Risk of Major Cardiac Event  · 6 points: Class IV Risk, 15% Risk of Major Cardiac Event    Lab Results   Component Value Date    CREATININE 1 26 08/18/2022       Lenard Dow was seen today for pre-op exam and annual exam     Diagnoses and all orders for this visit:    Primary hypertension  Blood pressure 144/93  Goal blood pressure less than 140/90  Patient is currently on Imdur 60 mg daily, Cardizem 300 mg daily, lisinopril 20 mg daily  He was advised to start chlorthalidone in previous visit, which he never did because of insurance issues  Advised to increase lisinopril to 40 mg daily  We will repeat CMP in 4 weeks  Patient may proceed with surgery as planned  -     Comprehensive metabolic panel; Future        Elevated alkaline phosphatase level  Alkaline phosphatase is elevated in recent blood work, rest of the liver enzymes are within normal limits  Patient did see GI in November of 2021, and was advised elastography, script reprinted today  Advised patient to get it done  Recommendations:  Radha Velez  is undergoing an elective Moderate Risk surgery, umbilical hernia repair  He is RCRI class I risk (0 points) with 3 9% risk for major adverse cardiac event (MACE)  He may proceed with surgery as planned without further workup  Pre-operative form completed and faxed today to office as requested

## 2022-09-08 NOTE — ASSESSMENT & PLAN NOTE
Desean Bolton  is undergoing an elective Moderate Risk surgery, umbilical hernia repair  He is RCRI class I risk (0 points) with 3 9% risk for major adverse cardiac event (MACE)  He may proceed with surgery as planned without further workup  Pre-operative form completed and faxed today to office as requested

## 2022-09-08 NOTE — ASSESSMENT & PLAN NOTE
Alkaline phosphatase is elevated in recent blood work, rest of the liver enzymes are within normal limits  Patient did see GI in November of 2021, and was advised elastography, script reprinted today  Advised patient to get it done

## 2022-09-09 NOTE — PRE-PROCEDURE INSTRUCTIONS
Pre-Surgery Instructions:   Medication Instructions    albuterol (PROVENTIL HFA,VENTOLIN HFA) 90 mcg/act inhaler Uses PRN- OK to take day of surgery    Aspirin Low Dose 81 MG chewable tablet Instructions provided by MD    atorvastatin (LIPITOR) 40 mg tablet Take day of surgery   diltiazem (CARDIZEM CD) 300 mg 24 hr capsule Take day of surgery   isosorbide mononitrate (IMDUR) 60 mg 24 hr tablet Take day of surgery   lisinopril (ZESTRIL) 40 mg tablet Hold day of surgery   LORazepam (ATIVAN) 1 mg tablet Uses PRN- OK to take day of surgery    omeprazole (PriLOSEC) 40 MG capsule Take day of surgery   QUEtiapine (SEROquel) 300 mg tablet Take night before surgery      Reviewed with patient, in detail, instructions from "My Surgical Experience"  Instructed to avoid all  OTC vitamins/supplements and NSAIDS from now until after surgery per anesthesia guidelines  Tylenol ok to take PRN  Advised patient that Milo Meléndez will call with surgery arrival time and hospital directions the business day prior to surgery  Advised patient nothing eat or drink after midnight prior to surgery  Instructed to call surgeon's office in meantime with any new illnesses/exposure  Patient verbalized understanding of current visitor restrictions/masking guidelines and advised that he/she can confirm these at time of arrival call with Milo Meléndez  Patient verbalized understanding and knows to call surgeon's office with any additional questions prior to surgery

## 2022-09-13 ENCOUNTER — ANESTHESIA EVENT (OUTPATIENT)
Dept: PERIOP | Facility: HOSPITAL | Age: 57
End: 2022-09-13
Payer: COMMERCIAL

## 2022-09-13 NOTE — ANESTHESIA PREPROCEDURE EVALUATION
Procedure:  REPAIR HERNIA UMBILICAL w/ mesh (N/A Abdomen)    H/o cardiac arrest while undergoing exercise stress test- noted to have ST elevations and then subsequent bradycardic arrest   LHC noted vasospasm which resolved with nitro  Thereafter was noted to have no obstructive disease    Relevant Problems   ANESTHESIA (within normal limits)   (-) History of anesthesia complications      CARDIO   (+) Atrial flutter (HCC)   (+) Cardiac arrest (HCC)   (+) Hypertension   (+) 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HCC)   (-) MCLEAN (dyspnea on exertion)      GI/HEPATIC   (+) GERD (gastroesophageal reflux disease)      HEMATOLOGY   (+) Anemia   (+) Thrombocytopenia (HCC)      MUSCULOSKELETAL   (+) 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HCC)      NEURO/PSYCH   (+) Anxiety   (+) Depression with anxiety   (+) MDD (major depressive disorder)      PULMONARY   (+) Asthma   (-) Shortness of breath   (-) URI (upper respiratory infection)      TTE 11/2021:    Left Ventricle: Left ventricular cavity size is normal  The left ventricular ejection fraction is 70%  Systolic function is vigorous  Wall motion is normal  Diastolic function is mildly abnormal, consistent with grade I (abnormal) relaxation  Wall thickness is increased  There is severe asymmetric hypertrophy of the septal wall  Physical Exam    Airway    Mallampati score: II  TM Distance: >3 FB  Neck ROM: full     Dental       Cardiovascular      Pulmonary      Other Findings        Anesthesia Plan  ASA Score- 3     Anesthesia Type- general with ASA Monitors  Additional Monitors:   Airway Plan:           Plan Factors-Exercise tolerance (METS): >4 METS  Chart reviewed  EKG reviewed  Existing labs reviewed  Patient summary reviewed  Induction- intravenous  Postoperative Plan-     Informed Consent- Anesthetic plan and risks discussed with patient  I personally reviewed this patient with the CRNA  Discussed and agreed on the Anesthesia Plan with the CRNA  Allie Peters

## 2022-09-14 ENCOUNTER — HOSPITAL ENCOUNTER (OUTPATIENT)
Facility: HOSPITAL | Age: 57
Setting detail: OUTPATIENT SURGERY
Discharge: HOME | End: 2022-09-14
Attending: SURGERY | Admitting: SURGERY
Payer: COMMERCIAL

## 2022-09-14 ENCOUNTER — TELEPHONE (OUTPATIENT)
Dept: SURGERY | Facility: CLINIC | Age: 57
End: 2022-09-14

## 2022-09-14 ENCOUNTER — ANESTHESIA (OUTPATIENT)
Dept: PERIOP | Facility: HOSPITAL | Age: 57
End: 2022-09-14
Payer: COMMERCIAL

## 2022-09-14 VITALS
BODY MASS INDEX: 42.09 KG/M2 | DIASTOLIC BLOOD PRESSURE: 84 MMHG | RESPIRATION RATE: 16 BRPM | SYSTOLIC BLOOD PRESSURE: 124 MMHG | HEIGHT: 70 IN | WEIGHT: 294 LBS | HEART RATE: 84 BPM | OXYGEN SATURATION: 95 % | TEMPERATURE: 98 F

## 2022-09-14 DIAGNOSIS — K42.9 UMBILICAL HERNIA WITHOUT OBSTRUCTION AND WITHOUT GANGRENE: Primary | Chronic | ICD-10-CM

## 2022-09-14 PROCEDURE — 49585 PR REPAIR UMBILICAL HERN,5+Y/O,REDUC: CPT | Performed by: SURGERY

## 2022-09-14 PROCEDURE — C1781 MESH (IMPLANTABLE): HCPCS | Performed by: SURGERY

## 2022-09-14 DEVICE — VENTRALEX ST HERNIA PATCH
Type: IMPLANTABLE DEVICE | Site: ABDOMEN | Status: FUNCTIONAL
Brand: VENTRALEX ST HERNIA PATCH

## 2022-09-14 RX ORDER — MAGNESIUM HYDROXIDE 1200 MG/15ML
LIQUID ORAL AS NEEDED
Status: DISCONTINUED | OUTPATIENT
Start: 2022-09-14 | End: 2022-09-14 | Stop reason: HOSPADM

## 2022-09-14 RX ORDER — OXYCODONE HYDROCHLORIDE 5 MG/1
10 TABLET ORAL EVERY 4 HOURS PRN
Status: DISCONTINUED | OUTPATIENT
Start: 2022-09-14 | End: 2022-09-14 | Stop reason: HOSPADM

## 2022-09-14 RX ORDER — OXYCODONE HYDROCHLORIDE 5 MG/1
5 TABLET ORAL EVERY 4 HOURS PRN
Status: DISCONTINUED | OUTPATIENT
Start: 2022-09-14 | End: 2022-09-14 | Stop reason: HOSPADM

## 2022-09-14 RX ORDER — PROPOFOL 10 MG/ML
INJECTION, EMULSION INTRAVENOUS AS NEEDED
Status: DISCONTINUED | OUTPATIENT
Start: 2022-09-14 | End: 2022-09-14

## 2022-09-14 RX ORDER — OXYCODONE HYDROCHLORIDE 5 MG/1
5 TABLET ORAL EVERY 4 HOURS PRN
Qty: 15 TABLET | Refills: 0 | Status: SHIPPED | OUTPATIENT
Start: 2022-09-14

## 2022-09-14 RX ORDER — ROCURONIUM BROMIDE 10 MG/ML
INJECTION, SOLUTION INTRAVENOUS AS NEEDED
Status: DISCONTINUED | OUTPATIENT
Start: 2022-09-14 | End: 2022-09-14

## 2022-09-14 RX ORDER — ONDANSETRON 2 MG/ML
INJECTION INTRAMUSCULAR; INTRAVENOUS AS NEEDED
Status: DISCONTINUED | OUTPATIENT
Start: 2022-09-14 | End: 2022-09-14

## 2022-09-14 RX ORDER — LIDOCAINE HYDROCHLORIDE 10 MG/ML
0.5 INJECTION, SOLUTION EPIDURAL; INFILTRATION; INTRACAUDAL; PERINEURAL ONCE AS NEEDED
Status: DISCONTINUED | OUTPATIENT
Start: 2022-09-14 | End: 2022-09-14 | Stop reason: HOSPADM

## 2022-09-14 RX ORDER — LIDOCAINE HYDROCHLORIDE 10 MG/ML
INJECTION, SOLUTION EPIDURAL; INFILTRATION; INTRACAUDAL; PERINEURAL AS NEEDED
Status: DISCONTINUED | OUTPATIENT
Start: 2022-09-14 | End: 2022-09-14

## 2022-09-14 RX ORDER — ONDANSETRON 2 MG/ML
4 INJECTION INTRAMUSCULAR; INTRAVENOUS ONCE AS NEEDED
Status: DISCONTINUED | OUTPATIENT
Start: 2022-09-14 | End: 2022-09-14 | Stop reason: HOSPADM

## 2022-09-14 RX ORDER — FENTANYL CITRATE/PF 50 MCG/ML
25 SYRINGE (ML) INJECTION
Status: DISCONTINUED | OUTPATIENT
Start: 2022-09-14 | End: 2022-09-14

## 2022-09-14 RX ORDER — DEXAMETHASONE SODIUM PHOSPHATE 10 MG/ML
INJECTION, SOLUTION INTRAMUSCULAR; INTRAVENOUS AS NEEDED
Status: DISCONTINUED | OUTPATIENT
Start: 2022-09-14 | End: 2022-09-14

## 2022-09-14 RX ORDER — SODIUM CHLORIDE, SODIUM LACTATE, POTASSIUM CHLORIDE, CALCIUM CHLORIDE 600; 310; 30; 20 MG/100ML; MG/100ML; MG/100ML; MG/100ML
125 INJECTION, SOLUTION INTRAVENOUS CONTINUOUS
Status: DISCONTINUED | OUTPATIENT
Start: 2022-09-14 | End: 2022-09-14 | Stop reason: HOSPADM

## 2022-09-14 RX ORDER — FENTANYL CITRATE 50 UG/ML
INJECTION, SOLUTION INTRAMUSCULAR; INTRAVENOUS AS NEEDED
Status: DISCONTINUED | OUTPATIENT
Start: 2022-09-14 | End: 2022-09-14

## 2022-09-14 RX ORDER — HYDROMORPHONE HCL IN WATER/PF 6 MG/30 ML
0.2 PATIENT CONTROLLED ANALGESIA SYRINGE INTRAVENOUS
Status: DISCONTINUED | OUTPATIENT
Start: 2022-09-14 | End: 2022-09-14

## 2022-09-14 RX ADMIN — PROPOFOL 50 MG: 10 INJECTION, EMULSION INTRAVENOUS at 08:38

## 2022-09-14 RX ADMIN — Medication 20 MG: at 08:54

## 2022-09-14 RX ADMIN — Medication 200 MCG: at 09:12

## 2022-09-14 RX ADMIN — Medication 25 MCG: at 10:54

## 2022-09-14 RX ADMIN — PROPOFOL 200 MG: 10 INJECTION, EMULSION INTRAVENOUS at 08:36

## 2022-09-14 RX ADMIN — SODIUM CHLORIDE, SODIUM LACTATE, POTASSIUM CHLORIDE, AND CALCIUM CHLORIDE: .6; .31; .03; .02 INJECTION, SOLUTION INTRAVENOUS at 09:15

## 2022-09-14 RX ADMIN — Medication 10 MG: at 09:27

## 2022-09-14 RX ADMIN — FENTANYL CITRATE 100 MCG: 50 INJECTION INTRAMUSCULAR; INTRAVENOUS at 08:36

## 2022-09-14 RX ADMIN — ROCURONIUM BROMIDE 50 MG: 50 INJECTION, SOLUTION INTRAVENOUS at 08:38

## 2022-09-14 RX ADMIN — DEXAMETHASONE SODIUM PHOSPHATE 10 MG: 10 INJECTION, SOLUTION INTRAMUSCULAR; INTRAVENOUS at 08:38

## 2022-09-14 RX ADMIN — CEFAZOLIN 3000 MG: 10 INJECTION, POWDER, FOR SOLUTION INTRAVENOUS at 08:26

## 2022-09-14 RX ADMIN — FENTANYL CITRATE 50 MCG: 50 INJECTION INTRAMUSCULAR; INTRAVENOUS at 09:34

## 2022-09-14 RX ADMIN — Medication 25 MCG: at 10:43

## 2022-09-14 RX ADMIN — SUGAMMADEX 267 MG: 100 INJECTION, SOLUTION INTRAVENOUS at 10:09

## 2022-09-14 RX ADMIN — SODIUM CHLORIDE, SODIUM LACTATE, POTASSIUM CHLORIDE, AND CALCIUM CHLORIDE: .6; .31; .03; .02 INJECTION, SOLUTION INTRAVENOUS at 07:55

## 2022-09-14 RX ADMIN — ONDANSETRON 4 MG: 2 INJECTION INTRAMUSCULAR; INTRAVENOUS at 09:50

## 2022-09-14 RX ADMIN — Medication 100 MCG: at 09:10

## 2022-09-14 RX ADMIN — OXYCODONE HYDROCHLORIDE 10 MG: 5 TABLET ORAL at 11:30

## 2022-09-14 RX ADMIN — LIDOCAINE HYDROCHLORIDE 50 MG: 10 INJECTION, SOLUTION EPIDURAL; INFILTRATION; INTRACAUDAL at 08:36

## 2022-09-14 NOTE — ANESTHESIA POSTPROCEDURE EVALUATION
Post-Op Assessment Note    CV Status:  Stable    Pain management: satisfactory to patient     Mental Status:  Alert and awake   Hydration Status:  Euvolemic   PONV Controlled:  Controlled   Airway Patency:  Patent      Post Op Vitals Reviewed: Yes      Staff: CRNA, Anesthesiologist         No complications documented      BP   130/97   Temp     Pulse  78   Resp   16   SpO2   95

## 2022-09-14 NOTE — DISCHARGE INSTRUCTIONS
Acute Care Surgery Discharge Instructions    Please follow-up as instructed  If you do not already have a follow-up appointment, please call the office when you leave to schedule an appointment to be seen in 2-3 weeks for post-operative re-evaluation  Activity:  - No lifting greater than 10 pounds or strenuous physical activity or exercise for 6 weeks  - Continue to wear your abdominal binder until you are seen for your post-operative re-evaluation   - Walking and normal light activities are encouraged  - Normal daily activities including climbing steps are okay  - No driving until no longer using pain medications  Return to work:    - You may return to work in 2 weeks or sooner if you are feeling well enough  Diet:    - You may resume your normal diet  Wound Care:  - May shower daily  No tub baths or swimming until cleared by your surgeon   - Wash incision gently with soap and water and pat dry  - Do not apply any creams or ointments unless instructed to do so by your surgeon   - Cindy Mcnamara may apply ice as needed (no longer than 20 minutes at a time) for the first 48 hours  - Bruising is not unusual and will go away with a little time  You may apply a warm, moist compress that may help the bruising resolve quicker  - You may remove the dressings the day after surgery (unless otherwise instructed)  Leave any skin tapes (steri-strips) on the incision(s) in place until they fall off on their own  Any new dressings are optional     Medications:    - You may resume all of your regular medications after discharge unless otherwise instructed  Please refer to your discharge medication list for further details  - Please take the pain medications as directed  - You are encouraged to use non-narcotic pain medications first and whenever possible   Reserve the use of narcotic pain medication for moderate to severe pain not controlled by non-narcotic medications   - No driving while taking narcotic pain medications  - You may become constipated, especially if taking pain medications  You may take any over the counter stool softeners or laxatives as needed  Examples: Milk of Magnesia, Colace, Senna  Additional Instructions:  - If you have any questions or concerns after discharge please call the office   - Call office or return to ER if fever greater than 101, chills, persistent nausea/vomiting, worsening/uncontrollable pain, and/or increasing redness or purulent/foul smelling drainage from incision(s)

## 2022-09-14 NOTE — TELEPHONE ENCOUNTER
Dr Renny Stinson, [facility] pharmacy called regarding Gabriele Sherman : 1965  Oxycodone was ordered for his though he is also taking Larazapan that is showing a drug interaction  Pharmacy wants to know if they can override to complete Oxy order  Thats finejust Tylenol and motrin then     Patient was informed and understood  Called pharmacy to inform

## 2022-09-14 NOTE — INTERVAL H&P NOTE
H&P reviewed  After examining the patient I find no changes in the patients condition since the H&P had been written      Vitals:    09/14/22 0739   BP: 135/97   Pulse: 97   Resp: 18   Temp: (!) 97 1 °F (36 2 °C)   SpO2: 94%     Gen: AAO x 3, NAD  Abd: soft, NT/ND, reducible umbilical hernia, no overlying skin changes  Ext: [place], no edema

## 2022-09-16 NOTE — OP NOTE
OPERATIVE REPORT  PATIENT NAME: Joao Arrington  :  1965  MRN: [id number]  Pt Location: [facility] OR ROOM 03    SURGERY DATE: 2022    Surgeon(s) and Role:     Ileana Siddiqui, DO - [address] MD - Assisting    Preop Diagnosis:  Umbilical hernia [I94 5]    Post-Op Diagnosis Codes:     * Umbilical hernia [Q05 4]    Procedure(s) (LRB):  REPAIR HERNIA UMBILICAL w/ mesh (N/A)    Specimen(s):  * No specimens in log *    Estimated Blood Loss:   Minimal    Drains:  * No LDAs found *    Anesthesia Type:   General    Operative Indications:  Umbilical hernia [H59 4]    Operative Findings:  Reducible fat-containing umbilical hernia, 2 5 cm x 3cm fascial defect    Complications:   None    Procedure and Technique:  Pt was brought to the operating room and placed in supine position on the operating table  After general anesthesia was established, the abdomen was prepped and draped in standard surgical fashion  0 5% marcaine was used as local anesthesia  A 4cm supraumbilical curvilinear incision was made and carried down to the level of the hernia  The umbilical stalk was dissected free from the hernia sac  The hernia sac was then dissected free from the fascia using blunt dissection and electrocautery  The hernia was then reduced back into the abdomen  The fascial defect was measured to be 2 5cm x 3cm in size and a 6 4cm x 6 4cm ventralex patch was selected as an underlay  The flaps were secured to the fascial edge with 3-0 PDS suture  The fascia was then re-approximated using 0 PDS suture in a figure of eight fashion  The umbilical stalk was tacked down to the fascia using 3-0 vicryl suture  The subcutaneous layer was re-approximated with 3-0 vicryl suture, skin was closed with 4-0 monocryl running subcuticular fashion and dressed with exofin  Hemostasis was obtained at the end of the case  All sponge, needle, and instrument counts were correct at the end of the case   The patient was extubated and transferred to the recovery room in stable condition        I was present for the entire procedure    Patient Disposition:  PACU         SIGNATURE: [unfilled]  DATE: September 16, 2022  TIME: 9:56 AM

## 2022-10-11 PROBLEM — H10.32 ACUTE CONJUNCTIVITIS OF LEFT EYE: Status: RESOLVED | Noted: 2022-08-11 | Resolved: 2022-10-11

## 2022-10-31 ENCOUNTER — PROCEDURE VISIT (OUTPATIENT)
Dept: FAMILY MEDICINE CLINIC | Facility: CLINIC | Age: 57
End: 2022-10-31

## 2022-10-31 VITALS
HEART RATE: 96 BPM | HEIGHT: 70 IN | TEMPERATURE: 97.4 F | SYSTOLIC BLOOD PRESSURE: 119 MMHG | DIASTOLIC BLOOD PRESSURE: 89 MMHG | BODY MASS INDEX: 42.23 KG/M2 | WEIGHT: 295 LBS

## 2022-10-31 DIAGNOSIS — L81.9 HYPERPIGMENTED SKIN LESION: ICD-10-CM

## 2022-10-31 DIAGNOSIS — H92.22 BLOOD IN LEFT EAR CANAL: ICD-10-CM

## 2022-10-31 DIAGNOSIS — I10 PRIMARY HYPERTENSION: Primary | ICD-10-CM

## 2022-10-31 RX ORDER — OFLOXACIN 3 MG/ML
10 SOLUTION AURICULAR (OTIC) 2 TIMES DAILY
Qty: 5 ML | Refills: 0 | Status: SHIPPED | OUTPATIENT
Start: 2022-10-31

## 2022-10-31 NOTE — PROGRESS NOTES
Name: Vicki Liz  : 1965      MRN: [id number]  Encounter Provider: Godwin Trujillo MD  Encounter Date: 10/31/2022   Encounter department: [address] [place] [address]  Primary hypertension  Assessment & Plan:  Blood pressure 119/89 on arrival, within goal   Goal blood pressure less than 140/90  Continue current regimen of Imdur 60 mg daily, Cardizem 300 mg daily, lisinopril 40 mg daily  Encouraged to get CMP done  Lifestyle modification with diet and exercise       2  Blood in left ear canal  Assessment & Plan:  Likely secondary to injury from Q-tips  Advised against using Q-tips or picking on ear  Recommended starting ofloxacin ear drops for 5 days  Also give referral for ENT because of chronic discomfort in ears  Will follow-up in 1 week    Orders:  -     ofloxacin (FLOXIN) 0 3 % otic solution; Administer 10 drops into the left ear 2 (two) times a day  -     Ambulatory Referral to Otolaryngology; Future    3  Hyperpigmented skin lesion  -     Lesion Destruction  -     Tissue Exam; Future  -     Tissue Exam         Subjective      Angel Esqueda presented to office for follow-up of hypertension and for biopsy of hyperpigmentation on right leg  He reports he is doing well, has no acute concerns today except for pain in his left ear  He reports he has been feeling fullness of left ear for last 2 weeks, today when he put Q-tip inside the ear he noticed blood  He denies any fever, decreased hearing or any other symptoms with it  He is tolerating the increased dose of lisinopril well as far as hypertension is concerned  Will be getting blood work done soon  Review of Systems   Constitutional: Negative for chills and fever  HENT: Positive for ear discharge and ear pain  Negative for congestion  Respiratory: Negative for shortness of breath  Cardiovascular: Negative for chest pain  Gastrointestinal: Negative for diarrhea, nausea and vomiting  Genitourinary: Negative for difficulty urinating  Neurological: Negative for headaches  Current Outpatient Medications on File Prior to Visit   Medication Sig   • albuterol (PROVENTIL HFA,VENTOLIN HFA) 90 mcg/act inhaler INHALE TWO PUFFS EVERY 6 HOURS AS NEEDED FOR SHORTNESS OF BREATH  • Aspirin Low Dose 81 MG chewable tablet CHEW 1 TABLET BY MOUTH EVERY DAY (Patient taking differently: Chew 81 mg every morning)   • atorvastatin (LIPITOR) 40 mg tablet TAKE ONE TABLET BY MOUTH EVERY DAY (Patient taking differently: Take 40 mg by mouth every morning)   • diltiazem (CARDIZEM CD) 300 mg 24 hr capsule TAKE ONE CAPSULE BY MOUTH EVERY DAY (Patient taking differently: Take 300 mg by mouth every morning)   • isosorbide mononitrate (IMDUR) 60 mg 24 hr tablet TAKE ONE TABLET BY MOUTH ONCE DAILY (Patient taking differently: Take 60 mg by mouth every morning)   • lisinopril (ZESTRIL) 40 mg tablet Take 1 tablet (40 mg total) by mouth daily (Patient taking differently: Take 40 mg by mouth every morning)   • LORazepam (ATIVAN) 1 mg tablet Take 1 tablet (1 mg total) by mouth 2 (two) times a day as needed for anxiety   • omeprazole (PriLOSEC) 40 MG capsule TAKE ONE CAPSULE BY MOUTH ONCE DAILY (Patient taking differently: Take 40 mg by mouth every morning)   • QUEtiapine (SEROquel) 300 mg tablet Take 1 tablet (300 mg total) by mouth daily at bedtime   • oxyCODONE (Roxicodone) 5 immediate release tablet Take 1 tablet (5 mg total) by mouth every 4 (four) hours as needed for moderate pain for up to 15 doses Max Daily Amount: 30 mg       Objective     /89 (BP Location: Right arm, Patient Position: Sitting, Cuff Size: Large)   Pulse 96   Temp (!) 97 4 °F (36 3 °C) (Temporal)   Ht 5' 10" (1 778 m)   Wt 134 kg (295 lb)   BMI 42 33 kg/m²     Physical Exam  Constitutional:       Appearance: He is well-developed  HENT:      Head: Normocephalic and atraumatic        Right Ear: External ear normal       Left Ear: External ear normal       Ears:      Comments: Bright red blood noticed inferiorly in the auditory canal of left ear, ear wax obstructing view of the tympanic membrane  Cerumen impaction in right auditory canal    Eyes:      Conjunctiva/sclera: Conjunctivae normal       Pupils: Pupils are equal, round, and reactive to light  Cardiovascular:      Rate and Rhythm: Normal rate and regular rhythm  Heart sounds: Normal heart sounds  No murmur heard  No friction rub  Pulmonary:      Effort: Pulmonary effort is normal  No respiratory distress  Breath sounds: Normal breath sounds  No wheezing or rales  Musculoskeletal:         General: Normal range of motion  Cervical back: Normal range of motion and neck supple  Skin:     General: Skin is warm and dry  Comments: Hyperpigmented lesion on chin of right lower leg, excision biopsy done today   Neurological:      Mental Status: He is alert and oriented to person, place, and time  David Lake MD      Lesion Destruction    Date/Time: 10/31/2022 12:09 PM  Performed by: David Lake MD  Authorized by: David Lake MD   Universal Protocol:  Consent: Verbal consent obtained    Consent given by: patient  Patient understanding: patient states understanding of the procedure being performed  Patient consent: the patient's understanding of the procedure matches consent given  Patient identity confirmed: verbally with patient      Procedure Details - Lesion Destruction:     Number of Lesions:  1  Lesion 1:     Body area:  Lower extremity    Lower extremity location:  R lower leg    Initial size (mm):  8    Final defect size (mm):  10    Malignancy: malignancy unknown      Destruction method: surgical removal

## 2022-10-31 NOTE — ASSESSMENT & PLAN NOTE
Likely secondary to injury from Q-tips  Advised against using Q-tips or picking on ear  Recommended starting ofloxacin ear drops for 5 days  Also give referral for ENT because of chronic discomfort in ears    Will follow-up in 1 week

## 2022-10-31 NOTE — ASSESSMENT & PLAN NOTE
Blood pressure 119/89 on arrival, within goal   Goal blood pressure less than 140/90  Continue current regimen of Imdur 60 mg daily, Cardizem 300 mg daily, lisinopril 40 mg daily  Encouraged to get CMP done    Lifestyle modification with diet and exercise

## 2022-11-04 ENCOUNTER — RA CDI HCC (OUTPATIENT)
Dept: OTHER | Facility: HOSPITAL | Age: 57
End: 2022-11-04

## 2022-11-04 NOTE — PROGRESS NOTES
Please review if the following dx  is applicable to the patient's condition and assess and document, if applicable in next visit       E66 01: Morbid (severe) obesity due to excess calories ([facility] [address] ) -      Per CMS/ICD 10 coding guidelines, BMI of 40 or higher; Class 3 obesity is sometimes categorized as "morbid," "extreme," or "severe" obesity      HCC coding opportunities          Chart Reviewed number of suggestions sent to Provider: 1     Patients Insurance        Commercial Insurance: [address] [place] [address]

## 2022-11-14 ENCOUNTER — OFFICE VISIT (OUTPATIENT)
Dept: SLEEP CENTER | Facility: CLINIC | Age: 57
End: 2022-11-14

## 2022-11-14 VITALS
BODY MASS INDEX: 43.09 KG/M2 | HEART RATE: 97 BPM | SYSTOLIC BLOOD PRESSURE: 152 MMHG | HEIGHT: 70 IN | WEIGHT: 301 LBS | DIASTOLIC BLOOD PRESSURE: 100 MMHG

## 2022-11-14 DIAGNOSIS — G47.19 EXCESSIVE DAYTIME SLEEPINESS: ICD-10-CM

## 2022-11-14 DIAGNOSIS — R29.818 SUSPECTED SLEEP APNEA: Primary | ICD-10-CM

## 2022-11-14 RX ORDER — SERTRALINE HYDROCHLORIDE 25 MG/1
25 TABLET, FILM COATED ORAL
COMMUNITY
Start: 2022-09-26

## 2022-11-14 RX ORDER — QUETIAPINE FUMARATE 25 MG/1
TABLET, FILM COATED ORAL
COMMUNITY
Start: 2022-10-17

## 2022-11-14 NOTE — PROGRESS NOTES
Assessment/Plan:      1  Suspected sleep apnea  -     Home Study; Future    2  Excessive daytime sleepiness  -     Ambulatory Referral to Sleep Medicine  -     Home Study; Future   Mr Winifred Guardado as significant risk for obstructive sleep apnea given obesity, and large neck circumference, hypertension, male gender, his age, snoring, and sleepiness  He has a crowded airway on examination  He sees motivated to pursue sleep testing  I believe his insurance will require a diagnostic polysomnogram and therefore have ordered this today  We reviewed treatment options, he is open to a trial of continuous positive airway pressure (CPAP) if needed  He is familiar with this as his brother used to use CPAP in the past   We reviewed comorbidities associated with obstructive sleep apnea and necessity of treatment of present    On examination his blood pressure was elevated, more markedly so when he 1st checked in  He reports full compliance with his antihypertensive medication and also avoid sodium containing foods  He is asymptomatic  We discussed that he should continue to monitor his blood pressure at home and follow-up closely with his primary care provider  He notes this result is not typical for him, usually it runs much lower at home  We also discussed the importance of weight loss  Referral to weight management can be considered in the future  He notes his ability to exercise is somewhat limited at present  Subjective:      Patient ID: Ata Davis  is a 65 y o  male  Mr Winifred Guardado presents as a new patient  Has been told he has concern for sleep apnea, reported to him by him by an anesthesiologist in 9/2022 around the time of surgery for an umbilical hernia  In addition, the patient notes that he has had daytime sleepiness at times where he struggles to stay awake at his desk occasionally  He goes to bed 10 pm, asleep quickly  Wakes 1-2 times a night to urniate  He awakens at 4:30 a m    He notes even when not working, he tends to awaken early in the morning  He lays in bed initially after 1st waking but once he is up, he generally feels rested  He feels somewhat drowsy during the day and will sometimes take naps at work  He denies dozing and does not have drowsy driving  There is no witness to asleep  He knows he snores but is not sure how loud  He has not had waking himself with gasping  No AM headache   with gasping    Wakes with sleep paralysis, rare   No sleep hallucinations  No cataplexy     [place] Sleepiness Scale:     Sitting and reading: Moderate chance of dozing  Watching TV: Moderate chance of dozing  Sitting, inactive in a public place (e g  a theatre or a meeting): Moderate chance of dozing  As a passenger in a car for an hour without a break: Would never doze  Lying down to rest in the afternoon when circumstances permit: Moderate chance of dozing  Sitting and talking to someone: Would never doze  Sitting quietly after a lunch without alcohol: Moderate chance of dozing  In a car, while stopped for a few minutes in traffic: Would never doze  Total score: 10     The following portions of the patient's history were reviewed and updated as appropriate: allergies, current medications, past family history, past medical history, past social history, past surgical history, and problem list     Review of Systems    Genitourinary none   Cardiology none   Gastrointestinal frequent heartburn/acid reflux   Neurology none   Constitutional none   Integumentary none   Psychiatry anxiety and depression   Musculoskeletal joint pain, back pain and leg cramps   Pulmonary shortness of breath with activity and snoring   ENT throat clearing and ringing in ears   Endocrine none   Hematological none        Objective:        /100   Pulse 97   Ht 5' 10" (1 778 m)   Wt (!) 137 kg (301 lb)   BMI 43 19 kg/m²     Physical Exam  Constitutional:       Appearance: Normal appearance     HENT:      Head: Normocephalic and atraumatic  Mouth/Throat:      Mouth: Mucous membranes are moist    Eyes:      Extraocular Movements: Extraocular movements intact  Pupils: Pupils are equal, round, and reactive to light  Cardiovascular:      Rate and Rhythm: Normal rate  Pulses: Normal pulses  Heart sounds: Normal heart sounds  Pulmonary:      Effort: Pulmonary effort is normal       Breath sounds: Normal breath sounds  Musculoskeletal:      Right lower leg: No edema  Left lower leg: No edema  Neurological:      Mental Status: He is alert  Psychiatric:         Mood and Affect: Mood normal          Behavior: Behavior normal          Thought Content:  Thought content normal          Judgment: Judgment normal         19 75 in   mallampati 4 airway  Partial upepr denture   High arched hard palate  Elongated uvula, thickened soft palate

## 2022-11-14 NOTE — PROGRESS NOTES
Review of Systems      Genitourinary none   Cardiology none   Gastrointestinal frequent heartburn/acid reflux   Neurology none   Constitutional none   Integumentary none   Psychiatry anxiety and depression   Musculoskeletal joint pain, back pain and leg cramps   Pulmonary shortness of breath with activity and snoring   ENT throat clearing and ringing in ears   Endocrine none   Hematological none

## 2022-11-14 NOTE — PATIENT INSTRUCTIONS
Your blood pressure is high in the office, please check at home and followup with your primary care doctor  1   Please schedule the sleep study as we discussed  2  After the test is read (typically within 1 week), I will order a PAP machine and/or supplies if indicated by the results  If the test is inconclusive, my office will be in touch to determine the next step (sooner follow-up, further testing etc)  3  I would then want to see you for a followup visit about 5-6 weeks after you receive your machine at home  Please bring your machine to the first visit  4  If you have any difficulties using the machine, please contact the medical supply for machine related issues (mask fit, leakage, question about settings) or contact me if you have side effects, concern about the air pressure, or symptoms of concern  It is very important to avoid driving while drowsy, this can be very dangerous or even cause serious injury or death  If sleepy, it is not safe to get behind the wheel  If you are driving and feels sleepy, it is very important to pull over right away  Even losing control of the car for a split second can be deadly  If you feel you cannot control when sleepiness occurs and cannot prevented, it is important to not drive at all until this improves  Please let me know if you experience this as it is very important  Nursing Support:  When: Monday through Friday 7A-5PM except holidays  Where: Our direct line is [phone]  If you are having a true emergency please call 911  In the event that the line is busy or it is after hours please leave a voice message and we will return your call  Please speak clearly, leaving your full name, birth date, best number to reach you and the reason for your call  Medication refills: We will need the name of the medication, the dosage, the ordering provider, whether you get a 30 or 90 day refill, and the pharmacy name and address    Medications will be ordered by the provider only  Nurses cannot call in prescriptions  Please allow 7 days for medication refills  Physician requested updates: If your provider requested that you call with an update after starting medication, please be ready to provide us the medication and dosage, what time you take your medication, the time you attempt to fall asleep, time you fall asleep, when you wake up, and what time you get out of bed  Sleep Study Results: We will contact you with sleep study results and/or next steps after the physician has reviewed your testing

## 2022-11-16 ENCOUNTER — HOSPITAL ENCOUNTER (OUTPATIENT)
Dept: SLEEP CENTER | Facility: CLINIC | Age: 57
Discharge: HOME | End: 2022-11-16

## 2022-11-16 ENCOUNTER — LAB (OUTPATIENT)
Dept: LAB | Facility: CLINIC | Age: 57
End: 2022-11-16

## 2022-11-16 DIAGNOSIS — R29.818 SUSPECTED SLEEP APNEA: ICD-10-CM

## 2022-11-16 DIAGNOSIS — G47.19 EXCESSIVE DAYTIME SLEEPINESS: ICD-10-CM

## 2022-11-16 DIAGNOSIS — I10 PRIMARY HYPERTENSION: ICD-10-CM

## 2022-11-16 LAB
ALBUMIN SERPL BCP-MCNC: 3.3 G/DL (ref 3.5–5)
ALP SERPL-CCNC: 180 U/L (ref 46–116)
ALT SERPL W P-5'-P-CCNC: 48 U/L (ref 12–78)
ANION GAP SERPL CALCULATED.3IONS-SCNC: 7 MMOL/L (ref 4–13)
AST SERPL W P-5'-P-CCNC: 30 U/L (ref 5–45)
BILIRUB SERPL-MCNC: 0.63 MG/DL (ref 0.2–1)
BUN SERPL-MCNC: 12 MG/DL (ref 5–25)
CALCIUM ALBUM COR SERPL-MCNC: 10.1 MG/DL (ref 8.3–10.1)
CALCIUM SERPL-MCNC: 9.5 MG/DL (ref 8.3–10.1)
CHLORIDE SERPL-SCNC: 104 MMOL/L (ref 96–108)
CO2 SERPL-SCNC: 27 MMOL/L (ref 21–32)
CREAT SERPL-MCNC: 1.1 MG/DL (ref 0.6–1.3)
GFR SERPL CREATININE-BSD FRML MDRD: 74 ML/MIN/1.73SQ M
GLUCOSE SERPL-MCNC: 95 MG/DL (ref 65–140)
POTASSIUM SERPL-SCNC: 4 MMOL/L (ref 3.5–5.3)
PROT SERPL-MCNC: 7.5 G/DL (ref 6.4–8.4)
SODIUM SERPL-SCNC: 138 MMOL/L (ref 135–147)

## 2022-11-17 NOTE — PROGRESS NOTES
Home Sleep Study Documentation    HOME STUDY DEVICE: Noxturnal yes                                           Amanda G3 no      Pre-Sleep Home Study:    Set-up and instructions performed by: [facility], [place]    Technician performed demonstration for Patient: yes    Return demonstration performed by Patient: yes    Written instructions provided to Patient: yes    Patient signed consent form: yes        Post-Sleep Home Study:    Additional comments by Patient: None    Home Sleep Study Failed:no:    Failure reason: N/A    Reported or Detected: N/A    Scored by: BEVERLEY Lopez

## 2022-11-17 NOTE — RESULT ENCOUNTER NOTE
Called patient and discuss results  CMP unremarkable except for elevated ALP likely secondary to liver  Advised to get ultrasound elastography done as advised by GI and we will follow-up    Thank you

## 2022-11-19 DIAGNOSIS — I10 HYPERTENSION, UNSPECIFIED TYPE: ICD-10-CM

## 2022-11-19 DIAGNOSIS — I20.1 PRINZMETAL VARIANT ANGINA (HCC): ICD-10-CM

## 2022-11-21 RX ORDER — DILTIAZEM HYDROCHLORIDE 300 MG/1
300 CAPSULE, COATED, EXTENDED RELEASE ORAL EVERY MORNING
Qty: 90 CAPSULE | Refills: 3 | Status: SHIPPED | OUTPATIENT
Start: 2022-11-21

## 2022-11-22 ENCOUNTER — TELEPHONE (OUTPATIENT)
Dept: SLEEP CENTER | Facility: CLINIC | Age: 57
End: 2022-11-22

## 2022-11-22 DIAGNOSIS — G47.33 OBSTRUCTIVE SLEEP APNEA: ICD-10-CM

## 2022-11-22 DIAGNOSIS — G47.19 EXCESSIVE DAYTIME SLEEPINESS: Primary | ICD-10-CM

## 2022-11-22 NOTE — TELEPHONE ENCOUNTER
----- Message from April Barros MD sent at 11/22/2022 12:50 PM EST -----  Test shows very severe PRAKASH  Would recommend a CPAP titration  If he cannot get in soon for a titration or not covered by insurace, please let me know as I would then expedite auto-PAP    I will message pt through [facility]

## 2022-11-22 NOTE — TELEPHONE ENCOUNTER
Need to call patient with sleep study results  7 Patient needs APAP set up ASAP  E mail sent to [facility] representative to expedite APAP set up  Waiting for reply from [facility] before calling the  patient     Patient will also need to be scheduled for CPAP study ASAP per Dr Supa Marshall

## 2022-11-23 LAB

## 2022-11-23 NOTE — TELEPHONE ENCOUNTER
Called patient  Advised that DME setup appointment was to be expedited per DR Hooks  DME setup 11/28/22 in [facility]    Compliance follow-up 2/6/2023 with Dr Corinne Pander  Rx for CPAP and clinicals sent to [facility] via [place]

## 2022-11-29 LAB

## 2022-12-07 DIAGNOSIS — I10 HYPERTENSION, UNSPECIFIED TYPE: ICD-10-CM

## 2022-12-08 RX ORDER — OMEPRAZOLE 40 MG/1
CAPSULE, DELAYED RELEASE ORAL
Qty: 60 CAPSULE | Refills: 0 | Status: SHIPPED | OUTPATIENT
Start: 2022-12-08

## 2022-12-09 DIAGNOSIS — E78.5 DYSLIPIDEMIA: ICD-10-CM

## 2022-12-09 RX ORDER — ALBUTEROL SULFATE 90 UG/1
AEROSOL, METERED RESPIRATORY (INHALATION)
Qty: 8.5 G | Refills: 2 | Status: SHIPPED | OUTPATIENT
Start: 2022-12-09

## 2022-12-14 LAB

## 2022-12-18 ENCOUNTER — HOSPITAL ENCOUNTER (OUTPATIENT)
Dept: ULTRASOUND IMAGING | Facility: HOSPITAL | Age: 57
Discharge: HOME | End: 2022-12-18

## 2022-12-18 DIAGNOSIS — R74.8 ELEVATED LIVER ENZYMES: ICD-10-CM

## 2022-12-19 DIAGNOSIS — I10 PRIMARY HYPERTENSION: ICD-10-CM

## 2022-12-19 RX ORDER — LISINOPRIL 40 MG/1
TABLET ORAL
Qty: 90 TABLET | Refills: 1 | Status: SHIPPED | OUTPATIENT
Start: 2022-12-19

## 2023-01-02 DIAGNOSIS — R74.8 ELEVATED ALKALINE PHOSPHATASE LEVEL: Primary | ICD-10-CM

## 2023-01-04 ENCOUNTER — TELEPHONE (OUTPATIENT)
Dept: GASTROENTEROLOGY | Facility: CLINIC | Age: 58
End: 2023-01-04

## 2023-01-04 NOTE — TELEPHONE ENCOUNTER
Pt returned phone call, scheduled ov with ALISE Enamorado on 1/9 at [facility]

## 2023-01-04 NOTE — TELEPHONE ENCOUNTER
Please contact patient to schedule hepatology visit  30 minute appt  See 12/18/22 imaging result note  -elevated LFT and US elastography  F2  early scarring

## 2023-01-09 ENCOUNTER — OFFICE VISIT (OUTPATIENT)
Dept: GASTROENTEROLOGY | Facility: CLINIC | Age: 58
End: 2023-01-09

## 2023-01-09 VITALS
SYSTOLIC BLOOD PRESSURE: 144 MMHG | HEIGHT: 70 IN | BODY MASS INDEX: 41.52 KG/M2 | OXYGEN SATURATION: 97 % | HEART RATE: 88 BPM | DIASTOLIC BLOOD PRESSURE: 100 MMHG | TEMPERATURE: 98.9 F | WEIGHT: 290 LBS

## 2023-01-09 DIAGNOSIS — R79.89 ELEVATED FERRITIN LEVEL: ICD-10-CM

## 2023-01-09 DIAGNOSIS — K76.0 FATTY LIVER: ICD-10-CM

## 2023-01-09 DIAGNOSIS — R74.8 ELEVATED ALKALINE PHOSPHATASE LEVEL: ICD-10-CM

## 2023-01-09 DIAGNOSIS — R79.89 ABNORMAL LIVER FUNCTION TESTS: Primary | ICD-10-CM

## 2023-01-09 DIAGNOSIS — Z00.00 HEALTHCARE MAINTENANCE: ICD-10-CM

## 2023-01-09 NOTE — PROGRESS NOTES
[facility] - Outpatient Consultation  Jamarcus Flores  65 y o  male MRN: [id number]  Encounter: [id number]          ASSESSMENT AND PLAN:      1  Abnormal liver function tests  2  Elevated alkaline phosphatase level  3  Elevated ferritin level  4  Fatty liver  Patient with mild-moderately elevated transaminases since 3/2021, peaking in 11/2021 (/), and also with intermittent mildly elevated alk phos and T bili  Previous full serologic evaluation was grossly unremarkable, with the exception of mildly elevated ferritin (633)  Ultrasound with elastography notable for hepatomegaly, hepatic steatosis and cholelithiasis in addition to F2 fibrosis - Although elastography was limited due to body habitus and possibly inaccurate  History is also remarkable for previous excessive alcohol use  Borderline low platelet count and intermittent hypoalbuminemia  No clinical or radiographic evidence of chronic liver disease  Suspect abnormal liver function tests to be secondary to a combination of PAO/ROBLEDO and possibly some degree of advanced fibrosis  Recommend completing AIH serologies with the addition to quant immunolglobulins, as well as, repeating iron studies with HFE gene mutation testing to r/o hemochromatosis  Will also assess basic serologies and for immunity to hepatitis A and B  Patient will also update his abdominal imaging with a complete abdominal ultrasound  Lastly, he will complete a ROBLEDO Fibrosure to help evalaute for advanced fibrosis given limitations with elastography - If discordinant results, would recommend a liver bx for staging  In the meantime, recommend weight loss of approx 10-15% of patient's current body weight over a period of 6-12 months through low fat diet and cardiovascular exercise as tolerated  Also recommend to continue abstaining from alcohol entirely and avoid hepatotoxic medications until wokrup is complete   Plan to see patient back in 6 weeks to further discuss imaigng and results  - IgG, IgA, IgM; Future  - Hemochromatosis mutation; Future  - US abdomen complete; Future  - ROBLEDO Fibrosure; Future  - CBC and differential; Future  - Comprehensive metabolic panel; Future  - Protime-INR; Future  - Hepatitis A antibody, total; Future  - Hepatitis B surface antibody; Future  - Iron Panel (Includes Ferritin, Iron Sat%, Iron, and TIBC); Future    5  Healthcare maintenance   Patient is up-to-date with CRC screening  Colonoscopy with inadequate bowel prep 3/21/2022 notable for 4 subcm polyps removed, few scattered diverticula in the rectosigmoid colon, and small external hemorrhoids  Recommended repeat x1 year due to inadequate bowel prep  Follow-up in 6 weeks  ______________________________________________________________________    HPI: Patient is a 65 y o  male with PMH significant for adenoma of the left adrenal gland, PRAKASH, hypertension, hyperlipidemia, history of Prinzmetal variant angina and cardiac arrest,  A flutter, depression anxiety and GERD who presents today for a consultation regarding abnormal liver function studies  Patient was initially seen in consultation on 11/4/2021 by Richard Maldonado PA-C for elevated liver enzymes  Per chart review, patient has had mild-moderately elevated transaminases since 3/2021 peaking with / in 11/2021  Additionally, patient is noted to have intermittent and mildly elevated alk phos and T bili peaking at 257 in 12/2021 and 1 58 in 12/2020  Patient underwent extensive serologic evaluation including acute hepatitis panel, SINDHU, AMA, ASMA, A1AT levels, ceruloplasmin levels, iron panel with ferritin, and celiac disease antibody profile all of which was unremarkable with exception of a mildly elevated ferritin (633)       Patient also completed an abdominal U/S notable for hepatomegaly, hepatic steatosis, and cholelithiasis without evidence of cholecystitis or biliary ductal dilatation  Elastography was notable for F2 fibrosis, however study was limited due to body habitus and possibly inaccurate  Patient's transaminases down trended to within normal range, although remain slightly elevated when compared to true normal  Plt borderline low and with intermittent hypoalbuminemia  No coagulopathy  Patient denies a family history of liver disease or liver cancer  Denies any personal family history of autoimmune conditions  Denies a known past or current infection with hepatitis - Previous testing was negative  Denies taking any additional medications or herbal supplements not reflected on his med list  He does admit to a history of heavy alcohol use, previously drinking approximately 1 bottle of liquor weekly, but denies drinking during the time of his elevations  Denies IV drug use  Denies pruritus, confusion, dark urine, bruising easily, yellowing of the eyes and/or skin, new/worsening abdominal distension or swelling of the lower extremities, abdominal pain, overt evidence of GI bleeding (hematemesis, coffee-ground emesis, hematochezia, melena), or unintentional weight loss  REVIEW OF SYSTEMS:    CONSTITUTIONAL: Denies any fever, chills, rigors, and weight loss  HEENT: No earache or tinnitus  Denies hearing loss or visual disturbances  CARDIOVASCULAR: No chest pain or palpitations  RESPIRATORY: Denies any cough, hemoptysis, shortness of breath or dyspnea on exertion  GASTROINTESTINAL: As noted in the History of Present Illness  GENITOURINARY: No problems with urination  Denies any hematuria or dysuria  NEUROLOGIC: No dizziness or vertigo, denies headaches  MUSCULOSKELETAL: Denies any muscle or joint pain  SKIN: Denies skin rashes or itching  ENDOCRINE: Denies excessive thirst  Denies intolerance to heat or cold  PSYCHOSOCIAL: Denies depression or anxiety  Denies any recent memory loss         Historical Information   Past Medical History:   Diagnosis Date   • Anxiety    • Asthma    • Colon polyp    • GERD (gastroesophageal reflux disease)    • History of ileus    • Hypertension    • Insomnia    • Kidney stone    • Lumbar herniated disc     last assessed: 3/27/2015   • MDD (major depressive disorder)    • Patella fracture     last assessed: 3/27/2015   • Tobacco abuse      Past Surgical History:   Procedure Laterality Date   • BACK SURGERY      Lumbar   • CARDIAC CATHETERIZATION N/A 2021    Procedure: CARDIAC CATHETERIZATION;  Surgeon: Noemi Hurd MD;  Location: [facility];   Service: Cardiology   • COLONOSCOPY     • KNEE SURGERY      right   • LUMBAR FUSION      last assessed: 9879   • WA RPR UMBILICAL HRNA 5 YRS/> REDUCIBLE N/A 2022    Procedure: REPAIR HERNIA UMBILICAL w/ mesh;  Surgeon: Linda Siddiqui DO;  Location: [facility];  Service: General   • WA TX TIBL SHFT FX IMED IMPLT W/WO SCREWS&/CERCLA Left 3/16/2017    Procedure: INSERTION NAIL IM TIBIA;  Surgeon: David Sin DO;  Location: [facility];  Service: Orthopedics     Social History   Social History     Substance and Sexual Activity   Alcohol Use Not Currently    Comment: Hx of a couple of drinks a night, or everyother night     Social History     Substance and Sexual Activity   Drug Use No     Social History     Tobacco Use   Smoking Status Former   • Packs/day: 1 50   • Years: 34 00   • Pack years: 51 00   • Types: Cigarettes   • Start date:    • Quit date:    • Years since quittin 0   Smokeless Tobacco Never     Family History   Problem Relation Age of Onset   • Pancreatic cancer Mother    • COPD Father    • Heart attack Brother 64        decreased   • Drug abuse Brother    • Alcohol abuse Brother    • Sleep apnea Brother    • Cancer Family    • Hypertension Family    • Heart disease Family        Meds/Allergies       Current Outpatient Medications:   •  albuterol (PROVENTIL HFA,VENTOLIN HFA) 90 mcg/act inhaler  •  Aspirin Low Dose 81 MG chewable tablet  • atorvastatin (LIPITOR) 40 mg tablet  •  diltiazem (CARDIZEM CD) 300 mg 24 hr capsule  •  isosorbide mononitrate (IMDUR) 60 mg 24 hr tablet  •  lisinopril (ZESTRIL) 40 mg tablet  •  LORazepam (ATIVAN) 1 mg tablet  •  ofloxacin (FLOXIN) 0 3 % otic solution  •  omeprazole (PriLOSEC) 40 MG capsule  •  oxyCODONE (Roxicodone) 5 immediate release tablet  •  QUEtiapine (SEROquel) 25 mg tablet  •  QUEtiapine (SEROquel) 300 mg tablet  •  sertraline (ZOLOFT) 25 mg tablet    No Known Allergies        Objective     Blood pressure 144/100, pulse 88, temperature 98 9 °F (37 2 °C), temperature source Tympanic, height 5' 10" (1 778 m), weight 132 kg (290 lb), SpO2 97 %  Body mass index is 41 61 kg/m²  PHYSICAL EXAM:      General Appearance:   Alert, cooperative, no distress   HEENT:   Normocephalic, atraumatic, anicteric  Neck:  Supple, symmetrical, trachea midline   Lungs:   Clear to auscultation bilaterally; no rales, rhonchi or wheezing; respirations unlabored    Heart[de-identified]   Regular rate and rhythm; no murmur, rub, or gallop  Abdomen:   Soft, non-tender, non-distended; normal bowel sounds; no masses, no organomegaly    Genitalia:   Deferred    Rectal:   Deferred    Extremities:  No cyanosis, clubbing or edema    Pulses:  2+ and symmetric    Skin:  No jaundice, rashes, or lesions    Lymph nodes:  No palpable cervical lymphadenopathy        Lab Results:   No visits with results within 1 Day(s) from this visit     Latest known visit with results is:   Telephone on 11/22/2022   Component Date Value   • Supplier Name 11/23/2022 [employer]    • Supplier Phone Number 11/23/2022 [phone]    • Order Status 11/23/2022 Contacting Patient    • Delivery Note 11/23/2022 DME setup 11/28/22 Ashok Garcia    • Delivery Request Date 11/23/2022 11/23/2022    • Item Description 11/23/2022 CPAP Machine, Resmed S10 Auto-CPAP    • Item Description 11/23/2022 PAP Mask, Nasal, Fit Upon Setup, N/A, 1 per 3 months    • Item Description 11/23/2022 PAP Mask Interface Cushion, Nasal, 2 per 1 month    • Item Description 11/23/2022 PAP Headgear, 1 per 6 months    • Item Description 11/23/2022 PAP Humidifier, Heated    • Item Description 11/23/2022 Disposable PAP Filter, 2 per 1 month    • Item Description 11/23/2022 Non-Disposable PAP Filter, 1 per 6 months    • Item Description 11/23/2022 PAP Machine Tubing, Heated, 1 per 3 months    • Item Description 11/23/2022 PAP Monitoring Modem    • Item Description 11/23/2022 Humidifier Water Chamber, 1 per 6 months    • Item Description 11/23/2022 PAP Chinstrap, 1 per 6 months          Radiology Results:   US elastography    Result Date: 12/22/2022  Narrative: ELASTOGRAPHY, LIVER ULTRASOUND INDICATION:   R74 8: Abnormal levels of other serum enzymes  COMPARISON:  Ultrasound of the abdomen dated June 29, 2021  TECHNIQUE: Targeted ultrasound of the liver was performed with a curvilinear transducer on a EndoDex e10 utilizing 2D SWE  FINDINGS:  Shear Wave Elastography sampling was performed to evaluate stiffness changes within the liver associated with fibrosis  The resulting E median is 8 44 kPa  The corresponding Metavir score is F2 (significant fibrosis), 8 27-9 39 kPa  1 66-1 76 m/s  The IQR/median value is 18 %  However, only 4 measurements were able to be obtained due to body habitus  (IQR of less than 30% is considered a satisfactory data set)  Note: that the stage of liver fibrosis may be overestimated by clinical conditions unrelated to fibrosis, including but not limited to, nonfasting, hepatic inflammation, vascular congestion, biliary obstruction, and infiltrative liver disease  Furthermore, in some patients with NAFLD, the cut-off values for compensated advanced chronic liver disease may be lower (7-9 kPa)  In causes other than viral hepatitis and nonalcoholic fatty liver disease, the cut-off values are not well established   When comparing measurements across time, a clinically significant change should be considered when the delta change is greater than 10%  In all these conditions, however, liver stiffness values within the normal range exclude significant liver fibrosis  Ultrasound-guided attenuation parameter (UGAP) Liver Steatosis Grading Attenuation coefficient:  1 05 dB/cm/MHz Liver steatosis grading:  S3 ( > 67% steatosis) IQR/Med:  6 1 % (Less than or equal to 30% is a satisfactory data set ) Reference White paper for ViVu ultrasound-guided attenuation parameter https://www [employer]/  au/-/jssmedia/[email or web address]  pdf?la=en-au     Impression: Metavir score: F2, Significant Fibrosis  However, a fewer sample measurements then usual may render this number less accurate  According to the updated SRU consensus statement, a liver stiffness of kPa, https://pubs  rsna org/doi/10 1148/radiol  6757660557 Liver steatosis grading:  S3 ( > 67% steatosis) Workstation performed: [id number]

## 2023-01-15 ENCOUNTER — TELEPHONE (OUTPATIENT)
Dept: OTHER | Facility: OTHER | Age: 58
End: 2023-01-15

## 2023-01-15 DIAGNOSIS — E78.5 DYSLIPIDEMIA WITH ELEVATED LOW DENSITY LIPOPROTEIN (LDL) CHOLESTEROL AND ABNORMALLY LOW HIGH DENSITY LIPOPROTEIN CHOLESTEROL: ICD-10-CM

## 2023-01-16 RX ORDER — ATORVASTATIN CALCIUM 40 MG/1
TABLET, FILM COATED ORAL
Qty: 90 TABLET | Refills: 0 | Status: SHIPPED | OUTPATIENT
Start: 2023-01-16

## 2023-02-05 ENCOUNTER — HOSPITAL ENCOUNTER (OUTPATIENT)
Dept: ULTRASOUND IMAGING | Facility: HOSPITAL | Age: 58
Discharge: HOME | End: 2023-02-05

## 2023-02-05 DIAGNOSIS — R79.89 ABNORMAL LIVER FUNCTION TESTS: ICD-10-CM

## 2023-02-05 DIAGNOSIS — R74.8 ELEVATED ALKALINE PHOSPHATASE LEVEL: ICD-10-CM

## 2023-02-11 ENCOUNTER — APPOINTMENT (OUTPATIENT)
Dept: LAB | Facility: HOSPITAL | Age: 58
End: 2023-02-11

## 2023-02-11 DIAGNOSIS — R79.89 ABNORMAL LIVER FUNCTION TESTS: ICD-10-CM

## 2023-02-11 DIAGNOSIS — K76.0 FATTY LIVER: ICD-10-CM

## 2023-02-11 DIAGNOSIS — R74.8 ELEVATED ALKALINE PHOSPHATASE LEVEL: ICD-10-CM

## 2023-02-11 DIAGNOSIS — R79.89 ELEVATED FERRITIN LEVEL: ICD-10-CM

## 2023-02-11 LAB
ALBUMIN SERPL BCP-MCNC: 4.2 G/DL (ref 3.5–5)
ALP SERPL-CCNC: 150 U/L (ref 34–104)
ALT SERPL W P-5'-P-CCNC: 38 U/L (ref 7–52)
ANION GAP SERPL CALCULATED.3IONS-SCNC: 12 MMOL/L (ref 4–13)
AST SERPL W P-5'-P-CCNC: 32 U/L (ref 13–39)
BASOPHILS # BLD AUTO: 0.04 THOUSANDS/ÂΜL (ref 0–0.1)
BASOPHILS NFR BLD AUTO: 0 % (ref 0–1)
BILIRUB SERPL-MCNC: 0.94 MG/DL (ref 0.2–1)
BUN SERPL-MCNC: 10 MG/DL (ref 5–25)
CALCIUM SERPL-MCNC: 9.2 MG/DL (ref 8.4–10.2)
CHLORIDE SERPL-SCNC: 101 MMOL/L (ref 96–108)
CO2 SERPL-SCNC: 25 MMOL/L (ref 21–32)
CREAT SERPL-MCNC: 0.99 MG/DL (ref 0.6–1.3)
EOSINOPHIL # BLD AUTO: 0.05 THOUSAND/ÂΜL (ref 0–0.61)
EOSINOPHIL NFR BLD AUTO: 1 % (ref 0–6)
ERYTHROCYTE [DISTWIDTH] IN BLOOD BY AUTOMATED COUNT: 14.3 % (ref 11.6–15.1)
FERRITIN SERPL-MCNC: 160 NG/ML (ref 8–388)
GFR SERPL CREATININE-BSD FRML MDRD: 84 ML/MIN/1.73SQ M
GGT SERPL-CCNC: 239 U/L (ref 5–85)
GLUCOSE P FAST SERPL-MCNC: 98 MG/DL (ref 65–99)
HAV AB SER QL IA: NORMAL
HBV SURFACE AB SER-ACNC: <3.1 MIU/ML
HCT VFR BLD AUTO: 49.5 % (ref 36.5–49.3)
HGB BLD-MCNC: 16.4 G/DL (ref 12–17)
IGA SERPL-MCNC: 195 MG/DL (ref 70–400)
IGG SERPL-MCNC: 887 MG/DL (ref 700–1600)
IGM SERPL-MCNC: 160 MG/DL (ref 40–230)
IMM GRANULOCYTES # BLD AUTO: 0.06 THOUSAND/UL (ref 0–0.2)
IMM GRANULOCYTES NFR BLD AUTO: 1 % (ref 0–2)
INR PPP: 1 (ref 0.84–1.19)
IRON SATN MFR SERPL: 26 % (ref 20–50)
IRON SERPL-MCNC: 115 UG/DL (ref 65–175)
LYMPHOCYTES # BLD AUTO: 1.09 THOUSANDS/ÂΜL (ref 0.6–4.47)
LYMPHOCYTES NFR BLD AUTO: 12 % (ref 14–44)
MCH RBC QN AUTO: 33.3 PG (ref 26.8–34.3)
MCHC RBC AUTO-ENTMCNC: 33.1 G/DL (ref 31.4–37.4)
MCV RBC AUTO: 100 FL (ref 82–98)
MONOCYTES # BLD AUTO: 0.65 THOUSAND/ÂΜL (ref 0.17–1.22)
MONOCYTES NFR BLD AUTO: 7 % (ref 4–12)
NEUTROPHILS # BLD AUTO: 7.5 THOUSANDS/ÂΜL (ref 1.85–7.62)
NEUTS SEG NFR BLD AUTO: 79 % (ref 43–75)
NRBC BLD AUTO-RTO: 0 /100 WBCS
PLATELET # BLD AUTO: 237 THOUSANDS/UL (ref 149–390)
PMV BLD AUTO: 10 FL (ref 8.9–12.7)
POTASSIUM SERPL-SCNC: 3.9 MMOL/L (ref 3.5–5.3)
PROT SERPL-MCNC: 7.1 G/DL (ref 6.4–8.4)
PROTHROMBIN TIME: 13.2 SECONDS (ref 11.6–14.5)
RBC # BLD AUTO: 4.93 MILLION/UL (ref 3.88–5.62)
SODIUM SERPL-SCNC: 138 MMOL/L (ref 135–147)
TIBC SERPL-MCNC: 444 UG/DL (ref 250–450)
WBC # BLD AUTO: 9.39 THOUSAND/UL (ref 4.31–10.16)

## 2023-02-12 LAB — ANA SER QL IA: NEGATIVE

## 2023-02-13 LAB
ACTIN IGG SERPL-ACNC: 16 UNITS (ref 0–19)
MITOCHONDRIA M2 IGG SER-ACNC: <20 UNITS (ref 0–20)

## 2023-02-14 LAB
A2 MACROGLOB SERPL-MCNC: 237 MG/DL (ref 110–276)
ALT SERPL W P-5'-P-CCNC: 49 IU/L (ref 0–55)
APO A-I SERPL-MCNC: 135 MG/DL (ref 101–178)
AST SERPL W P-5'-P-CCNC: 38 IU/L (ref 0–40)
BILIRUB SERPL-MCNC: 0.6 MG/DL (ref 0–1.2)
CHOLEST SERPL-MCNC: 222 MG/DL (ref 100–199)
FIBROSIS SCORING:: ABNORMAL
FIBROSIS STAGE SERPL QL: ABNORMAL
GGT SERPL-CCNC: 190 IU/L (ref 0–65)
GLUCOSE SERPL-MCNC: 96 MG/DL (ref 70–99)
HAPTOGLOB SERPL-MCNC: 179 MG/DL (ref 29–370)
LABORATORY COMMENT REPORT: ABNORMAL
LIVER FIBR SCORE SERPL CALC.FIBROSURE: 0.54 (ref 0–0.21)
NECROINFLAMMATORY ACT GRADE SERPL QL: ABNORMAL
NECROINFLAMMATORY ACT SCORE SERPL: 0.5
SERVICE CMNT-IMP: ABNORMAL
SL AMB INTERPRETATION: ABNORMAL
SL AMB NASH SCORING: ABNORMAL
SL AMB STEATOSIS GRADE: ABNORMAL
SL AMB STEATOSIS SCORE: 0.78 (ref 0–0.3)
STEATOSIS GRADING: ABNORMAL
TRIGL SERPL-MCNC: 135 MG/DL (ref 0–149)

## 2023-02-15 ENCOUNTER — TELEPHONE (OUTPATIENT)
Dept: CARDIOLOGY CLINIC | Facility: CLINIC | Age: 58
End: 2023-02-15

## 2023-02-15 DIAGNOSIS — I10 HYPERTENSION, UNSPECIFIED TYPE: ICD-10-CM

## 2023-02-15 RX ORDER — OMEPRAZOLE 40 MG/1
CAPSULE, DELAYED RELEASE ORAL
Qty: 60 CAPSULE | Refills: 0 | Status: SHIPPED | OUTPATIENT
Start: 2023-02-15

## 2023-02-16 ENCOUNTER — RA CDI HCC (OUTPATIENT)
Dept: OTHER | Facility: HOSPITAL | Age: 58
End: 2023-02-16

## 2023-02-16 NOTE — PROGRESS NOTES
AsthmaNoted 9/21/2020  [J45 [id number]]          [facility]  coding opportunities          Chart Reviewed number of suggestions sent to Provider: 2     Patients Insurance        Commercial Insurance: [address] [place] [address]

## 2023-02-17 DIAGNOSIS — E78.5 DYSLIPIDEMIA WITH ELEVATED LOW DENSITY LIPOPROTEIN (LDL) CHOLESTEROL AND ABNORMALLY LOW HIGH DENSITY LIPOPROTEIN CHOLESTEROL: ICD-10-CM

## 2023-02-17 RX ORDER — ATORVASTATIN CALCIUM 40 MG/1
TABLET, FILM COATED ORAL
Qty: 90 TABLET | Refills: 0 | Status: SHIPPED | OUTPATIENT
Start: 2023-02-17

## 2023-02-17 NOTE — TELEPHONE ENCOUNTER
Please approve or deny Rx request [facility] Sales protocols not met-thank you

## 2023-02-20 ENCOUNTER — OFFICE VISIT (OUTPATIENT)
Dept: GASTROENTEROLOGY | Facility: CLINIC | Age: 58
End: 2023-02-20

## 2023-02-20 VITALS
WEIGHT: 290 LBS | HEIGHT: 70 IN | DIASTOLIC BLOOD PRESSURE: 120 MMHG | HEART RATE: 102 BPM | OXYGEN SATURATION: 95 % | SYSTOLIC BLOOD PRESSURE: 146 MMHG | TEMPERATURE: 98.7 F | BODY MASS INDEX: 41.52 KG/M2

## 2023-02-20 DIAGNOSIS — Z86.010 PERSONAL HISTORY OF COLONIC POLYPS: ICD-10-CM

## 2023-02-20 DIAGNOSIS — K76.0 HEPATIC STEATOSIS: Primary | ICD-10-CM

## 2023-02-20 DIAGNOSIS — K74.00 HEPATIC FIBROSIS, UNSPECIFIED: ICD-10-CM

## 2023-02-20 DIAGNOSIS — N28.1 RENAL CYST, RIGHT: ICD-10-CM

## 2023-02-20 DIAGNOSIS — R74.8 ELEVATED ALKALINE PHOSPHATASE LEVEL: ICD-10-CM

## 2023-02-20 LAB — HFE GENE MUT ANL BLD/T: NORMAL

## 2023-02-20 NOTE — PATIENT INSTRUCTIONS
Scheduled date of colonoscopy (as of today): 5/16/23  Physician performing colonoscopy: Dr Rodriguez  Location of colonoscopy: [facility]  Bowel prep reviewed with patient: Nataliya (2 DAY)  Instructions reviewed with patient by: Sreekanth Amaya  Clearances: N/A

## 2023-02-20 NOTE — PROGRESS NOTES
[facility] - Outpatient Follow-up Note  Ina Lebron  65 y o  male MRN: [id number]  Encounter: [id number]          ASSESSMENT AND PLAN:      1  Hepatic steatosis  2  Hepatic fibrosis, unspecified  3  BMI 40 0-44 9, adult [facility])  Patient with mild-moderately elevated transaminases since 3/2021, peaking in 11/2021 (/), and also with intermittent mildly elevated alk phos and T bili  Previous full serologic evaluation was grossly unremarkable, with the exception of mildly elevated ferritin (633)  Ultrasound with elastography notable for hepatomegaly, hepatic steatosis and cholelithiasis in addition to F2 fibrosis - Although elastography was limited due to body habitus and possibly inaccurate  No clinical or serologic evidence of chronic liver disease  Since his last appointment, patient has completed additional serologies unremarkable for both autoimmune hepatitis and hemochromatosis (hemochromatosis mutation pending)  His hepatic function remains grossly normal, with the exception of a mildly elevated alkaline phosphatase (GGT confirming hepatic origin)  ROBLEDO FibroSure also revealed F2 fibrosis, consistent with elastography  Lastly, patient repeated an abdominal ultrasound notable for persistent hepatomegaly with new mild splenomegaly and diffuse hepatic steatosis  Discussed previously abnormal LFTs were secondary to fatty liver disease as suspected  Patient is aware of the pathophysiology of fatty liver disease, and potential to progress to cirrhosis over time if left untreated  Discussed recommendations in regards to fatty liver including weight loss of approx 10-15% of patient's current body weight over a period of 6-12 months through low fat diet and cardiovascular exercise as tolerated, in addition to strict control of contributing comorbidities and limiting alcohol consumption   Patient has been referred to weigh management for consideration of a GLP-1 agonist  Recommend he continue to have his hepatic function monitored q6 months with routine labs and also complete the vaccination series for both hep A and B given his lack of protective immunity  NAFLD Fibrosis Score is: - 711    - Ambulatory Referral to Weight Management; Future  - Hepatic function panel; Future    4  Elevated alkaline phosphatase level  Patient noted to have a mildly elevated alkaline phosphatase (<2x ULN) with GGT confirming hepatic origin  Patient has also completed serologic evaluation negative for AIH/PBC and an abdominal ultrasound without evidence of intrahepatic or extrahepatic biliary dilatation  Suspect benign variant and recommend monitoring his hepatic function q6 months with routine labs  If alkaline phosphatase were to rise, would recommend further evaluation with MRI/MRCP  5  Renal cyst, right  Patient incidentally noted to have a 1 3 cm simple cyst in the lower pole of the right kidney  Advised patient to inform his primary care provider of this finding to ensure no further work-up is necessary  6  Personal history of colonic polyps  Patient is up-to-date with CRC screening  Colonoscopy 3/21/2022 with inadequate bowel prep was notable for 4 subcm polyps removed, few scattered diverticuli in the rectosigmoid colon, and small external hemorrhoids  Patient will be due for repeat colonoscopy in approximately 1 month, orders placed today  Discussed risks of procedure with patient including, but not limited to, bleeding, infection, and perforation; Patient gave verbal understanding and is agreeable to proceed  Discussed and given instructions for bowel prep as ordered -2-day GoLytely/MiraLAX bowel prep  - Colonoscopy; Future  - polyethylene glycol (GOLYTELY) 4000 mL solution;  Take 4,000 mL by mouth once for 1 dose  Dispense: 4000 mL; Refill: 0    Follow-up in 6 months      ______________________________________________________________________    SUBJECTIVE: Patient is a 65 y o  male with PMH significant for adenoma of the left adrenal gland, PRAKASH, hypertension, hyperlipidemia, history of Prinzmetal variant angina and cardiac arrest, A flutter and GERD who presents today for follow-up regarding abnormal liver function tests and fatty liver disease  Interval history  - Completed serologies with negative AIH work-up, normal ferritin, mildly elevated alk phos and GGT confirming hepatic origin, and without protective immunity to hepatitis A or B  Hemochromatosis mutation pending   - Also completed a ROBLEDO FibroSure notable for F2 fibrosis, consistent with elastography   - Repeat abdominal ultrasound notable for hepatosplenomegaly and diffuse hepatic steatosis, 1 3 cm simple cyst to lower pole of the right kidney, cholelithiasis  Extended liver history  Patient was initially seen in consultation on 11/4/2021 by Rene Clements PA-C for elevated liver enzymes  Per chart review, patient has had mild-moderately elevated transaminases since 3/2021 peaking with / in 11/2021  Additionally, patient is noted to have intermittent and mildly elevated alk phos and T bili peaking at 257 in 12/2021 and 1 58 in 12/2020  Patient underwent extensive serologic evaluation including acute hepatitis panel, SINDHU, AMA, ASMA, A1AT levels, ceruloplasmin levels, iron panel with ferritin, and celiac disease antibody profile all of which was unremarkable with exception of a mildly elevated ferritin (633)       Patient also completed an abdominal U/S notable for hepatomegaly, hepatic steatosis, and cholelithiasis without evidence of cholecystitis or biliary ductal dilatation  Elastography was notable for F2 fibrosis, however study was limited due to body habitus and possibly inaccurate  Admits to a history of heavy alcohol use, previously drinking approximately 1 bottle of liquor weekly, but denies drinking during the time of his elevations  Denies IV drug use        REVIEW OF SYSTEMS IS OTHERWISE NEGATIVE  Historical Information   Past Medical History:   Diagnosis Date   • Anxiety    • Asthma    • Colon polyp    • GERD (gastroesophageal reflux disease)    • History of ileus    • Hypertension    • Insomnia    • Kidney stone    • Lumbar herniated disc     last assessed: 3/27/2015   • MDD (major depressive disorder)    • Patella fracture     last assessed: 3/27/2015   • Tobacco abuse      Past Surgical History:   Procedure Laterality Date   • BACK SURGERY      Lumbar   • CARDIAC CATHETERIZATION N/A 2021    Procedure: CARDIAC CATHETERIZATION;  Surgeon: Lb Miles MD;  Location: [facility];   Service: Cardiology   • COLONOSCOPY     • KNEE SURGERY      right   • LUMBAR FUSION      last assessed:    • GA RPR UMBILICAL HRNA 5 YRS/> REDUCIBLE N/A 2022    Procedure: REPAIR HERNIA UMBILICAL w/ mesh;  Surgeon: Yohana Siddiqui DO;  Location: [facility] MAIN [facility];  Service: General   • GA [place] SHFT FX IMED IMPLT W/WO SCREWS&/CERCLA Left 3/16/2017    Procedure: INSERTION NAIL IM TIBIA;  Surgeon: Avelino Butler DO;  Location: [facility];  Service: Orthopedics     Social History   Social History     Substance and Sexual Activity   Alcohol Use Not Currently    Comment: Hx of a couple of drinks a night, or everyother night     Social History     Substance and Sexual Activity   Drug Use No     Social History     Tobacco Use   Smoking Status Former   • Packs/day: 1 50   • Years: 34 00   • Pack years: 51 00   • Types: Cigarettes   • Start date:    • Quit date:    • Years since quittin 1   Smokeless Tobacco Never     Family History   Problem Relation Age of Onset   • Pancreatic cancer Mother    • COPD Father    • Heart attack Brother 64        decreased   • Drug abuse Brother    • Alcohol abuse Brother    • Sleep apnea Brother    • Cancer Family    • Hypertension Family    • Heart disease Family        Meds/Allergies       Current Outpatient Medications:   • polyethylene glycol (GOLYTELY) 4000 mL solution  •  albuterol (PROVENTIL HFA,VENTOLIN HFA) 90 mcg/act inhaler  •  Aspirin Low Dose 81 MG chewable tablet  •  atorvastatin (LIPITOR) 40 mg tablet  •  diltiazem (CARDIZEM CD) 300 mg 24 hr capsule  •  isosorbide mononitrate (IMDUR) 60 mg 24 hr tablet  •  lisinopril (ZESTRIL) 40 mg tablet  •  LORazepam (ATIVAN) 1 mg tablet  •  ofloxacin (FLOXIN) 0 3 % otic solution  •  omeprazole (PriLOSEC) 40 MG capsule  •  oxyCODONE (Roxicodone) 5 immediate release tablet  •  QUEtiapine (SEROquel) 25 mg tablet  •  QUEtiapine (SEROquel) 300 mg tablet  •  sertraline (ZOLOFT) 25 mg tablet    No Known Allergies        Objective     Blood pressure (!) 146/120, pulse 102, temperature 98 7 °F (37 1 °C), temperature source Tympanic, height 5' 10" (1 778 m), weight 132 kg (290 lb), SpO2 95 %  Body mass index is 41 61 kg/m²  PHYSICAL EXAM:      General Appearance:   Alert, cooperative, no distress   HEENT:   Normocephalic, atraumatic, anicteric  Neck:  Supple, symmetrical, trachea midline   Lungs:   Clear to auscultation bilaterally; no rales, rhonchi or wheezing; respirations unlabored    Heart[de-identified]   Regular rate and rhythm; no murmur, rub, or gallop  Abdomen:   Soft, non-tender, non-distended; normal bowel sounds; no masses, no organomegaly    Genitalia:   Deferred    Rectal:   Deferred    Extremities:  No cyanosis, clubbing or edema    Pulses:  2+ and symmetric    Skin:  No jaundice, rashes, or lesions    Lymph nodes:  No palpable cervical lymphadenopathy        Lab Results:   No visits with results within 1 Day(s) from this visit     Latest known visit with results is:   Appointment on 02/11/2023   Component Date Value   • GGT 02/11/2023 239 (H)    • Mitochondrial Ab 02/11/2023 <20 0    • Smooth Muscle Ab 02/11/2023 16    • SINDHU 02/11/2023 Negative    • IGA 02/11/2023 195 0    • IGG 02/11/2023 887 0    • IGM 02/11/2023 160 0    • Glucose, Random 02/11/2023 96    • Cholesterol, Total 2023 222 (H)    • BILIRUBIN, TOTAL 2023 0 6    • AST (SGOT) P5P 2023 38    • Triglycerides 2023 135    • ALT (SGPT) 2023 49    • Haptoglobin 2023 179    • GGT 2023 190 (H)    • Apolipoprotein A-1 2023 135    • Alpha 2-Macroglobulins, * 2023 237    • Comments 2023 Comment    • Fibrosis Scorin2023 Comment    • Necroinflammat Activity * 2023 Comment    • Interpretation: 2023 Comment    • ROBLEDO Scoring 2023 Comment    • Fibrosis Score 2023 0 54 (H)    • Fibrosis Stage 2023 Comment    • Steatosis Score 2023 0 78 (H)    • Steatosis Grade 2023 Comment    • Height 2023 72    • Weight 2023 290    • Necroinflammat Activity * 2023 0 50 (H)    • Limitations: 2023 Comment    • Steatosis Grading 2023 Comment    • WBC 2023 9 39    • RBC 2023 4 93    • Hemoglobin 2023 16 4    • Hematocrit 2023 49 5 (H)    • MCV 2023 100 (H)    • MCH 2023 33 3    • MCHC 2023 33 1    • RDW 2023 14 3    • MPV 2023 10 0    • Platelets  237    • nRBC 2023 0    • Neutrophils Relative 2023 79 (H)    • Immat GRANS % 2023 1    • Lymphocytes Relative 2023 12 (L)    • Monocytes Relative 2023 7    • Eosinophils Relative 2023 1    • Basophils Relative 2023 0    • Neutrophils Absolute 2023 7 50    • Immature Grans Absolute 2023 0 06    • Lymphocytes Absolute 2023 1 09    • Monocytes Absolute 2023 0 65    • Eosinophils Absolute 2023 0 05    • Basophils Absolute 2023 0 04    • Sodium 2023 138    • Potassium 2023 3 9    • Chloride 2023 101    • CO2 2023 25    • ANION GAP 2023 12    • BUN 2023 10    • Creatinine 2023 0 99    • Glucose, Fasting 2023 98    • Calcium 2023 9 2    • AST 2023 32    • ALT 2023 38    • Alkaline Phosphatase 02/11/2023 150 (H)    • Total Protein 02/11/2023 7 1    • Albumin 02/11/2023 4 2    • Total Bilirubin 02/11/2023 0 94    • eGFR 02/11/2023 84    • Protime 02/11/2023 13 2    • INR 02/11/2023 1 00    • Hep A Total Ab 02/11/2023 Non-reactive    • Hep B S Ab 02/11/2023 <3 10    • Iron Saturation 02/11/2023 26    • TIBC 02/11/2023 444    • Iron 02/11/2023 115    • Ferritin 02/11/2023 160          Radiology Results:   US abdomen complete    Result Date: 2/10/2023  Narrative: ABDOMEN ULTRASOUND, COMPLETE INDICATION:   R79 89: Other specified abnormal findings of blood chemistry R74 8: Abnormal levels of other serum enzymes  COMPARISON:  CT abdomen pelvis on 12/28/2020 TECHNIQUE:   Real-time ultrasound of the abdomen was performed with a curvilinear transducer with both volumetric sweeps and still imaging techniques  FINDINGS: PANCREAS/AORTA AND IVC:  Poorly visualized/evaluated due to overlying bowel gas shadowing  LIVER: Size:  Mildly enlarged  The liver measures 20 4 cm in the midclavicular line  Contour:  Surface contour is smooth  Parenchyma:  Heterogeneous increased echotexture suggesting moderate diffuse hepatic steatosis No liver mass identified  Limited imaging of the main portal vein shows it to be patent and hepatopetal  BILIARY: The gallbladder is normal in caliber  No wall thickening or pericholecystic fluid  Shadowing gallstone(s) identified  No sonographic Correa's sign  No intrahepatic biliary dilatation  CBD measures 3 0 mm  No choledocholithiasis  KIDNEY: Right kidney measures 14 0 x 5 5 x 5 4  cm  Volume 215 6 mL There is a 1 3 x 0 9 x 1 0 cm right lower pole simple cyst   No suspicious solid renal mass, hydronephrosis, nephrolithiasis, or perinephric fluid collection  Left kidney measures 12 2 x 7 2 x 5 9 cm  Volume 271 1 mL Slightly lobulated contour of the left kidney noted without suspicious renal mass, nephrolithiasis, hydronephrosis, or perinephric fluid collection   SPLEEN: Measures 14 4 x 14 4 x 7 8 cm  Volume 847 0 mL No focal mass or fluid collection  ASCITES:  None  Impression: 1  Hepatosplenomegaly and diffuse hepatic steatosis  No focal hepatic or splenic mass identified  2   1 3 cm simple cyst in the lower pole of the right kidney  3   Cholelithiasis without sonographic evidence for acute cholecystitis or significant biliary ductal dilatation   Workstation performed: [id number]

## 2023-02-23 ENCOUNTER — OFFICE VISIT (OUTPATIENT)
Dept: FAMILY MEDICINE CLINIC | Facility: CLINIC | Age: 58
End: 2023-02-23

## 2023-02-23 ENCOUNTER — TELEPHONE (OUTPATIENT)
Dept: FAMILY MEDICINE CLINIC | Facility: CLINIC | Age: 58
End: 2023-02-23

## 2023-02-23 VITALS
DIASTOLIC BLOOD PRESSURE: 87 MMHG | HEART RATE: 92 BPM | WEIGHT: 301.2 LBS | BODY MASS INDEX: 43.12 KG/M2 | HEIGHT: 70 IN | SYSTOLIC BLOOD PRESSURE: 134 MMHG | TEMPERATURE: 98.2 F | OXYGEN SATURATION: 95 %

## 2023-02-23 DIAGNOSIS — Z23 ENCOUNTER FOR IMMUNIZATION: ICD-10-CM

## 2023-02-23 DIAGNOSIS — I10 PRIMARY HYPERTENSION: Primary | ICD-10-CM

## 2023-02-23 DIAGNOSIS — R74.8 ELEVATED ALKALINE PHOSPHATASE LEVEL: ICD-10-CM

## 2023-02-23 DIAGNOSIS — R06.02 SHORTNESS OF BREATH: ICD-10-CM

## 2023-02-23 DIAGNOSIS — J45.20 MILD INTERMITTENT ASTHMA WITHOUT COMPLICATION: ICD-10-CM

## 2023-02-23 NOTE — ASSESSMENT & PLAN NOTE
Continue follow-up with hepatology, and is concerned about results for hemochromatosis gene and will follow-up with hepatology  We will administer hepatitis B vaccine today, patient agreeable  Will follow-up in 1 month for next dose

## 2023-02-23 NOTE — ASSESSMENT & PLAN NOTE
Patient complains of shortness of breath and takes albuterol as needed, has never had PFTs done  Encouraged to get PFTs done which were ordered in the last visit

## 2023-02-23 NOTE — ASSESSMENT & PLAN NOTE
Patient follows with psychiatry and therapist, PHQ-9 score 9 today, encouraged to discuss with psychiatrist    Depression Screening Follow-up Plan: Patient's depression screening was positive with a PHQ-2 score of   Their PHQ-9 score was 9  Continue regular follow-up with their psychologist/therapist/psychiatrist who is managing their mental health condition(s)

## 2023-02-23 NOTE — PROGRESS NOTES
Name: Garcia Yip  : 1965      MRN: [id number]  Encounter Provider: Christiano Ellis MD  Encounter Date: 2023   Encounter department: [address] [place] [address]     1  Primary hypertension  Assessment & Plan: Within goal   Blood pressure 134/87 today, continue current regimen  2  Mild intermittent asthma without complication  Assessment & Plan:  Patient complains of shortness of breath and takes albuterol as needed, has never had PFTs done  Encouraged to get PFTs done which were ordered in the last visit  3  Elevated alkaline phosphatase level  Assessment & Plan:  Continue follow-up with hepatology, and is concerned about results for hemochromatosis gene and will follow-up with hepatology  We will administer hepatitis B vaccine today, patient agreeable  Will follow-up in 1 month for next dose  4  Encounter for immunization  -     HEPATITIS B VACCINE ADULT IM    5  Shortness of breath  Assessment & Plan:  Patient complains of shortness of breath, he takes albuterol as needed for this  Encouraged to get PFTs, if unremarkable we will also check echo  Patient does not have any lower extremity edema today  Mona Gusman presented to office for follow-up of hypertension and review of labs that  He has been following up with hepatology for elevated LFTs, was advised to follow-up with bariatrics as well for weight loss to help with hepatic steatosis  He also feels that his depression is worse, but he does have psychiatrist and therapist who he sees regularly  He he is also concerned about shortness of breath, because of which he is not able to exercise  He will be seeing cardiology and sleep medicine in May    Review of Systems   Constitutional: Negative for chills and fever  HENT: Negative for congestion  Respiratory: Positive for shortness of breath  Cardiovascular: Negative for chest pain     Gastrointestinal: Negative for diarrhea, nausea and vomiting  Genitourinary: Negative for difficulty urinating  Neurological: Negative for headaches         Current Outpatient Medications on File Prior to Visit   Medication Sig   • albuterol (PROVENTIL HFA,VENTOLIN HFA) 90 mcg/act inhaler INHALE 2 PUFFS EVERY 6 HOURS AS NEEDED FOR SHORTNESS OF BREATH   • Aspirin Low Dose 81 MG chewable tablet CHEW 1 TABLET BY MOUTH EVERY DAY (Patient taking differently: Chew 81 mg every morning)   • atorvastatin (LIPITOR) 40 mg tablet TAKE ONE TABLET BY MOUTH EVERY DAY   • diltiazem (CARDIZEM CD) 300 mg 24 hr capsule Take 1 capsule (300 mg total) by mouth every morning   • isosorbide mononitrate (IMDUR) 60 mg 24 hr tablet TAKE ONE TABLET BY MOUTH ONCE DAILY (Patient taking differently: Take 60 mg by mouth every morning)   • lisinopril (ZESTRIL) 40 mg tablet TAKE ONE TABLET BY MOUTH EVERY DAY   • LORazepam (ATIVAN) 1 mg tablet Take 1 tablet (1 mg total) by mouth 2 (two) times a day as needed for anxiety   • omeprazole (PriLOSEC) 40 MG capsule TAKE ONE CAPSULE BY MOUTH EVERY DAY   • polyethylene glycol (GOLYTELY) 4000 mL solution Take 4,000 mL by mouth once for 1 dose   • QUEtiapine (SEROquel) 300 mg tablet Take 1 tablet (300 mg total) by mouth daily at bedtime   • sertraline (ZOLOFT) 25 mg tablet Take 25 mg by mouth daily at bedtime   • QUEtiapine (SEROquel) 25 mg tablet TAKE TWO TABLETS BY MOUTH EVERY MORNING AND 1 TABLET AT BEDTIME WITH 300MG TABLET FOR A 325MG TOTAL DOSE (Patient not taking: Reported on 11/14/2022)   • [DISCONTINUED] ofloxacin (FLOXIN) 0 3 % otic solution Administer 10 drops into the left ear 2 (two) times a day   • [DISCONTINUED] oxyCODONE (Roxicodone) 5 immediate release tablet Take 1 tablet (5 mg total) by mouth every 4 (four) hours as needed for moderate pain for up to 15 doses Max Daily Amount: 30 mg       Objective     /87 (BP Location: Right arm, Patient Position: Sitting, Cuff Size: Standard)   Pulse 92   Temp 98 2 °F (36 8 °C) (Temporal)   Ht 5' 10" (1 778 m)   Wt (!) 137 kg (301 lb 3 2 oz)   SpO2 95%   BMI 43 22 kg/m²     Physical Exam  Vitals reviewed  Constitutional:       Appearance: He is well-developed  HENT:      Head: Normocephalic and atraumatic  Right Ear: External ear normal       Left Ear: External ear normal    Eyes:      Conjunctiva/sclera: Conjunctivae normal    Cardiovascular:      Rate and Rhythm: Normal rate and regular rhythm  Heart sounds: Normal heart sounds  No murmur heard  No friction rub  Pulmonary:      Effort: Pulmonary effort is normal  No respiratory distress  Breath sounds: Normal breath sounds  No wheezing or rales  Musculoskeletal:         General: Normal range of motion  Cervical back: Normal range of motion and neck supple  Skin:     General: Skin is warm and dry  Neurological:      Mental Status: He is alert and oriented to person, place, and time         Sharon Rhodes MD

## 2023-02-23 NOTE — ASSESSMENT & PLAN NOTE
Patient complains of shortness of breath, he takes albuterol as needed for this  Encouraged to get PFTs, if unremarkable we will also check echo  Patient does not have any lower extremity edema today

## 2023-02-23 NOTE — TELEPHONE ENCOUNTER
Form completed by Dr Galina Carmona     Copied made and place in scanning folder    Original form given back to patient

## 2023-02-23 NOTE — TELEPHONE ENCOUNTER
Folder (To be completed by) - Dr Bryan Kirkland     Name of Form - FMLA forms    Color folder Moe Richardson)    Form to be completed at visit and give to the patient    Patient was made aware of the 7-10 business day form policy

## 2023-02-26 DIAGNOSIS — E78.5 DYSLIPIDEMIA: ICD-10-CM

## 2023-02-27 RX ORDER — ALBUTEROL SULFATE 90 UG/1
AEROSOL, METERED RESPIRATORY (INHALATION)
Qty: 8.5 G | Refills: 0 | Status: SHIPPED | OUTPATIENT
Start: 2023-02-27

## 2023-02-27 NOTE — TELEPHONE ENCOUNTER
Please approve or deny [facility] protocols not met-thank you

## 2023-02-27 NOTE — TELEPHONE ENCOUNTER
[employer] calling regarding FMLA forms received, state they do need an estimated beginning and end date for leave, also a frequency and duration such as 1 hour every week, etc  Also asking for 2 visits to be sent supporting this between 1/12/22 and 1/12/23  States papers can be addended and resent or a letter with this info can be sent by Dr Roula Gay      Phone is [phone]  Fax # [phone]

## 2023-03-02 ENCOUNTER — TELEPHONE (OUTPATIENT)
Dept: OTHER | Facility: OTHER | Age: 58
End: 2023-03-02

## 2023-03-02 NOTE — TELEPHONE ENCOUNTER
Patient called in to request review of lab results, in particular, the Gamma GT test  He kept asking about iron and possibly retaining it  Please follow up with patient to review lab results that were reviewed in [address] [place] [address] Rd 2/20/23

## 2023-03-03 NOTE — TELEPHONE ENCOUNTER
Patient called and informed of results  Please refer to result note under hemochromatosis mutation for complete details

## 2023-03-28 DIAGNOSIS — E78.5 DYSLIPIDEMIA: ICD-10-CM

## 2023-03-29 RX ORDER — ALBUTEROL SULFATE 90 UG/1
AEROSOL, METERED RESPIRATORY (INHALATION)
Qty: 8.5 G | Refills: 0 | Status: SHIPPED | OUTPATIENT
Start: 2023-03-29

## 2023-05-01 DIAGNOSIS — E78.5 DYSLIPIDEMIA: ICD-10-CM

## 2023-05-02 ENCOUNTER — TELEPHONE (OUTPATIENT)
Dept: FAMILY MEDICINE CLINIC | Facility: CLINIC | Age: 58
End: 2023-05-02

## 2023-05-02 RX ORDER — ALBUTEROL SULFATE 90 UG/1
2 AEROSOL, METERED RESPIRATORY (INHALATION) EVERY 6 HOURS PRN
Qty: 8.5 G | Refills: 0 | Status: SHIPPED | OUTPATIENT
Start: 2023-05-02

## 2023-05-05 ENCOUNTER — TELEPHONE (OUTPATIENT)
Dept: PHYSICAL THERAPY | Facility: OTHER | Age: 58
End: 2023-05-05

## 2023-05-05 NOTE — TELEPHONE ENCOUNTER
Courtesy call placed to the patient per [facility] after being triaged  No appt scheduled yet  [facility] called patient on 04/24 to schedule an appt, pt did not answer, office left him a v/m  I called patient , left v/m   I encouraged the patient to call [facility] site  Phone number an address provided

## 2023-05-16 ENCOUNTER — ANESTHESIA EVENT (OUTPATIENT)
Dept: GASTROENTEROLOGY | Facility: HOSPITAL | Age: 58
End: 2023-05-16

## 2023-05-16 ENCOUNTER — HOSPITAL ENCOUNTER (OUTPATIENT)
Dept: GASTROENTEROLOGY | Facility: HOSPITAL | Age: 58
Setting detail: OUTPATIENT SURGERY
Discharge: HOME | End: 2023-05-16
Attending: INTERNAL MEDICINE

## 2023-05-16 ENCOUNTER — ANESTHESIA (OUTPATIENT)
Dept: GASTROENTEROLOGY | Facility: HOSPITAL | Age: 58
End: 2023-05-16

## 2023-05-16 VITALS
TEMPERATURE: 98.1 F | RESPIRATION RATE: 18 BRPM | HEART RATE: 93 BPM | SYSTOLIC BLOOD PRESSURE: 140 MMHG | DIASTOLIC BLOOD PRESSURE: 81 MMHG | OXYGEN SATURATION: 95 %

## 2023-05-16 DIAGNOSIS — Z86.010 PERSONAL HISTORY OF COLONIC POLYPS: ICD-10-CM

## 2023-05-16 RX ORDER — PROPOFOL 10 MG/ML
INJECTION, EMULSION INTRAVENOUS AS NEEDED
Status: DISCONTINUED | OUTPATIENT
Start: 2023-05-16 | End: 2023-05-16

## 2023-05-16 RX ORDER — PROPOFOL 10 MG/ML
INJECTION, EMULSION INTRAVENOUS CONTINUOUS PRN
Status: DISCONTINUED | OUTPATIENT
Start: 2023-05-16 | End: 2023-05-16

## 2023-05-16 RX ORDER — SODIUM CHLORIDE 9 MG/ML
INJECTION, SOLUTION INTRAVENOUS CONTINUOUS PRN
Status: DISCONTINUED | OUTPATIENT
Start: 2023-05-16 | End: 2023-05-16

## 2023-05-16 RX ADMIN — SODIUM CHLORIDE: 0.9 INJECTION, SOLUTION INTRAVENOUS at 09:23

## 2023-05-16 RX ADMIN — PROPOFOL 120 MCG/KG/MIN: 10 INJECTION, EMULSION INTRAVENOUS at 09:27

## 2023-05-16 RX ADMIN — PROPOFOL 100 MG: 10 INJECTION, EMULSION INTRAVENOUS at 09:27

## 2023-05-16 NOTE — ANESTHESIA PREPROCEDURE EVALUATION
Procedure:  COLONOSCOPY    Relevant Problems   ANESTHESIA (within normal limits)      CARDIO   (+) Atrial flutter (HCC)   (+) Cardiac arrest (HCC)   (+) Hypertension   (+) 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HCC)      ENDO   (-) Diabetes mellitus, type 2 (HCC)   (-) Hyperthyroidism   (-) Hypothyroidism      GI/HEPATIC   (+) GERD (gastroesophageal reflux disease)   (+) Ileus (HCC)      /RENAL   (+) Kidney stone on right side      HEMATOLOGY   (+) Anemia   (+) Thrombocytopenia (HCC)      MUSCULOSKELETAL   (+) 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HCC)      NEURO/PSYCH   (+) Anxiety   (+) Depression with anxiety   (+) History of MRSA infection of lungs   (+) History of ileus   (+) MDD (major depressive disorder)   (+) Moderate episode of recurrent major depressive disorder (HCC)      PULMONARY   (+) Asthma   (+) Obstructive sleep apnea   (+) Shortness of breath        TTE 11/6/2021  •  Left Ventricle: Left ventricular cavity size is normal  The left ventricular ejection fraction is 70%  Systolic function is vigorous  Wall motion is normal  Diastolic function is mildly abnormal, consistent with grade I (abnormal) relaxation  Wall thickness is increased  There is severe asymmetric hypertrophy of the septal wall  Physical Exam    Airway    Mallampati score: III  TM Distance: >3 FB  Neck ROM: full     Dental       Cardiovascular      Pulmonary      Other Findings        Anesthesia Plan  ASA Score- 3     Anesthesia Type- IV sedation with anesthesia with ASA Monitors  Additional Monitors:   Airway Plan:           Plan Factors-Exercise tolerance (METS): >4 METS  Chart reviewed  EKG reviewed  Existing labs reviewed  Patient summary reviewed  Patient is not a current smoker  Induction- intravenous  Postoperative Plan-     Informed Consent- Anesthetic plan and risks discussed with patient  I personally reviewed this patient with the CRNA  Discussed and agreed on the Anesthesia Plan with the CRNA  Kingsley Garcia

## 2023-05-16 NOTE — H&P
History and Physical - [facility]  Sharath Woods  65 y o  male MRN: [id number]                  HPI: Sharath Woods  is a 65y o  year old male who presents for hx of polyps  REVIEW OF SYSTEMS: Per the HPI, and otherwise unremarkable  Historical Information   Past Medical History:   Diagnosis Date   • Anxiety    • Asthma    • Colon polyp    • GERD (gastroesophageal reflux disease)    • History of ileus    • Hypertension    • Insomnia    • Kidney stone    • Lumbar herniated disc     last assessed: 3/27/2015   • MDD (major depressive disorder)    • Patella fracture     last assessed: 3/27/2015   • Tobacco abuse      Past Surgical History:   Procedure Laterality Date   • BACK SURGERY      Lumbar   • CARDIAC CATHETERIZATION N/A 2021    Procedure: CARDIAC CATHETERIZATION;  Surgeon: Allison Wright MD;  Location: [facility];   Service: Cardiology   • COLONOSCOPY     • KNEE SURGERY      right   • LUMBAR FUSION      last assessed:    • IA RPR UMBILICAL HRNA 5 YRS/> REDUCIBLE N/A 2022    Procedure: REPAIR HERNIA UMBILICAL w/ mesh;  Surgeon: Bhanu Siddiqui DO;  Location: [facility];  Service: General   • IA [place] SHFT FX IMED IMPLT W/WO SCREWS&/CERCLA Left 3/16/2017    Procedure: INSERTION NAIL IM TIBIA;  Surgeon: Radha Albert DO;  Location: [facility];  Service: Orthopedics     Social History   Social History     Substance and Sexual Activity   Alcohol Use Not Currently    Comment: Hx of a couple of drinks a night, or everyother night     Social History     Substance and Sexual Activity   Drug Use No     Social History     Tobacco Use   Smoking Status Former   • Packs/day: 1 50   • Years: 34 00   • Pack years: 51 00   • Types: Cigarettes   • Start date:    • Quit date:    • Years since quittin 3   Smokeless Tobacco Never     Family History   Problem Relation Age of Onset   • Pancreatic cancer Mother    • COPD Father    • Heart attack Brother 64        decreased   • Drug abuse Brother    • Alcohol abuse Brother    • Sleep apnea Brother    • Cancer Family    • Hypertension Family    • Heart disease Family        Meds/Allergies       Current Outpatient Medications:   •  albuterol (PROVENTIL HFA,VENTOLIN HFA) 90 mcg/act inhaler  •  atorvastatin (LIPITOR) 40 mg tablet  •  diltiazem (CARDIZEM CD) 300 mg 24 hr capsule  •  isosorbide mononitrate (IMDUR) 60 mg 24 hr tablet  •  lisinopril (ZESTRIL) 40 mg tablet  •  LORazepam (ATIVAN) 1 mg tablet  •  omeprazole (PriLOSEC) 40 MG capsule  •  QUEtiapine (SEROquel) 300 mg tablet  •  Aspirin Low Dose 81 MG chewable tablet  •  naproxen (Naprosyn) 500 mg tablet  •  polyethylene glycol (GOLYTELY) 4000 mL solution  •  QUEtiapine (SEROquel) 25 mg tablet  •  sertraline (ZOLOFT) 25 mg tablet    No Known Allergies    Objective     There were no vitals taken for this visit  PHYSICAL EXAM    Gen: NAD  Head: NCAT  CV: RRR  CHEST: Clear  ABD: soft, NT/ND  EXT: no edema      ASSESSMENT/PLAN:  This is a 65y o  year old male here for surviellance colonoscopy, and he is stable and optimized for his procedure

## 2023-05-16 NOTE — ANESTHESIA POSTPROCEDURE EVALUATION
Post-Op Assessment Note    CV Status:  Stable  Pain Score: 0    Pain management: adequate     Mental Status:  Sleepy   Hydration Status:  Stable   PONV Controlled:  Controlled   Airway Patency:  Patent      Post Op Vitals Reviewed: Yes      Staff: Anesthesiologist, CRNA         No notable events documented      /86 (05/16/23 1011)    Temp 98 1 °F (36 7 °C) (05/16/23 1011)    Pulse 94 (05/16/23 1011)   Resp 18 (05/16/23 1011)    SpO2 95 % (05/16/23 1011)

## 2023-05-18 DIAGNOSIS — E78.5 DYSLIPIDEMIA WITH ELEVATED LOW DENSITY LIPOPROTEIN (LDL) CHOLESTEROL AND ABNORMALLY LOW HIGH DENSITY LIPOPROTEIN CHOLESTEROL: ICD-10-CM

## 2023-05-18 RX ORDER — ATORVASTATIN CALCIUM 40 MG/1
TABLET, FILM COATED ORAL
Qty: 90 TABLET | Refills: 0 | Status: SHIPPED | OUTPATIENT
Start: 2023-05-18

## 2023-05-22 NOTE — RESULT ENCOUNTER NOTE
I sent patient a MyChart message regarding the biopsy results from colonoscopy perfomed on 5/16/23  Recommended repeat colonoscopy in 2y

## 2023-05-27 DIAGNOSIS — E78.5 DYSLIPIDEMIA: ICD-10-CM

## 2023-05-30 ENCOUNTER — OFFICE VISIT (OUTPATIENT)
Dept: FAMILY MEDICINE CLINIC | Facility: CLINIC | Age: 58
End: 2023-05-30

## 2023-05-30 VITALS
TEMPERATURE: 97.9 F | HEART RATE: 114 BPM | WEIGHT: 299.4 LBS | DIASTOLIC BLOOD PRESSURE: 89 MMHG | SYSTOLIC BLOOD PRESSURE: 143 MMHG | OXYGEN SATURATION: 96 % | BODY MASS INDEX: 42.96 KG/M2 | RESPIRATION RATE: 20 BRPM

## 2023-05-30 DIAGNOSIS — M79.605 LEFT LEG PAIN: Primary | ICD-10-CM

## 2023-05-30 RX ORDER — ALBUTEROL SULFATE 90 UG/1
AEROSOL, METERED RESPIRATORY (INHALATION)
Qty: 8.5 G | Refills: 0 | Status: SHIPPED | OUTPATIENT
Start: 2023-05-30

## 2023-05-30 RX ORDER — KETOROLAC TROMETHAMINE 30 MG/ML
30 INJECTION, SOLUTION INTRAMUSCULAR; INTRAVENOUS ONCE
Status: COMPLETED | OUTPATIENT
Start: 2023-05-30 | End: 2023-05-30

## 2023-05-30 RX ORDER — LIDOCAINE 50 MG/G
1 PATCH TOPICAL DAILY
Qty: 15 PATCH | Refills: 0 | Status: SHIPPED | OUTPATIENT
Start: 2023-05-30

## 2023-05-30 RX ADMIN — KETOROLAC TROMETHAMINE 30 MG: 30 INJECTION, SOLUTION INTRAMUSCULAR; INTRAVENOUS at 11:45

## 2023-05-30 NOTE — ASSESSMENT & PLAN NOTE
-Ongoing c/o of left hip pain  Patient seen in the urgent care and prescribed prednisone and Naproxen  Pain is not improved  Worse with ambulation    -due to worsening c/o pain and difficulties with ambulation  Will refer to ortho for possible injection to the joint    - For now, can use tylenol/Motrin  Will prescribe lidocaine patch and will give a shot of toradol in office today     - provided referral to ortho  - Pt declined Physical therapy

## 2023-05-30 NOTE — PROGRESS NOTES
Name: Lee Russell  : 1965      MRN: [id number]  Encounter Provider: Zelda Herrera MD  Encounter Date: 2023   Encounter department: [address] [place] [address]  Left leg pain  Assessment & Plan:  -Ongoing c/o of left hip pain  Patient seen in the urgent care and prescribed prednisone and Naproxen  Pain is not improved  Worse with ambulation    -due to worsening c/o pain and difficulties with ambulation  Will refer to ortho for possible injection to the joint    - For now, can use tylenol/Motrin  Will prescribe lidocaine patch and will give a shot of toradol in office today  - provided referral to ortho  - Pt declined Physical therapy    Orders:  -     Ambulatory Referral to Orthopedic Surgery; Future  -     ketorolac (TORADOL) injection 30 mg         Subjective      Subjective    Lee Russell  is a 65 y o  male who presents with left hip pain  Onset of the symptoms was at least two months ago  Inciting event was unsure of when the pain started  Work ? There is some numbness  The patient reports the hip pain is aggravated by walking  He states that walking on the stairs makes it worse  He uses a cane for ambulation    Patient has had no prior hip problems  But has back problems about 15 years ago  Has hip fusion  Treatment to date , he was prescribed naproxen and prednisone at the urgent care on 2023  Patient denies any fall  Review of Systems   Constitutional: Negative for chills and fever  HENT: Negative for ear pain and sore throat  Eyes: Negative for pain and visual disturbance  Respiratory: Negative for cough and shortness of breath  Cardiovascular: Negative for chest pain and palpitations  Gastrointestinal: Negative for abdominal pain and vomiting  Genitourinary: Negative for dysuria and hematuria  Musculoskeletal: Positive for arthralgias and gait problem   Negative for back pain and neck stiffness  + Left leg pain   Skin: Negative for color change and rash  Neurological: Negative for seizures and syncope  All other systems reviewed and are negative        Current Outpatient Medications on File Prior to Visit   Medication Sig   • atorvastatin (LIPITOR) 40 mg tablet TAKE ONE TABLET BY MOUTH EVERY DAY   • diltiazem (CARDIZEM CD) 300 mg 24 hr capsule Take 1 capsule (300 mg total) by mouth every morning   • lisinopril (ZESTRIL) 40 mg tablet TAKE ONE TABLET BY MOUTH EVERY DAY   • LORazepam (ATIVAN) 1 mg tablet Take 1 tablet (1 mg total) by mouth 2 (two) times a day as needed for anxiety   • omeprazole (PriLOSEC) 40 MG capsule TAKE ONE CAPSULE BY MOUTH EVERY DAY   • QUEtiapine (SEROquel) 300 mg tablet Take 1 tablet (300 mg total) by mouth daily at bedtime   • sertraline (ZOLOFT) 25 mg tablet Take 25 mg by mouth daily at bedtime   • [DISCONTINUED] albuterol (PROVENTIL HFA,VENTOLIN HFA) 90 mcg/act inhaler Inhale 2 puffs every 6 (six) hours as needed for wheezing   • albuterol (PROVENTIL HFA,VENTOLIN HFA) 90 mcg/act inhaler INHALE 2 PUFFS EVERY 6 HOURS AS NEEDED FOR SHORTNESS OF BREATH   • Aspirin Low Dose 81 MG chewable tablet CHEW 1 TABLET BY MOUTH EVERY DAY (Patient taking differently: Chew 81 mg every morning)   • isosorbide mononitrate (IMDUR) 60 mg 24 hr tablet TAKE ONE TABLET BY MOUTH ONCE DAILY (Patient taking differently: Take 60 mg by mouth every morning)   • naproxen (Naprosyn) 500 mg tablet Take 1 tablet (500 mg total) by mouth 2 (two) times a day with meals for 15 days   • polyethylene glycol (GOLYTELY) 4000 mL solution Take 4,000 mL by mouth once for 1 dose   • QUEtiapine (SEROquel) 25 mg tablet TAKE TWO TABLETS BY MOUTH EVERY MORNING AND 1 TABLET AT BEDTIME WITH 300MG TABLET FOR A 325MG TOTAL DOSE (Patient not taking: Reported on 11/14/2022)       Objective     /89   Pulse (!) 114   Temp 97 9 °F (36 6 °C)   Resp 20   Wt 136 kg (299 lb 6 4 oz)   SpO2 96%   BMI 42 96 kg/m² Physical Exam  Vitals reviewed  Constitutional:       General: He is not in acute distress  Appearance: Normal appearance  He is obese  He is not ill-appearing, toxic-appearing or diaphoretic  HENT:      Head: Normocephalic and atraumatic  Eyes:      General:         Right eye: No discharge  Left eye: No discharge  Conjunctiva/sclera: Conjunctivae normal    Cardiovascular:      Rate and Rhythm: Normal rate and regular rhythm  Pulses: Normal pulses  Heart sounds: Normal heart sounds  Pulmonary:      Effort: Pulmonary effort is normal  No respiratory distress  Breath sounds: Normal breath sounds  No wheezing  Abdominal:      General: Abdomen is flat  Bowel sounds are normal  There is no distension  Palpations: Abdomen is soft  Tenderness: There is no abdominal tenderness  There is no guarding  Skin:     General: Skin is warm and dry  Neurological:      Mental Status: He is alert  Mental status is at baseline  Gait: Gait abnormal       Comments: Ambulates with a walker   Psychiatric:         Mood and Affect: Mood normal          Behavior: Behavior normal          Thought Content:  Thought content normal          Judgment: Judgment normal        Alejandra Rodriugez MD

## 2023-05-31 DIAGNOSIS — I10 HYPERTENSION, UNSPECIFIED TYPE: ICD-10-CM

## 2023-05-31 RX ORDER — OMEPRAZOLE 40 MG/1
CAPSULE, DELAYED RELEASE ORAL
Qty: 60 CAPSULE | Refills: 0 | Status: SHIPPED | OUTPATIENT
Start: 2023-05-31

## 2023-06-02 ENCOUNTER — TELEPHONE (OUTPATIENT)
Dept: FAMILY MEDICINE CLINIC | Facility: CLINIC | Age: 58
End: 2023-06-02

## 2023-06-02 ENCOUNTER — TELEPHONE (OUTPATIENT)
Dept: OBGYN CLINIC | Facility: CLINIC | Age: 58
End: 2023-06-02

## 2023-06-02 ENCOUNTER — TELEPHONE (OUTPATIENT)
Dept: PAIN MEDICINE | Facility: MEDICAL CENTER | Age: 58
End: 2023-06-02

## 2023-06-02 DIAGNOSIS — M54.41 ACUTE RIGHT-SIDED LOW BACK PAIN WITH RIGHT-SIDED SCIATICA: Primary | ICD-10-CM

## 2023-06-02 RX ORDER — METHOCARBAMOL 500 MG/1
500 TABLET, FILM COATED ORAL 2 TIMES DAILY
Qty: 20 TABLET | Refills: 0 | Status: SHIPPED | OUTPATIENT
Start: 2023-06-02

## 2023-06-02 NOTE — TELEPHONE ENCOUNTER
"S/W pt  It appears he has not been seen with our practice since 2016  Pt is asking to be seen so someone will evaluate him and order an MRI  States it has been 2 months that he has not been able to walk  Stabbing hip pain radiating  from hip all the way down his leg and feels needles in his feet  Pcp referred to Ortho who scheduled then declined the appt and said send to [facility]  Pt is not asking for \"drugs\" at this time  Wants evaluation and MRI    Please advise - he would be a new pt wherever he was seen at this point    "

## 2023-06-02 NOTE — TELEPHONE ENCOUNTER
Called pt and left message to let him know he needs to schedule an appt with [facility]  He has an appt on 6/5 with Cole Ramesh but after he reviewed his chart and is unable to help him with his current problems  He feels [facility] is who would best be able to help him at this time

## 2023-06-02 NOTE — TELEPHONE ENCOUNTER
Caller: Manuel    Doctor: doug    Reason for call: patient called back and transferred patient to [facility]    Call back#: [phone]

## 2023-06-02 NOTE — TELEPHONE ENCOUNTER
Hi Dr Clemente French,  Your patient was in this past Tues and was seen by Dr Lorena Sharif for hip pain  She referred patient to Ortho, who gave him an appt for Monday 06/05/23 and shortly after returned the call to cancel appt  Patient stated, they said there was nothing they could do for him other than suggest PT and contact PCP to order xray and MRI  Patient said, he doesn't know what to do he's so much pain he can hardly walk, hoping you can order something for pain until further advise suggested by Ortho    Please advise

## 2023-06-02 NOTE — TELEPHONE ENCOUNTER
Caller: patient    Doctor: Leonides Knows    Reason for call: would like loyda to [place] office, did not want to f/u with Lester Burks   Patient is in severe would like to be seen sooner    Call back#:

## 2023-06-12 ENCOUNTER — CONSULT (OUTPATIENT)
Dept: PAIN MEDICINE | Facility: CLINIC | Age: 58
End: 2023-06-12
Payer: COMMERCIAL

## 2023-06-12 VITALS
HEIGHT: 70 IN | BODY MASS INDEX: 42.8 KG/M2 | DIASTOLIC BLOOD PRESSURE: 100 MMHG | WEIGHT: 299 LBS | SYSTOLIC BLOOD PRESSURE: 164 MMHG

## 2023-06-12 DIAGNOSIS — Z98.1 HISTORY OF LUMBAR SPINAL FUSION: ICD-10-CM

## 2023-06-12 DIAGNOSIS — M54.16 LUMBAR RADICULOPATHY: Primary | ICD-10-CM

## 2023-06-12 PROBLEM — T50.902A INTENTIONAL DRUG OVERDOSE, INITIAL ENCOUNTER (HCC): Status: ACTIVE | Noted: 2023-06-12

## 2023-06-12 PROCEDURE — 99214 OFFICE O/P EST MOD 30 MIN: CPT | Performed by: ANESTHESIOLOGY

## 2023-06-12 NOTE — PROGRESS NOTES
Assessment:  1  Lumbar radiculopathy    2  History of lumbar spinal fusion        Plan:    Patient presenting with chronic back pain with radicular symptoms for 10+ years, acutely worsening over the past several months  Pain consistent with /lumbar radicular pain accompanied by pain >7/10 on the pain scale with inability to participate in IADLs for >6 weeks  Patient has participated in physical therapy in the past with minimal improvement  Has been taking NSAIDs, muscle relaxants, oral steroids with minimal benefit  Denies any gait instability, saddle anesthesia  Patient was previously on oxycodone from other specialists which has since been discontinued  Given his medical comorbidities and concurrent benzodiazepines, would avoid chronic opioid therapy  In regards to the patient's lumbar spine pathology, we discussed the various treatment options including physical therapy, chiropractic treatment, medication management, activity modifications, interventional spine procedures  Given that patient has not had any benefit with conservative treatments, I think patient would benefit from targeted interventional treatment modalities  I personally reviewed and interpreted pertinent imaging studies - specifically lumbar MRI from 2017 and discussed the results and clinical significance with the patient  Prior MRI shows intact L4-5 fusion hardware with likely granulation tissue along the left L5 nerve root  There is moderate to severe bilateral foraminal narrowing at L3-4  Given his acutely worsening radicular pain in the left lower extremity, I have recommended obtaining an updated MRI to further guide treatment modalities, likely possible epidural steroid injection(s)  Risks, benefits, and alternatives to epidural steroid injections thoroughly discussed with patient  Patient will follow-up after lumbar MRI      I reviewed external notes from the relevant aspects of the patient's medical record, specifically primary care physician, external pain management, physical therapy, orthopedic notes in regards to current and prior treatments tried (as mentioned in history of present illness)  I reviewed pertinent laboratory studies, specifically renal function, hemoglobin A1c, CBC, coagulation studies, prior to initiating the recommended medication therapies/interventional treatment options  Handouts provided questions answered to patient's satisfaction  Lifestyle modifications extensively discussed including diet, exercise and weight loss in addition to core strengthening  [place] Prescription Drug Monitoring Program report was reviewed and was appropriate     My impressions and treatment recommendations were discussed in detail with the patient who verbalized understanding and had no further questions  Discharge instructions were provided  I personally saw and examined the patient and I agree with the above discussed plan of care  Orders Placed This Encounter   Procedures   • MRI lumbar spine w wo contrast     Standing Status:   Future     Standing Expiration Date:   6/12/2027     Scheduling Instructions: There is no preparation for this test  Please leave your jewelry and valuables at home, wedding rings are the exception  All patients will be required to change into a hospital gown and pants  Street clothes are not permitted in the MRI  Magnetic nail polish must be removed prior to arrival for your test  Please bring your insurance cards, a form of photo ID and a list of your medications with you  Arrive 15 minutes prior to your appointment time in order to register  Please bring any prior CT or MRI studies of this area that were not performed at a [facility]  To schedule this appointment, please contact Central Scheduling at [phone]              Prior to your appointment, please make sure you complete the MRI Screening Form when you e-Check in for your appointment  This will be available starting 7 days before your appointment in [facility] [facility]  You may receive an e-mail with an activation code if you do not have a [employer] account  If you do not have access to a device, we will complete your screening at your appointment  Order Specific Question:   What is the patient's sedation requirement? Answer:   No Sedation     Order Specific Question:   Does this procedure require the 3T MRI at [facility] or [place]?     Answer:   No     Order Specific Question:   Release to patient through [facility]     Answer:   Immediate     Order Specific Question:   Is order priority selected as STAT? Answer:   No     Order Specific Question:   Reason for Exam (FREE TEXT)     Answer:   lumbar radiculopathy, new symptoms since prior lumbar fusion 2015   • Comprehensive metabolic panel     This is a patient instruction: Patient fasting for 8 hours or longer recommended  Standing Status:   Future     Standing Expiration Date:   6/12/2024     No orders of the defined types were placed in this encounter  History of Present Illness:  Yair Summers  is a 65 y o  male presenting for consultation at [facility] for exam and evaluation of chronic low pain for 10+ years, worsening over the past several months  Pain started without any precipitating injury or trauma  Over the past month, the intensity of pain has been Severe  Pain is currently 10/10  Pain does interfere with age appropriate activities of daily living  Pain is constant, sharp, throbbing, shooting with numbness and pins-and-needles throughout the day with no typical pattern  Patient endorses weakness in the bilateral lower extremities  Assistance device used: [address] [employer], Ne and Walker  Pain is increased with all movements and positions  Pain is not decreased with any position or activity  Prior pertinent treatments tried: Physical therapy, steroid injections, surgery    Pertinent medications tried/currently taking: Methocarbamol, naproxen, gabapentin, oxycodone, oral steroids  I have personally reviewed and/or updated the patient's past medical history, past surgical history, family history, social history, current medications, allergies, and vital signs today  Review of Systems   Constitutional: Negative for chills and fever  HENT: Negative for ear pain and sore throat  Eyes: Negative for pain and visual disturbance  Respiratory: Negative for cough and shortness of breath  Cardiovascular: Negative for chest pain and palpitations  Gastrointestinal: Negative for abdominal pain and vomiting  Genitourinary: Negative for dysuria and hematuria  Musculoskeletal: Positive for arthralgias, back pain, gait problem and myalgias  Skin: Negative for color change and rash  Neurological: Positive for weakness and numbness  Negative for seizures and syncope  All other systems reviewed and are negative        Patient Active Problem List   Diagnosis   • Other insomnia   • Depression with anxiety   • Kidney stone on right side   • Ileus ([address] )   • Umbilical hernia without obstruction and without gangrene   • Gallbladder calculus without cholecystitis   • Tobacco use disorder   • Closed right ankle fracture   • History of ileus   • Adenoma of left adrenal gland   • Anxiety   • GERD (gastroesophageal reflux disease)   • MDD (major depressive disorder)   • History of MRSA infection of lungs   • Moderate episode of recurrent major depressive disorder (HCC)   • Colon cancer screening   • Asthma   • Hypertension   • Tachycardia   • Dyslipidemia with elevated low density lipoprotein (LDL) cholesterol and abnormally low high density lipoprotein cholesterol   • Elevated alkaline phosphatase level   • Elevated hemoglobin (HCC)   • Obesity, Class II, BMI 35-39 9   • Excessive daytime sleepiness   • Cardiac arrest (HCC)   • 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HCC)   • Alcohol dependence (HCC)   • Anemia   • Thrombocytopenia (HCC)   • Atrial flutter ([address] )   • Hip pain   • Financial difficulties   • Annual physical exam   • Preop examination   • Blood in left ear canal   • Hyperpigmented skin lesion   • Obstructive sleep apnea   • Shortness of breath   • Left leg pain   • Intentional drug overdose, initial encounter [facility])       Past Medical History:   Diagnosis Date   • Anxiety    • Asthma    • Colon polyp    • GERD (gastroesophageal reflux disease)    • History of ileus    • Hypertension    • Insomnia    • Kidney stone    • Lumbar herniated disc     last assessed: 3/27/2015   • MDD (major depressive disorder)    • Patella fracture     last assessed: 3/27/2015   • Tobacco abuse        Past Surgical History:   Procedure Laterality Date   • BACK SURGERY  2015    Lumbar   • CARDIAC CATHETERIZATION N/A 11/5/2021    Procedure: CARDIAC CATHETERIZATION;  Surgeon: Daniel Steiner MD;  Location: [facility];   Service: Cardiology   • COLONOSCOPY     • KNEE SURGERY      right   • LUMBAR FUSION      last assessed: 5/58/5645   • VA RPR UMBILICAL HRNA 5 YRS/> REDUCIBLE N/A 9/14/2022    Procedure: REPAIR HERNIA UMBILICAL w/ mesh;  Surgeon: Jania Siddiqui DO;  Location: [facility] MAIN OR;  Service: General   • VA TX TIBL SHFT FX IMED IMPLT W/WO SCREWS&/CERCLA Left 3/16/2017    Procedure: INSERTION NAIL IM TIBIA;  Surgeon: Sulema Smiley DO;  Location: [facility];  Service: Orthopedics       Family History   Problem Relation Age of Onset   • Pancreatic cancer Mother    • COPD Father    • Heart attack Brother 64        decreased   • Drug abuse Brother    • Alcohol abuse Brother    • Sleep apnea Brother    • Cancer Family    • Hypertension Family    • Heart disease Family        Social History     Occupational History     Employer: [employer]   Tobacco Use   • Smoking status: Former     Packs/day: 1 50     Years: 34 00     Total pack years: 51 00     Types: Cigarettes     Start date: 27     Quit date: 2021     Years since quittin 4   • Smokeless tobacco: Never   Vaping Use   • Vaping Use: Never used   Substance and Sexual Activity   • Alcohol use: Not Currently     Comment: Hx of a couple of drinks a night, or everyother night   • Drug use: No   • Sexual activity: Not Currently     Partners: Female       Current Outpatient Medications on File Prior to Visit   Medication Sig   • albuterol (PROVENTIL HFA,VENTOLIN HFA) 90 mcg/act inhaler INHALE 2 PUFFS EVERY 6 HOURS AS NEEDED FOR SHORTNESS OF BREATH   • Aspirin Low Dose 81 MG chewable tablet CHEW 1 TABLET BY MOUTH EVERY DAY (Patient taking differently: Chew 81 mg every morning)   • atorvastatin (LIPITOR) 40 mg tablet TAKE ONE TABLET BY MOUTH EVERY DAY   • diltiazem (CARDIZEM CD) 300 mg 24 hr capsule Take 1 capsule (300 mg total) by mouth every morning   • isosorbide mononitrate (IMDUR) 60 mg 24 hr tablet TAKE ONE TABLET BY MOUTH ONCE DAILY (Patient taking differently: Take 60 mg by mouth every morning)   • lidocaine (Lidoderm) 5 % Apply 1 patch topically over 12 hours daily Remove & Discard patch within 12 hours or as directed by MD   • lisinopril (ZESTRIL) 40 mg tablet TAKE ONE TABLET BY MOUTH EVERY DAY   • LORazepam (ATIVAN) 1 mg tablet Take 1 tablet (1 mg total) by mouth 2 (two) times a day as needed for anxiety   • methocarbamol (ROBAXIN) 500 mg tablet Take 1 tablet (500 mg total) by mouth 2 (two) times a day   • omeprazole (PriLOSEC) 40 MG capsule TAKE ONE CAPSULE BY MOUTH EVERY DAY   • QUEtiapine (SEROquel) 300 mg tablet Take 1 tablet (300 mg total) by mouth daily at bedtime   • sertraline (ZOLOFT) 25 mg tablet Take 25 mg by mouth daily at bedtime   • naproxen (Naprosyn) 500 mg tablet Take 1 tablet (500 mg total) by mouth 2 (two) times a day with meals for 15 days   • polyethylene glycol (GOLYTELY) 4000 mL solution Take 4,000 mL by mouth once for 1 dose   • QUEtiapine (SEROquel) 25 mg tablet TAKE TWO TABLETS BY MOUTH EVERY MORNING AND 1 TABLET AT BEDTIME WITH 300MG "TABLET FOR A 325MG TOTAL DOSE (Patient not taking: Reported on 11/14/2022)     No current facility-administered medications on file prior to visit  No Known Allergies    Physical Exam:    /100   Ht 5' 10\" (1 778 m)   Wt 136 kg (299 lb)   BMI 42 90 kg/m²     Constitutional:normal, well developed, well nourished, alert, in no distress and non-toxic and no overt pain behavior  and obese  Eyes:anicteric  HEENT:grossly intact  Neck:supple, symmetric, trachea midline and no masses   Pulmonary:even and unlabored  Cardiovascular:No edema or pitting edema present  Skin:Normal without rashes or lesions and well hydrated  Psychiatric:Mood and affect appropriate  Neurologic: Weakness with left hip flexion and leg extension (4+/5)  No focal neurologic deficit  Musculoskeletal:antalgic  Positive left straight leg raise  Imaging  MRI LUMBAR SPINE WITH AND WITHOUT CONTRAST     INDICATION:  History of right lower leg fracture in March 20, 2017  Severe low back pain for 2 -- 3 months radiating medially down the right leg to the ankle      COMPARISON:  Prior MRI dated March 15, 2016     TECHNIQUE:  Sagittal T1, sagittal T2, sagittal inversion recovery, axial T1 and axial T2, coronal T2  Sagittal and axial T1 postcontrast      IV Contrast:  10 mL of gadobutrol injection (MULTI-DOSE)      IMAGE QUALITY:  Diagnostic     FINDINGS:     ALIGNMENT:  The patient is status post L4-5 fusion  Transitional lumbosacral junction noted  L5 is partially sacralized with the enlarged right transverse processes articulating with the right sacral base      MARROW SIGNAL:  Endplate degenerative marrow signal identified L3-4 and L4-5      DISTAL CORD AND CONUS:  Normal size and signal of the distal cord and conus  The conus ends at the T12-L1 level      PARASPINAL SOFT TISSUES:  Paraspinal soft tissues are unremarkable      SACRUM:  Normal signal within the sacrum   No evidence of insufficiency or stress fracture      LOWER THORACIC " DISC SPACES:  Normal disc height and signal   No disc herniation, canal stenosis or foraminal narrowing      LUMBAR DISC SPACES:          L1-L2:  Small marginal osteophytes on the right  No significant central or foraminal narrowing      L2-L3:  Degenerative disc osteophyte complex with marginal osteophyte eccentric to the right results in moderate right foraminal narrowing  There is mild central stenosis  Moderate facet hypertrophy noted      L3-L4:  Degenerative disc osteophyte complex with marginal osteophytes results in moderate to severe bilateral foraminal narrowing  Status post decompressive laminectomy  Epidural granulation tissue noted with enhancing tissue along the ventral   epidural margin      L4-L5:  Status post fusion with hardware  Foramina are patent  No recurrent or residual disc herniation  Abnormal soft tissue surrounds the left L5 nerve root in the lateral recess likely granulation tissue  Foramina are patent      L5-S1:  No significant central or foraminal narrowing  Severe facet hypertrophy      POSTCONTRAST IMAGING:  No abnormal enhancement      IMPRESSION:     Postoperative changes identified  No recurrent residual disc herniation  Marginal osteophytes L3-4 result in moderate to severe bilateral foraminal narrowing    Epidural granulation L3-4 and eccentric to the left at L4-5 noted

## 2023-06-14 ENCOUNTER — TELEPHONE (OUTPATIENT)
Dept: FAMILY MEDICINE CLINIC | Facility: CLINIC | Age: 58
End: 2023-06-14

## 2023-06-14 NOTE — TELEPHONE ENCOUNTER
----- Message from Aster Ragsdale MD sent at 6/14/2023  1:20 PM EDT -----  Patient needs FMLA forms filled out, I will need to see him for that    Please schedule an appointment on 6/22/2023 at 10:30 AM   Thank you    Aster Ragsdale MD

## 2023-06-14 NOTE — TELEPHONE ENCOUNTER
Patient is requesting an call back from [address] [place] [address]  In regards to his FMLA and MRI of his left leg  Patient states that the MRI is schedule for 6/26  Also that he is out till the 6/29 and wanted to speak on that little with Dr Gurjit Mcclellan

## 2023-06-15 ENCOUNTER — TELEPHONE (OUTPATIENT)
Dept: PAIN MEDICINE | Facility: CLINIC | Age: 58
End: 2023-06-15

## 2023-06-15 NOTE — TELEPHONE ENCOUNTER
additional info needed  Received: Today  [facility]  JONAS is looking for physical therapy notes for this patient's MRI  I read in the note he had done PT in the past but has he done any within the last 6 months? If not, I'm afraid this will need a peer to peer to be approved       Thanks

## 2023-06-15 NOTE — TELEPHONE ENCOUNTER
Caller: Chandler Johnson     Doctor: Dr Merry Ruiz     Reason for call: Patient returning call     Call back#: [phone]

## 2023-06-15 NOTE — TELEPHONE ENCOUNTER
S/w pt, he has not completed PT in the last 6 months for his low back  Pt said he would be willing to complete if necessary  Please advise if PT will be ordered or P2P will be completed

## 2023-06-15 NOTE — TELEPHONE ENCOUNTER
Will Jett Cardenas MD  You 1 minute ago (3:18 PM)     WS  I would have patient complete PT, thank you  He does have an active PT referral from April 2023  S/w pt, made him aware  Pt will call to set up PT  MRI appt cx'd

## 2023-06-16 ENCOUNTER — TELEPHONE (OUTPATIENT)
Dept: FAMILY MEDICINE CLINIC | Facility: CLINIC | Age: 58
End: 2023-06-16

## 2023-06-16 NOTE — TELEPHONE ENCOUNTER
Medical Records requested by [facility]  From 05/25/23 to Present    Emailed [employer]

## 2023-06-17 DIAGNOSIS — I10 HYPERTENSION, UNSPECIFIED TYPE: ICD-10-CM

## 2023-06-19 RX ORDER — OMEPRAZOLE 40 MG/1
CAPSULE, DELAYED RELEASE ORAL
Qty: 60 CAPSULE | Refills: 0 | Status: SHIPPED | OUTPATIENT
Start: 2023-06-19

## 2023-06-23 ENCOUNTER — HOSPITAL ENCOUNTER (OUTPATIENT)
Dept: SLEEP CENTER | Facility: CLINIC | Age: 58
Discharge: HOME | End: 2023-06-23
Payer: COMMERCIAL

## 2023-06-23 DIAGNOSIS — G47.33 OBSTRUCTIVE SLEEP APNEA: ICD-10-CM

## 2023-06-23 DIAGNOSIS — G47.19 EXCESSIVE DAYTIME SLEEPINESS: ICD-10-CM

## 2023-06-23 PROCEDURE — 95811 POLYSOM 6/>YRS CPAP 4/> PARM: CPT

## 2023-06-23 PROCEDURE — 95811 POLYSOM 6/>YRS CPAP 4/> PARM: CPT | Performed by: PSYCHIATRY & NEUROLOGY

## 2023-06-24 NOTE — PROGRESS NOTES
Sleep Study Documentation    Pre-Sleep Study       Sleep testing procedure explained to patient:YES    Patient napped prior to study:NO    Caffeine:Dayshift worker after 12PM   Caffeine use:YES- coffee  6 ounces and soda  12 ounces    Alcohol:Dayshift workers after 5PM: Alcohol use:NO    Typical day for patient:YES       Study Documentation    Sleep Study Indications: PRAKASH- dignosed home study    Sleep Study: Treatment   Optimal PAP pressure: +[id number]  Leak:None  Snore:Eliminated  REM Obtained:yes  Supplemental O2: no    Minimum SaO2 85%  Baseline SaO2 90%    PAP mask choice (final)Medium [employer] SIMPLUS  PAP mask type:full face  PAP pressure at which snoring was eliminated [id number]  Minimum SaO2 at final PAP pressure 87%  Mode of Therapy:CPAP  ETCO2:No  CPAP changed to BiPAP:No    Mode of Therapy:CPAP    EKG abnormalities: yes:  EPOCH example and comments: Cardiac arhythmias throughout the night  EEG abnormalities: no    Sleep Study Recorded < 2 hours: N/A    Sleep Study Recorded > 2 hours but incomplete study: N/A    Sleep Study Recorded 6 hours but no sleep obtained: NO    Patient classification: employed       Post-Sleep Study    Medication used at bedtime or during sleep study:YES prescription sleep aid    Patient reports time it took to fall asleep:less than 20 minutes    Patient reports waking up during study:1 to 2 times  Patient reports returning to sleep in 10 to 30 minutes  Patient reports sleeping 6 to 8 hours without dreaming  Patient reports sleep during study:better than usual    Patient rated sleepiness: Somewhat sleepy or tired    PAP treatment:yes: Post PAP treatment patient reports feeling unsure if a change is noted and  would wear PAP mask at home

## 2023-06-28 DIAGNOSIS — G47.33 OBSTRUCTIVE SLEEP APNEA: Primary | ICD-10-CM

## 2023-06-28 DIAGNOSIS — G47.36 HYPOXEMIA ASSOCIATED WITH SLEEP: ICD-10-CM

## 2023-06-28 DIAGNOSIS — E66.01 CLASS 3 SEVERE OBESITY DUE TO EXCESS CALORIES WITH SERIOUS COMORBIDITY AND BODY MASS INDEX (BMI) OF 40.0 TO 44.9 IN ADULT (HCC): ICD-10-CM

## 2023-06-29 ENCOUNTER — TELEPHONE (OUTPATIENT)
Dept: SLEEP CENTER | Facility: CLINIC | Age: 58
End: 2023-06-29

## 2023-06-29 DIAGNOSIS — G47.33 OBSTRUCTIVE SLEEP APNEA: Primary | ICD-10-CM

## 2023-06-29 LAB

## 2023-06-29 NOTE — TELEPHONE ENCOUNTER
Order was sent to [employer] but it is held for recent chart notes discussing the need for O2  I have reached out to nurse Tony Whitley in sleep to advised him the order is held  I also have reached out to my company asking if we can push the PAP machine forward while we wait for the anibal  for the recent FTF  Waiting on a response

## 2023-06-29 NOTE — TELEPHONE ENCOUNTER
----- Message from Kj Dang MD sent at 6/28/2023  8:47 PM EDT -----  Message sent,  He needs to be seen ASAP, he should already have a CPAP machine, think it is airsense 10 without a modem   I can see him at 12 pm 6/29 if he can come , he needs to bring his machine to the visit

## 2023-06-29 NOTE — TELEPHONE ENCOUNTER
Order placed for CPAP with supplemental oxygen bled in, Dr Halima Stanford requesting expedited setup  Email sent to [facility] requesting expedited setup of CPAP  Rx for CPAP with supplemental oxygen bled in and clinicals sent to [address] [place] [address] via [employer]  Rx for home oxygen and clinicals sent to [facility] via [employer]  Call placed to patient  Left message CPAP with supplemental oxygen has been ordered with request to expedite setup, a representative from [address] [place] [address] will be calling to schedule appt         Also advised patient call to schedule a follow-up appointment with Dr Halima Stanford  Patient appointment 8/7/2023 with Dr Halima Stanford, left in place for compliance  MyChart message also sent to patient with above information

## 2023-06-29 NOTE — TELEPHONE ENCOUNTER
Call placed to patient  Advised CPAP study resulted and a pressure change has been ordered  Patient reports not having a CPAP currently  Advised I would notify Dr Rosa Maria Rodriguez [facility] Text sent)  Also advised Dr Rosa Maria Rodriguez would like to see patient ASAP 6/29/2023 at 12:00, patient could not accept appointment

## 2023-06-30 ENCOUNTER — TELEPHONE (OUTPATIENT)
Dept: SLEEP CENTER | Facility: CLINIC | Age: 58
End: 2023-06-30

## 2023-06-30 NOTE — TELEPHONE ENCOUNTER
I called the pt  And scheduled his CPAP set up in [place] for about 2 weeks from now  Due to being out of work he would like to wait till he gets finically caught up  His INS only covers 80%  I have advised the sleep nurses and both doctors, the Sleep & Cardiologist about his O2 and CPAP set up

## 2023-07-05 ENCOUNTER — TELEPHONE (OUTPATIENT)
Dept: FAMILY MEDICINE CLINIC | Facility: CLINIC | Age: 58
End: 2023-07-05

## 2023-07-05 ENCOUNTER — OFFICE VISIT (OUTPATIENT)
Dept: FAMILY MEDICINE CLINIC | Facility: CLINIC | Age: 58
End: 2023-07-05

## 2023-07-05 VITALS
TEMPERATURE: 98.9 F | HEART RATE: 112 BPM | OXYGEN SATURATION: 96 % | SYSTOLIC BLOOD PRESSURE: 135 MMHG | DIASTOLIC BLOOD PRESSURE: 91 MMHG | WEIGHT: 307 LBS | HEIGHT: 70 IN | BODY MASS INDEX: 43.95 KG/M2

## 2023-07-05 DIAGNOSIS — M79.605 LEFT LEG PAIN: Primary | ICD-10-CM

## 2023-07-05 PROCEDURE — 99213 OFFICE O/P EST LOW 20 MIN: CPT | Performed by: FAMILY MEDICINE

## 2023-07-05 NOTE — LETTER
July 5, 2023     Patient: Dragan Sallie. YOB: 1965  Date of Visit: 7/5/2023      To Whom it May Concern:    Loyd Lozada is under my professional care. Duke was seen in my office on 7/5/2023. [facility] may return to work on 07/09/2023 . July 8th will be the last day without work. If you have any questions or concerns, please don't hesitate to call.          Sincerely,          Machelle Borjas,         CC: No Recipients

## 2023-07-05 NOTE — PROGRESS NOTES
Name: Antonia Mart. : 1965      MRN: [id number]  Encounter Provider: Lb Kruger DO  Encounter Date: 2023   Encounter department: [address]     1. Left leg pain  Assessment & Plan:  -tingling and aching pain since 2023  -was using a walker and/or cane until a few weeks ago -- now minimal problems with ambulation  -significant improvement in pain level and gait  -has been able to exercise the leg for the past few weeks  -has been out of work since end of May 2023 with this, and will return to work on 2023  -will refer to PT for continued management      Orders:  -     Ambulatory Referral to Physical Therapy; Future         Subjective      Pt is a 63yo male with history of left-sided leg and hip pain. Pt is returning to the office today for FMLA form and [id number] letter due to left leg pain/sciatica. Pt states that the pain started in 2023 after a fall out of his shower onto his left hip, and was progressively worsening until a few weeks ago. For the past few weeks, he has been able to do some light exercise, such as stationary bike riding. Pt was seen in the office back at the end of May for the same concern, and was unable to participate in physical therapy due to high pain level. He has been out of work since May 25, 2023 due to pain. Pt states that his FMLA was in effect until Nohemy 15, but since then, he has been out of work without it. He wishes to return to work on  to full work duties. He works in inventory management. Today, pt states that his pain level has significantly improved and that he wants a referral to PT for continued management. Review of Systems   Musculoskeletal: Positive for gait problem. Negative for back pain. Left leg pain   Skin: Negative for color change, rash and wound. Neurological: Negative for dizziness, weakness and numbness.    All other systems reviewed and are negative.       Current Outpatient Medications on File Prior to Visit   Medication Sig   • albuterol (PROVENTIL HFA,VENTOLIN HFA) 90 mcg/act inhaler INHALE 2 PUFFS EVERY 6 HOURS AS NEEDED FOR SHORTNESS OF BREATH   • Aspirin Low Dose 81 MG chewable tablet CHEW 1 TABLET BY MOUTH EVERY DAY (Patient taking differently: Chew 81 mg every morning)   • atorvastatin (LIPITOR) 40 mg tablet TAKE ONE TABLET BY MOUTH EVERY DAY   • diltiazem (CARDIZEM CD) 300 mg 24 hr capsule Take 1 capsule (300 mg total) by mouth every morning   • isosorbide mononitrate (IMDUR) 60 mg 24 hr tablet TAKE ONE TABLET BY MOUTH ONCE DAILY (Patient taking differently: Take 60 mg by mouth every morning)   • lidocaine (Lidoderm) 5 % Apply 1 patch topically over 12 hours daily Remove & Discard patch within 12 hours or as directed by MD   • lisinopril (ZESTRIL) 40 mg tablet TAKE ONE TABLET BY MOUTH EVERY DAY   • LORazepam (ATIVAN) 1 mg tablet Take 1 tablet (1 mg total) by mouth 2 (two) times a day as needed for anxiety   • methocarbamol (ROBAXIN) 500 mg tablet Take 1 tablet (500 mg total) by mouth 2 (two) times a day   • omeprazole (PriLOSEC) 40 MG capsule TAKE ONE CAPSULE BY MOUTH EVERY DAY   • QUEtiapine (SEROquel) 300 mg tablet Take 1 tablet (300 mg total) by mouth daily at bedtime   • sertraline (ZOLOFT) 25 mg tablet Take 25 mg by mouth daily at bedtime   • naproxen (Naprosyn) 500 mg tablet Take 1 tablet (500 mg total) by mouth 2 (two) times a day with meals for 15 days   • polyethylene glycol (GOLYTELY) 4000 mL solution Take 4,000 mL by mouth once for 1 dose   • QUEtiapine (SEROquel) 25 mg tablet TAKE TWO TABLETS BY MOUTH EVERY MORNING AND 1 TABLET AT BEDTIME WITH 300MG TABLET FOR A 325MG TOTAL DOSE (Patient not taking: Reported on 11/14/2022)       Objective     /91 (BP Location: Right arm, Patient Position: Sitting, Cuff Size: Large)   Pulse (!) 112   Temp 98.9 °F (37.2 °C) (Temporal)   Ht 5' 10" (1.778 m)   Wt (!) 139 kg (307 lb)   SpO2 96%   BMI 44.05 kg/m²     Physical Exam  Constitutional:       Appearance: Normal appearance. He is obese. Cardiovascular:      Rate and Rhythm: Normal rate and regular rhythm. Heart sounds: Normal heart sounds. No murmur heard. No friction rub. No gallop. Pulmonary:      Effort: Pulmonary effort is normal.      Breath sounds: Normal breath sounds. No wheezing, rhonchi or rales. Musculoskeletal:      Right upper leg: Normal.      Left upper leg: Normal.      Right lower leg: Normal.      Left lower leg: Normal.   Neurological:      Mental Status: He is alert. Motor: No weakness. Gait: Gait is intact.  Gait and tandem walk normal.       Maksim Corona,

## 2023-07-05 NOTE — TELEPHONE ENCOUNTER
Form completed today at appt time by Dr. Hemant Cruz. Form placed in [address] [place] [address] clerical folder for faxing.

## 2023-07-05 NOTE — TELEPHONE ENCOUNTER
Folder (To be completed by) -Dr. Lamberto Robbins      Name of Siobhan Boudreaux  folder (green    Form to be Faxed [phone]    Patient was made aware of the 7-10 business day form policy.

## 2023-07-05 NOTE — ASSESSMENT & PLAN NOTE
-tingling and aching pain since April 2023  -was using a walker and/or cane until a few weeks ago -- now minimal problems with ambulation  -significant improvement in pain level and gait  -has been able to exercise the leg for the past few weeks  -has been out of work since end of May 2023 with this, and will return to work on July 9, 2023  -will refer to PT for continued management

## 2023-07-14 DIAGNOSIS — E78.5 DYSLIPIDEMIA: ICD-10-CM

## 2023-07-14 RX ORDER — ALBUTEROL SULFATE 90 UG/1
AEROSOL, METERED RESPIRATORY (INHALATION)
Qty: 8.5 G | Refills: 0 | Status: SHIPPED | OUTPATIENT
Start: 2023-07-14

## 2023-07-21 LAB
DME PARACHUTE DELIVERY DATE EXPECTED: NORMAL
DME PARACHUTE DELIVERY DATE REQUESTED: NORMAL
DME PARACHUTE DELIVERY NOTE: NORMAL
DME PARACHUTE ITEM DESCRIPTION: NORMAL
DME PARACHUTE ORDER STATUS: NORMAL
DME PARACHUTE SUPPLIER NAME: NORMAL
DME PARACHUTE SUPPLIER PHONE: NORMAL

## 2023-07-26 ENCOUNTER — RA CDI HCC (OUTPATIENT)
Dept: OTHER | Facility: HOSPITAL | Age: 58
End: 2023-07-26

## 2023-07-26 NOTE — PROGRESS NOTES
F339    [address] [facility] coding opportunities          Chart Reviewed number of suggestions sent to Provider: 1     Patients Insurance        Commercial Insurance: Filiberto Rueda

## 2023-07-28 DIAGNOSIS — I10 HYPERTENSION, UNSPECIFIED TYPE: ICD-10-CM

## 2023-07-28 DIAGNOSIS — I20.1 PRINZMETAL VARIANT ANGINA (HCC): ICD-10-CM

## 2023-07-28 DIAGNOSIS — E78.5 DYSLIPIDEMIA WITH ELEVATED LOW DENSITY LIPOPROTEIN (LDL) CHOLESTEROL AND ABNORMALLY LOW HIGH DENSITY LIPOPROTEIN CHOLESTEROL: ICD-10-CM

## 2023-07-28 RX ORDER — ISOSORBIDE MONONITRATE 60 MG/1
60 TABLET, EXTENDED RELEASE ORAL EVERY MORNING
Qty: 90 TABLET | Refills: 3 | Status: SHIPPED | OUTPATIENT
Start: 2023-07-28

## 2023-07-28 RX ORDER — ATORVASTATIN CALCIUM 40 MG/1
TABLET, FILM COATED ORAL
Qty: 90 TABLET | Refills: 0 | Status: SHIPPED | OUTPATIENT
Start: 2023-07-28

## 2023-07-28 RX ORDER — OMEPRAZOLE 40 MG/1
CAPSULE, DELAYED RELEASE ORAL
Qty: 60 CAPSULE | Refills: 1 | Status: SHIPPED | OUTPATIENT
Start: 2023-07-28

## 2023-07-28 NOTE — TELEPHONE ENCOUNTER
Requested medication(s) are due for refill today: Yes  Patient has already received a courtesy refill: No  Other reason request has been forwarded to provider: failed protocol, appt 9/8/23 @ [facility]

## 2023-08-14 DIAGNOSIS — E78.5 DYSLIPIDEMIA: ICD-10-CM

## 2023-08-14 RX ORDER — ALBUTEROL SULFATE 90 UG/1
AEROSOL, METERED RESPIRATORY (INHALATION)
Qty: 6.7 G | Refills: 0 | Status: SHIPPED | OUTPATIENT
Start: 2023-08-14

## 2023-08-17 ENCOUNTER — OFFICE VISIT (OUTPATIENT)
Dept: FAMILY MEDICINE CLINIC | Facility: CLINIC | Age: 58
End: 2023-08-17

## 2023-08-17 VITALS
RESPIRATION RATE: 18 BRPM | TEMPERATURE: 97.3 F | DIASTOLIC BLOOD PRESSURE: 98 MMHG | HEIGHT: 70 IN | HEART RATE: 103 BPM | WEIGHT: 305 LBS | SYSTOLIC BLOOD PRESSURE: 138 MMHG | OXYGEN SATURATION: 96 % | BODY MASS INDEX: 43.67 KG/M2

## 2023-08-17 DIAGNOSIS — I10 PRIMARY HYPERTENSION: ICD-10-CM

## 2023-08-17 DIAGNOSIS — R00.0 TACHYCARDIA, UNSPECIFIED: ICD-10-CM

## 2023-08-17 DIAGNOSIS — F17.200 TOBACCO USE DISORDER: ICD-10-CM

## 2023-08-17 DIAGNOSIS — M54.41 ACUTE RIGHT-SIDED LOW BACK PAIN WITH RIGHT-SIDED SCIATICA: ICD-10-CM

## 2023-08-17 DIAGNOSIS — F41.8 DEPRESSION WITH ANXIETY: ICD-10-CM

## 2023-08-17 DIAGNOSIS — M62.830 SPASM OF MUSCLE OF LOWER BACK: ICD-10-CM

## 2023-08-17 DIAGNOSIS — M79.605 LEFT LEG PAIN: Primary | ICD-10-CM

## 2023-08-17 DIAGNOSIS — D58.2 ELEVATED HEMOGLOBIN (HCC): ICD-10-CM

## 2023-08-17 DIAGNOSIS — L98.9 SKIN LESION: ICD-10-CM

## 2023-08-17 DIAGNOSIS — G47.33 OBSTRUCTIVE SLEEP APNEA: ICD-10-CM

## 2023-08-17 PROCEDURE — 99214 OFFICE O/P EST MOD 30 MIN: CPT | Performed by: FAMILY MEDICINE

## 2023-08-17 RX ORDER — METHOCARBAMOL 500 MG/1
500 TABLET, FILM COATED ORAL 2 TIMES DAILY
Qty: 20 TABLET | Refills: 0 | Status: SHIPPED | OUTPATIENT
Start: 2023-08-17

## 2023-08-17 NOTE — ASSESSMENT & PLAN NOTE
Used to smoke 1 pack a day for 30 years, now smokes only occasionally.   Patient is agreeable for lung cancer screening, order placed

## 2023-08-17 NOTE — PROGRESS NOTES
Name: Kira Doe. : 1965      MRN: [id number]  Encounter Provider: Milagro Anand MD  Encounter Date: 2023   Encounter department: [address]     1. Left leg pain  Assessment & Plan:  Improved, patient will follow-up as needed      2. Acute right-sided low back pain with right-sided sciatica  -     methocarbamol (ROBAXIN) 500 mg tablet; Take 1 tablet (500 mg total) by mouth 2 (two) times a day    3. Primary hypertension  Assessment & Plan:  Blood pressure on arrival 149/97, on recheck 138/98. Slightly above goal, patient is currently on Cardizem 300 mg daily, Imdur 60 mg daily, lisinopril 40 mg. We will continue current regimen at this time and have patient follow-up    Orders:  -     Lipid panel; Future  -     HEMOGLOBIN A1C W/ EAG ESTIMATION; Future    4. Skin lesion  Assessment & Plan:  Nodular lesion on left ear, concerns for basal cell carcinoma, referral to dermatology provided. Orders:  -     Ambulatory Referral to Dermatology; Future    5. Elevated hemoglobin (HCC)  -     CBC and differential; Future    6. Tobacco use disorder  Assessment & Plan:  Used to smoke 1 pack a day for 30 years, now smokes only occasionally. Patient is agreeable for lung cancer screening, order placed    Orders:  -     CT lung screening program; Future; Expected date: 2023    7. Tachycardia, unspecified  Assessment & Plan:  Noted to be tachycardic to 101 today, heart rate regular on auscultation, advised patient to continue follow-up with cardiology. We will also check TSH. Orders:  -     TSH, 3rd generation with Free T4 reflex; Future    8. Obstructive sleep apnea  Assessment & Plan:  Encouraged to follow-up with sleep medicine      9. Depression with anxiety  Assessment & Plan:  Recently Zoloft was increased from 25 to 50 mg, Seroquel 300 mg at night. Ativan twice daily as needed. Continue follow-up with psychiatry.       10. Spasm of muscle of lower back  Assessment & Plan:  Refill of Robaxin sent, side effects reviewed with patient           Subjective      Laura Waite presented to office for follow-up of leg pain. He reports leg pain is better, but he does have spasms in his back and is requesting refill for Robaxin. He is also concerned about wound in his ear which has not healed for last 2-months and would like me to take a look at it. Otherwise he reports he is doing well. He reports he smokes occasionally, but in the past he used to smoke 1 pack a day and smoked for at least 30 years. He denies drinking any alcohol. He reports he was recently very stressed with the leg pain, disability not kicking in, which made his depression worse. He saw his psychiatrist yesterday and they increase Zoloft from 25 to 50 mg. Review of Systems   Constitutional: Negative for chills and fever. HENT: Negative for congestion. Respiratory: Negative for shortness of breath. Cardiovascular: Negative for chest pain. Gastrointestinal: Negative for diarrhea, nausea and vomiting. Genitourinary: Negative for difficulty urinating. Musculoskeletal: Positive for back pain. Skin:        Skin lesion   Neurological: Positive for headaches. Psychiatric/Behavioral: The patient is nervous/anxious.         Current Outpatient Medications on File Prior to Visit   Medication Sig   • albuterol (PROVENTIL HFA,VENTOLIN HFA) 90 mcg/act inhaler INHALE 2 PUFFS EVERY 6 HOURS AS NEEDED FOR SHORTNESS OF BREATH   • atorvastatin (LIPITOR) 40 mg tablet TAKE ONE TABLET BY MOUTH EVERY DAY   • diltiazem (CARDIZEM CD) 300 mg 24 hr capsule Take 1 capsule (300 mg total) by mouth every morning   • isosorbide mononitrate (IMDUR) 60 mg 24 hr tablet Take 1 tablet (60 mg total) by mouth every morning   • lisinopril (ZESTRIL) 40 mg tablet TAKE ONE TABLET BY MOUTH EVERY DAY   • LORazepam (ATIVAN) 1 mg tablet Take 1 tablet (1 mg total) by mouth 2 (two) times a day as needed for anxiety • omeprazole (PriLOSEC) 40 MG capsule TAKE ONE CAPSULE BY MOUTH EVERY DAY   • QUEtiapine (SEROquel) 300 mg tablet Take 1 tablet (300 mg total) by mouth daily at bedtime   • sertraline (ZOLOFT) 25 mg tablet Take 25 mg by mouth daily at bedtime   • [DISCONTINUED] Aspirin Low Dose 81 MG chewable tablet CHEW 1 TABLET BY MOUTH EVERY DAY (Patient taking differently: Chew 81 mg every morning)   • lidocaine (Lidoderm) 5 % Apply 1 patch topically over 12 hours daily Remove & Discard patch within 12 hours or as directed by MD   • naproxen (Naprosyn) 500 mg tablet Take 1 tablet (500 mg total) by mouth 2 (two) times a day with meals for 15 days   • polyethylene glycol (GOLYTELY) 4000 mL solution Take 4,000 mL by mouth once for 1 dose   • [DISCONTINUED] methocarbamol (ROBAXIN) 500 mg tablet Take 1 tablet (500 mg total) by mouth 2 (two) times a day   • [DISCONTINUED] QUEtiapine (SEROquel) 25 mg tablet TAKE TWO TABLETS BY MOUTH EVERY MORNING AND 1 TABLET AT BEDTIME WITH 300MG TABLET FOR A 325MG TOTAL DOSE (Patient not taking: Reported on 11/14/2022)       Objective     /98   Pulse 103   Temp (!) 97.3 °F (36.3 °C) (Temporal)   Resp 18   Ht 5' 10" (1.778 m)   Wt (!) 138 kg (305 lb)   SpO2 96%   BMI 43.76 kg/m²     Physical Exam  Vitals reviewed. Constitutional:       Appearance: He is well-developed. HENT:      Head: Normocephalic and atraumatic. Right Ear: External ear normal.      Left Ear: External ear normal.   Eyes:      Conjunctiva/sclera: Conjunctivae normal.   Cardiovascular:      Rate and Rhythm: Regular rhythm. Tachycardia present. Heart sounds: Normal heart sounds. No murmur heard. No friction rub. Pulmonary:      Effort: Pulmonary effort is normal. No respiratory distress. Breath sounds: Normal breath sounds. No wheezing or rales. Musculoskeletal:         General: Normal range of motion. Cervical back: Normal range of motion and neck supple.    Skin:     General: Skin is warm and dry. Neurological:      Mental Status: He is alert and oriented to person, place, and time.        Miranda James MD

## 2023-08-17 NOTE — ASSESSMENT & PLAN NOTE
Recently Zoloft was increased from 25 to 50 mg, Seroquel 300 mg at night. Ativan twice daily as needed. Continue follow-up with psychiatry.

## 2023-08-17 NOTE — ASSESSMENT & PLAN NOTE
Noted to be tachycardic to 101 today, heart rate regular on auscultation, advised patient to continue follow-up with cardiology. We will also check TSH.

## 2023-08-17 NOTE — ASSESSMENT & PLAN NOTE
Blood pressure on arrival 149/97, on recheck 138/98. Slightly above goal, patient is currently on Cardizem 300 mg daily, Imdur 60 mg daily, lisinopril 40 mg.   We will continue current regimen at this time and have patient follow-up

## 2023-09-11 DIAGNOSIS — I10 HYPERTENSION, UNSPECIFIED TYPE: ICD-10-CM

## 2023-09-11 DIAGNOSIS — E78.5 DYSLIPIDEMIA: ICD-10-CM

## 2023-09-12 RX ORDER — ALBUTEROL SULFATE 90 UG/1
AEROSOL, METERED RESPIRATORY (INHALATION)
Refills: 0 | OUTPATIENT
Start: 2023-09-12

## 2023-09-12 RX ORDER — OMEPRAZOLE 40 MG/1
CAPSULE, DELAYED RELEASE ORAL
Qty: 60 CAPSULE | Refills: 0 | OUTPATIENT
Start: 2023-09-12

## 2023-09-14 ENCOUNTER — OFFICE VISIT (OUTPATIENT)
Dept: CARDIOLOGY CLINIC | Facility: CLINIC | Age: 58
End: 2023-09-14
Payer: COMMERCIAL

## 2023-09-14 VITALS
WEIGHT: 305 LBS | DIASTOLIC BLOOD PRESSURE: 76 MMHG | OXYGEN SATURATION: 96 % | HEART RATE: 103 BPM | SYSTOLIC BLOOD PRESSURE: 128 MMHG | HEIGHT: 70 IN | BODY MASS INDEX: 43.67 KG/M2

## 2023-09-14 DIAGNOSIS — I48.92 ATRIAL FLUTTER, UNSPECIFIED TYPE (HCC): ICD-10-CM

## 2023-09-14 DIAGNOSIS — I20.1 PRINZMETAL VARIANT ANGINA (HCC): ICD-10-CM

## 2023-09-14 DIAGNOSIS — E78.5 DYSLIPIDEMIA WITH ELEVATED LOW DENSITY LIPOPROTEIN (LDL) CHOLESTEROL AND ABNORMALLY LOW HIGH DENSITY LIPOPROTEIN CHOLESTEROL: ICD-10-CM

## 2023-09-14 DIAGNOSIS — I10 PRIMARY HYPERTENSION: ICD-10-CM

## 2023-09-14 DIAGNOSIS — R06.02 SOB (SHORTNESS OF BREATH): Primary | ICD-10-CM

## 2023-09-14 DIAGNOSIS — R00.0 TACHYCARDIA, UNSPECIFIED: ICD-10-CM

## 2023-09-14 PROCEDURE — 99214 OFFICE O/P EST MOD 30 MIN: CPT | Performed by: INTERNAL MEDICINE

## 2023-09-14 NOTE — PROGRESS NOTES
Cardiology Follow Up    Louellen Gowers.  1965  [id number]  [facility] [address]  [phone]    1. SOB (shortness of breath)  Echo complete w/ contrast if indicated      2. 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[address])        3. Primary hypertension        4. Dyslipidemia with elevated low density lipoprotein (LDL) cholesterol and abnormally low high density lipoprotein cholesterol        5. Tachycardia, unspecified        6. Atrial flutter, unspecified type ([address])            Interval History: I have not seen Star Heath in over a year. He states that over the summer he has issues with his lower back that were been debilitating. He did not do much activity and he gained weight. He feels he is now more short of breath when he walks stairs. He denies any chest pain orthopnea paroxysmal nocturnal dyspnea or syncope.     Patient Active Problem List   Diagnosis   • Other insomnia   • Depression with anxiety   • Kidney stone on right side   • Ileus (HCC)   • Umbilical hernia without obstruction and without gangrene   • Gallbladder calculus without cholecystitis   • Tobacco use disorder   • Closed right ankle fracture   • History of ileus   • Adenoma of left adrenal gland   • Anxiety   • GERD (gastroesophageal reflux disease)   • MDD (major depressive disorder)   • History of MRSA infection of lungs   • Moderate episode of recurrent major depressive disorder (HCC)   • Colon cancer screening   • Asthma   • Hypertension   • Tachycardia, unspecified   • Dyslipidemia with elevated low density lipoprotein (LDL) cholesterol and abnormally low high density lipoprotein cholesterol   • Elevated alkaline phosphatase level   • Elevated hemoglobin (HCC)   • Obesity, Class II, BMI 35-39.9   • Excessive daytime sleepiness   • Cardiac arrest (HCC)   • 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HCC)   • Alcohol dependence (HCC)   • Anemia   • Thrombocytopenia (HCC)   • Atrial flutter (HCC)   • Hip pain   • Financial difficulties   • Annual physical exam   • Preop examination   • Blood in left ear canal   • Hyperpigmented skin lesion   • Obstructive sleep apnea   • Shortness of breath   • Left leg pain   • Intentional drug overdose, initial encounter ([address])   • Spasm of muscle of lower back   • Skin lesion     Past Medical History:   Diagnosis Date   • Anxiety    • Asthma    • Colon polyp    • GERD (gastroesophageal reflux disease)    • History of ileus    • Hypertension    • Insomnia    • Kidney stone    • Lumbar herniated disc     last assessed: 3/27/2015   • MDD (major depressive disorder)    • Patella fracture     last assessed: 3/27/2015   • Tobacco abuse      Social History     Socioeconomic History   • Marital status: Single     Spouse name: Not on file   • Number of children: Not on file   • Years of education: Not on file   • Highest education level: Not on file   Occupational History     Employer: [employer]   Tobacco Use   • Smoking status: Former     Packs/day: 1.50     Years: 34.00     Total pack years: 51.00     Types: Cigarettes     Start date:      Quit date:      Years since quittin.7   • Smokeless tobacco: Never   Vaping Use   • Vaping Use: Never used   Substance and Sexual Activity   • Alcohol use: Not Currently     Comment: Hx of a couple of drinks a night, or everyother night   • Drug use: No   • Sexual activity: Not Currently     Partners: Female   Other Topics Concern   • Not on file   Social History Narrative    Denies pmh     Social Determinants of Health     Financial Resource Strain: Medium Risk (2023)    Overall Financial Resource Strain (CARDIA)    • Difficulty of Paying Living Expenses: Somewhat hard   Food Insecurity: Food Insecurity Present (2023)    Hunger Vital Sign    • Worried About Running Out of Food in the Last Year: Sometimes true    • Ran Out of Food in the Last Year: Sometimes true   Transportation Needs: Unmet Transportation Needs (8/16/2023)    PRAPARE - Transportation    • Lack of Transportation (Medical): Yes    • Lack of Transportation (Non-Medical): Yes   Physical Activity: Inactive (7/25/2022)    Exercise Vital Sign    • Days of Exercise per Week: 0 days    • Minutes of Exercise per Session: 0 min   Stress: No Stress Concern Present (8/17/2023)    [facility]    • Feeling of Stress : Not at all   Social Connections:  Moderately Isolated (7/25/2022)    Social Connection and Isolation Panel [NHANES]    • Frequency of Communication with Friends and Family: More than three times a week    • Frequency of Social Gatherings with Friends and Family: More than three times a week    • Attends Caodaism Services: More than 4 times per year    • Active Member of Clubs or Organizations: No    • Attends Club or Organization Meetings: Never    • Marital Status: Never    Intimate Partner Violence: Not At Risk (8/17/2023)    Humiliation, Afraid, Rape, and Kick questionnaire    • Fear of Current or Ex-Partner: No    • Emotionally Abused: No    • Physically Abused: No    • Sexually Abused: No   Housing Stability: Unknown (8/17/2023)    Housing Stability Vital Sign    • Unable to Pay for Housing in the Last Year: No    • Number of Places Lived in the Last Year: Not on file    • Unstable Housing in the Last Year: No      Family History   Problem Relation Age of Onset   • Pancreatic cancer Mother    • COPD Father    • Heart attack Brother 64        decreased   • Drug abuse Brother    • Alcohol abuse Brother    • Sleep apnea Brother    • Cancer Family    • Hypertension Family    • Heart disease Family      Past Surgical History:   Procedure Laterality Date   • BACK SURGERY  2015    Lumbar   • CARDIAC CATHETERIZATION N/A 11/5/2021    Procedure: CARDIAC CATHETERIZATION;  Surgeon: Felisha Mac MD;  Location: [facility];  Service: Cardiology   • COLONOSCOPY     • KNEE SURGERY      right   • LUMBAR FUSION      last assessed: 0/06/0851   • CO RPR UMBILICAL HRNA 5 YRS/> REDUCIBLE N/A 9/14/2022    Procedure: REPAIR HERNIA UMBILICAL w/ mesh;  Surgeon: Earle Almaguer To, DO;  Location: [facility];  Service: General   • CO TX TIBL SHFT FX IMED IMPLT W/WO SCREWS&/CERCLA Left 3/16/2017    Procedure: INSERTION NAIL IM TIBIA;  Surgeon: Ronen Martino, DO;  Location: [facility];  Service: Orthopedics       Current Outpatient Medications:   •  albuterol (PROVENTIL HFA,VENTOLIN HFA) 90 mcg/act inhaler, INHALE 2 PUFFS EVERY 6 HOURS AS NEEDED FOR SHORTNESS OF BREATH, Disp: 6.7 g, Rfl: 0  •  atorvastatin (LIPITOR) 40 mg tablet, TAKE ONE TABLET BY MOUTH EVERY DAY, Disp: 90 tablet, Rfl: 0  •  diltiazem (CARDIZEM CD) 300 mg 24 hr capsule, Take 1 capsule (300 mg total) by mouth every morning, Disp: 90 capsule, Rfl: 3  •  isosorbide mononitrate (IMDUR) 60 mg 24 hr tablet, Take 1 tablet (60 mg total) by mouth every morning, Disp: 90 tablet, Rfl: 3  •  lisinopril (ZESTRIL) 40 mg tablet, TAKE ONE TABLET BY MOUTH EVERY DAY, Disp: 90 tablet, Rfl: 1  •  LORazepam (ATIVAN) 1 mg tablet, Take 1 tablet (1 mg total) by mouth 2 (two) times a day as needed for anxiety, Disp: 60 tablet, Rfl: 0  •  methocarbamol (ROBAXIN) 500 mg tablet, Take 1 tablet (500 mg total) by mouth 2 (two) times a day, Disp: 20 tablet, Rfl: 0  •  omeprazole (PriLOSEC) 40 MG capsule, TAKE ONE CAPSULE BY MOUTH EVERY DAY, Disp: 60 capsule, Rfl: 1  •  QUEtiapine (SEROquel) 300 mg tablet, Take 1 tablet (300 mg total) by mouth daily at bedtime, Disp: 90 tablet, Rfl: 2  •  sertraline (ZOLOFT) 25 mg tablet, Take 25 mg by mouth daily at bedtime, Disp: , Rfl:   •  lidocaine (Lidoderm) 5 %, Apply 1 patch topically over 12 hours daily Remove & Discard patch within 12 hours or as directed by MD, Disp: 15 patch, Rfl: 0  •  naproxen (Naprosyn) 500 mg tablet, Take 1 tablet (500 mg total) by mouth 2 (two) times a day with meals for 15 days, Disp: 30 tablet, Rfl: 0  •  polyethylene glycol (GOLYTELY) 4000 mL solution, Take 4,000 mL by mouth once for 1 dose, Disp: 4000 mL, Rfl: 0  No Known Allergies    Labs:  Hospital Outpatient Visit on 05/16/2023   Component Date Value   • Case Report 05/16/2023                      Value:Surgical Pathology Report                         Case: [id number]                                   Authorizing Provider:  Juan Ramon Melo MD           Collected:           05/16/2023 [id number]              Ordering Location:     [facility]      Received:            05/16/2023 [id number] [facility]                                                           Pathologist:           Mau Miramontes DO                                                          Specimen:    Polyp, Colorectal, transverse                                                             • Final Diagnosis 05/16/2023                      Value: This result contains rich text formatting which cannot be displayed here. • Additional Information 05/16/2023                      Value: This result contains rich text formatting which cannot be displayed here. • Synoptic Checklist 05/16/2023                      Value:                            COLON/RECTUM POLYP FORM - GI - All Specimens                                                                                     :    Adenoma(s)     • Gross Description 05/16/2023                      Value: This result contains rich text formatting which cannot be displayed here. • Clinical Information 05/16/2023                      Value:Cold snare polypectomy     Imaging: No results found. Review of Systems:  Review of Systems   Constitutional: Negative for fatigue. HENT: Negative for congestion. Eyes: Negative for discharge. Respiratory: Negative for shortness of breath. Cardiovascular: Negative for chest pain.    Gastrointestinal: Negative for abdominal distention. Endocrine: Negative for polyuria. Genitourinary: Negative for dysuria. Musculoskeletal: Negative for arthralgias. Psychiatric/Behavioral: Positive for dysphoric mood. The patient is nervous/anxious. Physical Exam:  Physical Exam  Constitutional:       Appearance: He is well-developed. HENT:      Head: Normocephalic and atraumatic. Nose: Nose normal.   Eyes:      Pupils: Pupils are equal, round, and reactive to light. Cardiovascular:      Rate and Rhythm: Normal rate and regular rhythm. Heart sounds: Normal heart sounds. Pulmonary:      Effort: Pulmonary effort is normal.      Breath sounds: Normal breath sounds. Abdominal:      General: Bowel sounds are normal.      Palpations: Abdomen is soft. Comments: Obese     Musculoskeletal:         General: Normal range of motion. Cervical back: Neck supple. Skin:     General: Skin is warm and dry. Neurological:      Mental Status: He is alert and oriented to person, place, and time. Psychiatric:         Behavior: Behavior normal.         Thought Content: Thought content normal.         Judgment: Judgment normal.         Discussion/Summary: Previous heart disease history includes cardiac arrest during stress testing in 2021. Catheterization revealed no obstructive coronary disease however there was significant spasm of the right coronary artery. He has been treated with nitrates and calcium channel brought blockers with no further issues. From a shortness of breath standpoint I do not think it is an anginal equivalent. He has no chest pain at the time of catheterization coronary arteries were unremarkable for obstruction. I do think it is deconditioning secondary to inactivity and weight. I will check a 2D echocardiogram to assess left ventricular function and assess for any valvular pathology. We will notify him of those results.   His blood pressure is well controlled on diltiazem and lisinopril he is on high-dose statin. he he has a history of intermittent sinus tachycardia. He has a remote history of atrial flutter that has not been an issue recently. Further Alden mentations will be based on his echocardiogram and I will see him again in the office in 6 months. Raisa Roberson

## 2023-09-25 ENCOUNTER — RA CDI HCC (OUTPATIENT)
Dept: OTHER | Facility: HOSPITAL | Age: 58
End: 2023-09-25

## 2023-09-25 NOTE — PROGRESS NOTES
F330    [address] [facility] coding opportunities          Chart Reviewed number of suggestions sent to Provider: 1     Patients Insurance        Commercial Insurance: Filiberto Rueda

## 2023-09-27 ENCOUNTER — APPOINTMENT (OUTPATIENT)
Dept: LAB | Facility: HOSPITAL | Age: 58
End: 2023-09-27
Payer: COMMERCIAL

## 2023-09-27 DIAGNOSIS — K76.0 HEPATIC STEATOSIS: ICD-10-CM

## 2023-09-27 DIAGNOSIS — R00.0 TACHYCARDIA, UNSPECIFIED: ICD-10-CM

## 2023-09-27 DIAGNOSIS — D58.2 ELEVATED HEMOGLOBIN (HCC): ICD-10-CM

## 2023-09-27 DIAGNOSIS — I10 PRIMARY HYPERTENSION: ICD-10-CM

## 2023-09-27 DIAGNOSIS — K74.00 HEPATIC FIBROSIS, UNSPECIFIED: ICD-10-CM

## 2023-09-27 LAB
ALBUMIN SERPL BCP-MCNC: 3.7 G/DL (ref 3.5–5)
ALP SERPL-CCNC: 147 U/L (ref 34–104)
ALT SERPL W P-5'-P-CCNC: 39 U/L (ref 7–52)
AST SERPL W P-5'-P-CCNC: 33 U/L (ref 13–39)
BASOPHILS # BLD AUTO: 0.03 THOUSANDS/ÂΜL (ref 0–0.1)
BASOPHILS NFR BLD AUTO: 0 % (ref 0–1)
BILIRUB DIRECT SERPL-MCNC: 0.23 MG/DL (ref 0–0.2)
BILIRUB SERPL-MCNC: 0.82 MG/DL (ref 0.2–1)
CHOLEST SERPL-MCNC: 172 MG/DL
EOSINOPHIL # BLD AUTO: 0.06 THOUSAND/ÂΜL (ref 0–0.61)
EOSINOPHIL NFR BLD AUTO: 1 % (ref 0–6)
ERYTHROCYTE [DISTWIDTH] IN BLOOD BY AUTOMATED COUNT: 14.9 % (ref 11.6–15.1)
EST. AVERAGE GLUCOSE BLD GHB EST-MCNC: 123 MG/DL
HBA1C MFR BLD: 5.9 %
HCT VFR BLD AUTO: 50.5 % (ref 36.5–49.3)
HDLC SERPL-MCNC: 45 MG/DL
HGB BLD-MCNC: 16.2 G/DL (ref 12–17)
IMM GRANULOCYTES # BLD AUTO: 0.02 THOUSAND/UL (ref 0–0.2)
IMM GRANULOCYTES NFR BLD AUTO: 0 % (ref 0–2)
LDLC SERPL CALC-MCNC: 105 MG/DL (ref 0–100)
LYMPHOCYTES # BLD AUTO: 1.04 THOUSANDS/ÂΜL (ref 0.6–4.47)
LYMPHOCYTES NFR BLD AUTO: 13 % (ref 14–44)
MCH RBC QN AUTO: 35.4 PG (ref 26.8–34.3)
MCHC RBC AUTO-ENTMCNC: 32.1 G/DL (ref 31.4–37.4)
MCV RBC AUTO: 111 FL (ref 82–98)
MONOCYTES # BLD AUTO: 0.46 THOUSAND/ÂΜL (ref 0.17–1.22)
MONOCYTES NFR BLD AUTO: 6 % (ref 4–12)
NEUTROPHILS # BLD AUTO: 6.6 THOUSANDS/ÂΜL (ref 1.85–7.62)
NEUTS SEG NFR BLD AUTO: 80 % (ref 43–75)
NONHDLC SERPL-MCNC: 127 MG/DL
NRBC BLD AUTO-RTO: 0 /100 WBCS
PLATELET # BLD AUTO: 193 THOUSANDS/UL (ref 149–390)
PMV BLD AUTO: 9.9 FL (ref 8.9–12.7)
PROT SERPL-MCNC: 6.6 G/DL (ref 6.4–8.4)
RBC # BLD AUTO: 4.57 MILLION/UL (ref 3.88–5.62)
T4 FREE SERPL-MCNC: 0.52 NG/DL (ref 0.61–1.12)
TRIGL SERPL-MCNC: 108 MG/DL
TSH SERPL DL<=0.05 MIU/L-ACNC: 8.01 UIU/ML (ref 0.45–4.5)
WBC # BLD AUTO: 8.21 THOUSAND/UL (ref 4.31–10.16)

## 2023-09-27 PROCEDURE — 84443 ASSAY THYROID STIM HORMONE: CPT

## 2023-09-27 PROCEDURE — 36415 COLL VENOUS BLD VENIPUNCTURE: CPT

## 2023-09-27 PROCEDURE — 85025 COMPLETE CBC W/AUTO DIFF WBC: CPT

## 2023-09-27 PROCEDURE — 80076 HEPATIC FUNCTION PANEL: CPT

## 2023-09-27 PROCEDURE — 83036 HEMOGLOBIN GLYCOSYLATED A1C: CPT

## 2023-09-27 PROCEDURE — 80061 LIPID PANEL: CPT

## 2023-09-27 PROCEDURE — 84439 ASSAY OF FREE THYROXINE: CPT

## 2023-10-05 ENCOUNTER — OFFICE VISIT (OUTPATIENT)
Dept: FAMILY MEDICINE CLINIC | Facility: CLINIC | Age: 58
End: 2023-10-05

## 2023-10-05 VITALS
HEART RATE: 118 BPM | OXYGEN SATURATION: 96 % | SYSTOLIC BLOOD PRESSURE: 128 MMHG | WEIGHT: 303.2 LBS | DIASTOLIC BLOOD PRESSURE: 83 MMHG | HEIGHT: 70 IN | BODY MASS INDEX: 43.41 KG/M2 | RESPIRATION RATE: 18 BRPM | TEMPERATURE: 98.2 F

## 2023-10-05 DIAGNOSIS — R71.8 ABNORMAL HEMATOCRIT: ICD-10-CM

## 2023-10-05 DIAGNOSIS — E03.8 OTHER SPECIFIED HYPOTHYROIDISM: Primary | ICD-10-CM

## 2023-10-05 DIAGNOSIS — R73.03 PREDIABETES: ICD-10-CM

## 2023-10-05 DIAGNOSIS — I10 PRIMARY HYPERTENSION: ICD-10-CM

## 2023-10-05 PROCEDURE — 99214 OFFICE O/P EST MOD 30 MIN: CPT | Performed by: FAMILY MEDICINE

## 2023-10-05 RX ORDER — LEVOTHYROXINE SODIUM 0.03 MG/1
25 TABLET ORAL
Qty: 60 TABLET | Refills: 0 | Status: SHIPPED | OUTPATIENT
Start: 2023-10-05

## 2023-10-05 NOTE — ASSESSMENT & PLAN NOTE
Recent labs show TSH of 8, with low T4 consistent with overt hypothyroidism. Discussed sensation of levothyroxine, patient agreeable. We will start patient on levothyroxine 25 mcg daily and repeat TSH in 6 weeks.

## 2023-10-05 NOTE — ASSESSMENT & PLAN NOTE
Within goal.  Continue current regimen of Cardizem 300 mg daily, Imdur 60 mg daily, lisinopril 40 mg daily.   Lifestyle modification with diet and exercise

## 2023-10-05 NOTE — ASSESSMENT & PLAN NOTE
Most recent hemoglobin A1c 5.9, consistent with prediabetes. Counseled patient on lifestyle modification with diet and exercise.

## 2023-10-05 NOTE — PROGRESS NOTES
Name: Feliberto Mejia. : 1965      MRN: [id number]  Encounter Provider: Herlinda Dominguez MD  Encounter Date: 10/5/2023   Encounter department: [address]     1. Other specified hypothyroidism  Assessment & Plan:  Recent labs show TSH of 8, with low T4 consistent with overt hypothyroidism. Discussed sensation of levothyroxine, patient agreeable. We will start patient on levothyroxine 25 mcg daily and repeat TSH in 6 weeks. Orders:  -     levothyroxine (Euthyrox) 25 mcg tablet; Take 1 tablet (25 mcg total) by mouth daily in the early morning  -     TSH, 3rd generation; Future; Expected date: 2023    2. Abnormal hematocrit  Assessment & Plan:  Suspect secondary to high MCV, will check folate and B12 levels. Patient denies alcohol use. If does not improve, will consider referral to hematology    Orders:  -     Vitamin B12; Future  -     Folate; Future    3. Primary hypertension  Assessment & Plan: Within goal.  Continue current regimen of Cardizem 300 mg daily, Imdur 60 mg daily, lisinopril 40 mg daily. Lifestyle modification with diet and exercise    Orders:  -     Basic metabolic panel; Future    4. Prediabetes  Assessment & Plan:  Most recent hemoglobin A1c 5.9, consistent with prediabetes. Counseled patient on lifestyle modification with diet and exercise. BMI Counseling: Body mass index is 43.5 kg/m². The BMI is above normal. Nutrition recommendations include decreasing portion sizes, encouraging healthy choices of fruits and vegetables, decreasing fast food intake, consuming healthier snacks and limiting drinks that contain sugar. Exercise recommendations include moderate physical activity 150 minutes/week. Rationale for BMI follow-up plan is due to patient being overweight or obese. Simona Ye presented to office to review labs. He reports exceeding fatigue, otherwise he is doing well.   Back pain still bothers him, but hip pain is better. Counseled extensively on diet and exercise. He reports he is improving his diet, and will also start exercising. Review of Systems   Constitutional: Positive for fatigue. Negative for chills and fever. HENT: Negative for congestion. Respiratory: Negative for shortness of breath. Cardiovascular: Negative for chest pain. Gastrointestinal: Negative for diarrhea, nausea and vomiting. Genitourinary: Negative for difficulty urinating. Musculoskeletal: Positive for arthralgias and back pain. Current Outpatient Medications on File Prior to Visit   Medication Sig   • albuterol (PROVENTIL HFA,VENTOLIN HFA) 90 mcg/act inhaler INHALE 2 PUFFS EVERY 6 HOURS AS NEEDED FOR SHORTNESS OF BREATH   • atorvastatin (LIPITOR) 40 mg tablet TAKE ONE TABLET BY MOUTH EVERY DAY   • diltiazem (CARDIZEM CD) 300 mg 24 hr capsule Take 1 capsule (300 mg total) by mouth every morning   • isosorbide mononitrate (IMDUR) 60 mg 24 hr tablet Take 1 tablet (60 mg total) by mouth every morning   • lisinopril (ZESTRIL) 40 mg tablet TAKE ONE TABLET BY MOUTH EVERY DAY   • LORazepam (ATIVAN) 1 mg tablet Take 1 tablet (1 mg total) by mouth 2 (two) times a day as needed for anxiety   • methocarbamol (ROBAXIN) 500 mg tablet Take 1 tablet (500 mg total) by mouth 2 (two) times a day   • omeprazole (PriLOSEC) 40 MG capsule TAKE ONE CAPSULE BY MOUTH EVERY DAY   • QUEtiapine (SEROquel) 300 mg tablet Take 1 tablet (300 mg total) by mouth daily at bedtime   • sertraline (ZOLOFT) 50 mg tablet Take 50 mg by mouth daily at bedtime       Objective     /83 (BP Location: Left arm, Patient Position: Sitting, Cuff Size: Large)   Pulse (!) 118   Temp 98.2 °F (36.8 °C) (Temporal)   Resp 18   Ht 5' 10" (1.778 m)   Wt (!) 138 kg (303 lb 3.2 oz)   SpO2 96%   BMI 43.50 kg/m²     Physical Exam  Vitals reviewed. Constitutional:       Appearance: He is well-developed.    HENT:      Head: Normocephalic and atraumatic. Right Ear: External ear normal.      Left Ear: External ear normal.   Eyes:      Conjunctiva/sclera: Conjunctivae normal.   Cardiovascular:      Rate and Rhythm: Normal rate and regular rhythm. Heart sounds: Normal heart sounds. No murmur heard. No friction rub. Pulmonary:      Effort: Pulmonary effort is normal. No respiratory distress. Breath sounds: Normal breath sounds. No wheezing or rales. Musculoskeletal:         General: No swelling. Normal range of motion. Cervical back: Normal range of motion and neck supple. Skin:     General: Skin is warm and dry. Neurological:      Mental Status: He is alert and oriented to person, place, and time.        Michelle Vasquez MD

## 2023-10-05 NOTE — ASSESSMENT & PLAN NOTE
Suspect secondary to high MCV, will check folate and B12 levels. Patient denies alcohol use.   If does not improve, will consider referral to hematology

## 2023-10-07 ENCOUNTER — APPOINTMENT (OUTPATIENT)
Dept: LAB | Facility: HOSPITAL | Age: 58
End: 2023-10-07
Payer: COMMERCIAL

## 2023-10-07 DIAGNOSIS — E03.8 OTHER SPECIFIED HYPOTHYROIDISM: ICD-10-CM

## 2023-10-07 DIAGNOSIS — I10 PRIMARY HYPERTENSION: ICD-10-CM

## 2023-10-07 DIAGNOSIS — R71.8 ABNORMAL HEMATOCRIT: ICD-10-CM

## 2023-10-07 LAB
ANION GAP SERPL CALCULATED.3IONS-SCNC: 8 MMOL/L
BUN SERPL-MCNC: 9 MG/DL (ref 5–25)
CALCIUM SERPL-MCNC: 8.8 MG/DL (ref 8.4–10.2)
CHLORIDE SERPL-SCNC: 101 MMOL/L (ref 96–108)
CO2 SERPL-SCNC: 28 MMOL/L (ref 21–32)
CREAT SERPL-MCNC: 0.99 MG/DL (ref 0.6–1.3)
FOLATE SERPL-MCNC: 2.6 NG/ML
GFR SERPL CREATININE-BSD FRML MDRD: 84 ML/MIN/1.73SQ M
GLUCOSE SERPL-MCNC: 134 MG/DL (ref 65–140)
POTASSIUM SERPL-SCNC: 4.3 MMOL/L (ref 3.5–5.3)
SODIUM SERPL-SCNC: 137 MMOL/L (ref 135–147)
VIT B12 SERPL-MCNC: 191 PG/ML (ref 180–914)

## 2023-10-07 PROCEDURE — 36415 COLL VENOUS BLD VENIPUNCTURE: CPT

## 2023-10-07 PROCEDURE — 82607 VITAMIN B-12: CPT

## 2023-10-07 PROCEDURE — 82746 ASSAY OF FOLIC ACID SERUM: CPT

## 2023-10-07 PROCEDURE — 80048 BASIC METABOLIC PNL TOTAL CA: CPT

## 2023-10-09 DIAGNOSIS — E78.5 DYSLIPIDEMIA: ICD-10-CM

## 2023-10-10 DIAGNOSIS — D75.89 MACROCYTIC: Primary | ICD-10-CM

## 2023-10-10 RX ORDER — LANOLIN ALCOHOL/MO/W.PET/CERES
1000 CREAM (GRAM) TOPICAL DAILY
Qty: 90 TABLET | Refills: 0 | Status: SHIPPED | OUTPATIENT
Start: 2023-10-10

## 2023-10-10 RX ORDER — LANOLIN ALCOHOL/MO/W.PET/CERES
400 CREAM (GRAM) TOPICAL DAILY
Qty: 90 TABLET | Refills: 0 | Status: SHIPPED | OUTPATIENT
Start: 2023-10-10

## 2023-10-11 RX ORDER — ALBUTEROL SULFATE 90 UG/1
AEROSOL, METERED RESPIRATORY (INHALATION)
Qty: 6.7 G | Refills: 0 | Status: SHIPPED | OUTPATIENT
Start: 2023-10-11

## 2023-10-23 DIAGNOSIS — E78.5 DYSLIPIDEMIA WITH ELEVATED LOW DENSITY LIPOPROTEIN (LDL) CHOLESTEROL AND ABNORMALLY LOW HIGH DENSITY LIPOPROTEIN CHOLESTEROL: ICD-10-CM

## 2023-10-24 RX ORDER — ATORVASTATIN CALCIUM 40 MG/1
TABLET, FILM COATED ORAL
Qty: 90 TABLET | Refills: 0 | Status: SHIPPED | OUTPATIENT
Start: 2023-10-24

## 2023-10-26 ENCOUNTER — HOSPITAL ENCOUNTER (OUTPATIENT)
Dept: NON INVASIVE DIAGNOSTICS | Facility: CLINIC | Age: 58
Discharge: HOME | End: 2023-10-26
Payer: COMMERCIAL

## 2023-10-26 VITALS
WEIGHT: 303 LBS | HEIGHT: 70 IN | SYSTOLIC BLOOD PRESSURE: 128 MMHG | BODY MASS INDEX: 43.38 KG/M2 | DIASTOLIC BLOOD PRESSURE: 83 MMHG | HEART RATE: 95 BPM

## 2023-10-26 DIAGNOSIS — R06.02 SOB (SHORTNESS OF BREATH): ICD-10-CM

## 2023-10-26 DIAGNOSIS — I10 PRIMARY HYPERTENSION: ICD-10-CM

## 2023-10-26 LAB
AORTIC ROOT: 4 CM
AORTIC VALVE ANNULUS: 2.9 CM
APICAL FOUR CHAMBER EJECTION FRACTION: 57 %
ASCENDING AORTA: 4.1 CM
E WAVE DECELERATION TIME: 284 MS
E/A RATIO: 0.83
FRACTIONAL SHORTENING: 36 (ref 28–44)
INTERVENTRICULAR SEPTUM IN DIASTOLE (PARASTERNAL SHORT AXIS VIEW): 1.8 CM
INTERVENTRICULAR SEPTUM: 1.8 CM (ref 0.6–1.1)
LAAS-AP2: 19.5 CM2
LAAS-AP4: 21.2 CM2
LEFT ATRIUM SIZE: 4.5 CM
LEFT ATRIUM VOLUME (MOD BIPLANE): 62 ML
LEFT ATRIUM VOLUME INDEX (MOD BIPLANE): 24.9 ML/M2
LEFT INTERNAL DIMENSION IN SYSTOLE: 2.8 CM (ref 2.1–4)
LEFT VENTRICLE DIASTOLIC VOLUME (MOD BIPLANE): 116 ML
LEFT VENTRICLE SYSTOLIC VOLUME (MOD BIPLANE): 38 ML
LEFT VENTRICULAR INTERNAL DIMENSION IN DIASTOLE: 4.4 CM (ref 3.5–6)
LEFT VENTRICULAR POSTERIOR WALL IN END DIASTOLE: 1.4 CM
LEFT VENTRICULAR STROKE VOLUME: 57 ML
LV EF: 67 %
LVSV (TEICH): 57 ML
MV E'TISSUE VEL-LAT: 7 CM/S
MV E'TISSUE VEL-SEP: 7 CM/S
MV PEAK A VEL: 0.7 M/S
MV PEAK E VEL: 58 CM/S
MV STENOSIS PRESSURE HALF TIME: 82 MS
MV VALVE AREA P 1/2 METHOD: 2.68
RIGHT ATRIUM AREA SYSTOLE A4C: 14.2 CM2
RIGHT VENTRICLE ID DIMENSION: 3.3 CM
SINOTUBULAR JUNCTION: 3 CM
SL CV LEFT ATRIUM LENGTH A2C: 5.6 CM
SL CV LV EF: 70
SL CV PED ECHO LEFT VENTRICLE DIASTOLIC VOLUME (MOD BIPLANE) 2D: 87 ML
SL CV PED ECHO LEFT VENTRICLE SYSTOLIC VOLUME (MOD BIPLANE) 2D: 30 ML
SL CV SINUS OF VALSALVA 2D: 3.4 CM
STJ: 3 CM
TRICUSPID ANNULAR PLANE SYSTOLIC EXCURSION: 2 CM
TRICUSPID VALVE PEAK REGURGITATION VELOCITY: 1.29 M/S

## 2023-10-26 PROCEDURE — 93306 TTE W/DOPPLER COMPLETE: CPT | Performed by: INTERNAL MEDICINE

## 2023-10-26 PROCEDURE — 93306 TTE W/DOPPLER COMPLETE: CPT

## 2023-10-26 RX ORDER — LISINOPRIL 40 MG/1
TABLET ORAL
Qty: 90 TABLET | Refills: 1 | Status: SHIPPED | OUTPATIENT
Start: 2023-10-26

## 2023-11-04 DIAGNOSIS — E78.5 DYSLIPIDEMIA: ICD-10-CM

## 2023-11-06 ENCOUNTER — RA CDI HCC (OUTPATIENT)
Dept: OTHER | Facility: HOSPITAL | Age: 58
End: 2023-11-06

## 2023-11-06 RX ORDER — ALBUTEROL SULFATE 90 UG/1
AEROSOL, METERED RESPIRATORY (INHALATION)
Qty: 18 G | Refills: 1 | Status: SHIPPED | OUTPATIENT
Start: 2023-11-06

## 2023-11-06 NOTE — PROGRESS NOTES
Major depressive disorder, recurrent episode, unspecified [296.30. ICD-9-CM]     [address] coding opportunities          Chart Reviewed number of suggestions sent to Provider: 1     Patients Insurance        Commercial Insurance: Filiberto Rueda

## 2023-11-07 ENCOUNTER — TELEPHONE (OUTPATIENT)
Dept: CARDIOLOGY CLINIC | Facility: CLINIC | Age: 58
End: 2023-11-07

## 2023-11-07 NOTE — TELEPHONE ENCOUNTER
----- Message from Barrera Mcgowan MD sent at 11/7/2023  1:07 PM EST -----  Please call patient and tell results normal.

## 2023-11-09 ENCOUNTER — TELEPHONE (OUTPATIENT)
Dept: FAMILY MEDICINE CLINIC | Facility: CLINIC | Age: 58
End: 2023-11-09

## 2023-11-09 NOTE — TELEPHONE ENCOUNTER
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

## 2023-11-09 NOTE — TELEPHONE ENCOUNTER
Called patient to check if he required the two appts scheduled next week. Patient said just the one physical appt would be fine. Appt was rescheduled to 11/13, at 10:50. Patient is aware of one bloodwork test to be completed, but would like to know if PCP wishes any additional bloodwork to be ordered prior to appt. Please advise.

## 2023-11-10 NOTE — TELEPHONE ENCOUNTER
Patient was called with info regarding bloodwork orders. Patient confirmed that he will have the one remaining test done prior to appt on Monday 11/13.

## 2023-11-13 ENCOUNTER — TELEPHONE (OUTPATIENT)
Dept: FAMILY MEDICINE CLINIC | Facility: CLINIC | Age: 58
End: 2023-11-13

## 2023-11-20 ENCOUNTER — TELEPHONE (OUTPATIENT)
Dept: FAMILY MEDICINE CLINIC | Facility: CLINIC | Age: 58
End: 2023-11-20

## 2023-11-20 NOTE — TELEPHONE ENCOUNTER
Folder (To be completed by) -  Dr. Cholo Romero     Name of Form - Denial Notice needs new Diagnostic Codes changed    Color folder (Yellow)    Form to be Faxed (Fax #),  [place]  [phone]    Patient was made aware of the 7-10 business day form policy.

## 2023-11-22 DIAGNOSIS — E03.8 OTHER SPECIFIED HYPOTHYROIDISM: ICD-10-CM

## 2023-11-24 RX ORDER — LEVOTHYROXINE SODIUM 0.03 MG/1
TABLET ORAL
Qty: 60 TABLET | Refills: 0 | Status: SHIPPED | OUTPATIENT
Start: 2023-11-24

## 2023-12-11 DIAGNOSIS — I20.1 PRINZMETAL VARIANT ANGINA (HCC): ICD-10-CM

## 2023-12-11 DIAGNOSIS — I10 HYPERTENSION, UNSPECIFIED TYPE: ICD-10-CM

## 2023-12-11 RX ORDER — DILTIAZEM HYDROCHLORIDE 300 MG/1
300 CAPSULE, COATED, EXTENDED RELEASE ORAL EVERY MORNING
Qty: 90 CAPSULE | Refills: 3 | Status: SHIPPED | OUTPATIENT
Start: 2023-12-11

## 2023-12-21 ENCOUNTER — TELEPHONE (OUTPATIENT)
Dept: INTERNAL MEDICINE CLINIC | Facility: CLINIC | Age: 58
End: 2023-12-21

## 2023-12-28 DIAGNOSIS — I20.1 PRINZMETAL VARIANT ANGINA (HCC): ICD-10-CM

## 2023-12-28 DIAGNOSIS — E78.5 DYSLIPIDEMIA: ICD-10-CM

## 2023-12-28 RX ORDER — ISOSORBIDE MONONITRATE 60 MG/1
60 TABLET, EXTENDED RELEASE ORAL DAILY
Qty: 90 TABLET | Refills: 1 | Status: SHIPPED | OUTPATIENT
Start: 2023-12-28

## 2024-01-02 DIAGNOSIS — I10 HYPERTENSION, UNSPECIFIED TYPE: ICD-10-CM

## 2024-01-02 RX ORDER — ALBUTEROL SULFATE 90 UG/1
AEROSOL, METERED RESPIRATORY (INHALATION)
Qty: 8.5 G | Refills: 1 | Status: SHIPPED | OUTPATIENT
Start: 2024-01-02

## 2024-01-02 RX ORDER — OMEPRAZOLE 40 MG/1
CAPSULE, DELAYED RELEASE ORAL
Qty: 60 CAPSULE | Refills: 5 | Status: SHIPPED | OUTPATIENT
Start: 2024-01-02

## 2024-01-14 DIAGNOSIS — D75.89 MACROCYTIC: ICD-10-CM

## 2024-01-15 RX ORDER — LANOLIN ALCOHOL/MO/W.PET/CERES
400 CREAM (GRAM) TOPICAL DAILY
Qty: 90 TABLET | Refills: 0 | Status: SHIPPED | OUTPATIENT
Start: 2024-01-15 | End: 2024-01-24

## 2024-01-24 DIAGNOSIS — E78.5 DYSLIPIDEMIA WITH ELEVATED LOW DENSITY LIPOPROTEIN (LDL) CHOLESTEROL AND ABNORMALLY LOW HIGH DENSITY LIPOPROTEIN CHOLESTEROL: ICD-10-CM

## 2024-01-24 DIAGNOSIS — D75.89 MACROCYTIC: ICD-10-CM

## 2024-01-24 RX ORDER — LANOLIN ALCOHOL/MO/W.PET/CERES
400 CREAM (GRAM) TOPICAL DAILY
Qty: 90 TABLET | Refills: 0 | Status: SHIPPED | OUTPATIENT
Start: 2024-01-24

## 2024-01-24 RX ORDER — ATORVASTATIN CALCIUM 40 MG/1
TABLET, FILM COATED ORAL
Qty: 90 TABLET | Refills: 0 | Status: SHIPPED | OUTPATIENT
Start: 2024-01-24

## 2024-02-14 ENCOUNTER — TELEPHONE (OUTPATIENT)
Dept: FAMILY MEDICINE CLINIC | Facility: CLINIC | Age: 59
End: 2024-02-14

## 2024-02-21 PROBLEM — Z12.11 COLON CANCER SCREENING: Status: RESOLVED | Noted: 2019-11-06 | Resolved: 2024-02-21

## 2024-02-27 DIAGNOSIS — E78.5 DYSLIPIDEMIA: ICD-10-CM

## 2024-02-28 RX ORDER — ALBUTEROL SULFATE 90 UG/1
AEROSOL, METERED RESPIRATORY (INHALATION)
Qty: 8.5 G | Refills: 1 | Status: SHIPPED | OUTPATIENT
Start: 2024-02-28

## 2024-03-07 ENCOUNTER — TELEPHONE (OUTPATIENT)
Dept: BARIATRICS | Facility: CLINIC | Age: 59
End: 2024-03-07

## 2024-03-14 ENCOUNTER — RA CDI HCC (OUTPATIENT)
Dept: OTHER | Facility: HOSPITAL | Age: 59
End: 2024-03-14

## 2024-03-15 NOTE — PATIENT INSTRUCTIONS
Cut back on carbs and exercise, low salt diet   BP is borderline high and would like to get it to < 140/90  Follow up with counselor and mental health  Gave referral for colon cancer screening
Yes

## 2024-03-21 DIAGNOSIS — R71.8 ABNORMAL HEMATOCRIT: ICD-10-CM

## 2024-03-21 DIAGNOSIS — Z13.6 SCREENING FOR CARDIOVASCULAR CONDITION: ICD-10-CM

## 2024-03-21 DIAGNOSIS — R73.03 PREDIABETES: ICD-10-CM

## 2024-03-21 DIAGNOSIS — E03.8 OTHER SPECIFIED HYPOTHYROIDISM: ICD-10-CM

## 2024-03-21 DIAGNOSIS — E53.8 VITAMIN B 12 DEFICIENCY: ICD-10-CM

## 2024-03-21 DIAGNOSIS — E53.8 FOLATE DEFICIENCY: ICD-10-CM

## 2024-03-21 DIAGNOSIS — I10 PRIMARY HYPERTENSION: Primary | ICD-10-CM

## 2024-03-21 DIAGNOSIS — R74.8 ELEVATED ALKALINE PHOSPHATASE LEVEL: ICD-10-CM

## 2024-03-22 RX ORDER — LEVOTHYROXINE SODIUM 0.03 MG/1
TABLET ORAL
Qty: 60 TABLET | Refills: 0 | Status: SHIPPED | OUTPATIENT
Start: 2024-03-22

## 2024-04-01 ENCOUNTER — APPOINTMENT (OUTPATIENT)
Dept: LAB | Facility: HOSPITAL | Age: 59
End: 2024-04-01
Payer: COMMERCIAL

## 2024-04-01 ENCOUNTER — HOSPITAL ENCOUNTER (OUTPATIENT)
Dept: CT IMAGING | Facility: HOSPITAL | Age: 59
Discharge: HOME | End: 2024-04-01
Attending: FAMILY MEDICINE
Payer: COMMERCIAL

## 2024-04-01 DIAGNOSIS — I10 PRIMARY HYPERTENSION: ICD-10-CM

## 2024-04-01 DIAGNOSIS — F17.200 TOBACCO USE DISORDER: ICD-10-CM

## 2024-04-01 DIAGNOSIS — R71.8 ABNORMAL HEMATOCRIT: ICD-10-CM

## 2024-04-01 DIAGNOSIS — Z13.6 SCREENING FOR CARDIOVASCULAR CONDITION: ICD-10-CM

## 2024-04-01 DIAGNOSIS — E53.8 FOLATE DEFICIENCY: ICD-10-CM

## 2024-04-01 DIAGNOSIS — R74.8 ELEVATED ALKALINE PHOSPHATASE LEVEL: ICD-10-CM

## 2024-04-01 DIAGNOSIS — E53.8 VITAMIN B 12 DEFICIENCY: ICD-10-CM

## 2024-04-01 DIAGNOSIS — R73.03 PREDIABETES: ICD-10-CM

## 2024-04-01 DIAGNOSIS — E03.8 OTHER SPECIFIED HYPOTHYROIDISM: ICD-10-CM

## 2024-04-01 LAB
ALBUMIN SERPL BCP-MCNC: 4.1 G/DL (ref 3.5–5)
ALP SERPL-CCNC: 129 U/L (ref 34–104)
ALT SERPL W P-5'-P-CCNC: 34 U/L (ref 7–52)
ANION GAP SERPL CALCULATED.3IONS-SCNC: 7 MMOL/L (ref 4–13)
AST SERPL W P-5'-P-CCNC: 33 U/L (ref 13–39)
BASOPHILS # BLD AUTO: 0.04 THOUSANDS/ÂΜL (ref 0–0.1)
BASOPHILS NFR BLD AUTO: 1 % (ref 0–1)
BILIRUB SERPL-MCNC: 0.82 MG/DL (ref 0.2–1)
BUN SERPL-MCNC: 8 MG/DL (ref 5–25)
CALCIUM SERPL-MCNC: 8.9 MG/DL (ref 8.4–10.2)
CHLORIDE SERPL-SCNC: 102 MMOL/L (ref 96–108)
CHOLEST SERPL-MCNC: 244 MG/DL
CO2 SERPL-SCNC: 32 MMOL/L (ref 21–32)
CREAT SERPL-MCNC: 0.92 MG/DL (ref 0.6–1.3)
CREAT UR-MCNC: 156.2 MG/DL
EOSINOPHIL # BLD AUTO: 0.04 THOUSAND/ÂΜL (ref 0–0.61)
EOSINOPHIL NFR BLD AUTO: 1 % (ref 0–6)
ERYTHROCYTE [DISTWIDTH] IN BLOOD BY AUTOMATED COUNT: 16.9 % (ref 11.6–15.1)
EST. AVERAGE GLUCOSE BLD GHB EST-MCNC: 114 MG/DL
FOLATE SERPL-MCNC: 2.5 NG/ML
GFR SERPL CREATININE-BSD FRML MDRD: 91 ML/MIN/1.73SQ M
GLUCOSE P FAST SERPL-MCNC: 107 MG/DL (ref 65–99)
HBA1C MFR BLD: 5.6 %
HCT VFR BLD AUTO: 52.1 % (ref 36.5–49.3)
HDLC SERPL-MCNC: 35 MG/DL
HGB BLD-MCNC: 17 G/DL (ref 12–17)
IMM GRANULOCYTES # BLD AUTO: 0.03 THOUSAND/UL (ref 0–0.2)
IMM GRANULOCYTES NFR BLD AUTO: 0 % (ref 0–2)
LDLC SERPL CALC-MCNC: 169 MG/DL (ref 0–100)
LYMPHOCYTES # BLD AUTO: 1.1 THOUSANDS/ÂΜL (ref 0.6–4.47)
LYMPHOCYTES NFR BLD AUTO: 16 % (ref 14–44)
MCH RBC QN AUTO: 35.3 PG (ref 26.8–34.3)
MCHC RBC AUTO-ENTMCNC: 32.6 G/DL (ref 31.4–37.4)
MCV RBC AUTO: 108 FL (ref 82–98)
MICROALBUMIN UR-MCNC: 11.5 MG/L
MICROALBUMIN/CREAT 24H UR: 7 MG/G CREATININE (ref 0–30)
MONOCYTES # BLD AUTO: 0.45 THOUSAND/ÂΜL (ref 0.17–1.22)
MONOCYTES NFR BLD AUTO: 6 % (ref 4–12)
NEUTROPHILS # BLD AUTO: 5.35 THOUSANDS/ÂΜL (ref 1.85–7.62)
NEUTS SEG NFR BLD AUTO: 76 % (ref 43–75)
NONHDLC SERPL-MCNC: 209 MG/DL
NRBC BLD AUTO-RTO: 0 /100 WBCS
PLATELET # BLD AUTO: 211 THOUSANDS/UL (ref 149–390)
PMV BLD AUTO: 9.5 FL (ref 8.9–12.7)
POTASSIUM SERPL-SCNC: 4.3 MMOL/L (ref 3.5–5.3)
PROT SERPL-MCNC: 7.3 G/DL (ref 6.4–8.4)
RBC # BLD AUTO: 4.81 MILLION/UL (ref 3.88–5.62)
SODIUM SERPL-SCNC: 141 MMOL/L (ref 135–147)
TRIGL SERPL-MCNC: 199 MG/DL
TSH SERPL DL<=0.05 MIU/L-ACNC: 3.48 UIU/ML (ref 0.45–4.5)
VIT B12 SERPL-MCNC: 166 PG/ML (ref 180–914)
WBC # BLD AUTO: 7.01 THOUSAND/UL (ref 4.31–10.16)

## 2024-04-01 PROCEDURE — 36415 COLL VENOUS BLD VENIPUNCTURE: CPT

## 2024-04-01 PROCEDURE — 84443 ASSAY THYROID STIM HORMONE: CPT

## 2024-04-01 PROCEDURE — 85025 COMPLETE CBC W/AUTO DIFF WBC: CPT

## 2024-04-01 PROCEDURE — 80053 COMPREHEN METABOLIC PANEL: CPT

## 2024-04-01 PROCEDURE — 82746 ASSAY OF FOLIC ACID SERUM: CPT

## 2024-04-01 PROCEDURE — 82043 UR ALBUMIN QUANTITATIVE: CPT

## 2024-04-01 PROCEDURE — 71271 CT THORAX LUNG CANCER SCR C-: CPT

## 2024-04-01 PROCEDURE — 82570 ASSAY OF URINE CREATININE: CPT

## 2024-04-01 PROCEDURE — 83036 HEMOGLOBIN GLYCOSYLATED A1C: CPT

## 2024-04-01 PROCEDURE — 82607 VITAMIN B-12: CPT

## 2024-04-01 PROCEDURE — 80061 LIPID PANEL: CPT

## 2024-04-07 DIAGNOSIS — D75.89 MACROCYTIC: ICD-10-CM

## 2024-04-09 RX ORDER — LANOLIN ALCOHOL/MO/W.PET/CERES
1000 CREAM (GRAM) TOPICAL DAILY
Qty: 90 TABLET | Refills: 0 | Status: SHIPPED | OUTPATIENT
Start: 2024-04-09

## 2024-04-19 ENCOUNTER — OFFICE VISIT (OUTPATIENT)
Dept: FAMILY MEDICINE CLINIC | Facility: CLINIC | Age: 59
End: 2024-04-19

## 2024-04-19 VITALS
OXYGEN SATURATION: 95 % | HEIGHT: 70 IN | RESPIRATION RATE: 20 BRPM | TEMPERATURE: 98.7 F | HEART RATE: 108 BPM | SYSTOLIC BLOOD PRESSURE: 109 MMHG | BODY MASS INDEX: 45.1 KG/M2 | WEIGHT: 315 LBS | DIASTOLIC BLOOD PRESSURE: 74 MMHG

## 2024-04-19 DIAGNOSIS — R05.1 ACUTE COUGH: Primary | ICD-10-CM

## 2024-04-19 DIAGNOSIS — R06.2 WHEEZING: ICD-10-CM

## 2024-04-19 PROCEDURE — 99213 OFFICE O/P EST LOW 20 MIN: CPT | Performed by: FAMILY MEDICINE

## 2024-04-19 RX ORDER — ALBUTEROL SULFATE 90 UG/1
2 AEROSOL, METERED RESPIRATORY (INHALATION) EVERY 4 HOURS PRN
Qty: 8.5 G | Refills: 1 | Status: SHIPPED | OUTPATIENT
Start: 2024-04-19

## 2024-04-19 RX ORDER — PREDNISONE 20 MG/1
40 TABLET ORAL DAILY
Qty: 10 TABLET | Refills: 0 | Status: SHIPPED | OUTPATIENT
Start: 2024-04-19 | End: 2024-04-24

## 2024-04-19 RX ORDER — GUAIFENESIN 600 MG/1
1200 TABLET, EXTENDED RELEASE ORAL EVERY 12 HOURS SCHEDULED
Qty: 60 TABLET | Refills: 0 | Status: SHIPPED | OUTPATIENT
Start: 2024-04-19

## 2024-04-19 NOTE — LETTER
April 22, 2024     Patient: Torey Del Castillo .  YOB: 1965  Date of Visit: 4/19/2024      To Whom it May Concern:    Torey Del Castillo is under my professional care. Torey was seen in my office on 4/19/2024. Please excuse Torey from work on 4/19/24, 4/20/24, and he will return to work on 4/22/24. Thank you for your understanding.      If you have any questions or concerns, please don't hesitate to call.         Sincerely,          Ct López, DO         Sincerely,          Ct López, DO        CC:   No Recipients

## 2024-04-19 NOTE — ASSESSMENT & PLAN NOTE
Wheezing associated respiratory illness  Patient does have a over 30-pack-year history of smoking  Last spirometry test was in 2021 and revealed no obstructive pathology, restricted lung volumes likely secondary to obesity.  Because of patient's smoking history and current exacerbation, I have a high suspicion that he may have COPD  I encouraged patient to have pulmonary function test completed once he has recovered from this illness.    Plan:  Prednisone 40 mg for 5 days for cough with accompanying wheezing in the setting of suspected COPD

## 2024-04-19 NOTE — LETTER
April 19, 2024     Patient: Torey Del Castillo .  YOB: 1965  Date of Visit: 4/19/2024      To Whom it May Concern:    Torey Del Castillo is under my professional care. Torey was seen in my office on 4/19/2024. Please excuse Torey from work on 4/19/24.     If you have any questions or concerns, please don't hesitate to call.         Sincerely,          Ct López, DO        CC: No Recipients

## 2024-04-19 NOTE — ASSESSMENT & PLAN NOTE
7 days acute cough with associated fever and chills initially and now with productive cough unable to cough up phlegm.    Plan:  Highly suspicious that patient may have a component of COPD due to past smoking history.  No recent PFTs to confirm this diagnosis.    Mucinex 600 mg twice daily for productive cough  Encourage patient to have pulmonary function test completed as soon as he feels better from this illness.

## 2024-04-19 NOTE — PROGRESS NOTES
Name: Torey Del Castillo Jr.      : 1965      MRN: [id number]  Encounter Provider: Ct López DO  Encounter Date: 2024   Encounter department: [facility]    Assessment & Plan     1. Acute cough  Assessment & Plan:  7 days acute cough with associated fever and chills initially and now with productive cough unable to cough up phlegm.    Plan:  Highly suspicious that patient may have a component of COPD due to past smoking history.  No recent PFTs to confirm this diagnosis.    Mucinex 600 mg twice daily for productive cough  Encourage patient to have pulmonary function test completed as soon as he feels better from this illness.     Orders:  -     guaiFENesin (MUCINEX) 600 mg 12 hr tablet; Take 2 tablets (1,200 mg total) by mouth every 12 (twelve) hours    2. Wheezing  Assessment & Plan:  Wheezing associated respiratory illness  Patient does have a over 30-pack-year history of smoking  Last spirometry test was in  and revealed no obstructive pathology, restricted lung volumes likely secondary to obesity.  Because of patient's smoking history and current exacerbation, I have a high suspicion that he may have COPD  I encouraged patient to have pulmonary function test completed once he has recovered from this illness.    Plan:  Prednisone 40 mg for 5 days for cough with accompanying wheezing in the setting of suspected COPD      Orders:  -     albuterol (PROVENTIL HFA,VENTOLIN HFA) 90 mcg/act inhaler; Inhale 2 puffs every 4 (four) hours as needed for wheezing  -     predniSONE 20 mg tablet; Take 2 tablets (40 mg total) by mouth daily for 5 days           Subjective      HPI  58-year-old male who presents today for a cough that has been ongoing for 7 days.  He states that 7 days ago he started to feel unwell, took his temperature which was 101 °F.  He states that he took a Tylenol and it resolved on its own and has since been afebrile.  He states that the cough has  been worsening and he feels soreness in his throat.  He states that the cough is productive and is difficult for him to cough the mucus and phlegm up.  He feels more short of breath than baseline.  He is a greater than 30-pack-year smoker and currently smoking 3 to 4 cigarettes a day.  He denies any fever, congestion.    Review of Systems   Constitutional:  Positive for chills and fever.   HENT:  Positive for sore throat. Negative for congestion.    Respiratory:  Positive for cough, shortness of breath and wheezing.    Cardiovascular:  Negative for chest pain.   Gastrointestinal: Negative.        Current Outpatient Medications on File Prior to Visit   Medication Sig    atorvastatin (LIPITOR) 40 mg tablet TAKE ONE TABLET BY MOUTH EVERY DAY    diltiazem (CARDIZEM CD) 300 mg 24 hr capsule TAKE ONE CAPSULE BY MOUTH EVERY MORNING    isosorbide mononitrate (IMDUR) 60 mg 24 hr tablet TAKE ONE TABLET BY MOUTH ONCE DAILY    levothyroxine 25 mcg tablet TAKE ONE TABLET BY MOUTH EVERY EARLY MORNING    lisinopril (ZESTRIL) 40 mg tablet TAKE ONE TABLET BY MOUTH EVERY DAY    LORazepam (ATIVAN) 1 mg tablet Take 1 tablet (1 mg total) by mouth 2 (two) times a day as needed for anxiety    omeprazole (PriLOSEC) 40 MG capsule TAKE ONE CAPSULE BY MOUTH EVERY DAY    QUEtiapine (SEROquel) 300 mg tablet Take 1 tablet (300 mg total) by mouth daily at bedtime    sertraline (ZOLOFT) 50 mg tablet Take 50 mg by mouth daily at bedtime    vitamin B-12 (VITAMIN B-12) 1,000 mcg tablet TAKE ONE TABLET BY MOUTH EVERY DAY    [DISCONTINUED] albuterol (PROVENTIL HFA,VENTOLIN HFA) 90 mcg/act inhaler INHALE TWO PUFFS BY MOUTH EVERY 6 HOURS AS NEEDED FOR SHORTNESS OF BREATH    folic acid (FOLVITE) 400 mcg tablet TAKE ONE TABLET BY MOUTH EVERY DAY (Patient not taking: Reported on 4/19/2024)    methocarbamol (ROBAXIN) 500 mg tablet Take 1 tablet (500 mg total) by mouth 2 (two) times a day (Patient not taking: Reported on 4/19/2024)       Objective     BP  "109/74 (BP Location: Left arm, Patient Position: Sitting, Cuff Size: Large)   Pulse (!) 108   Temp 98.7 °F (37.1 °C) (Temporal)   Resp 20   Ht 5' 10\" (1.778 m)   Wt (!) 154 kg (339 lb 9.6 oz)   SpO2 95%   BMI 48.73 kg/m²     Physical Exam  Vitals reviewed.   Constitutional:       General: He is not in acute distress.     Appearance: Normal appearance. He is obese. He is not ill-appearing.   HENT:      Head: Normocephalic and atraumatic.      Mouth/Throat:      Mouth: Mucous membranes are moist.      Pharynx: Oropharynx is clear.   Eyes:      Conjunctiva/sclera: Conjunctivae normal.   Cardiovascular:      Rate and Rhythm: Normal rate and regular rhythm.      Heart sounds: No murmur heard.     No friction rub. No gallop.   Pulmonary:      Effort: Pulmonary effort is normal. No respiratory distress.      Breath sounds: Wheezing present. No rhonchi or rales.      Comments: Productive cough  Abdominal:      General: Abdomen is flat. Bowel sounds are normal.      Palpations: Abdomen is soft.   Musculoskeletal:      Cervical back: Normal range of motion.   Skin:     General: Skin is warm and dry.   Neurological:      General: No focal deficit present.      Mental Status: He is alert.   Psychiatric:         Mood and Affect: Mood normal.         Behavior: Behavior normal.       Ct López, DO    "

## 2024-04-22 ENCOUNTER — TELEPHONE (OUTPATIENT)
Dept: FAMILY MEDICINE CLINIC | Facility: CLINIC | Age: 59
End: 2024-04-22

## 2024-04-22 DIAGNOSIS — I10 PRIMARY HYPERTENSION: ICD-10-CM

## 2024-04-22 DIAGNOSIS — E78.5 DYSLIPIDEMIA WITH ELEVATED LOW DENSITY LIPOPROTEIN (LDL) CHOLESTEROL AND ABNORMALLY LOW HIGH DENSITY LIPOPROTEIN CHOLESTEROL: ICD-10-CM

## 2024-04-22 RX ORDER — ATORVASTATIN CALCIUM 40 MG/1
TABLET, FILM COATED ORAL
Qty: 90 TABLET | Refills: 0 | Status: SHIPPED | OUTPATIENT
Start: 2024-04-22

## 2024-04-22 RX ORDER — LISINOPRIL 40 MG/1
TABLET ORAL
Qty: 90 TABLET | Refills: 1 | Status: SHIPPED | OUTPATIENT
Start: 2024-04-22

## 2024-04-22 NOTE — TELEPHONE ENCOUNTER
Patient urgently requesting amendment to work letter of 4/19/24, to include absence from work Friday, 4/19 and Saturday, 4/20, returning to work today, 4/22/24.

## 2024-04-26 ENCOUNTER — OFFICE VISIT (OUTPATIENT)
Dept: FAMILY MEDICINE CLINIC | Facility: CLINIC | Age: 59
End: 2024-04-26

## 2024-04-26 ENCOUNTER — HOSPITAL ENCOUNTER (OUTPATIENT)
Dept: RADIOLOGY | Facility: HOSPITAL | Age: 59
Discharge: HOME | End: 2024-04-26
Payer: COMMERCIAL

## 2024-04-26 VITALS
SYSTOLIC BLOOD PRESSURE: 132 MMHG | DIASTOLIC BLOOD PRESSURE: 82 MMHG | HEART RATE: 97 BPM | RESPIRATION RATE: 16 BRPM | BODY MASS INDEX: 45.1 KG/M2 | TEMPERATURE: 98 F | WEIGHT: 315 LBS | OXYGEN SATURATION: 96 % | HEIGHT: 70 IN

## 2024-04-26 DIAGNOSIS — R05.2 SUBACUTE COUGH: ICD-10-CM

## 2024-04-26 DIAGNOSIS — R05.2 SUBACUTE COUGH: Primary | ICD-10-CM

## 2024-04-26 DIAGNOSIS — J18.9 PNEUMONIA OF RIGHT LOWER LOBE DUE TO INFECTIOUS ORGANISM: Primary | ICD-10-CM

## 2024-04-26 PROCEDURE — 99213 OFFICE O/P EST LOW 20 MIN: CPT | Performed by: FAMILY MEDICINE

## 2024-04-26 PROCEDURE — 71046 X-RAY EXAM CHEST 2 VIEWS: CPT

## 2024-04-26 RX ORDER — AMOXICILLIN 500 MG/1
1000 CAPSULE ORAL EVERY 8 HOURS SCHEDULED
Qty: 42 CAPSULE | Refills: 0 | Status: SHIPPED | OUTPATIENT
Start: 2024-04-26 | End: 2024-05-03

## 2024-04-26 NOTE — PROGRESS NOTES
"Name: Torey Del Castillo Jr.      : 1965      MRN: [id number]  Encounter Provider: Ct López DO  Encounter Date: 2024   Encounter department: [facility]    Assessment & Plan     1. Pneumonia of right lower lobe due to infectious organism  -     amoxicillin (AMOXIL) 500 mg capsule; Take 2 capsules (1,000 mg total) by mouth every 8 (eight) hours for 7 days    2. Subacute cough      58 y.o. male presenting with symptoms of coughing, malaise, previously treated with Prednisone and Mucinex for URI with suspected COPD exacerbation for approx 12 days.     Assessment:  Chest x-ray reviewed with attending physician, Dr. Ku, remarkable for probable right lower lobe pneumonia.    Plan:   No co-morbidities or low risk for P. Aeruginosa or MRSA - no prior respiratory isolation, recent hospitalization NOR IV Abx in previous 90 days   - Amoxicillin 1g PO TID x7 days         Subjective      HPI  58-year-old male presenting as a sick visit as a follow-up from an acute cough.  He was seen in the office approximately a week ago and had cough, congestion, runny nose after a week of malaise and intermittent fevers.  At that time he was treated as a suspected COPD exacerbation with prednisone and Mucinex.  He states that on 3 to 4 days of prednisone he started to feel better, however after the prednisone stopped he slowly began to feel worse.  He states that he is feeling more short of breath and overall malaise.  He denies any recent fevers or chills.  He states that he has been \"nodding off\" at work which is unusual for him.  He does have a past medical history of severe sleep apnea, however he was not able to obtain a CPAP machine due to cost.    Review of Systems   Constitutional:  Positive for activity change.   HENT:  Positive for congestion.    Respiratory:  Positive for cough and shortness of breath.    Gastrointestinal:  Negative for abdominal pain, diarrhea, nausea and " "vomiting.   Musculoskeletal:  Positive for myalgias. Negative for arthralgias.   Neurological:  Negative for dizziness and headaches.       Current Outpatient Medications on File Prior to Visit   Medication Sig    albuterol (PROVENTIL HFA,VENTOLIN HFA) 90 mcg/act inhaler Inhale 2 puffs every 4 (four) hours as needed for wheezing    atorvastatin (LIPITOR) 40 mg tablet TAKE ONE TABLET BY MOUTH EVERY DAY    diltiazem (CARDIZEM CD) 300 mg 24 hr capsule TAKE ONE CAPSULE BY MOUTH EVERY MORNING    guaiFENesin (MUCINEX) 600 mg 12 hr tablet Take 2 tablets (1,200 mg total) by mouth every 12 (twelve) hours    isosorbide mononitrate (IMDUR) 60 mg 24 hr tablet TAKE ONE TABLET BY MOUTH ONCE DAILY    levothyroxine 25 mcg tablet TAKE ONE TABLET BY MOUTH EVERY EARLY MORNING    lisinopril (ZESTRIL) 40 mg tablet TAKE ONE TABLET BY MOUTH EVERY DAY    LORazepam (ATIVAN) 1 mg tablet Take 1 tablet (1 mg total) by mouth 2 (two) times a day as needed for anxiety    omeprazole (PriLOSEC) 40 MG capsule TAKE ONE CAPSULE BY MOUTH EVERY DAY    QUEtiapine (SEROquel) 300 mg tablet Take 1 tablet (300 mg total) by mouth daily at bedtime    sertraline (ZOLOFT) 50 mg tablet Take 50 mg by mouth daily at bedtime    folic acid (FOLVITE) 400 mcg tablet TAKE ONE TABLET BY MOUTH EVERY DAY (Patient not taking: Reported on 4/19/2024)    methocarbamol (ROBAXIN) 500 mg tablet Take 1 tablet (500 mg total) by mouth 2 (two) times a day (Patient not taking: Reported on 4/19/2024)    vitamin B-12 (VITAMIN B-12) 1,000 mcg tablet TAKE ONE TABLET BY MOUTH EVERY DAY       Objective     /82 (BP Location: Left arm, Patient Position: Sitting, Cuff Size: Standard)   Pulse 97   Temp 98 °F (36.7 °C) (Temporal)   Resp 16   Ht 5' 10\" (1.778 m)   Wt (!) 154 kg (339 lb)   SpO2 96%   BMI 48.64 kg/m²     Physical Exam  Vitals reviewed.   Constitutional:       General: He is not in acute distress.     Appearance: Normal appearance. He is normal weight. He is not " ill-appearing.   HENT:      Head: Normocephalic and atraumatic.      Mouth/Throat:      Mouth: Mucous membranes are moist.      Pharynx: Oropharynx is clear.   Eyes:      Conjunctiva/sclera: Conjunctivae normal.   Cardiovascular:      Rate and Rhythm: Normal rate and regular rhythm.      Heart sounds: No murmur heard.     No friction rub. No gallop.   Pulmonary:      Effort: Pulmonary effort is normal. No respiratory distress.      Breath sounds: Rhonchi (RLL) present. No wheezing or rales.   Abdominal:      General: Abdomen is flat. Bowel sounds are normal.      Palpations: Abdomen is soft.   Musculoskeletal:      Cervical back: Normal range of motion.   Skin:     General: Skin is warm and dry.   Neurological:      General: No focal deficit present.      Mental Status: He is alert.   Psychiatric:         Mood and Affect: Mood normal.         Behavior: Behavior normal.       Ct López,

## 2024-04-26 NOTE — LETTER
April 26, 2024     Patient: Torey Del Castillo .  YOB: 1965  Date of Visit: 4/26/2024      To Whom it May Concern:    Torey Del Castillo is under my professional care. Torey was seen in my office on 4/26/2024. Torey may return to work on 4/29/24 .    If you have any questions or concerns, please don't hesitate to call.         Sincerely,          Ct López, DO        CC: No Recipients

## 2024-05-06 ENCOUNTER — TELEPHONE (OUTPATIENT)
Dept: FAMILY MEDICINE CLINIC | Facility: CLINIC | Age: 59
End: 2024-05-06

## 2024-05-06 NOTE — TELEPHONE ENCOUNTER
Patient was seen by Dr. López on 4/26/24 with a return date to work 4/29/24.     Patient called today and is requesting a return to work letter to include May 1st thru 3rd.    He has an appointment rescheduled for  5/13/24

## 2024-05-07 ENCOUNTER — TELEPHONE (OUTPATIENT)
Dept: FAMILY MEDICINE CLINIC | Facility: CLINIC | Age: 59
End: 2024-05-07

## 2024-05-07 NOTE — LETTER
May 7, 2024     Patient: Torey Del Castillo Jr.  YOB: 1965  Date of Visit: 5/7/2024      To Whom it May Concern:    Torey Del Castillo is under our professional care. Torey was seen in my office on 4/26/24. Torey may return to work on 5/6/24 .    If you have any questions or concerns, please don't hesitate to call.         Sincerely,        Kailee Underwood MD        CC: No Recipients

## 2024-05-07 NOTE — TELEPHONE ENCOUNTER
Pt called to see if he could get an update note to return to work the note in his chart states to return 04/29/2024.  He said there was another note written to return to work on 05/06/2024 in his chart but I don't see it in the chart.    Does he need to come back in before another note is written.  I explained he may need to come back to be cleared to return to work.    Please advice

## 2024-05-09 ENCOUNTER — OFFICE VISIT (OUTPATIENT)
Dept: FAMILY MEDICINE CLINIC | Facility: CLINIC | Age: 59
End: 2024-05-09

## 2024-05-09 VITALS
HEIGHT: 70 IN | TEMPERATURE: 98.9 F | RESPIRATION RATE: 24 BRPM | OXYGEN SATURATION: 93 % | WEIGHT: 315 LBS | SYSTOLIC BLOOD PRESSURE: 101 MMHG | BODY MASS INDEX: 45.1 KG/M2 | HEART RATE: 74 BPM | DIASTOLIC BLOOD PRESSURE: 60 MMHG

## 2024-05-09 DIAGNOSIS — R05.1 ACUTE COUGH: ICD-10-CM

## 2024-05-09 PROCEDURE — 99213 OFFICE O/P EST LOW 20 MIN: CPT | Performed by: FAMILY MEDICINE

## 2024-05-09 RX ORDER — GUAIFENESIN 600 MG/1
1200 TABLET, EXTENDED RELEASE ORAL EVERY 12 HOURS SCHEDULED
Qty: 60 TABLET | Refills: 0 | Status: SHIPPED | OUTPATIENT
Start: 2024-05-09

## 2024-05-09 RX ORDER — BENZONATATE 100 MG/1
200 CAPSULE ORAL 3 TIMES DAILY PRN
Qty: 20 CAPSULE | Refills: 0 | Status: SHIPPED | OUTPATIENT
Start: 2024-05-09

## 2024-05-09 NOTE — LETTER
May 9, 2024     Patient: Torey Del Castillo .  YOB: 1965  Date of Visit: 5/9/2024      To Whom it May Concern:    Torey Del Castillo is under my professional care. Torey was seen in my office on 5/9/2024. Torey may return to work on Tuesday, 5/14/2024  .    If you have any questions or concerns, please don't hesitate to call.         Sincerely,          Chantel Bliss DO        CC: No Recipients

## 2024-05-09 NOTE — PROGRESS NOTES
Assessment/Plan:    Acute cough  -in setting of recent pneumonia, for which he completed a 7-day course of amoxicillin prescribed on 4/26  -Pt is afebrile, SpO2 is 94% on RA today and lungs are CTAB. Good air entry throughout and no wheezes, rales, or rhonchi appreciated   -suspect lingering cough after pneumonia     Plan:  Will treat cough symptomatically with mucinex and tessalon perles   Will repeat CXR to confirm improvement/cure of pneumonia   Has follow up appointment with Dr. Rodríguez on 5/13, review CXR at that time  Return to ED precautions for chest pain, shortness of breath, etc reviewed        Diagnoses and all orders for this visit:    Acute cough  -     XR chest pa & lateral; Future  -     benzonatate (TESSALON PERLES) 100 mg capsule; Take 2 capsules (200 mg total) by mouth 3 (three) times a day as needed for cough  -     guaiFENesin (MUCINEX) 600 mg 12 hr tablet; Take 2 tablets (1,200 mg total) by mouth every 12 (twelve) hours          Subjective:      Patient ID: Torey Del Castillo Jr. is a 58 y.o. male.    Pt presents for acute visit for cough. Patient was initially seen in the office on 4/19 for cough, was suspected to have a COPD exacerbation and started on a 5-day Prednisone burst and mucinex. Was seen again on 4/26, diagnosed with a right lower lobe pneumonia based on CXR  and started on amoxicillin 1000mg q8h x7 days. Completed the amoxicillin but reports the symptoms are not resolved. Reports he still is not feeling well and still has the cough and wheeze. No fevers or chills.    States the most bothersome symptoms os the cough. Denies shortness of breath or difficulty breathing. States he has pain in his ribs when he coughs and the cough is so bothersome that he can barely sleep at night.Reports he is taking halls and vicks for the cough. They do not help.             The following portions of the patient's history were reviewed and updated as appropriate: allergies, current medications, past  "family history, past medical history, past social history, past surgical history, and problem list.    Review of Systems   Constitutional:  Negative for chills and fever.   HENT:  Negative for ear pain and sore throat.    Eyes:  Negative for pain and visual disturbance.   Respiratory:  Positive for cough. Negative for shortness of breath.    Cardiovascular:  Negative for chest pain and palpitations.   Gastrointestinal:  Negative for abdominal pain, constipation and vomiting.   Genitourinary:  Negative for dysuria and hematuria.   Musculoskeletal:  Negative for arthralgias and back pain.   Skin:  Negative for color change and rash.   Neurological:  Negative for seizures and syncope.   All other systems reviewed and are negative.        Objective:      /60   Pulse 74   Temp 98.9 °F (37.2 °C) (Temporal)   Resp (!) 24   Ht 5' 10\" (1.778 m)   Wt (!) 150 kg (331 lb)   SpO2 93%   BMI 47.49 kg/m²          Physical Exam  Constitutional:       General: He is not in acute distress.     Appearance: He is obese. He is not ill-appearing or toxic-appearing.   HENT:      Head: Normocephalic and atraumatic.      Right Ear: External ear normal.      Left Ear: External ear normal.      Nose: Nose normal.      Mouth/Throat:      Mouth: Mucous membranes are moist.      Pharynx: Oropharynx is clear.   Eyes:      General: No scleral icterus.     Conjunctiva/sclera: Conjunctivae normal.   Cardiovascular:      Rate and Rhythm: Normal rate and regular rhythm.      Heart sounds: Normal heart sounds. No murmur heard.  Pulmonary:      Effort: Pulmonary effort is normal. No respiratory distress.      Breath sounds: Normal breath sounds. No wheezing, rhonchi or rales.   Abdominal:      General: Bowel sounds are normal.      Palpations: Abdomen is soft.      Tenderness: There is no abdominal tenderness. There is no guarding.      Hernia: No hernia is present.   Musculoskeletal:         General: No swelling.      Right lower leg: No " edema.      Left lower leg: No edema.   Skin:     General: Skin is warm and dry.      Coloration: Skin is not jaundiced.   Neurological:      General: No focal deficit present.      Mental Status: He is alert and oriented to person, place, and time. Mental status is at baseline.   Psychiatric:         Mood and Affect: Mood normal.         Behavior: Behavior normal.

## 2024-05-09 NOTE — ASSESSMENT & PLAN NOTE
-in setting of recent pneumonia, for which he completed a 7-day course of amoxicillin prescribed on 4/26  -Pt is afebrile, SpO2 is 94% on RA today and lungs are CTAB. Good air entry throughout and no wheezes, rales, or rhonchi appreciated   -suspect lingering cough after pneumonia     Plan:  Will treat cough symptomatically with mucinex and tessalon perles   Will repeat CXR to confirm improvement/cure of pneumonia   Has follow up appointment with Dr. Rodríguez on 5/13, review CXR at that time  Return to ED precautions for chest pain, shortness of breath, etc reviewed

## 2024-05-13 ENCOUNTER — TELEPHONE (OUTPATIENT)
Dept: OTHER | Facility: OTHER | Age: 59
End: 2024-05-13

## 2024-05-13 NOTE — TELEPHONE ENCOUNTER
Patient is calling regarding cancelling an appointment.    Date/Time: 5/13 @ 10:30 am    Patient was rescheduled: YES [] NO [x]    Patient requesting call back to reschedule: YES [x] NO []    Patient not feeling well. He would like a callback to reschedule. His callback number is [phone].

## 2024-05-19 PROBLEM — R05.1 ACUTE COUGH: Status: RESOLVED | Noted: 2024-04-19 | Resolved: 2024-05-19

## 2024-05-23 NOTE — TELEPHONE ENCOUNTER
Faxed and scanned to chart
Folder (To be completed by) - Dr Hao Romero      Name of Form - Pre-surgical clearance     Color folder Nely Snare)    Form to be Faxed (Fax #):[phone]    Patient was made aware of the 7 business day form policy 
Form given to Dr Jeison Rico to complete 
Form reviewed and signed by Dr Terry Alarcon  Form placed in the [employer] in the [facility] for scan and fax to [phone]   Thanks
Patient scheduled 9/8/22 for preop
On admission, presented with /88. Patient reports being on Lisinopril 20mg  - c/w Lisinopril 20mg

## 2024-06-19 ENCOUNTER — OFFICE VISIT (OUTPATIENT)
Dept: CARDIOLOGY CLINIC | Facility: CLINIC | Age: 59
End: 2024-06-19
Payer: COMMERCIAL

## 2024-06-19 VITALS
DIASTOLIC BLOOD PRESSURE: 87 MMHG | HEART RATE: 78 BPM | WEIGHT: 298 LBS | HEIGHT: 70 IN | SYSTOLIC BLOOD PRESSURE: 108 MMHG | BODY MASS INDEX: 42.66 KG/M2

## 2024-06-19 DIAGNOSIS — I20.1 PRINZMETAL VARIANT ANGINA (HCC): Primary | ICD-10-CM

## 2024-06-19 DIAGNOSIS — I46.9 CARDIAC ARREST (HCC): ICD-10-CM

## 2024-06-19 DIAGNOSIS — I48.92 ATRIAL FLUTTER, UNSPECIFIED TYPE (HCC): ICD-10-CM

## 2024-06-19 DIAGNOSIS — E78.5 DYSLIPIDEMIA WITH ELEVATED LOW DENSITY LIPOPROTEIN (LDL) CHOLESTEROL AND ABNORMALLY LOW HIGH DENSITY LIPOPROTEIN CHOLESTEROL: ICD-10-CM

## 2024-06-19 DIAGNOSIS — R06.02 SOB (SHORTNESS OF BREATH): ICD-10-CM

## 2024-06-19 PROCEDURE — 99214 OFFICE O/P EST MOD 30 MIN: CPT | Performed by: INTERNAL MEDICINE

## 2024-06-19 RX ORDER — QUETIAPINE FUMARATE 50 MG/1
TABLET, FILM COATED ORAL
COMMUNITY
Start: 2024-05-10

## 2024-06-19 NOTE — PROGRESS NOTES
Cardiology Follow Up    Torey Del Castillo Jr.  1965  [id number]  [facility] CARDIOLOGY [address] [address] [phone]    1. 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HCC)        2. SOB (shortness of breath)  Ambulatory referral to Pulmonology      3. Atrial flutter, unspecified type (HCC)        4. Cardiac arrest (HCC)        5. Dyslipidemia with elevated low density lipoprotein (LDL) cholesterol and abnormally low high density lipoprotein cholesterol            Interval History: Complains of shortness of breath which is chronic and cough.  Denies chest pain orthopnea paroxysmal nocturnal dyspnea or palpitations.    Patient Active Problem List   Diagnosis    Other insomnia    Depression with anxiety    Kidney stone on right side    Ileus (HCC)    Umbilical hernia without obstruction and without gangrene    Gallbladder calculus without cholecystitis    Tobacco use disorder    Closed right ankle fracture    History of ileus    Adenoma of left adrenal gland    Anxiety    GERD (gastroesophageal reflux disease)    MDD (major depressive disorder)    History of MRSA infection of lungs    Moderate episode of recurrent major depressive disorder (HCC)    Asthma    Hypertension    Tachycardia, unspecified    Dyslipidemia with elevated low density lipoprotein (LDL) cholesterol and abnormally low high density lipoprotein cholesterol    Elevated alkaline phosphatase level    Elevated hemoglobin (HCC)    Obesity, Class II, BMI 35-39.9    Excessive daytime sleepiness    Cardiac arrest (HCC)    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HCC)    Alcohol dependence (HCC)    Anemia    Thrombocytopenia (HCC)    Atrial flutter (HCC)    Hip pain    Financial difficulties    Annual physical exam    Preop examination    Blood in left ear canal    Hyperpigmented skin lesion    Obstructive sleep apnea    Shortness of breath    Left leg pain    Intentional drug overdose, initial  encounter (HCC)    Spasm of muscle of lower back    Skin lesion    Other specified hypothyroidism    Prediabetes    Abnormal hematocrit    Wheezing     Past Medical History:   Diagnosis Date    Anxiety     Asthma     Colon polyp     GERD (gastroesophageal reflux disease)     History of ileus     Hypertension     Insomnia     Kidney stone     Lumbar herniated disc     last assessed: 3/27/2015    MDD (major depressive disorder)     Patella fracture     last assessed: 3/27/2015    Tobacco abuse      Social History     Socioeconomic History    Marital status: Single     Spouse name: Not on file    Number of children: Not on file    Years of education: Not on file    Highest education level: Not on file   Occupational History     Employer: [employer]   Tobacco Use    Smoking status: Every Day     Current packs/day: 0.00     Average packs/day: 1.5 packs/day for 34.0 years (51.0 ttl pk-yrs)     Types: Cigarettes     Start date: 1987     Last attempt to quit: 2021     Years since quitting: 3.4    Smokeless tobacco: Never   Vaping Use    Vaping status: Never Used   Substance and Sexual Activity    Alcohol use: Not Currently     Comment: Hx of a couple of drinks a night, or everyother night    Drug use: No    Sexual activity: Not Currently     Partners: Female   Other Topics Concern    Not on file   Social History Narrative    Denies pmh     Social Determinants of Health     Financial Resource Strain: Medium Risk (3/14/2024)    Overall Financial Resource Strain (CARDIA)     Difficulty of Paying Living Expenses: Somewhat hard   Food Insecurity: Food Insecurity Present (3/14/2024)    Hunger Vital Sign     Worried About Running Out of Food in the Last Year: Sometimes true     Ran Out of Food in the Last Year: Sometimes true   Transportation Needs: Unmet Transportation Needs (3/14/2024)    PRAPARE - Transportation     Lack of Transportation (Medical): Yes     Lack of Transportation (Non-Medical): Yes   Physical  Activity: Inactive (4/19/2024)    Exercise Vital Sign     Days of Exercise per Week: 0 days     Minutes of Exercise per Session: 0 min   Stress: No Stress Concern Present (4/19/2024)    [employer] - Occupational Stress Questionnaire     Feeling of Stress : Not at all   Social Connections: Moderately Isolated (7/25/2022)    Social Connection and Isolation Panel [NHANES]     Frequency of Communication with Friends and Family: More than three times a week     Frequency of Social Gatherings with Friends and Family: More than three times a week     Attends Islam Services: More than 4 times per year     Active Member of Clubs or Organizations: No     Attends Club or Organization Meetings: Never     Marital Status: Never    Intimate Partner Violence: Not At Risk (4/19/2024)    Humiliation, Afraid, Rape, and Kick questionnaire     Fear of Current or Ex-Partner: No     Emotionally Abused: No     Physically Abused: No     Sexually Abused: No   Housing Stability: High Risk (3/14/2024)    Housing Stability Vital Sign     Unable to Pay for Housing in the Last Year: Yes     Number of Places Lived in the Last Year: 1     Unstable Housing in the Last Year: No      Family History   Problem Relation Age of Onset    Pancreatic cancer Mother     COPD Father     Heart attack Brother 61        decreased    Drug abuse Brother     Alcohol abuse Brother     Sleep apnea Brother     Cancer Family     Hypertension Family     Heart disease Family      Past Surgical History:   Procedure Laterality Date    BACK SURGERY  2015    Lumbar    CARDIAC CATHETERIZATION N/A 11/5/2021    Procedure: CARDIAC CATHETERIZATION;  Surgeon: Herbie Castaneda MD;  Location:  CARDIAC CATH LAB;  Service: Cardiology    COLONOSCOPY      KNEE SURGERY      right    LUMBAR FUSION      last assessed: 3/27/2015    [place] RPR UMBILICAL HRNA 5 YRS/> REDUCIBLE N/A 9/14/2022    Procedure: REPAIR HERNIA UMBILICAL w/ mesh;  Surgeon: Edna SANDS  To, ;  Location: [facility];  Service: General    UT TX TIBL SHFT FX IMED IMPLT W/WO SCREWS&/CERCLA Left 3/16/2017    Procedure: INSERTION NAIL IM TIBIA;  Surgeon: Edna Abrams DO;  Location: [facility];  Service: Orthopedics       Current Outpatient Medications:     albuterol (PROVENTIL HFA,VENTOLIN HFA) 90 mcg/act inhaler, Inhale 2 puffs every 4 (four) hours as needed for wheezing, Disp: 8.5 g, Rfl: 1    atorvastatin (LIPITOR) 40 mg tablet, TAKE ONE TABLET BY MOUTH EVERY DAY, Disp: 90 tablet, Rfl: 0    benzonatate (TESSALON PERLES) 100 mg capsule, Take 2 capsules (200 mg total) by mouth 3 (three) times a day as needed for cough, Disp: 20 capsule, Rfl: 0    diltiazem (CARDIZEM CD) 300 mg 24 hr capsule, TAKE ONE CAPSULE BY MOUTH EVERY MORNING, Disp: 90 capsule, Rfl: 3    guaiFENesin (MUCINEX) 600 mg 12 hr tablet, Take 2 tablets (1,200 mg total) by mouth every 12 (twelve) hours, Disp: 60 tablet, Rfl: 0    isosorbide mononitrate (IMDUR) 60 mg 24 hr tablet, TAKE ONE TABLET BY MOUTH ONCE DAILY, Disp: 90 tablet, Rfl: 1    levothyroxine 25 mcg tablet, TAKE ONE TABLET BY MOUTH EVERY EARLY MORNING, Disp: 60 tablet, Rfl: 0    lisinopril (ZESTRIL) 40 mg tablet, TAKE ONE TABLET BY MOUTH EVERY DAY, Disp: 90 tablet, Rfl: 1    LORazepam (ATIVAN) 1 mg tablet, Take 1 tablet (1 mg total) by mouth 2 (two) times a day as needed for anxiety, Disp: 60 tablet, Rfl: 0    omeprazole (PriLOSEC) 40 MG capsule, TAKE ONE CAPSULE BY MOUTH EVERY DAY, Disp: 60 capsule, Rfl: 5    QUEtiapine (SEROquel) 300 mg tablet, Take 1 tablet (300 mg total) by mouth daily at bedtime, Disp: 90 tablet, Rfl: 2    QUEtiapine (SEROquel) 50 mg tablet, TAKE ONE TABLET BY MOUTH EVERY DAY AT BEDTIME WITH  MG FOR MOOD/SLEEP, Disp: , Rfl:     sertraline (ZOLOFT) 50 mg tablet, Take 50 mg by mouth daily at bedtime, Disp: , Rfl:     vitamin B-12 (VITAMIN B-12) 1,000 mcg tablet, TAKE ONE TABLET BY MOUTH EVERY DAY, Disp: 90 tablet, Rfl: 0    folic acid (FOLVITE)  400 mcg tablet, TAKE ONE TABLET BY MOUTH EVERY DAY (Patient not taking: Reported on 6/19/2024), Disp: 90 tablet, Rfl: 0    methocarbamol (ROBAXIN) 500 mg tablet, Take 1 tablet (500 mg total) by mouth 2 (two) times a day (Patient not taking: Reported on 4/19/2024), Disp: 20 tablet, Rfl: 0  No Known Allergies    Labs:  No visits with results within 2 Month(s) from this visit.   Latest known visit with results is:   Appointment on 04/01/2024   Component Date Value    Cholesterol 04/01/2024 244 (H)     Triglycerides 04/01/2024 199 (H)     HDL, Direct 04/01/2024 35 (L)     LDL Calculated 04/01/2024 169 (H)     Non-HDL-Chol (CHOL-HDL) 04/01/2024 209     Sodium 04/01/2024 141     Potassium 04/01/2024 4.3     Chloride 04/01/2024 102     CO2 04/01/2024 32     ANION GAP 04/01/2024 7     BUN 04/01/2024 8     Creatinine 04/01/2024 0.92     Glucose, Fasting 04/01/2024 107 (H)     Calcium 04/01/2024 8.9     AST 04/01/2024 33     ALT 04/01/2024 34     Alkaline Phosphatase 04/01/2024 129 (H)     Total Protein 04/01/2024 7.3     Albumin 04/01/2024 4.1     Total Bilirubin 04/01/2024 0.82     eGFR 04/01/2024 91     WBC 04/01/2024 7.01     RBC 04/01/2024 4.81     Hemoglobin 04/01/2024 17.0     Hematocrit 04/01/2024 52.1 (H)     MCV 04/01/2024 108 (H)     MCH 04/01/2024 35.3 (H)     MCHC 04/01/2024 32.6     RDW 04/01/2024 16.9 (H)     MPV 04/01/2024 9.5     Platelets 04/01/2024 211     nRBC 04/01/2024 0     Segmented % 04/01/2024 76 (H)     Immature Grans % 04/01/2024 0     Lymphocytes % 04/01/2024 16     Monocytes % 04/01/2024 6     Eosinophils Relative 04/01/2024 1     Basophils Relative 04/01/2024 1     Absolute Neutrophils 04/01/2024 5.35     Absolute Immature Grans 04/01/2024 0.03     Absolute Lymphocytes 04/01/2024 1.10     Absolute Monocytes 04/01/2024 0.45     Eosinophils Absolute 04/01/2024 0.04     Basophils Absolute 04/01/2024 0.04     Vitamin B-12 04/01/2024 166 (L)     Folate 04/01/2024 2.5 (L)     Hemoglobin A1C 04/01/2024  5.6     EAG 04/01/2024 114     Creatinine, Ur 04/01/2024 156.2     Albumin,U,Random 04/01/2024 11.5     Albumin Creat Ratio 04/01/2024 7      Imaging: No results found.    Review of Systems:  Review of Systems   Constitutional:  Negative for fatigue.   HENT:  Negative for congestion.    Eyes:  Negative for discharge.   Respiratory:  Positive for cough and shortness of breath.    Cardiovascular:  Negative for chest pain.   Gastrointestinal:  Negative for abdominal distention.   Endocrine: Negative for polyuria.   Genitourinary:  Negative for dysuria.   Musculoskeletal:  Negative for arthralgias.   Psychiatric/Behavioral:  Positive for dysphoric mood. The patient is nervous/anxious.        Physical Exam:  Physical Exam  Constitutional:       Appearance: He is well-developed.   HENT:      Head: Normocephalic and atraumatic.      Nose: Nose normal.   Eyes:      Pupils: Pupils are equal, round, and reactive to light.   Cardiovascular:      Rate and Rhythm: Normal rate and regular rhythm.      Heart sounds: Normal heart sounds.   Pulmonary:      Effort: Pulmonary effort is normal.      Breath sounds: Normal breath sounds.   Abdominal:      General: Bowel sounds are normal.      Palpations: Abdomen is soft.      Comments: Obese     Musculoskeletal:         General: Normal range of motion.      Cervical back: Neck supple.   Skin:     General: Skin is warm and dry.   Neurological:      Mental Status: He is alert and oriented to person, place, and time.   Psychiatric:         Behavior: Behavior normal.         Thought Content: Thought content normal.         Judgment: Judgment normal.         Discussion/Summary: 58-year-old male with a history of cardiac arrest during stress testing.  Catheterization revealed spasm of the right coronary artery but no focal obstruction.  He has been maintained on diltiazem and Imdur and has done well.  He has chronic dyspnea.  It appears to be multifactorial including obesity and  deconditioning, obstructive sleep apnea that is not treated, and COPD.  Recent echocardiogram was unremarkable.  I have referred him to pulmonary for further evaluation.  His blood pressure is well-controlled he continues on high-dose statin.  I will see him again in 6 months.

## 2024-06-20 ENCOUNTER — HOSPITAL ENCOUNTER (INPATIENT)
Facility: HOSPITAL | Age: 59
LOS: 2 days | Discharge: HOME | DRG: 175 | End: 2024-06-22
Attending: EMERGENCY MEDICINE | Admitting: ANESTHESIOLOGY
Payer: COMMERCIAL

## 2024-06-20 ENCOUNTER — OFFICE VISIT (OUTPATIENT)
Dept: FAMILY MEDICINE CLINIC | Facility: CLINIC | Age: 59
End: 2024-06-20

## 2024-06-20 ENCOUNTER — APPOINTMENT (EMERGENCY)
Dept: RADIOLOGY | Facility: HOSPITAL | Age: 59
DRG: 175 | End: 2024-06-20
Payer: COMMERCIAL

## 2024-06-20 VITALS
WEIGHT: 303 LBS | HEIGHT: 70 IN | DIASTOLIC BLOOD PRESSURE: 78 MMHG | HEART RATE: 153 BPM | TEMPERATURE: 98 F | BODY MASS INDEX: 43.38 KG/M2 | OXYGEN SATURATION: 96 % | RESPIRATION RATE: 24 BRPM | SYSTOLIC BLOOD PRESSURE: 114 MMHG

## 2024-06-20 DIAGNOSIS — I48.92 ATRIAL FLUTTER, UNSPECIFIED TYPE (HCC): ICD-10-CM

## 2024-06-20 DIAGNOSIS — R00.0 TACHYCARDIA: Primary | ICD-10-CM

## 2024-06-20 DIAGNOSIS — I48.92 ATRIAL FLUTTER, UNSPECIFIED TYPE (HCC): Primary | ICD-10-CM

## 2024-06-20 PROBLEM — E03.9 HYPOTHYROIDISM: Status: ACTIVE | Noted: 2024-06-20

## 2024-06-20 PROBLEM — R09.89 PULMONARY CONGESTION: Status: ACTIVE | Noted: 2024-06-20

## 2024-06-20 LAB
2HR DELTA HS TROPONIN: 0 NG/L
4HR DELTA HS TROPONIN: -4 NG/L
ALBUMIN SERPL BCG-MCNC: 3.7 G/DL (ref 3.5–5)
ALP SERPL-CCNC: 117 U/L (ref 34–104)
ALT SERPL W P-5'-P-CCNC: 23 U/L (ref 7–52)
ANION GAP SERPL CALCULATED.3IONS-SCNC: 6 MMOL/L (ref 4–13)
AST SERPL W P-5'-P-CCNC: 25 U/L (ref 13–39)
ATRIAL RATE: 157 BPM
ATRIAL RATE: 296 BPM
BASOPHILS # BLD AUTO: 0.05 THOUSANDS/ÂΜL (ref 0–0.1)
BASOPHILS NFR BLD AUTO: 1 % (ref 0–1)
BILIRUB SERPL-MCNC: 0.84 MG/DL (ref 0.2–1)
BUN SERPL-MCNC: 10 MG/DL (ref 5–25)
CALCIUM SERPL-MCNC: 9.2 MG/DL (ref 8.4–10.2)
CARDIAC TROPONIN I PNL SERPL HS: 21 NG/L
CARDIAC TROPONIN I PNL SERPL HS: 25 NG/L
CARDIAC TROPONIN I PNL SERPL HS: 25 NG/L
CHLORIDE SERPL-SCNC: 102 MMOL/L (ref 96–108)
CO2 SERPL-SCNC: 29 MMOL/L (ref 21–32)
CREAT SERPL-MCNC: 0.98 MG/DL (ref 0.6–1.3)
EOSINOPHIL # BLD AUTO: 0.04 THOUSAND/ÂΜL (ref 0–0.61)
EOSINOPHIL NFR BLD AUTO: 0 % (ref 0–6)
ERYTHROCYTE [DISTWIDTH] IN BLOOD BY AUTOMATED COUNT: 14.6 % (ref 11.6–15.1)
GFR SERPL CREATININE-BSD FRML MDRD: 85 ML/MIN/1.73SQ M
GLUCOSE SERPL-MCNC: 96 MG/DL (ref 65–140)
HCT VFR BLD AUTO: 45.5 % (ref 36.5–49.3)
HGB BLD-MCNC: 14.5 G/DL (ref 12–17)
IMM GRANULOCYTES # BLD AUTO: 0.05 THOUSAND/UL (ref 0–0.2)
IMM GRANULOCYTES NFR BLD AUTO: 1 % (ref 0–2)
LYMPHOCYTES # BLD AUTO: 1.15 THOUSANDS/ÂΜL (ref 0.6–4.47)
LYMPHOCYTES NFR BLD AUTO: 12 % (ref 14–44)
MCH RBC QN AUTO: 35.5 PG (ref 26.8–34.3)
MCHC RBC AUTO-ENTMCNC: 31.9 G/DL (ref 31.4–37.4)
MCV RBC AUTO: 112 FL (ref 82–98)
MONOCYTES # BLD AUTO: 0.67 THOUSAND/ÂΜL (ref 0.17–1.22)
MONOCYTES NFR BLD AUTO: 7 % (ref 4–12)
NEUTROPHILS # BLD AUTO: 7.88 THOUSANDS/ÂΜL (ref 1.85–7.62)
NEUTS SEG NFR BLD AUTO: 79 % (ref 43–75)
NRBC BLD AUTO-RTO: 0 /100 WBCS
P AXIS: -84 DEGREES
P AXIS: 213 DEGREES
PLATELET # BLD AUTO: 201 THOUSANDS/UL (ref 149–390)
PLATELET # BLD AUTO: 231 THOUSANDS/UL (ref 149–390)
PMV BLD AUTO: 10.6 FL (ref 8.9–12.7)
PMV BLD AUTO: 10.8 FL (ref 8.9–12.7)
POTASSIUM SERPL-SCNC: 3.9 MMOL/L (ref 3.5–5.3)
PR INTERVAL: 128 MS
PROT SERPL-MCNC: 6.4 G/DL (ref 6.4–8.4)
QRS AXIS: 73 DEGREES
QRS AXIS: 78 DEGREES
QRSD INTERVAL: 100 MS
QRSD INTERVAL: 88 MS
QT INTERVAL: 272 MS
QT INTERVAL: 320 MS
QTC INTERVAL: 439 MS
QTC INTERVAL: 502 MS
RBC # BLD AUTO: 4.08 MILLION/UL (ref 3.88–5.62)
SODIUM SERPL-SCNC: 137 MMOL/L (ref 135–147)
T WAVE AXIS: 124 DEGREES
T WAVE AXIS: 90 DEGREES
T4 FREE SERPL-MCNC: 0.7 NG/DL (ref 0.61–1.12)
TSH SERPL DL<=0.05 MIU/L-ACNC: 8.8 UIU/ML (ref 0.45–4.5)
VENTRICULAR RATE: 148 BPM
VENTRICULAR RATE: 157 BPM
WBC # BLD AUTO: 9.84 THOUSAND/UL (ref 4.31–10.16)

## 2024-06-20 PROCEDURE — 85025 COMPLETE CBC W/AUTO DIFF WBC: CPT

## 2024-06-20 PROCEDURE — 93010 ELECTROCARDIOGRAM REPORT: CPT | Performed by: INTERNAL MEDICINE

## 2024-06-20 PROCEDURE — 84484 ASSAY OF TROPONIN QUANT: CPT

## 2024-06-20 PROCEDURE — 99213 OFFICE O/P EST LOW 20 MIN: CPT | Performed by: FAMILY MEDICINE

## 2024-06-20 PROCEDURE — 85049 AUTOMATED PLATELET COUNT: CPT | Performed by: NURSE PRACTITIONER

## 2024-06-20 PROCEDURE — 84439 ASSAY OF FREE THYROXINE: CPT | Performed by: NURSE PRACTITIONER

## 2024-06-20 PROCEDURE — 96374 THER/PROPH/DIAG INJ IV PUSH: CPT

## 2024-06-20 PROCEDURE — 99285 EMERGENCY DEPT VISIT HI MDM: CPT

## 2024-06-20 PROCEDURE — 71045 X-RAY EXAM CHEST 1 VIEW: CPT

## 2024-06-20 PROCEDURE — 84443 ASSAY THYROID STIM HORMONE: CPT | Performed by: NURSE PRACTITIONER

## 2024-06-20 PROCEDURE — 36415 COLL VENOUS BLD VENIPUNCTURE: CPT

## 2024-06-20 PROCEDURE — 93005 ELECTROCARDIOGRAM TRACING: CPT

## 2024-06-20 PROCEDURE — 80053 COMPREHEN METABOLIC PANEL: CPT

## 2024-06-20 PROCEDURE — 99223 1ST HOSP IP/OBS HIGH 75: CPT | Performed by: ANESTHESIOLOGY

## 2024-06-20 PROCEDURE — 99255 IP/OBS CONSLTJ NEW/EST HI 80: CPT | Performed by: INTERNAL MEDICINE

## 2024-06-20 PROCEDURE — 99291 CRITICAL CARE FIRST HOUR: CPT | Performed by: EMERGENCY MEDICINE

## 2024-06-20 RX ORDER — ADENOSINE 3 MG/ML
INJECTION, SOLUTION INTRAVENOUS
Status: COMPLETED
Start: 2024-06-20 | End: 2024-06-20

## 2024-06-20 RX ORDER — ADENOSINE 3 MG/ML
6 INJECTION INTRAVENOUS ONCE
Status: COMPLETED | OUTPATIENT
Start: 2024-06-20 | End: 2024-06-20

## 2024-06-20 RX ORDER — LEVOTHYROXINE SODIUM 0.03 MG/1
25 TABLET ORAL
Status: DISCONTINUED | OUTPATIENT
Start: 2024-06-21 | End: 2024-06-22 | Stop reason: HOSPADM

## 2024-06-20 RX ORDER — QUETIAPINE FUMARATE 300 MG/1
300 TABLET, FILM COATED ORAL
Status: DISCONTINUED | OUTPATIENT
Start: 2024-06-20 | End: 2024-06-22 | Stop reason: HOSPADM

## 2024-06-20 RX ORDER — ADENOSINE 3 MG/ML
18 INJECTION INTRAVENOUS ONCE
Status: COMPLETED | OUTPATIENT
Start: 2024-06-20 | End: 2024-06-20

## 2024-06-20 RX ORDER — ADENOSINE 3 MG/ML
12 INJECTION INTRAVENOUS ONCE
Status: COMPLETED | OUTPATIENT
Start: 2024-06-20 | End: 2024-06-20

## 2024-06-20 RX ORDER — DILTIAZEM HYDROCHLORIDE 5 MG/ML
INJECTION INTRAVENOUS
Status: DISCONTINUED
Start: 2024-06-20 | End: 2024-06-20 | Stop reason: WASHOUT

## 2024-06-20 RX ORDER — HEPARIN SODIUM 5000 [USP'U]/ML
5000 INJECTION, SOLUTION INTRAVENOUS; SUBCUTANEOUS EVERY 8 HOURS SCHEDULED
Status: DISCONTINUED | OUTPATIENT
Start: 2024-06-20 | End: 2024-06-20

## 2024-06-20 RX ORDER — ISOSORBIDE MONONITRATE 60 MG/1
60 TABLET, EXTENDED RELEASE ORAL DAILY
Status: DISCONTINUED | OUTPATIENT
Start: 2024-06-21 | End: 2024-06-22 | Stop reason: HOSPADM

## 2024-06-20 RX ORDER — PANTOPRAZOLE SODIUM 40 MG/1
40 TABLET, DELAYED RELEASE ORAL
Status: DISCONTINUED | OUTPATIENT
Start: 2024-06-21 | End: 2024-06-22 | Stop reason: HOSPADM

## 2024-06-20 RX ORDER — ATORVASTATIN CALCIUM 40 MG/1
40 TABLET, FILM COATED ORAL DAILY
Status: DISCONTINUED | OUTPATIENT
Start: 2024-06-20 | End: 2024-06-22 | Stop reason: HOSPADM

## 2024-06-20 RX ORDER — PANTOPRAZOLE SODIUM 40 MG/10ML
40 INJECTION, POWDER, LYOPHILIZED, FOR SOLUTION INTRAVENOUS ONCE
Status: COMPLETED | OUTPATIENT
Start: 2024-06-21 | End: 2024-06-21

## 2024-06-20 RX ORDER — LORAZEPAM 1 MG/1
1 TABLET ORAL 2 TIMES DAILY PRN
Status: DISCONTINUED | OUTPATIENT
Start: 2024-06-20 | End: 2024-06-22 | Stop reason: HOSPADM

## 2024-06-20 RX ORDER — LISINOPRIL 20 MG/1
40 TABLET ORAL DAILY
Status: DISCONTINUED | OUTPATIENT
Start: 2024-06-21 | End: 2024-06-22 | Stop reason: HOSPADM

## 2024-06-20 RX ORDER — DILTIAZEM HYDROCHLORIDE 300 MG/1
300 CAPSULE, COATED, EXTENDED RELEASE ORAL EVERY MORNING
Status: DISCONTINUED | OUTPATIENT
Start: 2024-06-21 | End: 2024-06-20

## 2024-06-20 RX ORDER — CHLORHEXIDINE GLUCONATE ORAL RINSE 1.2 MG/ML
15 SOLUTION DENTAL EVERY 12 HOURS SCHEDULED
Status: DISCONTINUED | OUTPATIENT
Start: 2024-06-20 | End: 2024-06-21

## 2024-06-20 RX ORDER — SODIUM CHLORIDE 9 MG/ML
20 INJECTION, SOLUTION INTRAVENOUS ONCE
Status: DISCONTINUED | OUTPATIENT
Start: 2024-06-20 | End: 2024-06-20

## 2024-06-20 RX ADMIN — ADENOSINE 12 MG: 3 INJECTION INTRAVENOUS at 10:24

## 2024-06-20 RX ADMIN — LORAZEPAM 1 MG: 1 TABLET ORAL at 20:39

## 2024-06-20 RX ADMIN — APIXABAN 5 MG: 5 TABLET, FILM COATED ORAL at 17:05

## 2024-06-20 RX ADMIN — ADENOSINE 6 MG: 3 INJECTION INTRAVENOUS at 10:14

## 2024-06-20 RX ADMIN — CHLORHEXIDINE GLUCONATE 0.12% ORAL RINSE 15 ML: 1.2 LIQUID ORAL at 13:02

## 2024-06-20 RX ADMIN — ADENOSINE 18 MG: 3 INJECTION INTRAVENOUS at 10:41

## 2024-06-20 RX ADMIN — ADENOSINE 12 MG: 3 INJECTION INTRAVENOUS at 10:19

## 2024-06-20 RX ADMIN — HEPARIN SODIUM 5000 UNITS: 5000 INJECTION INTRAVENOUS; SUBCUTANEOUS at 14:11

## 2024-06-20 RX ADMIN — ATORVASTATIN CALCIUM 40 MG: 40 TABLET, FILM COATED ORAL at 17:05

## 2024-06-20 RX ADMIN — CHLORHEXIDINE GLUCONATE 0.12% ORAL RINSE 15 ML: 1.2 LIQUID ORAL at 21:04

## 2024-06-20 RX ADMIN — QUETIAPINE FUMARATE 300 MG: 300 TABLET ORAL at 21:04

## 2024-06-20 NOTE — ASSESSMENT & PLAN NOTE
Patient presenting from PCP office with tachycardia to the 160s refractory to multiple doses of Adenosine in the ED  Suspect atrial flutter vs less likely SVT  Patient remains asymptomatic    Plan  Continue on telemetry   Monitor and replete electrolytes as needed, goal Mg>2, K>4  Check thyroid studies   EP following, plan for study and ablation tomorrow   NPO at midnight

## 2024-06-20 NOTE — PROGRESS NOTES
Ambulatory Visit  Name: Torey Del Castillo Jr.      : 1965      MRN: [id number]  Encounter Provider: Tran Rodríguez MD  Encounter Date: 2024   Encounter department: [facility]    Assessment & Plan   1. Atrial flutter, unspecified type (HCC)  Assessment & Plan:  EKG shows atrial flutter, heart rate of 160.  Patient does have history of atrial flutter, Prinzmetal angina, cardiac arrest, follows closely with cardiology.  Discussed results with the patient.  He is asymptomatic at this time, blood pressure is stable, recommended to go to ER for further evaluation.  Reports he took his medications in the morning, TSH was normal in March, he is on levothyroxine 25 mg daily.   Offered to call ambulance, but patient wants to go by himself.  Discussed risks, patient still wants to go by himself.  Offered to call daughter but patient will call himself.  Transfer orders placed.  Orders:  -     Transfer to other facility     Patient will eventually need to return to discuss labs.  He does have really low vitamin B12 levels,increased MCV, increased hematocrit, increased alkaline phosphatase.  He will benefit from hematology evaluation.  Plan to start intramuscular B12 injections in the office.  History of Present Illness     Manuel presented to office for review of labs.  On check of his vitals he was found to be tachycardic to 160.  EKG was obtained that showed atrial flutter.  Patient does have history of atrial flutter, Prinzmetal angina, cardiac arrest and follows closely with cardiology.  He was seen by cardiology yesterday.  He denies any symptoms of lightheadedness, difficulty in breathing, chest pain.  Denies use of albuterol.  His TSH was normal in March, he is on levothyroxine 25 mcg daily.  He reports he did take his medications in the morning which includes diltiazem 300 mg daily, Ativan 1 mg twice daily as needed by psychiatry.        Review of Systems   Constitutional:   "Negative for chills and fever.   HENT:  Negative for congestion.    Respiratory:  Negative for shortness of breath.    Cardiovascular:  Negative for chest pain.   Gastrointestinal:  Negative for diarrhea, nausea and vomiting.   Genitourinary:  Negative for difficulty urinating.   Neurological:  Negative for headaches.       Objective     /78   Pulse (!) 153   Temp 98 °F (36.7 °C) (Temporal)   Resp (!) 24   Ht 5' 10\" (1.778 m)   Wt (!) 137 kg (303 lb)   SpO2 96%   BMI 43.48 kg/m²     Physical Exam  Constitutional:       Appearance: He is well-developed.   HENT:      Head: Normocephalic and atraumatic.      Right Ear: External ear normal.      Left Ear: External ear normal.   Eyes:      Conjunctiva/sclera: Conjunctivae normal.      Pupils: Pupils are equal, round, and reactive to light.   Cardiovascular:      Rate and Rhythm: Regular rhythm. Tachycardia present.      Heart sounds: No murmur heard.  Pulmonary:      Effort: Pulmonary effort is normal. No respiratory distress.      Breath sounds: Normal breath sounds. No wheezing or rales.   Musculoskeletal:         General: Normal range of motion.      Cervical back: Normal range of motion and neck supple.   Skin:     General: Skin is warm and dry.   Neurological:      Mental Status: He is alert and oriented to person, place, and time.       Administrative Statements     "

## 2024-06-20 NOTE — CONSULTS
Consultation - Cardiology  Torey Del Castillo Jr. 58 y.o. male MRN: [id number]  Unit/Bed#: ED 23 Encounter: [id number]          Inpatient consult to Electrophysiology     Date/Time  6/20/2024 4:43 PM     Performed by  Tim Gonzalez MD   Authorized by  Tanvir Briones MD             History of Present Illness   Physician Requesting Consult: Tanvir Briones MD  Reason for Consult / Principal Problem: SVT    Assessment & Plan   Assessment/Plan:  Torey Del Castillo Jr. is a 58 y.o. year old male with PMH of paroxsymal atrial flutter (one episode in 2018 when sick s/p cardioversion), prinzmetal angina, hx of cardiac arrest, PRAKASH, who was sent in from PCP clinic with asymptomatic SVT.     #SVT- suspected atrial flutter  #hx of atrial flutter s/p cardioversion in 2018- not on AC  Patient presented with asymptomatic SVT.  HR ~155, with little variability in HR.  He had vital signs taken the day prior and his HR was 78 bpm so his arrhythmia started in the last 24 hrs. Adenosine up to 18mg had no effect on rate.  Suspected atrial flutter but will take for EP study with ablation tomorrow. Plan to keep in the rhythm for now given he is asymptomatic which will increase yield of EP study if he is still in the arrhythmia.   -NPO midnight  -EP study/ablation tomorrow (NIKOLAS prior)  -apixaban 5mg x1 now  -on home diltiazem 300mg daily- will hold for EP study tomorrow    #Hx of prinzmetal angina: on diltiazem and imdur.   #hx of cardiac arrest 2021: had ST elevations and cardiac arrest. Cath showed vasospasm. Stopped smoking and on vasodilators now.   #PRAKASH: cannot afford CPAP    HPI: Torey Del Castillo Jr. is a 58 y.o. year old male with PMH of paroxsymal atrial flutter (one episode in 2018 when sick s/p cardioversion), prinzmetal angina, hx of cardiac arrest, PRAKASH, who was sent in from PCP clinic with asymptomatic SVT.     The patient was actually seen by Dr. Deleon in clinic yesterday.  At that time he reported is feeling well and his  heart was 78 bpm. At that time he was feeling well.  He went to his PCP this morning and is HR was 155 and EKG showed SV so he was sent to the ED.    In the ED he still reported no symptoms.  He was given adenosine 6mg, 12mg, 12mg and 18mg without any effect on his heart rate.     The patient was admitted with plans for EP study.     Historical Information   Past Medical History:   Diagnosis Date    Anxiety     Asthma     Colon polyp     GERD (gastroesophageal reflux disease)     History of ileus     Hypertension     Insomnia     Kidney stone     Lumbar herniated disc     last assessed: 3/27/2015    MDD (major depressive disorder)     Patella fracture     last assessed: 3/27/2015    Tobacco abuse      Past Surgical History:   Procedure Laterality Date    BACK SURGERY      Lumbar    CARDIAC CATHETERIZATION N/A 2021    Procedure: CARDIAC CATHETERIZATION;  Surgeon: Herbie Castaneda MD;  Location: [facility];  Service: Cardiology    COLONOSCOPY      KNEE SURGERY      right    LUMBAR FUSION      last assessed: 3/27/2015    [place] RPR UMBILICAL HRNA 5 YRS/> REDUCIBLE N/A 2022    Procedure: REPAIR HERNIA UMBILICAL w/ mesh;  Surgeon: Edna Siddiqui DO;  Location: [facility];  Service: General    [place] TX TIBL SHFT FX IMED IMPLT W/WO SCREWS&/CERCLA Left 3/16/2017    Procedure: INSERTION NAIL IM TIBIA;  Surgeon: Edna Abrams DO;  Location: [facility];  Service: Orthopedics     Social History     Substance and Sexual Activity   Alcohol Use Not Currently    Comment: Hx of a couple of drinks a night, or everyother night     Social History     Substance and Sexual Activity   Drug Use No     Social History     Tobacco Use   Smoking Status Every Day    Current packs/day: 0.00    Average packs/day: 1.5 packs/day for 34.0 years (51.0 ttl pk-yrs)    Types: Cigarettes    Start date:     Last attempt to quit:     Years since quitting: 3.4   Smokeless Tobacco Never     Family History: brother  of MI at age  61    Meds/Allergies   Hospital Medications:   No current facility-administered medications for this encounter.     Home Medications: Not in a hospital admission.    No Known Allergies    Objective   Vitals: Blood pressure (!) 168/106, pulse (!) 154, temperature 98.2 °F (36.8 °C), temperature source Temporal, resp. rate (!) 28, SpO2 96%.  Orthostatic Blood Pressures      Flowsheet Row Most Recent Value   Blood Pressure 168/106 filed at 06/20/2024 1042   Patient Position - Orthostatic VS Sitting filed at 06/20/2024 1042            No intake or output data in the 24 hours ending 06/20/24 1216    Invasive Devices       Peripheral Intravenous Line  Duration             Peripheral IV 06/20/24 Left;Ventral (anterior) Forearm <1 day                    Review of Systems:  ROS  ROS as noted above, otherwise 12 point review of systems was performed and is negative.     Physical Exam:   Physical Exam  Constitutional:       Appearance: obese male in no acute distress.  HENT:      Head: Normocephalic and atraumatic.   Neck:      Vascular: No JVD   Cardiovascular:      Rate and Rhythm: tachycardic, regular, no murmurs.   Pulmonary:      Effort: CTA-b  Abdominal:      General: Abdomen is flat. Bowel sounds are normal.      Palpations: Abdomen is soft.   Musculoskeletal:     No edema  Skin:     General: Skin is warm.   Neurological:      Mental Status: He is alert and oriented to person, place, and time.   Psychiatric:         Behavior: Behavior normal.       Lab Results: I have personally reviewed pertinent lab results.    Results from last 7 days   Lab Units 06/20/24  1032   WBC Thousand/uL 9.84   HEMOGLOBIN g/dL 14.5   HEMATOCRIT % 45.5   PLATELETS Thousands/uL 231     Results from last 7 days   Lab Units 06/20/24  1032   POTASSIUM mmol/L 3.9   CHLORIDE mmol/L 102   CO2 mmol/L 29   BUN mg/dL 10   CREATININE mg/dL 0.98   CALCIUM mg/dL 9.2

## 2024-06-20 NOTE — ASSESSMENT & PLAN NOTE
CXR with pulmonary congestion   Echo ordered  Diuresis as clinically indicated  O2 goal >92%  Continue to monitor respiratory status

## 2024-06-20 NOTE — ED ATTENDING ATTESTATION
6/20/2024  I, Tanvir Briones MD, saw and evaluated the patient. I have discussed the patient with the resident/non-physician practitioner and agree with the resident's/non-physician practitioner's findings, Plan of Care, and MDM as documented in the resident's/non-physician practitioner's note, except where noted. All available labs and Radiology studies were reviewed.  I was present for key portions of any procedure(s) performed by the resident/non-physician practitioner and I was immediately available to provide assistance.       At this point I agree with the current assessment done in the Emergency Department.  I have conducted an independent evaluation of this patient a history and physical is as follows:    58-year-old man with tacky dysrhythmia, sent in after this was discovered at a routine visit with his primary care doctor.  The patient believes he has had 2 episodes of atrial flutter in the past.  He is on diltiazem.  Not on anticoagulation or antiplatelet agents.  Patient also has history of MI.  On arrival patient states that he is not having any symptoms whatsoever including chest pain, shortness of breath, palpitations, dizziness or lightheadedness, abdominal pain, nausea or vomiting.  On exam heart tachycardic, regular, no murmurs rubs or gallops.  Lungs clear to auscultation bilaterally.  Abdomen soft, nontender, nondistended.  Skin warm and dry.  No extremity swelling or edema.  No gross focal neurological deficit.    Initial EKG with rate of 160.  It is narrow complex and appears to be regular.  Felt to be most likely AVNRT versus flutter with 2 1 conduction.  In consultation with cardiology we administered a total of 4 doses of adenosine.  First dose was 6 mg, followed by two 12 mg doses, followed by an 18 mg dose.  On the latter 2 administrations, the patient clearly felt normal symptoms associated with adenosine administration, however there was no slowing of heart rate.  Post adenosine  attempts, patient remained asymptomatic.  We discussed further with cardiology, and ultimate plan is for ablation.  At this time they would prefer that we not pursue pharmacotherapy for rate control due to potential for spontaneous cardioversion.  Will also not prefer electrical cardioversion given that the patient is clinically stable and that ablation will be easier if the patient remains in the abnormal rhythm.  We will continue to monitor the patient closely, and would of course pursue cardioversion if the patient became unstable.  Patient will be admitted to stepdown with cardiology consultation.        ED Course         Critical Care Time  CriticalCare Time    Date/Time: 6/20/2024 12:04 PM    Performed by: Tanvir Briones MD  Authorized by: Tanvir Briones MD    Critical care provider statement:     Critical care time (minutes):  33    Critical care time was exclusive of:  Separately billable procedures and treating other patients and teaching time    Critical care was necessary to treat or prevent imminent or life-threatening deterioration of the following conditions:  Cardiac failure and circulatory failure    Critical care was time spent personally by me on the following activities:  Obtaining history from patient or surrogate, development of treatment plan with patient or surrogate, discussions with consultants, evaluation of patient's response to treatment, examination of patient, interpretation of cardiac output measurements, ordering and performing treatments and interventions, ordering and review of laboratory studies, ordering and review of radiographic studies, re-evaluation of patient's condition and review of old charts    I assumed direction of critical care for this patient from another provider in my specialty: no

## 2024-06-20 NOTE — ASSESSMENT & PLAN NOTE
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

## 2024-06-20 NOTE — ED PROVIDER NOTES
History  Chief Complaint   Patient presents with    Atrial Fibrillation     Pt presents to ER following PCP visit. Patient in rapid a fib, pt denies being symptomatic. Patient states hard to tell if SOB since usually SOB. Feels a little cloudy but gets that way when does not take ativan      HPI  Patient is a 58 y.o. male with history of a flutter presenting to the emergency department for elevated heart rate. Patient states that he went to his primary care providers office today to review routine blood work, and they checked his heart rate and saw that it was elevated so they sent him to the ED for evaluation. Patient states that he does not know when his heart rate became elevated. He denies having any symptoms including chest pain, shortness of breath, leg swelling, or palpitations. States that he has been taking his medication as prescribed and is not on any anticoagulation.     Prior to Admission Medications   Prescriptions Last Dose Informant Patient Reported? Taking?   LORazepam (ATIVAN) 1 mg tablet  Self No No   Sig: Take 1 tablet (1 mg total) by mouth 2 (two) times a day as needed for anxiety   QUEtiapine (SEROquel) 300 mg tablet  Self No No   Sig: Take 1 tablet (300 mg total) by mouth daily at bedtime   QUEtiapine (SEROquel) 50 mg tablet   Yes No   Sig: TAKE ONE TABLET BY MOUTH EVERY DAY AT BEDTIME WITH  MG FOR MOOD/SLEEP   albuterol (PROVENTIL HFA,VENTOLIN HFA) 90 mcg/act inhaler   No No   Sig: Inhale 2 puffs every 4 (four) hours as needed for wheezing   atorvastatin (LIPITOR) 40 mg tablet   No No   Sig: TAKE ONE TABLET BY MOUTH EVERY DAY   benzonatate (TESSALON PERLES) 100 mg capsule   No No   Sig: Take 2 capsules (200 mg total) by mouth 3 (three) times a day as needed for cough   diltiazem (CARDIZEM CD) 300 mg 24 hr capsule   No No   Sig: TAKE ONE CAPSULE BY MOUTH EVERY MORNING   folic acid (FOLVITE) 400 mcg tablet   No No   Sig: TAKE ONE TABLET BY MOUTH EVERY DAY   Patient not taking: Reported  on 6/19/2024   guaiFENesin (MUCINEX) 600 mg 12 hr tablet   No No   Sig: Take 2 tablets (1,200 mg total) by mouth every 12 (twelve) hours   isosorbide mononitrate (IMDUR) 60 mg 24 hr tablet   No No   Sig: TAKE ONE TABLET BY MOUTH ONCE DAILY   levothyroxine 25 mcg tablet   No No   Sig: TAKE ONE TABLET BY MOUTH EVERY EARLY MORNING   lisinopril (ZESTRIL) 40 mg tablet   No No   Sig: TAKE ONE TABLET BY MOUTH EVERY DAY   methocarbamol (ROBAXIN) 500 mg tablet  Self No No   Sig: Take 1 tablet (500 mg total) by mouth 2 (two) times a day   Patient not taking: Reported on 4/19/2024   omeprazole (PriLOSEC) 40 MG capsule   No No   Sig: TAKE ONE CAPSULE BY MOUTH EVERY DAY   sertraline (ZOLOFT) 50 mg tablet   Yes No   Sig: Take 50 mg by mouth daily at bedtime   vitamin B-12 (VITAMIN B-12) 1,000 mcg tablet   No No   Sig: TAKE ONE TABLET BY MOUTH EVERY DAY      Facility-Administered Medications: None       Past Medical History:   Diagnosis Date    Anxiety     Asthma     Colon polyp     GERD (gastroesophageal reflux disease)     History of ileus     Hypertension     Insomnia     Kidney stone     Lumbar herniated disc     last assessed: 3/27/2015    MDD (major depressive disorder)     Patella fracture     last assessed: 3/27/2015    Tobacco abuse        Past Surgical History:   Procedure Laterality Date    BACK SURGERY  2015    Lumbar    CARDIAC CATHETERIZATION N/A 11/5/2021    Procedure: CARDIAC CATHETERIZATION;  Surgeon: Herbie Castaneda MD;  Location: [facility];  Service: Cardiology    COLONOSCOPY      KNEE SURGERY      right    LUMBAR FUSION      last assessed: 3/27/2015    ND RPR UMBILICAL HRNA 5 YRS/> REDUCIBLE N/A 9/14/2022    Procedure: REPAIR HERNIA UMBILICAL w/ mesh;  Surgeon: Edna Siddiqui DO;  Location: [facility];  Service: General    ND TX TIBL SHFT FX IMED IMPLT W/WO SCREWS&/CERCLA Left 3/16/2017    Procedure: INSERTION NAIL IM TIBIA;  Surgeon: Edna Abrams DO;  Location: [facility];  Service: Orthopedics        Family History   Problem Relation Age of Onset    Pancreatic cancer Mother     COPD Father     Heart attack Brother 61        decreased    Drug abuse Brother     Alcohol abuse Brother     Sleep apnea Brother     Cancer Family     Hypertension Family     Heart disease Family      I have reviewed and agree with the history as documented.    E-Cigarette/Vaping    E-Cigarette Use Never User      E-Cigarette/Vaping Substances    Nicotine No     THC No     CBD No     Flavoring No     Other No     Unknown No      Social History     Tobacco Use    Smoking status: Every Day     Current packs/day: 0.00     Average packs/day: 1.5 packs/day for 34.0 years (51.0 ttl pk-yrs)     Types: Cigarettes     Start date: 1987     Last attempt to quit: 2021     Years since quitting: 3.4    Smokeless tobacco: Never   Vaping Use    Vaping status: Never Used   Substance Use Topics    Alcohol use: Not Currently     Comment: Hx of a couple of drinks a night, or everyother night    Drug use: No        Review of Systems   Constitutional:  Negative for fever.   HENT:  Negative for congestion.    Eyes:  Negative for pain.   Respiratory:  Negative for cough.    Cardiovascular:  Negative for chest pain.   Gastrointestinal:  Negative for diarrhea and vomiting.   Genitourinary:  Negative for dysuria.   Musculoskeletal:  Negative for back pain.   Skin:  Negative for rash.   Neurological:  Negative for dizziness.   All other systems reviewed and are negative.      Physical Exam  ED Triage Vitals   Temperature Pulse Respirations Blood Pressure SpO2   06/20/24 1005 06/20/24 1005 06/20/24 1005 06/20/24 1016 06/20/24 1005   98.2 °F (36.8 °C) (!) 160 (!) 25 113/78 97 %      Temp Source Heart Rate Source Patient Position - Orthostatic VS BP Location FiO2 (%)   06/20/24 1005 06/20/24 1005 06/20/24 1016 06/20/24 1016 --   Temporal Monitor Sitting Right arm       Pain Score       --                    Orthostatic Vital Signs  Vitals:    06/20/24 1005  06/20/24 1016 06/20/24 1030   BP:  113/78 117/89   Pulse: (!) 160 (!) 156 (!) 154   Patient Position - Orthostatic VS:  Sitting Sitting       Physical Exam  Vitals and nursing note reviewed.   Constitutional:       Appearance: Normal appearance. He is obese.   HENT:      Head: Normocephalic and atraumatic.      Mouth/Throat:      Mouth: Mucous membranes are moist.   Eyes:      Conjunctiva/sclera: Conjunctivae normal.   Cardiovascular:      Rate and Rhythm: Regular rhythm. Tachycardia present.      Pulses: Normal pulses.      Heart sounds: Normal heart sounds.   Pulmonary:      Effort: Pulmonary effort is normal.      Breath sounds: Normal breath sounds.   Abdominal:      Palpations: Abdomen is soft.      Tenderness: There is no abdominal tenderness.   Musculoskeletal:         General: No tenderness.      Cervical back: Neck supple.   Skin:     General: Skin is warm and dry.      Capillary Refill: Capillary refill takes less than 2 seconds.   Neurological:      General: No focal deficit present.      Mental Status: He is alert. Mental status is at baseline.   Psychiatric:         Mood and Affect: Mood normal.         ED Medications  Medications   adenosine (ADENOCARD) injection 6 mg (has no administration in time range)   adenosine (ADENOCARD) injection 12 mg (has no administration in time range)   adenosine (ADENOCARD) injection 12 mg (has no administration in time range)   adenosine (ADENOCARD) 6 mg/2 mL injection **ADS Override Pull** (6 mg Intravenous Given 6/20/24 1014)   adenosine (ADENOCARD) 6 mg/2 mL injection **ADS Override Pull** (12 mg  Given 6/20/24 1019)   adenosine (ADENOCARD) 6 mg/2 mL injection **ADS Override Pull** (12 mg Intravenous Given 6/20/24 1024)       Diagnostic Studies  Results Reviewed       None                   No orders to display         Procedures  Procedures      ED Course  ED Course as of 06/21/24 1109   Thu Jun 20, 2024   1008 Hx flutter vs fib vs svt  Chemically cardioverued once.  RR even on EKG - 2-1 sumeet or SVT      1029 Gave 6 adenosine, then 12, then 12 again. No response. Cardiology fellow at bedside - will discuss with EP.    1036 Cardiology - likely 2:1 - recommend 18 adenosine with transcutaneous pacer in place.    1045 18 adenosine with no response.  At this time cardiology requesting not to cardiovert or give any other medications that patient stays in rhythm for ablation.  Cardiology is going to discuss with the EP and get back to [place].  In the meantime we will admit patient to medicine service.   1053 Reached out to  for admission.    1112 Family medicine requesting possible SD1 admission, will reach out to critical care   1113 Comprehensive metabolic panel(!)  No acute abnormalities   1113 hs TnI 0hr: 25  Will check 2-hour delta troponin   1218 Discussed with critical care, will admit patient to P4/P5 stepdown level 1                                       Medical Decision Making  Amount and/or Complexity of Data Reviewed  Labs:  Decision-making details documented in ED Course.    Risk  Prescription drug management.  Decision regarding hospitalization.    Patient is a 58 y.o. male with PMH of a flutter who presents to the ED with tachycardia.    Vital signs tachycardic, otherwise stable - normotensive. On exam patient well appearing, in no acute distress.    History and physical exam most consistent with a fib/flutter vs SVT. However, differential diagnosis included but not limited to ACS.     Plan: Patient placed on defibrillator/pacer pads and initial labs drawn. Gave 6mg adenosine to slow heart rate and evaluate for underlying rhythm with no effect. Cardiology fellow at bedside, discussed case, gave another 12mg x 2 without effect, and then additional 18mg secondary to cardiology advice with no response. Patient felt flushed, became diaphoretic after administration, but heart rate remained unchanged. Discussed with cardiology - will hold off on additional medications /  "treatment at this time as patient is remaining stable and they prefer to keep in underlying abnormal rhythm for ablation.     View ED course above for further discussion on patient workup.     On review of previous records echocardiogram on 10/26/23 - LVEF 70%.    All labs reviewed and utilized in the medical decision making process  All radiology studies independently viewed by me and interpreted by the radiologist.  I reviewed all testing with the patient.     Upon re-evaluation patient stable, normal blood pressure but remained tachycardic.    Disposition: I discussed the case with ICU.  We reviewed the HPI, pertinent PMH, ED course and workup. Agreed with plan and will admit the patient to the hospital for further evaluation and management of tachycardia. Patient in guarded condition at this time.       Portions of the record may have been created with voice recognition software. Occasional wrong word or \"sound a like\" substitutions may have occurred due to the inherent limitations of voice recognition software. Read the chart carefully and recognize, using context, where substitutions have occurred.        Disposition  Final diagnoses:   Tachycardia     Time reflects when diagnosis was documented in both MDM as applicable and the Disposition within this note       Time User Action Codes Description Comment    6/20/2024 10:48 AM Tanvir Briones Add [R00.0] Tachycardia     6/20/2024  4:12 PM Jw Lainez Modify [R00.0] Tachycardia           ED Disposition       ED Disposition   Admit    Condition   Stable    Date/Time   Thu Jun 20, 2024 12:17 PM    Comment   --             Follow-up Information    None         Patient's Medications   Discharge Prescriptions    No medications on file     No discharge procedures on file.    PDMP Review         Value Time User    PDMP Reviewed  Yes 6/12/2023 11:05 AM Will Amelia Rosales MD             ED Provider  Attending physically available and evaluated Torey Del Castillo Jr.. I " managed the patient along with the ED Attending.    Electronically Signed by           Ana Reeves DO  06/21/24 1126

## 2024-06-20 NOTE — ASSESSMENT & PLAN NOTE
EKG shows atrial flutter, heart rate of 160.  Patient does have history of atrial flutter, Prinzmetal angina, cardiac arrest, follows closely with cardiology.  Discussed results with the patient.  He is asymptomatic at this time, blood pressure is stable, recommended to go to ER for further evaluation.  Reports he took his medications in the morning, TSH was normal in March, he is on levothyroxine 25 mg daily.   Offered to call ambulance, but patient wants to go by himself.  Discussed risks, patient still wants to go by himself.  Offered to call daughter but patient will call himself.  Transfer orders placed.

## 2024-06-20 NOTE — ASSESSMENT & PLAN NOTE
Home medication regimen ordered to resume tomorrow with holding parameters  Diltiazem 300mg daily  Imdur 60mg daily  Lisinopril 40mg daily

## 2024-06-20 NOTE — H&P
[facility]  H&P  Name: Torey Del Castillo Jr. 58 y.o. male I MRN: [id number]  Unit/Bed#: PPHP 505-01 I Date of Admission: 6/20/2024   Date of Service: 6/20/2024 I Hospital Day: 0      Assessment & Plan   Tachycardia, unspecified  Assessment & Plan  Patient presenting from PCP office with tachycardia to the 160s refractory to multiple doses of Adenosine in the ED  Suspect atrial flutter vs less likely SVT  Patient remains asymptomatic    Plan  Continue on telemetry   Monitor and replete electrolytes as needed, goal Mg>2, K>4  Check thyroid studies   EP following, plan for study and ablation tomorrow   NPO at midnight       Cardiac arrest (Formerly [facility])  Assessment & Plan  History of cardiac arrest secondary to RCA vasospasm following stress echo 11/2021  Continue on telemetry     Pulmonary congestion  Assessment & Plan  CXR with pulmonary congestion   Echo ordered  Diuresis as clinically indicated  O2 goal >92%  Continue to monitor respiratory status    Hypothyroidism  Assessment & Plan  Continue home Levothyroxine 25mcg daily     Obesity, Class III, BMI 40-49.9 (morbid obesity) (Formerly [facility])  Assessment & Plan  Encourage healthy diet and lifestyle    Hyperlipidemia  Assessment & Plan  Continue home Atorvastatin 40mg daily    Hypertension  Assessment & Plan  Home medication regimen ordered to resume tomorrow with holding parameters  Diltiazem 300mg daily  Imdur 60mg daily  Lisinopril 40mg daily      MDD (major depressive disorder)  Assessment & Plan  Continue home medication regimen  Seroquel 300mg at bedtime  Sertraline 50mg daily     GERD (gastroesophageal reflux disease)  Assessment & Plan  Home omeprazole not on formulary, ordered as Pantoprazole 40mg daily    Anxiety  Assessment & Plan  Continue home regimen:   Sertraline 50mg daily  Ativan 1mg BID PRN  [place]            History of Present Illness     HPI: Torey Del Castillo Jr. is a 58 y.o. with PMHx HTN, HLD, hx of cardiac arrest 2/2  RCA vasospasm and prinzmetal angina following stress echo 11/2021,  obesity, hypothyroidism, depression, who presented to the ED this AM for tachycardia after referral from PCP office, [facility]. While at PCP office, HR was noted in the 160s, ECG concerning for atrial flutter. Patient transferred to ED for further care. In ED, patient arrived tachycardic in the 160s - appears SVT vs atrial flutter. Patient was given Adenosine 6mg x1, 12mg x2 and 18mg x1 without improvement.     Evaluated patient at bedside. Patient denies any current symptoms including CP, palpitations, SOB, dizziness, lightheadedness, change in vision, N/V, abdominal pain. He reports he feels like his usual self. He reports being thirsty and asks for something to drink.    Of note, patient was recently treated for a RLL pneumonia 4/26 with Amoxicillin x7 days.     History obtained from chart review and the patient.  Review of Systems: Review of Systems   Constitutional:  Negative for fatigue.   Eyes:  Negative for visual disturbance.   Respiratory:  Negative for cough, chest tightness and shortness of breath.    Cardiovascular:  Negative for chest pain, palpitations and leg swelling.   Gastrointestinal:  Negative for abdominal pain, nausea and vomiting.   Neurological:  Negative for dizziness, weakness, light-headedness and headaches.   All other systems reviewed and are negative.    Disposition: Stepdown Level 1  Historical Information   Past Medical History:  No date: Anxiety  No date: Asthma  No date: Colon polyp  No date: GERD (gastroesophageal reflux disease)  No date: History of ileus  No date: Hypertension  No date: Insomnia  No date: Kidney stone  No date: Lumbar herniated disc      Comment:  last assessed: 3/27/2015  No date: MDD (major depressive disorder)  No date: Patella fracture      Comment:  last assessed: 3/27/2015  No date: Tobacco abuse Past Surgical History:  2015: BACK SURGERY      Comment:   Lumbar  11/5/2021: CARDIAC CATHETERIZATION; N/A      Comment:  Procedure: CARDIAC CATHETERIZATION;  Surgeon: Herbie Castaneda MD;  [facility];  Service:                Cardiology  No date: COLONOSCOPY  No date: KNEE SURGERY      Comment:  right  No date: LUMBAR FUSION      Comment:  last assessed: 3/27/2015  9/14/2022: MA RPR UMBILICAL HRNA 5 YRS/> REDUCIBLE; N/A      Comment:  Procedure: REPAIR HERNIA UMBILICAL w/ mesh;  Surgeon:                Edna Siddiqui DO;  Location: [facility] MAIN OR;  Service:                General  3/16/2017: MA TX TIBL SHFT FX IMED IMPLT W/WO SCREWS&/CERCLA; Left      Comment:  Procedure: INSERTION NAIL IM TIBIA;  Surgeon: Edna Abrams DO;  Location: [facility];  Service: Orthopedics   Current Outpatient Medications   Medication Instructions    albuterol (PROVENTIL HFA,VENTOLIN HFA) 90 mcg/act inhaler 2 puffs, Inhalation, Every 4 hours PRN    atorvastatin (LIPITOR) 40 mg tablet TAKE ONE TABLET BY MOUTH EVERY DAY    benzonatate (TESSALON PERLES) 200 mg, Oral, 3 times daily PRN    diltiazem (CARDIZEM CD) 300 mg, Oral, Every morning    folic acid (FOLVITE) 400 mcg, Oral, Daily    guaiFENesin (MUCINEX) 1,200 mg, Oral, Every 12 hours scheduled    isosorbide mononitrate (IMDUR) 60 mg, Oral, Daily    levothyroxine 25 mcg tablet TAKE ONE TABLET BY MOUTH EVERY EARLY MORNING    lisinopril (ZESTRIL) 40 mg tablet TAKE ONE TABLET BY MOUTH EVERY DAY    LORazepam (ATIVAN) 1 mg, Oral, 2 times daily PRN    methocarbamol (ROBAXIN) 500 mg, Oral, 2 times daily    omeprazole (PriLOSEC) 40 MG capsule TAKE ONE CAPSULE BY MOUTH EVERY DAY    QUEtiapine (SEROquel) 50 mg tablet TAKE ONE TABLET BY MOUTH EVERY DAY AT BEDTIME WITH  MG FOR MOOD/SLEEP    QUEtiapine (SEROQUEL) 300 mg, Oral, Daily at bedtime    sertraline (ZOLOFT) 50 mg, Oral, Daily at bedtime    vitamin B-12 (VITAMIN B-12) 1,000 mcg, Oral, Daily    No Known Allergies   Social History     Tobacco Use     Smoking status: Every Day     Current packs/day: 0.00     Average packs/day: 1.5 packs/day for 34.0 years (51.0 ttl pk-yrs)     Types: Cigarettes     Start date: 1987     Last attempt to quit: 2021     Years since quitting: 3.4    Smokeless tobacco: Never   Vaping Use    Vaping status: Never Used   Substance Use Topics    Alcohol use: Not Currently     Comment: Hx of a couple of drinks a night, or everyother night    Drug use: No    Family History   Problem Relation Age of Onset    Pancreatic cancer Mother     COPD Father     Heart attack Brother 61        decreased    Drug abuse Brother     Alcohol abuse Brother     Sleep apnea Brother     Cancer Family     Hypertension Family     Heart disease Family           Objective                            Vitals I/O      Most Recent Min/Max in 24hrs   Temp 98 °F (36.7 °C) Temp  Min: 98 °F (36.7 °C)  Max: 98.2 °F (36.8 °C)   Pulse (!) 150 Pulse  Min: 150  Max: 160   Resp (!) 24 Resp  Min: 24  Max: 40   /79 BP  Min: 109/79  Max: 168/106   O2 Sat 95 % SpO2  Min: 94 %  Max: 97 %    No intake or output data in the 24 hours ending 06/20/24 1522    Diet Cardiovascular; Cardiac  Diet NPO    Invasive Monitoring   none        Physical Exam   Physical Exam  Vitals and nursing note reviewed.   Eyes:      Extraocular Movements: Extraocular movements intact.      Pupils: Pupils are equal, round, and reactive to light.   Skin:     General: Skin is warm and dry.   HENT:      Head: Normocephalic and atraumatic.      Mouth/Throat:      Mouth: Mucous membranes are dry.   Neck:      Vascular: No JVD.   Cardiovascular:      Rate and Rhythm: Normal rate and regular rhythm.      Pulses: Normal pulses.      Heart sounds: Normal heart sounds.   Musculoskeletal:         General: Normal range of motion.      Right lower leg: No edema.      Left lower leg: No edema.   Abdominal: General: Bowel sounds are normal.      Palpations: Abdomen is soft.      Tenderness: There is no abdominal  tenderness. There is no guarding.      Comments: Protuberant    Constitutional:       General: He is not in acute distress.     Appearance: He is morbidly obese.   Pulmonary:      Effort: Pulmonary effort is normal.      Breath sounds: Normal breath sounds.   Neurological:      General: No focal deficit present.      Mental Status: He is alert and oriented to person, place and time. He is calm.      Motor: Strength full and intact in all extremities.            Diagnostic Studies    EKG: Most recent ECG 1403: atrial flutter, 148 BPM  Imaging:  I have personally reviewed pertinent films in PACS, CXR with pulmonary venous congestion     Medications:  Scheduled PRN   atorvastatin, 40 mg, Daily  chlorhexidine, 15 mL, Q12H ASIA  [START ON 6/21/2024] diltiazem, 300 mg, QAM  heparin (porcine), 5,000 Units, Q8H ASIA  [START ON 6/21/2024] isosorbide mononitrate, 60 mg, Daily  [START ON 6/21/2024] levothyroxine, 25 mcg, Early Morning  [START ON 6/21/2024] lisinopril, 40 mg, Daily  [START ON 6/21/2024] pantoprazole, 40 mg, Early Morning  QUEtiapine, 300 mg, HS  [START ON 6/21/2024] sertraline, 50 mg, Daily      LORazepam, 1 mg, BID PRN       Continuous          Labs:  CBC    Recent Labs     06/20/24  1032 06/20/24  1410   WBC 9.84  --    HGB 14.5  --    HCT 45.5  --     201     BMP    Recent Labs     06/20/24  1032   SODIUM 137   K 3.9      CO2 29   AGAP 6   BUN 10   CREATININE 0.98   CALCIUM 9.2       Coags    No recent results     Additional Electrolytes  No recent results       Blood Gas    No recent results  No recent results LFTs  Recent Labs     06/20/24  1032   ALT 23   AST 25   ALKPHOS 117*   ALB 3.7   TBILI 0.84       Infectious  No recent results  Glucose  Recent Labs     06/20/24  1032   GLUC 96             Anticipated Length of Stay is > 2 midnights  Nathan Fuentes PA-C

## 2024-06-21 ENCOUNTER — APPOINTMENT (OUTPATIENT)
Dept: NON INVASIVE DIAGNOSTICS | Facility: HOSPITAL | Age: 59
DRG: 175 | End: 2024-06-21
Payer: COMMERCIAL

## 2024-06-21 LAB
ANION GAP SERPL CALCULATED.3IONS-SCNC: 7 MMOL/L (ref 4–13)
ATRIAL RATE: 163 BPM
BUN SERPL-MCNC: 10 MG/DL (ref 5–25)
CALCIUM SERPL-MCNC: 8.8 MG/DL (ref 8.4–10.2)
CHLORIDE SERPL-SCNC: 103 MMOL/L (ref 96–108)
CO2 SERPL-SCNC: 30 MMOL/L (ref 21–32)
CREAT SERPL-MCNC: 1 MG/DL (ref 0.6–1.3)
ERYTHROCYTE [DISTWIDTH] IN BLOOD BY AUTOMATED COUNT: 14.5 % (ref 11.6–15.1)
GFR SERPL CREATININE-BSD FRML MDRD: 83 ML/MIN/1.73SQ M
GLUCOSE SERPL-MCNC: 92 MG/DL (ref 65–140)
HCT VFR BLD AUTO: 44.9 % (ref 36.5–49.3)
HGB BLD-MCNC: 14.2 G/DL (ref 12–17)
INR PPP: 1.18 (ref 0.84–1.19)
KCT BLD-ACNC: 334 SEC (ref 89–137)
KCT BLD-ACNC: 368 SEC (ref 89–137)
KCT BLD-ACNC: 375 SEC (ref 89–137)
KCT BLD-ACNC: 422 SEC (ref 89–137)
MCH RBC QN AUTO: 35 PG (ref 26.8–34.3)
MCHC RBC AUTO-ENTMCNC: 31.6 G/DL (ref 31.4–37.4)
MCV RBC AUTO: 111 FL (ref 82–98)
PLATELET # BLD AUTO: 199 THOUSANDS/UL (ref 149–390)
PMV BLD AUTO: 10.6 FL (ref 8.9–12.7)
POTASSIUM SERPL-SCNC: 4.1 MMOL/L (ref 3.5–5.3)
PR INTERVAL: 88 MS
PROTHROMBIN TIME: 14.9 SECONDS (ref 11.6–14.5)
QRS AXIS: 97 DEGREES
QRSD INTERVAL: 86 MS
QT INTERVAL: 288 MS
QTC INTERVAL: 469 MS
RBC # BLD AUTO: 4.06 MILLION/UL (ref 3.88–5.62)
SL CV LV EF: 45
SODIUM SERPL-SCNC: 140 MMOL/L (ref 135–147)
SPECIMEN SOURCE: ABNORMAL
T WAVE AXIS: 29 DEGREES
VENTRICULAR RATE: 160 BPM
WBC # BLD AUTO: 7.61 THOUSAND/UL (ref 4.31–10.16)

## 2024-06-21 PROCEDURE — C1769 GUIDE WIRE: HCPCS | Performed by: INTERNAL MEDICINE

## 2024-06-21 PROCEDURE — 94660 CPAP INITIATION&MGMT: CPT

## 2024-06-21 PROCEDURE — NC001 PR NO CHARGE: Performed by: NURSE PRACTITIONER

## 2024-06-21 PROCEDURE — 80048 BASIC METABOLIC PNL TOTAL CA: CPT | Performed by: ANESTHESIOLOGY

## 2024-06-21 PROCEDURE — C1894 INTRO/SHEATH, NON-LASER: HCPCS | Performed by: INTERNAL MEDICINE

## 2024-06-21 PROCEDURE — 97163 PT EVAL HIGH COMPLEX 45 MIN: CPT

## 2024-06-21 PROCEDURE — 93325 DOPPLER ECHO COLOR FLOW MAPG: CPT | Performed by: INTERNAL MEDICINE

## 2024-06-21 PROCEDURE — 93320 DOPPLER ECHO COMPLETE: CPT | Performed by: INTERNAL MEDICINE

## 2024-06-21 PROCEDURE — 93653 COMPRE EP EVAL TX SVT: CPT | Performed by: INTERNAL MEDICINE

## 2024-06-21 PROCEDURE — 93312 ECHO TRANSESOPHAGEAL: CPT | Performed by: INTERNAL MEDICINE

## 2024-06-21 PROCEDURE — 85610 PROTHROMBIN TIME: CPT | Performed by: ANESTHESIOLOGY

## 2024-06-21 PROCEDURE — C9113 INJ PANTOPRAZOLE SODIUM, VIA: HCPCS | Performed by: PHYSICIAN ASSISTANT

## 2024-06-21 PROCEDURE — 76937 US GUIDE VASCULAR ACCESS: CPT | Performed by: INTERNAL MEDICINE

## 2024-06-21 PROCEDURE — 93312 ECHO TRANSESOPHAGEAL: CPT

## 2024-06-21 PROCEDURE — 02583ZZ DESTRUCTION OF CONDUCTION MECHANISM, PERCUTANEOUS APPROACH: ICD-10-PCS | Performed by: INTERNAL MEDICINE

## 2024-06-21 PROCEDURE — 93005 ELECTROCARDIOGRAM TRACING: CPT

## 2024-06-21 PROCEDURE — 97166 OT EVAL MOD COMPLEX 45 MIN: CPT

## 2024-06-21 PROCEDURE — C1759 CATH, INTRA ECHOCARDIOGRAPHY: HCPCS | Performed by: INTERNAL MEDICINE

## 2024-06-21 PROCEDURE — 85027 COMPLETE CBC AUTOMATED: CPT | Performed by: ANESTHESIOLOGY

## 2024-06-21 PROCEDURE — C1730 CATH, EP, 19 OR FEW ELECT: HCPCS | Performed by: INTERNAL MEDICINE

## 2024-06-21 PROCEDURE — 94760 N-INVAS EAR/PLS OXIMETRY 1: CPT

## 2024-06-21 PROCEDURE — C1766 INTRO/SHEATH,STRBLE,NON-PEEL: HCPCS | Performed by: INTERNAL MEDICINE

## 2024-06-21 PROCEDURE — C1733 CATH, EP, OTHR THAN COOL-TIP: HCPCS | Performed by: INTERNAL MEDICINE

## 2024-06-21 PROCEDURE — 85347 COAGULATION TIME ACTIVATED: CPT

## 2024-06-21 PROCEDURE — 02K83ZZ MAP CONDUCTION MECHANISM, PERCUTANEOUS APPROACH: ICD-10-PCS | Performed by: INTERNAL MEDICINE

## 2024-06-21 PROCEDURE — NC001 PR NO CHARGE: Performed by: FAMILY MEDICINE

## 2024-06-21 PROCEDURE — 99232 SBSQ HOSP IP/OBS MODERATE 35: CPT | Performed by: ANESTHESIOLOGY

## 2024-06-21 PROCEDURE — C1731 CATH, EP, 20 OR MORE ELEC: HCPCS | Performed by: INTERNAL MEDICINE

## 2024-06-21 RX ORDER — LIDOCAINE HYDROCHLORIDE 10 MG/ML
INJECTION, SOLUTION EPIDURAL; INFILTRATION; INTRACAUDAL; PERINEURAL AS NEEDED
Status: DISCONTINUED | OUTPATIENT
Start: 2024-06-21 | End: 2024-06-21

## 2024-06-21 RX ORDER — PROTAMINE SULFATE 10 MG/ML
INJECTION, SOLUTION INTRAVENOUS AS NEEDED
Status: DISCONTINUED | OUTPATIENT
Start: 2024-06-21 | End: 2024-06-21

## 2024-06-21 RX ORDER — NITROGLYCERIN 20 MG/100ML
INJECTION INTRAVENOUS CODE/TRAUMA/SEDATION MEDICATION
Status: DISCONTINUED | OUTPATIENT
Start: 2024-06-21 | End: 2024-06-21 | Stop reason: HOSPADM

## 2024-06-21 RX ORDER — ACETAMINOPHEN 325 MG/1
650 TABLET ORAL EVERY 4 HOURS PRN
Status: DISCONTINUED | OUTPATIENT
Start: 2024-06-21 | End: 2024-06-22 | Stop reason: HOSPADM

## 2024-06-21 RX ORDER — ROCURONIUM BROMIDE 10 MG/ML
INJECTION, SOLUTION INTRAVENOUS AS NEEDED
Status: DISCONTINUED | OUTPATIENT
Start: 2024-06-21 | End: 2024-06-21

## 2024-06-21 RX ORDER — HEPARIN SODIUM 1000 [USP'U]/ML
INJECTION, SOLUTION INTRAVENOUS; SUBCUTANEOUS CODE/TRAUMA/SEDATION MEDICATION
Status: DISCONTINUED | OUTPATIENT
Start: 2024-06-21 | End: 2024-06-21 | Stop reason: HOSPADM

## 2024-06-21 RX ORDER — ALBUTEROL SULFATE 90 UG/1
AEROSOL, METERED RESPIRATORY (INHALATION) AS NEEDED
Status: DISCONTINUED | OUTPATIENT
Start: 2024-06-21 | End: 2024-06-21

## 2024-06-21 RX ORDER — HYDROMORPHONE HCL IN WATER/PF 6 MG/30 ML
0.2 PATIENT CONTROLLED ANALGESIA SYRINGE INTRAVENOUS
Status: DISCONTINUED | OUTPATIENT
Start: 2024-06-21 | End: 2024-06-21 | Stop reason: HOSPADM

## 2024-06-21 RX ORDER — SODIUM CHLORIDE 9 MG/ML
INJECTION, SOLUTION INTRAVENOUS CONTINUOUS PRN
Status: DISCONTINUED | OUTPATIENT
Start: 2024-06-21 | End: 2024-06-21

## 2024-06-21 RX ORDER — DEXAMETHASONE SODIUM PHOSPHATE 10 MG/ML
INJECTION, SOLUTION INTRAMUSCULAR; INTRAVENOUS AS NEEDED
Status: DISCONTINUED | OUTPATIENT
Start: 2024-06-21 | End: 2024-06-21

## 2024-06-21 RX ORDER — LIDOCAINE HYDROCHLORIDE 10 MG/ML
INJECTION, SOLUTION EPIDURAL; INFILTRATION; INTRACAUDAL; PERINEURAL CODE/TRAUMA/SEDATION MEDICATION
Status: DISCONTINUED | OUTPATIENT
Start: 2024-06-21 | End: 2024-06-21 | Stop reason: HOSPADM

## 2024-06-21 RX ORDER — ONDANSETRON 2 MG/ML
4 INJECTION INTRAMUSCULAR; INTRAVENOUS ONCE AS NEEDED
Status: DISCONTINUED | OUTPATIENT
Start: 2024-06-21 | End: 2024-06-21 | Stop reason: HOSPADM

## 2024-06-21 RX ORDER — MAGNESIUM SULFATE HEPTAHYDRATE 40 MG/ML
2 INJECTION, SOLUTION INTRAVENOUS ONCE
Status: COMPLETED | OUTPATIENT
Start: 2024-06-21 | End: 2024-06-21

## 2024-06-21 RX ORDER — SUCCINYLCHOLINE/SOD CL,ISO/PF 100 MG/5ML
SYRINGE (ML) INTRAVENOUS AS NEEDED
Status: DISCONTINUED | OUTPATIENT
Start: 2024-06-21 | End: 2024-06-21

## 2024-06-21 RX ORDER — OXYCODONE HYDROCHLORIDE 5 MG/1
5 TABLET ORAL EVERY 4 HOURS PRN
Status: DISPENSED | OUTPATIENT
Start: 2024-06-21 | End: 2024-06-22

## 2024-06-21 RX ORDER — ONDANSETRON 2 MG/ML
INJECTION INTRAMUSCULAR; INTRAVENOUS AS NEEDED
Status: DISCONTINUED | OUTPATIENT
Start: 2024-06-21 | End: 2024-06-21

## 2024-06-21 RX ORDER — PROPOFOL 10 MG/ML
INJECTION, EMULSION INTRAVENOUS AS NEEDED
Status: DISCONTINUED | OUTPATIENT
Start: 2024-06-21 | End: 2024-06-21

## 2024-06-21 RX ORDER — FENTANYL CITRATE 50 UG/ML
INJECTION, SOLUTION INTRAMUSCULAR; INTRAVENOUS AS NEEDED
Status: DISCONTINUED | OUTPATIENT
Start: 2024-06-21 | End: 2024-06-21

## 2024-06-21 RX ORDER — DIPHENHYDRAMINE HYDROCHLORIDE 50 MG/ML
12.5 INJECTION INTRAMUSCULAR; INTRAVENOUS ONCE AS NEEDED
Status: DISCONTINUED | OUTPATIENT
Start: 2024-06-21 | End: 2024-06-21 | Stop reason: HOSPADM

## 2024-06-21 RX ORDER — FENTANYL CITRATE/PF 50 MCG/ML
25 SYRINGE (ML) INJECTION
Status: DISCONTINUED | OUTPATIENT
Start: 2024-06-21 | End: 2024-06-21 | Stop reason: HOSPADM

## 2024-06-21 RX ORDER — MIDAZOLAM HYDROCHLORIDE 2 MG/2ML
INJECTION, SOLUTION INTRAMUSCULAR; INTRAVENOUS AS NEEDED
Status: DISCONTINUED | OUTPATIENT
Start: 2024-06-21 | End: 2024-06-21

## 2024-06-21 RX ORDER — HEPARIN SODIUM 10000 [USP'U]/100ML
INJECTION, SOLUTION INTRAVENOUS
Status: DISCONTINUED | OUTPATIENT
Start: 2024-06-21 | End: 2024-06-21 | Stop reason: HOSPADM

## 2024-06-21 RX ADMIN — CHLORHEXIDINE GLUCONATE 0.12% ORAL RINSE 15 ML: 1.2 LIQUID ORAL at 09:10

## 2024-06-21 RX ADMIN — ALBUTEROL SULFATE 8 PUFF: 90 AEROSOL, METERED RESPIRATORY (INHALATION) at 12:35

## 2024-06-21 RX ADMIN — ISOSORBIDE MONONITRATE 60 MG: 60 TABLET, EXTENDED RELEASE ORAL at 09:09

## 2024-06-21 RX ADMIN — FENTANYL CITRATE 50 MCG: 50 INJECTION INTRAMUSCULAR; INTRAVENOUS at 12:33

## 2024-06-21 RX ADMIN — PROTAMINE SULFATE 80 MG: 10 INJECTION, SOLUTION INTRAVENOUS at 14:55

## 2024-06-21 RX ADMIN — DEXAMETHASONE SODIUM PHOSPHATE 10 MG: 10 INJECTION, SOLUTION INTRAMUSCULAR; INTRAVENOUS at 12:33

## 2024-06-21 RX ADMIN — EPINEPHRINE 10 MCG: 1 INJECTION, SOLUTION, CONCENTRATE INTRAVENOUS at 14:02

## 2024-06-21 RX ADMIN — PANTOPRAZOLE SODIUM 40 MG: 40 INJECTION, POWDER, FOR SOLUTION INTRAVENOUS at 09:10

## 2024-06-21 RX ADMIN — APIXABAN 5 MG: 5 TABLET, FILM COATED ORAL at 17:35

## 2024-06-21 RX ADMIN — ROCURONIUM BROMIDE 50 MG: 10 INJECTION, SOLUTION INTRAVENOUS at 12:33

## 2024-06-21 RX ADMIN — ONDANSETRON 4 MG: 2 INJECTION INTRAMUSCULAR; INTRAVENOUS at 12:33

## 2024-06-21 RX ADMIN — EPINEPHRINE 3 MCG/MIN: 1 INJECTION, SOLUTION, CONCENTRATE INTRAVENOUS at 12:45

## 2024-06-21 RX ADMIN — OXYCODONE HYDROCHLORIDE 5 MG: 5 TABLET ORAL at 17:35

## 2024-06-21 RX ADMIN — PANTOPRAZOLE SODIUM 40 MG: 40 TABLET, DELAYED RELEASE ORAL at 06:05

## 2024-06-21 RX ADMIN — EPINEPHRINE 10 MCG: 1 INJECTION, SOLUTION, CONCENTRATE INTRAVENOUS at 13:03

## 2024-06-21 RX ADMIN — PROPOFOL 200 MG: 10 INJECTION, EMULSION INTRAVENOUS at 12:33

## 2024-06-21 RX ADMIN — SERTRALINE HYDROCHLORIDE 50 MG: 50 TABLET ORAL at 09:09

## 2024-06-21 RX ADMIN — EPINEPHRINE 5 MCG/MIN: 1 INJECTION, SOLUTION, CONCENTRATE INTRAVENOUS at 13:04

## 2024-06-21 RX ADMIN — ROCURONIUM BROMIDE 50 MG: 10 INJECTION, SOLUTION INTRAVENOUS at 13:10

## 2024-06-21 RX ADMIN — SODIUM CHLORIDE: 9 INJECTION, SOLUTION INTRAVENOUS at 12:26

## 2024-06-21 RX ADMIN — FENTANYL CITRATE 50 MCG: 50 INJECTION INTRAMUSCULAR; INTRAVENOUS at 14:44

## 2024-06-21 RX ADMIN — ATORVASTATIN CALCIUM 40 MG: 40 TABLET, FILM COATED ORAL at 09:10

## 2024-06-21 RX ADMIN — MIDAZOLAM 2 MG: 1 INJECTION INTRAMUSCULAR; INTRAVENOUS at 12:26

## 2024-06-21 RX ADMIN — ACETAMINOPHEN 650 MG: 325 TABLET, FILM COATED ORAL at 16:34

## 2024-06-21 RX ADMIN — MAGNESIUM SULFATE HEPTAHYDRATE 2 G: 40 INJECTION, SOLUTION INTRAVENOUS at 09:09

## 2024-06-21 RX ADMIN — LISINOPRIL 40 MG: 20 TABLET ORAL at 09:10

## 2024-06-21 RX ADMIN — Medication 200 MG: at 12:33

## 2024-06-21 RX ADMIN — SODIUM CHLORIDE: 0.9 INJECTION, SOLUTION INTRAVENOUS at 12:26

## 2024-06-21 RX ADMIN — EPINEPHRINE 10 MCG: 1 INJECTION, SOLUTION, CONCENTRATE INTRAVENOUS at 12:46

## 2024-06-21 RX ADMIN — LEVOTHYROXINE SODIUM 25 MCG: 25 TABLET ORAL at 06:05

## 2024-06-21 RX ADMIN — DEXMEDETOMIDINE HYDROCHLORIDE 20 MCG: 100 INJECTION, SOLUTION INTRAVENOUS at 15:00

## 2024-06-21 RX ADMIN — PHENYLEPHRINE HYDROCHLORIDE 80 MCG/MIN: 10 INJECTION INTRAVENOUS at 12:35

## 2024-06-21 RX ADMIN — OXYCODONE HYDROCHLORIDE 5 MG: 5 TABLET ORAL at 21:45

## 2024-06-21 RX ADMIN — Medication 3000 MG: at 12:40

## 2024-06-21 RX ADMIN — QUETIAPINE FUMARATE 300 MG: 300 TABLET ORAL at 21:38

## 2024-06-21 RX ADMIN — LIDOCAINE HYDROCHLORIDE 50 MG: 10 INJECTION, SOLUTION EPIDURAL; INFILTRATION; INTRACAUDAL; PERINEURAL at 12:33

## 2024-06-21 RX ADMIN — SUGAMMADEX 400 MG: 100 INJECTION, SOLUTION INTRAVENOUS at 15:06

## 2024-06-21 RX ADMIN — LORAZEPAM 1 MG: 1 TABLET ORAL at 09:11

## 2024-06-21 NOTE — ASSESSMENT & PLAN NOTE
"6/20 CXR: revealed \"mild pulmonary vascular congestion\"  - Patient required up to 6L NC    6/21: Pre ablation NIKOLAS revealed EF of 45-49%  - Weaned to Room Air     Plan:  Diuresis as clinically indicated  O2 goal >92%  Continue to monitor respiratory status  CPAP HS  CM assisting with CPAP for discharge   "

## 2024-06-21 NOTE — PLAN OF CARE
Problem: PHYSICAL THERAPY ADULT  Goal: Performs mobility at highest level of function for planned discharge setting.  See evaluation for individualized goals.  Description: Treatment/Interventions: Functional transfer training, Endurance training, Gait training, Spoke to nursing, Spoke to case management, Elevations          See flowsheet documentation for full assessment, interventions and recommendations.  Outcome: Progressing  Note: Prognosis: Good  Problem List: Decreased endurance, Decreased mobility  Assessment: Pt is a 58 y.o. male seen for a high complexity PT evaluation due to Ongoing medical management for primary dx, Decreased activity tolerance compared to baseline, Ongoing telemetry monitoring. Patient is s/p admit to [facility] on 6/20/2024 for Tachycardia (R00.0). Patient  has a past medical history of Anxiety, Asthma, Colon polyp, GERD (gastroesophageal reflux disease), History of ileus, Hypertension, Insomnia, Kidney stone, Lumbar herniated disc, MDD (major depressive disorder), Patella fracture, and Tobacco abuse..     PT now consulted to assess functional mobility and needs for safe d/c planning. Prior to admission, pt independent with functional mobility, independent ADLs, and independent IADLs. Personal factors affecting status include hx of falls, fall risk, steps to enter home, steps to negotiate within home, lives alone, and medical status     Currently pt requires supervision for functional transfers with no AD ; supervision for ambulation with no AD. Patient limited from further mobility due to tachycardia. Pt presents functioning below baseline and w/ overall mobility deficits 2* to: decreased endurance, decreased mobility, impaired balance. These impairments place pt at risk for falls.     Pt will continue to benefit from skilled PT interventions to address stated impairments; to maximize functional potential; for ongoing pt/family education; and DME needs. The patient's  AM-PAC Basic Mobility Inpatient Short Form Raw Score Is 20. PT is currently anticipating no post acute rehab needs on d/c from hospital. Will continue to follow as able.  Barriers to Discharge: Inaccessible home environment     Rehab Resource Intensity Level, PT: No post-acute rehabilitation needs (anticipating no needs pending stair trial)    See flowsheet documentation for full assessment.

## 2024-06-21 NOTE — PHYSICAL THERAPY NOTE
Physical Therapy Evaluation     Patient's Name: Torey Del Castillo Jr.    Admitting Diagnosis  Tachycardia [R00.0]    Problem List  Patient Active Problem List   Diagnosis    Other insomnia    Depression with anxiety    Kidney stone on right side    Ileus (HCC)    Umbilical hernia without obstruction and without gangrene    Gallbladder calculus without cholecystitis    Tobacco use disorder    Closed right ankle fracture    History of ileus    Adenoma of left adrenal gland    Anxiety    GERD (gastroesophageal reflux disease)    MDD (major depressive disorder)    History of MRSA infection of lungs    Moderate episode of recurrent major depressive disorder (HCC)    Asthma    Hypertension    Tachycardia, unspecified    Hyperlipidemia    Elevated alkaline phosphatase level    Elevated hemoglobin (HCC)    Obesity, Class III, BMI 40-49.9 (morbid obesity) (HCC)    Excessive daytime sleepiness    Cardiac arrest (HCC)    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HCC)    Alcohol dependence (HCC)    Anemia    Thrombocytopenia (HCC)    Atrial flutter (HCC)    Hip pain    Financial difficulties    Annual physical exam    Preop examination    Blood in left ear canal    Hyperpigmented skin lesion    Obstructive sleep apnea    Shortness of breath    Left leg pain    Intentional drug overdose, initial encounter ([facility])    Spasm of muscle of lower back    Skin lesion    Other specified hypothyroidism    Prediabetes    Abnormal hematocrit    Wheezing    Hypothyroidism    Pulmonary congestion       Past Medical History  Past Medical History:   Diagnosis Date    Anxiety     Asthma     Colon polyp     GERD (gastroesophageal reflux disease)     History of ileus     Hypertension     Insomnia     Kidney stone     Lumbar herniated disc     last assessed: 3/27/2015    MDD (major depressive disorder)     Patella fracture     last assessed: 3/27/2015    Tobacco abuse        Past Surgical History  Past Surgical History:   Procedure Laterality Date    BACK SURGERY   2015    Lumbar    CARDIAC CATHETERIZATION N/A 11/5/2021    Procedure: CARDIAC CATHETERIZATION;  Surgeon: Herbie Castaneda MD;  [facility];  Service: Cardiology    COLONOSCOPY      KNEE SURGERY      right    LUMBAR FUSION      last assessed: 3/27/2015    CT RPR UMBILICAL HRNA 5 YRS/> REDUCIBLE N/A 9/14/2022    Procedure: REPAIR HERNIA UMBILICAL w/ mesh;  Surgeon: Edna Siddiqui DO;  Location: [facility];  Service: General    CT TX TIBL SHFT FX IMED IMPLT W/WO SCREWS&/CERCLA Left 3/16/2017    Procedure: INSERTION NAIL IM TIBIA;  Surgeon: Edna Abrams DO;  Location: [facility];  Service: Orthopedics          06/21/24 0958   PT Last Visit   PT Visit Date 06/21/24   Note Type   Note type Evaluation   Pain Assessment   Pain Assessment Tool 0-10   Pain Score No Pain   Restrictions/Precautions   Weight Bearing Precautions Per Order No   Other Precautions Telemetry;Multiple lines   Home Living   Type of Home House   Home Layout Two level;Bed/bath upstairs;Stairs to enter with rails;Laundry in basement  (3STE - FFOS to 2nd fl bed/bath and to basement for laundry)   Bathroom Shower/Tub Tub/shower unit   Bathroom Toilet Standard   Bathroom Accessibility Accessible   Home Equipment Cane  (does not use PTA)   Prior Function   Level of [place] Independent with ADLs;Independent with functional mobility;Independent with IADLS   Lives With Alone   Receives Help From Family;Friend(s)   IADLs Independent with driving;Independent with meal prep;Independent with medication management   Falls in the last 6 months 1 to 4  (x1)   Vocational Unemployed   General   Family/Caregiver Present No   Cognition   Overall Cognitive Status WFL   Arousal/Participation Alert   Orientation Level Oriented X4   Memory Within functional limits   Following Commands Follows all commands and directions without difficulty   Comments Patient pleasant and cooperative   Subjective   Subjective Patient agreeable to PT adelina SYED  Assessment   RUE Assessment WFL   LUE Assessment   LUE Assessment WFL   RLE Assessment   RLE Assessment WFL   LLE Assessment   LLE Assessment WFL   Bed Mobility   Additional Comments sitting EOB on arrival   Transfers   Sit to Stand 5  Supervision   Stand to Sit 5  Supervision   Additional Comments no AD   Ambulation/Elevation   Gait pattern Short stride   Gait Assistance 5  Supervision   Additional items Verbal cues   Assistive Device None   Distance 5' laterally at EOB   Stair Management Assistance Not tested   Ambulation/Elevation Additional Comments limited d/t tachycardia - 150s   Balance   Static Sitting Normal   Dynamic Sitting Good   Static Standing Fair +   Dynamic Standing Fair +   Ambulatory Fair +   Endurance Deficit   Endurance Deficit Yes   Endurance Deficit Description tachy   Activity Tolerance   Activity Tolerance Treatment limited secondary to medical complications (Comment)  (tachycardia)   Medical Staff Made Aware OT, SPT   Nurse Made Aware RN cleared   Assessment   Prognosis Good   Problem List Decreased endurance;Decreased mobility   Assessment Pt is a 58 y.o. male seen for a high complexity PT evaluation due to Ongoing medical management for primary dx, Decreased activity tolerance compared to baseline, Ongoing telemetry monitoring. Patient is s/p admit to [facility] on 6/20/2024 for Tachycardia (R00.0). Patient  has a past medical history of Anxiety, Asthma, Colon polyp, GERD (gastroesophageal reflux disease), History of ileus, Hypertension, Insomnia, Kidney stone, Lumbar herniated disc, MDD (major depressive disorder), Patella fracture, and Tobacco abuse..     PT now consulted to assess functional mobility and needs for safe d/c planning. Prior to admission, pt independent with functional mobility, independent ADLs, and independent IADLs. Personal factors affecting status include hx of falls, fall risk, steps to enter home, steps to negotiate within home, lives alone, and medical  status     Currently pt requires supervision for functional transfers with no AD ; supervision for ambulation with no AD. Patient limited from further mobility due to tachycardia. Pt presents functioning below baseline and w/ overall mobility deficits 2* to: decreased endurance, decreased mobility, impaired balance. These impairments place pt at risk for falls.     Pt will continue to benefit from skilled PT interventions to address stated impairments; to maximize functional potential; for ongoing pt/family education; and DME needs. The patient's AM-PAC Basic Mobility Inpatient Short Form Raw Score Is 20. PT is currently anticipating no post acute rehab needs on d/c from hospital. Will continue to follow as able.   Barriers to Discharge Inaccessible home environment   Goals   Patient Goals to have procedure and then go home   STG Expiration Date 07/05/24   Short Term Goal #1 In 14 days, patient will 1) increase functional transfers to MI for improved safety and functional mobility 2) ambulate 250ft with MI using no AD for increased endurance and safety ambulating home and community environments 3) improve balance by 1 grade for improved safety and stability and decreased risk for falls. 4) ascend/descend at least 12 stairs using HR with MI in order to safely access home environment   PT Treatment Day 0   Plan   Treatment/Interventions Functional transfer training;Endurance training;Gait training;Spoke to nursing;Spoke to case management;Elevations   PT Frequency 2-3x/wk   Discharge Recommendation   Rehab Resource Intensity Level, PT No post-acute rehabilitation needs  (anticipating no needs pending stair trial)   AM-[facility] Basic Mobility Inpatient   Turning in Flat Bed Without Bedrails 4   Lying on Back to Sitting on Edge of Flat Bed Without Bedrails 4   Moving Bed to Chair 3   Standing Up From Chair Using Arms 3   Walk in Room 3   Climb 3-5 Stairs With Railing 3   Basic Mobility Inpatient Raw Score 20   Basic Mobility  Standardized Score 43.99   [facility] Highest Level Of Mobility   -[facility] Goal 6: Walk 10 steps or more   -HL Achieved 6: Walk 10 steps or more   Modified Allen Scale   Modified Allen Scale 3   End of Consult   Patient Position at End of Consult All needs within reach;Seated edge of bed         Jimena Tello, PT, DPT

## 2024-06-21 NOTE — ASSESSMENT & PLAN NOTE
Patient presenting from PCP office with tachycardia to the 160s refractory to multiple doses of Adenosine in the ED  Suspect atrial flutter vs less likely SVT  Patient remains asymptomatic    Plan  Continue on telemetry   Monitor and replete electrolytes as needed, goal Mg>2, K>4  EP following, plan for study and ablation 06/21

## 2024-06-21 NOTE — ASSESSMENT & PLAN NOTE
Plan:  Home medication regimen ordered to resume 06/21 with holding parameters  Diltiazem 300mg daily  Imdur 60mg daily  Lisinopril 40mg daily

## 2024-06-21 NOTE — ASSESSMENT & PLAN NOTE
History of cardiac arrest secondary to RCA vasospasm following stress echo 11/2021    Plan:  Continue on telemetry

## 2024-06-21 NOTE — PROGRESS NOTES
"Critical Care Interval Transfer Note:    Brief Hospital Summary:     HPI per Nathan Fuentes on 6/20/24:   \"Torey Del Castillo Jr. is a 58 y.o. with PMHx HTN, HLD, hx of cardiac arrest 2/2 RCA vasospasm and prinzmetal angina following stress echo 11/2021,  obesity, hypothyroidism, depression, who presented to the ED this AM for tachycardia after referral from PCP office, [facility]. While at PCP office, HR was noted in the 160s, ECG concerning for atrial flutter. Patient transferred to ED for further care. In ED, patient arrived tachycardic in the 160s - appears SVT vs atrial flutter. Patient was given Adenosine 6mg x1, 12mg x2 and 18mg x1 without improvement.  Evaluated patient at bedside. Patient denies any current symptoms including CP, palpitations, SOB, dizziness, lightheadedness, change in vision, N/V, abdominal pain. He reports he feels like his usual self. He reports being thirsty and asks for something to drink.  Of note, patient was recently treated for a RLL pneumonia 4/26 with Amoxicillin x7 days.\"    The patient was evaluated by EP who recommended A flutter ablation. He remained in rapid atrial flutter throughout the night into 6/22/24, but remained asymptomatic with normal BP. He underwent a successful procedure on 6/22/24 and is now in high sinus rhythm/low sinus tach without hypotension.     He will need to remain admitted for telemetry monitoring overnight. He also previously qualified for CPAP, but failed to get insurance coverage. Given the high risk of recurrent arrhythmia in the setting of untreated PRAKASH, he should be seen by case management to help him qualify for DME upon discharge.       Barriers to discharge:   Clearance from EP  Outpatient qualification for CPAP      Consults:  IP CONSULT TO ELECTROPHYSIOLOGY  IP CONSULT TO CASE MANAGEMENT    Recommended to review admission imaging for incidental findings and document in discharge navigator: Chart reviewed, no known incidental " findings noted at this time.      Discharge Plan: Anticipate discharge in 24-48 hrs to home.          Patient seen and evaluated by Critical Care today and deemed to be appropriate for transfer to Med Surg with Telemetry. Spoke to Dr. Gonzalez from [facility] to accept transfer. Critical care can be contacted via [employer] with any questions or concerns.

## 2024-06-21 NOTE — ASSESSMENT & PLAN NOTE
Home regimen: Atorvastatin 40mg daily        Lab Results   Component Value Date    CHOLESTEROL 244 (H) 04/01/2024    TRIG 199 (H) 04/01/2024    HDL 35 (L) 04/01/2024    LDLCALC 169 (H) 04/01/2024       Plan: Continue home regimen

## 2024-06-21 NOTE — PROGRESS NOTES
[facility] Acceptance Note  Torey Del Castillo Jr. 58 y.o. male MRN: [id number]  Unit/Bed#: [facility] 505-01 Encounter: [id number]    Date of Admission: 6/20/2024 10:05 AM  Date of Transfer: 6/21/2024    Summary:   Torey Del Castillo Jr. is a 58 y.o. male with PMH of HTN, HLD, Cardiac Arrest secondary to RCA Vasospasm and Prinzmetal Angina following stress echo 11/2021, hypothyroidism, depression, recent RLL Pneumonia (4/2024) who presented to ED on 6/20 after referral from his PCP for concern of atrial flutter. On arrival patient was tachy to the 160s and appeared to be in either SVT or a flutter. Patient was given multiple rounds of Adenosine without improvement. At the time patient denied any chest pain, SOB, edema nor palpitations. Patient had stated that he was compliant with all his daily medication as prescribed. As the Adenosine did not improve heart rate patient was taken to Cardiac ICU for further monitoring and Electrophysiology was consulted. Patient remained in atrial flutter throughout the night into 6/21/24.  EP recommended A flutter ablation and patient underwent successful ablation on 6/21/24. After the ablation patient was in sinus rhythm without hypotension and was stable for downgrade to SD2 with Telemetry. Patient continues to require inpatient care for telemetry monitoring as well as for Case Management assistance with CPAP. Patient has a history of untreated PRAKASH. Given high risk of recurrent arrhythmia with untreated PRAKASH patient should go home with CPAP at discharge.    Assessment and Plan:   Torey Del Castillo Jr. is a 58 y.o. year old male who is being transferred from Cardiac ICU with:    Tachycardia, unspecified  Assessment & Plan  Patient presenting from PCP office on 6/20 with tachycardia to the 160s   - Patient asymptomatic on arrival and throughout admission  - Tachycardia refractory to multiple doses of Adenosine in the ED  - Suspected atrial flutter vs less likely SVT per Electrophysiology  - Patient in  "atrial flutter on telemetry into the early morning of   - Was given one dose Eliquis prior to ablation     Patient s/p successful ablation on  with pre-ablation NIKOLAS. Returned to sinus rhythm with some tachycardia after procedure. Patient will need to continue to be monitored on telemetry  - Patient has hx of untreated PRAKASH. Given risk of arrhythmia in setting of untreated PRAKASH patient will need CPAP prior to discharge.     Plan  Continue on telemetry   Eliquis 5mg BID started   Monitor and replete electrolytes as needed, goal Mg>2, K>4  EP following  CM consulted for help with CPAP device      Pulmonary congestion  Assessment & Plan   CXR: revealed \"mild pulmonary vascular congestion\"  - Patient required up to 6L NC    : Pre ablation NIKOLAS revealed EF of 45-49%  - Weaned to Room Air     Plan:  Diuresis as clinically indicated  O2 goal >92%  Continue to monitor respiratory status  CPAP HS  CM assisting with CPAP for discharge     Hypothyroidism  Assessment & Plan  Home regimen: Levothyroxine 25mcg daily    Plan: Continue home regimen    Cardiac arrest (HCC)  Assessment & Plan  Cardiac Arrest secondary to RCA Vasospasm and Prinzmetal Angina following stress echo 2021     Plan:  Continue on telemetry     Obesity, Class III, BMI 40-49.9 (morbid obesity) ([facility])  Assessment & Plan  BMI 42.9, patient has hx of PRAKASH    Plan:  Encourage healthy diet and lifestyle  CM assisting with CPAP for discharge     Hyperlipidemia  Assessment & Plan  Home regimen: Atorvastatin 40mg daily        Lab Results   Component Value Date    CHOLESTEROL 244 (H) 2024    TRIG 199 (H) 2024    HDL 35 (L) 2024    LDLCALC 169 (H) 2024       Plan: Continue home regimen    Hypertension  Assessment & Plan  Home regimen: Diltiazem 300mg daily, Imdur 60mg daily, Lisinopril 40mg daily     Systolic (12hrs), Av , Min:91 , Max:131   Diastolic (12hrs), Av, Min:59, Max:97      Plan:  Home medication regimen " resumed on 6/21, continue     MDD (major depressive disorder)  Assessment & Plan  Home regimen: Seroquel 350mg at bedtime, Sertraline 50mg daily, Ativan 1mg BID prn    Plan: Continue home regimen      GERD (gastroesophageal reflux disease)  Assessment & Plan  Home regimen: Omeprazole 40mg daily     Plan  - Pantoprazole 40mg daily     Anxiety  Assessment & Plan  Home regimen: Sertraline 50mg daily, Ativan 1mg BID prn    Plan:  Continue home regimen        Patient Active Problem List   Diagnosis    Other insomnia    Depression with anxiety    Kidney stone on right side    Ileus (HCC)    Umbilical hernia without obstruction and without gangrene    Gallbladder calculus without cholecystitis    Tobacco use disorder    Closed right ankle fracture    History of ileus    Adenoma of left adrenal gland    Anxiety    GERD (gastroesophageal reflux disease)    MDD (major depressive disorder)    History of MRSA infection of lungs    Moderate episode of recurrent major depressive disorder (HCC)    Asthma    Hypertension    Tachycardia, unspecified    Hyperlipidemia    Elevated alkaline phosphatase level    Elevated hemoglobin (HCC)    Obesity, Class III, BMI 40-49.9 (morbid obesity) (HCC)    Excessive daytime sleepiness    Cardiac arrest ([facility])    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HCC)    Alcohol dependence (HCC)    Anemia    Thrombocytopenia (HCC)    Atrial flutter (HCC)    Hip pain    Financial difficulties    Annual physical exam    Preop examination    Blood in left ear canal    Hyperpigmented skin lesion    Obstructive sleep apnea    Shortness of breath    Left leg pain    Intentional drug overdose, initial encounter ([facility])    Spasm of muscle of lower back    Skin lesion    Other specified hypothyroidism    Prediabetes    Abnormal hematocrit    Wheezing    Hypothyroidism    Pulmonary congestion       Diet:       Diet Orders   (From admission, onward)                 Start     Ordered    06/21/24 1525  Diet Cardiovascular; Cardiac   Diet effective now        References:    Adult Nutrition Support Algorithm    RD Therapeutic Diet Order Protocol   Question Answer Comment   Diet Type Cardiovascular    Cardiac Cardiac    RD to adjust diet per protocol? Yes        24 1524                   VTE PPX: SCDs  Dispo: Patient continues to require inpatient care.     Subjective:  Review of Systems   Constitutional:  Negative for chills and fever.   HENT:  Negative for ear pain and sore throat.    Eyes:  Negative for pain and visual disturbance.   Respiratory:  Negative for cough, chest tightness, shortness of breath and wheezing.    Cardiovascular:  Negative for chest pain, palpitations and leg swelling.   Gastrointestinal:  Negative for abdominal pain and vomiting.   Genitourinary:  Negative for dysuria and hematuria.   Musculoskeletal:  Negative for arthralgias and back pain.   Skin:  Negative for color change and rash.   Neurological:  Negative for seizures and syncope.   All other systems reviewed and are negative.      Objective:   Body mass index is 42.9 kg/m².  Vitals:    24 1750 24 1810 24 1820 24 1856   BP: 106/77 106/75 110/75 113/76   BP Location:    Left arm   Pulse:    102   Resp:    20   Temp:    97.5 °F (36.4 °C)   TempSrc:    Oral   SpO2:    95%   Weight:       Height:         Temp:  [97.5 °F (36.4 °C)-98.2 °F (36.8 °C)] 97.5 °F (36.4 °C)  HR:  [100-157] 102  Resp:  [16-21] 20  BP: ()/() 113/76  SpO2:  [92 %-96 %] 95 %  Temp (48hrs), Av.9 °F (36.6 °C), Min:97.4 °F (36.3 °C), Max:98.2 °F (36.8 °C)  Current: Temperature: 97.5 °F (36.4 °C)    Intake/Output Summary (Last 24 hours) at 2024 1924  Last data filed at 2024 1349  Gross per 24 hour   Intake 860 ml   Output 1100 ml   Net -240 ml     Invasive Devices       Peripheral Intravenous Line  Duration             Peripheral IV 24 Left Antecubital 1 day    Peripheral IV 24 Left;Ventral (anterior) Forearm 1 day    Peripheral IV  "06/21/24 Distal;Right;Ventral (anterior) Forearm <1 day                    Physical Exam  Constitutional:       Appearance: He is obese.   HENT:      Head: Normocephalic and atraumatic.      Right Ear: External ear normal.      Left Ear: External ear normal.      Nose: Nose normal.      Mouth/Throat:      Mouth: Mucous membranes are moist.      Pharynx: Oropharynx is clear.   Eyes:      Conjunctiva/sclera: Conjunctivae normal.   Cardiovascular:      Rate and Rhythm: Normal rate and regular rhythm.   Pulmonary:      Effort: Pulmonary effort is normal.      Breath sounds: No wheezing.   Abdominal:      General: Bowel sounds are normal.      Palpations: Abdomen is soft.      Tenderness: There is no abdominal tenderness.   Skin:     General: Skin is warm and dry.   Neurological:      General: No focal deficit present.      Mental Status: He is alert and oriented to person, place, and time.   Psychiatric:         Mood and Affect: Mood normal.         Behavior: Behavior normal.           Results from last 7 days   Lab Units 06/21/24  0339 06/20/24  1410 06/20/24  1032   WBC Thousand/uL 7.61  --  9.84   HEMOGLOBIN g/dL 14.2  --  14.5   HEMATOCRIT % 44.9  --  45.5   PLATELETS Thousands/uL 199 201 231   SEGS PCT %  --   --  79*   TOTAL NEUT ABS Thousands/µL  --   --  7.88*   LYMPHO PCT %  --   --  12*   MONO PCT %  --   --  7   EOS PCT %  --   --  0     Results from last 7 days   Lab Units 06/21/24  0339 06/20/24  1032   POTASSIUM mmol/L 4.1 3.9   CHLORIDE mmol/L 103 102   CO2 mmol/L 30 29   BUN mg/dL 10 10   CREATININE mg/dL 1.00 0.98   EGFR ml/min/1.73sq m 83 85   CALCIUM mg/dL 8.8 9.2   ALK PHOS U/L  --  117*   ALT U/L  --  23   AST U/L  --  25                              Results from last 7 days   Lab Units 06/21/24  0339   INR  1.18         No results for input(s): \"COLORU\", \"CLARITYU\", \"SPECGRAV\", \"PHUR\", \"LEUKOCYTESUR\", \"NITRITE\", \"PROTEINUA\", \"GLUCOSEU\", \"KETONESU\", \"BILIRUBINUR\", \"BLOODU\" in the last 72 " "hours.    Invalid input(s): \"URIBILINOGEN\"  ABG:  Lab Results   Component Value Date    PHART 7.338 (L) 11/08/2021    EIR2BUB 48.9 (H) 11/08/2021    PO2ART 77.0 11/08/2021    HLJ7FMS 25.7 11/08/2021    BEART -0.6 11/08/2021    SOURCE Line, Arterial 11/08/2021       =================================================    Imagining Results:  XR chest 1 view portable    Result Date: 6/20/2024  Mild pulmonary vascular congestion. Workstation performed: [id number]         This case was discussed with Family Medicine Attending Physician Dr. Rodríguez and [facility] team.      Tha Romo MD  PGY-1 Family Medicine       ** Please Note: Portions of this note have been constructed using voice recognition software.  Occasional wrong word or \"sound a like\" substitutions may have occurred due to the inherent limitations of voice recognition software. Please read the chart carefully and recognize, using context, where substitutions have occurred.**    "

## 2024-06-21 NOTE — ASSESSMENT & PLAN NOTE
CXR with pulmonary congestion     Plan:  Echo ordered  Diuresis as clinically indicated  O2 goal >92%  Continue to monitor respiratory status  CPAP HS

## 2024-06-21 NOTE — ASSESSMENT & PLAN NOTE
Home regimen: Diltiazem 300mg daily, Imdur 60mg daily, Lisinopril 40mg daily     Systolic (12hrs), Av , Min:98 , Max:114   Diastolic (12hrs), Av, Min:59, Max:77    Controlled  Plan:  Home medication regimen resumed on , continue

## 2024-06-21 NOTE — ASSESSMENT & PLAN NOTE
Patient presenting from PCP office on 6/20 with tachycardia to the 160s   - Patient asymptomatic on arrival and throughout admission  - Tachycardia refractory to multiple doses of Adenosine in the ED  - Suspected atrial flutter vs less likely SVT per Electrophysiology  - Patient in atrial flutter on telemetry into the early morning of 6/21  - Was given one dose Eliquis prior to ablation     Patient s/p successful ablation on 6/21 with pre-ablation NIKOLAS. Returned to sinus rhythm with some tachycardia after procedure. Patient will need to continue to be monitored on telemetry  - Patient has hx of untreated PRAKASH. Given risk of arrhythmia in setting of untreated PRAKASH patient will need CPAP prior to discharge.     Improved    Plan  Continue on telemetry  OP candidate for loop recorder due to being asymptomatic   Eliquis 5mg BID started 6/21  Patient not able to afford due to lack of insurance  May transition to Xarelto; pending price check. If cannot afford, may have to transition to Coumadin  Will contact [facility] CM to help with AC medications  Monitor and replete electrolytes as needed, goal Mg>2, K>4  EP following  CM consulted for help with CPAP device

## 2024-06-21 NOTE — ANESTHESIA PREPROCEDURE EVALUATION
Procedure:  Cardiac eps/aflutter ablation (Chest)    Relevant Problems   CARDIO   (+) Atrial flutter (HCC)   (+) Cardiac arrest (HCC)   (+) Hyperlipidemia   (+) Hypertension   (+) 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HCC)      ENDO   (+) Hypothyroidism   (+) Other specified hypothyroidism      GI/HEPATIC   (+) GERD (gastroesophageal reflux disease)   (+) Ileus (HCC)      /RENAL   (+) Kidney stone on right side      HEMATOLOGY   (+) Anemia   (+) Thrombocytopenia (HCC)      MUSCULOSKELETAL   (+) 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HCC)      NEURO/PSYCH   (+) Anxiety   (+) Depression with anxiety   (+) MDD (major depressive disorder)   (+) Moderate episode of recurrent major depressive disorder (HCC)      PULMONARY   (+) Asthma (Poorly controlled, uses albuterol inhl every day, chronic productive cough. PNA 1 month ago)   (+) Obstructive sleep apnea   (+) Shortness of breath      Respiratory/Allergy   (+) Pulmonary congestion      H/o cardiac arrest while undergoing exercise stress test- noted to have ST elevations and then subsequent bradycardic arrest.  LHC noted vasospasm which resolved with nitro.  Thereafter was noted to have no obstructive disease       Recent labs personally reviewed:  Lab Results   Component Value Date    WBC 7.61 06/21/2024    HGB 14.2 06/21/2024     06/21/2024     Lab Results   Component Value Date     08/06/2015    K 4.1 06/21/2024    BUN 10 06/21/2024    CREATININE 1.00 06/21/2024    GLUCOSE 87 02/10/2016     Lab Results   Component Value Date    PTT 37 11/07/2021      Lab Results   Component Value Date    INR 1.18 06/21/2024       Lab Results   Component Value Date    HGBA1C 5.6 04/01/2024       Type and Screen:  A    History    SOB, tachycardia, cardiac arrest, atrial flutter, tobacco use, PRAKASH, anemia, obesity, HTN, dyslipidemia     Interpretation Summary         Left Ventricle: Left ventricular cavity size is normal. Wall thickness is not well visualized. The left ventricular ejection  fraction is 70%. Systolic function is hyperdynamic. Wall motion is normal. Diastolic function is mildly abnormal, consistent with grade I (abnormal) relaxation.    Right Ventricle: Right ventricular cavity size is normal. Systolic function is normal.     Technically difficult study.     Physical Exam    Airway    Mallampati score: III  TM Distance: >3 FB  Neck ROM: full     Dental        Cardiovascular  Rhythm: irregular, Rate: abnormal    Pulmonary   Decreased breath sounds    Other Findings        Anesthesia Plan  ASA Score- 3     Anesthesia Type- general with ASA Monitors.         Additional Monitors:     Airway Plan: ETT.           Plan Factors-Exercise tolerance (METS): <4 METS.    Chart reviewed. EKG reviewed. Imaging results reviewed. Existing labs reviewed. Patient summary reviewed.    Patient is not a current smoker.              Induction- intravenous.    Postoperative Plan- Plan for postoperative opioid use. Planned trial extubation    Perioperative Resuscitation Plan - Level 1 - Full Code.       Informed Consent- Anesthetic plan and risks discussed with patient.  I personally reviewed this patient with the CRNA. Discussed and agreed on the Anesthesia Plan with the CRNA..

## 2024-06-21 NOTE — ANESTHESIA PROCEDURE NOTES
"Arterial Line Insertion    Performed by: Len Medrano CRNA  Authorized by: Jewels Albrecht MD  Consent: Verbal consent obtained. Written consent obtained.  Risks and benefits: risks, benefits and alternatives were discussed  Consent given by: patient  Patient understanding: patient states understanding of the procedure being performed  Patient consent: the patient's understanding of the procedure matches consent given  Procedure consent: procedure consent matches procedure scheduled  Relevant documents: relevant documents present and verified  Test results: test results available and properly labeled  Site marked: the operative site was marked  Radiology Images: Radiology Images displayed and confirmed. If images not available, report reviewed  Required items: required blood products, implants, devices, and special equipment available  Patient identity confirmed: arm band, provided demographic data, hospital-assigned identification number and anonymous protocol, patient vented/unresponsive  Time out: Immediately prior to procedure a \"time out\" was called to verify the correct patient, procedure, equipment, support staff and site/side marked as required.  Preparation: Patient was prepped and draped in the usual sterile fashion.  Indications: multiple ABGs and hemodynamic monitoring  Orientation:  Right  Location: radial artery  Sedation:  Patient sedated: no    Procedure Details:  Wisam's test normal: yes  Needle gauge: 20  Seldinger technique: Seldinger technique used  Number of attempts: 1    Post-procedure:  Post-procedure: dressing applied and line sutured  Waveform: good waveform and waveform confirmed  Post-procedure CNS: normal          "

## 2024-06-21 NOTE — ASSESSMENT & PLAN NOTE
BMI 42.9, patient has hx of PRAKASH    Plan:  Encourage healthy diet and lifestyle  CM assisting with CPAP for discharge

## 2024-06-21 NOTE — ANESTHESIA POSTPROCEDURE EVALUATION
Post-Op Assessment Note    CV Status:  Stable  Pain Score: 0    Pain management: adequate    Multimodal analgesia used between 6 hours prior to anesthesia start to PACU discharge    Mental Status:  Alert and awake   Hydration Status:  Euvolemic and stable   PONV Controlled:  Controlled   Airway Patency:  Patent  Two or more mitigation strategies used for obstructive sleep apnea   Post Op Vitals Reviewed: Yes    No anethesia notable event occurred.    Staff: CRNA               BP   96/56   Temp   97   Pulse  76   Resp   12   SpO2   95

## 2024-06-21 NOTE — PLAN OF CARE
Problem: PAIN - ADULT  Goal: Verbalizes/displays adequate comfort level or baseline comfort level  Description: Interventions:  - Encourage patient to monitor pain and request assistance  - Assess pain using appropriate pain scale  - Administer analgesics based on type and severity of pain and evaluate response  - Implement non-pharmacological measures as appropriate and evaluate response  - Consider cultural and social influences on pain and pain management  - Notify physician/advanced practitioner if interventions unsuccessful or patient reports new pain  Outcome: Progressing     Problem: INFECTION - ADULT  Goal: Absence or prevention of progression during hospitalization  Description: INTERVENTIONS:  - Assess and monitor for signs and symptoms of infection  - Monitor lab/diagnostic results  - Monitor all insertion sites, i.e. indwelling lines, tubes, and drains  - Monitor endotracheal if appropriate and nasal secretions for changes in amount and color  - [place] appropriate cooling/warming therapies per order  - Administer medications as ordered  - Instruct and encourage patient and family to use good hand hygiene technique  - Identify and instruct in appropriate isolation precautions for identified infection/condition  Outcome: Progressing  Goal: Absence of fever/infection during neutropenic period  Description: INTERVENTIONS:  - Monitor WBC    Outcome: Progressing     Problem: SAFETY ADULT  Goal: Patient will remain free of falls  Description: INTERVENTIONS:  - Educate patient/family on patient safety including physical limitations  - Instruct patient to call for assistance with activity   - Consult OT/PT to assist with strengthening/mobility   - Keep Call bell within reach  - Keep bed low and locked with side rails adjusted as appropriate  - Keep care items and personal belongings within reach  - Initiate and maintain comfort rounds  - Make Fall Risk Sign visible to staff  - Offer Toileting every  Hours,  in advance of need  - Initiate/Maintain alarm  - Obtain necessary fall risk management equipment:  - Apply yellow socks and bracelet for high fall risk patients  - Consider moving patient to room near nurses station  Outcome: Progressing  Goal: Maintain or return to baseline ADL function  Description: INTERVENTIONS:  -  Assess patient's ability to carry out ADLs; assess patient's baseline for ADL function and identify physical deficits which impact ability to perform ADLs (bathing, care of mouth/teeth, toileting, grooming, dressing, etc.)  - Assess/evaluate cause of self-care deficits   - Assess range of motion  - Assess patient's mobility; develop plan if impaired  - Assess patient's need for assistive devices and provide as appropriate  - Encourage maximum independence but intervene and supervise when necessary  - Involve family in performance of ADLs  - Assess for home care needs following discharge   - Consider OT consult to assist with ADL evaluation and planning for discharge  - Provide patient education as appropriate  Outcome: Progressing  Goal: Maintains/Returns to pre admission functional level  Description: INTERVENTIONS:  - Perform AM-PAC 6 Click Basic Mobility/ Daily Activity assessment daily.  - Set and communicate daily mobility goal to care team and patient/family/caregiver.   - Collaborate with rehabilitation services on mobility goals if consulted  - Perform Range of Motion  times a day.  - Reposition patient every  hours.  - Dangle patient times a day  - Stand patient  times a day  - Ambulate patient  times a day  - Out of bed to chair  times a day   - Out of bed for meals times a day  - Out of bed for toileting  - Record patient progress and toleration of activity level   Outcome: Progressing     Problem: DISCHARGE PLANNING  Goal: Discharge to home or other facility with appropriate resources  Description: INTERVENTIONS:  - Identify barriers to discharge w/patient and caregiver  - Arrange for  needed discharge resources and transportation as appropriate  - Identify discharge learning needs (meds, wound care, etc.)  - Arrange for interpretive services to assist at discharge as needed  - Refer to Case Management Department for coordinating discharge planning if the patient needs post-hospital services based on physician/advanced practitioner order or complex needs related to functional status, cognitive ability, or social support system  Outcome: Progressing     Problem: Knowledge Deficit  Goal: Patient/family/caregiver demonstrates understanding of disease process, treatment plan, medications, and discharge instructions  Description: Complete learning assessment and assess knowledge base.  Interventions:  - Provide teaching at level of understanding  - Provide teaching via preferred learning methods  Outcome: Progressing     Problem: CARDIOVASCULAR - ADULT  Goal: Maintains optimal cardiac output and hemodynamic stability  Description: INTERVENTIONS:  - Monitor I/O, vital signs and rhythm  - Monitor for S/S and trends of decreased cardiac output  - Administer and titrate ordered vasoactive medications to optimize hemodynamic stability  - Assess quality of pulses, skin color and temperature  - Assess for signs of decreased coronary artery perfusion  - Instruct patient to report change in severity of symptoms  Outcome: Progressing  Goal: Absence of cardiac dysrhythmias or at baseline rhythm  Description: INTERVENTIONS:  - Continuous cardiac monitoring, vital signs, obtain 12 lead EKG if ordered  - Administer antiarrhythmic and heart rate control medications as ordered  - Monitor electrolytes and administer replacement therapy as ordered  Outcome: Progressing

## 2024-06-21 NOTE — UTILIZATION REVIEW
Initial Clinical Review    Admission: Date/Time/Statement:   Admission Orders (From admission, onward)       Ordered        06/20/24 1218  INPATIENT ADMISSION  Once                          Orders Placed This Encounter   Procedures    INPATIENT ADMISSION     Standing Status:   Standing     Number of Occurrences:   1     Order Specific Question:   Level of Care     Answer:   Level 1 Stepdown [13]     Order Specific Question:   Estimated length of stay     Answer:   More than 2 Midnights     Order Specific Question:   Certification     Answer:   I certify that inpatient services are medically necessary for this patient for a duration of greater than two midnights. See H&P and MD Progress Notes for additional information about the patient's course of treatment.     ED Arrival Information       Expected   6/20/2024     Arrival   6/20/2024 09:55    Acuity   Emergent              Means of arrival   Walk-In    Escorted by   Self    Service   Anesthesiology    Admission type   Emergency              Arrival complaint   Atrial Flutter             Chief Complaint   Patient presents with    Atrial Fibrillation     Pt presents to ER following PCP visit. Patient in rapid a fib, pt denies being symptomatic. Patient states hard to tell if SOB since usually SOB. Feels a little cloudy but gets that way when does not take ativan        Initial Presentation: 58 y.o. male with PMHx HTN, HLD, h/o cardiac arrest 2/2 RCA vasospasm and prinzmetal angina following stress echo 11/2021,  obesity, hypothyroidism, depression presented to the ED from PCP office w/ tachycardia in the 160s, concern for atrial flutter on EKG.  In the ED, tachycardiac w/ HR- 160- appears SVT vs atrial flutter. given Adenosine 6mg x1, 12mg x2 and 18mg x1 without improvement.     Admit as Inpatient for evaluation and treatment of tachycardia.  Plan: Continue on telemetry. Monitor and replete electrolytes as needed, goal Mg>2, K>4. Check thyroid studies. EP consulted-  plan for study and ablation tomorrow. NPO at [facility].     EP Consult: SVT- suspected atrial flutter    Plan to keep in the rhythm for now given he is asymptomatic which will increase yield of EP study if he is still in the arrhythmia.. NPO midnight. EP study/ablation tomorrow (NIKOLAS prior). apixaban 5mg x1 now. Hold home diltiazem 300 mg daily for EP study tomorrow.     ED Triage Vitals   Temperature Pulse Respirations Blood Pressure SpO2 Pain Score   06/20/24 1005 06/20/24 1005 06/20/24 1005 06/20/24 1016 06/20/24 1005 06/20/24 1400   98.2 °F (36.8 °C) (!) 160 (!) 25 113/78 97 % No Pain     Weight (last 2 days)       Date/Time Weight    06/21/24 0333 136 (299.83)            Vital Signs (last 3 days)       Date/Time Temp Pulse Resp BP MAP (mmHg) SpO2 Calculated FIO2 (%) - Nasal Cannula Nasal Cannula O2 Flow Rate (L/min) O2 Device O2 Interface Device Patient Position - Orthostatic VS Pain    06/21/24 0958 -- -- -- -- -- -- -- -- -- -- -- No Pain    06/21/24 0900 -- -- -- -- -- 94 % -- -- None (Room air) -- -- No Pain    06/21/24 07:18:39 98.1 °F (36.7 °C) 157 16 131/89 103 94 % -- -- -- -- Lying --    06/21/24 0424 -- -- -- -- -- 95 % -- -- -- Full face mask -- --    06/21/24 0400 -- -- -- -- -- -- -- -- CPAP -- -- No Pain    06/21/24 02:53:41 -- -- 20 126/89 101 94 % -- -- -- -- -- --    06/21/24 0229 -- 129 20 172/106 130 96 % -- -- CPAP -- Lying --    06/21/24 0051 -- -- -- -- -- 95 % -- -- -- Full face mask -- --    06/21/24 0000 -- -- -- -- -- -- 28 2 L/min Nasal cannula -- -- --    06/20/24 2207 -- 150 19 128/55 75 93 % -- -- None (Room air) -- Sitting --    06/20/24 22:05:26 98.2 °F (36.8 °C) -- -- -- -- -- -- -- -- -- -- --    06/20/24 1925 -- -- -- -- -- -- -- -- None (Room air) -- -- No Pain    06/20/24 18:42:06 97.4 °F (36.3 °C) 151 26 133/89 107 93 % 28 2 L/min Nasal cannula -- Lying No Pain    06/20/24 1400 -- -- -- -- -- -- -- -- -- -- -- No Pain    06/20/24 1358 98 °F (36.7 °C) 150 24 109/79 87 -- -- -- -- --  Lying --    06/20/24 1230 -- 150 40 133/74 98 95 % -- -- -- -- -- --    06/20/24 1042 -- 154 28 168/106 127 96 % 28 2 L/min Nasal cannula -- Sitting --    06/20/24 1030 -- 154 28 117/89 100 96 % 28 2 L/min Nasal cannula -- Sitting --    06/20/24 1016 -- 156 26 113/78 92 94 % -- -- None (Room air) -- Sitting --    06/20/24 1005 98.2 °F (36.8 °C) 160 25 -- -- 97 % -- -- -- -- -- --              Pertinent Labs/Diagnostic Test Results:   Radiology:  XR chest 1 view portable   Final Interpretation by Cristela Valentin MD (06/20 1455)      Mild pulmonary vascular congestion.            Workstation performed: [id number]           Cardiology:  ECG 12 lead   Final Result by Jennifer Lyons MD (06/20 1450)   Atrial flutter with 2:1 A-V conduction   Low voltage QRS   Abnormal ECG   When compared with ECG of 20-JUN-2024 10:14, (unconfirmed)   No significant change was found   Confirmed by Jennifer Lyons ([id number]) on 6/20/2024 2:50:32 PM      ECG 12 lead   Final Result by Jennifer Lyons MD (06/20 1450)   Age and gender specific ECG analysis    Atrial flutter with rapid ventricular response   Abnormal ECG   When compared with ECG of 20-JUN-2024 09:59, (unconfirmed)   No significant change was found   Confirmed by Jennifer Lyons ([id number]) on 6/20/2024 2:50:11 PM      ECG 12 lead   Final Result by Percy Schultz DO (06/21 0743)   Atrial flutter with 2 to 1 block   Rightward axis   Nonspecific ST abnormality   Abnormal ECG   When compared with ECG of 18-AUG-2022 16:44,   Significant changes have occurred   Confirmed by Percy Schultz ([id number]) on 6/21/2024 7:43:49 AM        GI:  No orders to display           Results from last 7 days   Lab Units 06/21/24  0339 06/20/24  1410 06/20/24  1032   WBC Thousand/uL 7.61  --  9.84   HEMOGLOBIN g/dL 14.2  --  14.5   HEMATOCRIT % 44.9  --  45.5   PLATELETS Thousands/uL 199 201 231   TOTAL NEUT ABS Thousands/µL  --   --  7.88*         Results from last 7 days   Lab Units 06/21/24  5796  "06/20/24  1032   SODIUM mmol/L 140 137   POTASSIUM mmol/L 4.1 3.9   CHLORIDE mmol/L 103 102   CO2 mmol/L 30 29   ANION GAP mmol/L 7 6   BUN mg/dL 10 10   CREATININE mg/dL 1.00 0.98   EGFR ml/min/1.73sq m 83 85   CALCIUM mg/dL 8.8 9.2     Results from last 7 days   Lab Units 06/20/24  1032   AST U/L 25   ALT U/L 23   ALK PHOS U/L 117*   TOTAL PROTEIN g/dL 6.4   ALBUMIN g/dL 3.7   TOTAL BILIRUBIN mg/dL 0.84         Results from last 7 days   Lab Units 06/21/24  0339 06/20/24  1032   GLUCOSE RANDOM mg/dL 92 96             No results found for: \"BETA-HYDROXYBUTYRATE\"                   Results from last 7 days   Lab Units 06/20/24  1702 06/20/24  1410 06/20/24  1032   HS TNI 0HR ng/L  --   --  25   HS TNI 2HR ng/L  --  25  --    HSTNI D2 ng/L  --  0  --    HS TNI 4HR ng/L 21  --   --    HSTNI D4 ng/L -4  --   --          Results from last 7 days   Lab Units 06/21/24  0339   PROTIME seconds 14.9*   INR  1.18     Results from last 7 days   Lab Units 06/20/24  1032   TSH 3RD GENERATON uIU/mL 8.805*           ED Treatment-Medication Administration from 06/20/2024 0935 to 06/20/2024 1336         Date/Time Order Dose Route Action     06/20/2024 1014 adenosine (ADENOCARD) injection 6 mg 6 mg Intravenous Given     06/20/2024 1019 adenosine (ADENOCARD) injection 12 mg 12 mg Intravenous Given     06/20/2024 1024 adenosine (ADENOCARD) injection 12 mg 12 mg Intravenous Given     06/20/2024 1041 adenosine (ADENOCARD) injection 18 mg 18 mg Intravenous Given     06/20/2024 1302 chlorhexidine (PERIDEX) 0.12 % oral rinse 15 mL 15 mL Mouth/Throat Given            Past Medical History:   Diagnosis Date    Anxiety     Asthma     Colon polyp     GERD (gastroesophageal reflux disease)     History of ileus     Hypertension     Insomnia     Kidney stone     Lumbar herniated disc     last assessed: 3/27/2015    MDD (major depressive disorder)     Patella fracture     last assessed: 3/27/2015    Tobacco abuse      Present on Admission:   Cardiac " arrest (HCC)   Hypertension   Obesity, Class III, BMI 40-49.9 (morbid obesity) (HCC)   MDD (major depressive disorder)   Hyperlipidemia   Anxiety   Tachycardia, unspecified   GERD (gastroesophageal reflux disease)      Admitting Diagnosis: Tachycardia [R00.0]  Age/Sex: 58 y.o. male  Admission Orders:  SCd  PT/OT  Cardio-Pulmonary Monitoring, Neuro Checks, Nursing dysphagia screen, I/O, Daily weights, Vital signs      Scheduled Medications:  [Transfer Hold] apixaban, 5 mg, Oral, BID  atorvastatin, 40 mg, Oral, Daily  chlorhexidine, 15 mL, Mouth/Throat, Q12H ASIA  isosorbide mononitrate, 60 mg, Oral, Daily  levothyroxine, 25 mcg, Oral, Early Morning  lisinopril, 40 mg, Oral, Daily  pantoprazole, 40 mg, Oral, Early Morning  QUEtiapine, 300 mg, Oral, HS  sertraline, 50 mg, Oral, Daily      Continuous IV Infusions: none     PRN Meds:  LORazepam, 1 mg, Oral, BID PRN 06/20 x 1, 06/21 x 1        IP CONSULT TO ELECTROPHYSIOLOGY  IP CONSULT TO CASE MANAGEMENT    Network Utilization Review Department  ATTENTION: Please call with any questions or concerns to [phone] and carefully listen to the prompts so that you are directed to the right person. All voicemails are confidential.   For Discharge needs, contact Care Management DC Support Team at [phone] opt. [phone]  Send all requests for admission clinical reviews, approved or denied determinations and any other requests to dedicated fax number below belonging to the campus where the patient is receiving treatment. List of dedicated fax numbers for the Facilities:  FACILITY NAME UR FAX NUMBER   ADMISSION DENIALS (Administrative/Medical Necessity) [phone]   DISCHARGE SUPPORT TEAM (NETWORK) [phone]   PARENT CHILD HEALTH (Maternity/NICU/Pediatrics) [phone]   [facility] [phone]   [facility] [phone]   Critical access hospital [phone]   [facility] [phone]    [facility] [phone]   [facility] [phone]   General acute hospital [phone]   [facility] [phone]   [facility] [phone]   Pending sale to [facility] [phone]   [facility] [phone]   [facility] [phone]

## 2024-06-21 NOTE — ASSESSMENT & PLAN NOTE
Home regimen: Seroquel 350mg at bedtime, Sertraline 50mg daily, Ativan 1mg BID prn    Plan: Continue home regimen

## 2024-06-21 NOTE — ASSESSMENT & PLAN NOTE
Cardiac Arrest secondary to RCA Vasospasm and Prinzmetal Angina following stress echo 11/2021     Plan:  Continue on telemetry

## 2024-06-21 NOTE — OCCUPATIONAL THERAPY NOTE
Occupational Therapy Evaluation     Patient Name: Torey Del Castillo Jr.  Today's Date: 6/21/2024  Problem List  Active Problems:    Anxiety    GERD (gastroesophageal reflux disease)    MDD (major depressive disorder)    Hypertension    Tachycardia, unspecified    Hyperlipidemia    Obesity, Class III, BMI 40-49.9 (morbid obesity) (HCC)    Cardiac arrest (HCC)    Hypothyroidism    Pulmonary congestion    Past Medical History  Past Medical History:   Diagnosis Date    Anxiety     Asthma     Colon polyp     GERD (gastroesophageal reflux disease)     History of ileus     Hypertension     Insomnia     Kidney stone     Lumbar herniated disc     last assessed: 3/27/2015    MDD (major depressive disorder)     Patella fracture     last assessed: 3/27/2015    Tobacco abuse      Past Surgical History  Past Surgical History:   Procedure Laterality Date    BACK SURGERY  2015    Lumbar    CARDIAC CATHETERIZATION N/A 11/5/2021    Procedure: CARDIAC CATHETERIZATION;  Surgeon: Herbie Castaneda MD;  Location: [facility];  Service: Cardiology    COLONOSCOPY      KNEE SURGERY      right    LUMBAR FUSION      last assessed: 3/27/2015    UT RPR UMBILICAL HRNA 5 YRS/> REDUCIBLE N/A 9/14/2022    Procedure: REPAIR HERNIA UMBILICAL w/ mesh;  Surgeon: Edna Siddiqui DO;  Location: [facility] MAIN OR;  Service: General    UT TX TIBL SHFT FX IMED IMPLT W/WO SCREWS&/CERCLA Left 3/16/2017    Procedure: INSERTION NAIL IM TIBIA;  Surgeon: Edna Abrams DO;  Location: [facility];  Service: Orthopedics         06/21/24 0959   OT Last Visit   OT Visit Date 06/21/24   Note Type   Note type Evaluation   Pain Assessment   Pain Assessment Tool 0-10   Pain Score No Pain   Restrictions/Precautions   Weight Bearing Precautions Per Order No   Other Precautions Multiple lines;Telemetry   Home Living   Type of Home House   Home Layout Two level;Bed/bath upstairs   Bathroom Shower/Tub Tub/shower unit   Bathroom Toilet Standard   Home Equipment Cane   Prior  "Function   Level of [place] Independent with ADLs   Lives With Alone   Receives Help From Friend(s);Family   IADLs Independent with driving   Falls in the last 6 months 1 to 4  (1)   Vocational Unemployed   Lifestyle   Autonomy pta pt reports I in ADLs/IADLs/functional mobility   Reciprocal Relationships local family   Service to Others unemployed   Intrinsic Gratification spending time w/ family   Subjective   Subjective \"I'm doing fine\"   ADL   Where Assessed Chair   Eating Assistance 7  Independent   Grooming Assistance 7  Independent   UB Bathing Assistance 5  Supervision/Setup   LB Bathing Assistance 5  Supervision/Setup   UB Dressing Assistance 5  Supervision/Setup   LB Dressing Assistance 5  Supervision/Setup   Toileting Assistance  5  Supervision/Setup   Transfers   Sit to Stand 5  Supervision   Stand to Sit 5  Supervision   Functional Mobility   Functional Mobility 5  Supervision   Balance   Static Sitting Normal   Dynamic Sitting Good   Static Standing Fair +   Dynamic Standing Fair +   Ambulatory Fair +   Activity Tolerance   Activity Tolerance Treatment limited secondary to medical complications (Comment)  (tachycardia)   Medical Staff Made Aware PT Jimena 2* medical complexity/comorbidities   Nurse Made Aware okay to see per RN   RUE Assessment   RUE Assessment WFL   LUE Assessment   LUE Assessment WFL   Hand Function   Gross Motor Coordination Functional   Fine Motor Coordination Functional   Psychosocial   Psychosocial (WDL) WDL   Cognition   Overall Cognitive Status WFL   Arousal/Participation Cooperative   Attention Within functional limits   Orientation Level Oriented X4   Memory Within functional limits   Following Commands Follows all commands and directions without difficulty   Comments pt pleasant and cooperative   Assessment   Assessment Pt is a 58 y.o. YO  male admitted to [facility] on 6/20/2024 w/ tachycardia. Pt  has a past medical history of Anxiety, Asthma, Colon polyp, GERD " (gastroesophageal reflux disease), History of ileus, Hypertension, Insomnia, Kidney stone, Lumbar herniated disc, MDD (major depressive disorder), Patella fracture, and Tobacco abuse. Pt with active OT orders. Pt resides in a house. Pt was I w/  ADLS and IADLS, (+) drove, & required no use of DME PTA. Currently pt is supervision for ADLs/functional mobility. Pt is limited at this time 2*: functional mobility, decreased I w/ ADLS/IADLS, and decreased safety awareness.The following Occupational Performance Areas to address include: functional mobility, household maintenance, and job performance/volunteering. Based on the aforementioned OT evaluation, functional performance deficits, and assessments, pt has been identified as a moderate complexity evaluation. From OT standpoint, anticipate d/c  no post acute needs .   The patient's raw score on the AM-PAC Daily Activity Inpatient Short Form is 24. A raw score of greater than or equal to 19 suggests the patient may benefit from discharge to home. Please refer to the recommendation of the Occupational Therapist for safe discharge planning.   Goals   Patient Goals go home   Discharge Recommendation   Rehab Resource Intensity Level, OT No post-acute rehabilitation needs   AM-PAC Daily Activity Inpatient   Lower Body Dressing 4   Bathing 4   Toileting 4   Upper Body Dressing 4   Grooming 4   Eating 4   Daily Activity Raw Score 24   Daily Activity Standardized Score (Calc for Raw Score >=11) 57.54   AM-PAC Applied Cognition Inpatient   Following a Speech/Presentation 4   Understanding Ordinary Conversation 4   Taking Medications 4   Remembering Where Things Are Placed or Put Away 4   Remembering List of 4-5 Errands 4   Taking Care of Complicated Tasks 4   Applied Cognition Raw Score 24   Applied Cognition Standardized Score 62.21       Kyra Taylor MS, OTR/L

## 2024-06-21 NOTE — PROGRESS NOTES
[facility]  Progress Note  Name: Torey Carvalho I  MRN: [id number]  [facility] Date of Admission: 6/20/2024   Date of Service: 6/21/2024 I Hospital Day: 1    Assessment & Plan   Tachycardia, unspecified  Assessment & Plan  Patient presenting from PCP office with tachycardia to the 160s refractory to multiple doses of Adenosine in the ED  Suspect atrial flutter vs less likely SVT  Patient remains asymptomatic    Plan  Continue on telemetry   Monitor and replete electrolytes as needed, goal Mg>2, K>4  EP following, plan for study and ablation 06/21       Cardiac arrest ([facility])  Assessment & Plan  History of cardiac arrest secondary to RCA vasospasm following stress echo 11/2021    Plan:  Continue on telemetry     Pulmonary congestion  Assessment & Plan  CXR with pulmonary congestion     Plan:  Echo ordered  Diuresis as clinically indicated  O2 goal >92%  Continue to monitor respiratory status  CPAP HS    Hypothyroidism  Assessment & Plan  Plan:  Continue home Levothyroxine 25mcg daily     Obesity, Class III, BMI 40-49.9 (morbid obesity) ([facility])  Assessment & Plan  Plan:  Encourage healthy diet and lifestyle    Hyperlipidemia  Assessment & Plan  Plan:  Continue home Atorvastatin 40mg daily    Hypertension  Assessment & Plan  Plan:  Home medication regimen ordered to resume 06/21 with holding parameters  Diltiazem 300mg daily  Imdur 60mg daily  Lisinopril 40mg daily    MDD (major depressive disorder)  Assessment & Plan  Plan:  Continue home medication regimen  Seroquel 300mg at bedtime  Sertraline 50mg daily     GERD (gastroesophageal reflux disease)  Assessment & Plan  Plan:  Home omeprazole not on formulary, ordered as Pantoprazole 40mg daily    Anxiety  Assessment & Plan  Plan:  Continue home regimen:   Sertraline 50mg daily  Ativan 1mg BID PRN  [place]              Disposition: Stepdown Level 1    ICU Core Measures     A: Assess, Prevent, and Manage  Pain Has pain been assessed? Yes  Need for changes to pain regimen? No   B: Both SAT/SAT  N/A   C: Choice of Sedation RASS Goal: N/A patient not on sedation  Need for changes to sedation or analgesia regimen? No   D: Delirium CAM-ICU: Negative   E: Early Mobility  Plan for early mobility? Yes   F: Family Engagement Plan for family engagement today? Yes         Prophylaxis:  VTE VTE covered by:    None       Stress Ulcer  covered byomeprazole (PriLOSEC) 40 MG capsule [[id number]] (Long-Term Med), pantoprazole (PROTONIX) EC tablet 40 mg [[id number]], pantoprazole (PROTONIX) injection 40 mg [[id number]]         Significant 24hr Events     24hr events: NAEO     Subjective   Review of Systems: See HPI for Review of Systems     Objective                            Vitals I/O      Most Recent Min/Max in 24hrs   Temp 98.2 °F (36.8 °C) Temp  Min: 97.4 °F (36.3 °C)  Max: 98.2 °F (36.8 °C)   Pulse (!) 150 Pulse  Min: 150  Max: 160   Resp 19 Resp  Min: 19  Max: 40   /55 BP  Min: 109/79  Max: 168/106   O2 Sat 93 % SpO2  Min: 93 %  Max: 97 %      Intake/Output Summary (Last 24 hours) at 6/21/2024 0024  Last data filed at 6/21/2024 0001  Gross per 24 hour   Intake 580 ml   Output 300 ml   Net 280 ml       Diet NPO; Sips with meds    Invasive Monitoring           Physical Exam   Physical Exam  Vitals reviewed.   Eyes:      Pupils: Pupils are equal, round, and reactive to light.   Skin:     General: Skin is warm and dry.      Capillary Refill: Capillary refill takes less than 2 seconds.   HENT:      Head: Normocephalic.   Neck:      Vascular: No JVD.   Cardiovascular:      Rate and Rhythm: Normal rate and regular rhythm.      Pulses: Normal pulses.      Heart sounds: Normal heart sounds.   Musculoskeletal:      Right lower leg: No edema.      Left lower leg: No edema.   Abdominal: General: There is no distension.      Palpations: Abdomen is soft.      Tenderness: There is no abdominal tenderness.   Constitutional:       General:  He is not in acute distress.     Appearance: He is not ill-appearing or toxic-appearing.   Pulmonary:      Effort: Pulmonary effort is normal. No tachypnea, accessory muscle usage, respiratory distress or accessory muscle usage.      Breath sounds: Normal breath sounds and air entry.   Neurological:      General: No focal deficit present.      Mental Status: He is alert and oriented to person, place, and time.            Diagnostic Studies      EKG: reviewed  Imaging:  I have personally reviewed pertinent reports.   and I have personally reviewed pertinent films in PACS     Medications:  Scheduled PRN   atorvastatin, 40 mg, Daily  chlorhexidine, 15 mL, Q12H ASIA  isosorbide mononitrate, 60 mg, Daily  levothyroxine, 25 mcg, Early Morning  lisinopril, 40 mg, Daily  pantoprazole, 40 mg, Early Morning  pantoprazole, 40 mg, Once  QUEtiapine, 300 mg, HS  sertraline, 50 mg, Daily      LORazepam, 1 mg, BID PRN       Continuous          Labs:    CBC    Recent Labs     06/20/24  1032 06/20/24  1410   WBC 9.84  --    HGB 14.5  --    HCT 45.5  --     201     BMP    Recent Labs     06/20/24  1032   SODIUM 137   K 3.9      CO2 29   AGAP 6   BUN 10   CREATININE 0.98   CALCIUM 9.2       Coags    No recent results     Additional Electrolytes  No recent results       Blood Gas    No recent results  No recent results LFTs  Recent Labs     06/20/24  1032   ALT 23   AST 25   ALKPHOS 117*   ALB 3.7   TBILI 0.84       Infectious  No recent results  Glucose  Recent Labs     06/20/24  1032   GLUC 96               Wilmer Duarte PA-C

## 2024-06-22 LAB
ANION GAP SERPL CALCULATED.3IONS-SCNC: 6 MMOL/L (ref 4–13)
ATRIAL RATE: 103 BPM
ATRIAL RATE: 105 BPM
BUN SERPL-MCNC: 12 MG/DL (ref 5–25)
CALCIUM SERPL-MCNC: 8.3 MG/DL (ref 8.4–10.2)
CHLORIDE SERPL-SCNC: 104 MMOL/L (ref 96–108)
CO2 SERPL-SCNC: 28 MMOL/L (ref 21–32)
CREAT SERPL-MCNC: 0.75 MG/DL (ref 0.6–1.3)
ERYTHROCYTE [DISTWIDTH] IN BLOOD BY AUTOMATED COUNT: 14.8 % (ref 11.6–15.1)
GFR SERPL CREATININE-BSD FRML MDRD: 101 ML/MIN/1.73SQ M
GLUCOSE SERPL-MCNC: 114 MG/DL (ref 65–140)
HCT VFR BLD AUTO: 41.6 % (ref 36.5–49.3)
HGB BLD-MCNC: 12.8 G/DL (ref 12–17)
INR PPP: 1.17 (ref 0.84–1.19)
MAGNESIUM SERPL-MCNC: 2.1 MG/DL (ref 1.9–2.7)
MCH RBC QN AUTO: 35.1 PG (ref 26.8–34.3)
MCHC RBC AUTO-ENTMCNC: 30.8 G/DL (ref 31.4–37.4)
MCV RBC AUTO: 114 FL (ref 82–98)
P AXIS: 22 DEGREES
P AXIS: 27 DEGREES
PLATELET # BLD AUTO: 177 THOUSANDS/UL (ref 149–390)
PMV BLD AUTO: 11 FL (ref 8.9–12.7)
POTASSIUM SERPL-SCNC: 3.8 MMOL/L (ref 3.5–5.3)
PR INTERVAL: 122 MS
PR INTERVAL: 154 MS
PROTHROMBIN TIME: 14.8 SECONDS (ref 11.6–14.5)
QRS AXIS: 48 DEGREES
QRS AXIS: 48 DEGREES
QRSD INTERVAL: 94 MS
QRSD INTERVAL: 98 MS
QT INTERVAL: 370 MS
QT INTERVAL: 376 MS
QTC INTERVAL: 484 MS
QTC INTERVAL: 494 MS
RBC # BLD AUTO: 3.65 MILLION/UL (ref 3.88–5.62)
SODIUM SERPL-SCNC: 138 MMOL/L (ref 135–147)
T WAVE AXIS: 73 DEGREES
T WAVE AXIS: 80 DEGREES
VENTRICULAR RATE: 103 BPM
VENTRICULAR RATE: 104 BPM
WBC # BLD AUTO: 7.07 THOUSAND/UL (ref 4.31–10.16)

## 2024-06-22 PROCEDURE — 85027 COMPLETE CBC AUTOMATED: CPT | Performed by: NURSE PRACTITIONER

## 2024-06-22 PROCEDURE — 80048 BASIC METABOLIC PNL TOTAL CA: CPT | Performed by: NURSE PRACTITIONER

## 2024-06-22 PROCEDURE — 99238 HOSP IP/OBS DSCHRG MGMT 30/<: CPT | Performed by: FAMILY MEDICINE

## 2024-06-22 PROCEDURE — 94660 CPAP INITIATION&MGMT: CPT

## 2024-06-22 PROCEDURE — 83735 ASSAY OF MAGNESIUM: CPT | Performed by: NURSE PRACTITIONER

## 2024-06-22 PROCEDURE — NC001 PR NO CHARGE: Performed by: FAMILY MEDICINE

## 2024-06-22 PROCEDURE — 85610 PROTHROMBIN TIME: CPT | Performed by: NURSE PRACTITIONER

## 2024-06-22 PROCEDURE — 99232 SBSQ HOSP IP/OBS MODERATE 35: CPT | Performed by: PHYSICIAN ASSISTANT

## 2024-06-22 PROCEDURE — 93010 ELECTROCARDIOGRAM REPORT: CPT | Performed by: INTERNAL MEDICINE

## 2024-06-22 PROCEDURE — 94760 N-INVAS EAR/PLS OXIMETRY 1: CPT

## 2024-06-22 RX ORDER — CYANOCOBALAMIN 1000 UG/ML
1000 INJECTION, SOLUTION INTRAMUSCULAR; SUBCUTANEOUS
Status: DISCONTINUED | OUTPATIENT
Start: 2024-06-22 | End: 2024-06-22 | Stop reason: HOSPADM

## 2024-06-22 RX ORDER — DILTIAZEM HYDROCHLORIDE 300 MG/1
300 CAPSULE, COATED, EXTENDED RELEASE ORAL EVERY MORNING
Status: DISCONTINUED | OUTPATIENT
Start: 2024-06-22 | End: 2024-06-22

## 2024-06-22 RX ORDER — DILTIAZEM HYDROCHLORIDE 300 MG/1
300 CAPSULE, COATED, EXTENDED RELEASE ORAL EVERY MORNING
Status: DISCONTINUED | OUTPATIENT
Start: 2024-06-23 | End: 2024-06-22 | Stop reason: HOSPADM

## 2024-06-22 RX ORDER — ALBUTEROL SULFATE 90 UG/1
2 AEROSOL, METERED RESPIRATORY (INHALATION) EVERY 4 HOURS PRN
Status: DISCONTINUED | OUTPATIENT
Start: 2024-06-22 | End: 2024-06-22 | Stop reason: HOSPADM

## 2024-06-22 RX ORDER — DILTIAZEM HYDROCHLORIDE 300 MG/1
300 CAPSULE, COATED, EXTENDED RELEASE ORAL EVERY MORNING
Qty: 30 CAPSULE | Refills: 0 | Status: SHIPPED | OUTPATIENT
Start: 2024-06-23

## 2024-06-22 RX ADMIN — PANTOPRAZOLE SODIUM 40 MG: 40 TABLET, DELAYED RELEASE ORAL at 05:08

## 2024-06-22 RX ADMIN — LISINOPRIL 40 MG: 20 TABLET ORAL at 09:18

## 2024-06-22 RX ADMIN — OXYCODONE HYDROCHLORIDE 5 MG: 5 TABLET ORAL at 05:08

## 2024-06-22 RX ADMIN — OXYCODONE HYDROCHLORIDE 5 MG: 5 TABLET ORAL at 10:23

## 2024-06-22 RX ADMIN — CYANOCOBALAMIN 1000 MCG: 1000 INJECTION, SOLUTION INTRAMUSCULAR at 17:11

## 2024-06-22 RX ADMIN — ALBUTEROL SULFATE 2 PUFF: 90 AEROSOL, METERED RESPIRATORY (INHALATION) at 15:42

## 2024-06-22 RX ADMIN — OXYCODONE HYDROCHLORIDE 5 MG: 5 TABLET ORAL at 15:42

## 2024-06-22 RX ADMIN — APIXABAN 5 MG: 5 TABLET, FILM COATED ORAL at 09:18

## 2024-06-22 RX ADMIN — SERTRALINE HYDROCHLORIDE 50 MG: 50 TABLET ORAL at 09:19

## 2024-06-22 RX ADMIN — ATORVASTATIN CALCIUM 40 MG: 40 TABLET, FILM COATED ORAL at 09:18

## 2024-06-22 RX ADMIN — DILTIAZEM HYDROCHLORIDE 300 MG: 300 CAPSULE, COATED, EXTENDED RELEASE ORAL at 10:36

## 2024-06-22 RX ADMIN — LEVOTHYROXINE SODIUM 25 MCG: 25 TABLET ORAL at 05:09

## 2024-06-22 RX ADMIN — ISOSORBIDE MONONITRATE 60 MG: 60 TABLET, EXTENDED RELEASE ORAL at 09:19

## 2024-06-22 RX ADMIN — APIXABAN 5 MG: 5 TABLET, FILM COATED ORAL at 17:06

## 2024-06-22 RX ADMIN — ALBUTEROL SULFATE 2 PUFF: 90 AEROSOL, METERED RESPIRATORY (INHALATION) at 09:19

## 2024-06-22 RX ADMIN — LORAZEPAM 1 MG: 1 TABLET ORAL at 09:29

## 2024-06-22 NOTE — NURSING NOTE
Patient to  Xarelto at [facility] pharmacy upon discharge, pt discharged at 4pm after [facility] closed. Discussed concerns with Miguel Elizabeth DO with family medicine, confirmed patient is ok to leave after he receives his PM dose of Eliquis at 6pm. Patient is to  medication at [facility] tomorrow.  Nurse educated the patient that prescription needs to be picked up when pharmacy opens at 9:00am and dose needs to be taken at that time as well. Patient agreeable and understands the risks with noncompliance. Walked patient to micki A upon discharge.

## 2024-06-22 NOTE — DISCHARGE INSTR - AVS FIRST PAGE
PLEASE NOTE THE FOLLOWING MEDICATION RECOMMENDATIONS:  - take Xarelto 20 mg daily with a meal without missing doses ( from [facility])  - please call Dr. Lopez's office at [phone] to request samples of Xarelto prior to running out  - continue diltiazem and Imdur as previously instructed        RESTRICTIONS:  No heavy lifting or strenuous activity for one week.    No soaking in a bath tub/hot tub/swimming pool for one week or until groin heals. You may shower - please let soap and water run over the groins, no scrubbing, and pat the area dry. Please place band-aid on groins daily for up to five days, but you may remove sooner if no issues are noted.     If you notice ongoing bleeding, swelling, or firm lumps in groin near ablation incision, please contact Dr. Lopez's office - [phone].

## 2024-06-22 NOTE — PLAN OF CARE
Problem: PAIN - ADULT  Goal: Verbalizes/displays adequate comfort level or baseline comfort level  Description: Interventions:  - Encourage patient to monitor pain and request assistance  - Assess pain using appropriate pain scale  - Administer analgesics based on type and severity of pain and evaluate response  - Implement non-pharmacological measures as appropriate and evaluate response  - Consider cultural and social influences on pain and pain management  - Notify physician/advanced practitioner if interventions unsuccessful or patient reports new pain  Outcome: Progressing     Problem: INFECTION - ADULT  Goal: Absence or prevention of progression during hospitalization  Description: INTERVENTIONS:  - Assess and monitor for signs and symptoms of infection  - Monitor lab/diagnostic results  - Monitor all insertion sites, i.e. indwelling lines, tubes, and drains  - Monitor endotracheal if appropriate and nasal secretions for changes in amount and color  - [place] appropriate cooling/warming therapies per order  - Administer medications as ordered  - Instruct and encourage patient and family to use good hand hygiene technique  - Identify and instruct in appropriate isolation precautions for identified infection/condition  Outcome: Progressing     Problem: SAFETY ADULT  Goal: Maintain or return to baseline ADL function  Description: INTERVENTIONS:  -  Assess patient's ability to carry out ADLs; assess patient's baseline for ADL function and identify physical deficits which impact ability to perform ADLs (bathing, care of mouth/teeth, toileting, grooming, dressing, etc.)  - Assess/evaluate cause of self-care deficits   - Assess range of motion  - Assess patient's mobility; develop plan if impaired  - Assess patient's need for assistive devices and provide as appropriate  - Encourage maximum independence but intervene and supervise when necessary  - Involve family in performance of ADLs  - Assess for home care  needs following discharge   - Consider OT consult to assist with ADL evaluation and planning for discharge  - Provide patient education as appropriate  Outcome: Progressing  Goal: Maintains/Returns to pre admission functional level  Description: INTERVENTIONS:  - Perform AM-PAC 6 Click Basic Mobility/ Daily Activity assessment daily.  - Set and communicate daily mobility goal to care team and patient/family/caregiver.   - Collaborate with rehabilitation services on mobility goals if consulted  - Perform Range of Motion 3 times a day.  - Reposition patient every 2 hours.  - Dangle patient 3 times a day  - Stand patient 3 times a day  - Ambulate patient 3 times a day  - Out of bed to chair 3 times a day   - Out of bed for meals 3 times a day  - Out of bed for toileting  - Record patient progress and toleration of activity level   Outcome: Progressing     Problem: DISCHARGE PLANNING  Goal: Discharge to home or other facility with appropriate resources  Description: INTERVENTIONS:  - Identify barriers to discharge w/patient and caregiver  - Arrange for needed discharge resources and transportation as appropriate  - Identify discharge learning needs (meds, wound care, etc.)  - Arrange for interpretive services to assist at discharge as needed  - Refer to Case Management Department for coordinating discharge planning if the patient needs post-hospital services based on physician/advanced practitioner order or complex needs related to functional status, cognitive ability, or social support system  Outcome: Progressing     Problem: Knowledge Deficit  Goal: Patient/family/caregiver demonstrates understanding of disease process, treatment plan, medications, and discharge instructions  Description: Complete learning assessment and assess knowledge base.  Interventions:  - Provide teaching at level of understanding  - Provide teaching via preferred learning methods  Outcome: Progressing     Problem: CARDIOVASCULAR -  ADULT  Goal: Maintains optimal cardiac output and hemodynamic stability  Description: INTERVENTIONS:  - Monitor I/O, vital signs and rhythm  - Monitor for S/S and trends of decreased cardiac output  - Administer and titrate ordered vasoactive medications to optimize hemodynamic stability  - Assess quality of pulses, skin color and temperature  - Assess for signs of decreased coronary artery perfusion  - Instruct patient to report change in severity of symptoms  Outcome: Progressing  Goal: Absence of cardiac dysrhythmias or at baseline rhythm  Description: INTERVENTIONS:  - Continuous cardiac monitoring, vital signs, obtain 12 lead EKG if ordered  - Administer antiarrhythmic and heart rate control medications as ordered  - Monitor electrolytes and administer replacement therapy as ordered  Outcome: Progressing

## 2024-06-22 NOTE — PROGRESS NOTES
"  Progress Notes - Family Medicine Residency, [place]  Torey Del Castillo Jr. 1965, 58 y.o. male.  MRN: [id number]  Unit/Bed#: [facility] 505-01 Encounter: [id number]  Primary Care Provider: Tran Rodríguez MD      Admission Date: 6/20/2024 1005  Length of Stay: 2 days  Code Status:  Level 1 - Full Code  Disposition:   Consult:   IP CONSULT TO ELECTROPHYSIOLOGY  IP CONSULT TO CASE MANAGEMENT         Assessment/Plan:      Plans discussed with [facility] team and finalization is pending attending physician attestation. Please, call [phone] for any clarification.     * Tachycardia, unspecified  Assessment & Plan  Patient presenting from PCP office on 6/20 with tachycardia to the 160s   - Patient asymptomatic on arrival and throughout admission  - Tachycardia refractory to multiple doses of Adenosine in the ED  - Suspected atrial flutter vs less likely SVT per Electrophysiology  - Patient in atrial flutter on telemetry into the early morning of 6/21  - Was given one dose Eliquis prior to ablation     Patient s/p successful ablation on 6/21 with pre-ablation NIKOLAS. Returned to sinus rhythm with some tachycardia after procedure. Patient will need to continue to be monitored on telemetry  - Patient has hx of untreated PRAKASH. Given risk of arrhythmia in setting of untreated PRAKASH patient will need CPAP prior to discharge.     Improved    Plan  Continue on telemetry  OP candidate for loop recorder due to being asymptomatic   Eliquis 5mg BID started 6/21  Patient not able to afford due to lack of insurance  May transition to Xarelto; pending price check. If cannot afford, may have to transition to Coumadin  Will contact [facility] CM to help with AC medications  Monitor and replete electrolytes as needed, goal Mg>2, K>4  EP following  CM consulted for help with CPAP device      Pulmonary congestion  Assessment & Plan  6/20 CXR: revealed \"mild pulmonary vascular congestion\"  - Patient required up to 6L NC    6/21: Pre ablation NIKOLAS revealed EF of " 45-49%  - Weaned to Room Air     Plan:  Diuresis as clinically indicated  O2 goal >92%  Continue to monitor respiratory status  CPAP HS  CM assisting with CPAP for discharge     Hypothyroidism  Assessment & Plan  Home regimen: Levothyroxine 25mcg daily    Plan: Continue home regimen    Cardiac arrest (HCC)  Assessment & Plan  Cardiac Arrest secondary to RCA Vasospasm and Prinzmetal Angina following stress echo 2021     Plan:  Continue on telemetry     Obesity, Class III, BMI 40-49.9 (morbid obesity) (HCC)  Assessment & Plan  BMI 42.9, patient has hx of PRAKASH    Plan:  Encourage healthy diet and lifestyle  CM assisting with CPAP for discharge     Hyperlipidemia  Assessment & Plan  Home regimen: Atorvastatin 40mg daily        Lab Results   Component Value Date    CHOLESTEROL 244 (H) 2024    TRIG 199 (H) 2024    HDL 35 (L) 2024    LDLCALC 169 (H) 2024       Plan: Continue home regimen    Hypertension  Assessment & Plan  Home regimen: Diltiazem 300mg daily, Imdur 60mg daily, Lisinopril 40mg daily     Systolic (12hrs), Av , Min:98 , Max:114   Diastolic (12hrs), Av, Min:59, Max:77    Controlled  Plan:  Home medication regimen resumed on , continue     MDD (major depressive disorder)  Assessment & Plan  Home regimen: Seroquel 350mg at bedtime, Sertraline 50mg daily, Ativan 1mg BID prn    Plan: Continue home regimen      GERD (gastroesophageal reflux disease)  Assessment & Plan  Home regimen: Omeprazole 40mg daily     Plan  - Pantoprazole 40mg daily     Anxiety  Assessment & Plan  Home regimen: Sertraline 50mg daily, Ativan 1mg BID prn    Plan:  Continue home regimen          Diet: Diet Cardiovascular; Cardiac    VTE Pharm PPX: Eliquis 5 mg BID  [facility]      Subjective:   58 y.o. male admitted 2024 for a flutter s/p ablation. Awaiting medication price check for AC for OP.     Today 24, HD# 2    Per handoff, patient without acute events  overnight.  Per nursing staff, no concern or report.  Patient seen and examined at bedside and without questions or concerns. Patient denies CP, SOB, abdominal pain, N/V, headaches, dizziness, lightheadedness, changes in bowel movements, urinary symptoms at this time.    Medical management and treatment was discussed with patient, patient understands and agrees with plan.     Objective:     Vitals:    06/22/24 0708 06/22/24 0900 06/22/24 1036 06/22/24 1059   BP: 114/77  104/59 98/69   BP Location: Left arm   Left arm   Pulse: 105   98   Resp:       Temp: 98.6 °F (37 °C)   98.1 °F (36.7 °C)   TempSrc: Oral   Oral   SpO2: 95% 95%  97%   Weight:       Height:         Temp:  [97.5 °F (36.4 °C)-98.6 °F (37 °C)] 98.1 °F (36.7 °C)  HR:  [] 98  Resp:  [16-21] 20  BP: ()/(59-97) 98/69  Weight (last 2 days)       Date/Time Weight    06/22/24 0500 139 (306.44)    06/21/24 1300 136 (299)    06/21/24 0333 136 (299.83)            Intake/Output Summary (Last 24 hours) at 6/22/2024 1305  Last data filed at 6/22/2024 0708  Gross per 24 hour   Intake 1160 ml   Output 350 ml   Net 810 ml     Invasive Devices       Peripheral Intravenous Line  Duration             Peripheral IV 06/20/24 Left Antecubital 2 days    Peripheral IV 06/20/24 Left;Ventral (anterior) Forearm 2 days    Peripheral IV 06/21/24 Distal;Right;Ventral (anterior) Forearm 1 day                      Physical Exam:     Physical Exam  Vitals and nursing note reviewed.   Constitutional:       General: He is not in acute distress.     Appearance: Normal appearance. He is well-developed. He is obese. He is not ill-appearing, toxic-appearing or diaphoretic.   HENT:      Head: Normocephalic and atraumatic.      Right Ear: External ear normal.      Left Ear: External ear normal.      Nose: Nose normal.   Eyes:      Conjunctiva/sclera: Conjunctivae normal.   Cardiovascular:      Rate and Rhythm: Normal rate and regular rhythm.      Pulses: Normal pulses.      Heart  sounds: Normal heart sounds. No murmur heard.  Pulmonary:      Effort: Pulmonary effort is normal. No respiratory distress.      Breath sounds: Wheezing present.   Abdominal:      General: Abdomen is flat. Bowel sounds are normal.      Palpations: Abdomen is soft.      Tenderness: There is no abdominal tenderness.   Musculoskeletal:         General: Normal range of motion.      Cervical back: Normal range of motion and neck supple.   Skin:     General: Skin is warm and dry.      Capillary Refill: Capillary refill takes less than 2 seconds.      Coloration: Skin is not jaundiced.   Neurological:      Mental Status: He is alert and oriented to person, place, and time. Mental status is at baseline.   Psychiatric:         Mood and Affect: Mood normal.         Behavior: Behavior normal.           Labs:     CBC:  Results from last 7 days   Lab Units 06/22/24  0432 06/21/24  0339 06/20/24  1410 06/20/24  1032   WBC Thousand/uL 7.07 7.61  --  9.84   HEMOGLOBIN g/dL 12.8 14.2  --  14.5   HEMATOCRIT % 41.6 44.9  --  45.5   PLATELETS Thousands/uL 177 199 201 231   TOTAL NEUT ABS Thousands/µL  --   --   --  7.88*       CMP:  Results from last 7 days   Lab Units 06/22/24  0432 06/21/24  0339 06/20/24  1032   POTASSIUM mmol/L 3.8 4.1 3.9   CHLORIDE mmol/L 104 103 102   CO2 mmol/L 28 30 29   BUN mg/dL 12 10 10   CREATININE mg/dL 0.75 1.00 0.98   CALCIUM mg/dL 8.3* 8.8 9.2   AST U/L  --   --  25   ALT U/L  --   --  23   ALK PHOS U/L  --   --  117*   EGFR ml/min/1.73sq m 101 83 85   MAGNESIUM mg/dL 2.1  --   --        Sepsis:        Micro:         Imaging:     XR chest 1 view portable    Result Date: 6/20/2024  Impression: Mild pulmonary vascular congestion. Workstation performed: [id number]         Medications:     Current Facility-Administered Medications   Medication Dose Route Frequency    acetaminophen (TYLENOL) tablet 650 mg  650 mg Oral Q4H PRN    albuterol (PROVENTIL HFA,VENTOLIN HFA) inhaler 2 puff  2 puff Inhalation Q4H PRN     apixaban (ELIQUIS) tablet 5 mg  5 mg Oral BID    atorvastatin (LIPITOR) tablet 40 mg  40 mg Oral Daily    [START ON 6/23/2024] diltiazem (CARDIZEM CD) 24 hr capsule 300 mg  300 mg Oral QAM    isosorbide mononitrate (IMDUR) 24 hr tablet 60 mg  60 mg Oral Daily    levothyroxine tablet 25 mcg  25 mcg Oral Early Morning    lisinopril (ZESTRIL) tablet 40 mg  40 mg Oral Daily    LORazepam (ATIVAN) tablet 1 mg  1 mg Oral BID PRN    oxyCODONE (ROXICODONE) split tablet 2.5 mg  2.5 mg Oral Q4H PRN    Or    oxyCODONE (ROXICODONE) IR tablet 5 mg  5 mg Oral Q4H PRN    pantoprazole (PROTONIX) EC tablet 40 mg  40 mg Oral Early Morning    QUEtiapine (SEROquel) tablet 300 mg  300 mg Oral HS    sertraline (ZOLOFT) tablet 50 mg  50 mg Oral Daily         Cesilia Avendaño MD  Family Medicine, PGY-2  1:05 PM 6/22/2024

## 2024-06-22 NOTE — PROGRESS NOTES
Progress Note - Electrophysiology-Cardiology (EP)   Torey Del Castillo Jr. 58 y.o. male MRN: [id number]  Unit/Bed#: [facility] 505-01 Encounter: [id number]      Assessment:  Typical atrial flutter, status post pulsed-field ablation of CTI flutter 6/21/2024  Reportedly had prior episode in 2018 status post cardioversion  Tachycardia incidentally noted at PCP office on the day of admission, thus sent to ED  CHADS2 Vasc = 1, started on Eliquis this admission  Mildly reduced LV systolic function with LVEF 45-49% per preablation NIKOLAS, suspect due to tachycardia  Prior echo 10/2023 showed LVEF 70%, historically it has been normal  No obstructive CAD per cardiac catheterization 11/2021  Severe Prinzmetal angina  Prior bradycardic arrest with AV block and ST elevations during stress test, due to vasospasm of ostial left main and RCA  No obstructive CAD noted at that time  Has done well on diltiazem and Imdur as an outpatient  Obesity with BMI 43  Obstructive sleep apnea  Dyslipidemia  Hypertension      Plan:  He is doing well in the postablation setting.  He is currently in normal sinus rhythm with PACs.  His groin access sites were stable, sutures were removed without evidence of hematoma or ongoing bleeding.    All post ablation discharge instructions and restrictions were reviewed with the patient, all questions were answered.    He was started on Eliquis earlier this admission, however he currently does not have insurance and was previously on this medication.  Thus he is unable to receive a free 1 month supply.  Instead, I have sent a prescription for Xarelto 20 mg daily to [facility] to see if this is an option.    Moving forward, I think he would be a good candidate for loop recorder implantation to rule out atrial fibrillation.  He was asymptomatic with his atrial flutter, and thus would likely not be aware of future arrhythmias.  Loop recorder would help us safely discontinue anticoagulation and help guide medical management  "moving forward.  I will have our schedulers reach out to him to see if this is a possibility.  If he is unable to receive a loop recorder due to insurance issues and has not able to afford a DOAC, he may need to transition to Coumadin if long-term anticoagulation is recommended.  I will discuss this with Dr. Lopez.    Otherwise, he should be restarted on diltiazem and Imdur as previously recommended.    He is stable for discharge from an EP standpoint, pending the arrangement of his necessary medications.  Appreciate case management input.  Please contact us with questions or concerns.      Subjective/Objective   Chief Complaint: No acute complaints    Subjective: He is currently feeling well, denies chest pain or pressure, shortness of breath, palpitations, dizziness, or lightheadedness.  He has been ambulating without symptoms or limitations.  He is eager for discharge.      Objective:     Vitals: /77 (BP Location: Left arm)   Pulse 105   Temp 98.6 °F (37 °C) (Oral)   Resp 20   Ht 5' 10\" (1.778 m)   Wt (!) 139 kg (306 lb 7 oz)   SpO2 95%   BMI 43.97 kg/m²   Vitals:    06/21/24 1300 06/22/24 0500   Weight: 136 kg (299 lb) (!) 139 kg (306 lb 7 oz)     Orthostatic Blood Pressures      Flowsheet Row Most Recent Value   Blood Pressure 114/77 filed at 06/22/2024 0708   Patient Position - Orthostatic VS Sitting filed at 06/22/2024 0708              Intake/Output Summary (Last 24 hours) at 6/22/2024 0841  Last data filed at 6/22/2024 0708  Gross per 24 hour   Intake 1160 ml   Output 350 ml   Net 810 ml       Invasive Devices       Peripheral Intravenous Line  Duration             Peripheral IV 06/20/24 Left Antecubital 2 days    Peripheral IV 06/20/24 Left;Ventral (anterior) Forearm 1 day    Peripheral IV 06/21/24 Distal;Right;Ventral (anterior) Forearm <1 day                                Scheduled Meds:  Current Facility-Administered Medications   Medication Dose Route Frequency Provider Last Rate    " acetaminophen  650 mg Oral Q4H PRN Judith Sahh, SYEDA      albuterol  2 puff Inhalation Q4H PRN Miguel Elizabeth DO      apixaban  5 mg Oral BID Judith Shah, SYEDA      atorvastatin  40 mg Oral Daily Judith Shah, CRNP      isosorbide mononitrate  60 mg Oral Daily Judith Shah, ANGELANP      levothyroxine  25 mcg Oral Early Morning Judith Shah, CRNP      lisinopril  40 mg Oral Daily Judith Shah, CRNP      LORazepam  1 mg Oral BID PRN Judith Shah, CRNP      oxyCODONE  2.5 mg Oral Q4H PRN Judith Shah, CRNP      Or    oxyCODONE  5 mg Oral Q4H PRN Judith Shah, ANGELANP      pantoprazole  40 mg Oral Early Morning Judith Shah, CRNP      QUEtiapine  300 mg Oral HS Judith Shah, CRNP      sertraline  50 mg Oral Daily Judith Shah, CRNP       Continuous Infusions:   PRN Meds:.  acetaminophen    albuterol    LORazepam    oxyCODONE **OR** oxyCODONE    Review of Systems   Constitutional: Negative for fever and malaise/fatigue.   Cardiovascular:  Negative for chest pain, dyspnea on exertion, irregular heartbeat, leg swelling, near-syncope, orthopnea, palpitations, paroxysmal nocturnal dyspnea and syncope.   All other systems reviewed and are negative.        Physical Exam:   GEN: NAD, alert and oriented x 3, well appearing  SKIN: dry without significant lesions or rashes  HEENT: NCAT, PERRL, EOMs intact  NECK: No JVD appreciated  CARDIOVASCULAR: RRR, normal S1, S2 without murmurs, rubs, or gallops appreciated  LUNGS: Clear to auscultation bilaterally without wheezes, rhonchi, or rales  ABDOMEN: Soft, nontender, nondistended  EXTREMITIES/VASCULAR: perfused without clubbing, cyanosis, or LE edema b/l  PSYCH: Normal mood and affect  NEURO: CN ll-Xll grossly intact                Lab Results: I have personally reviewed pertinent lab results.    Results from last 7 days   Lab Units 06/22/24  0432 06/21/24  0339 06/20/24  1410 06/20/24  1032    WBC Thousand/uL 7.07 7.61  --  9.84   HEMOGLOBIN g/dL 12.8 14.2  --  14.5   HEMATOCRIT % 41.6 44.9  --  45.5   PLATELETS Thousands/uL 177 199 201 231     Results from last 7 days   Lab Units 24  0432 24  0339 24  1032   POTASSIUM mmol/L 3.8 4.1 3.9   CHLORIDE mmol/L 104 103 102   CO2 mmol/L 28 30 29   BUN mg/dL 12 10 10   CREATININE mg/dL 0.75 1.00 0.98   CALCIUM mg/dL 8.3* 8.8 9.2     Results from last 7 days   Lab Units 24  0432 24  0339   INR  1.17 1.18     Results from last 7 days   Lab Units 24  0432   MAGNESIUM mg/dL 2.1         Imaging: I have personally reviewed pertinent reports.      ECHO: Results for orders placed during the hospital encounter of 18    Echo complete with contrast if indicated    [place] [address]  [phone]    Transthoracic Echocardiogram  2D, M-mode, Doppler, and Color Doppler    Study date:  21-May-2018    Patient: WIL AMADOR  MR number: [id number]  Account number: [id number]  : 1965  Age: 52 years  Gender: Male  Status: Inpatient  Location: Bedside  Height: 70 in  Weight: 221.5 lb  BP: 99/ 65 mmHg    Indications: Atrial flutter    Diagnoses: I48.1 - Atrial flutter    Sonographer:  Fabian Robles [facility]  Primary Physician:  Rosario Ku MD  Group:  [facility]  Interpreting Physician:  DO WILMA Lee    LEFT VENTRICLE:  Systolic function was normal. Ejection fraction was estimated to be 60 %.  There were no regional wall motion abnormalities.  Wall thickness was mildly increased.  There was mild concentric hypertrophy.    LEFT ATRIUM:  The atrium was mildly dilated.    MITRAL VALVE:  There was mild regurgitation.    HISTORY: PRIOR HISTORY: AFL with RVR, GERD, smoker, anxiety, depression    PROCEDURE: The procedure was performed at the bedside. This was a routine study. The transthoracic approach was used. The study  included complete 2D imaging, M-mode, complete spectral Doppler, and color Doppler. The heart rate was 77 bpm,  at the start of the study. Image quality was adequate.    LEFT VENTRICLE: Size was normal. Systolic function was normal. Ejection fraction was estimated to be 60 %. There were no regional wall motion abnormalities. Wall thickness was mildly increased. There was mild concentric hypertrophy.  DOPPLER: Left ventricular diastolic function parameters were normal.    RIGHT VENTRICLE: The size was normal. Systolic function was normal. Wall thickness was normal.    LEFT ATRIUM: The atrium was mildly dilated.    RIGHT ATRIUM: Size was normal.    MITRAL VALVE: Valve structure was normal. There was normal leaflet separation. DOPPLER: The transmitral velocity was within the normal range. There was no evidence for stenosis. There was mild regurgitation.    AORTIC VALVE: The valve was trileaflet. Leaflets exhibited mildly increased thickness and normal cuspal separation. DOPPLER: Transaortic velocity was within the normal range. There was no evidence for stenosis. There was no regurgitation.    TRICUSPID VALVE: The valve structure was normal. There was normal leaflet separation. DOPPLER: The transtricuspid velocity was within the normal range. There was no evidence for stenosis. There was no regurgitation.    PULMONIC VALVE: Leaflets exhibited normal thickness, no calcification, and normal cuspal separation. DOPPLER: The transpulmonic velocity was within the normal range. There was no regurgitation.    PERICARDIUM: There was no pericardial effusion. The pericardium was normal in appearance.    AORTA: The root exhibited normal size.    SYSTEMIC VEINS: IVC: The inferior vena cava was normal in size and course. Respirophasic changes were normal.    PULMONARY ARTERY: DOPPLER: The tricuspid jet envelope definition was inadequate for estimation of RV systolic pressure. There are no indirect findings (abnormal RV volume or  geometry, altered pulmonary flow velocity profile, or leftward  septal displacement) which would suggest moderate or severe pulmonary hypertension.    SYSTEM MEASUREMENT TABLES    2D  %FS: 33.6 %  EF(Teich): 62.2 %  ESV(Teich): 40.7 ml  IVSd: 1.5 cm  LA Area: 22.3 cm2  LA Diam: 3.7 cm  LVEF MOD A4C: 61.7 %  LVIDd: 4.8 cm  LVIDs: 3.2 cm  LVPWd: 1.5 cm  RA Area: 17.3 cm2  SV(Teich): 67 ml    [facility]    Prepared and electronically signed by    Gregory Smith DO  Signed 21-May-2018 17:31:30      Results for orders placed during the hospital encounter of 10/26/23    Echo complete w/ contrast if indicated    Interpretation Summary    Left Ventricle: Left ventricular cavity size is normal. Wall thickness is not well visualized. The left ventricular ejection fraction is 70%. Systolic function is hyperdynamic. Wall motion is normal. Diastolic function is mildly abnormal, consistent with grade I (abnormal) relaxation.    Right Ventricle: Right ventricular cavity size is normal. Systolic function is normal.    Technically difficult study.        EKG/TELEMETRY: Normal sinus rhythm and sinus tachycardia with PACs noted      VTE Pharmacologic Prophylaxis: Eliquis

## 2024-06-22 NOTE — CASE MANAGEMENT
Case Management Assessment & Discharge Planning Note    Patient name Torey Del Castillo Jr.  Location [facility] 505/[facility] 505-01 MRN [id number]  : 1965 Date 2024       Current Admission Date: 2024  Current Admission Diagnosis:Tachycardia, unspecified   Patient Active Problem List    Diagnosis Date Noted Date Diagnosed    Hypothyroidism 2024     Pulmonary congestion 2024     Wheezing 2024     Other specified hypothyroidism 10/05/2023     Prediabetes 10/05/2023     Abnormal hematocrit 10/05/2023     Spasm of muscle of lower back 2023     Skin lesion 2023     Intentional drug overdose, initial encounter ([facility]) 2023     Left leg pain 2023     Shortness of breath 2023     Obstructive sleep apnea      Blood in left ear canal 10/31/2022     Hyperpigmented skin lesion 10/31/2022     Preop examination 2022     Annual physical exam 2022     Financial difficulties 2022     Hip pain 2022     Atrial flutter ([facility]) 2022     Anemia 2021     Thrombocytopenia ([facility]) 2021     Alcohol dependence ([facility]) 2021     Cardiac arrest ([facility]) 2021     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[facility]) 2021     Obesity, Class III, BMI 40-49.9 (morbid obesity) ([facility]) 10/20/2021     Excessive daytime sleepiness 10/20/2021     Hyperlipidemia 06/10/2021     Elevated alkaline phosphatase level 06/10/2021     Elevated hemoglobin ([facility]) 06/10/2021     Hypertension 2021     Tachycardia, unspecified 2021     Asthma 2020     Moderate episode of recurrent major depressive disorder (HCC) 2019     History of MRSA infection of lungs 2019     Anxiety      GERD (gastroesophageal reflux disease)      MDD (major depressive disorder)      Adenoma of left adrenal gland 2018     Closed right ankle fracture 2017     Tobacco use disorder      History of ileus      Umbilical hernia without obstruction and without gangrene 2016      Gallbladder calculus without cholecystitis 02/01/2016     Kidney stone on right side 01/31/2016     Ileus (HCC) 01/31/2016     Other insomnia      Depression with anxiety        LOS (days): 2  Geometric Mean LOS (GMLOS) (days):   Days to GMLOS:     OBJECTIVE:    Risk of Unplanned Readmission Score: 13.52         Current admission status: Inpatient       Preferred Pharmacy:   [id number]  [place], [place] - [address]  [address] [facility] [place] [address]  Phone: [phone] Fax: [phone]    [facility] - [place], [place] - [address]  [address]  [place] [address]  Phone: [phone] Fax: [phone]    Primary Care Provider: Tran Rodríguez MD    Primary Insurance: [employer] HANKS PENDING  Secondary Insurance:     ASSESSMENT:  Active Health Care Proxies    There are no active Health Care Proxies on file.                 Readmission Root Cause  30 Day Readmission: No    Patient Information  Admitted from:: Other (comment) (came from PMD)  Mental Status: Alert  During Assessment patient was accompanied by: Not accompanied during assessment  Assessment information provided by:: Patient  Primary Caregiver: Self  Support Systems: Self, Spouse/significant other, Family members  County of Residence: [place]  What city do you live in?: [place]  Home entry access options. Select all that apply.: Stairs  Number of steps to enter home.: 3  Do the steps have railings?: Yes  Type of Current Residence: [address] [place] [address] home  Upon entering residence, is there a bedroom on the main floor (no further steps)?: No  A bedroom is located on the following floor levels of residence (select all that apply):: 2nd Floor  Upon entering residence, is there a bathroom on the main floor (no further steps)?: No  Indicate which floors of current residence have a bathroom (select all the apply):: 2nd Floor  Number of steps to 2nd floor from main floor: One Flight  Living Arrangements: Lives Alone  Is  patient a ?: Yes  Is patient [facility] with [facility] ([place])?: No    Activities of Daily Living Prior to Admission  Functional Status: Independent  Completes ADLs independently?: Yes  Ambulates independently?: Yes  Does patient use assisted devices?: Yes  Assisted Devices (DME) used: Straight Cane  Does patient currently own DME?: Yes  What DME does the patient currently own?: Straight Cane  Does patient have a history of Outpatient Therapy (PT/OT)?: Yes  Does the patient have a history of Short-Term Rehab?: Yes  Does patient have a history of HHC?: No  Does patient currently have HHC?: No         Patient Information Continued  Income Source: Unemployed  Does patient have prescription coverage?: No  Does patient receive dialysis treatments?: No  Does patient have a history of substance abuse?: Yes  Historical substance use preference: Alcohol/ETOH  History of Withdrawal Symptoms: Seizures  Is patient currently in treatment for substance abuse?: N/A - sober  Does patient have a history of Mental Health Diagnosis?: Yes (depression)  Is patient receiving treatment for mental health?: No. Patient declined treatment information.  Has patient received inpatient treatment related to mental health in the last 2 years?: No         Means of Transportation  Means of Transport to Appts:: Drives Self      Social Determinants of Health (SDOH)      Flowsheet Row Most Recent Value   Housing Stability    In the last 12 months, was there a time when you were not able to pay the mortgage or rent on time? N   In the past 12 months, how many times have you moved where you were living? 1   At any time in the past 12 months, were you homeless or living in a shelter (including now)? N   Transportation Needs    In the past 12 months, has lack of transportation kept you from medical appointments or from getting medications? yes   In the past 12 months, has lack of transportation kept you from meetings, work, or from getting things  needed for daily living? No   Food Insecurity    Within the past 12 months, you worried that your food would run out before you got the money to buy more. Sometimes   Within the past 12 months, the food you bought just didn't last and you didn't have money to get more. Sometimes   Utilities    In the past 12 months has the electric, gas, oil, or water company threatened to shut off services in your home? No            DISCHARGE DETAILS:    Discharge planning discussed with:: patient  Freedom of Choice: Yes     CM contacted family/caregiver?: No- see comments (declined)  Were Treatment Team discharge recommendations reviewed with patient/caregiver?: Yes  Did patient/caregiver verbalize understanding of patient care needs?: Yes  Were patient/caregiver advised of the risks associated with not following Treatment Team discharge recommendations?: Yes         Requested Home Health Care         Is the patient interested in HHC at discharge?: No    DME Referral Provided  Referral made for DME?: No    Other Referral/Resources/Interventions Provided:  Interventions: FindHelp  Referral Comments: [employer], Food pantries, [employer]    Would you like to participate in our [facility] service program?  : Yes       Additional Comments: Patient states he lost his job a month ago so DOES NOT have any income or health insurance at this time. Patient states his friend Yeimy has started a MA application for him. Patient states he has his car to drive until the end of the month but then will need assistance in July. [employer] information was provided. [employer] and Food Pantry information was provided. It was determined patient used coupon for Eliquis in the past so it is not available at this time. Provider sent Xarelto prescription to [facility] to see if the coupon program is available. Awaiting determination. Family Medicine Dr Rivera was made aware of situation via Secure Chat. [facility] was contacted and demario said patient  can get Xarelto 1 month free.  Dr Rivera aware via Secure Chat.  No further CM needs anticipated

## 2024-06-22 NOTE — DISCHARGE SUMMARY
"Discharge Summary - Torey Del Castillo Jr. 58 y.o. male MRN: [id number]    Unit/Bed#: [facility] 505-01 Encounter: [id number]    Admission Date: 6/20/2024   Discharge Date: 6/22/2024    Admitting Diagnosis:   Problem List Items Addressed This Visit       Atrial flutter (HCC)    Relevant Medications    isosorbide mononitrate (IMDUR) 24 hr tablet 60 mg    diltiazem (CARDIZEM CD) 24 hr capsule 300 mg (Start on 6/23/2024  9:00 AM)    rivaroxaban (Xarelto) 20 mg tablet    diltiazem (CARDIZEM CD) 300 mg 24 hr capsule (Start on 6/23/2024)     Other Visit Diagnoses       Tachycardia    -  Primary    Relevant Orders    Inpatient consult to Electrophysiology (Completed)    Case Request EP Lab: Cardiac eps/svt ablation (Completed)    Cardiac ep lab eps/ablations (Completed)    Consult to Case Management (Completed)            HPI: From H&P dated: 6/20/2024 \"Torey Del Castillo Jr. is a 58 y.o. 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 While at PCP office, HR was noted in the 160s, ECG concerning for atrial flutter. Patient transferred to ED for further care. In ED, patient arrived tachycardic in the 160s - appears SVT vs atrial flutter. Patient was given Adenosine 6mg x1, 12mg x2 and 18mg x1 without improvement.      Evaluated patient at bedside. Patient denies any current symptoms including CP, palpitations, SOB, dizziness, lightheadedness, change in vision, N/V, abdominal pain. He reports he feels like his usual self. He reports being thirsty and asks for something to drink.     Of note, patient was recently treated for a RLL pneumonia 4/26 with Amoxicillin x7 days.     Hospital Course:     Patient was assessed by the EP team; underwent ablation on 6/21/24 without complications. Telemetry was continued throughout his " hospital stay due to PMH of cardiac arrest. Initially started on Eliquis 5 mg BID, however, was unable to afford due to lack of insurance. Patient was found to be eligible for Xarelto for 30 days, discharged on Xarelto 20 mg daily, with recommendations to continue home diltiazem, Imdur and lisinopril. Patient was also recommended to have outpatient placement of loop recorder in order to assess any arrhythmias, as patient has remained asymptomatic throughout disease course. Outpatient close EP follow up recommended. Recommended to contact EP prior to running out of Xarelto for additional samples. CM at [facility] will be contacted to help patient with finances for medications.   Patient also received 1 x IM cyanocobalamin due to low Vit B12 levels which will continue outpatient. Patient will also continue oral folate. Outpatient referral to Hematology recommended.         Exam on Day of Discharge:   Vitals:    06/22/24 0708 06/22/24 0900 06/22/24 1036 06/22/24 1059   BP: 114/77  104/59 98/69   BP Location: Left arm   Left arm   Pulse: 105   98   Resp:       Temp: 98.6 °F (37 °C)   98.1 °F (36.7 °C)   TempSrc: Oral   Oral   SpO2: 95% 95%  97%   Weight:       Height:         Physical Exam  Vitals reviewed.   Constitutional:       General: He is awake. He is not in acute distress.     Appearance: Normal appearance. He is obese. He is not ill-appearing.   HENT:      Head: Normocephalic and atraumatic.      Right Ear: External ear normal.      Left Ear: External ear normal.      Nose: Nose normal.      Mouth/Throat:      Mouth: Mucous membranes are moist.      Pharynx: Oropharynx is clear.   Eyes:      Extraocular Movements: Extraocular movements intact.      Conjunctiva/sclera: Conjunctivae normal.   Cardiovascular:      Rate and Rhythm: Normal rate and regular rhythm.      Pulses: Normal pulses.      Heart sounds: Normal heart sounds. No murmur heard.  Pulmonary:      Effort: Pulmonary effort is normal. No respiratory  "distress.      Breath sounds: Normal breath sounds. No wheezing or rales.   Abdominal:      General: Bowel sounds are normal. There is no distension.      Palpations: Abdomen is soft.      Tenderness: There is no abdominal tenderness. There is no right CVA tenderness or left CVA tenderness.   Musculoskeletal:         General: No swelling, deformity or signs of injury. Normal range of motion.      Cervical back: Normal range of motion. No rigidity.   Skin:     General: Skin is warm and dry.      Capillary Refill: Capillary refill takes less than 2 seconds.      Findings: No rash.   Neurological:      General: No focal deficit present.      Mental Status: He is alert and oriented to person, place, and time. Mental status is at baseline.   Psychiatric:         Mood and Affect: Mood normal.         Behavior: Behavior normal. Behavior is cooperative.         Significant Findings/Test Results:  - SVT, suspected atrial flutter with RVR     Procedures Performed:   - Ablation 6/21/24    Consultation(s) During Hospital Stay:  - EP  - PT/OT    Incidental Findings:   - None    Discharge Medications:  See after visit summary for complete discharge medication reconciliation.  Significant medication changes during admission:  - Xarelto 20 mg daily     Required Outpatient Follow-up:   PCP: Tran Rodríguez MD. Instructed patient to schedule PCP visit within 1 week of discharge from hospital/facility.  Electrophysiology (EP)  Please see \"Follow-up Providers\" section of AVS for detailed instructions and other providers not listed above     Future Appointments   Date Time Provider Department Center   7/11/2024  9:10 AM Tran Rodríguez MD [facility]   10/9/2024  9:00 AM John Patel PA-C [facility]        Test Results Pending at Discharge:  none    Complications:None  Condition at Discharge: Good   Disposition: Home     On day of discharge patient is seen and evaluated at bedside. Patient is stable and without concern. " Patient denies any pain or SOB. Patient able to tolerate diet without n/v/d. Patient able to ambulate at baseline or to maximum level while an inpatient. Patient is aware of current health status and understand plan of treatment and outpatient follow-up. The patient understood and agreed with the plan. All pertinent lab results, imaging studies, procedures, and/or any incidental findings have been disclosed to the patient. All pertinent questions are answered to patient's satisfaction. On day of discharge, the patient was hemodynamically stable and appropriate for discharge.    Discharge instructions/Information to patient and family:   See after visit summary for detailed information provided to patient and family    Provisions for Follow-Up Care: See after visit summary for information related to follow-up care and any pertinent home health orders.      Planned Readmission: None    Discharge Statement: I spent 30 minutes discharging the patient. This time was spent on the day of discharge. I had direct contact with the patient on the day of discharge. Additional documentation is required if more than 30 minutes were spent on discharge.     The senior resident, Dr. Avendaño, and the attending physician, Dr. Rodríguez, agreed with the assessment and plan.    Cesilia Avendaño MD  Family Medicine, [facility]   6/22/2024

## 2024-06-23 VITALS
TEMPERATURE: 98.1 F | WEIGHT: 306.44 LBS | DIASTOLIC BLOOD PRESSURE: 69 MMHG | RESPIRATION RATE: 20 BRPM | HEART RATE: 98 BPM | BODY MASS INDEX: 43.87 KG/M2 | OXYGEN SATURATION: 97 % | HEIGHT: 70 IN | SYSTOLIC BLOOD PRESSURE: 98 MMHG

## 2024-06-23 LAB
ATRIAL RATE: 102 BPM
P AXIS: 78 DEGREES
PR INTERVAL: 200 MS
QRS AXIS: 98 DEGREES
QRSD INTERVAL: 96 MS
QT INTERVAL: 371 MS
QTC INTERVAL: 484 MS
T WAVE AXIS: 96 DEGREES
VENTRICULAR RATE: 102 BPM

## 2024-06-23 PROCEDURE — 93010 ELECTROCARDIOGRAM REPORT: CPT | Performed by: INTERNAL MEDICINE

## 2024-06-24 ENCOUNTER — TRANSITIONAL CARE MANAGEMENT (OUTPATIENT)
Dept: FAMILY MEDICINE CLINIC | Facility: CLINIC | Age: 59
End: 2024-06-24

## 2024-06-24 DIAGNOSIS — Z59.41 FOOD INSECURITY: ICD-10-CM

## 2024-06-24 DIAGNOSIS — Z59.82 TRANSPORTATION INSECURITY: ICD-10-CM

## 2024-06-24 DIAGNOSIS — Z59.86 FINANCIAL INSECURITY: Primary | ICD-10-CM

## 2024-06-24 SDOH — ECONOMIC STABILITY - INCOME SECURITY: FINANCIAL INSECURITY: Z59.86

## 2024-06-24 SDOH — ECONOMIC STABILITY - FOOD INSECURITY: FOOD INSECURITY: Z59.41

## 2024-06-24 SDOH — ECONOMIC STABILITY - TRANSPORTATION SECURITY: TRANSPORTATION INSECURITY: Z59.82

## 2024-06-25 ENCOUNTER — TELEPHONE (OUTPATIENT)
Dept: CARDIOLOGY CLINIC | Facility: CLINIC | Age: 59
End: 2024-06-25

## 2024-06-25 ENCOUNTER — PATIENT OUTREACH (OUTPATIENT)
Dept: FAMILY MEDICINE CLINIC | Facility: CLINIC | Age: 59
End: 2024-06-25

## 2024-06-25 NOTE — TELEPHONE ENCOUNTER
Called patient to discuss ongoing EP plans, specifically loop recorder implantation to help guide anticoagulation moving forward.  He has applied for insurance, however the status is still pending.  Thus, he would have to pay out-of-pocket to pursue a loop recorder at this time, which is not feasible.    As we may need to continue longer-term anticoagulation, would recommend that he transition to Coumadin as the most cost effective option.  I will send messages to our Coumadin clinic as well as his PCP to help navigate this.  He will continue his 1 month free supply of Xarelto in the meantime.    He also admits that he has had worsening groin pain since the ablation, with some swelling noted.  I would recommend an ultrasound to rule out pseudoaneurysm versus AV fistula, however again he would need to pay out-of-pocket for this.  He is going to call  [facility] today to check the status, he may need to report to the emergency room for imaging.    He will call us to keep us updated.

## 2024-06-25 NOTE — PROGRESS NOTES
Pt was referred to SW via [facility] report. Chart review completed. Per chart review, pt reported food insecurity, inadequate transportation and financial resource strain. Per chart, pt w/ most recent hospitalization on 6/20. Per chart, pt spoke w/ cardiology today and stated that he has applied for health insurance, but the status is still pending at this time.    OP SWCM placed call to pt and received pt's VM. OP SWCM left message introducing self, role and reason for calling and asked for pt to return call to discuss referral needs. OP SWCM will await a call back and f/u as needed.

## 2024-06-27 NOTE — TELEPHONE ENCOUNTER
Dr Mix, are you willing to control patients Coumadin and if so,can you please give me transition instructions.   Thank you

## 2024-07-01 ENCOUNTER — TELEPHONE (OUTPATIENT)
Dept: CARDIOLOGY CLINIC | Facility: CLINIC | Age: 59
End: 2024-07-01

## 2024-07-01 ENCOUNTER — ANTICOAG VISIT (OUTPATIENT)
Dept: CARDIOLOGY CLINIC | Facility: CLINIC | Age: 59
End: 2024-07-01

## 2024-07-01 DIAGNOSIS — I48.3 TYPICAL ATRIAL FLUTTER (HCC): Primary | ICD-10-CM

## 2024-07-01 NOTE — TELEPHONE ENCOUNTER
----- Message from Ashley Campbell PA-C sent at 6/25/2024  7:52 AM EDT -----    This patient recently underwent an atrial flutter ablation, and there is concern for possible atrial fibrillation moving forward.  He does not currently have insurance, but has applied and is awaiting updates.    In the meantime, we are unable to proceed with loop recorder as this would need to be paid out-of-pocket.  Per Dr. Lopez, he should stay on long-term anticoagulation, as he was asymptomatic with atrial flutter and may not be able to tell us if he has atrial fibrillation.    He will need to be transition from Xarelto to Coumadin.  Given that he does not have insurance, I am not sure of the out-of-pocket cost for this.  I have included Dr. Rodríguez as well as our Coumadin clinic, to see how we can best navigate this moving forward. I am not sure if Dr. Rodríguez also has a coumadin clinic or other resources for this patient. In the meantime, he will continue his one month free supply of Xarelto.     Please let me know what I can do, or if any orders are needed.  I would really appreciate the help.  The patient is aware of the need to change to Coumadin.  He is calling today to see if there are any updates on his insurance status.      ----- Message -----  From: Nilay Lopez MD  Sent: 6/24/2024   6:51 PM EDT  To: Ashley Campbell PA-C; Naomi Og    Then do not go for implant  Continue with blood thinner      ----- Message -----  From: Naomi Og  Sent: 6/24/2024   1:37 PM EDT  To: Nilay Lopez MD; Ashley Campbell PA-C; #    I already reached out to pre auth dept to Astrid Jernigan and this is what she just informed me:      [1:35 PM] Astrid Jernigan  He actually does not have any active coverage  I'm just showing that he had eponyms that's EP OMS and that's just a behavioral health tracker. So it's not even behavioral health coverage. He just has that tracker in there. So no active coverage with him with MMA or any other  "plan. So he would be self pay.    Please advise next step.       Thanks,  Naomi English"Pat\" Earle      ----- Message -----  From: Ashley Campbell PA-C  Sent: 6/24/2024   1:18 PM EDT  To: Nilya Lopez MD; Naomi Og    So to try and close this loop - case management told me this patient applied for insurance and it is pending. I have no idea how long that will take, so I think we need to figure that part out first.     IF HE CAN GET A LOOP - Dr. Lopez, could he then come off of anticoagulation? If yes, then we may not need to change him to coumadin.     IF HE CAN'T GET A LOOP OR CANNOT COME OFF OF AC, then we need to talk to him about coumadin.     Pat, if you wouldn't mind reaching out to the patient to see where things stand with insurance. That is honestly something that I don't know much about. He has a lot of things in the air and I am afraid he will fall through the cracks.     Thank you!      ----- Message -----  From: Nilay Lopez MD  Sent: 6/24/2024  11:38 AM EDT  To: Ashley Campbell PA-C; Naomi Og    Loop if insurance covers      ----- Message -----  From: Naomi Og  Sent: 6/24/2024   9:27 AM EDT  To: Nilay Lopez MD; Ashley Campbell PA-C; #      So LOOP or NO LOOP?     Thanks,  Naomi \"Pat\" Earle      ----- Message -----  From: Nilay Lopez MD  Sent: 6/23/2024  12:29 PM EDT  To: Ashley Campbell PA-C; #    Agree with plan for long term anticoagulation with coumadin      ----- Message -----  From: Ashley Campbell PA-C  Sent: 6/22/2024   1:29 PM EDT  To: Nilay Lopez MD; #    Hi everyone,     This patient will need a 4-6 week follow up appointment status post atrial flutter ablation by Dr. Lopez, please call to arrange.     He had a flutter ablation, and we are recommending loop recorder implantation moving forward so that we can safely discontinue anticoagulation. The problem is that he doesn't have insurance. I was told by case management that he applied for something and it's pending, but I " don't know what to do in this case. Is there something that would still help us proceed with a loop?    I have included Dr. Lopez on this as well, since he did the flutter ablation.     Dr. Lopez - I have given him a 30 day supply of Xarelto, and asked him to call us for samples after that. Would you feel comfortable with him stopping AC without a loop? He did not feel his aflutter and has risk factors for afib. If long term AC is needed then he should likely be transitioned to coumadin.    Just wanted to get everyone on the same page. Thanks!  Ashley

## 2024-07-01 NOTE — PROGRESS NOTES
Renny Deleon MD   to Me        6/28/24  3:27 PM   Start coumadin 2.5 mg daily.  Check INR in one week.  DC Xarelto upon starting coumadin.    Attempted to contact patient, left message to call office to discuss transitioning from Xarelto to Coumadin. Phone # provided.

## 2024-07-01 NOTE — TELEPHONE ENCOUNTER
"I called patient to follow up regarding the LOOP Recorder Implant referral. Asked patient if had any update on insurance and he answered no. Patient said will reach out to his  for an update on his insurance and then he'll call 's office to inform. Patient does not wish to schedule procedure without insurance. Informed patient once he has active insurance to call and schedule procedure. Sent MyChart/mailed letter.      Thanks,  Naomi \"Pat\" Earle     "

## 2024-07-01 NOTE — LETTER
"   7/1/2024      MRN: [id number]        Torey Del Castillo .  [address] [facility] [address]      You have been referred to our Cardiology department to be scheduled for a LOOP Recorder Implant procedure.     Please call me at [phone] once you have active health insurance to schedule your procedure.      Thank you,   Naomi \"Pat\" Earle  Surgery Coordinator  [facility]   [address] [place] [id number]  Teams: [phone]  "

## 2024-07-02 ENCOUNTER — TELEPHONE (OUTPATIENT)
Dept: CARDIOLOGY CLINIC | Facility: CLINIC | Age: 59
End: 2024-07-02

## 2024-07-02 DIAGNOSIS — I48.3 TYPICAL ATRIAL FLUTTER (HCC): Primary | ICD-10-CM

## 2024-07-02 RX ORDER — WARFARIN SODIUM 2.5 MG/1
TABLET ORAL
Qty: 30 TABLET | Refills: 1 | Status: SHIPPED | OUTPATIENT
Start: 2024-07-02

## 2024-07-02 NOTE — PROGRESS NOTES
7/2/24~ Spoke with patient, introduced self, educated on Coumadin:  ~how it work, goal set for patient, dose adjusted according to INR, non fasting  ~dosing at night  ~what affects INR, diet and medications, advised to call with any medication or diet changes, when starting OTC medications or supplements, alcohol makes blood thin  ~need for frequent blood work in the beginning, standing INR order placed, usually good for 1 year  ~signs of bleeding  ~will only ask to cut tablet in 1/2 only  ~phone # provided  ~discussed My Chart    Patient states he has about 2 weeks worth of Xarelto left, will call when he has about 1 weeks worth to discuss transition.    Educational information mailed to patient.         I called and left a detailed voicemail stating that Aquacel Ag can be used, if this is not readily available otc iodine applied on a sterile 4x4 to the wound until iodosorb gel is delivered. Advised to call back with any further questions.    Conchis FREEMAN CMA

## 2024-07-05 DIAGNOSIS — R05.1 ACUTE COUGH: ICD-10-CM

## 2024-07-08 ENCOUNTER — PATIENT OUTREACH (OUTPATIENT)
Dept: FAMILY MEDICINE CLINIC | Facility: CLINIC | Age: 59
End: 2024-07-08

## 2024-07-08 RX ORDER — BENZONATATE 100 MG/1
CAPSULE ORAL
Qty: 20 CAPSULE | Refills: 0 | Status: SHIPPED | OUTPATIENT
Start: 2024-07-08 | End: 2024-07-16 | Stop reason: ALTCHOICE

## 2024-07-08 NOTE — PROGRESS NOTES
Medication Patient Assistance Program (MPAP) Specialist  received In Basket message from clinician regarding patient's ability to afford his medications. MPAP specialist reviewed patient chart prior to outreach.  Patient is being followed by Amanda LEBRON. MPAP specialist reached out to patient to determine eligibility for patient assistance program for Xarelto. Patient states he is taking Xarelto for 2 months. MPAP specialist explained her programs are for ongoing medication needs and most program approvals are for 1 year. Patient disclosed his is currently applying for disability and Welfare. MPAP specialist encouraged patient to reach out to [employer] and provided patient their number for questions he may have. MPAP specialist encouraged patient to discuss his needs with AMANDA LEBRON. Patient stated he currently receives SNAP benefits. MPAP specialist encouraged patient to speak with clinician regarding his medications. MPAP specialist will reach out to clinician following patient's appointment on 07/11/2024 to determine Patient assistance program need. MPAP specialist has sent IB message to clinician and AMANDA LEBRON.MPAP specialist will await clinician response to determine next steps.

## 2024-07-08 NOTE — PROGRESS NOTES
Pt called SW CM back while SW was 000. Pt left VM asking for call back.    OP SWCM called pt back and received his VM. OP SWCM left message asking pt to call SW back to discuss referral needs.    Addendum:    OP EVERETTCM received call back from pt. Pt wanted to check w/ SW if he was on the SFS program through [facility]. OP SWCM checked and pt was approved from 6/19/2024-6/19/2025. OP EVERETTCM explained SW role and offered to meet w/ pt at his upcoming appt in the office on Thursday. Pt agreeable. SW will meet w/ pt following his apt.

## 2024-07-10 ENCOUNTER — TELEPHONE (OUTPATIENT)
Dept: FAMILY MEDICINE CLINIC | Facility: CLINIC | Age: 59
End: 2024-07-10

## 2024-07-10 DIAGNOSIS — D75.89 MACROCYTIC: ICD-10-CM

## 2024-07-10 RX ORDER — LANOLIN ALCOHOL/MO/W.PET/CERES
1000 CREAM (GRAM) TOPICAL DAILY
Qty: 90 TABLET | Refills: 0 | Status: SHIPPED | OUTPATIENT
Start: 2024-07-10

## 2024-07-10 NOTE — TELEPHONE ENCOUNTER
Patient is still in hospital and cancelled appointment for tomorrow.    He was going to meet with you.

## 2024-07-15 NOTE — PROGRESS NOTES
Cardiology Follow Up    [facility] [address] [address]  PHONE: [phone]  FAX: [phone]    Torey Del Castillo Jr.  1965  [id number]    Assessment/Plan:  H/o Atrial flutter with RVR  H/o CV in 2018  S/p PFA of CTI flutter, 6/21/2024  Chads vasc is 1. Stroke prevention with warfarin. INR goal 2-3.  When he gets medical insurance he can go for Loop.     Chronic dyspnea  Multifactorial including obesity and deconditioning, obstructive sleep apnea that is not treated, and COPD  He has a Pulm appt on 9/4/2024.    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    He has an appt with Dr. Lopez on 8/9/2024.  RTO in 6 months with Dr. Delroy Deleon.    Interval History:   Torey Del Castillo Jr. is a 58 y.o. male with past medical history as below who presents to the office after hospitalization at [facility] from 6/20 - 6/22/2024.  He was sent to the hospital on 6/20 from PCP due to tachycardia and found to be in typical atrial flutter which was treated with ablation.  Since his discharge back home patient denies chest pain, chest pressure, chest discomfort or burning, shortness of breath, palpitations, skipped heartbeats, lightheadedness, dizziness, presyncope or syncope.  Patient denies abdominal distention or peripheral edema.    Patient has been taking his medications without access issues, missed doses or side effects.    Review of Systems   Constitutional: Negative for malaise/fatigue and weight gain.   Cardiovascular:  Negative for chest pain, leg swelling, near-syncope, orthopnea, palpitations, paroxysmal nocturnal dyspnea and syncope.        Pt reports chronic and unchanged solomon especially with climbing the steps in his home   Respiratory:  Negative for cough.    Hematologic/Lymphatic: Bruises/bleeds easily.        On warfarin    Gastrointestinal:  Negative for bloating, abdominal pain, hematochezia and melena.   Genitourinary:  Negative for hematuria.   Neurological:  Negative for dizziness, headaches and light-headedness.     Past Medical History:   Diagnosis Date    Anxiety     Asthma     Colon polyp     GERD (gastroesophageal reflux disease)     History of ileus     Hypertension     Insomnia     Kidney stone     Lumbar herniated disc     last assessed: 3/27/2015    MDD (major depressive disorder)     Patella fracture     last assessed: 3/27/2015    Tobacco abuse       Past Surgical History:   Procedure Laterality Date    BACK SURGERY  2015    Lumbar    CARDIAC CATHETERIZATION N/A 11/5/2021    Procedure: CARDIAC CATHETERIZATION;  Surgeon: Herbie Castaneda MD;  Location: [facility];  Service: Cardiology    CARDIAC ELECTROPHYSIOLOGY PROCEDURE N/A 6/21/2024    Procedure: Cardiac eps/aflutter ablation;  Surgeon: Nilay Lopez MD;  Location: [facility];  Service: Cardiology    COLONOSCOPY      KNEE SURGERY      right    LUMBAR FUSION      last assessed: 3/27/2015    MT RPR UMBILICAL HRNA 5 YRS/> REDUCIBLE N/A 9/14/2022    Procedure: REPAIR HERNIA UMBILICAL w/ mesh;  Surgeon: Edna Siddiqui DO;  Location: [facility] MA[facility];  Service: General    MT TX TIBL SHFT FX IMED IMPLT W/WO SCREWS&/CERCLA Left 3/16/2017    Procedure: INSERTION NAIL IM TIBIA;  Surgeon: Edna Abrams DO;  Location: [facility];  Service: Orthopedics      Family History   Problem Relation Age of Onset    Pancreatic cancer Mother     COPD Father     Heart attack Brother 61        decreased    Drug abuse Brother     Alcohol abuse Brother     Sleep apnea Brother     Cancer Family     Hypertension Family     Heart disease Family       Current Outpatient Medications:     albuterol (PROVENTIL HFA,VENTOLIN HFA) 90 mcg/act inhaler, Inhale 2 puffs every 4 (four) hours as needed for wheezing, Disp: 8.5 g, Rfl: 1    atorvastatin (LIPITOR) 40 mg tablet, TAKE ONE TABLET BY  MOUTH EVERY DAY, Disp: 90 tablet, Rfl: 0    diltiazem (CARDIZEM CD) 300 mg 24 hr capsule, Take 1 capsule (300 mg total) by mouth every morning Do not start before June 23, 2024., Disp: 30 capsule, Rfl: 0    folic acid (FOLVITE) 400 mcg tablet, TAKE ONE TABLET BY MOUTH EVERY DAY, Disp: 90 tablet, Rfl: 0    isosorbide mononitrate (IMDUR) 60 mg 24 hr tablet, TAKE ONE TABLET BY MOUTH ONCE DAILY, Disp: 90 tablet, Rfl: 1    lisinopril (ZESTRIL) 40 mg tablet, TAKE ONE TABLET BY MOUTH EVERY DAY, Disp: 90 tablet, Rfl: 1    LORazepam (ATIVAN) 1 mg tablet, Take 1 tablet (1 mg total) by mouth 2 (two) times a day as needed for anxiety, Disp: 60 tablet, Rfl: 0    omeprazole (PriLOSEC) 40 MG capsule, TAKE ONE CAPSULE BY MOUTH EVERY DAY, Disp: 60 capsule, Rfl: 5    QUEtiapine (SEROquel) 300 mg tablet, Take 1 tablet (300 mg total) by mouth daily at bedtime, Disp: 90 tablet, Rfl: 2    QUEtiapine (SEROquel) 50 mg tablet, TAKE ONE TABLET BY MOUTH EVERY DAY AT BEDTIME WITH  MG FOR MOOD/SLEEP, Disp: , Rfl:     rivaroxaban (Xarelto) 20 mg tablet, Take 1 tablet (20 mg total) by mouth daily with breakfast, Disp: 30 tablet, Rfl: 3    sertraline (ZOLOFT) 50 mg tablet, Take 50 mg by mouth daily at bedtime, Disp: , Rfl:     vitamin B-12 (VITAMIN B-12) 1,000 mcg tablet, TAKE ONE TABLET BY MOUTH EVERY DAY, Disp: 90 tablet, Rfl: 0    warfarin (Coumadin) 2.5 mg tablet, Take 1 tablet daily as directed by MD, Disp: 30 tablet, Rfl: 1    benzonatate (TESSALON PERLES) 100 mg capsule, TAKE TWO CAPSULES BY MOUTH THREE TIMES A DAY AS NEEDED FOR COUGH (Patient not taking: Reported on 7/16/2024), Disp: 20 capsule, Rfl: 0    guaiFENesin (MUCINEX) 600 mg 12 hr tablet, Take 2 tablets (1,200 mg total) by mouth every 12 (twelve) hours, Disp: 60 tablet, Rfl: 0    levothyroxine 25 mcg tablet, TAKE ONE TABLET BY MOUTH EVERY EARLY MORNING (Patient not taking: Reported on 7/16/2024), Disp: 60 tablet, Rfl: 0    methocarbamol (ROBAXIN) 500 mg tablet, Take 1  "tablet (500 mg total) by mouth 2 (two) times a day (Patient not taking: Reported on 7/16/2024), Disp: 20 tablet, Rfl: 0     No Known Allergies    Physical Exam:  Vitals:    07/16/24 1254   BP: 102/64   Pulse: 97     Vitals:    07/16/24 1254   Weight: 136 kg (299 lb 6.4 oz)     Height: 5' 10.5\" (179.1 cm)   Body mass index is 42.35 kg/m².    Physical Exam  Vitals and nursing note reviewed.   Constitutional:       General: He is not in acute distress.  HENT:      Head: Normocephalic and atraumatic.      Nose: Nose normal.      Mouth/Throat:      Mouth: Mucous membranes are moist.   Eyes:      Conjunctiva/sclera: Conjunctivae normal.   Neck:      Vascular: No JVD.   Cardiovascular:      Rate and Rhythm: Rhythm irregular.      Pulses:           Radial pulses are 2+ on the left side.      Heart sounds: S1 normal and S2 normal. No murmur heard.     Comments: Hr approx 90 bpm  Pulmonary:      Effort: Pulmonary effort is normal.      Breath sounds: No wheezing, rhonchi or rales.   Abdominal:      General: There is no distension.      Palpations: Abdomen is soft.   Musculoskeletal:      Comments: Trace edema lle no edema on the right lower extremity. He is wearing his compression stockings   Skin:     General: Skin is warm and dry.      Comments: Well healing small scabs left shin   Neurological:      General: No focal deficit present.      Mental Status: He is alert.   Psychiatric:         Behavior: Behavior normal.     Data:  Pre Ablation NIKOLAS: 6/21/2024  LVEF 45-49%.  Mild global hypokinesis.  Bilateral atria are dilated.  No PFO.  No left atrial appendage thrombus.  Mild MR.    ECHO: 10/2023 showed LVEF 70%     ECG: Today EKG shows sinus rhythm with PAC.  Heart rate 97 bpm.  Normal MD and QRS intervals.  Prolonged QTc at 482 ms.  No evidence of atrial enlargement or ventricular hypertrophy.  No evidence of prior infarct or current ischemia.    Polly Joseph PA-C  [facility]  "

## 2024-07-15 NOTE — ASSESSMENT & PLAN NOTE
H/o CV in 2018  S/p PFA of CTI flutter, 6/21/2024  Chads vasc 1. Stroke prevention with warfarin. INR goal 2-3.  When he gets medical insurance he can go for Loop.

## 2024-07-16 ENCOUNTER — OFFICE VISIT (OUTPATIENT)
Dept: CARDIOLOGY CLINIC | Facility: CLINIC | Age: 59
End: 2024-07-16

## 2024-07-16 VITALS
BODY MASS INDEX: 41.92 KG/M2 | DIASTOLIC BLOOD PRESSURE: 64 MMHG | SYSTOLIC BLOOD PRESSURE: 102 MMHG | WEIGHT: 299.4 LBS | HEART RATE: 97 BPM | HEIGHT: 71 IN

## 2024-07-16 DIAGNOSIS — I48.3 TYPICAL ATRIAL FLUTTER (HCC): Primary | ICD-10-CM

## 2024-07-16 PROCEDURE — 93000 ELECTROCARDIOGRAM COMPLETE: CPT | Performed by: PHYSICIAN ASSISTANT

## 2024-07-16 PROCEDURE — 99214 OFFICE O/P EST MOD 30 MIN: CPT | Performed by: PHYSICIAN ASSISTANT

## 2024-07-17 DIAGNOSIS — D75.89 MACROCYTIC: ICD-10-CM

## 2024-07-17 DIAGNOSIS — E78.5 DYSLIPIDEMIA WITH ELEVATED LOW DENSITY LIPOPROTEIN (LDL) CHOLESTEROL AND ABNORMALLY LOW HIGH DENSITY LIPOPROTEIN CHOLESTEROL: ICD-10-CM

## 2024-07-17 RX ORDER — ATORVASTATIN CALCIUM 40 MG/1
TABLET, FILM COATED ORAL
Qty: 90 TABLET | Refills: 0 | Status: SHIPPED | OUTPATIENT
Start: 2024-07-17

## 2024-07-17 RX ORDER — LANOLIN ALCOHOL/MO/W.PET/CERES
400 CREAM (GRAM) TOPICAL DAILY
Qty: 90 TABLET | Refills: 0 | Status: SHIPPED | OUTPATIENT
Start: 2024-07-17

## 2024-07-22 ENCOUNTER — PATIENT OUTREACH (OUTPATIENT)
Dept: FAMILY MEDICINE CLINIC | Facility: CLINIC | Age: 59
End: 2024-07-22

## 2024-07-22 ENCOUNTER — OFFICE VISIT (OUTPATIENT)
Dept: FAMILY MEDICINE CLINIC | Facility: CLINIC | Age: 59
End: 2024-07-22

## 2024-07-22 VITALS
OXYGEN SATURATION: 95 % | HEART RATE: 92 BPM | TEMPERATURE: 98.2 F | DIASTOLIC BLOOD PRESSURE: 72 MMHG | SYSTOLIC BLOOD PRESSURE: 112 MMHG | RESPIRATION RATE: 18 BRPM | BODY MASS INDEX: 42.46 KG/M2 | HEIGHT: 70 IN | WEIGHT: 296.6 LBS

## 2024-07-22 DIAGNOSIS — R55 SYNCOPE, UNSPECIFIED SYNCOPE TYPE: ICD-10-CM

## 2024-07-22 DIAGNOSIS — I48.3 TYPICAL ATRIAL FLUTTER (HCC): Primary | ICD-10-CM

## 2024-07-22 DIAGNOSIS — G47.9 SLEEP TROUBLE: ICD-10-CM

## 2024-07-22 DIAGNOSIS — E03.9 HYPOTHYROIDISM, UNSPECIFIED TYPE: ICD-10-CM

## 2024-07-22 PROCEDURE — 99213 OFFICE O/P EST LOW 20 MIN: CPT | Performed by: FAMILY MEDICINE

## 2024-07-22 NOTE — PROGRESS NOTES
Ambulatory Visit  Name: Torey Del Castillo Jr.      : 1965      MRN: [id number]  Encounter Provider: Ashtyn Sibley MD  Encounter Date: 2024   Encounter department: [facility]    Assessment & Plan   1. Typical atrial flutter (HCC)  Assessment & Plan:  - Patient went into the hospital Tuesday of last week, SOB, lightlessness, palpations and dizziness and fall   -In the ED patient reports that he was noted to be in a flutter.  Patient treated with antiarrhythmics per patient.  Patient denies any cardioversion.(Of note do not have much records of this hospitalization given that is not noted in care everywhere)  - Patient currently on Xaltero 20 mg -->> transition to warfarin (will follow with cardio for warfarin clinic)   - Currently on Cardizem 300 mg daily.  He states that he was placed on another medication temporarily to help revert him back to normal sinus rhythm.  Patient states he is not aware of what the name of this medication is.  Patient also states that he will no longer be taking medication was only for short period time.  2. Sleep trouble  Assessment & Plan:  - Patient with a history of PRAKASH who was supposed to have a CPAP.  - Patient reports he never got his CPAP machine such still has snoring and daytime sleepiness  - Patient also reports that he has abrupt moments of sleepiness during the day.    - States he has a family history of narcolepsy as such would like to be evaluated for this by sleep  - Instructed patient to call or follow-up about CPAP  Orders:  -     Ambulatory Referral to Sleep Medicine; Future  3. Syncope, unspecified syncope type  Assessment & Plan:  - Patient reports syncopal like episodes.  Patient unable to give great details about the event.  States that he will be normal and then suddenly just falls asleep and then wakes up.  Patient states upon waking up he noted to have gasping for air.  - Patient does have sleep apnea and  does not currently have CPAP.  Patient follows with cardiology and has had since echo.  - Will also order carotid Dopplers; patient had blood work done within the last month so we will only order repeat thyroid  - Will start with this workup and have patient follow-up with PCP. Plan discussed with Dr. Rodríguez   Orders:  -     VAS carotid complete study; Future; Expected date: 07/22/2024  4. Hypothyroidism, unspecified type  Assessment & Plan:  Thyroid    Lab Results   Component Value Date    YXS5PVPBVNWZ 8.805 (H) 06/20/2024    TSO7UNYFDYPA 3.481 04/01/2024    EQG6RXGTKWST 8.008 (H) 09/27/2023    FREET4 0.70 06/20/2024    FREET4 0.52 (L) 09/27/2023     Patient with hypothyroidism previously on levothyroxine 25 mcg  Currently patient reports that he is no longer medication DC'd prior.  Will order repeat TSH as last TSH was noted to be elevated  Orders:  -     TSH, 3rd generation with Free T4 reflex; Future       History of Present Illness     Patient here for hospital follow-up.  Patient reports that he was hospitalized at [facility] for a flutter.  Patient reports that during that hospitalization he was given a action medication and was reverted back to normal sinus rhythm.  Since that time patient states that he has remained in normal sinus rhythm, denies any symptoms of chest pain, nausea or vomiting, blurry vision, palpitation, shortness of breath or headaches. states that he has only 2 more days of Xarelto and will switch to warfarin which per provided by cardiology.  Patient reports that he will be attending warfarin clinic provided by cardiology.    Patient was reportedly sleeping episodes that occur intermittently.  States that he does have OSH but has not had his CPAP.  Also reports that he has a family history of narcolepsy and as such would like to be worked up for it.        Review of Systems   Eyes:  Negative for visual disturbance.   Respiratory:  Positive for apnea. Negative for shortness of breath  and wheezing.    Cardiovascular:  Negative for chest pain and palpitations.   Gastrointestinal:  Negative for abdominal pain, diarrhea and vomiting.     Current Outpatient Medications on File Prior to Visit   Medication Sig Dispense Refill    albuterol (PROVENTIL HFA,VENTOLIN HFA) 90 mcg/act inhaler Inhale 2 puffs every 4 (four) hours as needed for wheezing 8.5 g 1    atorvastatin (LIPITOR) 40 mg tablet TAKE ONE TABLET BY MOUTH EVERY DAY 90 tablet 0    diltiazem (CARDIZEM CD) 300 mg 24 hr capsule Take 1 capsule (300 mg total) by mouth every morning Do not start before June 23, 2024. 30 capsule 0    folic acid (FOLVITE) 400 mcg tablet TAKE ONE TABLET BY MOUTH EVERY DAY 90 tablet 0    isosorbide mononitrate (IMDUR) 60 mg 24 hr tablet TAKE ONE TABLET BY MOUTH ONCE DAILY 90 tablet 1    lisinopril (ZESTRIL) 40 mg tablet TAKE ONE TABLET BY MOUTH EVERY DAY 90 tablet 1    LORazepam (ATIVAN) 1 mg tablet Take 1 tablet (1 mg total) by mouth 2 (two) times a day as needed for anxiety 60 tablet 0    omeprazole (PriLOSEC) 40 MG capsule TAKE ONE CAPSULE BY MOUTH EVERY DAY 60 capsule 5    QUEtiapine (SEROquel) 300 mg tablet Take 1 tablet (300 mg total) by mouth daily at bedtime 90 tablet 2    QUEtiapine (SEROquel) 50 mg tablet TAKE ONE TABLET BY MOUTH EVERY DAY AT BEDTIME WITH  MG FOR MOOD/SLEEP      rivaroxaban (Xarelto) 20 mg tablet Take 1 tablet (20 mg total) by mouth daily with breakfast 30 tablet 3    sertraline (ZOLOFT) 50 mg tablet Take 50 mg by mouth daily at bedtime      vitamin B-12 (VITAMIN B-12) 1,000 mcg tablet TAKE ONE TABLET BY MOUTH EVERY DAY 90 tablet 0    warfarin (Coumadin) 2.5 mg tablet Take 1 tablet daily as directed by MD 30 tablet 1     No current facility-administered medications on file prior to visit.      Social History     Tobacco Use    Smoking status: Every Day     Current packs/day: 0.00     Average packs/day: 1.5 packs/day for 34.0 years (51.0 ttl pk-yrs)     Types: Cigarettes     Start date:  "1987     Last attempt to quit: 2021     Years since quitting: 3.5    Smokeless tobacco: Never   Vaping Use    Vaping status: Never Used   Substance and Sexual Activity    Alcohol use: Not Currently     Comment: Hx of a couple of drinks a night, or everyother night    Drug use: No    Sexual activity: Not Currently     Partners: Female     Objective     /72 (BP Location: Left arm, Patient Position: Sitting, Cuff Size: Large)   Pulse 92   Temp 98.2 °F (36.8 °C) (Temporal)   Resp 18   Ht 5' 10\" (1.778 m)   Wt 135 kg (296 lb 9.6 oz)   SpO2 95%   BMI 42.56 kg/m²     Physical Exam  Vitals and nursing note reviewed.   Constitutional:       General: He is not in acute distress.     Appearance: He is well-developed.   HENT:      Head: Normocephalic.      Nose: No congestion.      Mouth/Throat:      Mouth: Mucous membranes are moist.   Eyes:      Conjunctiva/sclera: Conjunctivae normal.   Cardiovascular:      Rate and Rhythm: Normal rate and regular rhythm.   Pulmonary:      Effort: Pulmonary effort is normal. No respiratory distress.      Breath sounds: Normal breath sounds.   Abdominal:      Palpations: Abdomen is soft.      Tenderness: There is no abdominal tenderness.   Skin:     General: Skin is warm and dry.      Capillary Refill: Capillary refill takes less than 2 seconds.   Neurological:      Mental Status: He is alert.   Psychiatric:         Mood and Affect: Mood normal.       Administrative Statements   I have spent a total time of 20 minutes in caring for this patient on the day of the visit/encounter including Diagnostic results, Prognosis, Risks and benefits of tx options, Instructions for management, Patient and family education, Importance of tx compliance, Risk factor reductions, Impressions, Counseling / Coordination of care, Documenting in the medical record, Reviewing / ordering tests, medicine, procedures  , Obtaining or reviewing history  , and Communicating with other healthcare professionals " .

## 2024-07-22 NOTE — ASSESSMENT & PLAN NOTE
- Patient with a history of PRAKASH who was supposed to have a CPAP.  - Patient reports he never got his CPAP machine such still has snoring and daytime sleepiness  - Patient also reports that he has abrupt moments of sleepiness during the day.    - States he has a family history of narcolepsy as such would like to be evaluated for this by sleep  - Instructed patient to call or follow-up about CPAP

## 2024-07-22 NOTE — ASSESSMENT & PLAN NOTE
- Patient went into the hospital Tuesday of last week, SOB, lightlessness, palpations and dizziness and fall   -In the ED patient reports that he was noted to be in a flutter.  Patient treated with antiarrhythmics per patient.  Patient denies any cardioversion.(Of note do not have much records of this hospitalization given that is not noted in care everywhere)  - Patient currently on Xaltero 20 mg -->> transition to warfarin (will follow with cardio for warfarin clinic)   - Currently on Cardizem 300 mg daily.  He states that he was placed on another medication temporarily to help revert him back to normal sinus rhythm.  Patient states he is not aware of what the name of this medication is.  Patient also states that he will no longer be taking medication was only for short period time.

## 2024-07-22 NOTE — ASSESSMENT & PLAN NOTE
- Patient reports syncopal like episodes.  Patient unable to give great details about the event.  States that he will be normal and then suddenly just falls asleep and then wakes up.  Patient states upon waking up he noted to have gasping for air.  - Patient does have sleep apnea and does not currently have CPAP.  Patient follows with cardiology and has had since echo.  - Will also order carotid Dopplers; patient had blood work done within the last month so we will only order repeat thyroid  - Will start with this workup and have patient follow-up with PCP. Plan discussed with Dr. Rodríguez

## 2024-07-22 NOTE — PROGRESS NOTES
Medication Patient Assistance Program (MPAP) Specialist  met with patient following his visit with Clinician. Patient unable to afford Xarelto. MPAP specialist spoke with Clinician and patient. MPAP specialist gave patient her card and assisted patient with Xarelto patient assistance program application from [employer]. Patient states he currently has a few pills left, but will reach out to Cardiology for help. Patient has applied for Medical assistance, but has not received a determination yet. Patient states he will call [facility] again this afternoon. MPAP specialist explained patient assistance program and its process. MPAP specialist thanked patient and encouraged him to reach out with any questions regarding the patient assistance program. MPAP specialist received Clinician portion of application. MPAP specialist has faxed application  and scanned completed application into [employer]. MPAP specialist will follow up in 10-14 biusiness days to determine application status. MPAP specialist has placed personal reminder.

## 2024-07-23 ENCOUNTER — TELEPHONE (OUTPATIENT)
Dept: CARDIOLOGY CLINIC | Facility: CLINIC | Age: 59
End: 2024-07-23

## 2024-07-23 ENCOUNTER — PATIENT OUTREACH (OUTPATIENT)
Dept: FAMILY MEDICINE CLINIC | Facility: CLINIC | Age: 59
End: 2024-07-23

## 2024-07-23 NOTE — TELEPHONE ENCOUNTER
Patient called, states he spoke with Edna, with medication assistance program.  Application to [employer] was submitted yesterday for Xarelto, states he only has one pill left, asking for samples until he gets determination.    Xarelto 20 mg, 3 bottles of 7 tablets given  Lot # [id number] exp 9/26    Placed at , patient states he cannot pick them up, but his friend Yeimy Bhandari will pick them up.

## 2024-07-23 NOTE — PROGRESS NOTES
Medication Patient Assistance Program (MPAP) Specialist received Voicemail from patient regarding  his Medical assistance application. Patient requested return call. MPAP specialist spoke with patient . Patient stated he was denied for Medical assistance, but spoke with another  who will try to reapply. MPAP specialist explained patient's application for Xarelto was faxed. Patient stated he received call from [employer]. MPAP specialist encouraged patient to call cardiologist office regarding his Xarelto and [employer]. MPAP specialist thanked patient. MPAP specialist placed personal reminder.   Patient called to update MPAP specialist. Patient stated he will be going to Cardiology to  Xarelto from their office. Patient stated [employer] stated he put his birth date instead of day of signature on application. Patient requested MPAP specialist correct and resend. MPAP specialist stated she would. MPAP specialist made requested changes and re faxed to [employer] patient assistance program.

## 2024-07-24 ENCOUNTER — TELEPHONE (OUTPATIENT)
Dept: FAMILY MEDICINE CLINIC | Facility: CLINIC | Age: 59
End: 2024-07-24

## 2024-07-24 ENCOUNTER — PATIENT OUTREACH (OUTPATIENT)
Dept: FAMILY MEDICINE CLINIC | Facility: CLINIC | Age: 59
End: 2024-07-24

## 2024-07-24 NOTE — TELEPHONE ENCOUNTER
To be completed by: Edna Pichardo    Form: Kristofer Miner    Fax:  [phone]    Folder: ELSA

## 2024-07-24 NOTE — PROGRESS NOTES
Medication Patient Assistance Program (MPAP) Specialist  received  email from clinical staff with [employer]. Per [employer] message, Nishant Carrion ([employer]) patient assistance program representative called MPAP specialist to discuss patient's application for Xarelto. Per [employer] , patient had dated application with his birthday instead of the day he signed. [employer] rep requested return call. MPAP specialist reached out to [employer]. MPAP specialist spoke with NANNETTE [employer] rep. Per [employer] rep she spoke with patient yesterday. [employer] rep confirmed receipt of updated application. Per [employer] rep patient's application should be processed in next day or 2. [employer] rep stated a new application may need to be completed since date was crossed out. MPAP specialist stated understanding. MPAP specialist will follow up with [employer] and determine application status and next steps. MPAP specialist has placed a personal reminder.

## 2024-07-26 ENCOUNTER — PATIENT OUTREACH (OUTPATIENT)
Dept: FAMILY MEDICINE CLINIC | Facility: CLINIC | Age: 59
End: 2024-07-26

## 2024-07-26 NOTE — PROGRESS NOTES
Medication Patient Assistance Program (MPAP) Specialist  received faxed letter from [employer] ([employer]) patient assistance program for Xarelto. Per faxed letter dated 07/23/2024. Patient's application had the wrong date with his signature. [employer] requested tax return pages 1&2. MPAP  specialist called patient to discuss documentation needed for application determination. Patient stated he will get copied of 1040 to MPAP specialist and will follow up with [employer] patient assistance program. MPAP specialist will follow up with [employer] patient assistance program  after required documentation is received. MPAP specialist has placed personal reminder. MPAP specialist has scanned faxed letter into [employer].

## 2024-08-02 ENCOUNTER — PATIENT OUTREACH (OUTPATIENT)
Dept: FAMILY MEDICINE CLINIC | Facility: CLINIC | Age: 59
End: 2024-08-02

## 2024-08-02 NOTE — PROGRESS NOTES
Medication Patient Assistance Program (MPAP) Specialist  received personal reminder notification regarding patient's application for Medical assistance and [employer] ([employer]) Xarelto patient assistance program application. MPAP specialist successfully reached patient. Patient states he has been denied MA due to discrepancy from 14 years ago. Patient states he is unsure what to do. MPAP specialist encouraged patient to speak with SW CM as she may be able to provide a resource to speak with. Patient stated he would reach out to [facility]. Patient states he has not received any communications from [employer]. MPAP specialist requested patient bring MA denial letter to clinician's office and have copy made for MPAP specialist. MPAP specialist explained [employer] may asa temporary approval pending MA denial. Patient stated understanding and that he will bring letter to clinician's office. MPAP specialist thanked patient. MPAP specialist stated she will follow up with [employer] in a few weeks to determine application status. Patient stated understanding. MPAP specialist has placed personal reminder.

## 2024-08-06 ENCOUNTER — PATIENT OUTREACH (OUTPATIENT)
Dept: FAMILY MEDICINE CLINIC | Facility: CLINIC | Age: 59
End: 2024-08-06

## 2024-08-06 NOTE — PROGRESS NOTES
SW due to outreach pt.    Chart review completed. Per chart review, MPAP specialist spoke to pt on 8/2 and pt stated that he was denied MA due to a discrepancy from 14 years ago. Per chart, MPAP specialist advised pt to reach out to SW to discuss resources for assistance in that. SW has not received any call from pt.    SW placed call to pt and received his VM. SW left message requesting that pt call SW back if he is needing assistance or information for assistance. SW will await a call back and f/u as needed.

## 2024-08-13 ENCOUNTER — TELEPHONE (OUTPATIENT)
Age: 59
End: 2024-08-13

## 2024-08-13 ENCOUNTER — TELEPHONE (OUTPATIENT)
Dept: CARDIOLOGY CLINIC | Facility: CLINIC | Age: 59
End: 2024-08-13

## 2024-08-13 DIAGNOSIS — I48.92 ATRIAL FLUTTER, UNSPECIFIED TYPE (HCC): Primary | ICD-10-CM

## 2024-08-13 NOTE — TELEPHONE ENCOUNTER
Caller: Venus from "[employer]    Doctor: Dr. Deleon    Reason for call: Venus from "[employer] called stating they received the request for the Xarelto but the patient is also taking Diltiazem. She stated the Diltizaem can increase blood thinner effects of the Xarelto and needs a doctor to okay it.    Call back#: [phone]

## 2024-08-13 NOTE — TELEPHONE ENCOUNTER
Pt received assistance card from [employer] requested Rx to [facility] for xarelto 90 day supply  Sent as requested.    Pulse Delay (In Milliseconds): 15 Anesthesia Volume In Cc: 0 Post-Care Instructions: I reviewed with the patient in detail post-care instructions. Patient should stay away from the sun and wear sun protection until treated areas are fully healed. Treatment Number: 1 Pulse Mode: triple Detail Level: Zone Cooling: 100 Treated Area: medium area Frequency: 1 Hz Applicator:  580 Consent: Written consent obtained, risks reviewed including but not limited to crusting, scabbing, blistering, scarring, darker or lighter pigmentary change, bruising, and/or incomplete response. Price (Use Numbers Only, No Special Characters Or $): 150 Fluence Units: J/cm2 Pulse Duration (In Milliseconds): 10

## 2024-08-14 ENCOUNTER — TELEPHONE (OUTPATIENT)
Dept: CARDIOLOGY CLINIC | Facility: CLINIC | Age: 59
End: 2024-08-14

## 2024-08-14 NOTE — TELEPHONE ENCOUNTER
Samples of xarelto given til pharmacy can order it for him  Given 2 bottles   Xarelto  20 mg   Lot  [id number]  exp 9/26

## 2024-08-16 ENCOUNTER — PATIENT OUTREACH (OUTPATIENT)
Dept: FAMILY MEDICINE CLINIC | Facility: CLINIC | Age: 59
End: 2024-08-16

## 2024-08-16 NOTE — PROGRESS NOTES
Medication Patient Assistance Program (MPAP) Specialist  received personal reminder notification regarding patient's Xarelto application. MPAP specialist reviewed patient chart prior to outreach. MPAP specialist spoke with [employer] ( [employer]) representative [employer]. Per [employer] rep patient has received temporary approval for Xarelto. Patient was shipped a card in the mail to receive his Xarelto for free. Patient's approval is from 07/24/2024-11/21/2024. Patient needs to apply for and be denied Medicaid to maintain his eligibility for patient assistance program. MPAP specialist thanked [employer] rep. [employer] rep stated she will fax approval letter to clinician's office. MPAP specialist attempted to reach patient regarding his temporary approval and to Request MA denial letter. MPAP specialist was unable to reach patient. MPAP specialist left voicemail with her name (Edna Pichardo), stated she was patient assistance  and number [phone]. MPAP specialist requested a return call. MPAP specialist will follow up with patient if no return call is received. MPAP specialist has placed personal reminder. MPAP specialist has updated care coordination note. MPAP specialist has updated patient tracking sheet.

## 2024-08-16 NOTE — TELEPHONE ENCOUNTER
Made contact with the pharmacy and they are now aware of Polly Joseph's response. The Pharmacy already has the medication ready for the patient.

## 2024-08-27 ENCOUNTER — PATIENT OUTREACH (OUTPATIENT)
Dept: FAMILY MEDICINE CLINIC | Facility: CLINIC | Age: 59
End: 2024-08-27

## 2024-08-27 NOTE — PROGRESS NOTES
OP SWCM due to outreach pt.    SW placed call to pt and received his VM. SW left a message requesting a call back. This is SW 2nd outreach attempt w/ no response or call back from pt.    SW completed a chart review. MPAP specialist Edna Pichardo noted pt received temporary approval for Xarelto through [employer] from 7/24/2024-11/21/2024 and would need to apply for and be denied MA in order to maintain eligibility with PAP. SW checked [facility] acct and pt approved for EPOMS- non medic MA.    SW to send UTR letter via [employer]. SW remains available should pt call back for assistance.    Addendum:  Pt returned SW call and left a message as SW was unavailable at time of call.    SW returned call to pt. Pt stated that he is running out of his medications. Pt was denied MA and cannot afford his medications. He also has not been keeping up w/ doctor appts due to not having insurance coverage. He states that he is currently unable to work. He states that he is in the process of applying for SSDI, but when speaking w/ unemployment office, they said he could be eligible for unemployment and fears this will effect him getting SSDI. Pt then stated that unemployment recognized his inability to work and retracted that statement.    Pt did confirm that he received a 90 day supply of his medication from [employer]. SW advised him that he will not be able to get the medication through PAP if he does not have a copy of his denial letter. Pt wants to try and appeal the denial for MA. SW advised pt to contact the county office to try and appeal their denial for MA. Pt agreeable and will contact  with any questions.    Pt has upcoming appt next week in the office w/ Dr. Rodríguez. SW will plan to meet him following his appt.

## 2024-08-27 NOTE — LETTER
[address] [address]  [phone]    Re:    8/27/2024       Dear Torey,    I am the   at [facility]. I have tried to reach you by phone on 8/6 and 8/27 and was unfortunately unable to reach you.  It is important that you contact the me directly as soon as possible at [phone] should you require further assistance.    Sincerely,         Ashley Encinas

## 2024-09-05 ENCOUNTER — PATIENT OUTREACH (OUTPATIENT)
Dept: FAMILY MEDICINE CLINIC | Facility: CLINIC | Age: 59
End: 2024-09-05

## 2024-09-05 ENCOUNTER — OFFICE VISIT (OUTPATIENT)
Dept: FAMILY MEDICINE CLINIC | Facility: CLINIC | Age: 59
End: 2024-09-05

## 2024-09-05 VITALS
TEMPERATURE: 98.1 F | WEIGHT: 281.2 LBS | DIASTOLIC BLOOD PRESSURE: 82 MMHG | HEIGHT: 70 IN | SYSTOLIC BLOOD PRESSURE: 125 MMHG | HEART RATE: 99 BPM | RESPIRATION RATE: 18 BRPM | BODY MASS INDEX: 40.26 KG/M2 | OXYGEN SATURATION: 98 %

## 2024-09-05 DIAGNOSIS — E03.9 HYPOTHYROIDISM, UNSPECIFIED TYPE: Primary | ICD-10-CM

## 2024-09-05 DIAGNOSIS — I10 PRIMARY HYPERTENSION: ICD-10-CM

## 2024-09-05 DIAGNOSIS — F41.8 DEPRESSION WITH ANXIETY: ICD-10-CM

## 2024-09-05 DIAGNOSIS — R63.4 WEIGHT LOSS: ICD-10-CM

## 2024-09-05 DIAGNOSIS — I48.92 ATRIAL FLUTTER, UNSPECIFIED TYPE (HCC): ICD-10-CM

## 2024-09-05 PROBLEM — R00.0 TACHYCARDIA, UNSPECIFIED: Status: RESOLVED | Noted: 2021-01-08 | Resolved: 2024-09-05

## 2024-09-05 PROBLEM — R06.02 SHORTNESS OF BREATH: Status: RESOLVED | Noted: 2023-02-23 | Resolved: 2024-09-05

## 2024-09-05 PROCEDURE — 99214 OFFICE O/P EST MOD 30 MIN: CPT | Performed by: FAMILY MEDICINE

## 2024-09-05 NOTE — PROGRESS NOTES
Pt came into office for appt and asked to meet with JORI prior to appt. SW met w/ pt before his appt. Pt stated that he had letters from Unemployment determination that he is needing assistance in printing. SW provided pt w/ the documents from unemployment.     SW reviewed pt's chart and these documents were needed for his MA application w/ PATHS. Pt was planning to meet w/ the [facility] Financial Counselor in the office today, as well as MPAP regarding his PAP and needing MA denial.    EVERETT advised pt to contact EVERETT w/ any questions. EVERETT will follow up next month at pt's appt to see if pt is established with MA.

## 2024-09-05 NOTE — PROGRESS NOTES
Medication Patient Assistance Program (MPAP) Specialist  received personal reminder. MPAP specialist reviewed patient chart prior to outreach. Patient received temporary approval for Xarelto til 11/21/2024. Patient needs Medical assistance denial letter to continue on program for Xarelto through [employer]. MPAP specialist met with patient prior to his visit with clinician. Patient states he has paperwork for unemployment and Medical assistance with help from EVERETT LEBRON. Patient stated he will continue applying for both unemployment and Medical assistance. MPAP specialist explained patient will need to provide Medicaid denial to continue patient assistance program. Patient verbalized understanding. MPAP specialist provided patient her card and encouraged patient to call with question. MPAP specialist stated she will call patient in a few weeks to determine Medical assistance status. Patient agreeable to further outreach. MPAP specialist has placed a personal reminder.

## 2024-09-05 NOTE — ASSESSMENT & PLAN NOTE
- Patient has experienced 15 lbs of weight loss since 7/22  - Denies heat intolerance, palpitations, or tremor  - Patient has previously been diagnosed with hypothyroidism, last TSH on 6/20 was 8.8, T4 was 0.7  - Patient states that he has been taking levothyroxine 25 mcg    Plan:  - Follow up TSH with reflex to T4  - Follow up in one month

## 2024-09-05 NOTE — ASSESSMENT & PLAN NOTE
- BP in office today was 125/82  - Home regimen of 300 mg Diltiazem, 60mg of Imdur, 40 mg of Lisinopril    Plan:  - Continue home regimen  - Follow up CBC and CMP

## 2024-09-05 NOTE — ASSESSMENT & PLAN NOTE
- Patient has experienced 15 lbs of weight loss since 7/22  - In May, patient was 330 lbs, was 296 lbs in July, is 281 lbs in office today  - Patient denies change in appetite, change in diet, and endorses decreased exercise  - Patient had previous diagnosis of hypothyroidism, states that he currently takes 25 mcg of levothyroxine  - Patient smokes 10-15 cigarettes per day for the past 30 years; last CT chest revealed no abnormalities  - Last colonoscopy revealed polyps which were removed, next colonoscopy in one year  - Patient counseled about noticing any changes in appetite or activity  - Patient admits to increased life stressors and depressed mood  - Discussion with patient to mention weight loss to patient's psychiatric team    Plan:  - Follow up with TSH and T4, CMP, CBC  - Follow up in one month, will closely monitor weight and consider more imaging if continues to lose weight

## 2024-09-05 NOTE — ASSESSMENT & PLAN NOTE
- Patient on Zoloft 50mg and Seroquel 300 mg   - Patient is being seen by psychiatry  - Patient endorses increased stressors and depressed mood    Plan:  - Continue follow up with psychiatry

## 2024-09-05 NOTE — PROGRESS NOTES
Ambulatory Visit  Name: Torey Del Castillo Jr.      : 1965      MRN: [id number]  Encounter Provider: Tran Rodríguez MD  Encounter Date: 2024   Encounter department: [facility]    Assessment & Plan   1. Hypothyroidism, unspecified type  Assessment & Plan:  - Patient has experienced 15 lbs of weight loss since   - Denies heat intolerance, palpitations, or tremor  - Patient has previously been diagnosed with hypothyroidism, last TSH on  was 8.8, T4 was 0.7  - Patient states that he has been taking levothyroxine 25 mcg    Plan:  - Follow up TSH with reflex to T4  - Follow up in one month  Orders:  -     TSH, 3rd generation; Future  2. Weight loss  Assessment & Plan:  - Patient has experienced 15 lbs of weight loss since   - In May, patient was 330 lbs, was 296 lbs in July, is 281 lbs in office today  - Patient denies change in appetite, change in diet, and endorses decreased exercise  - Patient had previous diagnosis of hypothyroidism, states that he currently takes 25 mcg of levothyroxine  - Patient smokes 10-15 cigarettes per day for the past 30 years; last CT chest revealed no abnormalities  - Last colonoscopy revealed polyps which were removed, next colonoscopy in one year  - Patient counseled about noticing any changes in appetite or activity  - Patient admits to increased life stressors and depressed mood  - Discussion with patient to mention weight loss to patient's psychiatric team    Plan:  - Follow up with TSH and T4, CMP, CBC  - Follow up in one month, will closely monitor weight and consider more imaging if continues to lose weight  Orders:  -     Comprehensive metabolic panel; Future  -     CBC and differential; Future  3. Primary hypertension  Assessment & Plan:  - BP in office today was 125/82  - Home regimen of 300 mg Diltiazem, 60mg of Imdur, 40 mg of Lisinopril    Plan:  - Continue home regimen  - Follow up CBC and CMP  Orders:  -      Comprehensive metabolic panel; Future  -     CBC and differential; Future  4. Depression with anxiety  Assessment & Plan:  - Patient on Zoloft 50mg and Seroquel 300 mg   - Patient is being seen by psychiatry  - Patient endorses increased stressors and depressed mood    Plan:  - Continue follow up with psychiatry  5. Atrial flutter, unspecified type (HCC)  Assessment & Plan:  Stable with Xarelto, Cardizem.  Continue follow-up with cardiology       History of Present Illness     Manuel Del Castillo is a 59 YOM who is coming in for a follow-up after being put on Xarelto. Patient has been experiencing hip pain that started 2 weeks ago. When it first started, he rated it as a 10/10 pain, he couldn't walk at all. Patient denies injury or trauma to the area. He was using his friend's walker when it first started, is now using his friend's cane to ambulate. He has also noticed weight loss. In the office today, he is 281 lbs, down from 296 lbs which he was in the office in July. Patient has not experienced a change in diet nor a change in his appetite. Patient has been moving less due to hip pain. Patient endorses smoking 5-10 cigarettes per day for past 30 years. Recently received screening chest CT which was negative for abnormalities.         Review of Systems   Constitutional:  Positive for diaphoresis, fatigue and unexpected weight change. Negative for appetite change, chills and fever.   HENT:  Negative for ear pain and sore throat.    Eyes:  Negative for pain and visual disturbance.   Respiratory:  Positive for wheezing. Negative for cough and shortness of breath.    Cardiovascular:  Negative for chest pain and palpitations.   Gastrointestinal:  Positive for constipation. Negative for abdominal pain, blood in stool and vomiting.   Genitourinary:  Negative for dysuria and hematuria.   Musculoskeletal:  Positive for back pain. Negative for arthralgias.   Skin:  Negative for color change and rash.   Neurological:  Negative for  "seizures, syncope and light-headedness.   Hematological:  Does not bruise/bleed easily.   All other systems reviewed and are negative.      Objective     /82 (BP Location: Left arm, Patient Position: Sitting, Cuff Size: Standard)   Pulse 99   Temp 98.1 °F (36.7 °C) (Temporal)   Resp 18   Ht 5' 10\" (1.778 m)   Wt 128 kg (281 lb 3.2 oz)   SpO2 98%   BMI 40.35 kg/m²     Physical Exam  Vitals reviewed.   Constitutional:       General: He is not in acute distress.     Appearance: He is well-developed.   HENT:      Head: Normocephalic and atraumatic.   Eyes:      Conjunctiva/sclera: Conjunctivae normal.      Pupils: Pupils are equal, round, and reactive to light.   Cardiovascular:      Rate and Rhythm: Normal rate and regular rhythm.      Heart sounds: No murmur heard.     No friction rub. No gallop.   Pulmonary:      Effort: Pulmonary effort is normal. No respiratory distress.      Breath sounds: Normal breath sounds.   Musculoskeletal:         General: No swelling.      Cervical back: Neck supple.      Right lower leg: No edema.      Left lower leg: No edema.   Skin:     General: Skin is warm and dry.      Capillary Refill: Capillary refill takes less than 2 seconds.   Neurological:      General: No focal deficit present.      Mental Status: He is alert and oriented to person, place, and time.   Psychiatric:         Mood and Affect: Mood normal.       Administrative Statements     "

## 2024-09-20 ENCOUNTER — PATIENT OUTREACH (OUTPATIENT)
Dept: FAMILY MEDICINE CLINIC | Facility: CLINIC | Age: 59
End: 2024-09-20

## 2024-09-20 NOTE — PROGRESS NOTES
Medication Patient Assistance Program (MPAP) Specialist  received personal reminder notification regarding patient's application for Xarelto. MPAP specialist reviewed patient chart prior to outreach. Patient is currently temporarily approved for Xarelto patient assistance program from [employer] pending a medicaid denial. Per chart review patient has active medicaid.MPAP specialist attempted to confirm medical assistance status with patient without success. MPAP specialist left voicemail with her name (Edna Pichardo), stated she was patient assistance  and number [phone]. MPAP specialist requested a return call. MPAP specialist will follow up with patient if no return call is received. MPAP specialist has placed a personal reminder.

## 2024-10-01 ENCOUNTER — PATIENT OUTREACH (OUTPATIENT)
Dept: FAMILY MEDICINE CLINIC | Facility: CLINIC | Age: 59
End: 2024-10-01

## 2024-10-01 NOTE — PROGRESS NOTES
Medication Patient Assistance Program (MPAP) Specialist received personal reminder regarding patient's Xarelto patient assistance program. MPAP specialist reviewed patient chart prior to outreach.  MPAP specialist successfully reached patient. Patient stated he has provided everything to PATHS and DPW that was requested. Patient said he has not received a determination. MPAP specialist stated patient will need Medical Assistance denial to continue to qualify for patient assistance program for Xarelto. Patient stated understanding. Patient stated confusion regarding Medical assistance process. Patient is currently working with EVERETT LEBRON. MPAP specialist has reached to to EVERETT LEBRON to assist patient with next steps. Patient stated he has appointment on Thurs at clinician's office.

## 2024-10-02 ENCOUNTER — PATIENT OUTREACH (OUTPATIENT)
Dept: FAMILY MEDICINE CLINIC | Facility: CLINIC | Age: 59
End: 2024-10-02

## 2024-10-02 NOTE — PROGRESS NOTES
SW received IB message from MPAP specialist. Pt notes being denied MA and was confused as he hadn't received a determination yet. SW reviewed pt's chart and notes on 9/13, pt was denied MA per compass due to not providing documentation on his unemployment compensation status, Copy of mortgage, current lease or rent receipt or completed form [place] 9767 if included in a packet. Pt's compass e-form number is [id number]     SW sent IB message to financial counselor that was working with pt's application and processed his denial. EVERETT asking for assistance to help pt get MA. SW confirmed providing documentation needed to complete his application via email on 9/9.     SW will await message back and f/u as needed.

## 2024-10-07 ENCOUNTER — TELEPHONE (OUTPATIENT)
Dept: BARIATRICS | Facility: CLINIC | Age: 59
End: 2024-10-07

## 2024-10-18 ENCOUNTER — PATIENT OUTREACH (OUTPATIENT)
Dept: FAMILY MEDICINE CLINIC | Facility: CLINIC | Age: 59
End: 2024-10-18

## 2024-10-18 NOTE — PROGRESS NOTES
Medication Patient Assistance Program (MPAP) Specialist  received personal reminder regarding patient's medical assistance denial. MPAP specialist reviewed patient chart prior to outreach. MPAP specialist attempted to reach patient without success. MPAP specialist left voicemail with her name (Edna Pichardo), stated she was patient assistance  and number [phone]. MPAP specialist requested a return call. MPAP specialist will follow up with patient if no return call is received. MPAP specialist has placed a personal reminder.

## 2024-10-25 ENCOUNTER — PATIENT OUTREACH (OUTPATIENT)
Dept: FAMILY MEDICINE CLINIC | Facility: CLINIC | Age: 59
End: 2024-10-25

## 2024-10-25 NOTE — PROGRESS NOTES
SW sent IB message to [facility] Financial Counselor pool to follow up on pt's MA denial.

## 2024-10-28 ENCOUNTER — PATIENT OUTREACH (OUTPATIENT)
Dept: FAMILY MEDICINE CLINIC | Facility: CLINIC | Age: 59
End: 2024-10-28

## 2024-10-28 NOTE — PROGRESS NOTES
Medication Patient Assistance Program (MPAP) Specialist  received personal reminder notification regarding patient's Xarelto patient assistance program through .MPAP specialist reviewed patient chart prior to outreach. MPAP specialist reached out to [employer] patient assistance program representative Christ[employer]. Per [employer] rep,  Patient's original application was awaiting a medicaid denial.  has changed their program it is no longer through [employer]. All patients will need to reapply for Patient assistance program.    MPAP specialist attempted to reach patient without success. MPAP specialist left voicemail with her name (Edna Pichardo), stated she was patient assistance  and number [phone]. MPAP specialist requested a return call. MPAP specialist will follow up with patient if no return call is received. MPAP specialist has placed a personal reminder.  MPAP specialist has sent patient a message through [employer].

## 2024-11-06 ENCOUNTER — PATIENT OUTREACH (OUTPATIENT)
Dept: FAMILY MEDICINE CLINIC | Facility: CLINIC | Age: 59
End: 2024-11-06

## 2024-11-11 ENCOUNTER — PATIENT OUTREACH (OUTPATIENT)
Dept: FAMILY MEDICINE CLINIC | Facility: CLINIC | Age: 59
End: 2024-11-11

## 2024-11-11 NOTE — PROGRESS NOTES
Medication Patient Assistance Program (MPAP) Specialist  received personal reminder.MPAP specialist reviewed patient chart prior to outreach.  Patient needs to reapply for Xarelto through [employer] as their patient assistance program has changed.MPAP specialist attempted to reach patient without success.MPAP specialist left voicemail with her name (Edna Pichardo), stated she was patient assistance  and number [phone]. MPAP specialist requested a return call. MPAP specialist will follow up with patient if no return call is received.

## 2024-11-20 DIAGNOSIS — G47.09 OTHER INSOMNIA: ICD-10-CM

## 2024-11-22 RX ORDER — QUETIAPINE FUMARATE 300 MG/1
TABLET, FILM COATED ORAL
Qty: 30 TABLET | Refills: 0 | Status: SHIPPED | OUTPATIENT
Start: 2024-11-22

## 2024-11-24 DIAGNOSIS — I10 PRIMARY HYPERTENSION: ICD-10-CM

## 2024-11-25 ENCOUNTER — PATIENT OUTREACH (OUTPATIENT)
Dept: FAMILY MEDICINE CLINIC | Facility: CLINIC | Age: 59
End: 2024-11-25

## 2024-11-25 RX ORDER — LISINOPRIL 40 MG/1
40 TABLET ORAL DAILY
Qty: 90 TABLET | Refills: 1 | Status: SHIPPED | OUTPATIENT
Start: 2024-11-25

## 2024-11-25 NOTE — PROGRESS NOTES
Medication Patient Assistance Program (MPAP) Specialist  received personal reminder regarding patient's difficulty affording his Xarelto. MPAP specialist reviewed patient chart prior to outreach. MPAP specialist attempted to reach patient without success. MPAP specialist left voicemail with her name (Edna Pichardo), stated she was patient assistance  and number [phone]. MPAP specialist requested a return call. MPAP specialist will follow up with patient if no return call is received.

## 2024-11-26 NOTE — PROGRESS NOTES
IB message received from [facility]. [facility] stated that per compass MA was denied as pt did not provide unemployment compensation status, copy of mortgage, current lease or rent receipt, or [place] 9505 form if included in the packet.    SW to follow up w/ pt.

## 2024-11-29 ENCOUNTER — PATIENT OUTREACH (OUTPATIENT)
Dept: FAMILY MEDICINE CLINIC | Facility: CLINIC | Age: 59
End: 2024-11-29

## 2024-11-29 NOTE — PROGRESS NOTES
Pt due for SW outreach.    SW placed call to pt receiving VM box. SW introduced self, role and reason for calling and asked for pt to return call. SW to assess if pt still needs assistance with applying for MA. Pt was previously denied due to documentation missing from the application. Pt will need to reapply.

## 2024-12-05 DIAGNOSIS — R06.2 WHEEZING: ICD-10-CM

## 2024-12-05 DIAGNOSIS — I20.1 PRINZMETAL VARIANT ANGINA (HCC): ICD-10-CM

## 2024-12-05 RX ORDER — ALBUTEROL SULFATE 90 UG/1
INHALANT RESPIRATORY (INHALATION)
Qty: 6.7 G | Refills: 1 | Status: SHIPPED | OUTPATIENT
Start: 2024-12-05

## 2024-12-06 RX ORDER — ISOSORBIDE MONONITRATE 60 MG/1
60 TABLET, EXTENDED RELEASE ORAL DAILY
Qty: 90 TABLET | Refills: 1 | Status: SHIPPED | OUTPATIENT
Start: 2024-12-06

## 2024-12-09 ENCOUNTER — PATIENT OUTREACH (OUTPATIENT)
Dept: FAMILY MEDICINE CLINIC | Facility: CLINIC | Age: 59
End: 2024-12-09

## 2024-12-09 NOTE — PROGRESS NOTES
Medication Patient Assistance Program (MPAP) Specialist  received personal reminder regarding patient's Xarelto patient assistance program through [employer]. MPAP specialist reviewed patient chart prior to outreach. Per chart review patient needs Medical denial letter to be eligible for Xarelto patient assistance program. Patient's temporary approval ended on 11/21/2024. Patient has not responded to repeated attempts by MPAP specialist to reach patient. MPAP specialist is closing referral to Medication Patient Assistant  at this time. MPAP Specialist has referred patient to clinician and clinical staff for further medication questions.

## 2024-12-10 ENCOUNTER — PATIENT OUTREACH (OUTPATIENT)
Dept: FAMILY MEDICINE CLINIC | Facility: CLINIC | Age: 59
End: 2024-12-10

## 2024-12-10 NOTE — PROGRESS NOTES
OP SWCM attempted to call pt and received his VM box. SW left a VM introducing self, role and reason for calling. SW advised pt to reach back out to EVERETT  if he should still require any assistance applying for MA.    SW received a call back from pt. He stated that he was approved for SSDI but received a packet in the mail to complete and send back to them with some missing information to complete the application. Pt states that he is unsure at this time if he wants to proceed with MA due to receiving the SSDI. SW advised pt to contact SW back if he would like to pursue reapplying. Pt agreeable and appreciative.

## 2024-12-10 NOTE — Clinical Note
[address] [address]  [phone]    Re: ***   12/10/2024       Dear Torey,    We tried to reach you by phone on *** and was unfortunately unable to reach you.  It is important that you contact the [facility] as soon as possible at: Dept: [phone]     Sincerely,         Ashley Encinas

## 2024-12-26 DIAGNOSIS — G47.09 OTHER INSOMNIA: ICD-10-CM

## 2024-12-26 RX ORDER — QUETIAPINE FUMARATE 300 MG/1
TABLET, FILM COATED ORAL
Qty: 30 TABLET | Refills: 3 | Status: SHIPPED | OUTPATIENT
Start: 2024-12-26

## 2025-01-01 ENCOUNTER — APPOINTMENT (EMERGENCY)
Dept: CT IMAGING | Facility: HOSPITAL | Age: 60
DRG: 426 | End: 2025-01-01
Payer: COMMERCIAL

## 2025-01-01 ENCOUNTER — HOSPITAL ENCOUNTER (INPATIENT)
Facility: HOSPITAL | Age: 60
LOS: 3 days | DRG: 426 | End: 2025-06-26
Admitting: INTERNAL MEDICINE
Payer: COMMERCIAL

## 2025-01-01 ENCOUNTER — APPOINTMENT (INPATIENT)
Dept: CT IMAGING | Facility: HOSPITAL | Age: 60
DRG: 426 | End: 2025-01-01
Payer: COMMERCIAL

## 2025-01-01 ENCOUNTER — RESULTS FOLLOW-UP (OUTPATIENT)
Dept: NON INVASIVE DIAGNOSTICS | Facility: HOSPITAL | Age: 60
End: 2025-01-01

## 2025-01-01 VITALS
DIASTOLIC BLOOD PRESSURE: 87 MMHG | SYSTOLIC BLOOD PRESSURE: 126 MMHG | TEMPERATURE: 97.9 F | HEIGHT: 70 IN | BODY MASS INDEX: 45.1 KG/M2 | OXYGEN SATURATION: 92 % | WEIGHT: 315 LBS

## 2025-01-01 DIAGNOSIS — E87.1 HYPONATREMIA: ICD-10-CM

## 2025-01-01 DIAGNOSIS — R06.03 ACUTE RESPIRATORY DISTRESS: Primary | ICD-10-CM

## 2025-01-01 DIAGNOSIS — Z13.9 ENCOUNTER FOR SCREENING INVOLVING SOCIAL DETERMINANTS OF HEALTH (SDOH): ICD-10-CM

## 2025-01-01 DIAGNOSIS — E87.20 LACTIC ACIDOSIS: ICD-10-CM

## 2025-01-01 LAB
2HR DELTA HS TROPONIN: 1 NG/L
4HR DELTA HS TROPONIN: 0 NG/L
ALBUMIN SERPL BCG-MCNC: 3.1 G/DL (ref 3.5–5)
ALBUMIN SERPL BCG-MCNC: 3.1 G/DL (ref 3.5–5)
ALP SERPL-CCNC: 150 U/L (ref 34–104)
ALP SERPL-CCNC: 174 U/L (ref 34–104)
ALT SERPL W P-5'-P-CCNC: 25 U/L (ref 7–52)
ALT SERPL W P-5'-P-CCNC: 28 U/L (ref 7–52)
ANION GAP SERPL CALCULATED.3IONS-SCNC: 11 MMOL/L (ref 4–13)
ANION GAP SERPL CALCULATED.3IONS-SCNC: 12 MMOL/L (ref 4–13)
ANION GAP SERPL CALCULATED.3IONS-SCNC: 12 MMOL/L (ref 4–13)
ANION GAP SERPL CALCULATED.3IONS-SCNC: 13 MMOL/L (ref 4–13)
ANION GAP SERPL CALCULATED.3IONS-SCNC: 19 MMOL/L (ref 4–13)
ANION GAP SERPL CALCULATED.3IONS-SCNC: 7 MMOL/L (ref 4–13)
ANION GAP SERPL CALCULATED.3IONS-SCNC: 8 MMOL/L (ref 4–13)
ANION GAP SERPL CALCULATED.3IONS-SCNC: 9 MMOL/L (ref 4–13)
ANION GAP SERPL CALCULATED.3IONS-SCNC: 9 MMOL/L (ref 4–13)
ANISOCYTOSIS BLD QL SMEAR: PRESENT
APTT PPP: 37 SECONDS (ref 23–34)
AST SERPL W P-5'-P-CCNC: 41 U/L (ref 13–39)
AST SERPL W P-5'-P-CCNC: 47 U/L (ref 13–39)
ATRIAL RATE: 133 BPM
ATRIAL RATE: 133 BPM
BASE EX.OXY STD BLDV CALC-SCNC: 62.1 % (ref 60–80)
BASE EX.OXY STD BLDV CALC-SCNC: 90.8 % (ref 60–80)
BASE EXCESS BLDA CALC-SCNC: -18 MMOL/L (ref -2–3)
BASE EXCESS BLDA CALC-SCNC: -2.7 MMOL/L
BASE EXCESS BLDV CALC-SCNC: -1.4 MMOL/L
BASE EXCESS BLDV CALC-SCNC: 2.8 MMOL/L
BASOPHILS # BLD AUTO: 0.05 THOUSANDS/ÂΜL (ref 0–0.1)
BASOPHILS # BLD MANUAL: 0 THOUSAND/UL (ref 0–0.1)
BASOPHILS # BLD MANUAL: 0 THOUSAND/UL (ref 0–0.1)
BASOPHILS NFR BLD AUTO: 0 % (ref 0–1)
BASOPHILS NFR MAR MANUAL: 0 % (ref 0–1)
BASOPHILS NFR MAR MANUAL: 0 % (ref 0–1)
BILIRUB SERPL-MCNC: 1.36 MG/DL (ref 0.2–1)
BILIRUB SERPL-MCNC: 1.9 MG/DL (ref 0.2–1)
BILIRUB UR QL STRIP: NEGATIVE
BNP SERPL-MCNC: 49 PG/ML (ref 0–100)
BUN SERPL-MCNC: 11 MG/DL (ref 5–25)
BUN SERPL-MCNC: 12 MG/DL (ref 5–25)
BUN SERPL-MCNC: 13 MG/DL (ref 5–25)
BUN SERPL-MCNC: 5 MG/DL (ref 5–25)
BUN SERPL-MCNC: 6 MG/DL (ref 5–25)
BUN SERPL-MCNC: 7 MG/DL (ref 5–25)
BUN SERPL-MCNC: 8 MG/DL (ref 5–25)
BUN SERPL-MCNC: 9 MG/DL (ref 5–25)
CA-I BLD-SCNC: 0.65 MMOL/L (ref 1.12–1.32)
CA-I BLD-SCNC: 0.96 MMOL/L (ref 1.12–1.32)
CALCIUM ALBUM COR SERPL-MCNC: 8.2 MG/DL (ref 8.3–10.1)
CALCIUM ALBUM COR SERPL-MCNC: 8.5 MG/DL (ref 8.3–10.1)
CALCIUM SERPL-MCNC: 7 MG/DL (ref 8.4–10.2)
CALCIUM SERPL-MCNC: 7.5 MG/DL (ref 8.4–10.2)
CALCIUM SERPL-MCNC: 7.5 MG/DL (ref 8.4–10.2)
CALCIUM SERPL-MCNC: 7.6 MG/DL (ref 8.4–10.2)
CALCIUM SERPL-MCNC: 7.7 MG/DL (ref 8.4–10.2)
CALCIUM SERPL-MCNC: 7.7 MG/DL (ref 8.4–10.2)
CALCIUM SERPL-MCNC: 7.8 MG/DL (ref 8.4–10.2)
CALCIUM SERPL-MCNC: 8 MG/DL (ref 8.4–10.2)
CALCIUM SERPL-MCNC: 8.1 MG/DL (ref 8.4–10.2)
CALCIUM SERPL-MCNC: 8.1 MG/DL (ref 8.4–10.2)
CARDIAC TROPONIN I PNL SERPL HS: 24 NG/L (ref ?–50)
CARDIAC TROPONIN I PNL SERPL HS: 24 NG/L (ref ?–50)
CARDIAC TROPONIN I PNL SERPL HS: 25 NG/L (ref ?–50)
CHLORIDE SERPL-SCNC: 74 MMOL/L (ref 96–108)
CHLORIDE SERPL-SCNC: 76 MMOL/L (ref 96–108)
CHLORIDE SERPL-SCNC: 78 MMOL/L (ref 96–108)
CHLORIDE SERPL-SCNC: 78 MMOL/L (ref 96–108)
CHLORIDE SERPL-SCNC: 79 MMOL/L (ref 96–108)
CHLORIDE SERPL-SCNC: 79 MMOL/L (ref 96–108)
CHLORIDE SERPL-SCNC: 80 MMOL/L (ref 96–108)
CHLORIDE SERPL-SCNC: 82 MMOL/L (ref 96–108)
CHLORIDE SERPL-SCNC: 83 MMOL/L (ref 96–108)
CHLORIDE SERPL-SCNC: 86 MMOL/L (ref 96–108)
CHLORIDE SERPL-SCNC: 88 MMOL/L (ref 96–108)
CHLORIDE SERPL-SCNC: 88 MMOL/L (ref 96–108)
CHLORIDE SERPL-SCNC: 89 MMOL/L (ref 96–108)
CHLORIDE SERPL-SCNC: 89 MMOL/L (ref 96–108)
CK SERPL-CCNC: 299 U/L (ref 39–308)
CLARITY UR: CLEAR
CO2 SERPL-SCNC: 21 MMOL/L (ref 21–32)
CO2 SERPL-SCNC: 24 MMOL/L (ref 21–32)
CO2 SERPL-SCNC: 25 MMOL/L (ref 21–32)
CO2 SERPL-SCNC: 25 MMOL/L (ref 21–32)
CO2 SERPL-SCNC: 26 MMOL/L (ref 21–32)
CO2 SERPL-SCNC: 28 MMOL/L (ref 21–32)
CO2 SERPL-SCNC: 29 MMOL/L (ref 21–32)
COLOR UR: YELLOW
CORTIS AM PEAK SERPL-MCNC: 24.3 UG/DL (ref 6.7–22.6)
CREAT SERPL-MCNC: 0.8 MG/DL (ref 0.6–1.3)
CREAT SERPL-MCNC: 0.82 MG/DL (ref 0.6–1.3)
CREAT SERPL-MCNC: 0.84 MG/DL (ref 0.6–1.3)
CREAT SERPL-MCNC: 0.85 MG/DL (ref 0.6–1.3)
CREAT SERPL-MCNC: 0.85 MG/DL (ref 0.6–1.3)
CREAT SERPL-MCNC: 0.86 MG/DL (ref 0.6–1.3)
CREAT SERPL-MCNC: 0.86 MG/DL (ref 0.6–1.3)
CREAT SERPL-MCNC: 0.87 MG/DL (ref 0.6–1.3)
CREAT SERPL-MCNC: 0.88 MG/DL (ref 0.6–1.3)
CREAT SERPL-MCNC: 0.88 MG/DL (ref 0.6–1.3)
CREAT SERPL-MCNC: 0.89 MG/DL (ref 0.6–1.3)
CREAT SERPL-MCNC: 0.89 MG/DL (ref 0.6–1.3)
CREAT SERPL-MCNC: 0.92 MG/DL (ref 0.6–1.3)
CREAT SERPL-MCNC: 0.93 MG/DL (ref 0.6–1.3)
CRP SERPL QL: 43.2 MG/L
EOSINOPHIL # BLD AUTO: 0.02 THOUSAND/ÂΜL (ref 0–0.61)
EOSINOPHIL # BLD MANUAL: 0 THOUSAND/UL (ref 0–0.4)
EOSINOPHIL # BLD MANUAL: 0 THOUSAND/UL (ref 0–0.4)
EOSINOPHIL NFR BLD AUTO: 0 % (ref 0–6)
EOSINOPHIL NFR BLD MANUAL: 0 % (ref 0–6)
EOSINOPHIL NFR BLD MANUAL: 0 % (ref 0–6)
ERYTHROCYTE [DISTWIDTH] IN BLOOD BY AUTOMATED COUNT: 13.3 % (ref 11.6–15.1)
ERYTHROCYTE [DISTWIDTH] IN BLOOD BY AUTOMATED COUNT: 13.5 % (ref 11.6–15.1)
ERYTHROCYTE [DISTWIDTH] IN BLOOD BY AUTOMATED COUNT: 14 % (ref 11.6–15.1)
ERYTHROCYTE [DISTWIDTH] IN BLOOD BY AUTOMATED COUNT: 14.4 % (ref 11.6–15.1)
FIO2 GAS DIL.REBREATH: 100 L
FLUAV AG UPPER RESP QL IA.RAPID: NEGATIVE
FLUBV AG UPPER RESP QL IA.RAPID: NEGATIVE
GFR SERPL CREATININE-BSD FRML MDRD: 90 ML/MIN/1.73SQ M
GFR SERPL CREATININE-BSD FRML MDRD: 91 ML/MIN/1.73SQ M
GFR SERPL CREATININE-BSD FRML MDRD: 94 ML/MIN/1.73SQ M
GFR SERPL CREATININE-BSD FRML MDRD: 95 ML/MIN/1.73SQ M
GFR SERPL CREATININE-BSD FRML MDRD: 96 ML/MIN/1.73SQ M
GFR SERPL CREATININE-BSD FRML MDRD: 97 ML/MIN/1.73SQ M
GFR SERPL CREATININE-BSD FRML MDRD: 98 ML/MIN/1.73SQ M
GLUCOSE SERPL-MCNC: 101 MG/DL (ref 65–140)
GLUCOSE SERPL-MCNC: 102 MG/DL (ref 65–140)
GLUCOSE SERPL-MCNC: 108 MG/DL (ref 65–140)
GLUCOSE SERPL-MCNC: 111 MG/DL (ref 65–140)
GLUCOSE SERPL-MCNC: 113 MG/DL (ref 65–140)
GLUCOSE SERPL-MCNC: 115 MG/DL (ref 65–140)
GLUCOSE SERPL-MCNC: 117 MG/DL (ref 65–140)
GLUCOSE SERPL-MCNC: 119 MG/DL (ref 65–140)
GLUCOSE SERPL-MCNC: 120 MG/DL (ref 65–140)
GLUCOSE SERPL-MCNC: 124 MG/DL (ref 65–140)
GLUCOSE SERPL-MCNC: 136 MG/DL (ref 65–140)
GLUCOSE SERPL-MCNC: 145 MG/DL (ref 65–140)
GLUCOSE SERPL-MCNC: 146 MG/DL (ref 65–140)
GLUCOSE SERPL-MCNC: 157 MG/DL (ref 65–140)
GLUCOSE SERPL-MCNC: 230 MG/DL (ref 65–140)
GLUCOSE SERPL-MCNC: 97 MG/DL (ref 65–140)
GLUCOSE UR STRIP-MCNC: NEGATIVE MG/DL
HCO3 BLDA-SCNC: 21.3 MMOL/L (ref 22–28)
HCO3 BLDA-SCNC: 9.2 MMOL/L (ref 22–28)
HCO3 BLDV-SCNC: 22.9 MMOL/L (ref 24–30)
HCO3 BLDV-SCNC: 26.5 MMOL/L (ref 24–30)
HCT VFR BLD AUTO: 33.1 % (ref 36.5–49.3)
HCT VFR BLD AUTO: 33.5 % (ref 36.5–49.3)
HCT VFR BLD AUTO: 34.6 % (ref 36.5–49.3)
HCT VFR BLD AUTO: 34.8 % (ref 36.5–49.3)
HCT VFR BLD CALC: <15 % (ref 36.5–49.3)
HGB BLD-MCNC: 12.1 G/DL (ref 12–17)
HGB BLD-MCNC: 12.2 G/DL (ref 12–17)
HGB BLD-MCNC: 12.3 G/DL (ref 12–17)
HGB BLD-MCNC: 12.7 G/DL (ref 12–17)
HGB UR QL STRIP.AUTO: NEGATIVE
IMM GRANULOCYTES # BLD AUTO: 0.08 THOUSAND/UL (ref 0–0.2)
IMM GRANULOCYTES NFR BLD AUTO: 1 % (ref 0–2)
INR PPP: 1.24 (ref 0.85–1.19)
INR PPP: 1.3 (ref 0.85–1.19)
IPAP: 18
KETONES UR STRIP-MCNC: NEGATIVE MG/DL
LACTATE SERPL-SCNC: 1.9 MMOL/L (ref 0.5–2)
LACTATE SERPL-SCNC: 2.5 MMOL/L (ref 0.5–2)
LACTATE SERPL-SCNC: 4.3 MMOL/L (ref 0.5–2)
LACTATE SERPL-SCNC: 5.5 MMOL/L (ref 0.5–2)
LACTATE SERPL-SCNC: 8.6 MMOL/L (ref 0.5–2)
LEUKOCYTE ESTERASE UR QL STRIP: NEGATIVE
LG PLATELETS BLD QL SMEAR: PRESENT
LYMPHOCYTES # BLD AUTO: 0.17 THOUSAND/UL (ref 0.6–4.47)
LYMPHOCYTES # BLD AUTO: 0.58 THOUSAND/UL (ref 0.6–4.47)
LYMPHOCYTES # BLD AUTO: 0.64 THOUSANDS/ÂΜL (ref 0.6–4.47)
LYMPHOCYTES # BLD AUTO: 1 % (ref 14–44)
LYMPHOCYTES # BLD AUTO: 5 % (ref 14–44)
LYMPHOCYTES NFR BLD AUTO: 4 % (ref 14–44)
MAGNESIUM SERPL-MCNC: 1.8 MG/DL (ref 1.9–2.7)
MCH RBC QN AUTO: 35.7 PG (ref 26.8–34.3)
MCH RBC QN AUTO: 35.8 PG (ref 26.8–34.3)
MCH RBC QN AUTO: 35.9 PG (ref 26.8–34.3)
MCH RBC QN AUTO: 36.2 PG (ref 26.8–34.3)
MCHC RBC AUTO-ENTMCNC: 35.1 G/DL (ref 31.4–37.4)
MCHC RBC AUTO-ENTMCNC: 36.6 G/DL (ref 31.4–37.4)
MCHC RBC AUTO-ENTMCNC: 36.7 G/DL (ref 31.4–37.4)
MCHC RBC AUTO-ENTMCNC: 36.7 G/DL (ref 31.4–37.4)
MCV RBC AUTO: 102 FL (ref 82–98)
MCV RBC AUTO: 97 FL (ref 82–98)
MCV RBC AUTO: 98 FL (ref 82–98)
MCV RBC AUTO: 99 FL (ref 82–98)
MONOCYTES # BLD AUTO: 0.17 THOUSAND/UL (ref 0–1.22)
MONOCYTES # BLD AUTO: 0.43 THOUSAND/ÂΜL (ref 0.17–1.22)
MONOCYTES # BLD AUTO: 0.58 THOUSAND/UL (ref 0–1.22)
MONOCYTES NFR BLD AUTO: 3 % (ref 4–12)
MONOCYTES NFR BLD: 1 % (ref 4–12)
MONOCYTES NFR BLD: 5 % (ref 4–12)
NEUTROPHILS # BLD AUTO: 15.17 THOUSANDS/ÂΜL (ref 1.85–7.62)
NEUTROPHILS # BLD MANUAL: 10.39 THOUSAND/UL (ref 1.85–7.62)
NEUTROPHILS # BLD MANUAL: 16.53 THOUSAND/UL (ref 1.85–7.62)
NEUTS HYPERSEG BLD QL SMEAR: PRESENT
NEUTS SEG NFR BLD AUTO: 90 % (ref 43–75)
NEUTS SEG NFR BLD AUTO: 92 % (ref 43–75)
NEUTS SEG NFR BLD AUTO: 98 % (ref 43–75)
NITRITE UR QL STRIP: NEGATIVE
NON VENT- BIPAP: ABNORMAL
NRBC BLD AUTO-RTO: 0 /100 WBCS
O2 CT BLDA-SCNC: 17.4 ML/DL (ref 16–23)
O2 CT BLDV-SCNC: 11.8 ML/DL
O2 CT BLDV-SCNC: 16.7 ML/DL
OSMOLALITY UR/SERPL-RTO: 245 MMOL/KG (ref 282–298)
OSMOLALITY UR: 211 MMOL/KG (ref 250–900)
OXYHGB MFR BLDA: 95.5 % (ref 94–97)
P AXIS: 0 DEGREES
P AXIS: 87 DEGREES
PCO2 BLD: 10 MMOL/L (ref 21–32)
PCO2 BLD: 29.3 MM HG (ref 36–44)
PCO2 BLDA: 34.6 MM HG (ref 36–44)
PCO2 BLDV: 37.3 MM HG (ref 42–50)
PCO2 BLDV: 37.8 MM HG (ref 42–50)
PEEP MAX SETTING VENT: 8 CM[H2O]
PH BLD: 7.11 [PH] (ref 7.35–7.45)
PH BLDA: 7.41 [PH] (ref 7.35–7.45)
PH BLDV: 7.41 [PH] (ref 7.3–7.4)
PH BLDV: 7.46 [PH] (ref 7.3–7.4)
PH UR STRIP.AUTO: 6 [PH]
PLATELET # BLD AUTO: 117 THOUSANDS/UL (ref 149–390)
PLATELET # BLD AUTO: 121 THOUSANDS/UL (ref 149–390)
PLATELET # BLD AUTO: 131 THOUSANDS/UL (ref 149–390)
PLATELET # BLD AUTO: 142 THOUSANDS/UL (ref 149–390)
PLATELET # BLD AUTO: 150 THOUSANDS/UL (ref 149–390)
PLATELET BLD QL SMEAR: ABNORMAL
PLATELET BLD QL SMEAR: ABNORMAL
PMV BLD AUTO: 10.7 FL (ref 8.9–12.7)
PMV BLD AUTO: 10.9 FL (ref 8.9–12.7)
PMV BLD AUTO: 10.9 FL (ref 8.9–12.7)
PMV BLD AUTO: 11.1 FL (ref 8.9–12.7)
PMV BLD AUTO: 11.3 FL (ref 8.9–12.7)
PO2 BLD: 20 MM HG (ref 75–129)
PO2 BLDA: 100.6 MM HG (ref 75–129)
PO2 BLDV: 31.8 MM HG (ref 35–45)
PO2 BLDV: 62.3 MM HG (ref 35–45)
POLYCHROMASIA BLD QL SMEAR: PRESENT
POTASSIUM BLD-SCNC: <2 MMOL/L (ref 3.5–5.3)
POTASSIUM SERPL-SCNC: 3 MMOL/L (ref 3.5–5.3)
POTASSIUM SERPL-SCNC: 3.2 MMOL/L (ref 3.5–5.3)
POTASSIUM SERPL-SCNC: 3.8 MMOL/L (ref 3.5–5.3)
POTASSIUM SERPL-SCNC: 3.9 MMOL/L (ref 3.5–5.3)
POTASSIUM SERPL-SCNC: 4 MMOL/L (ref 3.5–5.3)
POTASSIUM SERPL-SCNC: 4 MMOL/L (ref 3.5–5.3)
POTASSIUM SERPL-SCNC: 4.1 MMOL/L (ref 3.5–5.3)
POTASSIUM SERPL-SCNC: 4.1 MMOL/L (ref 3.5–5.3)
POTASSIUM SERPL-SCNC: 4.2 MMOL/L (ref 3.5–5.3)
POTASSIUM SERPL-SCNC: 4.2 MMOL/L (ref 3.5–5.3)
POTASSIUM SERPL-SCNC: 4.3 MMOL/L (ref 3.5–5.3)
POTASSIUM SERPL-SCNC: 4.3 MMOL/L (ref 3.5–5.3)
PROCALCITONIN SERPL-MCNC: 0.29 NG/ML
PROT SERPL-MCNC: 5.4 G/DL (ref 6.4–8.4)
PROT SERPL-MCNC: 5.6 G/DL (ref 6.4–8.4)
PROT UR STRIP-MCNC: NEGATIVE MG/DL
PROTHROMBIN TIME: 15.8 SECONDS (ref 12.3–15)
PROTHROMBIN TIME: 16.3 SECONDS (ref 12.3–15)
QRS AXIS: 47 DEGREES
QRS AXIS: 53 DEGREES
QRSD INTERVAL: 90 MS
QRSD INTERVAL: 90 MS
QT INTERVAL: 298 MS
QT INTERVAL: 312 MS
QTC INTERVAL: 404 MS
QTC INTERVAL: 410 MS
RBC # BLD AUTO: 3.34 MILLION/UL (ref 3.88–5.62)
RBC # BLD AUTO: 3.41 MILLION/UL (ref 3.88–5.62)
RBC # BLD AUTO: 3.45 MILLION/UL (ref 3.88–5.62)
RBC # BLD AUTO: 3.54 MILLION/UL (ref 3.88–5.62)
RBC MORPH BLD: NORMAL
RBC MORPH BLD: PRESENT
SAO2 % BLD FROM PO2: 20 % (ref 60–85)
SARS-COV+SARS-COV-2 AG RESP QL IA.RAPID: NEGATIVE
SODIUM BLD-SCNC: 150 MMOL/L (ref 136–145)
SODIUM SERPL-SCNC: 114 MMOL/L (ref 135–147)
SODIUM SERPL-SCNC: 116 MMOL/L (ref 135–147)
SODIUM SERPL-SCNC: 118 MMOL/L (ref 135–147)
SODIUM SERPL-SCNC: 119 MMOL/L (ref 135–147)
SODIUM SERPL-SCNC: 122 MMOL/L (ref 135–147)
SODIUM SERPL-SCNC: 125 MMOL/L (ref 135–147)
SODIUM SERPL-SCNC: 126 MMOL/L (ref 135–147)
SODIUM UR-SCNC: <10 MMOL/L
SP GR UR STRIP.AUTO: 1.02 (ref 1–1.03)
SPECIMEN SOURCE: ABNORMAL
SPECIMEN SOURCE: ABNORMAL
T WAVE AXIS: 65 DEGREES
T WAVE AXIS: 95 DEGREES
TSH SERPL DL<=0.05 MIU/L-ACNC: 3.68 UIU/ML (ref 0.45–4.5)
URATE SERPL-MCNC: 4.9 MG/DL (ref 3.5–8.5)
UROBILINOGEN UR STRIP-ACNC: <2 MG/DL
VENT BIPAP FIO2: 35 %
VENTRICULAR RATE: 101 BPM
VENTRICULAR RATE: 114 BPM
WBC # BLD AUTO: 11.54 THOUSAND/UL (ref 4.31–10.16)
WBC # BLD AUTO: 16.39 THOUSAND/UL (ref 4.31–10.16)
WBC # BLD AUTO: 16.87 THOUSAND/UL (ref 4.31–10.16)
WBC # BLD AUTO: 8.1 THOUSAND/UL (ref 4.31–10.16)
WBC TOXIC VACUOLES BLD QL SMEAR: PRESENT

## 2025-01-01 PROCEDURE — 74176 CT ABD & PELVIS W/O CONTRAST: CPT

## 2025-01-01 PROCEDURE — 92950 HEART/LUNG RESUSCITATION CPR: CPT

## 2025-01-01 PROCEDURE — 83735 ASSAY OF MAGNESIUM: CPT | Performed by: PHYSICIAN ASSISTANT

## 2025-01-01 PROCEDURE — 87804 INFLUENZA ASSAY W/OPTIC: CPT

## 2025-01-01 PROCEDURE — 85025 COMPLETE CBC W/AUTO DIFF WBC: CPT

## 2025-01-01 PROCEDURE — 82803 BLOOD GASES ANY COMBINATION: CPT

## 2025-01-01 PROCEDURE — 80048 BASIC METABOLIC PNL TOTAL CA: CPT

## 2025-01-01 PROCEDURE — 99291 CRITICAL CARE FIRST HOUR: CPT

## 2025-01-01 PROCEDURE — 31500 INSERT EMERGENCY AIRWAY: CPT

## 2025-01-01 PROCEDURE — 85027 COMPLETE CBC AUTOMATED: CPT | Performed by: PHYSICIAN ASSISTANT

## 2025-01-01 PROCEDURE — 86140 C-REACTIVE PROTEIN: CPT

## 2025-01-01 PROCEDURE — 82330 ASSAY OF CALCIUM: CPT | Performed by: PHYSICIAN ASSISTANT

## 2025-01-01 PROCEDURE — 99239 HOSP IP/OBS DSCHRG MGMT >30: CPT

## 2025-01-01 PROCEDURE — 84484 ASSAY OF TROPONIN QUANT: CPT

## 2025-01-01 PROCEDURE — 85007 BL SMEAR W/DIFF WBC COUNT: CPT | Performed by: PHYSICIAN ASSISTANT

## 2025-01-01 PROCEDURE — 87811 SARS-COV-2 COVID19 W/OPTIC: CPT

## 2025-01-01 PROCEDURE — 83880 ASSAY OF NATRIURETIC PEPTIDE: CPT

## 2025-01-01 PROCEDURE — NC001 PR NO CHARGE: Performed by: INTERNAL MEDICINE

## 2025-01-01 PROCEDURE — 99232 SBSQ HOSP IP/OBS MODERATE 35: CPT

## 2025-01-01 PROCEDURE — 84443 ASSAY THYROID STIM HORMONE: CPT

## 2025-01-01 PROCEDURE — 36600 WITHDRAWAL OF ARTERIAL BLOOD: CPT

## 2025-01-01 PROCEDURE — 94660 CPAP INITIATION&MGMT: CPT

## 2025-01-01 PROCEDURE — 85027 COMPLETE CBC AUTOMATED: CPT

## 2025-01-01 PROCEDURE — 94644 CONT INHLJ TX 1ST HOUR: CPT

## 2025-01-01 PROCEDURE — 94760 N-INVAS EAR/PLS OXIMETRY 1: CPT

## 2025-01-01 PROCEDURE — 83930 ASSAY OF BLOOD OSMOLALITY: CPT

## 2025-01-01 PROCEDURE — 85610 PROTHROMBIN TIME: CPT | Performed by: PHYSICIAN ASSISTANT

## 2025-01-01 PROCEDURE — 80048 BASIC METABOLIC PNL TOTAL CA: CPT | Performed by: PHYSICIAN ASSISTANT

## 2025-01-01 PROCEDURE — 85014 HEMATOCRIT: CPT

## 2025-01-01 PROCEDURE — 80053 COMPREHEN METABOLIC PANEL: CPT | Performed by: PHYSICIAN ASSISTANT

## 2025-01-01 PROCEDURE — 96376 TX/PRO/DX INJ SAME DRUG ADON: CPT

## 2025-01-01 PROCEDURE — 93010 ELECTROCARDIOGRAM REPORT: CPT | Performed by: STUDENT IN AN ORGANIZED HEALTH CARE EDUCATION/TRAINING PROGRAM

## 2025-01-01 PROCEDURE — 4A133B1 MONITORING OF ARTERIAL PRESSURE, PERIPHERAL, PERCUTANEOUS APPROACH: ICD-10-PCS | Performed by: INTERNAL MEDICINE

## 2025-01-01 PROCEDURE — 4A133J1 MONITORING OF ARTERIAL PULSE, PERIPHERAL, PERCUTANEOUS APPROACH: ICD-10-PCS | Performed by: INTERNAL MEDICINE

## 2025-01-01 PROCEDURE — 82805 BLOOD GASES W/O2 SATURATION: CPT

## 2025-01-01 PROCEDURE — 85007 BL SMEAR W/DIFF WBC COUNT: CPT

## 2025-01-01 PROCEDURE — 81003 URINALYSIS AUTO W/O SCOPE: CPT

## 2025-01-01 PROCEDURE — 5A12012 PERFORMANCE OF CARDIAC OUTPUT, SINGLE, MANUAL: ICD-10-PCS | Performed by: INTERNAL MEDICINE

## 2025-01-01 PROCEDURE — 99232 SBSQ HOSP IP/OBS MODERATE 35: CPT | Performed by: INTERNAL MEDICINE

## 2025-01-01 PROCEDURE — 82947 ASSAY GLUCOSE BLOOD QUANT: CPT

## 2025-01-01 PROCEDURE — 85730 THROMBOPLASTIN TIME PARTIAL: CPT

## 2025-01-01 PROCEDURE — 82533 TOTAL CORTISOL: CPT

## 2025-01-01 PROCEDURE — 94640 AIRWAY INHALATION TREATMENT: CPT

## 2025-01-01 PROCEDURE — 03HY32Z INSERTION OF MONITORING DEVICE INTO UPPER ARTERY, PERCUTANEOUS APPROACH: ICD-10-PCS | Performed by: INTERNAL MEDICINE

## 2025-01-01 PROCEDURE — 83605 ASSAY OF LACTIC ACID: CPT

## 2025-01-01 PROCEDURE — 85610 PROTHROMBIN TIME: CPT

## 2025-01-01 PROCEDURE — 96365 THER/PROPH/DIAG IV INF INIT: CPT

## 2025-01-01 PROCEDURE — 96367 TX/PROPH/DG ADDL SEQ IV INF: CPT

## 2025-01-01 PROCEDURE — 83935 ASSAY OF URINE OSMOLALITY: CPT

## 2025-01-01 PROCEDURE — 82805 BLOOD GASES W/O2 SATURATION: CPT | Performed by: PHYSICIAN ASSISTANT

## 2025-01-01 PROCEDURE — 36415 COLL VENOUS BLD VENIPUNCTURE: CPT

## 2025-01-01 PROCEDURE — 85049 AUTOMATED PLATELET COUNT: CPT

## 2025-01-01 PROCEDURE — 96375 TX/PRO/DX INJ NEW DRUG ADDON: CPT

## 2025-01-01 PROCEDURE — 87040 BLOOD CULTURE FOR BACTERIA: CPT

## 2025-01-01 PROCEDURE — 71275 CT ANGIOGRAPHY CHEST: CPT

## 2025-01-01 PROCEDURE — 84132 ASSAY OF SERUM POTASSIUM: CPT

## 2025-01-01 PROCEDURE — 96374 THER/PROPH/DIAG INJ IV PUSH: CPT

## 2025-01-01 PROCEDURE — 99291 CRITICAL CARE FIRST HOUR: CPT | Performed by: INTERNAL MEDICINE

## 2025-01-01 PROCEDURE — 82550 ASSAY OF CK (CPK): CPT | Performed by: PHYSICIAN ASSISTANT

## 2025-01-01 PROCEDURE — 82330 ASSAY OF CALCIUM: CPT

## 2025-01-01 PROCEDURE — 94664 DEMO&/EVAL PT USE INHALER: CPT

## 2025-01-01 PROCEDURE — 84295 ASSAY OF SERUM SODIUM: CPT

## 2025-01-01 PROCEDURE — 82948 REAGENT STRIP/BLOOD GLUCOSE: CPT

## 2025-01-01 PROCEDURE — 99254 IP/OBS CNSLTJ NEW/EST MOD 60: CPT | Performed by: INTERNAL MEDICINE

## 2025-01-01 PROCEDURE — 80053 COMPREHEN METABOLIC PANEL: CPT

## 2025-01-01 PROCEDURE — 83605 ASSAY OF LACTIC ACID: CPT | Performed by: PHYSICIAN ASSISTANT

## 2025-01-01 PROCEDURE — 99285 EMERGENCY DEPT VISIT HI MDM: CPT

## 2025-01-01 PROCEDURE — 93005 ELECTROCARDIOGRAM TRACING: CPT

## 2025-01-01 PROCEDURE — 84300 ASSAY OF URINE SODIUM: CPT

## 2025-01-01 PROCEDURE — 84550 ASSAY OF BLOOD/URIC ACID: CPT

## 2025-01-01 PROCEDURE — 84145 PROCALCITONIN (PCT): CPT

## 2025-01-01 PROCEDURE — 94002 VENT MGMT INPAT INIT DAY: CPT

## 2025-01-01 PROCEDURE — 83605 ASSAY OF LACTIC ACID: CPT | Performed by: INTERNAL MEDICINE

## 2025-01-01 RX ORDER — ALBUTEROL SULFATE 5 MG/ML
10 SOLUTION RESPIRATORY (INHALATION) ONCE
Status: COMPLETED | OUTPATIENT
Start: 2025-01-01 | End: 2025-01-01

## 2025-01-01 RX ORDER — MAGNESIUM SULFATE HEPTAHYDRATE 40 MG/ML
2 INJECTION, SOLUTION INTRAVENOUS ONCE
Status: COMPLETED | OUTPATIENT
Start: 2025-01-01 | End: 2025-01-01

## 2025-01-01 RX ORDER — ALBUMIN HUMAN 50 G/1000ML
12.5 SOLUTION INTRAVENOUS ONCE
Status: COMPLETED | OUTPATIENT
Start: 2025-01-01 | End: 2025-01-01

## 2025-01-01 RX ORDER — ENOXAPARIN SODIUM 100 MG/ML
40 INJECTION SUBCUTANEOUS EVERY 12 HOURS SCHEDULED
Status: DISCONTINUED | OUTPATIENT
Start: 2025-01-01 | End: 2025-01-01 | Stop reason: HOSPADM

## 2025-01-01 RX ORDER — MAGNESIUM HYDROXIDE/ALUMINUM HYDROXICE/SIMETHICONE 120; 1200; 1200 MG/30ML; MG/30ML; MG/30ML
30 SUSPENSION ORAL EVERY 4 HOURS PRN
Status: DISCONTINUED | OUTPATIENT
Start: 2025-01-01 | End: 2025-01-01 | Stop reason: HOSPADM

## 2025-01-01 RX ORDER — SODIUM CHLORIDE, SODIUM GLUCONATE, SODIUM ACETATE, POTASSIUM CHLORIDE, MAGNESIUM CHLORIDE, SODIUM PHOSPHATE, DIBASIC, AND POTASSIUM PHOSPHATE .53; .5; .37; .037; .03; .012; .00082 G/100ML; G/100ML; G/100ML; G/100ML; G/100ML; G/100ML; G/100ML
1000 INJECTION, SOLUTION INTRAVENOUS ONCE
Status: DISCONTINUED | OUTPATIENT
Start: 2025-01-01 | End: 2025-01-01

## 2025-01-01 RX ORDER — LORAZEPAM 1 MG/1
1 TABLET ORAL 2 TIMES DAILY PRN
Status: DISCONTINUED | OUTPATIENT
Start: 2025-01-01 | End: 2025-01-01 | Stop reason: HOSPADM

## 2025-01-01 RX ORDER — LISINOPRIL 20 MG/1
40 TABLET ORAL DAILY
Status: DISCONTINUED | OUTPATIENT
Start: 2025-01-01 | End: 2025-01-01 | Stop reason: HOSPADM

## 2025-01-01 RX ORDER — EPINEPHRINE 0.1 MG/ML
INJECTION INTRAVENOUS CODE/TRAUMA/SEDATION MEDICATION
Status: COMPLETED | OUTPATIENT
Start: 2025-01-01 | End: 2025-01-01

## 2025-01-01 RX ORDER — LEVALBUTEROL INHALATION SOLUTION 1.25 MG/3ML
1.25 SOLUTION RESPIRATORY (INHALATION)
Status: DISCONTINUED | OUTPATIENT
Start: 2025-01-01 | End: 2025-01-01 | Stop reason: HOSPADM

## 2025-01-01 RX ORDER — AMOXICILLIN 250 MG
2 CAPSULE ORAL 2 TIMES DAILY
Status: DISCONTINUED | OUTPATIENT
Start: 2025-01-01 | End: 2025-01-01 | Stop reason: HOSPADM

## 2025-01-01 RX ORDER — PREDNISONE 20 MG/1
40 TABLET ORAL DAILY
Status: DISCONTINUED | OUTPATIENT
Start: 2025-01-01 | End: 2025-01-01 | Stop reason: HOSPADM

## 2025-01-01 RX ORDER — SODIUM CHLORIDE, SODIUM GLUCONATE, SODIUM ACETATE, POTASSIUM CHLORIDE, MAGNESIUM CHLORIDE, SODIUM PHOSPHATE, DIBASIC, AND POTASSIUM PHOSPHATE .53; .5; .37; .037; .03; .012; .00082 G/100ML; G/100ML; G/100ML; G/100ML; G/100ML; G/100ML; G/100ML
190 INJECTION, SOLUTION INTRAVENOUS ONCE
Status: DISCONTINUED | OUTPATIENT
Start: 2025-01-01 | End: 2025-01-01

## 2025-01-01 RX ORDER — ALBUTEROL SULFATE 90 UG/1
2 INHALANT RESPIRATORY (INHALATION) EVERY 6 HOURS PRN
Status: DISCONTINUED | OUTPATIENT
Start: 2025-01-01 | End: 2025-01-01 | Stop reason: HOSPADM

## 2025-01-01 RX ORDER — ISOSORBIDE MONONITRATE 60 MG/1
60 TABLET, EXTENDED RELEASE ORAL DAILY
Status: DISCONTINUED | OUTPATIENT
Start: 2025-01-01 | End: 2025-01-01 | Stop reason: HOSPADM

## 2025-01-01 RX ORDER — POLYETHYLENE GLYCOL 3350 17 G/17G
17 POWDER, FOR SOLUTION ORAL DAILY
Status: DISCONTINUED | OUTPATIENT
Start: 2025-01-01 | End: 2025-01-01 | Stop reason: HOSPADM

## 2025-01-01 RX ORDER — METHYLPREDNISOLONE SODIUM SUCCINATE 125 MG/2ML
125 INJECTION, POWDER, LYOPHILIZED, FOR SOLUTION INTRAMUSCULAR; INTRAVENOUS ONCE
Status: COMPLETED | OUTPATIENT
Start: 2025-01-01 | End: 2025-01-01

## 2025-01-01 RX ORDER — SODIUM CHLORIDE, SODIUM GLUCONATE, SODIUM ACETATE, POTASSIUM CHLORIDE, MAGNESIUM CHLORIDE, SODIUM PHOSPHATE, DIBASIC, AND POTASSIUM PHOSPHATE .53; .5; .37; .037; .03; .012; .00082 G/100ML; G/100ML; G/100ML; G/100ML; G/100ML; G/100ML; G/100ML
1000 INJECTION, SOLUTION INTRAVENOUS ONCE
Status: COMPLETED | OUTPATIENT
Start: 2025-01-01 | End: 2025-01-01

## 2025-01-01 RX ORDER — FOLIC ACID 0.4 MG
400 TABLET ORAL DAILY
Status: DISCONTINUED | OUTPATIENT
Start: 2025-01-01 | End: 2025-01-01 | Stop reason: HOSPADM

## 2025-01-01 RX ORDER — QUETIAPINE FUMARATE 200 MG/1
200 TABLET, FILM COATED ORAL
Status: DISCONTINUED | OUTPATIENT
Start: 2025-01-01 | End: 2025-01-01 | Stop reason: HOSPADM

## 2025-01-01 RX ORDER — POTASSIUM CHLORIDE 14.9 MG/ML
20 INJECTION INTRAVENOUS ONCE
Status: COMPLETED | OUTPATIENT
Start: 2025-01-01 | End: 2025-01-01

## 2025-01-01 RX ORDER — ATORVASTATIN CALCIUM 40 MG/1
40 TABLET, FILM COATED ORAL
Status: DISCONTINUED | OUTPATIENT
Start: 2025-01-01 | End: 2025-01-01 | Stop reason: HOSPADM

## 2025-01-01 RX ORDER — SODIUM CHLORIDE FOR INHALATION 0.9 %
12 VIAL, NEBULIZER (ML) INHALATION ONCE
Status: COMPLETED | OUTPATIENT
Start: 2025-01-01 | End: 2025-01-01

## 2025-01-01 RX ORDER — NYSTATIN 100000 [USP'U]/G
POWDER TOPICAL 2 TIMES DAILY
Status: DISCONTINUED | OUTPATIENT
Start: 2025-01-01 | End: 2025-01-01 | Stop reason: HOSPADM

## 2025-01-01 RX ORDER — PANTOPRAZOLE SODIUM 40 MG/1
40 TABLET, DELAYED RELEASE ORAL
Status: DISCONTINUED | OUTPATIENT
Start: 2025-01-01 | End: 2025-01-01 | Stop reason: HOSPADM

## 2025-01-01 RX ORDER — PREDNISONE 20 MG/1
40 TABLET ORAL DAILY
Status: DISCONTINUED | OUTPATIENT
Start: 2025-01-01 | End: 2025-01-01

## 2025-01-01 RX ORDER — ROCURONIUM BROMIDE 10 MG/ML
INJECTION, SOLUTION INTRAVENOUS CODE/TRAUMA/SEDATION MEDICATION
Status: COMPLETED | OUTPATIENT
Start: 2025-01-01 | End: 2025-01-01

## 2025-01-01 RX ORDER — SPIRONOLACTONE 25 MG/1
25 TABLET ORAL DAILY
Status: DISCONTINUED | OUTPATIENT
Start: 2025-01-01 | End: 2025-01-01 | Stop reason: HOSPADM

## 2025-01-01 RX ORDER — AMOXICILLIN 250 MG
2 CAPSULE ORAL
Status: DISCONTINUED | OUTPATIENT
Start: 2025-01-01 | End: 2025-01-01

## 2025-01-01 RX ORDER — SODIUM CHLORIDE 9 MG/ML
125 INJECTION, SOLUTION INTRAVENOUS CONTINUOUS
Status: DISCONTINUED | OUTPATIENT
Start: 2025-01-01 | End: 2025-01-01

## 2025-01-01 RX ORDER — CALCIUM CHLORIDE 100 MG/ML
SYRINGE (ML) INTRAVENOUS CODE/TRAUMA/SEDATION MEDICATION
Status: COMPLETED | OUTPATIENT
Start: 2025-01-01 | End: 2025-01-01

## 2025-01-01 RX ORDER — ENOXAPARIN SODIUM 100 MG/ML
40 INJECTION SUBCUTANEOUS DAILY
Status: DISCONTINUED | OUTPATIENT
Start: 2025-01-01 | End: 2025-01-01

## 2025-01-01 RX ORDER — DEXTROSE MONOHYDRATE 50 MG/ML
50 INJECTION, SOLUTION INTRAVENOUS CONTINUOUS
Status: DISCONTINUED | OUTPATIENT
Start: 2025-01-01 | End: 2025-01-01

## 2025-01-01 RX ORDER — QUETIAPINE FUMARATE 100 MG/1
200 TABLET, FILM COATED ORAL
Status: DISCONTINUED | OUTPATIENT
Start: 2025-01-01 | End: 2025-01-01

## 2025-01-01 RX ORDER — CALCIUM GLUCONATE 20 MG/ML
2 INJECTION, SOLUTION INTRAVENOUS ONCE
Status: COMPLETED | OUTPATIENT
Start: 2025-01-01 | End: 2025-01-01

## 2025-01-01 RX ORDER — INDOMETHACIN 25 MG/1
CAPSULE ORAL CODE/TRAUMA/SEDATION MEDICATION
Status: COMPLETED | OUTPATIENT
Start: 2025-01-01 | End: 2025-01-01

## 2025-01-01 RX ORDER — SODIUM CHLORIDE, SODIUM GLUCONATE, SODIUM ACETATE, POTASSIUM CHLORIDE, MAGNESIUM CHLORIDE, SODIUM PHOSPHATE, DIBASIC, AND POTASSIUM PHOSPHATE .53; .5; .37; .037; .03; .012; .00082 G/100ML; G/100ML; G/100ML; G/100ML; G/100ML; G/100ML; G/100ML
500 INJECTION, SOLUTION INTRAVENOUS ONCE
Status: COMPLETED | OUTPATIENT
Start: 2025-01-01 | End: 2025-01-01

## 2025-01-01 RX ORDER — DEXTROSE 50 % IN WATER 50 %
SYRINGE (ML) INTRAVENOUS CODE/TRAUMA/SEDATION MEDICATION
Status: COMPLETED | OUTPATIENT
Start: 2025-01-01 | End: 2025-01-01

## 2025-01-01 RX ORDER — MAGNESIUM SULFATE HEPTAHYDRATE 40 MG/ML
2 INJECTION, SOLUTION INTRAVENOUS ONCE
Status: DISCONTINUED | OUTPATIENT
Start: 2025-01-01 | End: 2025-01-01

## 2025-01-01 RX ORDER — BISACODYL 10 MG
10 SUPPOSITORY, RECTAL RECTAL DAILY
Status: DISCONTINUED | OUTPATIENT
Start: 2025-01-01 | End: 2025-01-01 | Stop reason: HOSPADM

## 2025-01-01 RX ORDER — QUETIAPINE FUMARATE 25 MG/1
50 TABLET, FILM COATED ORAL
Status: DISCONTINUED | OUTPATIENT
Start: 2025-01-01 | End: 2025-01-01

## 2025-01-01 RX ORDER — ISOSORBIDE DINITRATE 10 MG/1
10 TABLET ORAL 3 TIMES DAILY
COMMUNITY

## 2025-01-01 RX ADMIN — SERTRALINE HYDROCHLORIDE 50 MG: 50 TABLET ORAL at 20:39

## 2025-01-01 RX ADMIN — SODIUM CHLORIDE 75 ML/HR: 0.9 INJECTION, SOLUTION INTRAVENOUS at 20:25

## 2025-01-01 RX ADMIN — PREDNISONE 40 MG: 20 TABLET ORAL at 08:13

## 2025-01-01 RX ADMIN — ENOXAPARIN SODIUM 40 MG: 40 INJECTION SUBCUTANEOUS at 21:50

## 2025-01-01 RX ADMIN — CALCIUM GLUCONATE 2 G: 20 INJECTION, SOLUTION INTRAVENOUS at 21:49

## 2025-01-01 RX ADMIN — SODIUM CHLORIDE 500 ML: 0.9 INJECTION, SOLUTION INTRAVENOUS at 11:14

## 2025-01-01 RX ADMIN — ENOXAPARIN SODIUM 40 MG: 40 INJECTION SUBCUTANEOUS at 20:39

## 2025-01-01 RX ADMIN — METHYLPREDNISOLONE SODIUM SUCCINATE 125 MG: 125 INJECTION, POWDER, FOR SOLUTION INTRAMUSCULAR; INTRAVENOUS at 05:16

## 2025-01-01 RX ADMIN — ATORVASTATIN CALCIUM 40 MG: 40 TABLET, FILM COATED ORAL at 17:06

## 2025-01-01 RX ADMIN — SENNOSIDES AND DOCUSATE SODIUM 2 TABLET: 50; 8.6 TABLET ORAL at 17:35

## 2025-01-01 RX ADMIN — SODIUM CHLORIDE, SODIUM GLUCONATE, SODIUM ACETATE, POTASSIUM CHLORIDE, MAGNESIUM CHLORIDE, SODIUM PHOSPHATE, DIBASIC, AND POTASSIUM PHOSPHATE 1000 ML: .53; .5; .37; .037; .03; .012; .00082 INJECTION, SOLUTION INTRAVENOUS at 06:47

## 2025-01-01 RX ADMIN — ALUMINUM HYDROXIDE, MAGNESIUM HYDROXIDE, AND DIMETHICONE 30 ML: 200; 20; 200 SUSPENSION ORAL at 00:57

## 2025-01-01 RX ADMIN — ENOXAPARIN SODIUM 40 MG: 40 INJECTION SUBCUTANEOUS at 21:54

## 2025-01-01 RX ADMIN — POLYETHYLENE GLYCOL 3350 17 G: 17 POWDER, FOR SOLUTION ORAL at 08:32

## 2025-01-01 RX ADMIN — POLYETHYLENE GLYCOL 3350 17 G: 17 POWDER, FOR SOLUTION ORAL at 08:13

## 2025-01-01 RX ADMIN — LORAZEPAM 1 MG: 1 TABLET ORAL at 08:31

## 2025-01-01 RX ADMIN — ALBUTEROL SULFATE 2 PUFF: 90 AEROSOL, METERED RESPIRATORY (INHALATION) at 21:54

## 2025-01-01 RX ADMIN — ENOXAPARIN SODIUM 40 MG: 40 INJECTION SUBCUTANEOUS at 08:13

## 2025-01-01 RX ADMIN — Medication 12 ML: at 05:13

## 2025-01-01 RX ADMIN — SENNOSIDES AND DOCUSATE SODIUM 2 TABLET: 50; 8.6 TABLET ORAL at 17:06

## 2025-01-01 RX ADMIN — SODIUM CHLORIDE 50 ML/HR: 4 INJECTION, SOLUTION, CONCENTRATE INTRAVENOUS at 04:30

## 2025-01-01 RX ADMIN — ALBUTEROL SULFATE 2 PUFF: 90 AEROSOL, METERED RESPIRATORY (INHALATION) at 18:15

## 2025-01-01 RX ADMIN — FOLIC ACID TAB 400 MCG 400 MCG: 400 TAB at 08:13

## 2025-01-01 RX ADMIN — SODIUM BICARBONATE 50 MEQ: 84 INJECTION INTRAVENOUS at 01:23

## 2025-01-01 RX ADMIN — LEVALBUTEROL HYDROCHLORIDE 1.25 MG: 1.25 SOLUTION RESPIRATORY (INHALATION) at 13:22

## 2025-01-01 RX ADMIN — ATORVASTATIN CALCIUM 40 MG: 40 TABLET, FILM COATED ORAL at 16:54

## 2025-01-01 RX ADMIN — ALBUTEROL SULFATE 2 PUFF: 90 AEROSOL, METERED RESPIRATORY (INHALATION) at 17:37

## 2025-01-01 RX ADMIN — ENOXAPARIN SODIUM 40 MG: 40 INJECTION SUBCUTANEOUS at 08:37

## 2025-01-01 RX ADMIN — EPINEPHRINE 1 MG: 0.1 INJECTION INTRAVENOUS at 01:31

## 2025-01-01 RX ADMIN — QUETIAPINE FUMARATE 200 MG: 200 TABLET ORAL at 20:39

## 2025-01-01 RX ADMIN — EPINEPHRINE 1 MG: 0.1 INJECTION INTRAVENOUS at 01:43

## 2025-01-01 RX ADMIN — EPINEPHRINE 1 MG: 0.1 INJECTION INTRAVENOUS at 01:21

## 2025-01-01 RX ADMIN — POLYETHYLENE GLYCOL 3350 17 G: 17 POWDER, FOR SOLUTION ORAL at 08:25

## 2025-01-01 RX ADMIN — PANTOPRAZOLE SODIUM 40 MG: 40 TABLET, DELAYED RELEASE ORAL at 18:14

## 2025-01-01 RX ADMIN — SENNOSIDES AND DOCUSATE SODIUM 2 TABLET: 50; 8.6 TABLET ORAL at 08:13

## 2025-01-01 RX ADMIN — SODIUM CHLORIDE, SODIUM GLUCONATE, SODIUM ACETATE, POTASSIUM CHLORIDE, MAGNESIUM CHLORIDE, SODIUM PHOSPHATE, DIBASIC, AND POTASSIUM PHOSPHATE 1000 ML: .53; .5; .37; .037; .03; .012; .00082 INJECTION, SOLUTION INTRAVENOUS at 06:14

## 2025-01-01 RX ADMIN — ALBUMIN (HUMAN) 12.5 G: 12.5 INJECTION, SOLUTION INTRAVENOUS at 08:31

## 2025-01-01 RX ADMIN — ALBUMIN (HUMAN) 12.5 G: 12.5 INJECTION, SOLUTION INTRAVENOUS at 21:35

## 2025-01-01 RX ADMIN — MAGNESIUM SULFATE HEPTAHYDRATE 2 G: 40 INJECTION, SOLUTION INTRAVENOUS at 21:48

## 2025-01-01 RX ADMIN — SENNOSIDES AND DOCUSATE SODIUM 2 TABLET: 50; 8.6 TABLET ORAL at 08:25

## 2025-01-01 RX ADMIN — IOHEXOL 120 ML: 350 INJECTION, SOLUTION INTRAVENOUS at 06:10

## 2025-01-01 RX ADMIN — EPINEPHRINE 1 MG: 0.1 INJECTION INTRAVENOUS at 01:34

## 2025-01-01 RX ADMIN — LORAZEPAM 1 MG: 1 TABLET ORAL at 21:54

## 2025-01-01 RX ADMIN — LEVALBUTEROL HYDROCHLORIDE 1.25 MG: 1.25 SOLUTION RESPIRATORY (INHALATION) at 13:01

## 2025-01-01 RX ADMIN — LEVALBUTEROL HYDROCHLORIDE 1.25 MG: 1.25 SOLUTION RESPIRATORY (INHALATION) at 07:37

## 2025-01-01 RX ADMIN — SERTRALINE HYDROCHLORIDE 50 MG: 50 TABLET ORAL at 21:50

## 2025-01-01 RX ADMIN — PREDNISONE 40 MG: 20 TABLET ORAL at 08:25

## 2025-01-01 RX ADMIN — SODIUM CHLORIDE 1000 ML: 0.9 INJECTION, SOLUTION INTRAVENOUS at 05:01

## 2025-01-01 RX ADMIN — ENOXAPARIN SODIUM 40 MG: 40 INJECTION SUBCUTANEOUS at 08:26

## 2025-01-01 RX ADMIN — LEVALBUTEROL HYDROCHLORIDE 1.25 MG: 1.25 SOLUTION RESPIRATORY (INHALATION) at 19:14

## 2025-01-01 RX ADMIN — SODIUM CHLORIDE, SODIUM GLUCONATE, SODIUM ACETATE, POTASSIUM CHLORIDE, MAGNESIUM CHLORIDE, SODIUM PHOSPHATE, DIBASIC, AND POTASSIUM PHOSPHATE 500 ML: .53; .5; .37; .037; .03; .012; .00082 INJECTION, SOLUTION INTRAVENOUS at 11:08

## 2025-01-01 RX ADMIN — ALBUTEROL SULFATE 10 MG: 2.5 SOLUTION RESPIRATORY (INHALATION) at 05:13

## 2025-01-01 RX ADMIN — DEXTROSE 2000 MG: 50 INJECTION, SOLUTION INTRAVENOUS at 05:18

## 2025-01-01 RX ADMIN — FOLIC ACID TAB 400 MCG 400 MCG: 400 TAB at 08:25

## 2025-01-01 RX ADMIN — DEXTROSE MONOHYDRATE 25 G: 25 INJECTION, SOLUTION INTRAVENOUS at 01:27

## 2025-01-01 RX ADMIN — EPINEPHRINE 1 MG: 0.1 INJECTION INTRAVENOUS at 01:46

## 2025-01-01 RX ADMIN — EPINEPHRINE 1 MG: 0.1 INJECTION INTRAVENOUS at 01:29

## 2025-01-01 RX ADMIN — DEXTROSE 50 ML/HR: 5 SOLUTION INTRAVENOUS at 02:24

## 2025-01-01 RX ADMIN — IPRATROPIUM BROMIDE 1 MG: 0.5 SOLUTION RESPIRATORY (INHALATION) at 05:13

## 2025-01-01 RX ADMIN — ALBUTEROL SULFATE 2 PUFF: 90 AEROSOL, METERED RESPIRATORY (INHALATION) at 09:49

## 2025-01-01 RX ADMIN — LEVALBUTEROL HYDROCHLORIDE 1.25 MG: 1.25 SOLUTION RESPIRATORY (INHALATION) at 21:00

## 2025-01-01 RX ADMIN — QUETIAPINE FUMARATE 200 MG: 200 TABLET ORAL at 22:58

## 2025-01-01 RX ADMIN — CALCIUM CHLORIDE 1 G: 100 INJECTION INTRAVENOUS; INTRAVENTRICULAR at 01:33

## 2025-01-01 RX ADMIN — CALCIUM CHLORIDE 1 G: 100 INJECTION INTRAVENOUS; INTRAVENTRICULAR at 01:23

## 2025-01-01 RX ADMIN — LEVALBUTEROL HYDROCHLORIDE 1.25 MG: 1.25 SOLUTION RESPIRATORY (INHALATION) at 13:33

## 2025-01-01 RX ADMIN — ROCURONIUM 150 MG: 50 INJECTION, SOLUTION INTRAVENOUS at 01:35

## 2025-01-01 RX ADMIN — SODIUM BICARBONATE 50 MEQ: 84 INJECTION INTRAVENOUS at 01:26

## 2025-01-01 RX ADMIN — EPINEPHRINE 1 MG: 0.1 INJECTION INTRAVENOUS at 01:40

## 2025-01-01 RX ADMIN — LORAZEPAM 1 MG: 1 TABLET ORAL at 20:39

## 2025-01-01 RX ADMIN — QUETIAPINE FUMARATE 200 MG: 200 TABLET ORAL at 21:54

## 2025-01-01 RX ADMIN — ATORVASTATIN CALCIUM 40 MG: 40 TABLET, FILM COATED ORAL at 16:25

## 2025-01-01 RX ADMIN — LEVALBUTEROL HYDROCHLORIDE 1.25 MG: 1.25 SOLUTION RESPIRATORY (INHALATION) at 19:20

## 2025-01-01 RX ADMIN — EPINEPHRINE 1 MG: 0.1 INJECTION INTRAVENOUS at 01:25

## 2025-01-01 RX ADMIN — LORAZEPAM 1 MG: 1 TABLET ORAL at 08:13

## 2025-01-01 RX ADMIN — SERTRALINE HYDROCHLORIDE 50 MG: 50 TABLET ORAL at 21:54

## 2025-01-01 RX ADMIN — SODIUM CHLORIDE: 4 INJECTION, SOLUTION, CONCENTRATE INTRAVENOUS at 14:16

## 2025-01-01 RX ADMIN — LEVALBUTEROL HYDROCHLORIDE 1.25 MG: 1.25 SOLUTION RESPIRATORY (INHALATION) at 07:39

## 2025-01-01 RX ADMIN — POTASSIUM CHLORIDE 20 MEQ: 14.9 INJECTION, SOLUTION INTRAVENOUS at 08:37

## 2025-01-01 RX ADMIN — LEVALBUTEROL HYDROCHLORIDE 1.25 MG: 1.25 SOLUTION RESPIRATORY (INHALATION) at 09:07

## 2025-01-01 RX ADMIN — MAGNESIUM SULFATE HEPTAHYDRATE 2 G: 40 INJECTION, SOLUTION INTRAVENOUS at 05:31

## 2025-01-01 RX ADMIN — FOLIC ACID TAB 400 MCG 400 MCG: 400 TAB at 08:37

## 2025-01-01 RX ADMIN — EPINEPHRINE 1 MG: 0.1 INJECTION INTRAVENOUS at 01:37

## 2025-01-01 RX ADMIN — POTASSIUM CHLORIDE 20 MEQ: 14.9 INJECTION, SOLUTION INTRAVENOUS at 06:35

## 2025-01-09 DIAGNOSIS — I48.92 ATRIAL FLUTTER, UNSPECIFIED TYPE (HCC): ICD-10-CM

## 2025-01-10 DIAGNOSIS — I10 HYPERTENSION, UNSPECIFIED TYPE: ICD-10-CM

## 2025-01-10 RX ORDER — OMEPRAZOLE 40 MG/1
40 CAPSULE, DELAYED RELEASE ORAL DAILY
Qty: 60 CAPSULE | Refills: 5 | Status: SHIPPED | OUTPATIENT
Start: 2025-01-10

## 2025-01-10 NOTE — TELEPHONE ENCOUNTER
Requested medication(s) are due for refill today: Yes  Patient has already received a courtesy refill: No  Other reason request has been forwarded to provider:    Albendazole Pregnancy And Lactation Text: This medication is Pregnancy Category C and it isn't known if it is safe during pregnancy. It is also excreted in breast milk.

## 2025-01-10 NOTE — TELEPHONE ENCOUNTER
Reason for call:   [x] Refill   [] Prior Auth  [] Other:     Office:   [] PCP/Provider -   [x] Specialty/Provider - cardio/ Renny Deleon MD     Medication:     omeprazole (PriLOSEC) 40 MG capsule       Dose/Frequency:    TAKE ONE CAPSULE BY MOUTH EVERY DAY       Quantity: 60    Pharmacy: [facility] [address] [address]  [address] [phone]     Does the patient have enough for 3 days?   [] Yes   [x] No - Send as HP to POD

## 2025-01-16 RX ORDER — DILTIAZEM HYDROCHLORIDE 300 MG/1
300 CAPSULE, COATED, EXTENDED RELEASE ORAL EVERY MORNING
Qty: 90 CAPSULE | Refills: 3 | Status: SHIPPED | OUTPATIENT
Start: 2025-01-16

## 2025-01-27 ENCOUNTER — APPOINTMENT (EMERGENCY)
Dept: RADIOLOGY | Facility: HOSPITAL | Age: 60
DRG: 951 | End: 2025-01-27
Payer: COMMERCIAL

## 2025-01-27 ENCOUNTER — HOSPITAL ENCOUNTER (INPATIENT)
Facility: HOSPITAL | Age: 60
LOS: 2 days | Discharge: HOME | DRG: 951 | End: 2025-01-30
Attending: EMERGENCY MEDICINE | Admitting: INTERNAL MEDICINE
Payer: COMMERCIAL

## 2025-01-27 DIAGNOSIS — I10 PRIMARY HYPERTENSION: ICD-10-CM

## 2025-01-27 DIAGNOSIS — R07.9 CHEST PAIN: ICD-10-CM

## 2025-01-27 DIAGNOSIS — Z13.9 ENCOUNTER FOR SCREENING INVOLVING SOCIAL DETERMINANTS OF HEALTH (SDOH): ICD-10-CM

## 2025-01-27 DIAGNOSIS — I46.9 CARDIAC ARREST (HCC): ICD-10-CM

## 2025-01-27 DIAGNOSIS — R06.00 DYSPNEA: Primary | ICD-10-CM

## 2025-01-27 DIAGNOSIS — R73.03 PREDIABETES: ICD-10-CM

## 2025-01-27 DIAGNOSIS — L02.91 ABSCESS: ICD-10-CM

## 2025-01-27 DIAGNOSIS — I48.92 ATRIAL FLUTTER, UNSPECIFIED TYPE (HCC): ICD-10-CM

## 2025-01-27 DIAGNOSIS — R00.2 PALPITATION: ICD-10-CM

## 2025-01-27 DIAGNOSIS — I20.1 PRINZMETAL VARIANT ANGINA (HCC): ICD-10-CM

## 2025-01-27 PROBLEM — R07.89 CHEST PAIN, ATYPICAL: Status: ACTIVE | Noted: 2025-01-27

## 2025-01-27 LAB
2HR DELTA HS TROPONIN: -4 NG/L
4HR DELTA HS TROPONIN: -3 NG/L
ALBUMIN SERPL BCG-MCNC: 3.9 G/DL (ref 3.5–5)
ALP SERPL-CCNC: 162 U/L (ref 34–104)
ALT SERPL W P-5'-P-CCNC: 24 U/L (ref 7–52)
ANION GAP SERPL CALCULATED.3IONS-SCNC: 12 MMOL/L (ref 4–13)
AST SERPL W P-5'-P-CCNC: 21 U/L (ref 13–39)
ATRIAL RATE: 103 BPM
ATRIAL RATE: 112 BPM
ATRIAL RATE: 117 BPM
BASOPHILS # BLD AUTO: 0.02 THOUSANDS/ΜL (ref 0–0.1)
BASOPHILS NFR BLD AUTO: 0 % (ref 0–1)
BILIRUB SERPL-MCNC: 0.72 MG/DL (ref 0.2–1)
BNP SERPL-MCNC: 33 PG/ML (ref 0–100)
BUN SERPL-MCNC: 5 MG/DL (ref 5–25)
CALCIUM SERPL-MCNC: 8.6 MG/DL (ref 8.4–10.2)
CARDIAC TROPONIN I PNL SERPL HS: 29 NG/L (ref ?–50)
CARDIAC TROPONIN I PNL SERPL HS: 30 NG/L (ref ?–50)
CARDIAC TROPONIN I PNL SERPL HS: 33 NG/L (ref ?–50)
CHLORIDE SERPL-SCNC: 100 MMOL/L (ref 96–108)
CO2 SERPL-SCNC: 28 MMOL/L (ref 21–32)
CREAT SERPL-MCNC: 0.88 MG/DL (ref 0.6–1.3)
D DIMER PPP FEU-MCNC: 0.28 UG/ML FEU
EOSINOPHIL # BLD AUTO: 0.02 THOUSAND/ΜL (ref 0–0.61)
EOSINOPHIL NFR BLD AUTO: 0 % (ref 0–6)
ERYTHROCYTE [DISTWIDTH] IN BLOOD BY AUTOMATED COUNT: 15.9 % (ref 11.6–15.1)
EST. AVERAGE GLUCOSE BLD GHB EST-MCNC: 100 MG/DL
FLUAV AG UPPER RESP QL IA.RAPID: NEGATIVE
FLUAV RNA RESP QL NAA+PROBE: NEGATIVE
FLUBV AG UPPER RESP QL IA.RAPID: NEGATIVE
FLUBV RNA RESP QL NAA+PROBE: NEGATIVE
GFR SERPL CREATININE-BSD FRML MDRD: 94 ML/MIN/1.73SQ M
GLUCOSE SERPL-MCNC: 120 MG/DL (ref 65–140)
GLUCOSE SERPL-MCNC: 158 MG/DL (ref 65–140)
HBA1C MFR BLD: 5.1 %
HCT VFR BLD AUTO: 46.1 % (ref 36.5–49.3)
HGB BLD-MCNC: 15.6 G/DL (ref 12–17)
IMM GRANULOCYTES # BLD AUTO: 0.02 THOUSAND/UL (ref 0–0.2)
IMM GRANULOCYTES NFR BLD AUTO: 0 % (ref 0–2)
LYMPHOCYTES # BLD AUTO: 1.01 THOUSANDS/ΜL (ref 0.6–4.47)
LYMPHOCYTES NFR BLD AUTO: 19 % (ref 14–44)
MCH RBC QN AUTO: 33.2 PG (ref 26.8–34.3)
MCHC RBC AUTO-ENTMCNC: 33.8 G/DL (ref 31.4–37.4)
MCV RBC AUTO: 98 FL (ref 82–98)
MONOCYTES # BLD AUTO: 0.35 THOUSAND/ΜL (ref 0.17–1.22)
MONOCYTES NFR BLD AUTO: 7 % (ref 4–12)
NEUTROPHILS # BLD AUTO: 3.83 THOUSANDS/ΜL (ref 1.85–7.62)
NEUTS SEG NFR BLD AUTO: 74 % (ref 43–75)
NRBC BLD AUTO-RTO: 0 /100 WBCS
P AXIS: 68 DEGREES
PLATELET # BLD AUTO: 150 THOUSANDS/UL (ref 149–390)
PMV BLD AUTO: 10.8 FL (ref 8.9–12.7)
POTASSIUM SERPL-SCNC: 2.9 MMOL/L (ref 3.5–5.3)
PR INTERVAL: 192 MS
PR INTERVAL: 198 MS
PROT SERPL-MCNC: 6.8 G/DL (ref 6.4–8.4)
QRS AXIS: 28 DEGREES
QRS AXIS: 29 DEGREES
QRS AXIS: 6 DEGREES
QRSD INTERVAL: 100 MS
QRSD INTERVAL: 94 MS
QRSD INTERVAL: 94 MS
QT INTERVAL: 362 MS
QT INTERVAL: 362 MS
QT INTERVAL: 370 MS
QTC INTERVAL: 483 MS
QTC INTERVAL: 484 MS
QTC INTERVAL: 494 MS
RBC # BLD AUTO: 4.7 MILLION/UL (ref 3.88–5.62)
RSV RNA RESP QL NAA+PROBE: NEGATIVE
SARS-COV+SARS-COV-2 AG RESP QL IA.RAPID: NEGATIVE
SARS-COV-2 RNA RESP QL NAA+PROBE: NEGATIVE
SODIUM SERPL-SCNC: 140 MMOL/L (ref 135–147)
T WAVE AXIS: 74 DEGREES
T WAVE AXIS: 76 DEGREES
T WAVE AXIS: 79 DEGREES
TSH SERPL DL<=0.05 MIU/L-ACNC: 2.24 UIU/ML (ref 0.45–4.5)
VENTRICULAR RATE: 103 BPM
VENTRICULAR RATE: 107 BPM
VENTRICULAR RATE: 112 BPM
WBC # BLD AUTO: 5.25 THOUSAND/UL (ref 4.31–10.16)

## 2025-01-27 PROCEDURE — 83880 ASSAY OF NATRIURETIC PEPTIDE: CPT | Performed by: INTERNAL MEDICINE

## 2025-01-27 PROCEDURE — 99285 EMERGENCY DEPT VISIT HI MDM: CPT | Performed by: EMERGENCY MEDICINE

## 2025-01-27 PROCEDURE — 93010 ELECTROCARDIOGRAM REPORT: CPT | Performed by: STUDENT IN AN ORGANIZED HEALTH CARE EDUCATION/TRAINING PROGRAM

## 2025-01-27 PROCEDURE — 84484 ASSAY OF TROPONIN QUANT: CPT

## 2025-01-27 PROCEDURE — 99223 1ST HOSP IP/OBS HIGH 75: CPT | Performed by: INTERNAL MEDICINE

## 2025-01-27 PROCEDURE — 0241U HB NFCT DS VIR RESP RNA 4 TRGT: CPT | Performed by: INTERNAL MEDICINE

## 2025-01-27 PROCEDURE — 82948 REAGENT STRIP/BLOOD GLUCOSE: CPT

## 2025-01-27 PROCEDURE — 71046 X-RAY EXAM CHEST 2 VIEWS: CPT

## 2025-01-27 PROCEDURE — 80053 COMPREHEN METABOLIC PANEL: CPT

## 2025-01-27 PROCEDURE — 87804 INFLUENZA ASSAY W/OPTIC: CPT | Performed by: EMERGENCY MEDICINE

## 2025-01-27 PROCEDURE — 83036 HEMOGLOBIN GLYCOSYLATED A1C: CPT | Performed by: INTERNAL MEDICINE

## 2025-01-27 PROCEDURE — 87811 SARS-COV-2 COVID19 W/OPTIC: CPT | Performed by: EMERGENCY MEDICINE

## 2025-01-27 PROCEDURE — 94640 AIRWAY INHALATION TREATMENT: CPT

## 2025-01-27 PROCEDURE — 93005 ELECTROCARDIOGRAM TRACING: CPT

## 2025-01-27 PROCEDURE — 99285 EMERGENCY DEPT VISIT HI MDM: CPT

## 2025-01-27 PROCEDURE — 84443 ASSAY THYROID STIM HORMONE: CPT

## 2025-01-27 PROCEDURE — 85379 FIBRIN DEGRADATION QUANT: CPT

## 2025-01-27 PROCEDURE — 85025 COMPLETE CBC W/AUTO DIFF WBC: CPT

## 2025-01-27 PROCEDURE — 36415 COLL VENOUS BLD VENIPUNCTURE: CPT

## 2025-01-27 RX ORDER — DOCUSATE SODIUM 100 MG/1
100 CAPSULE, LIQUID FILLED ORAL 2 TIMES DAILY
Status: DISCONTINUED | OUTPATIENT
Start: 2025-01-27 | End: 2025-01-30 | Stop reason: HOSPADM

## 2025-01-27 RX ORDER — SODIUM CHLORIDE 9 MG/ML
100 INJECTION, SOLUTION INTRAVENOUS CONTINUOUS
Status: DISCONTINUED | OUTPATIENT
Start: 2025-01-27 | End: 2025-01-28

## 2025-01-27 RX ORDER — ONDANSETRON 2 MG/ML
4 INJECTION INTRAMUSCULAR; INTRAVENOUS EVERY 4 HOURS PRN
Status: DISCONTINUED | OUTPATIENT
Start: 2025-01-27 | End: 2025-01-30 | Stop reason: HOSPADM

## 2025-01-27 RX ORDER — ATORVASTATIN CALCIUM 40 MG/1
40 TABLET, FILM COATED ORAL DAILY
Status: DISCONTINUED | OUTPATIENT
Start: 2025-01-27 | End: 2025-01-30 | Stop reason: HOSPADM

## 2025-01-27 RX ORDER — ACETAMINOPHEN 325 MG/1
650 TABLET ORAL EVERY 6 HOURS PRN
Status: DISCONTINUED | OUTPATIENT
Start: 2025-01-27 | End: 2025-01-30 | Stop reason: HOSPADM

## 2025-01-27 RX ORDER — FOLIC ACID 0.4 MG
400 TABLET ORAL DAILY
Status: DISCONTINUED | OUTPATIENT
Start: 2025-01-27 | End: 2025-01-30 | Stop reason: HOSPADM

## 2025-01-27 RX ORDER — LISINOPRIL 20 MG/1
40 TABLET ORAL DAILY
Status: DISCONTINUED | OUTPATIENT
Start: 2025-01-27 | End: 2025-01-30 | Stop reason: HOSPADM

## 2025-01-27 RX ORDER — HYDROMORPHONE HCL IN WATER/PF 6 MG/30 ML
0.2 PATIENT CONTROLLED ANALGESIA SYRINGE INTRAVENOUS EVERY 2 HOUR PRN
Refills: 0 | Status: DISCONTINUED | OUTPATIENT
Start: 2025-01-27 | End: 2025-01-30 | Stop reason: HOSPADM

## 2025-01-27 RX ORDER — OXYCODONE HYDROCHLORIDE 5 MG/1
5 TABLET ORAL EVERY 4 HOURS PRN
Refills: 0 | Status: DISCONTINUED | OUTPATIENT
Start: 2025-01-27 | End: 2025-01-30 | Stop reason: HOSPADM

## 2025-01-27 RX ORDER — ISOSORBIDE MONONITRATE 60 MG/1
60 TABLET, EXTENDED RELEASE ORAL DAILY
Status: DISCONTINUED | OUTPATIENT
Start: 2025-01-27 | End: 2025-01-30 | Stop reason: HOSPADM

## 2025-01-27 RX ORDER — LORAZEPAM 1 MG/1
1 TABLET ORAL ONCE
Status: COMPLETED | OUTPATIENT
Start: 2025-01-27 | End: 2025-01-27

## 2025-01-27 RX ORDER — POTASSIUM CHLORIDE 1500 MG/1
40 TABLET, EXTENDED RELEASE ORAL ONCE
Status: COMPLETED | OUTPATIENT
Start: 2025-01-27 | End: 2025-01-27

## 2025-01-27 RX ORDER — ALBUTEROL SULFATE 0.83 MG/ML
5 SOLUTION RESPIRATORY (INHALATION) ONCE
Status: COMPLETED | OUTPATIENT
Start: 2025-01-27 | End: 2025-01-27

## 2025-01-27 RX ORDER — LORAZEPAM 1 MG/1
1 TABLET ORAL 2 TIMES DAILY PRN
Status: DISCONTINUED | OUTPATIENT
Start: 2025-01-27 | End: 2025-01-30 | Stop reason: HOSPADM

## 2025-01-27 RX ORDER — POLYETHYLENE GLYCOL 3350 17 G/17G
17 POWDER, FOR SOLUTION ORAL DAILY
Status: DISCONTINUED | OUTPATIENT
Start: 2025-01-28 | End: 2025-01-30 | Stop reason: HOSPADM

## 2025-01-27 RX ORDER — QUETIAPINE FUMARATE 100 MG/1
300 TABLET, FILM COATED ORAL
Status: DISCONTINUED | OUTPATIENT
Start: 2025-01-27 | End: 2025-01-30 | Stop reason: HOSPADM

## 2025-01-27 RX ORDER — PANTOPRAZOLE SODIUM 40 MG/1
40 TABLET, DELAYED RELEASE ORAL
Status: DISCONTINUED | OUTPATIENT
Start: 2025-01-28 | End: 2025-01-30 | Stop reason: HOSPADM

## 2025-01-27 RX ADMIN — ALBUTEROL SULFATE 5 MG: 2.5 SOLUTION RESPIRATORY (INHALATION) at 12:38

## 2025-01-27 RX ADMIN — DOCUSATE SODIUM 100 MG: 100 CAPSULE, LIQUID FILLED ORAL at 17:33

## 2025-01-27 RX ADMIN — SODIUM CHLORIDE 100 ML/HR: 0.9 INJECTION, SOLUTION INTRAVENOUS at 15:58

## 2025-01-27 RX ADMIN — IPRATROPIUM BROMIDE 0.5 MG: 0.5 SOLUTION RESPIRATORY (INHALATION) at 12:37

## 2025-01-27 RX ADMIN — NICOTINE 7 MG/24 HR DAILY TRANSDERMAL PATCH 1 PATCH: at 15:13

## 2025-01-27 RX ADMIN — POTASSIUM CHLORIDE 40 MEQ: 1500 TABLET, EXTENDED RELEASE ORAL at 10:16

## 2025-01-27 RX ADMIN — OXYCODONE 5 MG: 5 TABLET ORAL at 19:27

## 2025-01-27 RX ADMIN — QUETIAPINE FUMARATE 300 MG: 100 TABLET ORAL at 21:34

## 2025-01-27 RX ADMIN — FOLIC ACID TAB 400 MCG 400 MCG: 400 TAB at 16:35

## 2025-01-27 RX ADMIN — CYANOCOBALAMIN TAB 500 MCG 1000 MCG: 500 TAB at 15:09

## 2025-01-27 RX ADMIN — ISOSORBIDE MONONITRATE 60 MG: 60 TABLET, EXTENDED RELEASE ORAL at 15:09

## 2025-01-27 RX ADMIN — ATORVASTATIN CALCIUM 40 MG: 40 TABLET, FILM COATED ORAL at 15:09

## 2025-01-27 RX ADMIN — HYDROMORPHONE HYDROCHLORIDE 0.2 MG: 0.2 INJECTION, SOLUTION INTRAMUSCULAR; INTRAVENOUS; SUBCUTANEOUS at 21:34

## 2025-01-27 RX ADMIN — LORAZEPAM 1 MG: 1 TABLET ORAL at 15:54

## 2025-01-27 RX ADMIN — SERTRALINE HYDROCHLORIDE 50 MG: 50 TABLET ORAL at 21:35

## 2025-01-27 RX ADMIN — LORAZEPAM 1 MG: 1 TABLET ORAL at 09:27

## 2025-01-27 NOTE — ED NOTES
Pt O2 with ambulation dropped to 91-92% on RA. Pt became dizzy, tachypneic, diaphoretic and c/o worsening sob while ambulating. Dr. Garcia made aware.      Eric Recio RN  01/27/25 1380

## 2025-01-27 NOTE — H&P
H&P - Hospitalist   Name: Torey Del Castillo Jr. 59 y.o. male I MRN: [id number]  Unit/Bed#: ED-11 I Date of Admission: 1/27/2025   Date of Service: 1/27/2025 I Hospital Day: 0       Assessment and Plan  * Chest pain, atypical  Assessment & Plan  Patient with atypical chest pain  Will trend cardiac enzymes and monitor on telemetry  Given previous history of cardiac arrest we will monitor on telemetry for arrhythmia  Continue double product control heart rate and blood pressure  Continue outpatient follow-up with cardiology  Lab Results   Component Value Date    HSTNI0 33 01/27/2025    HSTNI2 29 01/27/2025    HSTNI4 30 01/27/2025       \    Obstructive sleep apnea  Assessment & Plan  Continue CPAP at bedtime    H/o Atrial flutter (formerly [facility])  Assessment & Plan  Ventricular rate controlled  Anticoagulation with Xarelto    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formerly [facility])  Assessment & Plan  History of cardiac arrest during outpatient stress test  Patient was found to have angiographic spasm of the left main and RCA consistent with Prinzmetal angina  Remains on Cardizem 300 mg  Also on Imdur 60 mg to prevent vasospasms    Obesity, Class III, BMI 40-49.9 (morbid obesity) (formerly [facility])  Assessment & Plan  Recommend GLP-1 as outpatient    Tobacco use disorder  Assessment & Plan  Nicotine patch ordered  Smoking cessation to encourage        Code Status: Level 1 - Full Code     VTE Prophylaxis: Rivaroxaban (Xarelto)  / sequential compression device     Discussion with family: Patient updating family    Anticipated Length of Stay:  Patient will be admitted on an Observation basis with an anticipated length of stay of less than 2 midnights.   Justification for Hospital Stay: Chest pain, atypical     Total Time for Visit, including Counseling / Coordination of Care: 76 minutes.  Greater than 50% of this total time spent on direct patient counseling and coordination of care.    Chief Complaint:     Chief Complaint   Patient presents with    Shortness of Breath      Patient arrives via EMS from home, reports SOB and palpitations ongoing for awhile-months, worst with exertion, has cardiology appt in February.       History of Present Illness:    Torey Del Castillo Jr. is a 59 y.o. male who presents with worsening dyspnea on exertion, the patient has a past medical history of A-fib a flutter as well as history of present total angina with previous cardiac arrest during stress testing.  Is a history of nicotine dependence with current use, hypertension as well as GERD.  He was noted to have significant dyspnea on exertion, he denies any recent medication changes.  No nausea no vomiting.    He also reported having heart palpitations although currently without any chest pain the time of evaluation.  Denies any recent travel or trip history, no fevers or chills    Review of Systems:    A complete and comprehensive 14 point organ system review was performed and all other systems are negative other than stated above in the HPI    Past Medical and Surgical History:     Past Medical History:   Diagnosis Date    A-fib (HCC)     Anxiety     Asthma     Colon polyp     GERD (gastroesophageal reflux disease)     History of ileus     Hypertension     Insomnia     Kidney stone     Lumbar herniated disc     last assessed: 3/27/2015    MDD (major depressive disorder)     MI, old     Patella fracture     last assessed: 3/27/2015    Tobacco abuse        Past Surgical History:   Procedure Laterality Date    BACK SURGERY  2015    Lumbar    CARDIAC CATHETERIZATION N/A 11/5/2021    Procedure: CARDIAC CATHETERIZATION;  Surgeon: Herbie Castaneda MD;  Location: [facility];  Service: Cardiology    CARDIAC ELECTROPHYSIOLOGY PROCEDURE N/A 6/21/2024    Procedure: Cardiac eps/aflutter ablation;  Surgeon: Nilay Lopez MD;  Location: [facility];  Service: Cardiology    COLONOSCOPY      KNEE SURGERY      right    LUMBAR FUSION      last assessed: 3/27/2015    OH RPR UMBILICAL HRNA 5 YRS/> REDUCIBLE  N/A 9/14/2022    Procedure: REPAIR HERNIA UMBILICAL w/ mesh;  Surgeon: Edna Siddiqui DO;  Location: [facility];  Service: General    CA TX TIBL SHFT FX IMED IMPLT W/WO SCREWS&/CERCLA Left 3/16/2017    Procedure: INSERTION NAIL IM TIBIA;  Surgeon: Edna Abrams DO;  Location: [facility];  Service: Orthopedics       Meds/Allergies:    Prior to Admission medications    Medication Sig Start Date End Date Taking? Authorizing Provider   albuterol (PROVENTIL HFA,VENTOLIN HFA) 90 mcg/act inhaler INHALE TWO PUFFS BY MOUTH EVERY 6 HOURS AS NEEDED FOR SHORTNESS OF BREATH 12/5/24   Tran Rodríguez MD   atorvastatin (LIPITOR) 40 mg tablet TAKE ONE TABLET BY MOUTH EVERY DAY 7/17/24   Sandra Melo MD   diltiazem (CARDIZEM CD) 300 mg 24 hr capsule TAKE ONE CAPSULE BY MOUTH EVERY MORNING 1/16/25   Renny Deleon MD   folic acid (FOLVITE) 400 mcg tablet TAKE ONE TABLET BY MOUTH EVERY DAY 7/17/24   Sandra Melo MD   isosorbide mononitrate (IMDUR) 60 mg 24 hr tablet TAKE ONE TABLET BY MOUTH ONCE DAILY 12/6/24   Renny Deleon MD   lisinopril (ZESTRIL) 40 mg tablet TAKE ONE TABLET BY MOUTH EVERY DAY 11/25/24   Tran Rodríguez MD   LORazepam (ATIVAN) 1 mg tablet Take 1 tablet (1 mg total) by mouth 2 (two) times a day as needed for anxiety 2/10/21   Sayda Wilcox MD   omeprazole (PriLOSEC) 40 MG capsule Take 1 capsule (40 mg total) by mouth daily 1/10/25   Renny Deleon MD   QUEtiapine (SEROquel) 300 mg tablet TAKE ONE TABLET BY MOUTH EVERY DAY FOR SLEEP/MOOD 12/26/24   Tarn Rodríguez MD   QUEtiapine (SEROquel) 50 mg tablet TAKE ONE TABLET BY MOUTH EVERY DAY AT BEDTIME WITH  MG FOR MOOD/SLEEP 5/10/24   Historical Provider, MD   rivaroxaban (Xarelto) 20 mg tablet Take 1 tablet (20 mg total) by mouth daily with breakfast 8/13/24   Renny Deleon MD   sertraline (ZOLOFT) 50 mg tablet Take 50 mg by mouth daily at bedtime 8/15/23   Historical Provider, MD   vitamin B-12 (VITAMIN B-12) 1,000 mcg tablet TAKE ONE TABLET BY MOUTH  "EVERY DAY 7/10/24   Tran Rodríguez MD     I have reviewed home medications with patient personally.    Allergies: No Known Allergies    Social History:     Marital Status: Single   Occupation: Unknown    Substance Use History:   Social History     Substance and Sexual Activity   Alcohol Use Not Currently    Comment: Hx of a couple of drinks a night, or everyother night     Social History     Tobacco Use   Smoking Status Every Day    Current packs/day: 0.00    Average packs/day: 1.5 packs/day for 34.0 years (51.0 ttl pk-yrs)    Types: Cigarettes    Start date:     Last attempt to quit:     Years since quittin.0   Smokeless Tobacco Never     Social History     Substance and Sexual Activity   Drug Use No       Family History:    Family History   Problem Relation Age of Onset    Pancreatic cancer Mother     COPD Father     Heart attack Brother 61        decreased    Drug abuse Brother     Alcohol abuse Brother     Sleep apnea Brother     Cancer Family     Hypertension Family     Heart disease Family        Physical Exam:     Vitals:   Blood Pressure: 140/65 (25 1202)  Pulse: 98 (25 1202)  Temperature: 97.7 °F (36.5 °C) (25 0907)  Temp Source: Oral (25 0907)  Respirations: (!) 32 (25 1231)  Height: 5' 10\" (177.8 cm) (25 0907)  Weight - Scale: (!) 138 kg (304 lb 3.8 oz) (25 0909)  SpO2: 91 % (25 1231)      General: well appearing, no acute distress  HEENT: atraumatic, PERRLA, moist mucosa, normal pharynx, normal tonsils and adenoids, normal tongue, no fluid in sinuses  Neck: Trachea midline, no carotid bruit, no masses  Respiratory: normal chest wall expansion, CTA B, no r/r/w, no rubs  Cardiovascular: RRR, no m/r/g, Normal S1 and S2  Abdomen: Soft, non-tender, non-distended, normal bowel sounds in all quadrants, no hepatosplenomegaly, no tympany  Rectal: deferred  Musculoskeletal: normal ROM in upper and lower extremities  Integumentary: warm, dry, and pink, with " no rash, purpura, or petechia  Heme/Lymph: no lymphadenopathy, no bruises  Neurological: Cranial Nerves II-XII grossly intact  Psychiatric: cooperative with normal mood, affect, and cognition    Additional Data:     Lab Results: Laboratory studies reviewed for today    Results from last 7 days   Lab Units 01/27/25  0928   WBC Thousand/uL 5.25   HEMOGLOBIN g/dL 15.6   HEMATOCRIT % 46.1   PLATELETS Thousands/uL 150   SEGS PCT % 74   LYMPHO PCT % 19   MONO PCT % 7   EOS PCT % 0     Results from last 7 days   Lab Units 01/27/25  0928   SODIUM mmol/L 140   POTASSIUM mmol/L 2.9*   CHLORIDE mmol/L 100   CO2 mmol/L 28   BUN mg/dL 5   CREATININE mg/dL 0.88   ANION GAP mmol/L 12   CALCIUM mg/dL 8.6   ALBUMIN g/dL 3.9   TOTAL BILIRUBIN mg/dL 0.72   ALK PHOS U/L 162*   ALT U/L 24   AST U/L 21   GLUCOSE RANDOM mg/dL 158*                     Results from last 7 days   Lab Units 01/27/25  1313 01/27/25  1136 01/27/25  0928   HS TNI 0HR ng/L  --   --  33   HS TNI 2HR ng/L  --  29  --    HS TNI 4HR ng/L 30  --   --        Imaging: Results Review Statement: I personally reviewed the following image studies/reports in PACS and discussed pertinent findings with Radiology: Chest radiograph. My interpretation of the radiology images/reports is: No acute chest pathology.    XR chest 2 views   ED Interpretation by Bhargav Ridley DO (01/27 1009)   Wet read - no infiltrate or effusion. Enlarged cardiac silhouette       Final Result by Erna Vasquez MD (01/27 1044)      No acute cardiopulmonary disease.               Workstation performed: [id number]             EKG, Pathology, and Other Studies Reviewed on Admission:   EKG: Sinus tachycardia at a rate of 94, poor tracing.  No obvious ischemic changes    Allscripts / Epic Records Reviewed: Yes    ** Please Note: This note was completed in part utilizing [employer] Dragon Medical One Software.  Grammatical errors, random word insertions, spelling mistakes, and incomplete sentences may be  an occasional consequence of this system secondary to software limitations, ambient noise, and hardware issues.  If you have any questions or concerns about the content, text, or information contained within the body of this dictation, please contact the provider for clarification.**

## 2025-01-27 NOTE — ED NOTES
Patient reports worsening sob, anxiety, feeling shaky and diaphoretic. Dr. Zamora made aware and a patient bedside at this time.      Eric Recio RN  01/27/25 1771

## 2025-01-27 NOTE — ASSESSMENT & PLAN NOTE
Patient with atypical chest pain associated with palpitation  Telemetry without atrial flutter, normal sinus rhythm heart rate up to 110s max  Lab Results   Component Value Date    HSTNI0 33 01/27/2025    HSTNI2 29 01/27/2025    HSTNI4 30 01/27/2025

## 2025-01-27 NOTE — ASSESSMENT & PLAN NOTE
History of atrial flutter status post cardioversion in 2018 and ablation 6/21/2024  Continue Cardizem 300 mg daily  Cannot afford Xarelto, trying to work with case management  If Xarelto is not an option he will have to be started on Coumadin

## 2025-01-27 NOTE — ASSESSMENT & PLAN NOTE
History of cardiac arrest during outpatient stress test in 2021  Patient was found to have angiographic spasm of the left main and RCA consistent with Prinzmetal angina  Remains on Cardizem 300 mg and Imdur 60 mg daily  Denies any chest pain

## 2025-01-27 NOTE — ED ATTENDING ATTESTATION
"1/27/2025  I, Mindi Garcia DO, saw and evaluated the patient. I have discussed the patient with the resident/non-physician practitioner and agree with the resident's/non-physician practitioner's findings, Plan of Care, and MDM as documented in the resident's/non-physician practitioner's note, except where noted. All available labs and Radiology studies were reviewed.  I was present for key portions of any procedure(s) performed by the resident/non-physician practitioner and I was immediately available to provide assistance.       At this point I agree with the current assessment done in the Emergency Department.  I have conducted an independent evaluation of this patient a history and physical is as follows:    60 yo M h/o CAD/prior MI, tobacco use, Aflutter s/p ablation, anxiety; presenting for evaluation of MCLEAN.    Sx after been present for months, but getting progressively worse. Reports become very short of breath with minimal activity. Reports sensation of heart \"pounding\"  Has cardiology appointment, but not for a few more weeks and worried about waiting    Denies fevers, CP, abdominal pain, N/V, leg edema  No history of or risk factors for VTE    Per independent review of external records:   - 9/5/24 PCP- hypothyroid, unintentional weight loss, HTN, depression/anxiety,   - History of cardiac arrest   During a stress test in Nov 2021  Determined to be severe Prinzmetal angina due to vasospasm on cath  Stable on cardizem and imdur.    MDM: 60 yo M with progressive SOB/MCLEAN- EKG to r/o STEMI/dysrhythmia/abn intervals/ischemic changes, troponin to eval for ischemia, CBC to assess for leukocytosis/anemia, CMP to assess for elevated LFTs/CAROLYN/electrolyte derangements, CXR to r/o PTX/PNA/effusion/CHF, ddimer to r/o PE    Pt very tachypneic/increased WOB with walking in ER, therefore admitted for further workup. Uncertain if cardiac etiology or undiagnosed COPD with his smoking history    ED Course   "       Critical Care Time  Procedures

## 2025-01-27 NOTE — ED PROVIDER NOTES
Time reflects when diagnosis was documented in both MDM as applicable and the Disposition within this note       Time User Action Codes Description Comment    1/27/2025 12:30 PM Keyana Bhargav Add [R06.00] Dyspnea     1/27/2025 12:30 PM Bhargav Ridley [R07.9] Chest pain     1/27/2025 12:30 PM Bhargav Ridley [I46.9] Cardiac arrest (HCC)     1/27/2025 12:30 PM Bhargav Ridley [I20.1] 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HCC)     1/27/2025 12:31 PM Bhargav Ridley [I10] Primary hypertension     1/27/2025 12:31 PM Bhargav Ridley [R73.03] Prediabetes           ED Disposition       ED Disposition   Admit    Condition   Stable    Date/Time   Mon Jan 27, 2025 12:31 PM    Comment   --             Assessment & Plan       Medical Decision Making  Patient is a 59-year-old male presenting for evaluation of dyspnea    Differential: ACS versus arrhythmia versus anemia versus electrolyte abnormality.  Possibly COPD/emphysema although patient's never been diagnosed.  Doubt PE or CHF    Plan: Cardiac labs including dimer if positive will pursue CT PE study.  EKG chest x-ray.  Monitor and reassess.  Symptomatic treatment.  Final dispo pending    See ED course    Labs largely unremarkable.  Chest x-ray normal.  EKG as in ED course.  Patient unable to ambulate without becoming diaphoretic and dyspneic.  Will admit for further workup.  Discussed with medicine who accepts patient for admission.  Dynamically stable to time of admission    Amount and/or Complexity of Data Reviewed  Labs: ordered. Decision-making details documented in ED Course.  Radiology: ordered and independent interpretation performed.  ECG/medicine tests:  Decision-making details documented in ED Course.    Risk  Prescription drug management.  Decision regarding hospitalization.        ED Course as of 01/27/25 1247   Mon Jan 27, 2025   1010 ECG 12 lead  Sinus tach 113 no ischemic changes no RTACY or STD   1010 D-Dimer, Quant: 0.28   1010 hs TnI 0hr:  33  Likely 2/2 demand but will obtain delta   1011 Potassium(!): 2.9  Will replete   1011 TSH 3RD GENERATON: 2.243   1011 GFR, Calculated: 94   1038 On reassessment pt breathing somewhat improved. Declining nebulizer breathing treatment at this time        Medications   LORazepam (ATIVAN) tablet 1 mg (1 mg Oral Given 1/27/25 0927)   potassium chloride (Klor-Con M20) CR tablet 40 mEq (40 mEq Oral Given 1/27/25 1016)   albuterol inhalation solution 5 mg (5 mg Nebulization Given 1/27/25 1238)   ipratropium (ATROVENT) 0.02 % inhalation solution 0.5 mg (0.5 mg Nebulization Given 1/27/25 1237)       ED Risk Strat Scores   HEART Risk Score      Flowsheet Row Most Recent Value   Heart Score Risk Calculator    History 1 Filed at: 01/27/2025 1102   ECG 1 Filed at: 01/27/2025 1102   Age 1 Filed at: 01/27/2025 1102   Risk Factors 2 Filed at: 01/27/2025 1102   Troponin 1 Filed at: 01/27/2025 1102   HEART Score 6 Filed at: 01/27/2025 1102          HEART Risk Score      Flowsheet Row Most Recent Value   Heart Score Risk Calculator    History 1 Filed at: 01/27/2025 1102   ECG 1 Filed at: 01/27/2025 1102   Age 1 Filed at: 01/27/2025 1102   Risk Factors 2 Filed at: 01/27/2025 1102   Troponin 1 Filed at: 01/27/2025 1102   HEART Score 6 Filed at: 01/27/2025 1102                            SBIRT 22yo+      Flowsheet Row Most Recent Value   Initial Alcohol Screen: US AUDIT-C     1. How often do you have a drink containing alcohol? 0 Filed at: 01/27/2025 0905   2. How many drinks containing alcohol do you have on a typical day you are drinking?  0 Filed at: 01/27/2025 0905   3a. Male UNDER 65: How often do you have five or more drinks on one occasion? 0 Filed at: 01/27/2025 0905   3b. FEMALE Any Age, or MALE 65+: How often do you have 4 or more drinks on one occassion? 0 Filed at: 01/27/2025 0905   Audit-C Score 0 Filed at: 01/27/2025 0905   ENRRIQUE: How many times in the past year have you...    Used an illegal drug or used a prescription  medication for non-medical reasons? Never Filed at: 01/27/2025 0905                            History of Present Illness       Chief Complaint   Patient presents with    Shortness of Breath     Patient arrives via EMS from home, reports SOB and palpitations ongoing for awhile-months, worst with exertion, has cardiology appt in February.       Past Medical History:   Diagnosis Date    A-fib (HCC)     Anxiety     Asthma     Colon polyp     GERD (gastroesophageal reflux disease)     History of ileus     Hypertension     Insomnia     Kidney stone     Lumbar herniated disc     last assessed: 3/27/2015    MDD (major depressive disorder)     MI, old     Patella fracture     last assessed: 3/27/2015    Tobacco abuse       Past Surgical History:   Procedure Laterality Date    BACK SURGERY  2015    Lumbar    CARDIAC CATHETERIZATION N/A 11/5/2021    Procedure: CARDIAC CATHETERIZATION;  Surgeon: Herbie Castaneda MD;  Location: [facility];  Service: Cardiology    CARDIAC ELECTROPHYSIOLOGY PROCEDURE N/A 6/21/2024    Procedure: Cardiac eps/aflutter ablation;  Surgeon: Nilay Lopez MD;  Location: [facility];  Service: Cardiology    COLONOSCOPY      KNEE SURGERY      right    LUMBAR FUSION      last assessed: 3/27/2015    NE RPR UMBILICAL HRNA 5 YRS/> REDUCIBLE N/A 9/14/2022    Procedure: REPAIR HERNIA UMBILICAL w/ mesh;  Surgeon: Edna Siddiqui DO;  Location: [facility];  Service: General    NE TX TIBL SHFT FX IMED IMPLT W/WO SCREWS&/CERCLA Left 3/16/2017    Procedure: INSERTION NAIL IM TIBIA;  Surgeon: Edna Abrams DO;  Location: [facility];  Service: Orthopedics      Family History   Problem Relation Age of Onset    Pancreatic cancer Mother     COPD Father     Heart attack Brother 61        decreased    Drug abuse Brother     Alcohol abuse Brother     Sleep apnea Brother     Cancer Family     Hypertension Family     Heart disease Family       Social History     Tobacco Use    Smoking status: Every Day      Current packs/day: 0.00     Average packs/day: 1.5 packs/day for 34.0 years (51.0 ttl pk-yrs)     Types: Cigarettes     Start date:      Last attempt to quit:      Years since quittin.0    Smokeless tobacco: Never   Vaping Use    Vaping status: Never Used   Substance Use Topics    Alcohol use: Not Currently     Comment: Hx of a couple of drinks a night, or everyother night    Drug use: No      E-Cigarette/Vaping    E-Cigarette Use Never User       E-Cigarette/Vaping Substances    Nicotine No     THC No     CBD No     Flavoring No     Other No     Unknown No       I have reviewed and agree with the history as documented.     Patient is a 59-year-old male past medical history of CAD Prinzmetal angina cardiac arrest hypertension diabetes that presents for evaluation of worsening dyspnea on exertion.  Patient reports that symptoms been ongoing for 6 months.  States that he also gets chest tightness and palpitations with this.  Is denying fever chills cough abdominal pain nausea vomiting leg swelling lower extremity edema or any other complaints at this time.  Reports that he has been trying to get into see cardiology but has been unable to do so          Review of Systems   Constitutional:  Negative for chills and fever.   HENT:  Negative for ear pain and sore throat.    Eyes:  Negative for pain and visual disturbance.   Respiratory:  Positive for chest tightness and shortness of breath. Negative for cough.    Cardiovascular:  Positive for palpitations. Negative for chest pain and leg swelling.   Gastrointestinal:  Negative for abdominal pain, nausea and vomiting.   Genitourinary:  Negative for dysuria and hematuria.   Musculoskeletal:  Negative for arthralgias and back pain.   Skin:  Negative for color change and rash.   Neurological:  Negative for seizures and syncope.   All other systems reviewed and are negative.          Objective       ED Triage Vitals   Temperature Pulse Blood Pressure Respirations  SpO2 Patient Position - Orthostatic VS   01/27/25 0907 01/27/25 0907 01/27/25 0907 01/27/25 0907 01/27/25 0907 --   97.7 °F (36.5 °C) (!) 113 110/57 (!) 26 95 %       Temp Source Heart Rate Source BP Location FiO2 (%) Pain Score    01/27/25 0907 01/27/25 0907 01/27/25 1017 -- 01/27/25 0907    Oral Monitor Right arm  6      Vitals      Date and Time Temp Pulse SpO2 Resp BP Pain Score FACES Pain Rating User   01/27/25 1231 -- -- 91 % 32 -- -- -- HMR   01/27/25 1202 -- 98 94 % 20 140/65 -- -- R   01/27/25 1017 -- 101 95 % 24 134/60 -- -- R   01/27/25 0907 97.7 °F (36.5 °C) 113 95 % 26 110/57 6 -- EP            Physical Exam  Vitals and nursing note reviewed.   Constitutional:       General: He is not in acute distress.     Appearance: He is well-developed. He is obese. He is not ill-appearing.   HENT:      Head: Normocephalic and atraumatic.      Mouth/Throat:      Mouth: Mucous membranes are moist.   Eyes:      Extraocular Movements: Extraocular movements intact.      Conjunctiva/sclera: Conjunctivae normal.   Cardiovascular:      Rate and Rhythm: Regular rhythm. Tachycardia present.      Heart sounds: No murmur heard.  Pulmonary:      Effort: Pulmonary effort is normal. Tachypnea present. No respiratory distress.      Breath sounds: Normal breath sounds.   Abdominal:      General: Abdomen is protuberant.      Palpations: Abdomen is soft.      Tenderness: There is no abdominal tenderness.   Musculoskeletal:         General: Normal range of motion.      Cervical back: Normal range of motion and neck supple.      Right lower leg: No edema.      Left lower leg: No edema.   Skin:     General: Skin is warm and dry.      Capillary Refill: Capillary refill takes less than 2 seconds.   Neurological:      General: No focal deficit present.      Mental Status: He is alert.         Results Reviewed       Procedure Component Value Units Date/Time    B-Type Natriuretic Peptide(BNP) [[id number]]     Lab Status: No result  Specimen: Blood     COVID/FLU/RSV [[id number]]     Lab Status: No result Specimen: Nares from Nose     HS Troponin I 2hr [[id number]]  (Normal) Collected: 01/27/25 1136    Lab Status: Final result Specimen: Blood from Arm, Left Updated: 01/27/25 1212     hs TnI 2hr 29 ng/L      Delta 2hr hsTnI -4 ng/L     HS Troponin I 4hr [[id number]]     Lab Status: No result Specimen: Blood     TSH, 3rd generation with Free T4 reflex [[id number]]  (Normal) Collected: 01/27/25 0928    Lab Status: Final result Specimen: Blood from Arm, Right Updated: 01/27/25 1006     TSH 3RD GENERATON 2.243 uIU/mL     HS Troponin 0hr (reflex protocol) [[id number]]  (Normal) Collected: 01/27/25 0928    Lab Status: Final result Specimen: Blood from Arm, Right Updated: 01/27/25 0959     hs TnI 0hr 33 ng/L     D-Dimer [[id number]]  (Normal) Collected: 01/27/25 0928    Lab Status: Final result Specimen: Blood from Arm, Right Updated: 01/27/25 0954     D-Dimer, Quant 0.28 ug/ml FEU     Narrative:      In the evaluation for possible pulmonary embolism, in the appropriate (Well's Score of 4 or less) patient, the age adjusted d-dimer cutoff for this patient can be calculated as:    Age x 0.01 (in ug/mL) for Age-adjusted D-dimer exclusion threshold for a patient over 50 years.    Comprehensive metabolic panel [[id number]]  (Abnormal) Collected: 01/27/25 0928    Lab Status: Final result Specimen: Blood from Arm, Right Updated: 01/27/25 0950     Sodium 140 mmol/L      Potassium 2.9 mmol/L      Chloride 100 mmol/L      CO2 28 mmol/L      ANION GAP 12 mmol/L      BUN 5 mg/dL      Creatinine 0.88 mg/dL      Glucose 158 mg/dL      Calcium 8.6 mg/dL      AST 21 U/L      ALT 24 U/L      Alkaline Phosphatase 162 U/L      Total Protein 6.8 g/dL      Albumin 3.9 g/dL      Total Bilirubin 0.72 mg/dL      eGFR 94 ml/min/1.73sq m     Narrative:      [employer] guidelines for Chronic Kidney Disease (CKD):     Stage 1 with normal or high GFR (GFR > 90  mL/min/1.73 square meters)    Stage 2 Mild CKD (GFR = 60-89 mL/min/1.73 square meters)    Stage 3A Moderate CKD (GFR = 45-59 mL/min/1.73 square meters)    Stage 3B Moderate CKD (GFR = 30-44 mL/min/1.73 square meters)    Stage 4 Severe CKD (GFR = 15-29 mL/min/1.73 square meters)    Stage 5 End Stage CKD (GFR <15 mL/min/1.73 square meters)  Note: GFR calculation is accurate only with a steady state creatinine    CBC and differential [[id number]]  (Abnormal) Collected: 01/27/25 0928    Lab Status: Final result Specimen: Blood from Arm, Right Updated: 01/27/25 0936     WBC 5.25 Thousand/uL      RBC 4.70 Million/uL      Hemoglobin 15.6 g/dL      Hematocrit 46.1 %      MCV 98 fL      MCH 33.2 pg      MCHC 33.8 g/dL      RDW 15.9 %      MPV 10.8 fL      Platelets 150 Thousands/uL      nRBC 0 /100 WBCs      Segmented % 74 %      Immature Grans % 0 %      Lymphocytes % 19 %      Monocytes % 7 %      Eosinophils Relative 0 %      Basophils Relative 0 %      Absolute Neutrophils 3.83 Thousands/µL      Absolute Immature Grans 0.02 Thousand/uL      Absolute Lymphocytes 1.01 Thousands/µL      Absolute Monocytes 0.35 Thousand/µL      Eosinophils Absolute 0.02 Thousand/µL      Basophils Absolute 0.02 Thousands/µL             XR chest 2 views   ED Interpretation by Bhargav Ridley DO (01/27 1009)   Wet read - no infiltrate or effusion. Enlarged cardiac silhouette       Final Interpretation by Erna Vasquez MD (01/27 1044)      No acute cardiopulmonary disease.               Workstation performed: [id number]             Procedures    ED Medication and Procedure Management   Prior to Admission Medications   Prescriptions Last Dose Informant Patient Reported? Taking?   LORazepam (ATIVAN) 1 mg tablet  Self No No   Sig: Take 1 tablet (1 mg total) by mouth 2 (two) times a day as needed for anxiety   QUEtiapine (SEROquel) 300 mg tablet   No No   Sig: TAKE ONE TABLET BY MOUTH EVERY DAY FOR SLEEP/MOOD   QUEtiapine (SEROquel) 50 mg  tablet  Self Yes No   Sig: TAKE ONE TABLET BY MOUTH EVERY DAY AT BEDTIME WITH  MG FOR MOOD/SLEEP   albuterol (PROVENTIL HFA,VENTOLIN HFA) 90 mcg/act inhaler   No No   Sig: INHALE TWO PUFFS BY MOUTH EVERY 6 HOURS AS NEEDED FOR SHORTNESS OF BREATH   atorvastatin (LIPITOR) 40 mg tablet   No No   Sig: TAKE ONE TABLET BY MOUTH EVERY DAY   diltiazem (CARDIZEM CD) 300 mg 24 hr capsule   No No   Sig: TAKE ONE CAPSULE BY MOUTH EVERY MORNING   folic acid (FOLVITE) 400 mcg tablet   No No   Sig: TAKE ONE TABLET BY MOUTH EVERY DAY   isosorbide mononitrate (IMDUR) 60 mg 24 hr tablet   No No   Sig: TAKE ONE TABLET BY MOUTH ONCE DAILY   lisinopril (ZESTRIL) 40 mg tablet   No No   Sig: TAKE ONE TABLET BY MOUTH EVERY DAY   omeprazole (PriLOSEC) 40 MG capsule   No No   Sig: Take 1 capsule (40 mg total) by mouth daily   rivaroxaban (Xarelto) 20 mg tablet   No No   Sig: Take 1 tablet (20 mg total) by mouth daily with breakfast   sertraline (ZOLOFT) 50 mg tablet  Self Yes No   Sig: Take 50 mg by mouth daily at bedtime   vitamin B-12 (VITAMIN B-12) 1,000 mcg tablet  Self No No   Sig: TAKE ONE TABLET BY MOUTH EVERY DAY      Facility-Administered Medications: None     Patient's Medications   Discharge Prescriptions    No medications on file     No discharge procedures on file.  ED SEPSIS DOCUMENTATION   Time reflects when diagnosis was documented in both MDM as applicable and the Disposition within this note       Time User Action Codes Description Comment    1/27/2025 12:30 PM Bhargav Ridley [R06.00] Dyspnea     1/27/2025 12:30 PM Bhargav Ridley [R07.9] Chest pain     1/27/2025 12:30 PM Bhargav Ridley [I46.9] Cardiac arrest (HCC)     1/27/2025 12:30 PM Bhargav Ridley [I20.1] 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HCC)     1/27/2025 12:31 PM Bhargav Ridley [I10] Primary hypertension     1/27/2025 12:31 PM Bhargav Ridley [R73.03] Prediabetes                  Bhargav Ridley DO  01/28/25 0609

## 2025-01-28 ENCOUNTER — TELEPHONE (OUTPATIENT)
Dept: FAMILY MEDICINE CLINIC | Facility: CLINIC | Age: 60
End: 2025-01-28

## 2025-01-28 ENCOUNTER — APPOINTMENT (OUTPATIENT)
Dept: NON INVASIVE DIAGNOSTICS | Facility: HOSPITAL | Age: 60
DRG: 951 | End: 2025-01-28
Payer: COMMERCIAL

## 2025-01-28 DIAGNOSIS — Z59.71 INSURANCE COVERAGE PROBLEMS: Primary | ICD-10-CM

## 2025-01-28 DIAGNOSIS — Z78.9 NEEDS ASSISTANCE WITH COMMUNITY RESOURCES: ICD-10-CM

## 2025-01-28 DIAGNOSIS — Z59.82 TRANSPORTATION INSECURITY: ICD-10-CM

## 2025-01-28 PROBLEM — Z86.74 HISTORY OF CARDIAC ARREST: Status: ACTIVE | Noted: 2025-01-28

## 2025-01-28 PROBLEM — E78.2 MIXED HYPERLIPIDEMIA: Status: ACTIVE | Noted: 2025-01-28

## 2025-01-28 PROBLEM — R06.09 DYSPNEA ON EXERTION: Status: ACTIVE | Noted: 2023-02-23

## 2025-01-28 LAB
ANION GAP SERPL CALCULATED.3IONS-SCNC: 9 MMOL/L (ref 4–13)
AORTIC ROOT: 3.5 CM
ASCENDING AORTA: 3.6 CM
BSA FOR ECHO PROCEDURE: 2.5 M2
BUN SERPL-MCNC: 8 MG/DL (ref 5–25)
CALCIUM SERPL-MCNC: 8.5 MG/DL (ref 8.4–10.2)
CHLORIDE SERPL-SCNC: 100 MMOL/L (ref 96–108)
CHOLEST SERPL-MCNC: 192 MG/DL (ref ?–200)
CO2 SERPL-SCNC: 27 MMOL/L (ref 21–32)
CREAT SERPL-MCNC: 0.82 MG/DL (ref 0.6–1.3)
E WAVE DECELERATION TIME: 214 MS
E/A RATIO: 1.12
ERYTHROCYTE [DISTWIDTH] IN BLOOD BY AUTOMATED COUNT: 15.7 % (ref 11.6–15.1)
FRACTIONAL SHORTENING: 29 (ref 28–44)
GFR SERPL CREATININE-BSD FRML MDRD: 97 ML/MIN/1.73SQ M
GLUCOSE SERPL-MCNC: 98 MG/DL (ref 65–140)
HCT VFR BLD AUTO: 43.7 % (ref 36.5–49.3)
HDLC SERPL-MCNC: 39 MG/DL
HGB BLD-MCNC: 14.8 G/DL (ref 12–17)
INTERVENTRICULAR SEPTUM IN DIASTOLE (PARASTERNAL SHORT AXIS VIEW): 1.9 CM
INTERVENTRICULAR SEPTUM: 1.9 CM (ref 0.6–1.1)
LAAS-AP2: 19.3 CM2
LAAS-AP4: 14.2 CM2
LDLC SERPL CALC-MCNC: 123 MG/DL (ref 0–100)
LEFT ATRIUM SIZE: 4.6 CM
LEFT ATRIUM VOLUME (MOD BIPLANE): 43 ML
LEFT ATRIUM VOLUME INDEX (MOD BIPLANE): 17.2 ML/M2
LEFT INTERNAL DIMENSION IN SYSTOLE: 3.2 CM (ref 2.1–4)
LEFT VENTRICULAR INTERNAL DIMENSION IN DIASTOLE: 4.5 CM (ref 3.5–6)
LEFT VENTRICULAR POSTERIOR WALL IN END DIASTOLE: 1.3 CM
LEFT VENTRICULAR STROKE VOLUME: 51 ML
LV EF US.2D.A4C+ESTIMATED: 61 %
LVSV (TEICH): 51 ML
MCH RBC QN AUTO: 33.8 PG (ref 26.8–34.3)
MCHC RBC AUTO-ENTMCNC: 33.9 G/DL (ref 31.4–37.4)
MCV RBC AUTO: 100 FL (ref 82–98)
MV E'TISSUE VEL-LAT: 11 CM/S
MV E'TISSUE VEL-SEP: 7 CM/S
MV PEAK A VEL: 0.84 M/S
MV PEAK E VEL: 94 CM/S
MV STENOSIS PRESSURE HALF TIME: 62 MS
MV VALVE AREA P 1/2 METHOD: 3.5
PLATELET # BLD AUTO: 145 THOUSANDS/UL (ref 149–390)
PMV BLD AUTO: 10.9 FL (ref 8.9–12.7)
POTASSIUM SERPL-SCNC: 3.2 MMOL/L (ref 3.5–5.3)
RBC # BLD AUTO: 4.38 MILLION/UL (ref 3.88–5.62)
RIGHT ATRIUM AREA SYSTOLE A4C: 19.2 CM2
RIGHT VENTRICLE ID DIMENSION: 3.3 CM
SL CV LEFT ATRIUM LENGTH A2C: 5.5 CM
SL CV LV EF: 65
SL CV PED ECHO LEFT VENTRICLE DIASTOLIC VOLUME (MOD BIPLANE) 2D: 91 ML
SL CV PED ECHO LEFT VENTRICLE SYSTOLIC VOLUME (MOD BIPLANE) 2D: 40 ML
SODIUM SERPL-SCNC: 136 MMOL/L (ref 135–147)
TRIGL SERPL-MCNC: 148 MG/DL (ref ?–150)
WBC # BLD AUTO: 5.44 THOUSAND/UL (ref 4.31–10.16)

## 2025-01-28 PROCEDURE — 93306 TTE W/DOPPLER COMPLETE: CPT

## 2025-01-28 PROCEDURE — 99232 SBSQ HOSP IP/OBS MODERATE 35: CPT | Performed by: INTERNAL MEDICINE

## 2025-01-28 PROCEDURE — 93306 TTE W/DOPPLER COMPLETE: CPT | Performed by: INTERNAL MEDICINE

## 2025-01-28 PROCEDURE — 80048 BASIC METABOLIC PNL TOTAL CA: CPT | Performed by: INTERNAL MEDICINE

## 2025-01-28 PROCEDURE — 0J9N0ZZ DRAINAGE OF RIGHT LOWER LEG SUBCUTANEOUS TISSUE AND FASCIA, OPEN APPROACH: ICD-10-PCS | Performed by: SPECIALIST

## 2025-01-28 PROCEDURE — 99222 1ST HOSP IP/OBS MODERATE 55: CPT | Performed by: INTERNAL MEDICINE

## 2025-01-28 PROCEDURE — 80061 LIPID PANEL: CPT | Performed by: INTERNAL MEDICINE

## 2025-01-28 PROCEDURE — 85027 COMPLETE CBC AUTOMATED: CPT | Performed by: INTERNAL MEDICINE

## 2025-01-28 RX ORDER — SPIRONOLACTONE 25 MG/1
25 TABLET ORAL DAILY
Status: DISCONTINUED | OUTPATIENT
Start: 2025-01-28 | End: 2025-01-30 | Stop reason: HOSPADM

## 2025-01-28 RX ORDER — ALBUTEROL SULFATE 90 UG/1
2 INHALANT RESPIRATORY (INHALATION) EVERY 6 HOURS PRN
Status: DISCONTINUED | OUTPATIENT
Start: 2025-01-28 | End: 2025-01-30 | Stop reason: HOSPADM

## 2025-01-28 RX ORDER — POTASSIUM CHLORIDE 1500 MG/1
40 TABLET, EXTENDED RELEASE ORAL ONCE
Status: COMPLETED | OUTPATIENT
Start: 2025-01-28 | End: 2025-01-28

## 2025-01-28 RX ADMIN — HYDROMORPHONE HYDROCHLORIDE 0.2 MG: 0.2 INJECTION, SOLUTION INTRAMUSCULAR; INTRAVENOUS; SUBCUTANEOUS at 20:45

## 2025-01-28 RX ADMIN — SERTRALINE HYDROCHLORIDE 50 MG: 50 TABLET ORAL at 21:55

## 2025-01-28 RX ADMIN — ALBUTEROL SULFATE 2 PUFF: 90 AEROSOL, METERED RESPIRATORY (INHALATION) at 17:24

## 2025-01-28 RX ADMIN — ALBUTEROL SULFATE 2 PUFF: 90 AEROSOL, METERED RESPIRATORY (INHALATION) at 22:58

## 2025-01-28 RX ADMIN — ATORVASTATIN CALCIUM 40 MG: 40 TABLET, FILM COATED ORAL at 08:45

## 2025-01-28 RX ADMIN — OXYCODONE 5 MG: 5 TABLET ORAL at 11:14

## 2025-01-28 RX ADMIN — ISOSORBIDE MONONITRATE 60 MG: 60 TABLET, EXTENDED RELEASE ORAL at 08:45

## 2025-01-28 RX ADMIN — LORAZEPAM 1 MG: 1 TABLET ORAL at 10:19

## 2025-01-28 RX ADMIN — OXYCODONE 5 MG: 5 TABLET ORAL at 17:25

## 2025-01-28 RX ADMIN — FOLIC ACID TAB 400 MCG 400 MCG: 400 TAB at 08:45

## 2025-01-28 RX ADMIN — DILTIAZEM HYDROCHLORIDE 300 MG: 180 CAPSULE, COATED, EXTENDED RELEASE ORAL at 08:45

## 2025-01-28 RX ADMIN — QUETIAPINE FUMARATE 300 MG: 100 TABLET ORAL at 21:55

## 2025-01-28 RX ADMIN — POTASSIUM CHLORIDE 40 MEQ: 1500 TABLET, EXTENDED RELEASE ORAL at 18:36

## 2025-01-28 RX ADMIN — OXYCODONE 5 MG: 5 TABLET ORAL at 07:01

## 2025-01-28 RX ADMIN — SPIRONOLACTONE 25 MG: 25 TABLET ORAL at 12:15

## 2025-01-28 RX ADMIN — PANTOPRAZOLE SODIUM 40 MG: 40 TABLET, DELAYED RELEASE ORAL at 05:15

## 2025-01-28 RX ADMIN — HYDROMORPHONE HYDROCHLORIDE 0.2 MG: 0.2 INJECTION, SOLUTION INTRAMUSCULAR; INTRAVENOUS; SUBCUTANEOUS at 03:37

## 2025-01-28 RX ADMIN — LISINOPRIL 40 MG: 20 TABLET ORAL at 08:45

## 2025-01-28 RX ADMIN — CYANOCOBALAMIN TAB 500 MCG 1000 MCG: 500 TAB at 08:45

## 2025-01-28 RX ADMIN — HYDROMORPHONE HYDROCHLORIDE 0.2 MG: 0.2 INJECTION, SOLUTION INTRAMUSCULAR; INTRAVENOUS; SUBCUTANEOUS at 08:46

## 2025-01-28 RX ADMIN — OXYCODONE 5 MG: 5 TABLET ORAL at 00:29

## 2025-01-28 RX ADMIN — HYDROMORPHONE HYDROCHLORIDE 0.2 MG: 0.2 INJECTION, SOLUTION INTRAMUSCULAR; INTRAVENOUS; SUBCUTANEOUS at 14:48

## 2025-01-28 RX ADMIN — LORAZEPAM 1 MG: 1 TABLET ORAL at 03:36

## 2025-01-28 RX ADMIN — DOCUSATE SODIUM 100 MG: 100 CAPSULE, LIQUID FILLED ORAL at 08:46

## 2025-01-28 RX ADMIN — RIVAROXABAN 20 MG: 20 TABLET, FILM COATED ORAL at 08:45

## 2025-01-28 SDOH — ECONOMIC STABILITY - TRANSPORTATION SECURITY: TRANSPORTATION INSECURITY: Z59.82

## 2025-01-28 NOTE — ASSESSMENT & PLAN NOTE
-Cardiac cath post cardiac arrest showed evidence of Prinzmetal angina with vasospasm  -Currently on Cardizem and Imdur

## 2025-01-28 NOTE — ASSESSMENT & PLAN NOTE
-History of cardioversion in 2018  -Ablation of atrial flutter (6/21/2024) by Dr. Lopez: Successful fluoroscopy plus ablation for CTI dependent atrial flutter  -[id number] 1(HTN), currently on rivaroxaban 20 mg daily  -Admitted to the hospital 6/20/2024-6/22/2024 for atrial flutter with RVR which was treated with ablation  -ECG on presentation showed sinus tach test  -Short runs of sinus tachycardia on telemetry, otherwise sinus rhythm with Hrs in  bpm

## 2025-01-28 NOTE — CONSULTS
Consultation - General Cardiology Team 2  Torey Del Castillo . 59 y.o. male MRN: [id number]  Unit/Bed#: E4 -01 Encounter: [id number]          Inpatient consult to Cardiology     Date/Time  1/28/2025 8:47 AM     Performed by  Gelacio Schneider MD   Authorized by  Breana Mancia MD           PCP: Tran Rodríguez MD   Outpatient Cardiologist: Polly Joseph PA-C, Renny Deleon MD,     History of Present Illness   Physician Requesting Consult: Breana Mancia MD  Reason for Consult / Principal Problem: Chest pain in setting of atrial flutter      Assessment & Plan       Plan:  Pulmonology consult, dyspnea likely from COPD?. Consider a 6-minute walk test while inpt and PFTs as outpatient.  An acute PE is less likely considering the patient is on Xarelto. May consider CT chest.   TTE   Appears euvolemic, weight at baseline   No need for diuretics at this time   Telemetry showing NSR with sinus tachycardia.   Continue with diltiazem 300 mg daily, Imdur 60 mg daily, lisinopril 40 mg daily  Continue with Xarelto 20 mg daily  Continue with telemetry monitoring  Tobacco cessation encouraged, nicotine patch       Assessment:  Assessment & Plan  Dyspnea on exertion  - Presented with dyspnea on exertion, has to take several breaks walking for 1 block   - Abnormal chest CT with RUL cavitary lesion in 2019 due to MRSA,   - Progressively worsening, however, has been ongoing for sometime now   - No recent cough or URI   - Smoker with history of PRAKASH   - CXR(1/27/25): No acute cardiopulmonary disease   Chest pain, atypical  -TTE (7/10/2024): LVEF 55 to 60% no wall motion abnormalities, normal RV size and function, small circumferential anterior and posterior pericardial effusion.  - Endorses chest pain and palpitation when he is anxious.     H/o Atrial flutter (HCC)  -History of cardioversion in 2018  -Ablation of atrial flutter (6/21/2024) by Dr. Lopez: Successful fluoroscopy plus ablation for CTI dependent atrial  flutter  -[id number] 1(HTN), currently on rivaroxaban 20 mg daily  -Admitted to the hospital 6/20/2024-6/22/2024 for atrial flutter with RVR which was treated with ablation  -ECG on presentation showed sinus tach test  -Short runs of sinus tachycardia on telemetry, otherwise sinus rhythm with Hrs in  bpm      History of cardiac arrest  - Cardiac arrest during echo exercise stress (11/5/2021): During recovery phase, patient was in severe respiratory distress and began to develop ST elevations in the inferior leads. Subsequently lost pulse upon which ACLS was initiated. CPR conducted for approximately 5 minutes and 30 seconds with EMS present upon which ROSC was achieved. Atropine and Epinephrine were each given x1 given profound bradycardia in setting of complete heart block which subsequently progressed to asystole. Patients post-ROSC rhythm was consistent with atrial fibrillation which spontaneously converted to normal sinus rhythm.  -Thought to be due to severe Prinzmetal angina due to vasospasm  Currently on Cardizem and Imdur  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HCC)  -Cardiac cath post cardiac arrest showed evidence of Prinzmetal angina with vasospasm  -Currently on Cardizem and Imdur    Tobacco use disorder  - Current use, 1/2 PPD   Mixed hyperlipidemia  -Lovastatin 40 mg  Obesity, Class III, BMI 40-49.9 (morbid obesity) (HCC)    Obstructive sleep apnea  - No CPAP use at night         Case discussed and reviewed with Dr. Smith who agrees with my assessment and plan.Thank you for involving us in the care of your patient.  Gelacio Schneider MD  Cardiology Fellow   PGY-6    HPI: Torey Del Castillo  is a 59 y.o. year old male with history of atrial flutter (cardioversion in 2018, status post ablation in 2024), obstructive sleep apnea, nicotine dependence/current use, HLD, GERD Prinzmetal angina, history of cardiac arrest during stress test who presented with worsening dyspnea on exertion significant dyspnea noted on  exertion, denies any recent medication changes.  He also endorses having palpitations although currently without any chest pain at the time of presentation.He is unable to climb his stairs at home due to dyspnea, has to take a rest to catch his breath. Also, unable to walk to a store close to his house that's only a 1.5 block away without taking several breaks. No recent URI/fever, cough. No lower extremity edema, no PND but endorses orthopnea. Also endorsed bilateral leg and hip pain that has been going for sometime now.       Review of Systems  Review of system was conducted and was negative except for as stated in the HPI.      Historical Information   Past Medical History:   Diagnosis Date    A-fib (HCC)     Anxiety     Asthma     Colon polyp     GERD (gastroesophageal reflux disease)     History of ileus     Hypertension     Insomnia     Kidney stone     Lumbar herniated disc     last assessed: 3/27/2015    MDD (major depressive disorder)     MI, old     Patella fracture     last assessed: 3/27/2015    Tobacco abuse      Past Surgical History:   Procedure Laterality Date    BACK SURGERY  2015    Lumbar    CARDIAC CATHETERIZATION N/A 11/5/2021    Procedure: CARDIAC CATHETERIZATION;  Surgeon: Herbie Castaneda MD;  Location: [facility];  Service: Cardiology    CARDIAC ELECTROPHYSIOLOGY PROCEDURE N/A 6/21/2024    Procedure: Cardiac eps/aflutter ablation;  Surgeon: Nilay Lopez MD;  Location: [facility];  Service: Cardiology    COLONOSCOPY      KNEE SURGERY      right    LUMBAR FUSION      last assessed: 3/27/2015    WA RPR UMBILICAL HRNA 5 YRS/> REDUCIBLE N/A 9/14/2022    Procedure: REPAIR HERNIA UMBILICAL w/ mesh;  Surgeon: Edna Siddiqui DO;  Location: [facility];  Service: General    WA TX TIBL SHFT FX IMED IMPLT W/WO SCREWS&/CERCLA Left 3/16/2017    Procedure: INSERTION NAIL IM TIBIA;  Surgeon: Edna Abrams DO;  Location: [facility];  Service: Orthopedics     Social History     Substance  and Sexual Activity   Alcohol Use Not Currently    Comment: Hx of a couple of drinks a night, or everyother night     Social History     Substance and Sexual Activity   Drug Use No     Social History     Tobacco Use   Smoking Status Every Day    Current packs/day: 0.00    Average packs/day: 1.5 packs/day for 34.0 years (51.0 ttl pk-yrs)    Types: Cigarettes    Start date:     Last attempt to quit:     Years since quittin.0   Smokeless Tobacco Never     Family History: Family history non-contributory    Meds/Allergies   Hospital Medications:   Current Facility-Administered Medications:     acetaminophen (TYLENOL) tablet 650 mg, Q6H PRN    atorvastatin (LIPITOR) tablet 40 mg, Daily    cyanocobalamin (VITAMIN B-12) tablet 1,000 mcg, Daily    diltiazem (CARDIZEM CD) 24 hr capsule 300 mg, QAM    docusate sodium (COLACE) capsule 100 mg, BID    folic acid (FOLVITE) tablet 400 mcg, Daily    HYDROmorphone HCl (DILAUDID) injection 0.2 mg, Q2H PRN    isosorbide mononitrate (IMDUR) 24 hr tablet 60 mg, Daily    lisinopril (ZESTRIL) tablet 40 mg, Daily    LORazepam (ATIVAN) tablet 1 mg, BID PRN    naloxone (NARCAN) 0.04 mg/mL syringe 0.04 mg, Q1MIN PRN    nicotine (NICODERM CQ) 7 mg/24hr TD 24 hr patch 1 patch, Q24H    ondansetron (ZOFRAN) injection 4 mg, Q4H PRN    oxyCODONE (ROXICODONE) split tablet 2.5 mg, Q4H PRN **OR** oxyCODONE (ROXICODONE) IR tablet 5 mg, Q4H PRN    pantoprazole (PROTONIX) EC tablet 40 mg, Early Morning    polyethylene glycol (MIRALAX) packet 17 g, Daily    QUEtiapine (SEROquel) tablet 300 mg, HS    rivaroxaban (XARELTO) tablet 20 mg, Daily With Breakfast    sertraline (ZOLOFT) tablet 50 mg, HS  Home Medications:   Medications Prior to Admission:     albuterol (PROVENTIL HFA,VENTOLIN HFA) 90 mcg/act inhaler    diltiazem (CARDIZEM CD) 300 mg 24 hr capsule    isosorbide mononitrate (IMDUR) 60 mg 24 hr tablet    lisinopril (ZESTRIL) 40 mg tablet    LORazepam (ATIVAN) 1 mg tablet    omeprazole  "(PriLOSEC) 40 MG capsule    QUEtiapine (SEROquel) 50 mg tablet    rivaroxaban (Xarelto) 20 mg tablet    sertraline (ZOLOFT) 50 mg tablet    vitamin B-12 (VITAMIN B-12) 1,000 mcg tablet    atorvastatin (LIPITOR) 40 mg tablet    folic acid (FOLVITE) 400 mcg tablet    QUEtiapine (SEROquel) 300 mg tablet    No Known Allergies    Objective   Vitals: Blood pressure 163/95, pulse 99, temperature 98.1 °F (36.7 °C), temperature source Axillary, resp. rate 18, height 5' 10\" (1.778 m), weight (!) 139 kg (305 lb 12.5 oz), SpO2 94%.  Orthostatic Blood Pressures      Flowsheet Row Most Recent Value   Blood Pressure 163/95 filed at 01/28/2025 0728   Patient Position - Orthostatic VS Sitting filed at 01/28/2025 0728              Invasive Devices       Peripheral Intravenous Line  Duration             Peripheral IV 01/27/25 Left Antecubital <1 day                    Physical Exam    GEN: Torey Del Castillo  appears well, alert and oriented x 3, pleasant and cooperative   NECK: No JVD or carotid bruits   HEART: Regular rhythm, normal rate, normal S1 and S2, no murmurs, clicks, gallops or rubs   LUNGS: Rhonchis and wheezing heard posteriorly,L>R  ABDOMEN:  Normoactive bowel sounds, soft, no tenderness, no distention  EXTREMITIES: peripheral pulses palpable; no edema    Lab Results: I have personally reviewed pertinent lab results.    Results from last 7 days   Lab Units 01/27/25  1313 01/27/25  1136 01/27/25  0928   HS TNI 0HR ng/L  --   --  33   HS TNI 2HR ng/L  --  29  --    HS TNI 4HR ng/L 30  --   --          Results from last 7 days   Lab Units 01/28/25  0507 01/27/25  0928   POTASSIUM mmol/L 3.2* 2.9*   CO2 mmol/L 27 28   CHLORIDE mmol/L 100 100   BUN mg/dL 8 5   CREATININE mg/dL 0.82 0.88     Results from last 7 days   Lab Units 01/28/25  0507 01/27/25  0928   HEMOGLOBIN g/dL 14.8 15.6   HEMATOCRIT % 43.7 46.1   PLATELETS Thousands/uL 145* 150     Results from last 7 days   Lab Units 01/28/25  0507 01/27/25  1508   TRIGLYCERIDES " mg/dL 148  --    HDL mg/dL 39*  --    LDL CALC mg/dL 123*  --    HEMOGLOBIN A1C %  --  5.1         Imaging: Results Review Statement: No pertinent imaging studies reviewed.  Telemetry: Sinus tachycardia, average heart rate between  bpm, a couple episodes above 110 bpm.         Previous STRESS TEST:  No results found for this or any previous visit.     No results found for this or any previous visit.    No results found for this or any previous visit.      Previous Cath/PCI:  No results found for this or any previous visit.    No results found for this or any previous visit.    No results found for this or any previous visit.      ECHO:  Results for orders placed during the hospital encounter of 18    Echo complete with contrast if indicated    Narrative  [address] [address]  [phone]    Transthoracic Echocardiogram  2D, M-mode, Doppler, and Color Doppler    Study date:  21-May-2018    Patient: WIL AMADOR  MR number: [id number]  Account number: [id number]  : 1965  Age: 52 years  Gender: Male  Status: Inpatient  Location: Bedside  Height: 70 in  Weight: 221.5 lb  BP: 99/ 65 mmHg    Indications: Atrial flutter    Diagnoses: I48.1 - Atrial flutter    Sonographer:  Fabian Robles [facility]  Primary Physician:  Rosario Ku MD  Group:  [facility]  Interpreting Physician:  DO WILMA Lee    LEFT VENTRICLE:  Systolic function was normal. Ejection fraction was estimated to be 60 %.  There were no regional wall motion abnormalities.  Wall thickness was mildly increased.  There was mild concentric hypertrophy.    LEFT ATRIUM:  The atrium was mildly dilated.    MITRAL VALVE:  There was mild regurgitation.    HISTORY: PRIOR HISTORY: AFL with RVR, GERD, smoker, anxiety, depression    PROCEDURE: The procedure was performed at the bedside. This was a routine study. The transthoracic approach was used. The  study included complete 2D imaging, M-mode, complete spectral Doppler, and color Doppler. The heart rate was 77 bpm,  at the start of the study. Image quality was adequate.    LEFT VENTRICLE: Size was normal. Systolic function was normal. Ejection fraction was estimated to be 60 %. There were no regional wall motion abnormalities. Wall thickness was mildly increased. There was mild concentric hypertrophy.  DOPPLER: Left ventricular diastolic function parameters were normal.    RIGHT VENTRICLE: The size was normal. Systolic function was normal. Wall thickness was normal.    LEFT ATRIUM: The atrium was mildly dilated.    RIGHT ATRIUM: Size was normal.    MITRAL VALVE: Valve structure was normal. There was normal leaflet separation. DOPPLER: The transmitral velocity was within the normal range. There was no evidence for stenosis. There was mild regurgitation.    AORTIC VALVE: The valve was trileaflet. Leaflets exhibited mildly increased thickness and normal cuspal separation. DOPPLER: Transaortic velocity was within the normal range. There was no evidence for stenosis. There was no regurgitation.    TRICUSPID VALVE: The valve structure was normal. There was normal leaflet separation. DOPPLER: The transtricuspid velocity was within the normal range. There was no evidence for stenosis. There was no regurgitation.    PULMONIC VALVE: Leaflets exhibited normal thickness, no calcification, and normal cuspal separation. DOPPLER: The transpulmonic velocity was within the normal range. There was no regurgitation.    PERICARDIUM: There was no pericardial effusion. The pericardium was normal in appearance.    AORTA: The root exhibited normal size.    SYSTEMIC VEINS: IVC: The inferior vena cava was normal in size and course. Respirophasic changes were normal.    PULMONARY ARTERY: DOPPLER: The tricuspid jet envelope definition was inadequate for estimation of RV systolic pressure. There are no indirect findings (abnormal RV volume  or geometry, altered pulmonary flow velocity profile, or leftward  septal displacement) which would suggest moderate or severe pulmonary hypertension.    SYSTEM MEASUREMENT TABLES    2D  %FS: 33.6 %  EF(Teich): 62.2 %  ESV(Teich): 40.7 ml  IVSd: 1.5 cm  LA Area: 22.3 cm2  LA Diam: 3.7 cm  LVEF MOD A4C: 61.7 %  LVIDd: 4.8 cm  LVIDs: 3.2 cm  LVPWd: 1.5 cm  RA Area: 17.3 cm2  SV(Teich): 67 ml    [facility]    Prepared and electronically signed by    Gregory Smith DO  Signed 21-May-2018 17:31:30    Results for orders placed during the hospital encounter of 10/26/23    Echo complete w/ contrast if indicated    Interpretation Summary    Left Ventricle: Left ventricular cavity size is normal. Wall thickness is not well visualized. The left ventricular ejection fraction is 70%. Systolic function is hyperdynamic. Wall motion is normal. Diastolic function is mildly abnormal, consistent with grade I (abnormal) relaxation.    Right Ventricle: Right ventricular cavity size is normal. Systolic function is normal.    Technically difficult study.      NIKOLAS:  No results found for this or any previous visit.    No results found for this or any previous visit.      CMR:  No results found for this or any previous visit.    No results found for this or any previous visit.    No results found for this or any previous visit.      HOLTER  No results found for this or any previous visit.    No results found for this or any previous visit.

## 2025-01-28 NOTE — ASSESSMENT & PLAN NOTE
- Cardiac arrest during echo exercise stress (11/5/2021): During recovery phase, patient was in severe respiratory distress and began to develop ST elevations in the inferior leads. Subsequently lost pulse upon which ACLS was initiated. CPR conducted for approximately 5 minutes and 30 seconds with EMS present upon which ROSC was achieved. Atropine and Epinephrine were each given x1 given profound bradycardia in setting of complete heart block which subsequently progressed to asystole. Patients post-ROSC rhythm was consistent with atrial fibrillation which spontaneously converted to normal sinus rhythm.  -Thought to be due to severe Prinzmetal angina due to vasospasm  Currently on Cardizem and Imdur   Luisa

## 2025-01-28 NOTE — UTILIZATION REVIEW
Initial Clinical Review    Admission: Date/Time/Statement:   Admission Orders (From admission, onward)       Ordered        01/27/25 1247  Place in Observation  Once                          Orders Placed This Encounter   Procedures    Place in Observation     Standing Status:   Standing     Number of Occurrences:   1     Level of Care:   Med Surg [16]     Bed Type:   Jennifer [4]     ED Arrival Information       Expected   -    Arrival   1/27/2025 09:02    Acuity   Emergent              Means of arrival   Stretcher    Escorted by   [facility] ([facility])    Service   Hospitalist    Admission type   Emergency              Arrival complaint   palpitations             Chief Complaint   Patient presents with    Shortness of Breath     Patient arrives via EMS from home, reports SOB and palpitations ongoing for awhile-months, worst with exertion, has cardiology appt in February.       Initial Presentation: 59 y.o. male presents to ED from home via EMS due to worsening MCLEAN, atypical chest pain, PMH Afib/flutter, angina, cardiac arrest during stress testing.  Labs find hypokalemia, K 2.9, Exam notes tachycardia, tachycardia, regular cardiac rhythm. Normal breath sounds.   spO2 91% . Admitted as OBS to med surg for chest pain, Prinzmetal variant angina, PRAKASH, h/o Aflutter Plan trend cardiac enzymes, monitor telemetry CPAP at  , AC with Xarelto, continue imdur, Cardizem, smoking cessation       ED Treatment-Medication Administration from 01/27/2025 0902 to 01/27/2025 2128         Date/Time Order Dose Route Action     01/27/2025 0927 LORazepam (ATIVAN) tablet 1 mg 1 mg Oral Given     01/27/2025 1016 potassium chloride (Klor-Con M20) CR tablet 40 mEq 40 mEq Oral Given     01/27/2025 1238 albuterol inhalation solution 5 mg 5 mg Nebulization Given     01/27/2025 1237 ipratropium (ATROVENT) 0.02 % inhalation solution 0.5 mg 0.5 mg Nebulization Given     01/27/2025 1635 folic acid (FOLVITE) tablet 400 mcg 400 mcg Oral  Given     01/27/2025 1509 atorvastatin (LIPITOR) tablet 40 mg 40 mg Oral Given     01/27/2025 1509 isosorbide mononitrate (IMDUR) 24 hr tablet 60 mg 60 mg Oral Given     01/27/2025 1554 LORazepam (ATIVAN) tablet 1 mg 1 mg Oral Given     01/27/2025 1509 cyanocobalamin (VITAMIN B-12) tablet 1,000 mcg 1,000 mcg Oral Given     01/27/2025 1733 docusate sodium (COLACE) capsule 100 mg 100 mg Oral Given     01/27/2025 1513 nicotine (NICODERM CQ) 7 mg/24hr TD 24 hr patch 1 patch 1 patch Transdermal Medication Applied     01/27/2025 1558 sodium chloride 0.9 % infusion 100 mL/hr Intravenous New Bag     01/27/2025 1927 oxyCODONE (ROXICODONE) split tablet 2.5 mg -- Oral See Alternative     01/27/2025 1927 oxyCODONE (ROXICODONE) IR tablet 5 mg 5 mg Oral Given            Scheduled Medications:  atorvastatin, 40 mg, Oral, Daily  vitamin B-12, 1,000 mcg, Oral, Daily  diltiazem, 300 mg, Oral, QAM  docusate sodium, 100 mg, Oral, BID  folic acid, 400 mcg, Oral, Daily  isosorbide mononitrate, 60 mg, Oral, Daily  lisinopril, 40 mg, Oral, Daily  nicotine, 1 patch, Transdermal, Q24H  pantoprazole, 40 mg, Oral, Early Morning  polyethylene glycol, 17 g, Oral, Daily  QUEtiapine, 300 mg, Oral, HS  rivaroxaban, 20 mg, Oral, Daily With Breakfast  sertraline, 50 mg, Oral, HS  spironolactone, 25 mg, Oral, Daily      Continuous IV Infusions:     PRN Meds:  acetaminophen, 650 mg, Oral, Q6H PRN  albuterol, 2 puff, Inhalation, Q6H PRN  HYDROmorphone, 0.2 mg, Intravenous, Q2H PRN  LORazepam, 1 mg, Oral, BID PRN  naloxone, 0.04 mg, Intravenous, Q1MIN PRN  ondansetron, 4 mg, Intravenous, Q4H PRN  oxyCODONE, 2.5 mg, Oral, Q4H PRN   Or  oxyCODONE, 5 mg, Oral, Q4H PRN      ED Triage Vitals [01/27/25 0907]   Temperature Pulse Respirations Blood Pressure SpO2 Pain Score   97.7 °F (36.5 °C) (!) 113 (!) 26 110/57 95 % 6     Weight (last 2 days)       Date/Time Weight    01/28/25 1321 138 (305)    01/27/25 2134 139 (305.78)    01/27/25 0909 138 (304.24)             Vital Signs (last 3 days)       Date/Time Temp Pulse Resp BP MAP (mmHg) SpO2 O2 Device Patient Position - Orthostatic VS [place] Coma Scale Score Pain    01/28/25 1321 -- 100 -- 162/81 -- -- -- -- -- --    01/28/25 1114 -- -- -- -- -- -- -- -- -- 8 01/28/25 1055 97 °F (36.1 °C) 100 18 162/81 96 96 % None (Room air) Sitting -- --    01/28/25 0900 -- -- -- -- -- -- -- -- 15 --    01/28/25 0846 -- -- -- -- -- -- -- -- -- 8 01/28/25 0842 -- -- -- -- -- -- -- -- -- 8 01/28/25 0728 98.1 °F (36.7 °C) 99 18 163/95 114 94 % None (Room air) Sitting -- --    01/28/25 0701 -- -- -- -- -- -- -- -- -- 8 01/28/25 0647 -- 97 -- 142/76 101 -- -- -- -- --    01/28/25 0337 -- -- -- -- -- -- -- -- -- 9    01/28/25 0029 -- -- -- -- -- -- -- -- -- 8 01/27/25 2329 98.4 °F (36.9 °C) 98 20 174/99 -- 94 % None (Room air) Lying -- --    01/27/25 2134 97 °F (36.1 °C) 104 20 140/75 99 94 % None (Room air) Sitting -- 8 01/27/25 2130 -- -- -- -- -- -- -- -- 15 --    01/27/25 1927 -- -- -- -- -- -- -- -- -- 8 01/27/25 1920 -- -- -- -- -- -- -- -- -- 8 01/27/25 1918 -- 100 28 154/85 -- 95 % None (Room air) Sitting -- --    01/27/25 1912 -- -- -- -- -- -- -- -- 15 --    01/27/25 1729 -- 99 28 142/70 -- 97 % None (Room air) -- -- --    01/27/25 1506 -- 100 28 144/72 -- 95 % None (Room air) -- -- --    01/27/25 1231 -- -- 32 -- -- 91 % None (Room air) -- -- --    01/27/25 1202 -- 98 20 140/65 -- 94 % None (Room air) -- -- --    01/27/25 1017 -- 101 24 134/60 -- 95 % None (Room air) -- -- --    01/27/25 0907 97.7 °F (36.5 °C) 113 26 110/57 -- 95 % None (Room air) -- -- 6              Pertinent Labs/Diagnostic Test Results:   Radiology:  XR chest 2 views   ED Interpretation by Bhargav Ridley DO (01/27 1009)   Wet read - no infiltrate or effusion. Enlarged cardiac silhouette       Final Interpretation by Erna Vasquez MD (01/27 1044)      No acute cardiopulmonary disease.               Workstation performed:  [id number]           Cardiology:  ECG 12 lead   Final Result by Balwinder Reyes MD (01/27 1326)   Sinus tachycardia   Baseline artifact   Low voltage QRS   Cannot rule out Anterior infarct , age undetermined   Abnormal ECG   Confirmed by Balwinder Reyes ([id number]) on 1/27/2025 1:26:50 PM      ECG 12 lead   Final Result by Balwinder Reyes MD (01/27 1229)   Sinus tachycardia with occasional Premature ventricular complexes   Low voltage QRS   Borderline ECG   When compared with ECG of 27-Jan-2025 09:13,   Premature ventricular complexes are now Present   Confirmed by Balwinder Reyes ([id number]) on 1/27/2025 12:29:01 PM      ECG 12 lead   Final Result by Balwinder Reyes MD (01/27 0919)   Sinus tachycardia   Cannot rule out Anterior infarct , age undetermined   Abnormal ECG   When compared with ECG of 21-Jun-2024 16:48,   Aberrant conduction is no longer Present   Confirmed by Balwinder Reyes ([id number]) on 1/27/2025 9:19:46 AM        GI:  No orders to display       Results from last 7 days   Lab Units 01/27/25  1554   SARS-COV-2  Negative     Results from last 7 days   Lab Units 01/28/25  0507 01/27/25  0928   WBC Thousand/uL 5.44 5.25   HEMOGLOBIN g/dL 14.8 15.6   HEMATOCRIT % 43.7 46.1   PLATELETS Thousands/uL 145* 150   TOTAL NEUT ABS Thousands/µL  --  3.83         Results from last 7 days   Lab Units 01/28/25  0507 01/27/25  0928   SODIUM mmol/L 136 140   POTASSIUM mmol/L 3.2* 2.9*   CHLORIDE mmol/L 100 100   CO2 mmol/L 27 28   ANION GAP mmol/L 9 12   BUN mg/dL 8 5   CREATININE mg/dL 0.82 0.88   EGFR ml/min/1.73sq m 97 94   CALCIUM mg/dL 8.5 8.6     Results from last 7 days   Lab Units 01/27/25  0928   AST U/L 21   ALT U/L 24   ALK PHOS U/L 162*   TOTAL PROTEIN g/dL 6.8   ALBUMIN g/dL 3.9   TOTAL BILIRUBIN mg/dL 0.72     Results from last 7 days   Lab Units 01/27/25  1537   POC GLUCOSE mg/dl 120     Results from last 7 days   Lab Units 01/28/25  0507 01/27/25  0928   GLUCOSE RANDOM mg/dL 98 158*        "  Results from last 7 days   Lab Units 01/27/25  1508   HEMOGLOBIN A1C % 5.1   EAG mg/dl 100     No results found for: \"BETA-HYDROXYBUTYRATE\"                   Results from last 7 days   Lab Units 01/27/25  1313 01/27/25  1136 01/27/25  0928   HS TNI 0HR ng/L  --   --  33   HS TNI 2HR ng/L  --  29  --    HSTNI D2 ng/L  --  -4  --    HS TNI 4HR ng/L 30  --   --    HSTNI D4 ng/L -3  --   --      Results from last 7 days   Lab Units 01/27/25  0928   D-DIMER QUANTITATIVE ug/ml FEU 0.28         Results from last 7 days   Lab Units 01/27/25  0928   TSH 3RD GENERATON uIU/mL 2.243                     Results from last 7 days   Lab Units 01/27/25  0928   BNP pg/mL 33                                         Results from last 7 days   Lab Units 01/27/25  1554   INFLUENZA A PCR  Negative   INFLUENZA B PCR  Negative   RSV PCR  Negative                                               Past Medical History:   Diagnosis Date    A-fib (formerly [facility])     Anxiety     Asthma     Colon polyp     GERD (gastroesophageal reflux disease)     History of ileus     Hypertension     Insomnia     Kidney stone     Lumbar herniated disc     last assessed: 3/27/2015    MDD (major depressive disorder)     MI, old     Patella fracture     last assessed: 3/27/2015    Tobacco abuse      Present on Admission:   Tobacco use disorder   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HCC)   Obesity, Class III, BMI 40-49.9 (morbid obesity) (formerly [facility])   Obstructive sleep apnea   H/o Atrial flutter (HCC)   Chest pain, atypical   Dyspnea on exertion      Admitting Diagnosis: Cardiac arrest (HCC) [I46.9]  Palpitations [R00.2]  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HCC) [I20.1]  Primary hypertension [I10]  Chest pain [R07.9]  Dyspnea [R06.00]  Prediabetes [R73.03]  Age/Sex: 59 y.o. male    Network Utilization Review Department  ATTENTION: Please call with any questions or concerns to [phone] and carefully listen to the prompts so that you are directed to the right person. All voicemails are confidential. "   For Discharge needs, contact Care Management DC Support Team at [phone] opt. 2  Send all requests for admission clinical reviews, approved or denied determinations and any other requests to dedicated fax number below belonging to the campus where the patient is receiving treatment. List of dedicated fax numbers for the Facilities:  FACILITY NAME UR FAX NUMBER   ADMISSION DENIALS (Administrative/Medical Necessity) [phone]   DISCHARGE SUPPORT TEAM (NETWORK) [phone]   PARENT CHILD HEALTH (Maternity/NICU/Pediatrics) [phone]   General acute hospital [phone]   [facility] [phone]   [facility] [phone]   [facility] [phone]   [facility] [phone]   [facility] [phone]   [facility] [phone]   [facility] [phone]   [facility] [phone]   [facility] [phone]   [facility] [phone]   [facility] [phone]

## 2025-01-28 NOTE — CASE MANAGEMENT
Case Management Assessment & Discharge Planning Note    Patient name Torey Del Castillo Jr.  Location [facility] 4 /E4 -* MRN [id number]  : 1965 Date 2025       Current Admission Date: 2025  Current Admission Diagnosis:Chest pain, atypical   Patient Active Problem List    Diagnosis Date Noted Date Diagnosed    History of cardiac arrest 2025     Mixed hyperlipidemia 2025     Chest pain, atypical 2025     Weight loss 2024     Sleep trouble 2024     Syncope 2024     Hypothyroidism 2024     Pulmonary congestion 2024     Other specified hypothyroidism 10/05/2023     Prediabetes 10/05/2023     Abnormal hematocrit 10/05/2023     Spasm of muscle of lower back 2023     Skin lesion 2023     Intentional drug overdose, initial encounter ([facility]) 2023     Left leg pain 2023     Obstructive sleep apnea      Blood in left ear canal 10/31/2022     Hyperpigmented skin lesion 10/31/2022     Preop examination 2022     Financial difficulties 2022     Hip pain 2022     H/o Atrial flutter ([facility]) 2022     Anemia 2021     Thrombocytopenia ([facility]) 2021     Alcohol dependence ([facility]) 2021     Cardiac arrest ([facility]) 2021     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[facility]) 2021     Obesity, Class III, BMI 40-49.9 (morbid obesity) ([facility]) 10/20/2021     Excessive daytime sleepiness 10/20/2021     Hyperlipidemia 06/10/2021     Elevated alkaline phosphatase level 06/10/2021     Elevated hemoglobin ([facility]) 06/10/2021     Hypertension 2021     Asthma 2020     Moderate episode of recurrent major depressive disorder ([facility]) 2019     History of MRSA infection of lungs 2019     Anxiety      GERD (gastroesophageal reflux disease)      MDD (major depressive disorder)      Adenoma of left adrenal gland 2018     Closed right ankle fracture 2017     Tobacco use disorder      History of ileus      Umbilical  hernia without obstruction and without gangrene 02/01/2016     Gallbladder calculus without cholecystitis 02/01/2016     Kidney stone on right side 01/31/2016     Ileus (HCC) 01/31/2016     Other insomnia      Depression with anxiety        LOS (days): 0  Geometric Mean LOS (GMLOS) (days):   Days to GMLOS:     OBJECTIVE:              Current admission status: Observation  Referral Reason: Other (SDOH)    Preferred Pharmacy:   [facility] [address]  [address] [address]  Phone: [phone] Fax: [phone]    Primary Care Provider: Tran Rodríguez MD    Primary Insurance: [employer] HANKS PENDING  Secondary Insurance:     ASSESSMENT:  Active Health Care Proxies    There are no active Health Care Proxies on file.       Advance Directives  Does patient have a Health Care POA?: No  Was patient offered paperwork?: Yes (Declined, patient feels he may have one, will look into this)  Does patient currently have a Health Care decision maker?: Yes, please see Health Care Proxy section  Does patient have Advance Directives?: No  Was patient offered paperwork?: Yes (Declined, patient feels he may have one, will look into this)  Primary Contact: Torey Del Castillo II (Son)   [phone] (M)         Readmission Root Cause  30 Day Readmission: No    Patient Information  Admitted from:: Home  Mental Status: Alert  During Assessment patient was accompanied by: Not accompanied during assessment  Assessment information provided by:: Patient  Primary Caregiver: Self  Support Systems: Self, Spouse/significant other, Family members, Daughter  County of Residence: [place]  What city do you live in?: [place]  Home entry access options. Select all that apply.: Stairs  Number of steps to enter home.: 3  Do the steps have railings?: Yes  Type of Current Residence: [address] [place] [address] home  Upon entering residence, is there a bedroom on the main floor (no further steps)?: No  A bedroom is located on the following  floor levels of residence (select all that apply):: 2nd Floor  Upon entering residence, is there a bathroom on the main floor (no further steps)?: No  Indicate which floors of current residence have a bathroom (select all the apply):: 2nd Floor  Number of steps to 2nd floor from main floor: One Flight  Living Arrangements: Lives Alone  Is patient a ?: Yes  Is patient active with VA ([employer])?: No (unable to collect VA benefits due to time served)    Activities of Daily Living Prior to Admission  Functional Status: Independent  Completes ADLs independently?: Yes  Ambulates independently?: Yes  Does patient use assisted devices?: Yes  Assisted Devices (DME) used: Straight Cane  Does patient currently own DME?: Yes  What DME does the patient currently own?: Straight Cane  Does patient have a history of Outpatient Therapy (PT/OT)?: Yes  Does the patient have a history of Short-Term Rehab?: No  Does patient have a history of HHC?: No  Does patient currently have HHC?: No         Patient Information Continued  Income Source: SSI/SSD  Does patient have prescription coverage?: No  Does patient receive dialysis treatments?: No  Does patient have a history of substance abuse?: No  Does patient have a history of Mental Health Diagnosis?: Yes (Depression and anxiety)  Is patient receiving treatment for mental health?: Yes (OP psychiatry and therapy)         Means of Transportation  Means of Transport to Appts:: Family transport          DISCHARGE DETAILS:    Discharge planning discussed with:: Patient  Freedom of Choice: Yes  Comments - Freedom of Choice: Inbasket sent for OPCM and email to hospital financial counselors  CM contacted family/caregiver?: No- see comments (Pt alert and oriented, declined call to family)  Were Treatment Team discharge recommendations reviewed with patient/caregiver?: Yes  Did patient/caregiver verbalize understanding of patient care needs?: Yes  Were patient/caregiver advised of the  risks associated with not following Treatment Team discharge recommendations?: Yes         Requested Home Health Care         Is the patient interested in HHC at discharge?: No    DME Referral Provided  Referral made for DME?: No    Other Referral/Resources/Interventions Provided:  Financial Resources Provided: Financial Counselor  Interventions: Outpatient     Would you like to participate in our [facility] service program?  : No - Declined    Treatment Team Recommendation: Home  Discharge Destination Plan:: Home  Transport at Discharge : Family                           CM met with patient at bedside to introduce self and role with DC planning.  Patient resides alone in a 2 story home.  Patient is independent with ADL's and requires assistance with IADL's.  Patient's daughter and sister assist with transportation, shopping, and cleaning.  Patient reports that he applied for MA in the past, unsure status of application.  Patient agreeable to referral to hospital financial counselors.  Patient [facility] in [place], at times transportation is a barrier.  CM discussed contacting his PCP to set up lyft rides to appointments.  Patient also agreeable to OPCM referral.  CM provided patient with additional community resources via find help.     CM sent inbasket requesting OPCM for patient.  CM also sent email to hospital financial counselors.  CM will continue to follow.

## 2025-01-28 NOTE — ASSESSMENT & PLAN NOTE
Ongoing dyspnea on exertion, has been complaining to cardiology in July 2024 on follow-up  Multifactorial in the setting of obesity, obstructive sleep apnea, current smoker, no history of COPD  CTA chest in July 2024 was normal  Chest x-ray on this admission showed no acute cardiopulmonary disease  6-minute walk his lowest oxygen saturation was 93%  Clinically is not fluid overloaded  Echo pending  Will refer patient to pulmonology as outpatient for PFTs, continue albuterol inhaler as needed which is home medication

## 2025-01-28 NOTE — ASSESSMENT & PLAN NOTE
-TTE (7/10/2024): LVEF 55 to 60% no wall motion abnormalities, normal RV size and function, small circumferential anterior and posterior pericardial effusion.  - Endorses chest pain and palpitation when he is anxious.

## 2025-01-28 NOTE — PLAN OF CARE
Problem: PAIN - ADULT  Goal: Verbalizes/displays adequate comfort level or baseline comfort level  Description: Interventions:  - Encourage patient to monitor pain and request assistance  - Assess pain using appropriate pain scale  - Administer analgesics based on type and severity of pain and evaluate response  - Implement non-pharmacological measures as appropriate and evaluate response  - Consider cultural and social influences on pain and pain management  - Notify physician/advanced practitioner if interventions unsuccessful or patient reports new pain  Outcome: Progressing     Problem: CARDIOVASCULAR - ADULT  Goal: Absence of cardiac dysrhythmias or at baseline rhythm  Description: INTERVENTIONS:  - Continuous cardiac monitoring, vital signs, obtain 12 lead EKG if ordered  - Administer antiarrhythmic and heart rate control medications as ordered  - Monitor electrolytes and administer replacement therapy as ordered  Outcome: Progressing     Problem: MUSCULOSKELETAL - ADULT  Goal: Maintain or return mobility to safest level of function  Description: INTERVENTIONS:  - Assess patient's ability to carry out ADLs; assess patient's baseline for ADL function and identify physical deficits which impact ability to perform ADLs (bathing, care of mouth/teeth, toileting, grooming, dressing, etc.)  - Assess/evaluate cause of self-care deficits   - Assess range of motion  - Assess patient's mobility  - Assess patient's need for assistive devices and provide as appropriate  - Encourage maximum independence but intervene and supervise when necessary  - Involve family in performance of ADLs  - Assess for home care needs following discharge   - Consider OT consult to assist with ADL evaluation and planning for discharge  - Provide patient education as appropriate  Outcome: Progressing

## 2025-01-28 NOTE — PLAN OF CARE
Problem: PAIN - ADULT  Goal: Verbalizes/displays adequate comfort level or baseline comfort level  Description: Interventions:  - Encourage patient to monitor pain and request assistance  - Assess pain using appropriate pain scale  - Administer analgesics based on type and severity of pain and evaluate response  - Implement non-pharmacological measures as appropriate and evaluate response  - Consider cultural and social influences on pain and pain management  - Notify physician/advanced practitioner if interventions unsuccessful or patient reports new pain  Outcome: Progressing     Problem: INFECTION - ADULT  Goal: Absence or prevention of progression during hospitalization  Description: INTERVENTIONS:  - Assess and monitor for signs and symptoms of infection  - Monitor lab/diagnostic results  - Monitor all insertion sites, i.e. indwelling lines, tubes, and drains  - Monitor endotracheal if appropriate and nasal secretions for changes in amount and color  - [place] appropriate cooling/warming therapies per order  - Administer medications as ordered  - Instruct and encourage patient and family to use good hand hygiene technique  - Identify and instruct in appropriate isolation precautions for identified infection/condition  Outcome: Progressing  Goal: Absence of fever/infection during neutropenic period  Description: INTERVENTIONS:  - Monitor WBC    Outcome: Progressing     Problem: SAFETY ADULT  Goal: Patient will remain free of falls  Description: INTERVENTIONS:  - Educate patient/family on patient safety including physical limitations  - Instruct patient to call for assistance with activity   - Consult OT/PT to assist with strengthening/mobility   - Keep Call bell within reach  - Keep bed low and locked with side rails adjusted as appropriate  - Keep care items and personal belongings within reach  - Initiate and maintain comfort rounds  - Make Fall Risk Sign visible to staff  - Offer Toileting every 2 Hours,  in advance of need  - Initiate/Maintain bed alarm  - Obtain necessary fall risk management equipment: alarms  - Apply yellow socks and bracelet for high fall risk patients  - Consider moving patient to room near nurses station  Outcome: Progressing  Goal: Maintain or return to baseline ADL function  Description: INTERVENTIONS:  -  Assess patient's ability to carry out ADLs; assess patient's baseline for ADL function and identify physical deficits which impact ability to perform ADLs (bathing, care of mouth/teeth, toileting, grooming, dressing, etc.)  - Assess/evaluate cause of self-care deficits   - Assess range of motion  - Assess patient's mobility; develop plan if impaired  - Assess patient's need for assistive devices and provide as appropriate  - Encourage maximum independence but intervene and supervise when necessary  - Involve family in performance of ADLs  - Assess for home care needs following discharge   - Consider OT consult to assist with ADL evaluation and planning for discharge  - Provide patient education as appropriate  Outcome: Progressing  Goal: Maintains/Returns to pre admission functional level  Description: INTERVENTIONS:  - Perform AM-PAC 6 Click Basic Mobility/ Daily Activity assessment daily.  - Set and communicate daily mobility goal to care team and patient/family/caregiver.   - Collaborate with rehabilitation services on mobility goals if consulted  - Perform Range of Motion 3 times a day.  - Reposition patient every 2 hours.  - Dangle patient 3 times a day  - Stand patient 3 times a day  - Ambulate patient 3 times a day  - Out of bed to chair 3 times a day   - Out of bed for meals 3 times a day  - Out of bed for toileting  - Record patient progress and toleration of activity level   Outcome: Progressing     Problem: DISCHARGE PLANNING  Goal: Discharge to home or other facility with appropriate resources  Description: INTERVENTIONS:  - Identify barriers to discharge w/patient and  caregiver  - Arrange for needed discharge resources and transportation as appropriate  - Identify discharge learning needs (meds, wound care, etc.)  - Arrange for interpretive services to assist at discharge as needed  - Refer to Case Management Department for coordinating discharge planning if the patient needs post-hospital services based on physician/advanced practitioner order or complex needs related to functional status, cognitive ability, or social support system  Outcome: Progressing     Problem: Knowledge Deficit  Goal: Patient/family/caregiver demonstrates understanding of disease process, treatment plan, medications, and discharge instructions  Description: Complete learning assessment and assess knowledge base.  Interventions:  - Provide teaching at level of understanding  - Provide teaching via preferred learning methods  Outcome: Progressing

## 2025-01-28 NOTE — ASSESSMENT & PLAN NOTE
- Presented with dyspnea on exertion, has to take several breaks walking for 1 block   - Abnormal chest CT with RUL cavitary lesion in 2019 due to MRSA,   - Progressively worsening, however, has been ongoing for sometime now   - No recent cough or URI   - Smoker with history of PRAKASH   - CXR(1/27/25): No acute cardiopulmonary disease

## 2025-01-28 NOTE — PROGRESS NOTES
Progress Note - Hospitalist   Name: Torey Del Castillo Jr. 59 y.o. male I MRN: [id number]  Unit/Bed#: E4 -01 I Date of Admission: 1/27/2025   Date of Service: 1/28/2025 I Hospital Day: 0    Assessment & Plan  Chest pain, atypical  Patient with atypical chest pain associated with palpitation  Telemetry without atrial flutter, normal sinus rhythm heart rate up to 110s max  Lab Results   Component Value Date    HSTNI0 33 01/27/2025    HSTNI2 29 01/27/2025    HSTNI4 30 01/27/2025         Dyspnea on exertion  Ongoing dyspnea on exertion, has been complaining to cardiology in July 2024 on follow-up  Multifactorial in the setting of obesity, obstructive sleep apnea, current smoker, no history of COPD  CTA chest in July 2024 was normal  Chest x-ray on this admission showed no acute cardiopulmonary disease  6-minute walk his lowest oxygen saturation was 93%  Clinically is not fluid overloaded  Echo pending  Will refer patient to pulmonology as outpatient for PFTs, continue albuterol inhaler as needed which is home medication  Tobacco use disorder  Continue nicotine patch  Smoking cessation  Obesity, Class III, BMI 40-49.9 (morbid obesity) (HCC)  Lifestyle modifications  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HCC)  History of cardiac arrest during outpatient stress test in 2021  Patient was found to have angiographic spasm of the left main and RCA consistent with Prinzmetal angina  Remains on Cardizem 300 mg and Imdur 60 mg daily  Denies any chest pain  H/o Atrial flutter (HCC)  History of atrial flutter status post cardioversion in 2018 and ablation 6/21/2024  Continue Cardizem 300 mg daily  Cannot afford Xarelto, trying to work with case management  If Xarelto is not an option he will have to be started on Coumadin  Obstructive sleep apnea  Continue CPAP at bedtime, reports compliance  History of cardiac arrest  Due to Prinzmetal angina, coronary vasospasm in November 2021  Mixed hyperlipidemia  Continue statin    VTE Pharmacologic  Prophylaxis: VTE Score: 2  Xarelto    Mobility:   Basic Mobility Inpatient Raw Score: 22  JH-HLM Goal: 7: Walk 25 feet or more  JH-HLM Achieved: 7: Walk 25 feet or more  JH-HLM Goal achieved. Continue to encourage appropriate mobility.    Patient Centered Rounds: I performed bedside rounds with nursing staff today.   Discussions with Specialists or Other Care Team Provider: Nursing, case management, cardiology    Education and Discussions with Family / Patient:  Patient.     Current Length of Stay: 0 day(s)  Current Patient Status: Inpatient   Certification Statement: The patient will continue to require additional inpatient hospital stay due to workup for dyspnea on exertion  Discharge Plan: Anticipate discharge tomorrow to home.    Code Status: Level 1 - Full Code    Subjective   Patient was seen evaluated bedside.  He is feeling a little bit better today, less palpitation    Objective :  Temp:  [97 °F (36.1 °C)-98.4 °F (36.9 °C)] 97 °F (36.1 °C)  HR:  [] 94  BP: (140-174)/() 145/103  Resp:  [18-28] 18  SpO2:  [94 %-97 %] 95 %  O2 Device: None (Room air)    Body mass index is 43.76 kg/m².     Input and Output Summary (last 24 hours):     Intake/Output Summary (Last 24 hours) at 1/28/2025 1724  Last data filed at 1/28/2025 1232  Gross per 24 hour   Intake 400 ml   Output 420 ml   Net -20 ml       Physical Exam  Constitutional:       General: He is not in acute distress.     Appearance: He is obese.   HENT:      Head: Atraumatic.   Cardiovascular:      Rate and Rhythm: Normal rate and regular rhythm.      Heart sounds: No murmur heard.  Pulmonary:      Effort: Pulmonary effort is normal. No respiratory distress.      Breath sounds: Normal breath sounds.      Comments: Minimal expiratory wheezing  Abdominal:      General: Bowel sounds are normal. There is no distension.      Palpations: Abdomen is soft.      Tenderness: There is no abdominal tenderness.   Musculoskeletal:         General: No swelling.    Skin:     General: Skin is warm and dry.   Neurological:      General: No focal deficit present.      Mental Status: He is alert.   Psychiatric:         Mood and Affect: Mood normal.           Lines/Drains:        Telemetry:  Telemetry Orders (From admission, onward)               24 Hour Telemetry Monitoring  Continuous x 24 Hours (Telem)        Expiring   Question:  Reason for 24 Hour Telemetry  Answer:  Arrhythmias requiring acute medical intervention / PPM or ICD malfunction                     Telemetry Reviewed: Normal Sinus Rhythm  Indication for Continued Telemetry Use: Arrthymias requiring medical therapy               Lab Results: I have reviewed the following results:   Results from last 7 days   Lab Units 01/28/25  0507 01/27/25  0928   WBC Thousand/uL 5.44 5.25   HEMOGLOBIN g/dL 14.8 15.6   HEMATOCRIT % 43.7 46.1   PLATELETS Thousands/uL 145* 150   SEGS PCT %  --  74   LYMPHO PCT %  --  19   MONO PCT %  --  7   EOS PCT %  --  0     Results from last 7 days   Lab Units 01/28/25  0507 01/27/25  0928   SODIUM mmol/L 136 140   POTASSIUM mmol/L 3.2* 2.9*   CHLORIDE mmol/L 100 100   CO2 mmol/L 27 28   BUN mg/dL 8 5   CREATININE mg/dL 0.82 0.88   ANION GAP mmol/L 9 12   CALCIUM mg/dL 8.5 8.6   ALBUMIN g/dL  --  3.9   TOTAL BILIRUBIN mg/dL  --  0.72   ALK PHOS U/L  --  162*   ALT U/L  --  24   AST U/L  --  21   GLUCOSE RANDOM mg/dL 98 158*         Results from last 7 days   Lab Units 01/27/25  1537   POC GLUCOSE mg/dl 120     Results from last 7 days   Lab Units 01/27/25  1508   HEMOGLOBIN A1C % 5.1           Recent Cultures (last 7 days):         Imaging Results Review: I reviewed radiology reports from this admission including: chest xray.  Other Study Results Review: EKG was reviewed.     Last 24 Hours Medication List:     Current Facility-Administered Medications:     acetaminophen (TYLENOL) tablet 650 mg, Q6H PRN    albuterol (PROVENTIL HFA,VENTOLIN HFA) inhaler 2 puff, Q6H PRN    atorvastatin (LIPITOR)  tablet 40 mg, Daily    cyanocobalamin (VITAMIN B-12) tablet 1,000 mcg, Daily    diltiazem (CARDIZEM CD) 24 hr capsule 300 mg, QAM    docusate sodium (COLACE) capsule 100 mg, BID    folic acid (FOLVITE) tablet 400 mcg, Daily    HYDROmorphone HCl (DILAUDID) injection 0.2 mg, Q2H PRN    isosorbide mononitrate (IMDUR) 24 hr tablet 60 mg, Daily    lisinopril (ZESTRIL) tablet 40 mg, Daily    LORazepam (ATIVAN) tablet 1 mg, BID PRN    naloxone (NARCAN) 0.04 mg/mL syringe 0.04 mg, Q1MIN PRN    nicotine (NICODERM CQ) 7 mg/24hr TD 24 hr patch 1 patch, Q24H    ondansetron (ZOFRAN) injection 4 mg, Q4H PRN    oxyCODONE (ROXICODONE) split tablet 2.5 mg, Q4H PRN **OR** oxyCODONE (ROXICODONE) IR tablet 5 mg, Q4H PRN    pantoprazole (PROTONIX) EC tablet 40 mg, Early Morning    polyethylene glycol (MIRALAX) packet 17 g, Daily    QUEtiapine (SEROquel) tablet 300 mg, HS    rivaroxaban (XARELTO) tablet 20 mg, Daily With Breakfast    sertraline (ZOLOFT) tablet 50 mg, HS    spironolactone (ALDACTONE) tablet 25 mg, Daily    Administrative Statements   Today, Patient Was Seen By: Breana Mancia MD      **Please Note: This note may have been constructed using a voice recognition system.**

## 2025-01-29 ENCOUNTER — APPOINTMENT (INPATIENT)
Dept: CT IMAGING | Facility: HOSPITAL | Age: 60
DRG: 951 | End: 2025-01-29
Payer: COMMERCIAL

## 2025-01-29 PROBLEM — L02.91 ABSCESS: Status: ACTIVE | Noted: 2025-01-29

## 2025-01-29 LAB
ANION GAP SERPL CALCULATED.3IONS-SCNC: 6 MMOL/L (ref 4–13)
BUN SERPL-MCNC: 10 MG/DL (ref 5–25)
CALCIUM SERPL-MCNC: 8.7 MG/DL (ref 8.4–10.2)
CHLORIDE SERPL-SCNC: 101 MMOL/L (ref 96–108)
CO2 SERPL-SCNC: 32 MMOL/L (ref 21–32)
CREAT SERPL-MCNC: 0.89 MG/DL (ref 0.6–1.3)
GFR SERPL CREATININE-BSD FRML MDRD: 94 ML/MIN/1.73SQ M
GLUCOSE SERPL-MCNC: 102 MG/DL (ref 65–140)
MAGNESIUM SERPL-MCNC: 1.7 MG/DL (ref 1.9–2.7)
POTASSIUM SERPL-SCNC: 3.8 MMOL/L (ref 3.5–5.3)
SODIUM SERPL-SCNC: 139 MMOL/L (ref 135–147)

## 2025-01-29 PROCEDURE — 80048 BASIC METABOLIC PNL TOTAL CA: CPT | Performed by: INTERNAL MEDICINE

## 2025-01-29 PROCEDURE — 99232 SBSQ HOSP IP/OBS MODERATE 35: CPT | Performed by: INTERNAL MEDICINE

## 2025-01-29 PROCEDURE — 83735 ASSAY OF MAGNESIUM: CPT | Performed by: INTERNAL MEDICINE

## 2025-01-29 PROCEDURE — 71250 CT THORAX DX C-: CPT

## 2025-01-29 PROCEDURE — NC001 PR NO CHARGE: Performed by: SPECIALIST

## 2025-01-29 RX ORDER — MAGNESIUM SULFATE HEPTAHYDRATE 40 MG/ML
2 INJECTION, SOLUTION INTRAVENOUS ONCE
Status: COMPLETED | OUTPATIENT
Start: 2025-01-29 | End: 2025-01-29

## 2025-01-29 RX ORDER — CHLORHEXIDINE GLUCONATE ORAL RINSE 1.2 MG/ML
15 SOLUTION DENTAL ONCE
Status: COMPLETED | OUTPATIENT
Start: 2025-01-29 | End: 2025-01-30

## 2025-01-29 RX ORDER — HYDROMORPHONE HCL IN WATER/PF 6 MG/30 ML
0.2 PATIENT CONTROLLED ANALGESIA SYRINGE INTRAVENOUS ONCE
Status: COMPLETED | OUTPATIENT
Start: 2025-01-29 | End: 2025-01-29

## 2025-01-29 RX ORDER — LIDOCAINE HYDROCHLORIDE 10 MG/ML
10 INJECTION, SOLUTION EPIDURAL; INFILTRATION; INTRACAUDAL; PERINEURAL ONCE
Status: COMPLETED | OUTPATIENT
Start: 2025-01-29 | End: 2025-01-29

## 2025-01-29 RX ADMIN — SERTRALINE HYDROCHLORIDE 50 MG: 50 TABLET ORAL at 21:58

## 2025-01-29 RX ADMIN — OXYCODONE 5 MG: 5 TABLET ORAL at 00:07

## 2025-01-29 RX ADMIN — LORAZEPAM 1 MG: 1 TABLET ORAL at 16:16

## 2025-01-29 RX ADMIN — HYDROMORPHONE HYDROCHLORIDE 0.2 MG: 0.2 INJECTION, SOLUTION INTRAMUSCULAR; INTRAVENOUS; SUBCUTANEOUS at 02:36

## 2025-01-29 RX ADMIN — LISINOPRIL 40 MG: 20 TABLET ORAL at 08:42

## 2025-01-29 RX ADMIN — ATORVASTATIN CALCIUM 40 MG: 40 TABLET, FILM COATED ORAL at 08:42

## 2025-01-29 RX ADMIN — QUETIAPINE FUMARATE 300 MG: 100 TABLET ORAL at 21:58

## 2025-01-29 RX ADMIN — ISOSORBIDE MONONITRATE 60 MG: 60 TABLET, EXTENDED RELEASE ORAL at 08:41

## 2025-01-29 RX ADMIN — HYDROMORPHONE HYDROCHLORIDE 0.2 MG: 0.2 INJECTION, SOLUTION INTRAMUSCULAR; INTRAVENOUS; SUBCUTANEOUS at 21:58

## 2025-01-29 RX ADMIN — MAGNESIUM SULFATE HEPTAHYDRATE 2 G: 40 INJECTION, SOLUTION INTRAVENOUS at 08:42

## 2025-01-29 RX ADMIN — SPIRONOLACTONE 25 MG: 25 TABLET ORAL at 08:42

## 2025-01-29 RX ADMIN — OXYCODONE 5 MG: 5 TABLET ORAL at 19:27

## 2025-01-29 RX ADMIN — PANTOPRAZOLE SODIUM 40 MG: 40 TABLET, DELAYED RELEASE ORAL at 05:04

## 2025-01-29 RX ADMIN — ALBUTEROL SULFATE 2 PUFF: 90 AEROSOL, METERED RESPIRATORY (INHALATION) at 21:58

## 2025-01-29 RX ADMIN — RIVAROXABAN 20 MG: 20 TABLET, FILM COATED ORAL at 08:42

## 2025-01-29 RX ADMIN — LIDOCAINE HYDROCHLORIDE 10 ML: 10 INJECTION, SOLUTION EPIDURAL; INFILTRATION; INTRACAUDAL; PERINEURAL at 15:57

## 2025-01-29 RX ADMIN — OXYCODONE 5 MG: 5 TABLET ORAL at 08:42

## 2025-01-29 RX ADMIN — HYDROMORPHONE HYDROCHLORIDE 0.2 MG: 0.2 INJECTION, SOLUTION INTRAMUSCULAR; INTRAVENOUS; SUBCUTANEOUS at 13:16

## 2025-01-29 RX ADMIN — FOLIC ACID TAB 400 MCG 400 MCG: 400 TAB at 08:42

## 2025-01-29 RX ADMIN — CYANOCOBALAMIN TAB 500 MCG 1000 MCG: 500 TAB at 08:41

## 2025-01-29 RX ADMIN — DILTIAZEM HYDROCHLORIDE 300 MG: 180 CAPSULE, COATED, EXTENDED RELEASE ORAL at 08:42

## 2025-01-29 RX ADMIN — LORAZEPAM 1 MG: 1 TABLET ORAL at 02:30

## 2025-01-29 RX ADMIN — HYDROMORPHONE HYDROCHLORIDE 0.2 MG: 0.2 INJECTION, SOLUTION INTRAMUSCULAR; INTRAVENOUS; SUBCUTANEOUS at 15:56

## 2025-01-29 NOTE — ASSESSMENT & PLAN NOTE
History of atrial flutter status post cardioversion in 2018 and ablation 6/21/2024  Continue Cardizem 300 mg daily  PCP helping to get Xarelto  Loop recorder will be placed tomorrow  Outpatient follow-up with EP

## 2025-01-29 NOTE — ASSESSMENT & PLAN NOTE
R posterior thigh, 1cm x 1cm area of mild fluctuance, draining serous fluid.    Plan:  - bedside cruciate I&D, packing as needed  - appreciate wound care recs  - remainder of care per primary team

## 2025-01-29 NOTE — ASSESSMENT & PLAN NOTE
Patient with atypical chest pain associated with palpitation and anxiety  Telemetry without atrial flutter, normal sinus rhythm heart rate up to 110s max  Lab Results   Component Value Date    HSTNI0 33 01/27/2025    HSTNI2 29 01/27/2025    HSTNI4 30 01/27/2025

## 2025-01-29 NOTE — CONSULTS
Progress Note - Surgery-General   Name: Torey Del Castillo Jr. 59 y.o. male I MRN: [id number]  Unit/Bed#: E4 -01 I Date of Admission: 1/27/2025   Date of Service: 1/29/2025 I Hospital Day: 1     Assessment & Plan  Abscess  R posterior thigh, 1cm x 1cm area of mild fluctuance, draining serous fluid.    Plan:  - bedside cruciate I&D, packing as needed  - appreciate wound care recs  - remainder of care per primary team  Chest pain, atypical    Tobacco use disorder    Hypertension    Obesity, Class III, BMI 40-49.9 (morbid obesity) (HCC)    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HCC)    H/o Atrial flutter (HCC)-rate controlled    Obstructive sleep apnea    Dyspnea on exertion    History of cardiac arrest    Mixed hyperlipidemia    HPI:  Torey Del Castillo Jr. is a 59 y.o. male with PMHx of A Fib, GERD, HTN, MDD, MI, umbo hernia repair, who presents with chest pain. Noted to have R posterior thigh abscess. Started 1 year ago, popped it himself with no pus but just bloody drainage. Resolved, but returned. Has been draining serous fluid. Minimal pain associated with it.    Objective   Patient Vitals for the past 24 hrs:   BP Temp Temp src Pulse Resp SpO2   01/29/25 1520 94/50 98.1 °F (36.7 °C) Temporal 86 19 93 %   01/29/25 1025 139/86 97.6 °F (36.4 °C) Temporal 97 18 91 %   01/29/25 0745 132/75 97.8 °F (36.6 °C) Temporal 91 18 93 %   01/29/25 0245 (!) 151/113 97.5 °F (36.4 °C) Temporal (!) 109 18 92 %   01/28/25 2241 (!) 172/102 (!) 97.2 °F (36.2 °C) Temporal 99 18 93 %   01/28/25 2003 (!) 160/101 (!) 96.7 °F (35.9 °C) Temporal 91 18 94 %       Physical Exam  Constitutional:       General: He is not in acute distress.  HENT:      Head: Normocephalic and atraumatic.   Eyes:      Extraocular Movements: Extraocular movements intact.      Conjunctiva/sclera: Conjunctivae normal.   Cardiovascular:      Rate and Rhythm: Normal rate.      Pulses: Normal pulses.   Pulmonary:      Effort: Pulmonary effort is normal. No respiratory distress.    Abdominal:      General: There is no distension.      Palpations: Abdomen is soft.      Tenderness: There is no abdominal tenderness.   Musculoskeletal:         General: Normal range of motion.      Cervical back: Normal range of motion.   Skin:     General: Skin is warm and dry.      Findings: Lesion (posterior R thigh area of erythema/induration (see media), tiny amount of fluctuance, draining serous fluid, minimal tenderness) present.   Neurological:      General: No focal deficit present.      Mental Status: He is alert and oriented to person, place, and time.   Psychiatric:         Mood and Affect: Mood normal.       Review of Systems   Constitutional:  Negative for chills and fever.   HENT:  Negative for ear pain and sore throat.    Eyes:  Negative for pain and visual disturbance.   Respiratory:  Positive for shortness of breath. Negative for cough.    Cardiovascular:  Positive for chest pain. Negative for palpitations.   Gastrointestinal:  Negative for abdominal pain and vomiting.   Genitourinary:  Negative for dysuria and hematuria.   Musculoskeletal:  Negative for arthralgias and back pain.   Skin:  Positive for wound (R posterior thigh). Negative for color change and rash.   Neurological:  Negative for seizures and syncope.   All other systems reviewed and are negative.      Meds: xarelto, statin, diltiazem, lisinopril, nicotine patch, PPI (p), miralax, quetiapine, sertraline, spironolactone    Recent Labs     01/27/25  0928 01/28/25  0507 01/29/25  0524   WBC 5.25 5.44  --    HGB 15.6 14.8  --     145*  --    SODIUM 140 136 139   K 2.9* 3.2* 3.8    100 101   CO2 28 27 32   BUN 5 8 10   CREATININE 0.88 0.82 0.89   GLUC 158* 98 102   CALCIUM 8.6 8.5 8.7   MG  --   --  1.7*   AST 21  --   --    ALT 24  --   --    ALKPHOS 162*  --   --    TBILI 0.72  --   --    EGFR 94 97 94     Lab Results   Component Value Date    BLOODCX No Growth After 5 Days. 11/06/2021    BLOODCX No Growth After 5 Days.  11/06/2021    URINECX No Growth <1000 cfu/mL 01/31/2016    LEUKOCYTESUR Small (A) 11/06/2021    LEUKOCYTESUR Negative 07/30/2015    NITRITE Negative 11/06/2021    NITRITE Negative 07/30/2015    GLUCOSEU Negative 11/06/2021    GLUCOSEU Negative 07/30/2015    KETONESU 15 (1+) (A) 11/06/2021    KETONESU Negative 07/30/2015    BLOODU Moderate (A) 11/06/2021    BLOODU Trace (A) 07/30/2015       Lab Results: I have personally reviewed pertinent lab results.  Imaging: I have personally reviewed pertinent reports.  EKG, Pathology, and Other Studies: I have personally reviewed pertinent reports.  All current active meds have been reviewed  Counseling / Coordination of Care: Total floor / unit time spent today 30 minutes.  Greater than 50% of total time was spent with the patient and / or family counseling and / or coordination of care.

## 2025-01-29 NOTE — DISCHARGE INSTR - OTHER ORDERS
Right posterior thigh wound: Three times weekly or more frequently for soilage. Remove prior dressing and packing. Flush with 10cc of normal saline. Pat dry. Pack wound with Maxorb Extra Ag Rope (or other Calcium Alginate Rope). Cover with Mepilex border flex dressing (or other silicone foam dressing).

## 2025-01-29 NOTE — ASSESSMENT & PLAN NOTE
- Cardiac arrest during echo exercise stress (11/5/2021): During recovery phase, patient was in severe respiratory distress and began to develop ST elevations in the inferior leads. Subsequently lost pulse upon which ACLS was initiated. CPR conducted for approximately 5 minutes and 30 seconds with EMS present upon which ROSC was achieved. Atropine and Epinephrine were each given x1 given profound bradycardia in setting of complete heart block which subsequently progressed to asystole. Patients post-ROSC rhythm was consistent with atrial fibrillation which spontaneously converted to normal sinus rhythm.  -Thought to be due to severe Prinzmetal angina due to vasospasm  Currently on Cardizem and Imdur

## 2025-01-29 NOTE — ASSESSMENT & PLAN NOTE
- History of cardioversion in 2018  - Ablation of atrial flutter (6/21/2024) by Dr. Lopez: Successful fluoroscopy plus ablation for CTI dependent atrial flutter  -[id number] 1(HTN), currently on rivaroxaban 20 mg daily  - Admitted to the hospital 6/20/2024-6/22/2024 for atrial flutter with RVR which was treated with ablation  - ECG on presentation showed sinus tach test  - Short runs of sinus tachycardia on telemetry, otherwise sinus rhythm with Hrs in  bpm   - Currently on Cardizem 300mg QD, rate controlled   - Loop recorder implant on 1/30/25

## 2025-01-29 NOTE — PROGRESS NOTES
Progress Note - Hospitalist   Name: Torey Del Castillo Jr. 59 y.o. male I MRN: [id number]  Unit/Bed#: E4 -01 I Date of Admission: 1/27/2025   Date of Service: 1/29/2025 I Hospital Day: 1    Assessment & Plan  Chest pain, atypical  Patient with atypical chest pain associated with palpitation and anxiety  Telemetry without atrial flutter, normal sinus rhythm heart rate up to 110s max  Lab Results   Component Value Date    HSTNI0 33 01/27/2025    HSTNI2 29 01/27/2025    HSTNI4 30 01/27/2025         Dyspnea on exertion  Ongoing dyspnea on exertion, suspect multifactorial in the setting of obesity, obstructive sleep apnea, current smoker, no history of COPD  CTA chest in July 2024 was normal  Chest x-ray on this admission showed no acute cardiopulmonary disease  6-minute walk his lowest oxygen saturation was 93%  Clinically is not fluid overloaded  Echo showed ejection fraction 65%, grade 1 diastolic dysfunction, trace MR, small pericardial effusion--which is stable from echo in July 2024  CT chest pending  Will refer patient to pulmonology as outpatient for PFTs, continue albuterol inhaler as needed which is home medication  Tobacco use disorder  Continue nicotine patch  Smoking cessation  Obesity, Class III, BMI 40-49.9 (morbid obesity) (HCC)  Lifestyle modifications  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[facility])  History of cardiac arrest during outpatient stress test in 2021  Patient was found to have angiographic spasm of the left main and RCA consistent with Prinzmetal angina  Remains on Cardizem 300 mg and Imdur 60 mg daily  Denies any chest pain  H/o Atrial flutter (HCC)-rate controlled  History of atrial flutter status post cardioversion in 2018 and ablation 6/21/2024  Continue Cardizem 300 mg daily  PCP helping to get Xarelto  Loop recorder will be placed tomorrow  Outpatient follow-up with EP    Obstructive sleep apnea  Continue CPAP at bedtime, reports compliance  History of cardiac arrest  Due to Prinzmetal angina,  coronary vasospasm in November 2021  Mixed hyperlipidemia  Continue statin  Abscess  Seen by wound care, has what seems like a right thigh abscess  Surgery consult for I&D  Hypertension  Continue Cardizem 300 mg daily  Continue lisinopril 40 mg daily  Spironolactone 25 mg daily was added on this admission    VTE Pharmacologic Prophylaxis: VTE Score: 2  chemical prophylaxis with Xarelto    Mobility:   Basic Mobility Inpatient Raw Score: 22  JH-HLM Goal: 7: Walk 25 feet or more  JH-HLM Achieved: 7: Walk 25 feet or more  JH-HLM Goal achieved. Continue to encourage appropriate mobility.    Patient Centered Rounds: I performed bedside rounds with nursing staff today.   Discussions with Specialists or Other Care Team Provider: Nursing, case management, cardiology    Education and Discussions with Family / Patient: Patient declined call to .     Current Length of Stay: 1 day(s)  Current Patient Status: Inpatient   Certification Statement: The patient will continue to require additional inpatient hospital stay due to loop recorder placement tomorrow  Discharge Plan: Anticipate discharge in 24-48 hrs to home.    Code Status: Level 1 - Full Code    Subjective   Patient was seen evaluated bedside.  Denies chest pain palpitation shortness of breath today.    Objective :  Temp:  [96.7 °F (35.9 °C)-97.8 °F (36.6 °C)] 97.6 °F (36.4 °C)  HR:  [] 97  BP: (132-172)/() 139/86  Resp:  [18] 18  SpO2:  [91 %-95 %] 91 %  O2 Device: None (Room air)    Body mass index is 43.76 kg/m².     Input and Output Summary (last 24 hours):   No intake or output data in the 24 hours ending 01/29/25 1508    Physical Exam  Constitutional:       General: He is not in acute distress.     Appearance: He is obese.   HENT:      Head: Atraumatic.   Cardiovascular:      Rate and Rhythm: Normal rate and regular rhythm.      Heart sounds: No murmur heard.  Pulmonary:      Effort: Pulmonary effort is normal. No respiratory distress.       Breath sounds: Normal breath sounds. No wheezing.   Abdominal:      General: Bowel sounds are normal. There is no distension.      Palpations: Abdomen is soft.      Tenderness: There is no abdominal tenderness.   Musculoskeletal:         General: No swelling.      Cervical back: Neck supple.   Skin:     Comments: Posterior right thigh lesion   Neurological:      General: No focal deficit present.      Mental Status: He is alert.   Psychiatric:         Mood and Affect: Mood normal.         Lines/Drains:              Lab Results: I have reviewed the following results:   Results from last 7 days   Lab Units 01/28/25  0507 01/27/25  0928   WBC Thousand/uL 5.44 5.25   HEMOGLOBIN g/dL 14.8 15.6   HEMATOCRIT % 43.7 46.1   PLATELETS Thousands/uL 145* 150   SEGS PCT %  --  74   LYMPHO PCT %  --  19   MONO PCT %  --  7   EOS PCT %  --  0     Results from last 7 days   Lab Units 01/29/25  0524 01/28/25  0507 01/27/25  0928   SODIUM mmol/L 139   < > 140   POTASSIUM mmol/L 3.8   < > 2.9*   CHLORIDE mmol/L 101   < > 100   CO2 mmol/L 32   < > 28   BUN mg/dL 10   < > 5   CREATININE mg/dL 0.89   < > 0.88   ANION GAP mmol/L 6   < > 12   CALCIUM mg/dL 8.7   < > 8.6   ALBUMIN g/dL  --   --  3.9   TOTAL BILIRUBIN mg/dL  --   --  0.72   ALK PHOS U/L  --   --  162*   ALT U/L  --   --  24   AST U/L  --   --  21   GLUCOSE RANDOM mg/dL 102   < > 158*    < > = values in this interval not displayed.         Results from last 7 days   Lab Units 01/27/25  1537   POC GLUCOSE mg/dl 120     Results from last 7 days   Lab Units 01/27/25  1508   HEMOGLOBIN A1C % 5.1           Recent Cultures (last 7 days):         Imaging Results Review: I reviewed radiology reports from this admission including: Echocardiogram.  Other Study Results Review: EKG was reviewed.     Last 24 Hours Medication List:     Current Facility-Administered Medications:     acetaminophen (TYLENOL) tablet 650 mg, Q6H PRN    albuterol (PROVENTIL HFA,VENTOLIN HFA) inhaler 2 puff, Q6H  PRN    atorvastatin (LIPITOR) tablet 40 mg, Daily    chlorhexidine (PERIDEX) 0.12 % oral rinse 15 mL, Once    cyanocobalamin (VITAMIN B-12) tablet 1,000 mcg, Daily    diltiazem (CARDIZEM CD) 24 hr capsule 300 mg, QAM    docusate sodium (COLACE) capsule 100 mg, BID    folic acid (FOLVITE) tablet 400 mcg, Daily    HYDROmorphone HCl (DILAUDID) injection 0.2 mg, Q2H PRN    isosorbide mononitrate (IMDUR) 24 hr tablet 60 mg, Daily    lisinopril (ZESTRIL) tablet 40 mg, Daily    LORazepam (ATIVAN) tablet 1 mg, BID PRN    naloxone (NARCAN) 0.04 mg/mL syringe 0.04 mg, Q1MIN PRN    nicotine (NICODERM CQ) 7 mg/24hr TD 24 hr patch 1 patch, Q24H    ondansetron (ZOFRAN) injection 4 mg, Q4H PRN    oxyCODONE (ROXICODONE) split tablet 2.5 mg, Q4H PRN **OR** oxyCODONE (ROXICODONE) IR tablet 5 mg, Q4H PRN    pantoprazole (PROTONIX) EC tablet 40 mg, Early Morning    polyethylene glycol (MIRALAX) packet 17 g, Daily    QUEtiapine (SEROquel) tablet 300 mg, HS    rivaroxaban (XARELTO) tablet 20 mg, Daily With Breakfast    sertraline (ZOLOFT) tablet 50 mg, HS    spironolactone (ALDACTONE) tablet 25 mg, Daily    Administrative Statements   Today, Patient Was Seen By: Breana Mancia MD      **Please Note: This note may have been constructed using a voice recognition system.**

## 2025-01-29 NOTE — UTILIZATION REVIEW
Initial Clinical Review    OBSERVATION  1/27 1247 CHANGED TO INPATIENT 1/28 1636     Admission: Date/Time/Statement:   Admission Orders (From admission, onward)       Ordered        01/28/25 1636  INPATIENT ADMISSION  Once            01/27/25 1247  Place in Observation  Once                          Orders Placed This Encounter   Procedures    Place in Observation     Standing Status:   Standing     Number of Occurrences:   1     Level of Care:   Med Surg [16]     Bed Type:   [place] [4]    INPATIENT ADMISSION     Standing Status:   Standing     Number of Occurrences:   1     Level of Care:   Med Surg [16]              tele     Estimated length of stay:   More than 2 Midnights     Certification:   I certify that inpatient services are medically necessary for this patient for a duration of greater than two midnights. See H&P and MD Progress Notes for additional information about the patient's course of treatment.     ED Arrival Information       Expected   -    Arrival   1/27/2025 09:02    Acuity   Emergent              Means of arrival   Stretcher    Escorted by   [facility] ([facility])    Service   Hospitalist    Admission type   Emergency              Arrival complaint   palpitations             Chief Complaint   Patient presents with    Shortness of Breath     Patient arrives via EMS from home, reports SOB and palpitations ongoing for awhile-months, worst with exertion, has cardiology appt in February.       Initial Presentation: 59 y.o. male  presents to ED from home via EMS due to worsening MCLEAN, atypical chest pain, PMH Afib/flutter, angina, cardiac arrest during stress testing.  Labs find hypokalemia, K 2.9, Exam notes tachycardia, tachycardia, regular cardiac rhythm. Normal breath sounds.   spO2 91% . Admitted as OBS to med surg for chest pain, Prinzmetal variant angina, PRAKASH, h/o Aflutter Plan trend cardiac enzymes, monitor telemetry CPAP at  ,  with Xarelto, continue imdur, Cardizem, smoking  cessation   Changed to INPATIENT 1/28 @ 1636 for continued workup of dyspnea  Exam notes minimal expiratory wheezing   telemetry shows NSR, HR up to  110's max  CXR shows no acute disease, 6 minute walk lowest O2 sat 93%, ECHO is pending Plan to refer to pulmonology as outpatient for PFTs, continue albuterol inhaler as needed. Pt reports he cannot afford Xarelto , working with CM, Will need to start Coumadin AC if Xarelto is not an option.  K is  3.2 today, up from 2.9 1/27     Anticipated Length of Stay Certification Statement: The patient will continue to require additional inpatient hospital stay due to workup for dyspnea on exertion     Date: 1/29    Day 2:      Noted to have  several elevated BP  readings evening of  1/28, and overnight  1/29. Remains on telemetry  - ST  Lungs decreased breath sounds, expiratory wheeze. AM labs show Mg is  1.7.  Magnesium sulfate 2 g/50 ml ordered 1/29 0745  Since change to inpatient pt has received hydromorphone 02 mg IV x 2 doses.  CT chest ordered to adelina MCLEAN  1/29     Wound care consult  1/29 Right posterior thigh--presents as abscess with two draining openings within a fluctuant hyperpigmented demarcated area.  There is fluctuance and lateral edge induration to the hyperpigmented area.  Patient denies pain during care and palpation.  Only able to express small amount of milky serous fluid at this time.   Recommendation surgical consult for possible I & D.     ED Treatment-Medication Administration from 01/27/2025 0902 to 01/27/2025 2128         Date/Time Order Dose Route Action     01/27/2025 0927 LORazepam (ATIVAN) tablet 1 mg 1 mg Oral Given     01/27/2025 1016 potassium chloride (Klor-Con M20) CR tablet 40 mEq 40 mEq Oral Given     01/27/2025 1238 albuterol inhalation solution 5 mg 5 mg Nebulization Given     01/27/2025 1237 ipratropium (ATROVENT) 0.02 % inhalation solution 0.5 mg 0.5 mg Nebulization Given     01/27/2025 1635 folic acid (FOLVITE) tablet 400 mcg 400 mcg  Oral Given     01/27/2025 1509 atorvastatin (LIPITOR) tablet 40 mg 40 mg Oral Given     01/27/2025 1509 isosorbide mononitrate (IMDUR) 24 hr tablet 60 mg 60 mg Oral Given     01/27/2025 1554 LORazepam (ATIVAN) tablet 1 mg 1 mg Oral Given     01/27/2025 1509 cyanocobalamin (VITAMIN B-12) tablet 1,000 mcg 1,000 mcg Oral Given     01/27/2025 1733 docusate sodium (COLACE) capsule 100 mg 100 mg Oral Given     01/27/2025 1513 nicotine (NICODERM CQ) 7 mg/24hr TD 24 hr patch 1 patch 1 patch Transdermal Medication Applied     01/27/2025 1558 sodium chloride 0.9 % infusion 100 mL/hr Intravenous New Bag     01/27/2025 1927 oxyCODONE (ROXICODONE) split tablet 2.5 mg -- Oral See Alternative     01/27/2025 1927 oxyCODONE (ROXICODONE) IR tablet 5 mg 5 mg Oral Given            Scheduled Medications:  atorvastatin, 40 mg, Oral, Daily  vitamin B-12, 1,000 mcg, Oral, Daily  diltiazem, 300 mg, Oral, QAM  docusate sodium, 100 mg, Oral, BID  folic acid, 400 mcg, Oral, Daily  isosorbide mononitrate, 60 mg, Oral, Daily  lisinopril, 40 mg, Oral, Daily  magnesium sulfate, 2 g, Intravenous, Once  nicotine, 1 patch, Transdermal, Q24H  pantoprazole, 40 mg, Oral, Early Morning  polyethylene glycol, 17 g, Oral, Daily  QUEtiapine, 300 mg, Oral, HS  rivaroxaban, 20 mg, Oral, Daily With Breakfast  sertraline, 50 mg, Oral, HS  spironolactone, 25 mg, Oral, Daily      Continuous IV Infusions:     PRN Meds:    acetaminophen, 650 mg, Oral, Q6H PRN  albuterol, 2 puff, Inhalation, Q6H PRN  HYDROmorphone, 0.2 mg, Intravenous, Q2H PRN 1/28 2045, 1/29 0236   LORazepam, 1 mg, Oral, BID PRN  1/29 0230   naloxone, 0.04 mg, Intravenous, Q1MIN PRN  ondansetron, 4 mg, Intravenous, Q4H PRN  oxyCODONE, 2.5 mg, Oral, Q4H PRN   Or  oxyCODONE, 5 mg, Oral, Q4H PRN  1/28 1725, 1/29 0007       ED Triage Vitals [01/27/25 0907]   Temperature Pulse Respirations Blood Pressure SpO2 Pain Score   97.7 °F (36.5 °C) (!) 113 (!) 26 110/57 95 % 6     Weight (last 2 days)        Date/Time Weight    01/28/25 1321 138 (305)    01/27/25 2134 139 (305.78)    01/27/25 0909 138 (304.24)            Vital Signs (last 3 days)       Date/Time Temp Pulse Resp BP MAP (mmHg) SpO2 O2 Device Patient Position - Orthostatic VS [place] Coma Scale Score Pain    01/29/25 0745 97.8 °F (36.6 °C) 91 18 132/75 98 93 % None (Room air) Lying -- --    01/29/25 0245 97.5 °F (36.4 °C) 109 18 151/113 124 92 % -- Lying -- --    01/29/25 0236 -- -- -- -- -- -- -- -- -- 8 01/29/25 0007 -- -- -- -- -- -- -- -- -- 8 01/28/25 2241 97.2 °F (36.2 °C) 99 18 172/102 127 93 % -- Lying -- --    01/28/25 2045 -- -- -- -- -- -- -- -- -- 8 01/28/25 2003 96.7 °F (35.9 °C) 91 18 160/101 117 94 % -- Sitting -- --    01/28/25 1910 -- -- -- -- -- -- -- -- 15 8    01/28/25 1725 -- -- -- -- -- -- -- -- -- 8 01/28/25 1525 97 °F (36.1 °C) 94 18 145/103 116 95 % None (Room air) Sitting -- --    01/28/25 1448 -- -- -- -- -- -- -- -- -- 8 01/28/25 1321 -- 100 -- 162/81 -- -- -- -- -- --    01/28/25 1114 -- -- -- -- -- -- -- -- -- 8 01/28/25 1055 97 °F (36.1 °C) 100 18 162/81 96 96 % None (Room air) Sitting -- --    01/28/25 0900 -- -- -- -- -- -- -- -- 15 --    01/28/25 0846 -- -- -- -- -- -- -- -- -- 8 01/28/25 0842 -- -- -- -- -- -- -- -- -- 8 01/28/25 0728 98.1 °F (36.7 °C) 99 18 163/95 114 94 % None (Room air) Sitting -- --    01/28/25 0701 -- -- -- -- -- -- -- -- -- 8 01/28/25 0647 -- 97 -- 142/76 101 -- -- -- -- --    01/28/25 0337 -- -- -- -- -- -- -- -- -- 9    01/28/25 0029 -- -- -- -- -- -- -- -- -- 8 01/27/25 2329 98.4 °F (36.9 °C) 98 20 174/99 -- 94 % None (Room air) Lying -- --    01/27/25 2134 97 °F (36.1 °C) 104 20 140/75 99 94 % None (Room air) Sitting -- 8 01/27/25 2130 -- -- -- -- -- -- -- -- 15 --    01/27/25 1927 -- -- -- -- -- -- -- -- -- 8 01/27/25 1920 -- -- -- -- -- -- -- -- -- 8 01/27/25 1918 -- 100 28 154/85 -- 95 % None (Room air) Sitting -- --    01/27/25 1912 -- -- -- -- -- -- --  -- 15 --    01/27/25 1729 -- 99 28 142/70 -- 97 % None (Room air) -- -- --    01/27/25 1506 -- 100 28 144/72 -- 95 % None (Room air) -- -- --    01/27/25 1231 -- -- 32 -- -- 91 % None (Room air) -- -- --    01/27/25 1202 -- 98 20 140/65 -- 94 % None (Room air) -- -- --    01/27/25 1017 -- 101 24 134/60 -- 95 % None (Room air) -- -- --    01/27/25 0907 97.7 °F (36.5 °C) 113 26 110/57 -- 95 % None (Room air) -- -- 6              Pertinent Labs/Diagnostic Test Results:   Radiology:  XR chest 2 views   ED Interpretation by Bhargav Ridley DO (01/27 1009)   Wet read - no infiltrate or effusion. Enlarged cardiac silhouette       Final Interpretation by Erna Vasquez MD (01/27 1044)      No acute cardiopulmonary disease.               Workstation performed: [id number]           Cardiology:  Echo complete w/ contrast if indicated   Final Result by Fabian Ansari DO (01/28 2018)        Left Ventricle: Left ventricular cavity size is normal. There is    moderate concentric hypertrophy. The left ventricular ejection fraction is    65% by visual estimation. Systolic function is normal. Wall motion is    normal. Diastolic function is mildly abnormal, consistent with grade I    (abnormal) relaxation.     Right Ventricle: Right ventricular cavity size is normal. Systolic    function is normal.     Aortic Valve: There is aortic valve sclerosis.     Mitral Valve: There is trace regurgitation.     Tricuspid Valve: Pulmonary artery systolic pressures cannot be    estimated due to lack of tricuspid regurgitation.     Aorta: The aortic root is normal in size. The ascending aorta is    ectatic at 3.6 cm.     Compared to report from October 26, 2023, there are no significant    changes.         ECG 12 lead   Final Result by Balwinder Reyes MD (01/27 1326)   Sinus tachycardia   Baseline artifact   Low voltage QRS   Cannot rule out Anterior infarct , age undetermined   Abnormal ECG   Confirmed by Balwinder Reyes  "([id number]) on 1/27/2025 1:26:50 PM      ECG 12 lead   Final Result by Balwinder Reyes MD (01/27 1229)   Sinus tachycardia with occasional Premature ventricular complexes   Low voltage QRS   Borderline ECG   When compared with ECG of 27-Jan-2025 09:13,   Premature ventricular complexes are now Present   Confirmed by Balwinder Reyes ([id number]) on 1/27/2025 12:29:01 PM      ECG 12 lead   Final Result by Balwinder Reyes MD (01/27 0919)   Sinus tachycardia   Cannot rule out Anterior infarct , age undetermined   Abnormal ECG   When compared with ECG of 21-Jun-2024 16:48,   Aberrant conduction is no longer Present   Confirmed by Balwinder Reyes ([id number]) on 1/27/2025 9:19:46 AM        GI:  No orders to display       Results from last 7 days   Lab Units 01/27/25  1554   SARS-COV-2  Negative     Results from last 7 days   Lab Units 01/28/25  0507 01/27/25  0928   WBC Thousand/uL 5.44 5.25   HEMOGLOBIN g/dL 14.8 15.6   HEMATOCRIT % 43.7 46.1   PLATELETS Thousands/uL 145* 150   TOTAL NEUT ABS Thousands/µL  --  3.83         Results from last 7 days   Lab Units 01/29/25  0524 01/28/25  0507 01/27/25  0928   SODIUM mmol/L 139 136 140   POTASSIUM mmol/L 3.8 3.2* 2.9*   CHLORIDE mmol/L 101 100 100   CO2 mmol/L 32 27 28   ANION GAP mmol/L 6 9 12   BUN mg/dL 10 8 5   CREATININE mg/dL 0.89 0.82 0.88   EGFR ml/min/1.73sq m 94 97 94   CALCIUM mg/dL 8.7 8.5 8.6   MAGNESIUM mg/dL 1.7*  --   --      Results from last 7 days   Lab Units 01/27/25  0928   AST U/L 21   ALT U/L 24   ALK PHOS U/L 162*   TOTAL PROTEIN g/dL 6.8   ALBUMIN g/dL 3.9   TOTAL BILIRUBIN mg/dL 0.72     Results from last 7 days   Lab Units 01/27/25  1537   POC GLUCOSE mg/dl 120     Results from last 7 days   Lab Units 01/29/25  0524 01/28/25  0507 01/27/25  0928   GLUCOSE RANDOM mg/dL 102 98 158*         Results from last 7 days   Lab Units 01/27/25  1508   HEMOGLOBIN A1C % 5.1   EAG mg/dl 100     No results found for: \"BETA-HYDROXYBUTYRATE\"                 "   Results from last 7 days   Lab Units 01/27/25  1313 01/27/25  1136 01/27/25  0928   HS TNI 0HR ng/L  --   --  33   HS TNI 2HR ng/L  --  29  --    HSTNI D2 ng/L  --  -4  --    HS TNI 4HR ng/L 30  --   --    HSTNI D4 ng/L -3  --   --      Results from last 7 days   Lab Units 01/27/25  0928   D-DIMER QUANTITATIVE ug/ml FEU 0.28         Results from last 7 days   Lab Units 01/27/25  0928   TSH 3RD GENERATON uIU/mL 2.243                     Results from last 7 days   Lab Units 01/27/25  0928   BNP pg/mL 33                                         Results from last 7 days   Lab Units 01/27/25  1554   INFLUENZA A PCR  Negative   INFLUENZA B PCR  Negative   RSV PCR  Negative                                               Past Medical History:   Diagnosis Date    A-fib (HCC)     Anxiety     Asthma     Colon polyp     GERD (gastroesophageal reflux disease)     History of ileus     Hypertension     Insomnia     Kidney stone     Lumbar herniated disc     last assessed: 3/27/2015    MDD (major depressive disorder)     MI, old     Patella fracture     last assessed: 3/27/2015    Tobacco abuse      Present on Admission:   Tobacco use disorder   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HCC)   Obesity, Class III, BMI 40-49.9 (morbid obesity) (HCC)   Obstructive sleep apnea   H/o Atrial flutter (HCC)   Chest pain, atypical   Dyspnea on exertion      Admitting Diagnosis: Cardiac arrest (HCC) [I46.9]  Palpitations [R00.2]  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HCC) [I20.1]  Primary hypertension [I10]  Chest pain [R07.9]  Dyspnea [R06.00]  Prediabetes [R73.03]  Age/Sex: 59 y.o. male    Network Utilization Review Department  ATTENTION: Please call with any questions or concerns to [phone] and carefully listen to the prompts so that you are directed to the right person. All voicemails are confidential.   For Discharge needs, contact Care Management DC Support Team at [phone] opt. 2  Send all requests for admission clinical reviews, approved or denied  determinations and any other requests to dedicated fax number below belonging to the campus where the patient is receiving treatment. List of dedicated fax numbers for the Facilities:  FACILITY NAME UR FAX NUMBER   ADMISSION DENIALS (Administrative/Medical Necessity) [phone]   DISCHARGE SUPPORT TEAM (NETWORK) [phone]   PARENT CHILD HEALTH (Maternity/NICU/Pediatrics) [phone]   [facility] [phone]   [facility] [phone]   [facility] [phone]   [facility] [phone]   [facility] [phone]   [facility] [phone]   [facility] [phone]   [facility] [phone]   [facility] [phone]   [facility] [phone]   [facility] [phone]   [facility] [phone]

## 2025-01-29 NOTE — ASSESSMENT & PLAN NOTE
-TTE (7/10/2024): LVEF 55 to 60% no wall motion abnormalities, normal RV size and function, small circumferential anterior and posterior pericardial effusion.  - Endorses chest pain and palpitation when he is anxious.   - On Imdur 60mg qd and Cardizem 300mg QD   -TTE (1/28/2025): Moderate concentric hypertrophy, LVEF 65%, grade 1 diastolic dysfunction, no regional wall motion abnormalities reported

## 2025-01-29 NOTE — ASSESSMENT & PLAN NOTE
Continue Cardizem 300 mg daily  Continue lisinopril 40 mg daily  Spironolactone 25 mg daily was added on this admission

## 2025-01-29 NOTE — ASSESSMENT & PLAN NOTE
- Presented with dyspnea on exertion, has to take several breaks walking for 1 block   - Likely multifactorial, in setting of obstructive sleep apnea, tobacco use, morbid obesity  - Abnormal chest CT with RUL cavitary lesion in 2019 due to MRSA; normal follow-up CTA in July 2024  - CXR(1/27/25): No acute cardiopulmonary disease

## 2025-01-29 NOTE — ASSESSMENT & PLAN NOTE
Ongoing dyspnea on exertion, suspect multifactorial in the setting of obesity, obstructive sleep apnea, current smoker, no history of COPD  CTA chest in July 2024 was normal  Chest x-ray on this admission showed no acute cardiopulmonary disease  6-minute walk his lowest oxygen saturation was 93%  Clinically is not fluid overloaded  Echo showed ejection fraction 65%, grade 1 diastolic dysfunction, trace MR, small pericardial effusion--which is stable from echo in July 2024  CT chest pending  Will refer patient to pulmonology as outpatient for PFTs, continue albuterol inhaler as needed which is home medication

## 2025-01-29 NOTE — PROGRESS NOTES
Progress Note - Cardiology   Name: Torey Del Castillo Jr. 59 y.o. male I MRN: [id number]  Unit/Bed#: E4 -01 I Date of Admission: 1/27/2025   Date of Service: 1/29/2025 I Hospital Day: 1       Interval:   Patient was examined at bedside, has no acute complaints.  Reports that dyspnea has slightly improved.  6-minute walk test showed O2sat drops to 93% on RA. Appears euvolemic on exam. Wt remains close to baseline.     Plan:     Loop recorder implant in the AM, NPO after midnight  Continue with Cardizem and Xarelto 20mg QD   Continue with Spironolactone 25mg QD, continue with lisinopril 40 mg daily, isosorbide mononitrate 60 mg daily  Continue with albuterol inhalers as needed  CT chest read pending- grossly unchanged, small pericardial effusion is stable.    Agree with outpatient PFT and pulmonology evaluation  Monitor I/Os, K>4, Mg>2, Phos >3       Thanking for involving us in the care of your patient.   Gelacio Schneider MD  Cardiology fellow,PGY6  [facility]  Assessment & Plan  Dyspnea on exertion  - Presented with dyspnea on exertion, has to take several breaks walking for 1 block   - Likely multifactorial, in setting of obstructive sleep apnea, tobacco use, morbid obesity  - Abnormal chest CT with RUL cavitary lesion in 2019 due to MRSA; normal follow-up CTA in July 2024  - CXR(1/27/25): No acute cardiopulmonary disease     Chest pain, atypical  -TTE (7/10/2024): LVEF 55 to 60% no wall motion abnormalities, normal RV size and function, small circumferential anterior and posterior pericardial effusion.  - Endorses chest pain and palpitation when he is anxious.   - On Imdur 60mg qd and Cardizem 300mg QD   -TTE (1/28/2025): Moderate concentric hypertrophy, LVEF 65%, grade 1 diastolic dysfunction, no regional wall motion abnormalities reported    H/o Atrial flutter (HCC)-rate controlled  - History of cardioversion in 2018  - Ablation of atrial flutter (6/21/2024) by Dr. Lopez: Successful  fluoroscopy plus ablation for CTI dependent atrial flutter  -[id number] 1(HTN), currently on rivaroxaban 20 mg daily  - Admitted to the hospital 6/20/2024-6/22/2024 for atrial flutter with RVR which was treated with ablation  - ECG on presentation showed sinus tach test  - Short runs of sinus tachycardia on telemetry, otherwise sinus rhythm with Hrs in  bpm   - Currently on Cardizem 300mg QD, rate controlled   - Loop recorder implant on 1/30/25   History of cardiac arrest  - Cardiac arrest during echo exercise stress (11/5/2021): During recovery phase, patient was in severe respiratory distress and began to develop ST elevations in the inferior leads. Subsequently lost pulse upon which ACLS was initiated. CPR conducted for approximately 5 minutes and 30 seconds with EMS present upon which ROSC was achieved. Atropine and Epinephrine were each given x1 given profound bradycardia in setting of complete heart block which subsequently progressed to asystole. Patients post-ROSC rhythm was consistent with atrial fibrillation which spontaneously converted to normal sinus rhythm.  -Thought to be due to severe Prinzmetal angina due to vasospasm  Currently on Cardizem and Imdur  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HCC)  -Cardiac cath post cardiac arrest showed evidence of Prinzmetal angina with vasospasm  -Currently on Cardizem and Imdur    Tobacco use disorder  - Current use, 1/2 PPD   Mixed hyperlipidemia  -Lovastatin 40 mg  Obesity, Class III, BMI 40-49.9 (morbid obesity) (HCC)    Obstructive sleep apnea  - No CPAP use at night         Objective :  Temp:  [96.7 °F (35.9 °C)-97.8 °F (36.6 °C)] 97.6 °F (36.4 °C)  HR:  [] 97  BP: (132-172)/() 139/86  Resp:  [18] 18  SpO2:  [91 %-95 %] 91 %  O2 Device: None (Room air)  Orthostatic Blood Pressures      Flowsheet Row Most Recent Value   Blood Pressure 139/86 filed at 01/29/2025 1025   Patient Position - Orthostatic VS Sitting filed at 01/29/2025 1025          First  Weight: Weight - Scale: (!) 138 kg (304 lb 3.8 oz) (01/27/25 0909)  Vitals:    01/27/25 2134 01/28/25 1321   Weight: (!) 139 kg (305 lb 12.5 oz) (!) 138 kg (305 lb)     Physical Exam  GEN: Torey Del Castillo Jr. appears well, alert and oriented x 3, pleasant and cooperative   NECK: No JVD or carotid bruits   HEART: Regular rhythm, normal rate, normal S1 and S2, no murmurs, clicks, gallops or rubs   LUNGS: Rhonchis and wheezing heard posteriorly,L>R  ABDOMEN:  Normoactive bowel sounds, soft, no tenderness, no distention  EXTREMITIES: peripheral pulses palpable; no edema      Lab Results: I have reviewed the following results:CBC/BMP:   .     01/29/25  0524   SODIUM 139   K 3.8      CO2 32   BUN 10   CREATININE 0.89   GLUC 102   MG 1.7*    , Creatinine Clearance: Estimated Creatinine Clearance: 126.7 mL/min (by C-G formula based on SCr of 0.89 mg/dL)., LFTs: No new results in last 24 hours.   Results from last 7 days   Lab Units 01/28/25  0507 01/27/25  0928   WBC Thousand/uL 5.44 5.25   HEMOGLOBIN g/dL 14.8 15.6   HEMATOCRIT % 43.7 46.1   PLATELETS Thousands/uL 145* 150     Results from last 7 days   Lab Units 01/29/25  0524 01/28/25  0507 01/27/25  0928   POTASSIUM mmol/L 3.8 3.2* 2.9*   CHLORIDE mmol/L 101 100 100   CO2 mmol/L 32 27 28   BUN mg/dL 10 8 5   CREATININE mg/dL 0.89 0.82 0.88   CALCIUM mg/dL 8.7 8.5 8.6         Lab Results   Component Value Date    HGBA1C 5.1 01/27/2025     Lab Results   Component Value Date    CKTOTAL 6,934 (H) 11/07/2021    CKMB 38.7 (H) 11/07/2021    CKMBINDEX <1.0 11/07/2021    TROPONINI 0.03 11/05/2021       Imaging Results Review: No pertinent imaging studies reviewed.  Other Study Results Review: No additional pertinent studies reviewed.

## 2025-01-29 NOTE — PLAN OF CARE
Problem: PAIN - ADULT  Goal: Verbalizes/displays adequate comfort level or baseline comfort level  Description: Interventions:  - Encourage patient to monitor pain and request assistance  - Assess pain using appropriate pain scale  - Administer analgesics based on type and severity of pain and evaluate response  - Implement non-pharmacological measures as appropriate and evaluate response  - Consider cultural and social influences on pain and pain management  - Notify physician/advanced practitioner if interventions unsuccessful or patient reports new pain  Outcome: Progressing     Problem: INFECTION - ADULT  Goal: Absence or prevention of progression during hospitalization  Description: INTERVENTIONS:  - Assess and monitor for signs and symptoms of infection  - Monitor lab/diagnostic results  - Monitor all insertion sites, i.e. indwelling lines, tubes, and drains  - Monitor endotracheal if appropriate and nasal secretions for changes in amount and color  - [place] appropriate cooling/warming therapies per order  - Administer medications as ordered  - Instruct and encourage patient and family to use good hand hygiene technique  - Identify and instruct in appropriate isolation precautions for identified infection/condition  Outcome: Progressing  Goal: Absence of fever/infection during neutropenic period  Description: INTERVENTIONS:  - Monitor WBC    Outcome: Progressing     Problem: SAFETY ADULT  Goal: Patient will remain free of falls  Description: INTERVENTIONS:  - Educate patient/family on patient safety including physical limitations  - Instruct patient to call for assistance with activity   - Consult OT/PT to assist with strengthening/mobility   - Keep Call bell within reach  - Keep bed low and locked with side rails adjusted as appropriate  - Keep care items and personal belongings within reach  - Initiate and maintain comfort rounds  - Make Fall Risk Sign visible to staff  - Offer Toileting every 2 Hours,  in advance of need  - Initiate/Maintain bed alarm  - Obtain necessary fall risk management equipment:   - Apply yellow socks and bracelet for high fall risk patients  - Consider moving patient to room near nurses station  Outcome: Progressing  Goal: Maintain or return to baseline ADL function  Description: INTERVENTIONS:  -  Assess patient's ability to carry out ADLs; assess patient's baseline for ADL function and identify physical deficits which impact ability to perform ADLs (bathing, care of mouth/teeth, toileting, grooming, dressing, etc.)  - Assess/evaluate cause of self-care deficits   - Assess range of motion  - Assess patient's mobility; develop plan if impaired  - Assess patient's need for assistive devices and provide as appropriate  - Encourage maximum independence but intervene and supervise when necessary  - Involve family in performance of ADLs  - Assess for home care needs following discharge   - Consider OT consult to assist with ADL evaluation and planning for discharge  - Provide patient education as appropriate  Outcome: Progressing  Goal: Maintains/Returns to pre admission functional level  Description: INTERVENTIONS:  - Perform AM-PAC 6 Click Basic Mobility/ Daily Activity assessment daily.  - Set and communicate daily mobility goal to care team and patient/family/caregiver.   - Collaborate with rehabilitation services on mobility goals if consulted  - Perform Range of Motion 3 times a day.  - Reposition patient every 3 hours.  - Dangle patient 3 times a day  - Stand patient 3 times a day  - Ambulate patient 3 times a day  - Out of bed to chair 3 times a day   - Out of bed for meals 3 times a day  - Out of bed for toileting  - Record patient progress and toleration of activity level   Outcome: Progressing     Problem: DISCHARGE PLANNING  Goal: Discharge to home or other facility with appropriate resources  Description: INTERVENTIONS:  - Identify barriers to discharge w/patient and caregiver  -  Arrange for needed discharge resources and transportation as appropriate  - Identify discharge learning needs (meds, wound care, etc.)  - Arrange for interpretive services to assist at discharge as needed  - Refer to Case Management Department for coordinating discharge planning if the patient needs post-hospital services based on physician/advanced practitioner order or complex needs related to functional status, cognitive ability, or social support system  Outcome: Progressing     Problem: Knowledge Deficit  Goal: Patient/family/caregiver demonstrates understanding of disease process, treatment plan, medications, and discharge instructions  Description: Complete learning assessment and assess knowledge base.  Interventions:  - Provide teaching at level of understanding  - Provide teaching via preferred learning methods  Outcome: Progressing     Problem: CARDIOVASCULAR - ADULT  Goal: Absence of cardiac dysrhythmias or at baseline rhythm  Description: INTERVENTIONS:  - Continuous cardiac monitoring, vital signs, obtain 12 lead EKG if ordered  - Administer antiarrhythmic and heart rate control medications as ordered  - Monitor electrolytes and administer replacement therapy as ordered  Outcome: Progressing     Problem: RESPIRATORY - ADULT  Goal: Achieves optimal ventilation and oxygenation  Description: INTERVENTIONS:  - Assess for changes in respiratory status  - Assess for changes in mentation and behavior  - Position to facilitate oxygenation and minimize respiratory effort  - Oxygen administered by appropriate delivery if ordered  - Initiate smoking cessation education as indicated  - Encourage broncho-pulmonary hygiene including cough, deep breathe, Incentive Spirometry  - Assess the need for suctioning and aspirate as needed  - Assess and instruct to report SOB or any respiratory difficulty  - Respiratory Therapy support as indicated  Outcome: Progressing     Problem: SKIN/TISSUE INTEGRITY - ADULT  Goal: Skin  Integrity remains intact(Skin Breakdown Prevention)  Description: Assess:  -Perform Marcos assessment every   -Clean and moisturize skin every   -Inspect skin when repositioning, toileting, and assisting with ADLS  -Assess under medical devices such as  every   -Assess extremities for adequate circulation and sensation     Bed Management:  -Have minimal linens on bed & keep smooth, unwrinkled  -Change linens as needed when moist or perspiring  -Avoid sitting or lying in one position for more than  hours while in bed  -Keep HOB at degrees     Toileting:  -Offer bedside commode  -Assess for incontinence every   -Use incontinent care products after each incontinent episode such as     Activity:  -Mobilize patient  times a day  -Encourage activity and walks on unit  -Encourage or provide ROM exercises   -Turn and reposition patient every  Hours  -Use appropriate equipment to lift or move patient in bed  -Instruct/ Assist with weight shifting every  when out of bed in chair  -Consider limitation of chair time  hour intervals    Skin Care:  -Avoid use of baby powder, tape, friction and shearing, hot water or constrictive clothing  -Relieve pressure over bony prominences using   -Do not massage red bony areas    Next Steps:  -Teach patient strategies to minimize risks such as    -Consider consults to  interdisciplinary teams such as   Outcome: Progressing  Goal: Incision(s), wounds(s) or drain site(s) healing without S/S of infection  Description: INTERVENTIONS  - Assess and document dressing, incision, wound bed, drain sites and surrounding tissue  - Provide patient and family education  - Perform skin care/dressing changes every  Outcome: Progressing  Goal: Pressure injury heals and does not worsen  Description: Interventions:  - Implement low air loss mattress or specialty surface (Criteria met)  - Apply silicone foam dressing  - Instruct/assist with weight shifting every  minutes when in chair   - Limit chair time to   hour intervals  - Use special pressure reducing interventions such as  when in chair   - Apply fecal or urinary incontinence containment device   - Perform passive or active ROM every   - Turn and reposition patient & offload bony prominences every  hours   - Utilize friction reducing device or surface for transfers   - Consider consults to  interdisciplinary teams such as   - Use incontinent care products after each incontinent episode such as   - Consider nutrition services referral as needed  Outcome: Progressing     Problem: MUSCULOSKELETAL - ADULT  Goal: Maintain or return mobility to safest level of function  Description: INTERVENTIONS:  - Assess patient's ability to carry out ADLs; assess patient's baseline for ADL function and identify physical deficits which impact ability to perform ADLs (bathing, care of mouth/teeth, toileting, grooming, dressing, etc.)  - Assess/evaluate cause of self-care deficits   - Assess range of motion  - Assess patient's mobility  - Assess patient's need for assistive devices and provide as appropriate  - Encourage maximum independence but intervene and supervise when necessary  - Involve family in performance of ADLs  - Assess for home care needs following discharge   - Consider OT consult to assist with ADL evaluation and planning for discharge  - Provide patient education as appropriate  Outcome: Progressing  Goal: Maintain proper alignment of affected body part  Description: INTERVENTIONS:  - Support, maintain and protect limb and body alignment  - Provide patient/ family with appropriate education  Outcome: Progressing

## 2025-01-29 NOTE — PLAN OF CARE
Problem: PAIN - ADULT  Goal: Verbalizes/displays adequate comfort level or baseline comfort level  Description: Interventions:  - Encourage patient to monitor pain and request assistance  - Assess pain using appropriate pain scale  - Administer analgesics based on type and severity of pain and evaluate response  - Implement non-pharmacological measures as appropriate and evaluate response  - Consider cultural and social influences on pain and pain management  - Notify physician/advanced practitioner if interventions unsuccessful or patient reports new pain  Outcome: Progressing     Problem: INFECTION - ADULT  Goal: Absence or prevention of progression during hospitalization  Description: INTERVENTIONS:  - Assess and monitor for signs and symptoms of infection  - Monitor lab/diagnostic results  - Monitor all insertion sites, i.e. indwelling lines, tubes, and drains  - Monitor endotracheal if appropriate and nasal secretions for changes in amount and color  - [place] appropriate cooling/warming therapies per order  - Administer medications as ordered  - Instruct and encourage patient and family to use good hand hygiene technique  - Identify and instruct in appropriate isolation precautions for identified infection/condition  Outcome: Progressing  Goal: Absence of fever/infection during neutropenic period  Description: INTERVENTIONS:  - Monitor WBC    Outcome: Progressing     Problem: SAFETY ADULT  Goal: Patient will remain free of falls  Description: INTERVENTIONS:  - Educate patient/family on patient safety including physical limitations  - Instruct patient to call for assistance with activity   - Consult OT/PT to assist with strengthening/mobility   - Keep Call bell within reach  - Keep bed low and locked with side rails adjusted as appropriate  - Keep care items and personal belongings within reach  - Initiate and maintain comfort rounds  - Make Fall Risk Sign visible to staff  - Offer Toileting every 2 Hours,  in advance of need  - Initiate/Maintain bed alarm  - Obtain necessary fall risk management equipment: In place   - Apply yellow socks and bracelet for high fall risk patients  - Consider moving patient to room near nurses station  Outcome: Progressing  Goal: Maintain or return to baseline ADL function  Description: INTERVENTIONS:  -  Assess patient's ability to carry out ADLs; assess patient's baseline for ADL function and identify physical deficits which impact ability to perform ADLs (bathing, care of mouth/teeth, toileting, grooming, dressing, etc.)  - Assess/evaluate cause of self-care deficits   - Assess range of motion  - Assess patient's mobility; develop plan if impaired  - Assess patient's need for assistive devices and provide as appropriate  - Encourage maximum independence but intervene and supervise when necessary  - Involve family in performance of ADLs  - Assess for home care needs following discharge   - Consider OT consult to assist with ADL evaluation and planning for discharge  - Provide patient education as appropriate  Outcome: Progressing  Goal: Maintains/Returns to pre admission functional level  Description: INTERVENTIONS:  - Perform AM-PAC 6 Click Basic Mobility/ Daily Activity assessment daily.  - Set and communicate daily mobility goal to care team and patient/family/caregiver.   - Collaborate with rehabilitation services on mobility goals if consulted  - Perform Range of Motion 3 times a day.  - Reposition patient every 2 hours.  - Dangle patient 3 times a day  - Stand patient 3 times a day  - Ambulate patient 3 times a day  - Out of bed to chair 3 times a day   - Out of bed for meals 3 times a day  - Out of bed for toileting  - Record patient progress and toleration of activity level   Outcome: Progressing     Problem: DISCHARGE PLANNING  Goal: Discharge to home or other facility with appropriate resources  Description: INTERVENTIONS:  - Identify barriers to discharge w/patient and  caregiver  - Arrange for needed discharge resources and transportation as appropriate  - Identify discharge learning needs (meds, wound care, etc.)  - Arrange for interpretive services to assist at discharge as needed  - Refer to Case Management Department for coordinating discharge planning if the patient needs post-hospital services based on physician/advanced practitioner order or complex needs related to functional status, cognitive ability, or social support system  Outcome: Progressing     Problem: Knowledge Deficit  Goal: Patient/family/caregiver demonstrates understanding of disease process, treatment plan, medications, and discharge instructions  Description: Complete learning assessment and assess knowledge base.  Interventions:  - Provide teaching at level of understanding  - Provide teaching via preferred learning methods  Outcome: Progressing     Problem: CARDIOVASCULAR - ADULT  Goal: Absence of cardiac dysrhythmias or at baseline rhythm  Description: INTERVENTIONS:  - Continuous cardiac monitoring, vital signs, obtain 12 lead EKG if ordered  - Administer antiarrhythmic and heart rate control medications as ordered  - Monitor electrolytes and administer replacement therapy as ordered  Outcome: Progressing     Problem: RESPIRATORY - ADULT  Goal: Achieves optimal ventilation and oxygenation  Description: INTERVENTIONS:  - Assess for changes in respiratory status  - Assess for changes in mentation and behavior  - Position to facilitate oxygenation and minimize respiratory effort  - Oxygen administered by appropriate delivery if ordered  - Initiate smoking cessation education as indicated  - Encourage broncho-pulmonary hygiene including cough, deep breathe, Incentive Spirometry  - Assess the need for suctioning and aspirate as needed  - Assess and instruct to report SOB or any respiratory difficulty  - Respiratory Therapy support as indicated  Outcome: Progressing     Problem: SKIN/TISSUE INTEGRITY -  ADULT  Goal: Skin Integrity remains intact(Skin Breakdown Prevention)  Description: Assess:  -Perform Marcos assessment every shift   -Clean and moisturize skin every 2 hours   -Inspect skin when repositioning, toileting, and assisting with ADLS  -Assess under medical devices such as pillows  every 2 hours   -Assess extremities for adequate circulation and sensation     Bed Management:  -Have minimal linens on bed & keep smooth, unwrinkled  -Change linens as needed when moist or perspiring  -Avoid sitting or lying in one position for more than 2 hours while in bed  -Keep HOB at 30 degrees     Toileting:  -Offer bedside commode  -Assess for incontinence every 2 hours   -Use incontinent care products after each incontinent episode such as barrier cream     Activity:  -Mobilize patient 3 times a day  -Encourage activity and walks on unit  -Encourage or provide ROM exercises   -Turn and reposition patient every 2 Hours  -Use appropriate equipment to lift or move patient in bed  -Instruct/ Assist with weight shifting every 2 hours  when out of bed in chair  -Consider limitation of chair time to 2  hour intervals    Skin Care:  -Avoid use of baby powder, tape, friction and shearing, hot water or constrictive clothing  -Relieve pressure over bony prominences using pillows   -Do not massage red bony areas    Next Steps:  -Teach patient strategies to minimize risks such as pressure injuries    -Consider consults to  interdisciplinary teams such as wound care   Outcome: Progressing  Goal: Incision(s), wounds(s) or drain site(s) healing without S/S of infection  Description: INTERVENTIONS  - Assess and document dressing, incision, wound bed, drain sites and surrounding tissue  - Provide patient and family education  - Perform skin care/dressing changes based on order or PRN   Outcome: Progressing  Goal: Pressure injury heals and does not worsen  Description: Interventions:  - Implement low air loss mattress or specialty  surface (Criteria met)  - Apply silicone foam dressing  - Apply fecal or urinary incontinence containment device   - Turn and reposition patient & offload bony prominences every 2 hours   - Utilize friction reducing device or surface for transfers   - Consider nutrition services referral as needed  Outcome: Progressing     Problem: MUSCULOSKELETAL - ADULT  Goal: Maintain or return mobility to safest level of function  Description: INTERVENTIONS:  - Assess patient's ability to carry out ADLs; assess patient's baseline for ADL function and identify physical deficits which impact ability to perform ADLs (bathing, care of mouth/teeth, toileting, grooming, dressing, etc.)  - Assess/evaluate cause of self-care deficits   - Assess range of motion  - Assess patient's mobility  - Assess patient's need for assistive devices and provide as appropriate  - Encourage maximum independence but intervene and supervise when necessary  - Involve family in performance of ADLs  - Assess for home care needs following discharge   - Consider OT consult to assist with ADL evaluation and planning for discharge  - Provide patient education as appropriate  Outcome: Progressing  Goal: Maintain proper alignment of affected body part  Description: INTERVENTIONS:  - Support, maintain and protect limb and body alignment  - Provide patient/ family with appropriate education  Outcome: Progressing

## 2025-01-29 NOTE — PROCEDURES
"Incision and drain    Date/Time: 1/29/2025 3:55 PM    Performed by: Brenton English MD  Authorized by: Brenton English MD  Universal Protocol:  Consent: Verbal consent obtained.  Risks and benefits: risks, benefits and alternatives were discussed  Consent given by: patient  Time out: Immediately prior to procedure a \"time out\" was called to verify the correct patient, procedure, equipment, support staff and site/side marked as required.  Timeout called at: 1/29/2025 3:50 PM.  Patient identity confirmed: verbally with patient, arm band, provided demographic data and hospital-assigned identification number    Patient location:  Bedside  Location:     Type:  Abscess    Location:  Lower extremity    Lower extremity location:  R leg  Pre-procedure details:     Skin preparation:  Betadine  Anesthesia (see MAR for exact dosages):     Anesthesia method:  Local infiltration    Local anesthetic:  Lidocaine 1% w/o epi  Procedure details:     Complexity:  Simple    Needle aspiration: no      Incision types:  Cruciate    Scalpel blade:  11    Approach:  Open    Incision depth:  Subcutaneous    Wound management:  Probed and deloculated and irrigated with saline    Irrigation with saline:  20cc    Drainage:  Bloody    Drainage amount:  Moderate    Wound treatment:  Wound left open and packing placed    Packing materials:  1/2 in iodoform gauze  Post-procedure details:     Patient tolerance of procedure:  Tolerated well, no immediate complications        Brenton English MD  General Surgery   01/29/25      "

## 2025-01-29 NOTE — WOUND OSTOMY CARE
"Consult Note - Wound   Torey Del Castillo Jr. 59 y.o. male MRN: [id number]  Unit/Bed#: E4 -01 Encounter: [id number]      History and Present Illness:  59 year old male presented to the hospital with shortness of breath and palpitations.  Patient's history significant for atrial fibrillation, HTN, tobacco use, obesity.    Assessment Findings:   Patient agreeable to assessment.  He is independent and ambulatory.  Continent of bowel and bladder.  Bilateral buttocks, sacrum, and heels intact.    Abdominal and groin folds pink and intact.  No evidence of MASD at this time.  Right posterior thigh--presents as abscess with two draining openings within a fluctuant hyperpigmented demarcated area.  There is fluctuance and lateral edge induration to the hyperpigmented area.  Patient denies pain during care and palpation.  Only able to express small amount of milky serous fluid at this time.    Patient reports the area has been present for about a month.  He \"squeezed it and heard a pop\" at one point and bloody fluid came out.  States the area just won't heal and keeps having intermittent drainage.  Patient has not followed up with any providers concerning the right posterior thigh wound.      See flowsheet for wound details.    Wound Care Plan:   1-Right posterior thigh--cleanse with normal saline, pat dry.  Apply silver alginate (melgisorb Ag) and cover with silicone bordered foam dressing.  Change dressing daily and as needed with soilage/dislodgement.    Wound care team to follow.  Plan of care reviewed with primary RN.    Dr. Mancia made aware of assessment findings and recommendation for surgical consult for possible I & D.      Wound 01/29/25 Abscess Thigh Right;Posterior (Active)   Wound Image   01/29/25 1044   Wound Description Pink;Yellow 01/29/25 1044   Cassie-wound Assessment Induration;Hyperpigmented 01/29/25 1044   Wound Length (cm) 3 cm 01/29/25 1044   Wound Width (cm) 6.5 cm 01/29/25 1044   Wound Depth (cm) " 0.1 cm 01/29/25 1044   Wound Surface Area (cm^2) 19.5 cm^2 01/29/25 1044   Wound Volume (cm^3) 1.95 cm^3 01/29/25 1044   Calculated Wound Volume (cm^3) 1.95 cm^3 01/29/25 1044   Drainage Amount Moderate 01/29/25 1044   Drainage Description Serous;Milky 01/29/25 1044   Treatments Cleansed;Irrigation with NSS 01/29/25 1044   Dressing Calcium Alginate with Silver;Foam, Silicon (eg. Allevyn, etc) 01/29/25 1044   Dressing Changed New 01/29/25 1044   Patient Tolerance Tolerated well 01/29/25 1044   Dressing Status Clean;Dry;Intact 01/29/25 1044       Bibiana Avila RN, BSN, CWON

## 2025-01-30 VITALS
HEART RATE: 106 BPM | TEMPERATURE: 98 F | WEIGHT: 305 LBS | HEIGHT: 70 IN | SYSTOLIC BLOOD PRESSURE: 145 MMHG | DIASTOLIC BLOOD PRESSURE: 88 MMHG | RESPIRATION RATE: 20 BRPM | OXYGEN SATURATION: 92 % | BODY MASS INDEX: 43.67 KG/M2

## 2025-01-30 PROCEDURE — 0JH632Z INSERTION OF MONITORING DEVICE INTO CHEST SUBCUTANEOUS TISSUE AND FASCIA, PERCUTANEOUS APPROACH: ICD-10-PCS | Performed by: INTERNAL MEDICINE

## 2025-01-30 PROCEDURE — 33285 INSJ SUBQ CAR RHYTHM MNTR: CPT | Performed by: INTERNAL MEDICINE

## 2025-01-30 PROCEDURE — C1764 EVENT RECORDER, CARDIAC: HCPCS | Performed by: INTERNAL MEDICINE

## 2025-01-30 PROCEDURE — 99239 HOSP IP/OBS DSCHRG MGMT >30: CPT | Performed by: INTERNAL MEDICINE

## 2025-01-30 PROCEDURE — 99232 SBSQ HOSP IP/OBS MODERATE 35: CPT | Performed by: INTERNAL MEDICINE

## 2025-01-30 DEVICE — MONITOR INSERTABLE CARDIAC ASSERT IQ EL+: Type: IMPLANTABLE DEVICE | Status: FUNCTIONAL

## 2025-01-30 RX ORDER — SPIRONOLACTONE 25 MG/1
25 TABLET ORAL DAILY
Qty: 30 TABLET | Refills: 0 | Status: SHIPPED | OUTPATIENT
Start: 2025-01-31

## 2025-01-30 RX ORDER — LIDOCAINE HYDROCHLORIDE 10 MG/ML
INJECTION, SOLUTION EPIDURAL; INFILTRATION; INTRACAUDAL; PERINEURAL CODE/TRAUMA/SEDATION MEDICATION
Status: DISCONTINUED | OUTPATIENT
Start: 2025-01-30 | End: 2025-01-30 | Stop reason: HOSPADM

## 2025-01-30 RX ADMIN — SPIRONOLACTONE 25 MG: 25 TABLET ORAL at 07:15

## 2025-01-30 RX ADMIN — OXYCODONE 5 MG: 5 TABLET ORAL at 11:56

## 2025-01-30 RX ADMIN — OXYCODONE 5 MG: 5 TABLET ORAL at 05:53

## 2025-01-30 RX ADMIN — CYANOCOBALAMIN TAB 500 MCG 1000 MCG: 500 TAB at 07:14

## 2025-01-30 RX ADMIN — CHLORHEXIDINE GLUCONATE 15 ML: 1.2 SOLUTION ORAL at 07:15

## 2025-01-30 RX ADMIN — PANTOPRAZOLE SODIUM 40 MG: 40 TABLET, DELAYED RELEASE ORAL at 05:53

## 2025-01-30 RX ADMIN — LORAZEPAM 1 MG: 1 TABLET ORAL at 09:11

## 2025-01-30 RX ADMIN — LISINOPRIL 40 MG: 20 TABLET ORAL at 07:15

## 2025-01-30 RX ADMIN — ALBUTEROL SULFATE 2 PUFF: 90 AEROSOL, METERED RESPIRATORY (INHALATION) at 05:53

## 2025-01-30 RX ADMIN — ISOSORBIDE MONONITRATE 60 MG: 60 TABLET, EXTENDED RELEASE ORAL at 07:15

## 2025-01-30 RX ADMIN — OXYCODONE 5 MG: 5 TABLET ORAL at 01:18

## 2025-01-30 RX ADMIN — DOCUSATE SODIUM 100 MG: 100 CAPSULE, LIQUID FILLED ORAL at 07:15

## 2025-01-30 RX ADMIN — ATORVASTATIN CALCIUM 40 MG: 40 TABLET, FILM COATED ORAL at 07:19

## 2025-01-30 RX ADMIN — RIVAROXABAN 20 MG: 20 TABLET, FILM COATED ORAL at 07:15

## 2025-01-30 RX ADMIN — HYDROMORPHONE HYDROCHLORIDE 0.2 MG: 0.2 INJECTION, SOLUTION INTRAMUSCULAR; INTRAVENOUS; SUBCUTANEOUS at 14:20

## 2025-01-30 RX ADMIN — FOLIC ACID TAB 400 MCG 400 MCG: 400 TAB at 07:15

## 2025-01-30 RX ADMIN — DILTIAZEM HYDROCHLORIDE 300 MG: 180 CAPSULE, COATED, EXTENDED RELEASE ORAL at 07:14

## 2025-01-30 RX ADMIN — HYDROMORPHONE HYDROCHLORIDE 0.2 MG: 0.2 INJECTION, SOLUTION INTRAMUSCULAR; INTRAVENOUS; SUBCUTANEOUS at 09:10

## 2025-01-30 NOTE — ASSESSMENT & PLAN NOTE
Right posterior thigh abscess 1 x 1 cm, draining serous fluid  Bedside I&D was done by surgery, moderate amount of bloody drainage  Pack, recommended packing 3 times a week, family member educated  Phone number provided for patient to call wound care center for follow-up in 1 week

## 2025-01-30 NOTE — ASSESSMENT & PLAN NOTE
History of atrial flutter status post cardioversion in 2018 and ablation 6/21/2024  Continue Cardizem 300 mg daily  PCP helping to get Xarelto  EP recommended a loop recorder on his last visit in July  Loop recorder was placed today  Outpatient follow-up with EP and cardiology

## 2025-01-30 NOTE — PROGRESS NOTES
Progress Note - Cardiology   Name: Torey Del Castillo Jr. 59 y.o. male I MRN: [id number]  Unit/Bed#: E4 -01 I Date of Admission: 1/27/2025   Date of Service: 1/30/2025 I Hospital Day: 2       Interval:   Patient was examined at bedside, has no acute complaints. No events overnight.     Plan:     Loop recorded implanted today, [employer] Assert-IQ EL+ ICM 5500  Continue with Cardizem and Xarelto 20mg QD;   Continue with Spironolactone 25mg QD, continue with lisinopril 40 mg daily, isosorbide mononitrate 60 mg daily  Continue with albuterol inhalers as needed  Agree with outpatient PFT and pulmonology evaluation  Monitor I/Os, K>4, Mg>2, Phos >3   Agree to discharge with close outpatient follow up.       Thanking for involving us in the care of your patient.   Gelacio Schneider MD  Cardiology fellow,[id number]  [facility]  Assessment & Plan  Dyspnea on exertion  - Presented with dyspnea on exertion, has to take several breaks walking for 1 block   - Likely multifactorial, in setting of obstructive sleep apnea, tobacco use, morbid obesity  - Abnormal chest CT with RUL cavitary lesion in 2019 due to MRSA; normal follow-up CTA in July 2024  - CXR(1/27/25): No acute cardiopulmonary disease     Chest pain, atypical  -TTE (7/10/2024): LVEF 55 to 60% no wall motion abnormalities, normal RV size and function, small circumferential anterior and posterior pericardial effusion.  - Endorses chest pain and palpitation when he is anxious.   - On Imdur 60mg qd and Cardizem 300mg QD   -TTE (1/28/2025): Moderate concentric hypertrophy, LVEF 65%, grade 1 diastolic dysfunction, no regional wall motion abnormalities reported    H/o Atrial flutter (HCC)-rate controlled  - History of cardioversion in 2018  - Ablation of atrial flutter (6/21/2024) by Dr. Lopez: Successful fluoroscopy plus ablation for CTI dependent atrial flutter  -[id number] 1(HTN), currently on rivaroxaban 20 mg daily  - Admitted to the hospital  6/20/2024-6/22/2024 for atrial flutter with RVR which was treated with ablation  - ECG on presentation showed sinus tach test  - Short runs of sinus tachycardia on telemetry, otherwise sinus rhythm with Hrs in  bpm   - Currently on Cardizem 300mg QD, rate controlled   - Loop recorder implanted on 1/30/25     History of cardiac arrest  - Cardiac arrest during echo exercise stress (11/5/2021): During recovery phase, patient was in severe respiratory distress and began to develop ST elevations in the inferior leads. Subsequently lost pulse upon which ACLS was initiated. CPR conducted for approximately 5 minutes and 30 seconds with EMS present upon which ROSC was achieved. Atropine and Epinephrine were each given x1 given profound bradycardia in setting of complete heart block which subsequently progressed to asystole. Patients post-ROSC rhythm was consistent with atrial fibrillation which spontaneously converted to normal sinus rhythm.  -Thought to be due to severe Prinzmetal angina due to vasospasm  Currently on Cardizem and Imdur  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HCC)  -Cardiac cath post cardiac arrest showed evidence of Prinzmetal angina with vasospasm  -Currently on Cardizem and Imdur    Tobacco use disorder  - Current use, 1/2 PPD   Mixed hyperlipidemia  -Lovastatin 40 mg  Obstructive sleep apnea  - No CPAP use at night   Hypertension    Abscess    Obesity, Class III, BMI 40-49.9 (morbid obesity) (Formerly [facility])          Objective :  Temp:  [96.2 °F (35.7 °C)-98.1 °F (36.7 °C)] 98 °F (36.7 °C)  HR:  [] 106  BP: ()/() 145/88  Resp:  [18-20] 20  SpO2:  [92 %-96 %] 92 %  O2 Device: None (Room air)  Orthostatic Blood Pressures      Flowsheet Row Most Recent Value   Blood Pressure 145/88 filed at 01/30/2025 1047   Patient Position - Orthostatic VS Sitting filed at 01/30/2025 1047          First Weight: Weight - Scale: (!) 138 kg (304 lb 3.8 oz) (01/27/25 0909)  Vitals:    01/27/25 2134 01/28/25 1321   Weight:  (!) 139 kg (305 lb 12.5 oz) (!) 138 kg (305 lb)     Physical Exam  GEN: Torey Del Castillo Jr. appears well, alert and oriented x 3, pleasant and cooperative   NECK: No JVD or carotid bruits   HEART: Regular rhythm, normal rate, normal S1 and S2, no murmurs, clicks, gallops or rubs   LUNGS: Rhonchis and wheezing heard posteriorly,L>R  ABDOMEN:  Normoactive bowel sounds, soft, no tenderness, no distention  EXTREMITIES: peripheral pulses palpable; no edema      Lab Results: I have reviewed the following results:CBC/BMP:   No new results in last 24 hours.   , Creatinine Clearance: Estimated Creatinine Clearance: 126.7 mL/min (by C-G formula based on SCr of 0.89 mg/dL)., LFTs: No new results in last 24 hours.   Results from last 7 days   Lab Units 01/28/25  0507 01/27/25  0928   WBC Thousand/uL 5.44 5.25   HEMOGLOBIN g/dL 14.8 15.6   HEMATOCRIT % 43.7 46.1   PLATELETS Thousands/uL 145* 150     Results from last 7 days   Lab Units 01/29/25  0524 01/28/25  0507 01/27/25  0928   POTASSIUM mmol/L 3.8 3.2* 2.9*   CHLORIDE mmol/L 101 100 100   CO2 mmol/L 32 27 28   BUN mg/dL 10 8 5   CREATININE mg/dL 0.89 0.82 0.88   CALCIUM mg/dL 8.7 8.5 8.6         Lab Results   Component Value Date    HGBA1C 5.1 01/27/2025     Lab Results   Component Value Date    CKTOTAL 6,934 (H) 11/07/2021    CKMB 38.7 (H) 11/07/2021    CKMBINDEX <1.0 11/07/2021    TROPONINI 0.03 11/05/2021       Imaging Results Review: No pertinent imaging studies reviewed.  Other Study Results Review: No additional pertinent studies reviewed.

## 2025-01-30 NOTE — DISCHARGE SUMMARY
Discharge Summary - Hospitalist   Name: Torey Del Castillo Jr. 59 y.o. male I MRN: [id number]  Unit/Bed#: E4 -01 I Date of Admission: 1/27/2025   Date of Service: 1/30/2025 I Hospital Day: 2     Assessment & Plan  Chest pain, atypical  Patient with atypical chest pain associated with palpitation and anxiety  Telemetry without atrial flutter, normal sinus rhythm heart rate up to 110s max  Lab Results   Component Value Date    HSTNI0 33 01/27/2025    HSTNI2 29 01/27/2025    HSTNI4 30 01/27/2025         Dyspnea on exertion  Ongoing dyspnea on exertion for months at least since June 2024, suspect multifactorial in the setting of obesity, obstructive sleep apnea, current smoker, no history of COPD  Chest x-ray on this admission showed no acute cardiopulmonary disease  CT chest showed no acute pathology  On 6-minute walk, his lowest oxygen saturation was 93%  Clinically is not fluid overloaded  Echo showed ejection fraction 65%, grade 1 diastolic dysfunction, trace MR, small pericardial effusion--which is stable from echo in July 2024  Low suspicious for PE, he is compliant with Xarelto  Loop recorder was placed today to assess atrial flutter burden  Will refer patient to pulmonology as outpatient for PFTs, continue albuterol inhaler as needed which is home medication  Tobacco use disorder  Continue nicotine patch  Smoking cessation  Obesity, Class III, BMI 40-49.9 (morbid obesity) (HCC)  Lifestyle modifications  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HCC)  History of cardiac arrest during outpatient stress test in 2021  Patient was found to have angiographic spasm of the left main and RCA consistent with Prinzmetal angina  Remains on Cardizem 300 mg and Imdur 60 mg daily  Denies any chest pain  H/o Atrial flutter (HCC)-rate controlled  History of atrial flutter status post cardioversion in 2018 and ablation 6/21/2024  Continue Cardizem 300 mg daily  PCP helping to get Xarelto  EP recommended a loop recorder on his last visit in  July  Loop recorder was placed today  Outpatient follow-up with EP and cardiology    Obstructive sleep apnea  Continue CPAP at bedtime, reports compliance  History of cardiac arrest  Due to Prinzmetal angina, coronary vasospasm in November 2021  Mixed hyperlipidemia  Continue statin  Abscess  Right posterior thigh abscess 1 x 1 cm, draining serous fluid  Bedside I&D was done by surgery, moderate amount of bloody drainage  Pack, recommended packing 3 times a week, family member educated  Phone number provided for patient to call wound care center for follow-up in 1 week  Hypertension  Continue Cardizem 300 mg daily  Continue lisinopril 40 mg daily  Spironolactone 25 mg daily was added on this admission     Medical Problems       Resolved Problems  Date Reviewed: 1/30/2025   None       Discharging Physician / Practitioner: Breana Mancia MD  PCP: Tran Rodríguez MD  Admission Date:   Admission Orders (From admission, onward)       Ordered        01/28/25 1636  INPATIENT ADMISSION  Once            01/27/25 1247  Place in Observation  Once                          Discharge Date: 01/30/25    Consultations During Hospital Stay:  Cardiology    Procedures Performed:   Loop recorder implantation 1/30/2025    Significant Findings / Test Results:   Chest x-ray  IMPRESSION:  No acute cardiopulmonary disease.     CT chest without contrast  IMPRESSION:  No acute pathology. Stable findings in the chest.     EchoInterpretation Summary  Show Result Comparison     Left Ventricle: Left ventricular cavity size is normal. There is moderate concentric hypertrophy. The left ventricular ejection fraction is 65% by visual estimation. Systolic function is normal. Wall motion is normal. Diastolic function is mildly abnormal, consistent with grade I (abnormal) relaxation.    Right Ventricle: Right ventricular cavity size is normal. Systolic function is normal.    Aortic Valve: There is aortic valve sclerosis.    Mitral Valve: There is trace  regurgitation.    Tricuspid Valve: Pulmonary artery systolic pressures cannot be estimated due to lack of tricuspid regurgitation.    Aorta: The aortic root is normal in size. The ascending aorta is ectatic at 3.6 cm.    Compared to report from October 26, 2023, there are no significant changes.     Incidental Findings:   none       Test Results Pending at Discharge (will require follow up):   none     Outpatient Tests Requested:  none    Complications:  none    Reason for Admission: Dyspnea on exertion    Hospital Course:   Torey Del Castillo Jr. is a 59 y.o. male patient who originally presented to the hospital on 1/27/2025 due to dyspnea on exertion, palpitation and dizziness.  Patient has a history of atrial flutter status post cardioversion in 2018, ablation 6/21/2024 currently on Xarelto and Cardizem.  History of cardiac arrest during nuclear stress test in November 2021 secondary to severe Prinzmetal angina.  Reviewing chart he has been complaining of dyspnea on exertion for months at least since June 2024.   Suspect his symptoms are multifactorial due to obesity, deconditioning, obstructive sleep apnea, possible COPD, no official diagnosis, current smoker  Chest x-ray and CT chest showed no acute pathology.  Clinically not fluid overloaded, echo showed ejection fraction of 65%, grade 1 diastolic dysfunction, trace MR and small pericardial effusion, no change from echo in July 2024.  He had a 6-minute walk, his lowest oxygen saturation was 93%.  Low suspicious for PE, symptoms has been going on at least since June 2024, he is compliant with Xarelto.  Loop recorder was placed to assess atrial flutter burden.  He will follow-up with cardiology and EP. Ambulatory referral to pulmonology for PFTs.  Spironolactone 25 mg daily was added on this admission.  1 X1 centimeter right posterior thigh abscess, drained by surgery, packed.  Outpatient follow-up with wound care in a week.      Please see above list of diagnoses  "and related plan for additional information.     Condition at Discharge: good    Discharge Day Visit / Exam:   Subjective: Patient was seen evaluated bedside, in no distress, denies chest pain palpitation shortness of breath  Vitals: Blood Pressure: 145/88 (01/30/25 1047)  Pulse: (!) 106 (01/30/25 1047)  Temperature: 98 °F (36.7 °C) (01/30/25 1047)  Temp Source: Temporal (01/30/25 1047)  Respirations: 20 (01/30/25 1047)  Height: 5' 10\" (177.8 cm) (01/28/25 1321)  Weight - Scale: (!) 138 kg (305 lb) (01/28/25 1321)  SpO2: 92 % (01/30/25 1047)  Physical Exam  Constitutional:       General: He is not in acute distress.     Appearance: He is obese.   HENT:      Head: Atraumatic.   Cardiovascular:      Rate and Rhythm: Normal rate and regular rhythm.      Heart sounds: No murmur heard.  Pulmonary:      Effort: Pulmonary effort is normal. No respiratory distress.      Breath sounds: Normal breath sounds. No wheezing.   Abdominal:      General: Bowel sounds are normal. There is no distension.      Palpations: Abdomen is soft.      Tenderness: There is no abdominal tenderness.   Musculoskeletal:         General: No swelling.      Cervical back: Neck supple.   Skin:     General: Skin is warm and dry.   Neurological:      General: No focal deficit present.      Mental Status: He is alert.   Psychiatric:         Mood and Affect: Mood normal.          Discussion with Family:  patient.     Discharge instructions/Information to patient and family:   See after visit summary for information provided to patient and family.      Provisions for Follow-Up Care:  See after visit summary for information related to follow-up care and any pertinent home health orders.      Mobility at time of Discharge:   Basic Mobility Inpatient Raw Score: 22  JH-HLM Goal: 7: Walk 25 feet or more  JH-HLM Achieved: 7: Walk 25 feet or more  HLM Goal achieved. Continue to encourage appropriate mobility.     Disposition:   Home    Planned Readmission: " no    Discharge Medications:  See after visit summary for reconciled discharge medications provided to patient and/or family.      Administrative Statements   Discharge Statement:  I have spent a total time of 40 minutes in caring for this patient on the day of the visit/encounter. >30 minutes of time was spent on: Counseling / Coordination of care, Documenting in the medical record, Reviewing / ordering tests, medicine, procedures  , and Communicating with other healthcare professionals .    **Please Note: This note may have been constructed using a voice recognition system**

## 2025-01-30 NOTE — ASSESSMENT & PLAN NOTE
Ongoing dyspnea on exertion for months at least since June 2024, suspect multifactorial in the setting of obesity, obstructive sleep apnea, current smoker, no history of COPD  Chest x-ray on this admission showed no acute cardiopulmonary disease  CT chest showed no acute pathology  On 6-minute walk, his lowest oxygen saturation was 93%  Clinically is not fluid overloaded  Echo showed ejection fraction 65%, grade 1 diastolic dysfunction, trace MR, small pericardial effusion--which is stable from echo in July 2024  Low suspicious for PE, he is compliant with Xarelto  Loop recorder was placed today to assess atrial flutter burden  Will refer patient to pulmonology as outpatient for PFTs, continue albuterol inhaler as needed which is home medication

## 2025-01-30 NOTE — QUICK NOTE
"Dressing taking down, packing removed at bedside.  Scant dark bloody drainage noted.   Wound probed and irrigated.  8cm 1/4\" plain packing replaced.  Wound covered with sterile 4x4s and tape.  "

## 2025-01-30 NOTE — DISCHARGE INSTR - AVS FIRST PAGE
You were admitted with shortness of breath and palpitation.  CT chest was negative for acute pathology, you were seen by cardiology, repeat heart ultrasound was unchanged since July 2024.  You will be referred to a lung doctor  You were started on new medication for blood pressure spironolactone 25 mg daily  Follow-up with your primary care doctor to continue Xarelto  Continue Cardizem, lisinopril  Encouraged smoking cessation  You have a wound on the back of your right thigh which was cleaned by surgery.  Continue changing dressing 3 times a week.  Please call the phone number of the wound care center on the discharge paperwork and make an appointment to be seen in a week.  Loop recorder was implanted today, outpatient follow-up with cardiology

## 2025-01-30 NOTE — ASSESSMENT & PLAN NOTE
- History of cardioversion in 2018  - Ablation of atrial flutter (6/21/2024) by Dr. Lopez: Successful fluoroscopy plus ablation for CTI dependent atrial flutter  -[id number] 1(HTN), currently on rivaroxaban 20 mg daily  - Admitted to the hospital 6/20/2024-6/22/2024 for atrial flutter with RVR which was treated with ablation  - ECG on presentation showed sinus tach test  - Short runs of sinus tachycardia on telemetry, otherwise sinus rhythm with Hrs in  bpm   - Currently on Cardizem 300mg QD, rate controlled   - Loop recorder implanted on 1/30/25

## 2025-01-30 NOTE — PLAN OF CARE
Problem: PAIN - ADULT  Goal: Verbalizes/displays adequate comfort level or baseline comfort level  Description: Interventions:  - Encourage patient to monitor pain and request assistance  - Assess pain using appropriate pain scale  - Administer analgesics based on type and severity of pain and evaluate response  - Implement non-pharmacological measures as appropriate and evaluate response  - Consider cultural and social influences on pain and pain management  - Notify physician/advanced practitioner if interventions unsuccessful or patient reports new pain  1/30/2025 0046 by Sayda Marino RN  Outcome: Progressing  1/30/2025 0041 by Sayda Marino RN  Outcome: Progressing     Problem: INFECTION - ADULT  Goal: Absence or prevention of progression during hospitalization  Description: INTERVENTIONS:  - Assess and monitor for signs and symptoms of infection  - Monitor lab/diagnostic results  - Monitor all insertion sites, i.e. indwelling lines, tubes, and drains  - Monitor endotracheal if appropriate and nasal secretions for changes in amount and color  - [place] appropriate cooling/warming therapies per order  - Administer medications as ordered  - Instruct and encourage patient and family to use good hand hygiene technique  - Identify and instruct in appropriate isolation precautions for identified infection/condition  1/30/2025 0046 by Sayda Marino RN  Outcome: Progressing  1/30/2025 0041 by Sayda Marino RN  Outcome: Progressing  Goal: Absence of fever/infection during neutropenic period  Description: INTERVENTIONS:  - Monitor WBC    1/30/2025 0046 by Sayda Marino RN  Outcome: Progressing  1/30/2025 0041 by Sayda Marino RN  Outcome: Progressing     Problem: SAFETY ADULT  Goal: Patient will remain free of falls  Description: INTERVENTIONS:  - Educate patient/family on patient safety including physical limitations  - Instruct patient to call for assistance with activity   - Consult OT/PT to assist with  strengthening/mobility   - Keep Call bell within reach  - Keep bed low and locked with side rails adjusted as appropriate  - Keep care items and personal belongings within reach  - Initiate and maintain comfort rounds  - Make Fall Risk Sign visible to staff  - Offer Toileting every 2 Hours, in advance of need  - Initiate/Maintain bed alarm  - Obtain necessary fall risk management equipment:   - Apply yellow socks and bracelet for high fall risk patients  - Consider moving patient to room near nurses station  1/30/2025 0046 by Sayda Marino RN  Outcome: Progressing  1/30/2025 0041 by Sayda Marino RN  Outcome: Progressing  Goal: Maintain or return to baseline ADL function  Description: INTERVENTIONS:  -  Assess patient's ability to carry out ADLs; assess patient's baseline for ADL function and identify physical deficits which impact ability to perform ADLs (bathing, care of mouth/teeth, toileting, grooming, dressing, etc.)  - Assess/evaluate cause of self-care deficits   - Assess range of motion  - Assess patient's mobility; develop plan if impaired  - Assess patient's need for assistive devices and provide as appropriate  - Encourage maximum independence but intervene and supervise when necessary  - Involve family in performance of ADLs  - Assess for home care needs following discharge   - Consider OT consult to assist with ADL evaluation and planning for discharge  - Provide patient education as appropriate  1/30/2025 0046 by Sayda Marino RN  Outcome: Progressing  1/30/2025 0041 by Sayda Marino RN  Outcome: Progressing  Goal: Maintains/Returns to pre admission functional level  Description: INTERVENTIONS:  - Perform AM-PAC 6 Click Basic Mobility/ Daily Activity assessment daily.  - Set and communicate daily mobility goal to care team and patient/family/caregiver.   - Collaborate with rehabilitation services on mobility goals if consulted  - Perform Range of Motion 3 times a day.  - Reposition patient every 2  hours.  - Dangle patient 3 times a day  - Stand patient 3 times a day  - Ambulate patient 3 times a day  - Out of bed to chair 3 times a day   - Out of bed for meals 3 times a day  - Out of bed for toileting  - Record patient progress and toleration of activity level   1/30/2025 0046 by Sayda Marino RN  Outcome: Progressing  1/30/2025 0041 by Sayda Marino RN  Outcome: Progressing     Problem: DISCHARGE PLANNING  Goal: Discharge to home or other facility with appropriate resources  Description: INTERVENTIONS:  - Identify barriers to discharge w/patient and caregiver  - Arrange for needed discharge resources and transportation as appropriate  - Identify discharge learning needs (meds, wound care, etc.)  - Arrange for interpretive services to assist at discharge as needed  - Refer to Case Management Department for coordinating discharge planning if the patient needs post-hospital services based on physician/advanced practitioner order or complex needs related to functional status, cognitive ability, or social support system  1/30/2025 0046 by Sayda Marino RN  Outcome: Progressing  1/30/2025 0041 by Sayda Marino RN  Outcome: Progressing     Problem: Knowledge Deficit  Goal: Patient/family/caregiver demonstrates understanding of disease process, treatment plan, medications, and discharge instructions  Description: Complete learning assessment and assess knowledge base.  Interventions:  - Provide teaching at level of understanding  - Provide teaching via preferred learning methods  1/30/2025 0046 by Sayda Marino RN  Outcome: Progressing  1/30/2025 0041 by Sayda Marino RN  Outcome: Progressing     Problem: CARDIOVASCULAR - ADULT  Goal: Absence of cardiac dysrhythmias or at baseline rhythm  Description: INTERVENTIONS:  - Continuous cardiac monitoring, vital signs, obtain 12 lead EKG if ordered  - Administer antiarrhythmic and heart rate control medications as ordered  - Monitor electrolytes and administer  replacement therapy as ordered  1/30/2025 0046 by Sayda Marino RN  Outcome: Progressing  1/30/2025 0041 by Sayda Marino RN  Outcome: Progressing     Problem: RESPIRATORY - ADULT  Goal: Achieves optimal ventilation and oxygenation  Description: INTERVENTIONS:  - Assess for changes in respiratory status  - Assess for changes in mentation and behavior  - Position to facilitate oxygenation and minimize respiratory effort  - Oxygen administered by appropriate delivery if ordered  - Initiate smoking cessation education as indicated  - Encourage broncho-pulmonary hygiene including cough, deep breathe, Incentive Spirometry  - Assess the need for suctioning and aspirate as needed  - Assess and instruct to report SOB or any respiratory difficulty  - Respiratory Therapy support as indicated  1/30/2025 0046 by Sayda Marino RN  Outcome: Progressing  1/30/2025 0041 by Sayda Marino RN  Outcome: Progressing     Problem: SKIN/TISSUE INTEGRITY - ADULT  Goal: Skin Integrity remains intact(Skin Breakdown Prevention)  Description: Assess:  -Perform Marcos assessment every shift  -Clean and moisturize skin every shift/prn  -Inspect skin when repositioning, toileting, and assisting with ADLS  -Assess under medical devices   -Assess extremities for adequate circulation and sensation     Bed Management:  -Have minimal linens on bed & keep smooth, unwrinkled  -Change linens as needed when moist or perspiring  -Avoid sitting or lying in one position for more than 2 hours while in bed  -Keep HOB at 30 degrees     Toileting:  -Offer bedside commode  -Assess for incontinence every shift  -Use incontinent care products after each incontinent episode     Activity:  -Mobilize patient 3 times a day  -Encourage activity and walks on unit  -Encourage or provide ROM exercises   -Turn and reposition patient every 2 Hours  -Use appropriate equipment to lift or move patient in bed  -Instruct/ Assist with weight shifting every 30 min when out of  bed in chair  -Consider limitation of chair time 2 hour intervals    Skin Care:  -Avoid use of baby powder, tape, friction and shearing, hot water or constrictive clothing  -Relieve pressure over bony prominences  -Do not massage red bony areas    Next Steps:  -Teach patient strategies to minimize risks    -Consider consults to  interdisciplinary teams   1/30/2025 0046 by Sayda Marino RN  Outcome: Progressing  1/30/2025 0041 by Sayda Marino RN  Outcome: Progressing  Goal: Incision(s), wounds(s) or drain site(s) healing without S/S of infection  Description: INTERVENTIONS  - Assess and document dressing, incision, wound bed, drain sites and surrounding tissue  - Provide patient and family education  - Perform skin care/dressing changes every shift/prn   1/30/2025 0046 by Sayda Marino RN  Outcome: Progressing  1/30/2025 0041 by Sayda Marino RN  Outcome: Progressing  Goal: Pressure injury heals and does not worsen  Description: Interventions:  - Implement low air loss mattress or specialty surface (Criteria met)  - Apply silicone foam dressing  - Instruct/assist with weight shifting every 30 minutes when in chair   - Limit chair time to 2 hour intervals  - Use special pressure reducing interventions    - Apply fecal or urinary incontinence containment device   - Perform passive or active ROM every shift  - Turn and reposition patient & offload bony prominences every 2 hours   - Utilize friction reducing device or surface for transfers   - Consider consults to  interdisciplinary teams   - Use incontinent care products after each incontinent episode   - Consider nutrition services referral as needed  1/30/2025 0046 by Sayda Marino RN  Outcome: Progressing  1/30/2025 0041 by Sayda Marino RN  Outcome: Progressing     Problem: MUSCULOSKELETAL - ADULT  Goal: Maintain or return mobility to safest level of function  Description: INTERVENTIONS:  - Assess patient's ability to carry out ADLs; assess patient's baseline  for ADL function and identify physical deficits which impact ability to perform ADLs (bathing, care of mouth/teeth, toileting, grooming, dressing, etc.)  - Assess/evaluate cause of self-care deficits   - Assess range of motion  - Assess patient's mobility  - Assess patient's need for assistive devices and provide as appropriate  - Encourage maximum independence but intervene and supervise when necessary  - Involve family in performance of ADLs  - Assess for home care needs following discharge   - Consider OT consult to assist with ADL evaluation and planning for discharge  - Provide patient education as appropriate  1/30/2025 0046 by Sayda Marino RN  Outcome: Progressing  1/30/2025 0041 by Sayda Marino RN  Outcome: Progressing  Goal: Maintain proper alignment of affected body part  Description: INTERVENTIONS:  - Support, maintain and protect limb and body alignment  - Provide patient/ family with appropriate education  1/30/2025 0046 by Sayda Marino RN  Outcome: Progressing  1/30/2025 0041 by Sayda Marino RN  Outcome: Progressing

## 2025-01-31 ENCOUNTER — TELEPHONE (OUTPATIENT)
Dept: FAMILY MEDICINE CLINIC | Facility: CLINIC | Age: 60
End: 2025-01-31

## 2025-01-31 ENCOUNTER — RESULTS FOLLOW-UP (OUTPATIENT)
Dept: NON INVASIVE DIAGNOSTICS | Facility: HOSPITAL | Age: 60
End: 2025-01-31

## 2025-01-31 ENCOUNTER — TRANSITIONAL CARE MANAGEMENT (OUTPATIENT)
Dept: FAMILY MEDICINE CLINIC | Facility: CLINIC | Age: 60
End: 2025-01-31

## 2025-01-31 ENCOUNTER — TELEPHONE (OUTPATIENT)
Dept: CARDIOLOGY CLINIC | Facility: CLINIC | Age: 60
End: 2025-01-31

## 2025-01-31 NOTE — TELEPHONE ENCOUNTER
PC to patient checking on him as per Dr Emmanuel.  He has no bleeding from the site of loop implantation. He will monitor blood pressure and call if he is finding BP over 140/80.  He does have an appointment with Dr Deleon in two weeks for follow up. I sent a note to EP clerical to call with appointment for suture removal and device check as well as giving patient phone number for device if he does not hear from them in a timely manner.

## 2025-02-02 DIAGNOSIS — R06.2 WHEEZING: ICD-10-CM

## 2025-02-03 RX ORDER — ALBUTEROL SULFATE 90 UG/1
INHALANT RESPIRATORY (INHALATION)
Qty: 6.7 G | Refills: 1 | Status: SHIPPED | OUTPATIENT
Start: 2025-02-03

## 2025-02-13 ENCOUNTER — OFFICE VISIT (OUTPATIENT)
Dept: FAMILY MEDICINE CLINIC | Facility: CLINIC | Age: 60
End: 2025-02-13

## 2025-02-13 VITALS
BODY MASS INDEX: 43.09 KG/M2 | SYSTOLIC BLOOD PRESSURE: 109 MMHG | DIASTOLIC BLOOD PRESSURE: 71 MMHG | OXYGEN SATURATION: 96 % | WEIGHT: 301 LBS | HEIGHT: 70 IN | TEMPERATURE: 97.9 F | RESPIRATION RATE: 24 BRPM | HEART RATE: 116 BPM

## 2025-02-13 DIAGNOSIS — I10 PRIMARY HYPERTENSION: ICD-10-CM

## 2025-02-13 DIAGNOSIS — H00.015 HORDEOLUM EXTERNUM OF LEFT LOWER EYELID: ICD-10-CM

## 2025-02-13 DIAGNOSIS — I48.92 ATRIAL FLUTTER, UNSPECIFIED TYPE (HCC): ICD-10-CM

## 2025-02-13 DIAGNOSIS — R06.02 SOB (SHORTNESS OF BREATH): Primary | ICD-10-CM

## 2025-02-13 DIAGNOSIS — R09.89 PULMONARY CONGESTION: ICD-10-CM

## 2025-02-13 DIAGNOSIS — L02.91 ABSCESS: ICD-10-CM

## 2025-02-13 DIAGNOSIS — F17.209 NICOTINE DEPENDENCE WITH NICOTINE-INDUCED DISORDER, UNSPECIFIED NICOTINE PRODUCT TYPE: ICD-10-CM

## 2025-02-13 PROCEDURE — 99495 TRANSJ CARE MGMT MOD F2F 14D: CPT | Performed by: FAMILY MEDICINE

## 2025-02-13 RX ORDER — NICOTINE 21 MG/24HR
1 PATCH, TRANSDERMAL 24 HOURS TRANSDERMAL EVERY 24 HOURS
Qty: 14 PATCH | Refills: 0 | Status: SHIPPED | OUTPATIENT
Start: 2025-02-13

## 2025-02-13 NOTE — ASSESSMENT & PLAN NOTE
Patient with history of atrial flutter. He was recently hospitalized with palpitations and a loop recorder was placed at that time. Not currently experiencing palpitations.  Follow up with cardiology, appointment scheduled for next week.    Orders:    Ambulatory Referral to Complex Care Management Program; Future

## 2025-02-13 NOTE — ASSESSMENT & PLAN NOTE
Patient with primary hypertension controlled on current medication regimen.  On exam, /71.    Orders:    Ambulatory Referral to Complex Care Management Program; Future

## 2025-02-13 NOTE — ASSESSMENT & PLAN NOTE
Patient with production of sputum upon waking up in the morning, dyspnea on exertion, and periodic wheezing.  May consider trial of alternate inhaler for better symptom management. Referral to Complex Care Management Program.  Ordered PFT with post bronchodilator.    Orders:    Ambulatory Referral to Complex Care Management Program; Future

## 2025-02-13 NOTE — PROGRESS NOTES
Transition of Care Visit  Name: Torey Del Castillo Jr.      : 1965      MRN: [id number]  Encounter Provider: Tran Rodríguez MD  Encounter Date: 2025   Encounter department: [facility]    Assessment & Plan  SOB (shortness of breath)  Patient continuing to report dyspnea, exacerbated on exertion, since hospital admission. He is using his albuterol inhaler every day.  On exam, patient not in respiratory distress and without wheezing. He had used his inhaler prior to his appointment. SpO2 96%.  May consider trial of alternate inhaler for better symptom management. Referral to Complex Care Management Program.  Ordered PFT with post bronchodilator.  Unclear etiology of shortness of breath at this time, patient does have loop recorder in place and follow-up with cardiology.  Will also follow-up on PFTs.  PFTs in  did not show any obstructive disease.  Will consider steroid inhaler if patient is unable to get PFTs or follow-up with pulmonology.    Orders:    Complete PFT with post bronchodilator; Future    Ambulatory Referral to Complex Care Management Program; Future    Nicotine dependence with nicotine-induced disorder, unspecified nicotine product type  Patient currently using 0.5 packs of cigarettes per day. He is interested in using nicotine patches to help him with smoking cessation.  Ordered nicotine 14 mg/24 hour TD 24 hr patch.  Tobacco Cessation Counseling: Tobacco cessation counseling and education was provided. The patient is sincerely urged to quit consumption of tobacco. He is ready to quit tobacco. The numerous health risks of tobacco consumption were discussed. Prescribed the following medications: nicotine patch.. I spent 5 minutes on Tobacco Cessation counseling during today's visit.    Orders:    nicotine (NICODERM CQ) 14 mg/24hr TD 24 hr patch; Place 1 patch on the skin over 24 hours every 24 hours    Ambulatory Referral to Complex Care Management  Program; Future    Atrial flutter, unspecified type (HCC)  Patient with history of atrial flutter. He was recently hospitalized with palpitations and a loop recorder was placed at that time. Not currently experiencing palpitations.  Follow up with cardiology, appointment scheduled for next week.    Orders:    Ambulatory Referral to Complex Care Management Program; Future    Primary hypertension  Patient with primary hypertension controlled on current medication regimen.  On exam, /71.    Orders:    Ambulatory Referral to Complex Care Management Program; Future    Pulmonary congestion  Patient with production of sputum upon waking up in the morning, dyspnea on exertion, and periodic wheezing.  May consider trial of alternate inhaler for better symptom management. Referral to Complex Care Management Program.  Ordered PFT with post bronchodilator.    Orders:    Ambulatory Referral to Complex Care Management Program; Future    Hordeolum externum of left lower eyelid  Patient with 2-3 day history of itching and watery discharge from his left eye. He noticed a bump on his inner lower left eyelid this morning.  On exam, patient has a hordeolum of his left lower eyelid. No erythema or purulent discharge.  Recommended using a warm compress 4 times per day until resolution. Return precautions given.       Abscess  Much improved, scab noted over the wound.  No erythema, fluctuance surrounding it.            History of Present Illness     Transitional Care Management Review:   Torey Del Castillo  is a 59 y.o. male here for TCM follow up.     During the TCM phone call patient stated:  TCM Call       Date and time call was made  1/31/2025  9:45 AM    Hospital care reviewed  Records reviewed    Patient was hospitialized at  [facility]    Date of Admission  01/27/25    Date of discharge  01/30/25    Diagnosis  Chest Pain, atypical    Disposition  Home    Were the patients medications reviewed and updated  Yes     Current Symptoms  Shortness of breath    Cough Severity  Moderate    Shortness of breath severity  Moderate          TCM Call       Post hospital issues  Reduced activity    Should patient be enrolled in anticoag monitoring?  No    Scheduled for follow up?  Yes    Patients specialists  Cardiologist    Pulmonologist name  [facility]    Did you obtain your prescribed medications  Yes    Do you need help managing your prescriptions or medications  No    Is transportation to your appointment needed  No    I have advised the patient to call PCP with any new or worsening symptoms  Eileen Rincon RICO    Living Arrangements  Spouse or Significiant other    Support System  Family    Do you have social support  Yes, as much as I need    Are you recieving any outpatient services  No    Are you recieving home care services  No    Are you using any community resources  No    Current waiver services  No    Have you fallen in the last 12 months  No    Interperter language line needed  No    Counseling  Patient    Counseling topics  Diagnostic results; Activities of daily living; Importance of RX compliance; instructions for management          Torey Del Castillo Jr. is a 59 year old male in the office for a TCM visit after hospitalization 1/27/2025-1/30/2025 for dyspnea on exertion, palpitation and dizziness. He had complaints of dyspnea since June 2024. His inpatient medical team suspected that his symptoms were due to obesity, deconditioning, obstructive sleep apnea, undiagnosed COPD, and current cigarette smoking. No acute pathology was found on chest x-ray, CT chest or echo. Echo showed EF of 65% and was unchanged from July 2024. During his hospitalization, a loop recorder was placed to assess atrial flutter burden. Recommended follow-up with cardiology and EP. Placed referral to pulmonology for outpatient PFTs. Medication change of adding Spironolactone 25 mg daily. Additionally, Torey had a 1x1 cm abscess on his posterior  "right thigh which was drained and packed by surgery. Recommended follow-up with wound care 1 week from discharge.    Today, Torey continues to report dyspnea on exertion and dizziness, however, palpitations have subsided. Using the stairs inside his home is a trigger for increased dyspnea, and patient has been using his inhaler daily. He has been taking spironolactone as prescribed in the hospital, he reports no side effects. Patient has a follow up appointment scheduled with cardiology next week to discuss the findings of his loop recorder. He will schedule a pulmonology appointment and complete PFTs when he has insurance. Torey has not seen wound care but has been cleaning his thigh abscess every other day with saline and placing clean guaze over the wound. It has not ar draining or painful. Patient has had congestion for 2-3 days. He also reports that his left eye has been itching and watering, he noticed a bump on his lower inner eyelid this morning.      Review of Systems   Constitutional:  Negative for chills and fever.   HENT:  Positive for congestion. Negative for ear pain and sore throat.    Eyes:  Positive for discharge (left), redness and itching (left). Negative for pain and visual disturbance.   Respiratory:  Positive for shortness of breath and wheezing. Negative for cough.    Cardiovascular:  Negative for chest pain and palpitations.   Gastrointestinal:  Negative for abdominal pain, constipation, diarrhea and vomiting.   Genitourinary:  Negative for dysuria and hematuria.   Musculoskeletal:  Positive for back pain. Negative for arthralgias.   Skin:  Positive for wound. Negative for color change and rash.   Neurological:  Negative for seizures and syncope.   All other systems reviewed and are negative.    Objective   /71   Pulse (!) 116   Temp 97.9 °F (36.6 °C) (Temporal)   Resp (!) 24   Ht 5' 10\" (1.778 m)   Wt (!) 137 kg (301 lb)   SpO2 96%   BMI 43.19 kg/m²     Physical Exam  Vitals " reviewed.   Constitutional:       General: He is not in acute distress.     Appearance: He is well-developed. He is ill-appearing.   HENT:      Head: Normocephalic and atraumatic.      Nose: Congestion present.   Eyes:      General:         Left eye: Discharge and hordeolum present.     Conjunctiva/sclera: Conjunctivae normal.   Cardiovascular:      Rate and Rhythm: Regular rhythm. Tachycardia present.      Heart sounds: No murmur heard.  Pulmonary:      Effort: Pulmonary effort is normal. No respiratory distress.      Breath sounds: Normal breath sounds.   Abdominal:      Palpations: Abdomen is soft.      Tenderness: There is no abdominal tenderness.   Musculoskeletal:         General: No swelling.      Cervical back: Neck supple.   Skin:     General: Skin is warm and dry.      Capillary Refill: Capillary refill takes less than 2 seconds.      Findings: Abscess (healing abscess on posterior right thigh) present.   Neurological:      Mental Status: He is alert.   Psychiatric:         Mood and Affect: Mood normal.       Medications have been reviewed by provider in current encounter

## 2025-02-21 ENCOUNTER — RESULTS FOLLOW-UP (OUTPATIENT)
Dept: CARDIOLOGY CLINIC | Facility: CLINIC | Age: 60
End: 2025-02-21

## 2025-02-26 ENCOUNTER — PATIENT OUTREACH (OUTPATIENT)
Dept: FAMILY MEDICINE CLINIC | Facility: CLINIC | Age: 60
End: 2025-02-26

## 2025-02-26 NOTE — PROGRESS NOTES
Outpatient RN Care Manager Note    Patient was seen in office for shortness of breath, nicotine dependence, atrial flutter, hypertension, pulmonary congestion and hordeolum externum of left lower eyelid on 2/13/25.  Patient was referred to complex care management and order was placed for PFT with post bronchodilator.  Patient was prescribed nicotine 14 mg/24 hr patch.    Called patient with no answer. Left message, this is Alina the Outpatient Nurse Care Manager at [facility] office calling to follow up with you. Requested return phone call at [phone].

## 2025-02-27 ENCOUNTER — PATIENT OUTREACH (OUTPATIENT)
Dept: FAMILY MEDICINE CLINIC | Facility: CLINIC | Age: 60
End: 2025-02-27

## 2025-02-27 ENCOUNTER — RESULTS FOLLOW-UP (OUTPATIENT)
Dept: NON INVASIVE DIAGNOSTICS | Facility: HOSPITAL | Age: 60
End: 2025-02-27

## 2025-02-27 NOTE — PROGRESS NOTES
Outpatient RN Care Manager Note    Patient was seen in office for shortness of breath, nicotine dependence, atrial flutter, hypertension, pulmonary congestion and hordeolum externum of left lower eyelid on 2/13/25. Patient was referred to complex care management and order was placed for PFT with post bronchodilator. Patient was prescribed nicotine 14 mg/24 hr patch.     Second call placed to patient with no answer. Left message, this is Alina the Outpatient Nurse Care Manager at [facility] office calling to follow up with you. Requested return phone call at [phone].      UTR to My Chart.  Will close to complex care management and re-open upon patient response.

## 2025-02-27 NOTE — LETTER
Date: 02/27/25    Dear Torey Del Castillo Jr.,   My name is Alina Adam; I am a Registered Nurse Care Manager working with  [facility] [place]  [facility] [place]  [address] [address]  I have not been able to reach you and would like to set a time that I can talk with you over the phone or in-person.  My work is to help patients that have complex medical conditions get the care they need. This includes patients who may have been in the hospital or emergency room.    I have enclosed information for you. Please call me with any questions you may have. I look forward to meeting with you.  Sincerely,  Alina Adam RN  [phone]  Outpatient Care Manager

## 2025-02-27 NOTE — LETTER
Date: 02/27/25    Dear Torey Del Castillo Jr.,   My name is ***; I am a registered nurse care manager working with [facility] [place]  [facility] [place]  [facility] [address] [place] [address] [phone].   I have not been able to reach you and would like to set a time that I can talk with you over the phone or in-person.  My work is to help patients that have complex medical conditions get the care they need. This includes patients who may have been in the hospital or emergency room.    I have enclosed information for you. Please call me with any questions you may have. I look forward to meeting with you.  Sincerely,  ***  [phone]  Outpatient Care Manager  Copy:  (primary care physician name and address)

## 2025-03-03 ENCOUNTER — TELEPHONE (OUTPATIENT)
Dept: OTHER | Facility: OTHER | Age: 60
End: 2025-03-03

## 2025-03-03 NOTE — TELEPHONE ENCOUNTER
Call patient to reschedule loop device site check appointment, no answer left voicemail message with direct ph#[phone] for device clinic to reschedule

## 2025-03-03 NOTE — TELEPHONE ENCOUNTER
Patient is calling regarding cancelling an appointment.    Date/Time: 3/3/2025 / 10:30 am     Patient was rescheduled: YES [] NO [x]    Patient requesting call back to reschedule: YES [x] NO []

## 2025-03-07 NOTE — TELEPHONE ENCOUNTER
Patient is calling regarding cancelling an appointment.    Date/Time: 3/7/25 @ 3:00 pm    Patient was rescheduled: YES [] NO [x]    Patient requesting call back to reschedule: YES [] NO [x]

## 2025-03-10 ENCOUNTER — TELEPHONE (OUTPATIENT)
Dept: CARDIOLOGY CLINIC | Facility: CLINIC | Age: 60
End: 2025-03-10

## 2025-03-10 ENCOUNTER — RESULTS FOLLOW-UP (OUTPATIENT)
Dept: NON INVASIVE DIAGNOSTICS | Facility: HOSPITAL | Age: 60
End: 2025-03-10

## 2025-03-11 ENCOUNTER — PATIENT OUTREACH (OUTPATIENT)
Dept: FAMILY MEDICINE CLINIC | Facility: CLINIC | Age: 60
End: 2025-03-11

## 2025-03-11 NOTE — PROGRESS NOTES
Outpatient RN Care Manager Note    RN BERNARDINO received call from patient.  RN CM introduced self and explained role.  Patient states he has scheduled an appointment with both Cardiology for 3/12/25 and Pulmonology on 3/18/25.  RN CM advised patient of PFT test to be performed.  Patient states he is currently without health insurance.  Patient has a contact from [employer], who is helping patient complete application and is awaiting a determination.  Patient has worked with [facility] CM as well and requests return call.  Patient requests return call from [facility].    Patient states his SOB remains unchanged when going up and down stairs and needs rescue inhaler. Patient states he has stopped smoking.  RN CM congratulated patient on his success.     Patient has no further care needs and declines further outreach.

## 2025-03-12 ENCOUNTER — TELEPHONE (OUTPATIENT)
Dept: OTHER | Facility: OTHER | Age: 60
End: 2025-03-12

## 2025-03-12 ENCOUNTER — PATIENT OUTREACH (OUTPATIENT)
Dept: FAMILY MEDICINE CLINIC | Facility: CLINIC | Age: 60
End: 2025-03-12

## 2025-03-12 DIAGNOSIS — Z59.71 DOES NOT HAVE HEALTH INSURANCE: Primary | ICD-10-CM

## 2025-03-12 NOTE — TELEPHONE ENCOUNTER
Patient is calling regarding cancelling an appointment.    Date/Time: 3/12/2025 10:00 AM     Patient was rescheduled: YES [] NO [x]    Patient requesting call back to reschedule: YES [x] NO []    Patient is sick. He is a requesting a call to reschedule.

## 2025-03-12 NOTE — PROGRESS NOTES
IB message received from RN BERNARDINO Adam. Pt is still w/o health insurance. He has worked with [employer] representative in the past to get MA, but has been denied for ongoing. Pt is now working through [facility] and the [facility].     EVERETT hinton call to pt w/ success. EVERETT LEBRON introduced self, role and reason for calling. Pt explained that he checked [facility] website and it updated that his case has been closed. Pt is unsure why and states he received a letter from the [facility] as well requesting documentation and is unsure what he should do. Pt states he is planning to go to the [facility] to ask for assistance and try to meet w/  as he has not been able to get ahold of them lately. He states that he is planning to bring his documents with him to the [facility] assistance office to hopefully be able to reopen his application to resubmit or appeal.    EVERETT LEBRON advised pt that if he needs help, EVERETT LEBRON can assist him here in the office. Pt expressed appreciation. Pt stated that he will plan to meet w/ EVERETT LEBRON in the office Next Tuesday if he was unsuccessful tomorrow with the [facility].    EVERETT LEBRON completed new assessment and assigned tasks to EVERETT LEBRON.

## 2025-03-17 ENCOUNTER — TELEPHONE (OUTPATIENT)
Dept: OTHER | Facility: OTHER | Age: 60
End: 2025-03-17

## 2025-03-17 NOTE — TELEPHONE ENCOUNTER
Patient is calling regarding cancelling an appointment.    Date/Time: 3/18/25 8:20am    Patient was rescheduled: YES [] NO [x]    Patient requesting call back to reschedule: YES [] NO [x]    Pt offered no reason for cancel, stated he will call in morning to reschedule.

## 2025-03-26 ENCOUNTER — TELEPHONE (OUTPATIENT)
Dept: CARDIOLOGY CLINIC | Facility: CLINIC | Age: 60
End: 2025-03-26

## 2025-03-26 NOTE — TELEPHONE ENCOUNTER
Patient called the RX Refill Line. Message is being forwarded to the office.     Patient called to see if there are samples of Xarelto 20 mg. Does not have insurance and can not afford to get the medication right now.     Please contact patient at [phone]

## 2025-03-27 NOTE — TELEPHONE ENCOUNTER
Patient called to see if there are samples of Xarelto 20 mg. Does not have insurance and can not afford to get the medication right now.      Other offices have been checked to see if they have 20mg samples and they don't have any. Advised I would check the [place] office for the patient.     Please contact patient at [phone] to let him know. Thank you.

## 2025-03-28 ENCOUNTER — PATIENT OUTREACH (OUTPATIENT)
Dept: FAMILY MEDICINE CLINIC | Facility: CLINIC | Age: 60
End: 2025-03-28

## 2025-03-28 NOTE — TELEPHONE ENCOUNTER
"Patient has no insurance at all even to transition to warfarin, because it will cost him to get blood tests. He wants to know if he actually needs a \"blood thinner' and would like to go off if possible.     Patient had device check and had AF with [facility] on 3/8/25. I told patient I would send note to Dr Deleon regarding Manuel being out of Xarelto 20 and no samples in any of the offices for the 20 mgs tablets. Can he take the 15 mgs for now or Eliquis? Please advise.    "

## 2025-03-28 NOTE — TELEPHONE ENCOUNTER
Pt called back today. He has been off Xarelto for 4 days at this time. It appears no offices have samples of the medication. The patient does not have insurance. He is asking what he should do. Are there samples of another NOAC he can use until this gets straightened out? Or warfarin? Please review and call the patient back

## 2025-03-28 NOTE — PROGRESS NOTES
Medication Patient Assistance Program (MPAP) Specialist  received call from patient requesting a return call regarding his Xarelto. MPAP specialist reviewed patient chart prior to outreach. Patient was previously on patient assistance program for Xarelto through [employer] patient assistance program. Patient's Program ended 11/21/2024. MPAP specialist had closed referral due to lack of response from patient. MPAP specialist attempted to call patient [facility] success.[facility] with her name (Edna Pichardo), stated she was patient assistance  and number [phone]. MPAP specialist requested a return call. Per chart review patent is attempting to receive Xarelto samples. Patient is currently working with EVERETT LEBRON regarding his Medical assistance application. Patient is applying for Medical assistance. MPAP specialist has reached out to EVERETT LEBRON and patient's clinician.

## 2025-04-02 ENCOUNTER — TELEPHONE (OUTPATIENT)
Dept: CARDIOLOGY CLINIC | Facility: CLINIC | Age: 60
End: 2025-04-02

## 2025-04-02 NOTE — TELEPHONE ENCOUNTER
We found 2 bottles of Xarelto 20 being held for another patient who never picked it up since beginning of Feb. Called Manuel and he will  today 4/2/25 at  in [place] Office.  He also mentioned he has an appointment with Dr Adrienne Ramirez, so he will discuss other options with him then.

## 2025-04-13 ENCOUNTER — RESULTS FOLLOW-UP (OUTPATIENT)
Dept: NON INVASIVE DIAGNOSTICS | Facility: HOSPITAL | Age: 60
End: 2025-04-13

## 2025-04-14 RX ORDER — ALBUTEROL SULFATE 0.83 MG/ML
2.5 SOLUTION RESPIRATORY (INHALATION) ONCE
Status: DISCONTINUED | OUTPATIENT
Start: 2025-04-14 | End: 2025-04-19 | Stop reason: HOSPADM

## 2025-04-15 ENCOUNTER — HOSPITAL ENCOUNTER (OUTPATIENT)
Dept: PULMONOLOGY | Facility: HOSPITAL | Age: 60
Discharge: HOME | End: 2025-04-15

## 2025-04-22 ENCOUNTER — PATIENT OUTREACH (OUTPATIENT)
Dept: FAMILY MEDICINE CLINIC | Facility: CLINIC | Age: 60
End: 2025-04-22

## 2025-04-22 NOTE — PROGRESS NOTES
Pt has cancelled several appts here in the [facility] office. Pt interested in obtaining insurance w/ MA and meeting EVERETT LEBRON in office, but pt has not kept recent appts scheduled. EVERETT LEBRON referred pt out to hospital financial counselors to further assistance w/ application.

## 2025-04-30 RX ORDER — ALBUTEROL SULFATE 0.83 MG/ML
2.5 SOLUTION RESPIRATORY (INHALATION) ONCE
Status: DISCONTINUED | OUTPATIENT
Start: 2025-04-30 | End: 2025-05-05 | Stop reason: HOSPADM

## 2025-05-01 ENCOUNTER — HOSPITAL ENCOUNTER (OUTPATIENT)
Dept: PULMONOLOGY | Facility: HOSPITAL | Age: 60
Discharge: HOME | End: 2025-05-01
Attending: FAMILY MEDICINE

## 2025-05-03 DIAGNOSIS — I20.1 PRINZMETAL VARIANT ANGINA (HCC): ICD-10-CM

## 2025-05-05 RX ORDER — ISOSORBIDE MONONITRATE 60 MG/1
60 TABLET, EXTENDED RELEASE ORAL DAILY
Qty: 90 TABLET | Refills: 1 | Status: SHIPPED | OUTPATIENT
Start: 2025-05-05

## 2025-05-08 DIAGNOSIS — R06.2 WHEEZING: ICD-10-CM

## 2025-05-08 RX ORDER — ALBUTEROL SULFATE 90 UG/1
2 INHALANT RESPIRATORY (INHALATION) EVERY 6 HOURS PRN
Qty: 6.7 G | Refills: 1 | Status: SHIPPED | OUTPATIENT
Start: 2025-05-08

## 2025-05-20 RX ORDER — ALBUTEROL SULFATE 0.83 MG/ML
2.5 SOLUTION RESPIRATORY (INHALATION) ONCE
Status: DISCONTINUED | OUTPATIENT
Start: 2025-05-20 | End: 2025-05-25 | Stop reason: HOSPADM

## 2025-05-21 ENCOUNTER — HOSPITAL ENCOUNTER (OUTPATIENT)
Dept: PULMONOLOGY | Facility: HOSPITAL | Age: 60
Discharge: HOME | End: 2025-05-21
Attending: FAMILY MEDICINE

## 2025-05-27 DIAGNOSIS — I10 PRIMARY HYPERTENSION: ICD-10-CM

## 2025-05-27 RX ORDER — LISINOPRIL 40 MG/1
40 TABLET ORAL DAILY
Qty: 90 TABLET | Refills: 1 | Status: SHIPPED | OUTPATIENT
Start: 2025-05-27

## 2025-05-30 RX ORDER — ALBUTEROL SULFATE 0.83 MG/ML
2.5 SOLUTION RESPIRATORY (INHALATION) ONCE
Status: DISCONTINUED | OUTPATIENT
Start: 2025-05-30 | End: 2025-06-07 | Stop reason: HOSPADM

## 2025-06-03 ENCOUNTER — HOSPITAL ENCOUNTER (OUTPATIENT)
Dept: PULMONOLOGY | Facility: HOSPITAL | Age: 60
Discharge: HOME | End: 2025-06-03
Attending: FAMILY MEDICINE

## 2025-06-12 ENCOUNTER — REMOTE DEVICE CLINIC VISIT (OUTPATIENT)
Dept: CARDIOLOGY CLINIC | Facility: CLINIC | Age: 60
End: 2025-06-12
Payer: COMMERCIAL

## 2025-06-12 DIAGNOSIS — I48.0 PAF (PAROXYSMAL ATRIAL FIBRILLATION) (HCC): Primary | ICD-10-CM

## 2025-06-12 PROCEDURE — 93298 REM INTERROG DEV EVAL SCRMS: CPT | Performed by: INTERNAL MEDICINE

## 2025-06-12 NOTE — PROGRESS NOTES
"SJM ASSERT ILR/ ACTIVE SYSTEM IS MRI CONDITIONAL   MERLIN TRANSMISSION: LOOP RECORDER. PRESENTING RHYTHM VS @ 99 BPM. BATTERY STATUS \"OK.\" 1 DEVICE CLASSIFIED AF EPISODES, LONGEST EPISODE IS 6 MINS, AVAILABLE STRIPS DEMONSTRATE PACs AND PVCs. CAN NOT R/O ATRIAL FIBRILLATION. AF BURDEN IS <1%. HX OF AF. PT TAKES CARDIZEM AND XARELTO. NO PATIENT ACTIVATED EPISODES. NORMAL DEVICE FUNCTION. DL   "

## 2025-06-13 RX ORDER — ALBUTEROL SULFATE 0.83 MG/ML
2.5 SOLUTION RESPIRATORY (INHALATION) ONCE
Status: DISCONTINUED | OUTPATIENT
Start: 2025-06-13 | End: 2025-06-20 | Stop reason: HOSPADM

## 2025-06-16 ENCOUNTER — HOSPITAL ENCOUNTER (OUTPATIENT)
Dept: PULMONOLOGY | Facility: HOSPITAL | Age: 60
Discharge: HOME | End: 2025-06-16
Attending: FAMILY MEDICINE

## 2025-06-23 PROBLEM — K59.00 CONSTIPATION: Status: ACTIVE | Noted: 2025-01-01

## 2025-06-23 PROBLEM — E87.1 HYPONATREMIA: Status: ACTIVE | Noted: 2025-01-01

## 2025-06-23 PROBLEM — R79.89 ELEVATED LACTIC ACID LEVEL: Status: ACTIVE | Noted: 2025-01-01

## 2025-06-23 PROBLEM — G93.41 ENCEPHALOPATHY, METABOLIC: Status: ACTIVE | Noted: 2025-01-01

## 2025-06-23 PROBLEM — R65.10 SIRS (SYSTEMIC INFLAMMATORY RESPONSE SYNDROME) (HCC): Status: ACTIVE | Noted: 2025-01-01

## 2025-06-23 NOTE — ASSESSMENT & PLAN NOTE
History of atrial flutter status post cardioversion in 2018 and ablation 6/21/2024  Hold Cardizem 300 Mg daily in the setting of hypotension  Not on any anticoagulation according to the patient

## 2025-06-23 NOTE — ASSESSMENT & PLAN NOTE
Patient has ongoing dyspnea for months, since June 2024 however has been exacerbated in the past 2 to 3 days  Dyspnea can be multifactorial in nature including obesity, PRAKASH, smoking  Patient denies any history of COPD  Patient denies any noncompliance with medication at home  Patient does not wear oxygen at home, uses CPAP  Diffuse wheezing noted on physical examination, status post albuterol nebulizer, Xopenex and Solu-Medrol 125 Mg IV  Patient denies any chest pain, fevers or chills  Patient does endorse stuffy nose and upper respiratory symptoms  Flu RSV COVID-negative  CTA PE negative for acute pulmonary embolism    PLAN:  Continue Xopenex 3 times daily  Continue CPAP at night  Start prednisone 40 Mg daily

## 2025-06-23 NOTE — ASSESSMENT & PLAN NOTE
Lactic acid 8.6 on arrival, WBCs 16.39  Hypotensive with systolic blood pressure in 80s  Status post 2 L of fluid and 1 dose of albumin  CTA PE negative for pulmonary embolism  Patient is afebrile, leukocytosis most likely secondary to stress  Lactic acid most likely secondary to hypotension and less likely albuterol induced  Patient is less likely septic, most symptoms secondary to hyponatremia

## 2025-06-23 NOTE — ASSESSMENT & PLAN NOTE
Lab Results   Component Value Date/Time    SODIUM 114 (LL) 06/23/2025 05:55 AM    SODIUM 138 07/11/2024 04:04 AM     According to the EMS, multiple beer cans and bottles were found  Can be in the setting of potomania  Follow-up with urine osmolality, serum osmolality and urine sodium  Follow-up with serum cortisol, a.m.  Monitor BMP every 6 hours

## 2025-06-23 NOTE — SEPSIS NOTE
"Sepsis Note   Torey Del Castillo Jr. 59 y.o. male MRN: [id number]  Unit/Bed#: ED-23 Encounter: [id number]       Initial Sepsis Screening       Row Name 06/23/25 0629 06/23/25 0520             Is the patient's history suggestive of a new or worsening infection? -- Yes (Proceed)  -GJ       Suspected source of infection -- suspect infection, source unknown  -GJ       Indicate SIRS criteria -- Tachycardia > 90 bpm;Tachypnea > 20 resp per min;Leukocytosis (WBC > 56456 IJL) OR Leukopenia (WBC <4000 IJL) OR Bandemia (WBC >10% bands)  -GJ       Are two or more of the above signs & symptoms of infection both present and new to the patient? -- Yes (Proceed)  -GJ       Assess for evidence of organ dysfunction: Are any of the below criteria present within 6 hours of suspected infection and SIRS criteria that are NOT considered to be chronic conditions? -- SBP < 90  -GJ       Date of presentation of septic shock -- 06/23/25  -GJ       Time of presentation of septic shock -- 0520  -GJ       Fluid Resuscitation: -- A lesser volume than 30 ml/kg IV fluid will be given  -GJ       The 30 mL/kg fluid bolus was not given to the patient despite having hypotension, a lactate of >= 4 mmol/L, or documentation of septic shock secondary to: -- Concern for fluid/volume overload  -GJ       Instead of the 30 ml/kg fluid bolus, the following volume of crystalloid fluid will be ordered: -- 1L  -GJ       Of the following fluid type: -- NSS  -GJ       Is the patient persistently hypotensive in the hour after fluid bolus administration? If yes, patient meets criteria for vasopressor use. NO  -GJ --       Sepsis Note: Click \"NEXT\" below (NOT \"close\") to generate sepsis note based on above information. -- --                 User Key  (r) = Recorded By, (t) = Taken By, (c) = Cosigned By      Initials Name Provider Type    FRANCISCO Evans,  Physician                   Initial Sepsis Screening       Row Name 06/23/25 0629 06/23/25 0520             "    Initial Sepsis Assessment    Is the patient's history suggestive of a new or worsening infection? -- Yes (Proceed)  -GJ       Suspected source of infection -- suspect infection, source unknown  -GJ       Indicate SIRS criteria -- Tachycardia > 90 bpm;Tachypnea > 20 resp per min;Leukocytosis (WBC > 78528 IJL) OR Leukopenia (WBC <4000 IJL) OR Bandemia (WBC >10% bands)  -GJ       Are two or more of the above signs & symptoms of infection both present and new to the patient? -- Yes (Proceed)  -GJ          If the answer is yes to both above questions, suspicion of sepsis is present. Proceed with algorithm to determine if severe sepsis or septic shock criteria are met.    Assess for evidence of organ dysfunction: Are any of the below criteria present within 6 hours of suspected infection and SIRS criteria that are NOT considered to be chronic conditions? -- SBP < 90  -GJ          Based on the above information, the patient meets criteria for SEPTIC SHOCK. CALL A SEPSIS ALERT. Use sepsis order set. If the lactate is > or equal to 4, or the SBP is <90, give 30 ml/kg cystalloid IV fluid bolus or document exclusion criteria.    Date of presentation of septic shock -- 06/23/25  -GJ       Time of presentation of septic shock -- 0520 -GJ       Fluid Resuscitation: -- A lesser volume than 30 ml/kg IV fluid will be given  -GJ       The 30 mL/kg fluid bolus was not given to the patient despite having hypotension, a lactate of >= 4 mmol/L, or documentation of septic shock secondary to: -- Concern for fluid/volume overload  -GJ       Instead of the 30 ml/kg fluid bolus, the following volume of crystalloid fluid will be ordered: -- 1L  -GJ       Of the following fluid type: -- NSS  -GJ       Is the patient persistently hypotensive in the hour after fluid bolus administration? If yes, patient meets criteria for vasopressor use. NO  -GJ --                 User Key  (r) = Recorded By, (t) = Taken By, (c) = Cosigned By      Initials  "Name Provider Type    FRANCISCO Evans DO Physician                     Default Flowsheet Data (Last 720 Hours)       Sepsis Reassess       Row Name 06/23/25 0628                   Repeat Volume Status and Tissue Perfusion Assessment Performed    Date of Reassessment: 06/23/25  -        Time of Reassessment: 0629  -        Sepsis Reassessment Note: Click \"NEXT\" below (NOT \"close\") to generate sepsis reassessment note. --        Repeat Volume Status and Tissue Perfusion Assessment Performed --                  User Key  (r) = Recorded By, (t) = Taken By, (c) = Cosigned By      Initials Name Provider Type    FRANCISCO Evans DO Physician                    Body mass index is 49.03 kg/m².  Wt Readings from Last 1 Encounters:   06/23/25 (!) 155 kg (341 lb 11.4 oz)     IBW (Ideal Body Weight): 73 kg    Ideal body weight: 73 kg (160 lb 15 oz)  Adjusted ideal body weight: 106 kg (233 lb 3.9 oz)    "

## 2025-06-23 NOTE — ASSESSMENT & PLAN NOTE
Lab Results   Component Value Date/Time    LACTICACID 5.5 (HH) 06/23/2025 06:58 AM    LACTICACID 8.6 (HH) 06/23/2025 05:00 AM     Lactic acid can be elevated in the setting of hypotension versus albuterol induced  Continue to monitor lactic acid

## 2025-06-23 NOTE — H&P
H&P - Critical Care/ICU   Name: Torey Del Castillo Jr. 59 y.o. male I MRN: [id number]  Unit/Bed#: ED-23 I Date of Admission: 6/23/2025   Date of Service: 6/23/2025 I Hospital Day: 0       Assessment & Plan  SIRS (systemic inflammatory response syndrome) (HCC)  Lactic acid 8.6 on arrival, WBCs 16.39  Hypotensive with systolic blood pressure in 80s  Status post 2 L of fluid and 1 dose of albumin  CTA PE negative for pulmonary embolism  Patient is afebrile, leukocytosis most likely secondary to stress  Lactic acid most likely secondary to hypotension and less likely albuterol induced  Patient is less likely septic, most symptoms secondary to hyponatremia  Dyspnea on exertion  Patient has ongoing dyspnea for months, since June 2024 however has been exacerbated in the past 2 to 3 days  Dyspnea can be multifactorial in nature including obesity, PRAKASH, smoking  Patient denies any history of COPD  Patient denies any noncompliance with medication at home  Patient does not wear oxygen at home, uses CPAP  Diffuse wheezing noted on physical examination, status post albuterol nebulizer, Xopenex and Solu-Medrol 125 Mg IV  Patient denies any chest pain, fevers or chills  Patient does endorse stuffy nose and upper respiratory symptoms  Flu RSV COVID-negative  CTA PE negative for acute pulmonary embolism    PLAN:  Continue Xopenex 3 times daily  Continue CPAP at night  Start prednisone 40 Mg daily  Elevated lactic acid level  Lab Results   Component Value Date/Time    LACTICACID 5.5 (HH) 06/23/2025 06:58 AM    LACTICACID 8.6 (HH) 06/23/2025 05:00 AM     Lactic acid can be elevated in the setting of hypotension versus albuterol induced  Continue to monitor lactic acid    Hyponatremia  Lab Results   Component Value Date/Time    SODIUM 114 (LL) 06/23/2025 05:55 AM    SODIUM 138 07/11/2024 04:04 AM     According to the EMS, multiple beer cans and bottles were found  Can be in the setting of potomania  Follow-up with urine osmolality, serum  osmolality and urine sodium  Follow-up with serum cortisol, a.m.  Monitor BMP every 6 hours    Constipation  Patient has not had a bowel movement in the past 6 days  Patient has past medical history of ileus  MiraLAX daily and senna twice daily  H/o Atrial flutter (HCC)-rate controlled  History of atrial flutter status post cardioversion in 2018 and ablation 6/21/2024  Hold Cardizem 300 Mg daily in the setting of hypotension  Not on any anticoagulation according to the patient  Anxiety  Continue Zoloft 50 Mg daily  Hypertension  Hold lisinopril 40 Mg daily in the setting of hypotension  Prediabetes  Lab Results   Component Value Date/Time    HGBA1C 5.1 01/27/2025 03:08 PM    HGBA1C 4.8 10/31/2020 06:24 AM     Continue to monitor glucose levels  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 is a 59 y.o. past medical history of atrial flutter status post cardioversion in 2018 and ablation in 2024, obesity, hypertension, cardiac arrest, hyperlipidemia presented to the ED secondary to not feeling well and dyspnea on exertion which has been exacerbated in the past 2 to 3 days.  Patient was found by the EMS surrounded by beer bottles and cans however patient denies any alcohol intake since Saturday.  Patient has been compliant with this medication however does not regularly follow-up with his PCP and pulmonologist.  Patient is a smoker, smokes half a pack a day for the past 20 to 30 years.  Patient does not use oxygen at home, uses CPAP at night.  Denies any symptoms such as  fevers or chills however complained of upper respiratory symptoms and feeling stuffy couple of days ago.  Patient has history of atrial flutter and was supposed to be on Xarelto however according to the chart patient has not been taking the medication secondary to financial issues.  In the ED patient remains hypotensive, status post 2 L of fluid.  Patient remains on room air, afebrile.  Labs shows sodium levels of 114, elevated lactic acid at 8.6 and leukocytosis.  CTA PE was negative for acute pulmonary embolism.  Patient is status post albuterol nebulizer, Atrovent nebulizer and   Solu-Medrol 125 Mg.      History obtained from the patient.  Review of Systems: Review of Systems   Constitutional:  Positive for activity change and fatigue. Negative for chills and fever.   HENT: Negative.     Eyes: Negative.    Respiratory: Negative.     Cardiovascular:  Positive for leg swelling.   Gastrointestinal:  Positive for abdominal distention and abdominal pain.   Genitourinary: Negative.    Musculoskeletal: Negative.    Skin: Negative.    Neurological: Negative.    Hematological: Negative.    Psychiatric/Behavioral: Negative.         Historical Information   Past Medical History:  No date: A-fib ([facility])  No date: Anxiety  No date: Asthma  No date: Colon polyp  11/5/2021: Coronary artery disease  No date: Diabetes mellitus ([facility])  No date: GERD (gastroesophageal reflux disease)  No date: History of ileus  No date: Hypertension  No date: Insomnia  No date: Kidney stone  No date: Lumbar herniated disc      Comment:  last assessed: 3/27/2015  No date: MDD (major depressive disorder)  No date: MI, old  No date: Patella fracture      Comment:  last assessed: 3/27/2015  No date: Pneumonia  No date: Tobacco abuse Past Surgical History:  2015: BACK SURGERY      Comment:  Lumbar  11/05/2021: CARDIAC CATHETERIZATION; N/A      Comment:  Procedure: CARDIAC CATHETERIZATION;  Surgeon: Herbie Castaneda MD;  [facility]: [facility];  Service:                Cardiology  06/21/2024: CARDIAC ELECTROPHYSIOLOGY PROCEDURE; N/A      Comment:  Procedure: Cardiac eps/aflutter ablation;  Surgeon:                Nilay Lopez MD;  Location: [facility];                 Service: Cardiology  01/30/2025: CARDIAC ELECTROPHYSIOLOGY PROCEDURE; N/A      Comment:  Procedure: Cardiac loop recorder implant;  Surgeon:                Wendy Emmanuel MD;  Location: [facility];                 Service: Cardiology  No date: COLONOSCOPY  No date: HERNIA REPAIR  No date: KNEE SURGERY      Comment:  right  No date: LUMBAR FUSION      Comment:  last assessed: 3/27/2015  09/14/2022: [place] RPR UMBILICAL HRNA 5 YRS/> REDUCIBLE; N/A      Comment:  Procedure: REPAIR HERNIA UMBILICAL w/ mesh;  Surgeon:                Edna Siddiqui DO;  Location: [facility];  Service:                General  03/16/2017: [place] TX TIBL SHFT FX IMED IMPLT W/WO SCREWS&/CERCLA; Left      Comment:  Procedure: INSERTION NAIL IM TIBIA;  Surgeon: Edna Abrams DO;  Location: [facility];  Service: Orthopedics  No date: SPINE SURGERY   Current Outpatient Medications   Medication Instructions    albuterol (PROVENTIL HFA,VENTOLIN HFA) 90 mcg/act inhaler 2 puffs, Inhalation, Every 6 hours PRN    atorvastatin (LIPITOR) 40 mg tablet TAKE ONE TABLET BY MOUTH EVERY DAY    diltiazem (CARDIZEM CD) 300 mg, Oral, Every morning    folic acid (FOLVITE) 400 mcg, Oral, Daily    isosorbide dinitrate (ISORDIL) 10 mg, Oral, 3 times daily    isosorbide mononitrate (IMDUR) 60 mg, Oral, Daily    lisinopril (ZESTRIL) 40 mg, Oral, Daily    LORazepam (ATIVAN) 1 mg, Oral, 2 times daily PRN    nicotine (NICODERM CQ) 14 mg/24hr TD 24 hr patch 1 patch, Transdermal, Every 24 hours    omeprazole (PRILOSEC) 40 mg, Oral, Daily    QUEtiapine (SEROquel) 300 mg tablet TAKE ONE TABLET BY MOUTH EVERY DAY FOR SLEEP/MOOD    QUEtiapine (SEROquel) 50 mg tablet     rivaroxaban (XARELTO) 20 mg, Oral, Daily with  breakfast    sertraline (ZOLOFT) 50 mg, Daily at bedtime    spironolactone (ALDACTONE) 25 mg, Oral, Daily    vitamin B-12 (VITAMIN B-12) 1,000 mcg, Oral, Daily    Allergies[1]   Social History[2] Family History[3]       Objective :                   Vitals I/O      Most Recent Min/Max in 24hrs   Temp 97.6 °F (36.4 °C) Temp  Min: 97.6 °F (36.4 °C)  Max: 97.8 °F (36.6 °C)   Pulse (!) 106 Pulse  Min: 95  Max: 114   Resp (!) 30 Resp  Min: 30  Max: 34   BP (!) 84/48 BP  Min: 72/48  Max: 98/58   O2 Sat 95 % SpO2  Min: 91 %  Max: 96 %      Intake/Output Summary (Last 24 hours) at 6/23/2025 0717  Last data filed at 6/23/2025 0618  Gross per 24 hour   Intake 1700 ml   Output --   Net 1700 ml       No diet orders on file    Invasive Monitoring           Physical Exam   Physical Exam  Eyes:      General: Vision grossly intact.      Extraocular Movements: Extraocular movements intact.      Conjunctiva/sclera: Conjunctivae normal.      Pupils: Pupils are equal, round, and reactive to light.   Skin:     General: Skin is warm.   HENT:      Head: Normocephalic and atraumatic.      Mouth/Throat:      Mouth: Mucous membranes are dry.   Neck:      Comments: Bilateral fat stranding around the neckCardiovascular:      Rate and Rhythm: Normal rate and regular rhythm.      Pulses: Normal pulses.   Musculoskeletal:      Right lower leg: Edema present.      Left lower leg: Edema present.   Abdominal: General: There is distension.     Palpations: Abdomen is soft.   Constitutional:       Appearance: He is morbidly obese.   Pulmonary:      Effort: Pulmonary effort is normal.      Breath sounds: Wheezing present.   Neurological:      General: No focal deficit present.      Mental Status: He is alert and oriented to person, place and time. Mental status is at baseline.      Motor: Strength full and intact in all extremities.          Diagnostic Studies        Lab Results: I have reviewed the following results:     Medications:  Scheduled PRN    multi-electrolyte, 1,000 mL, Once  potassium chloride, 20 mEq, Once   Followed by  potassium chloride, 20 mEq, Once          Continuous          Labs:   CBC    Recent Labs     25  0500   WBC 16.39*   HGB 12.7   HCT 34.6*        BMP    Recent Labs     25  0500 25  0555   SODIUM 114* 114*   K 3.2* 3.0*   CL 74* 76*   CO2 21 25   AGAP 19* 13   BUN 5 5   CREATININE 0.88 0.89   CALCIUM 7.5* 7.0*       Coags    Recent Labs     25  0500   INR 1.24*   PTT 37*        Additional Electrolytes  No recent results       Blood Gas    No recent results  Recent Labs     25  0500   PHVEN 7.406*   LCE6OJQ 37.3*   PO2VEN 31.8*   GXX5DCS 22.9*   BEVEN -1.4   K4DXQJU 62.1    LFTs  Recent Labs     25  0500   ALT 28   AST 47*   ALKPHOS 174*   ALB 3.1*   TBILI 1.90*       Infectious  Recent Labs     25  0500   PROCALCITONI 0.29*     Glucose  Recent Labs     25  0500 25  0555   GLUC 120 124               [1] No Known Allergies  [2]   Social History  Tobacco Use    Smoking status: Former     Current packs/day: 0.00     Average packs/day: 1.5 packs/day for 34.0 years (51.0 ttl pk-yrs)     Types: Cigarettes     Start date: 1987     Quit date:      Years since quittin.4    Smokeless tobacco: Never   Vaping Use    Vaping status: Never Used   Substance Use Topics    Alcohol use: Not Currently     Comment: Hx of a couple of drinks a night, or everyother night    Drug use: No   [3]   Family History  Problem Relation Name Age of Onset    Pancreatic cancer Mother Alina     Cancer Mother Alina     COPD Father Torey sr     Asthma Father Torey sr     Cancer Father Torey sr         Lung cancer passed 10/27/2021    Heart attack Brother  61        decreased    Drug abuse Brother      Alcohol abuse Brother      Sleep apnea Brother      Cancer Family      Hypertension Family      Heart disease Family

## 2025-06-23 NOTE — PLAN OF CARE
Problem: Potential for Falls  Goal: Patient will remain free of falls  Description: INTERVENTIONS:  - Educate patient/family on patient safety including physical limitations  - Instruct patient to call for assistance with activity   - Consider consulting OT/PT to assist with strengthening/mobility based on AM PAC & JH-HLM score  - Consult OT/PT to assist with strengthening/mobility   - Keep Call bell within reach  - Keep bed low and locked with side rails adjusted as appropriate  - Keep care items and personal belongings within reach  - Initiate and maintain comfort rounds  - Make Fall Risk Sign visible to staff  - Offer Toileting   - Apply yellow socks and bracelet for high fall risk patients  - Consider moving patient to room near nurses station  Outcome: Progressing     Problem: Nutrition/Hydration-ADULT  Goal: Nutrient/Hydration intake appropriate for improving, restoring or maintaining nutritional needs  Description: Monitor and assess patient's nutrition/hydration status for malnutrition. Collaborate with interdisciplinary team and initiate plan and interventions as ordered.  Monitor patient's weight and dietary intake as ordered or per policy. Utilize nutrition screening tool and intervene as necessary. Determine patient's food preferences and provide high-protein, high-caloric foods as appropriate.     INTERVENTIONS:  - Monitor oral intake, urinary output, labs, and treatment plans  - Assess nutrition and hydration status and recommend course of action  - Evaluate amount of meals eaten  - Assist patient with eating if necessary   - Allow adequate time for meals  - Recommend/ encourage appropriate diets, oral nutritional supplements, and vitamin/mineral supplements  - Order, calculate, and assess calorie counts as needed  - Recommend, monitor, and adjust tube feedings and TPN/PPN based on assessed needs  - Assess need for intravenous fluids  - Provide specific nutrition/hydration education as appropriate  -  Include patient/family/caregiver in decisions related to nutrition  Outcome: Progressing     Problem: PAIN - ADULT  Goal: Verbalizes/displays adequate comfort level or baseline comfort level  Description: Interventions:  - Encourage patient to monitor pain and request assistance  - Assess pain using appropriate pain scale  - Administer analgesics as ordered based on type and severity of pain and evaluate response  - Implement non-pharmacological measures as appropriate and evaluate response  - Consider cultural and social influences on pain and pain management  - Notify physician/advanced practitioner if interventions unsuccessful or patient reports new pain  - Educate patient/family on pain management process including their role and importance of  reporting pain   - Provide non-pharmacologic/complimentary pain relief interventions  Outcome: Progressing     Problem: INFECTION - ADULT  Goal: Absence or prevention of progression during hospitalization  Description: INTERVENTIONS:  - Assess and monitor for signs and symptoms of infection  - Monitor lab/diagnostic results  - Monitor all insertion sites, i.e. indwelling lines, tubes, and drains  - Monitor endotracheal if appropriate and nasal secretions for changes in amount and color  - [place] appropriate cooling/warming therapies per order  - Administer medications as ordered  - Instruct and encourage patient and family to use good hand hygiene technique  - Identify and instruct in appropriate isolation precautions for identified infection/condition  Outcome: Progressing  Goal: Absence of fever/infection during neutropenic period  Description: INTERVENTIONS:  - Monitor WBC  - Perform strict hand hygiene  - Limit to healthy visitors only  - No plants, dried, fresh or silk flowers with han in patient room  Outcome: Progressing     Problem: SAFETY ADULT  Goal: Patient will remain free of falls  Description: INTERVENTIONS:  - Educate patient/family on patient safety  including physical limitations  - Instruct patient to call for assistance with activity   - Consider consulting OT/PT to assist with strengthening/mobility based on AM PAC & JH-HLM score  - Consult OT/PT to assist with strengthening/mobility   - Keep Call bell within reach  - Keep bed low and locked with side rails adjusted as appropriate  - Keep care items and personal belongings within reach  - Initiate and maintain comfort rounds  - Make Fall Risk Sign visible to staff  - Offer Toileting   - Apply yellow socks and bracelet for high fall risk patients  - Consider moving patient to room near nurses station  Outcome: Progressing  Goal: Maintain or return to baseline ADL function  Description: INTERVENTIONS:  -  Assess patient's ability to carry out ADLs; assess patient's baseline for ADL function and identify physical deficits which impact ability to perform ADLs (bathing, care of mouth/teeth, toileting, grooming, dressing, etc.)  - Assess/evaluate cause of self-care deficits   - Assess range of motion  - Assess patient's mobility; develop plan if impaired  - Assess patient's need for assistive devices and provide as appropriate  - Encourage maximum independence but intervene and supervise when necessary  - Involve family in performance of ADLs  - Assess for home care needs following discharge   - Consider OT consult to assist with ADL evaluation and planning for discharge  - Provide patient education as appropriate  - Monitor functional capacity and physical performance, use of AM PAC & JH-HLM   - Monitor gait, balance and fatigue with ambulation    Outcome: Progressing  Goal: Maintains/Returns to pre admission functional level  Description: INTERVENTIONS:  - Perform AM-PAC 6 Click Basic Mobility/ Daily Activity assessment daily.  - Set and communicate daily mobility goal to care team and patient/family/caregiver.   - Collaborate with rehabilitation services on mobility goals if consulted  - Perform Range of Motion    - Out of bed for toileting  - Record patient progress and toleration of activity level   Outcome: Progressing     Problem: DISCHARGE PLANNING  Goal: Discharge to home or other facility with appropriate resources  Description: INTERVENTIONS:  - Identify barriers to discharge w/patient and caregiver  - Arrange for needed discharge resources and transportation as appropriate  - Identify discharge learning needs (meds, wound care, etc.)  - Arrange for interpretive services to assist at discharge as needed  - Refer to Case Management Department for coordinating discharge planning if the patient needs post-hospital services based on physician/advanced practitioner order or complex needs related to functional status, cognitive ability, or social support system  Outcome: Progressing     Problem: Knowledge Deficit  Goal: Patient/family/caregiver demonstrates understanding of disease process, treatment plan, medications, and discharge instructions  Description: Complete learning assessment and assess knowledge base.  Interventions:  - Provide teaching at level of understanding  - Provide teaching via preferred learning methods  Outcome: Progressing

## 2025-06-23 NOTE — ASSESSMENT & PLAN NOTE
Lab Results   Component Value Date/Time    HGBA1C 5.1 01/27/2025 03:08 PM    HGBA1C 4.8 10/31/2020 06:24 AM     Continue to monitor glucose levels   86.2

## 2025-06-23 NOTE — ASSESSMENT & PLAN NOTE
Lab Results   Component Value Date    CHOLESTEROL 192 01/28/2025    TRIG 148 01/28/2025    HDL 39 (L) 01/28/2025    LDLCALC 123 (H) 01/28/2025      Continue Lipitor 40 Mg daily

## 2025-06-23 NOTE — ED PROVIDER NOTES
Time reflects when diagnosis was documented in both MDM as applicable and the Disposition within this note       Time User Action Codes Description Comment    6/23/2025  6:25 AM Devonte Evans Add [R06.03] Acute respiratory distress     6/23/2025  6:25 AM Devonte Evans Add [E87.1] Hyponatremia     6/23/2025  6:25 AM Devonte Evans Add [E87.20] Lactic acidosis     6/23/2025  8:27 AM Peri Davis Add [Z13.9] Encounter for screening involving social determinants of health (SDoH)           ED Disposition       ED Disposition   Admit    Condition   Stable    Date/Time   Mon Jun 23, 2025  6:59 AM    Comment   Case was discussed with critical care and the patient's admission status was agreed to be inpatient to the service of  .               Assessment & Plan       Medical Decision Making  Patient with history as below presented with shortness of breath. Patient presents with vitals notable for tachycardia, tachypnea, hypotension. I considered the patient's chronic medical conditions including their morbid obesity, diabetes, hypertension, nicotine abuse, in forming my differential diagnosis, plan, and my medical decision making. I also reviewed their external records including admission 1/27/2025. History obtained from patient.    Differential diagnosis includes: Reactive lung disease with acute exacerbation, URI, pneumonia, pneumothorax, PE, ACS, at risk for sepsis.     Plan: Initial testing to include sepsis labs, troponin, BNP, CRP, VBG, ECG, CTA for PE. Initial therapeutics to include heart nebulizer, fluids, magnesium, Solu-Medrol, will empirically initiate on ceftriaxone with concern for possible infection.     After initial evaluation and testing, ECG independently interpreted by myself without acute ischemic changes as below. Labs reviewed and with leukocytosis, lactic acidosis, hyponatremia, hypokalemia which was repleted. Imaging pending official read however do not see obvious pulmonary  embolism or infiltrate.  Patient was treated with below with improvement in his hypotension, improving respiratory symptoms following urias nebulization. I believe the patient warrants admission given his significant hyponatremia, degree of respiratory symptoms upon arrival with continued wheezing on exam. Discussed patient's management with critical care who agreed to admit patient under their service.    Amount and/or Complexity of Data Reviewed  Labs: ordered. Decision-making details documented in ED Course.  Radiology: ordered.    Risk  Prescription drug management.  Decision regarding hospitalization.        ED Course as of 06/23/25 0843   Mon Jun 23, 2025   0453 Procedure Note: EKG  Date/Time: 06/23/25 4:58 AM   Interpreted by: Devonte Evans   Indications / Diagnosis: SOB  ECG reviewed by me, the ED Provider: yes   The EKG demonstrates:  Rhythm: MAT vs afib with PVCs  Axis: normal axis  QRS/Blocks: normal QRS  ST Changes: No acute ST Changes, no STD/TRACY.   0521 The 30ml/kg fluid bolus was not given to the patient despite hypotension and/or significantly elevated lactate of = 4 and/or presence of septic shock due to: Concern for fluid/volume overload. The patient will be administered 1L of crystalloid fluid instead. Orders for this have been placed in [facility]. The patient may receive additional colloid or crystalloid fluids thereafter based on clinical condition.     Nathan Whitney, DO    0609 Remainder of IBW fluids added.   0617 Sodium(!!): 114  Given this value seen on 2x BMP, will hold further fluids with patient now normotensive.   0655 Patient became hypotensive again so additional IBW fluids given.       Medications   potassium chloride 20 mEq IVPB (premix) (20 mEq Intravenous New Bag 6/23/25 0635)     Followed by   potassium chloride 20 mEq IVPB (premix) (has no administration in time range)   folic acid (FOLVITE) tablet 400 mcg (has no administration in time range)   albuterol (PROVENTIL  HFA,VENTOLIN HFA) inhaler 2 puff (has no administration in time range)   atorvastatin (LIPITOR) tablet 40 mg (has no administration in time range)   diltiazem (CARDIZEM CD) 24 hr capsule 300 mg ( Oral Held by provider 6/23/25 0736)   lisinopril (ZESTRIL) tablet 40 mg ( Oral Held by provider 6/23/25 7036)   QUEtiapine (SEROquel) tablet 50 mg (has no administration in time range)   sertraline (ZOLOFT) tablet 50 mg (has no administration in time range)   enoxaparin (LOVENOX) subcutaneous injection 40 mg (has no administration in time range)   levalbuterol (XOPENEX) inhalation solution 1.25 mg (has no administration in time range)   predniSONE tablet 40 mg (has no administration in time range)   polyethylene glycol (MIRALAX) packet 17 g (has no administration in time range)   senna-docusate sodium (SENOKOT S) 8.6-50 mg per tablet 2 tablet (has no administration in time range)   isosorbide mononitrate (IMDUR) 24 hr tablet 60 mg ( Oral Held by provider 6/23/25 6090)   spironolactone (ALDACTONE) tablet 25 mg ( Oral Held by provider 6/23/25 3739)   ceftriaxone (ROCEPHIN) 2 g/50 mL in dextrose IVPB (0 mg Intravenous Stopped 6/23/25 0548)   sodium chloride 0.9 % bolus 1,000 mL (0 mL Intravenous Stopped 6/23/25 0610)   albuterol inhalation solution 10 mg (10 mg Nebulization Given 6/23/25 0513)   ipratropium (ATROVENT) 0.02 % inhalation solution 1 mg (1 mg Nebulization Given 6/23/25 0513)   sodium chloride 0.9 % inhalation solution 12 mL (12 mL Nebulization Given 6/23/25 0513)   methylPREDNISolone sodium succinate (Solu-MEDROL) injection 125 mg (125 mg Intravenous Given 6/23/25 0516)   magnesium sulfate 2 g/50 mL IVPB (premix) 2 g (0 g Intravenous Stopped 6/23/25 0546)   iohexol (OMNIPAQUE) 350 MG/ML injection (MULTI-DOSE) 120 mL (120 mL Intravenous Given 6/23/25 0610)   multi-electrolyte (Plasmalyte-A/Isolyte-S PH 7.4/Normosol-R) IV bolus 1,000 mL (0 mL Intravenous Stopped 6/23/25 5104)   albumin human (FLEXBUMIN) 5 %  injection 12.5 g (has no administration in time range)       ED Risk Strat Scores                    No data recorded        SBIRT 20yo+      Flowsheet Row Most Recent Value   Initial Alcohol Screen: US AUDIT-C     1. How often do you have a drink containing alcohol? 4 Filed at: 06/23/2025 0527   2. How many drinks containing alcohol do you have on a typical day you are drinking?  4 Filed at: 06/23/2025 0527   3a. Male UNDER 65: How often do you have five or more drinks on one occasion? 0 Filed at: 06/23/2025 0527   Audit-C Score 8 Filed at: 06/23/2025 0527   Full Alcohol Screen: US AUDIT    4. How often during the last year have you found that you were not able to stop drinking once you had started? 0 Filed at: 06/23/2025 0527   5. How often during past year have you failed to do what was normally expected of you because of drinking?  0 Filed at: 06/23/2025 0527   6. How often in past year have you needed a first drink in the morning to get yourself going after a heavy drinking session?  0 Filed at: 06/23/2025 0527   7. How often in past year have you had feeling of guilt or remorse after drinking?  0 Filed at: 06/23/2025 0527   8. How often in past year have you been unable to remember what happened night before because you had been drinking?  0 Filed at: 06/23/2025 0527   9. Have you or someone else been injured as a result of your drinking?  0 Filed at: 06/23/2025 0527   10. Has a relative, friend, doctor or other health worker been concerned about your drinking and suggested you cut down?  0 Filed at: 06/23/2025 0527   AUDIT Total Score 8 Filed at: 06/23/2025 0527   ENRRIQUE: How many times in the past year have you...    Used an illegal drug or used a prescription medication for non-medical reasons? Never Filed at: 06/23/2025 0527                            History of Present Illness       Chief Complaint   Patient presents with    Shortness of Breath     Pt arrives via EMS from home, c/o increased dyspnea with  exertion, difficulty ambulating and completing ADLs due to dyspnea, also c/o intermittent tingling in extremities, and constipation x 1 week. Unclear onset and duration of symptoms. Pt reports living alone, taking some of his prescribed medications and not others due to financial cost. EMS reports home was unkempt, patient found sitting in chair, noted bottles of alcohol near patient. Pt denies heavy alcohol use, reports drinking some days.        Past Medical History[1]   Past Surgical History[2]   Family History[3]   Social History[4]   E-Cigarette/Vaping    E-Cigarette Use Never User       E-Cigarette/Vaping Substances    Nicotine No     THC No     CBD No     Flavoring No     Other No     Unknown No       I have reviewed and agree with the history as documented.     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  Patient is a poor historian, but reports that he has been having progressive shortness of breath, specifically over the last 2 days.  He has had inability to get out of his bed because of physical limitations as well as his severe shortness of breath.  He has been trying nebulizers at home, multiple times per day without improvement.  He denies any fevers.  He denies chest pain.  He is otherwise without complaint.        Review of Systems   Constitutional:  Negative for fever.   Respiratory:  Positive for shortness of breath.    Cardiovascular:  Negative for chest pain.   Gastrointestinal:  Negative for abdominal pain.           Objective       ED Triage Vitals   Temperature Pulse Blood Pressure Respirations SpO2 Patient Position - Orthostatic VS   06/23/25 0506 06/23/25 0448 06/23/25 0448 06/23/25 0448 06/23/25 0448 --   97.8 °F (36.6 °C) (!) 110 (!)  "78/52 (!) 30 96 %       Temp Source Heart Rate Source BP Location FiO2 (%) Pain Score    06/23/25 0506 -- 06/23/25 0515 -- 06/23/25 0715    Oral  Left arm  7      Vitals      Date and Time Temp Pulse SpO2 Resp BP Pain Score FACES Pain Rating User   06/23/25 0800 -- 98 96 % -- -- sneha -- -- EP   06/23/25 0745 -- 96 96 % 29 91/51 -- -- EP   06/23/25 0730 -- 98 95 % 28 92/56 -- -- EP   06/23/25 0715 -- 97 94 % 30 82/55 7 -- EP   06/23/25 0700 -- 106 95 % 30 84/48 -- -- EP   06/23/25 0645 97.6 °F (36.4 °C) 110 96 % -- 88/52 -- -- EP   06/23/25 0630 -- 106 95 % 34 90/50 -- -- EP   06/23/25 0614 -- 95 91 % 30 98/58 -- -- EP   06/23/25 0600 -- 107 94 % 30 84/42 -- -- EP   06/23/25 0545 -- 109 92 % 32 82/42 -- -- EP   06/23/25 0530 -- 110 95 % 32 -- sneha -- -- EP   06/23/25 0515 -- 114 95 % 34 72/48 sneha -- -- EP   06/23/25 0513 -- -- 95 % -- -- -- -- MS   06/23/25 0506 97.8 °F (36.6 °C) -- -- -- -- -- -- EP   06/23/25 0458 -- 114 96 % 32 75/45 -- -- EP   06/23/25 0448 -- 110 96 % 30 78/52 -- -- EP            Physical Exam  Constitutional:       Appearance: He is obese. He is ill-appearing.     Cardiovascular:      Rate and Rhythm: Tachycardia present. Rhythm irregular.   Pulmonary:      Effort: Tachypnea and accessory muscle usage present.      Breath sounds: Wheezing present.   Abdominal:      General: Abdomen is flat.      Palpations: Abdomen is soft.      Tenderness: There is no abdominal tenderness.     Musculoskeletal:      Right lower leg: Edema present.      Left lower leg: Edema present.     Neurological:      General: No focal deficit present.         Results Reviewed       Procedure Component Value Units Date/Time    HS Troponin I 4hr [[id number]] Collected: 06/23/25 0842    Lab Status: No result Specimen: Blood from Arm, Right     Osmolality-\"If this is regarding a toxic alcohol, please STOP and consult medical  for further guidance.\" [[id number]] Collected: 06/23/25 0836    Lab Status: No result " Specimen: Blood from Arm, Right     Cortisol Level, AM Specimen [[id number]] Collected: 06/23/25 0836    Lab Status: No result Specimen: Blood from Arm, Right     Platelet count [[id number]] Collected: 06/23/25 0836    Lab Status: No result Specimen: Blood from Arm, Right     Basic metabolic panel [[id number]]     Lab Status: No result Specimen: Blood     HS Troponin I 2hr [[id number]]  (Normal) Collected: 06/23/25 0658    Lab Status: Final result Specimen: Blood from Arm, Right Updated: 06/23/25 0730     hs TnI 2hr 25 ng/L      Delta 2hr hsTnI 1 ng/L     Osmolality, Urine, random [[id number]]     Lab Status: No result Specimen: Urine     Sodium, urine, random [[id number]]     Lab Status: No result Specimen: Urine     Lactic acid 2 Hours [[id number]]  (Abnormal) Collected: 06/23/25 0658    Lab Status: Final result Specimen: Blood from Arm, Right Updated: 06/23/25 0726     LACTIC ACID 5.5 mmol/L     Narrative:      Result may be elevated if tourniquet was used during collection.    UA w Reflex to Microscopic w Reflex to Culture [[id number]]     Lab Status: No result Specimen: Urine     Basic metabolic panel [[id number]]  (Abnormal) Collected: 06/23/25 0555    Lab Status: Final result Specimen: Blood from Arm, Right Updated: 06/23/25 0616     Sodium 114 mmol/L      Potassium 3.0 mmol/L      Chloride 76 mmol/L      CO2 25 mmol/L      ANION GAP 13 mmol/L      BUN 5 mg/dL      Creatinine 0.89 mg/dL      Glucose 124 mg/dL      Calcium 7.0 mg/dL      eGFR 94 ml/min/1.73sq m     Narrative:      National Kidney Disease Foundation guidelines for Chronic Kidney Disease (CKD):     Stage 1 with normal or high GFR (GFR > 90 mL/min/1.73 square meters)    Stage 2 Mild CKD (GFR = 60-89 mL/min/1.73 square meters)    Stage 3A Moderate CKD (GFR = 45-59 mL/min/1.73 square meters)    Stage 3B Moderate CKD (GFR = 30-44 mL/min/1.73 square meters)    Stage 4 Severe CKD (GFR = 15-29 mL/min/1.73 square meters)    Stage 5 End Stage CKD (GFR <15  mL/min/1.73 square meters)  Note: GFR calculation is accurate only with a steady state creatinine    C-reactive protein [[id number]]  (Abnormal) Collected: 06/23/25 0500    Lab Status: Final result Specimen: Blood from Hand, Left Updated: 06/23/25 0603     CRP 43.2 mg/L     Comprehensive metabolic panel [[id number]]  (Abnormal) Collected: 06/23/25 0500    Lab Status: Final result Specimen: Blood from Hand, Left Updated: 06/23/25 0548     Sodium 114 mmol/L      Potassium 3.2 mmol/L      Chloride 74 mmol/L      CO2 21 mmol/L      ANION GAP 19 mmol/L      BUN 5 mg/dL      Creatinine 0.88 mg/dL      Glucose 120 mg/dL      Calcium 7.5 mg/dL      Corrected Calcium 8.2 mg/dL      AST 47 U/L      ALT 28 U/L      Alkaline Phosphatase 174 U/L      Total Protein 5.4 g/dL      Albumin 3.1 g/dL      Total Bilirubin 1.90 mg/dL      eGFR 94 ml/min/1.73sq m     Narrative:      Result delayed, Specimen sent in Gold top tube which cannot be spin right away  National Kidney Disease Foundation guidelines for Chronic Kidney Disease (CKD):     Stage 1 with normal or high GFR (GFR > 90 mL/min/1.73 square meters)    Stage 2 Mild CKD (GFR = 60-89 mL/min/1.73 square meters)    Stage 3A Moderate CKD (GFR = 45-59 mL/min/1.73 square meters)    Stage 3B Moderate CKD (GFR = 30-44 mL/min/1.73 square meters)    Stage 4 Severe CKD (GFR = 15-29 mL/min/1.73 square meters)    Stage 5 End Stage CKD (GFR <15 mL/min/1.73 square meters)  Note: GFR calculation is accurate only with a steady state creatinine    FLU/COVID Rapid Antigen (30 min. TAT) - Preferred screening test in ED [[id number]]  (Normal) Collected: 06/23/25 0505    Lab Status: Final result Specimen: Nares from Nose Updated: 06/23/25 0547     SARS COV Rapid Antigen Negative     Influenza A Rapid Antigen Negative     Influenza B Rapid Antigen Negative    Narrative:      This test has been performed using the Official Limited Virtual [facility] 2 FLU+SARS Antigen test under the Emergency Use Authorization (EUA). This  test has been validated by the  and verified by the performing laboratory. The [facility] uses lateral flow immunofluorescent sandwich assay to detect SARS-COV, Influenza A and Influenza B Antigen.     The WISE s.r.lidel Malia 2 SARS Antigen test does not differentiate between SARS-CoV and SARS-CoV-2.     Negative results are presumptive and may be confirmed with a molecular assay, if necessary, for patient management. Negative results do not rule out SARS-CoV-2 or influenza infection and should not be used as the sole basis for treatment or patient management decisions. A negative test result may occur if the level of antigen in a sample is below the limit of detection of this test.     Positive results are indicative of the presence of viral antigens, but do not rule out bacterial infection or co-infection with other viruses.     All test results should be used as an adjunct to clinical observations and other information available to the provider.    FOR PEDIATRIC PATIENTS - copy/paste COVID Guidelines URL to browser: [email or web address]    Lactic acid [[id number]]  (Abnormal) Collected: 06/23/25 0500    Lab Status: Final result Specimen: Blood from Hand, Left Updated: 06/23/25 0543     LACTIC ACID 8.6 mmol/L     Narrative:      Result may be elevated if tourniquet was used during collection.    Procalcitonin [[id number]]  (Abnormal) Collected: 06/23/25 0500    Lab Status: Final result Specimen: Blood from Hand, Left Updated: 06/23/25 0542     Procalcitonin 0.29 ng/ml     HS Troponin 0hr (reflex protocol) [[id number]]  (Normal) Collected: 06/23/25 0500    Lab Status: Final result Specimen: Blood from Hand, Left Updated: 06/23/25 0540     hs TnI 0hr 24 ng/L     B-Type Natriuretic Peptide(BNP) [[id number]]  (Normal) Collected: 06/23/25 0500    Lab Status: Final result Specimen: Blood from Hand, Left Updated: 06/23/25 0539     BNP 49 pg/mL     Protime-INR [[id number]]   (Abnormal) Collected: 06/23/25 0500    Lab Status: Final result Specimen: Blood from Hand, Left Updated: 06/23/25 0538     Protime 15.8 seconds      INR 1.24    Narrative:      INR Therapeutic Range    Indication                                             INR Range      Atrial Fibrillation                                               2.0-3.0  Hypercoagulable State                                    2.0.2.3  Left Ventricular Asist Device                            2.0-3.0  Mechanical Heart Valve                                  -    Aortic(with afib, MI, embolism, HF, LA enlargement,    and/or coagulopathy)                                     2.0-3.0 (2.5-3.5)     Mitral                                                             2.5-3.5  Prosthetic/Bioprosthetic Heart Valve               2.0-3.0  Venous thromboembolism (VTE: VT, PE        2.0-3.0    APTT [[id number]]  (Abnormal) Collected: 06/23/25 0500    Lab Status: Final result Specimen: Blood from Hand, Left Updated: 06/23/25 0538     PTT 37 seconds     Blood culture #1 [[id number]] Collected: 06/23/25 0502    Lab Status: In process Specimen: Blood from Arm, Right Updated: 06/23/25 0524    Blood culture #2 [[id number]] Collected: 06/23/25 0500    Lab Status: In process Specimen: Blood from Hand, Left Updated: 06/23/25 0524    CBC and differential [[id number]]  (Abnormal) Collected: 06/23/25 0500    Lab Status: Final result Specimen: Blood from Hand, Left Updated: 06/23/25 0522     WBC 16.39 Thousand/uL      RBC 3.54 Million/uL      Hemoglobin 12.7 g/dL      Hematocrit 34.6 %      MCV 98 fL      MCH 35.9 pg      MCHC 36.7 g/dL      RDW 13.3 %      MPV 10.7 fL      Platelets 150 Thousands/uL      nRBC 0 /100 WBCs      Segmented % 92 %      Immature Grans % 1 %      Lymphocytes % 4 %      Monocytes % 3 %      Eosinophils Relative 0 %      Basophils Relative 0 %      Absolute Neutrophils 15.17 Thousands/µL      Absolute Immature Grans 0.08 Thousand/uL      Absolute  Lymphocytes 0.64 Thousands/µL      Absolute Monocytes 0.43 Thousand/µL      Eosinophils Absolute 0.02 Thousand/µL      Basophils Absolute 0.05 Thousands/µL     Narrative:      This is an appended report.  These results have been appended to a previously verified report.    Blood gas, Venous [[id number]]  (Abnormal) Collected: 06/23/25 0500    Lab Status: Final result Specimen: Blood from Hand, Left Updated: 06/23/25 0520     pH, Eugenio 7.406     pCO2, Eugenio 37.3 mm Hg      pO2, Eugenio 31.8 mm Hg      HCO3, Eugenio 22.9 mmol/L      Base Excess, Eugenio -1.4 mmol/L      O2 Content, Eugenio 11.8 ml/dL      O2 HGB, VENOUS 62.1 %             CTA chest pe study   Final Interpretation by Oniel Moran MD (06/23 0717)      No acute pulmonary embolus is seen.                        Workstation performed: [id number]             Procedures    Critical Care Time Statement: Upon my evaluation, this patient had a high probability of imminent or life-threatening deterioration due to acute respiratory distress requiring heart nebulization, hypotension requiring multiple fluid boluses, hyponatremia requiring critical care admission, which required my direct attention, intervention, and personal management.  I spent a total of 48 minutes directly providing critical care services, including interpretation of complex medical databases, evaluating for the presence of life-threatening injuries or illnesses, management of organ system failure(s) , complex medical decision making (to support/prevent further life-threatening deterioration)., and interpretation of hemodynamic data. This time is exclusive of procedures, teaching, treating other patients, family meetings, and any prior time recorded by providers other than myself.       ED Medication and Procedure Management   Prior to Admission Medications   Prescriptions Last Dose Informant Patient Reported? Taking?   LORazepam (ATIVAN) 1 mg tablet  Self No Yes   Sig: Take 1 tablet (1 mg total) by mouth 2  (two) times a day as needed for anxiety   QUEtiapine (SEROquel) 300 mg tablet   No Yes   Sig: TAKE ONE TABLET BY MOUTH EVERY DAY FOR SLEEP/MOOD   QUEtiapine (SEROquel) 50 mg tablet  Self Yes Yes   albuterol (PROVENTIL HFA,VENTOLIN HFA) 90 mcg/act inhaler   No Yes   Sig: INHALE 2 PUFFS BY MOUTH EVERY 6 HOURS AS NEEDED FOR SHORTNESS OF BREATH   atorvastatin (LIPITOR) 40 mg tablet Unknown  No No   Sig: TAKE ONE TABLET BY MOUTH EVERY DAY   diltiazem (CARDIZEM CD) 300 mg 24 hr capsule   No Yes   Sig: TAKE ONE CAPSULE BY MOUTH EVERY MORNING   folic acid (FOLVITE) 400 mcg tablet   No No   Sig: TAKE ONE TABLET BY MOUTH EVERY DAY   isosorbide dinitrate (ISORDIL) 10 mg tablet Unknown  Yes No   Sig: Take 10 mg by mouth in the morning and 10 mg at noon and 10 mg in the evening.   isosorbide mononitrate (IMDUR) 60 mg 24 hr tablet   No Yes   Sig: TAKE ONE TABLET BY MOUTH ONCE DAILY   lisinopril (ZESTRIL) 40 mg tablet   No Yes   Sig: TAKE ONE TABLET BY MOUTH EVERY DAY   nicotine (NICODERM CQ) 14 mg/24hr TD 24 hr patch   No No   Sig: Place 1 patch on the skin over 24 hours every 24 hours   omeprazole (PriLOSEC) 40 MG capsule   No Yes   Sig: Take 1 capsule (40 mg total) by mouth daily   rivaroxaban (Xarelto) 20 mg tablet   No No   Sig: Take 1 tablet (20 mg total) by mouth daily with breakfast   sertraline (ZOLOFT) 50 mg tablet  Self Yes Yes   Sig: Take 50 mg by mouth daily at bedtime   spironolactone (ALDACTONE) 25 mg tablet Unknown  No No   Sig: Take 1 tablet (25 mg total) by mouth daily   vitamin B-12 (VITAMIN B-12) 1,000 mcg tablet  Self No No   Sig: TAKE ONE TABLET BY MOUTH EVERY DAY      Facility-Administered Medications: None     Current Discharge Medication List        CONTINUE these medications which have NOT CHANGED    Details   albuterol (PROVENTIL HFA,VENTOLIN HFA) 90 mcg/act inhaler INHALE 2 PUFFS BY MOUTH EVERY 6 HOURS AS NEEDED FOR SHORTNESS OF BREATH  Qty: 6.7 g, Refills: 1    Comments: Substitution to a formulary  equivalent within the same pharmaceutical class is authorized.  Associated Diagnoses: Wheezing      diltiazem (CARDIZEM CD) 300 mg 24 hr capsule TAKE ONE CAPSULE BY MOUTH EVERY MORNING  Qty: 90 capsule, Refills: 3    Associated Diagnoses: Atrial flutter, unspecified type (HCC)      isosorbide mononitrate (IMDUR) 60 mg 24 hr tablet TAKE ONE TABLET BY MOUTH ONCE DAILY  Qty: 90 tablet, Refills: 1    Associated Diagnoses: 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HCC)      lisinopril (ZESTRIL) 40 mg tablet TAKE ONE TABLET BY MOUTH EVERY DAY  Qty: 90 tablet, Refills: 1    Associated Diagnoses: Primary hypertension      LORazepam (ATIVAN) 1 mg tablet Take 1 tablet (1 mg total) by mouth 2 (two) times a day as needed for anxiety  Qty: 60 tablet, Refills: 0    Associated Diagnoses: Depression with anxiety      omeprazole (PriLOSEC) 40 MG capsule Take 1 capsule (40 mg total) by mouth daily  Qty: 60 capsule, Refills: 5    Associated Diagnoses: Hypertension, unspecified type      !! QUEtiapine (SEROquel) 300 mg tablet TAKE ONE TABLET BY MOUTH EVERY DAY FOR SLEEP/MOOD  Qty: 30 tablet, Refills: 3    Associated Diagnoses: Other insomnia      !! QUEtiapine (SEROquel) 50 mg tablet       sertraline (ZOLOFT) 50 mg tablet Take 50 mg by mouth daily at bedtime      atorvastatin (LIPITOR) 40 mg tablet TAKE ONE TABLET BY MOUTH EVERY DAY  Qty: 90 tablet, Refills: 0    Associated Diagnoses: Dyslipidemia with elevated low density lipoprotein (LDL) cholesterol and abnormally low high density lipoprotein cholesterol      folic acid (FOLVITE) 400 mcg tablet TAKE ONE TABLET BY MOUTH EVERY DAY  Qty: 90 tablet, Refills: 0    Associated Diagnoses: Macrocytic      isosorbide dinitrate (ISORDIL) 10 mg tablet Take 10 mg by mouth in the morning and 10 mg at noon and 10 mg in the evening.      nicotine (NICODERM CQ) 14 mg/24hr TD 24 hr patch Place 1 patch on the skin over 24 hours every 24 hours  Qty: 14 patch, Refills: 0    Associated Diagnoses: Nicotine dependence  with nicotine-induced disorder, unspecified nicotine product type      rivaroxaban (Xarelto) 20 mg tablet Take 1 tablet (20 mg total) by mouth daily with breakfast  Qty: 90 tablet, Refills: 3    Associated Diagnoses: Atrial flutter, unspecified type (HCC)      spironolactone (ALDACTONE) 25 mg tablet Take 1 tablet (25 mg total) by mouth daily  Qty: 30 tablet, Refills: 0    Associated Diagnoses: Primary hypertension      vitamin B-12 (VITAMIN B-12) 1,000 mcg tablet TAKE ONE TABLET BY MOUTH EVERY DAY  Qty: 90 tablet, Refills: 0    Associated Diagnoses: Macrocytic       !! - Potential duplicate medications found. Please discuss with provider.        No discharge procedures on file.  ED SEPSIS DOCUMENTATION   Time reflects when diagnosis was documented in both MDM as applicable and the Disposition within this note       Time User Action Codes Description Comment    6/23/2025  6:25 AM Devonte Evans Add [R06.03] Acute respiratory distress     6/23/2025  6:25 AM Devonte Evans Add [E87.1] Hyponatremia     6/23/2025  6:25 AM Devonte Evans Add [E87.20] Lactic acidosis     6/23/2025  8:27 AM Peri Davis Add [Z13.9] Encounter for screening involving social determinants of health (SDoH)            Initial Sepsis Screening       Row Name 06/23/25 0629 06/23/25 0520             Is the patient's history suggestive of a new or worsening infection? -- Yes (Proceed)  -GJ       Suspected source of infection -- suspect infection, source unknown  -GJ       Indicate SIRS criteria -- Tachycardia > 90 bpm;Tachypnea > 20 resp per min;Leukocytosis (WBC > 04547 IJL) OR Leukopenia (WBC <4000 IJL) OR Bandemia (WBC >10% bands)  -GJ       Are two or more of the above signs & symptoms of infection both present and new to the patient? -- Yes (Proceed)  -GJ       Assess for evidence of organ dysfunction: Are any of the below criteria present within 6 hours of suspected infection and SIRS criteria that are NOT considered to be chronic  "conditions? -- SBP < 90  -       Date of presentation of septic shock -- 06/23/25  -       Time of presentation of septic shock -- 0520 -       Fluid Resuscitation: -- A lesser volume than 30 ml/kg IV fluid will be given  -GJ       The 30 mL/kg fluid bolus was not given to the patient despite having hypotension, a lactate of >= 4 mmol/L, or documentation of septic shock secondary to: -- Concern for fluid/volume overload  -GJ       Instead of the 30 ml/kg fluid bolus, the following volume of crystalloid fluid will be ordered: -- 1L  -GJ       Of the following fluid type: -- NSS  -GJ       Is the patient persistently hypotensive in the hour after fluid bolus administration? If yes, patient meets criteria for vasopressor use. NO  -GJ --       Sepsis Note: Click \"NEXT\" below (NOT \"close\") to generate sepsis note based on above information. -- --                 User Key  (r) = Recorded By, (t) = Taken By, (c) = Cosigned By      Initials Name Provider Type    FRANCISCO Evans DO Physician                  Default Flowsheet Data (Last 720 Hours)       Sepsis Reassess       Row Name 06/23/25 0628                   Repeat Volume Status and Tissue Perfusion Assessment Performed    Date of Reassessment: 06/23/25 -        Time of Reassessment: 0629 -        Sepsis Reassessment Note: Click \"NEXT\" below (NOT \"close\") to generate sepsis reassessment note. --        Repeat Volume Status and Tissue Perfusion Assessment Performed --                  User Key  (r) = Recorded By, (t) = Taken By, (c) = Cosigned By      Initials Name Provider Type    FRANCISCO Evans DO Physician                       [1]   Past Medical History:  Diagnosis Date    A-fib (HCC)     Anxiety     Asthma     Colon polyp     Coronary artery disease 11/5/2021    Diabetes mellitus (HCC)     GERD (gastroesophageal reflux disease)     History of ileus     Hypertension     Insomnia     Kidney stone     Lumbar herniated disc     last " assessed: 3/27/2015    MDD (major depressive disorder)     MI, old     Patella fracture     last assessed: 3/27/2015    Pneumonia     Tobacco abuse    [2]   Past Surgical History:  Procedure Laterality Date    BACK SURGERY      Lumbar    CARDIAC CATHETERIZATION N/A 2021    Procedure: CARDIAC CATHETERIZATION;  Surgeon: Herbie Castaneda MD;  Location: [facility];  Service: Cardiology    CARDIAC ELECTROPHYSIOLOGY PROCEDURE N/A 2024    Procedure: Cardiac eps/aflutter ablation;  Surgeon: Nilay Lopez MD;  Location: [facility];  Service: Cardiology    CARDIAC ELECTROPHYSIOLOGY PROCEDURE N/A 2025    Procedure: Cardiac loop recorder implant;  Surgeon: Wendy Emmanuel MD;  Location: [facility];  Service: Cardiology    COLONOSCOPY      HERNIA REPAIR      KNEE SURGERY      right    LUMBAR FUSION      last assessed: 3/27/2015    NE RPR UMBILICAL HRNA 5 YRS/> REDUCIBLE N/A 2022    Procedure: REPAIR HERNIA UMBILICAL w/ mesh;  Surgeon: Edna Siddiqui DO;  Location: [facility];  Service: General    NE TX TIBL SHFT FX IMED IMPLT W/WO SCREWS&/CERCLA Left 2017    Procedure: INSERTION NAIL IM TIBIA;  Surgeon: Edna Abrams DO;  Location: [facility];  Service: Orthopedics    SPINE SURGERY     [3]   Family History  Problem Relation Name Age of Onset    Pancreatic cancer Mother Alina     Cancer Mother Alina     COPD Father Torey sr     Asthma Father Torey sr     Cancer Father Torey sr         Lung cancer passed 10/27/2021    Heart attack Brother  61        decreased    Drug abuse Brother      Alcohol abuse Brother      Sleep apnea Brother      Cancer Family      Hypertension Family      Heart disease Family     [4]   Social History  Tobacco Use    Smoking status: Former     Current packs/day: 0.00     Average packs/day: 1.5 packs/day for 34.0 years (51.0 ttl pk-yrs)     Types: Cigarettes     Start date: 1987     Quit date:      Years since quittin.4    Smokeless  tobacco: Never    Tobacco comments:     Pt not interested at this time;   Vaping Use    Vaping status: Never Used   Substance Use Topics    Alcohol use: Yes     Alcohol/week: 3.0 standard drinks of alcohol     Types: 3 Glasses of wine per week     Comment: ; reports wine every other night; approx 3 glasses    Drug use: No        Devonte Evans,   06/23/25 0802

## 2025-06-23 NOTE — ASSESSMENT & PLAN NOTE
Patient has not had a bowel movement in the past 6 days  Patient has past medical history of ileus  MiraLAX daily and senna twice daily

## 2025-06-23 NOTE — QUICK NOTE
Updated patient's son on the phone, answered his questions and concerns and informed him about the management.

## 2025-06-23 NOTE — SEPSIS NOTE
"Sepsis Note   Torey Del Castillo Jr. 59 y.o. male MRN: [id number]  Unit/Bed#: ICU 08 Encounter: [id number]       Initial Sepsis Screening       Row Name 06/23/25 0629 06/23/25 0520             Is the patient's history suggestive of a new or worsening infection? -- Yes (Proceed)  -GJ       Suspected source of infection -- suspect infection, source unknown  -GJ       Indicate SIRS criteria -- Tachycardia > 90 bpm;Tachypnea > 20 resp per min;Leukocytosis (WBC > 28377 IJL) OR Leukopenia (WBC <4000 IJL) OR Bandemia (WBC >10% bands)  -GJ       Are two or more of the above signs & symptoms of infection both present and new to the patient? -- Yes (Proceed)  -GJ       Assess for evidence of organ dysfunction: Are any of the below criteria present within 6 hours of suspected infection and SIRS criteria that are NOT considered to be chronic conditions? -- SBP < 90  -GJ       Date of presentation of septic shock -- 06/23/25  -GJ       Time of presentation of septic shock -- 0520  -GJ       Fluid Resuscitation: -- A lesser volume than 30 ml/kg IV fluid will be given  -GJ       The 30 mL/kg fluid bolus was not given to the patient despite having hypotension, a lactate of >= 4 mmol/L, or documentation of septic shock secondary to: -- Concern for fluid/volume overload  -GJ       Instead of the 30 ml/kg fluid bolus, the following volume of crystalloid fluid will be ordered: -- 1L  -GJ       Of the following fluid type: -- NSS  -GJ       Is the patient persistently hypotensive in the hour after fluid bolus administration? If yes, patient meets criteria for vasopressor use. NO  -GJ --       Sepsis Note: Click \"NEXT\" below (NOT \"close\") to generate sepsis note based on above information. -- --                 User Key  (r) = Recorded By, (t) = Taken By, (c) = Cosigned By      Initials Name Provider Type    FRANCISCO Evans,  Physician                   Initial Sepsis Screening       Row Name 06/23/25 0629 06/23/25 0520             "    Initial Sepsis Assessment    Is the patient's history suggestive of a new or worsening infection? -- Yes (Proceed)  -GJ       Suspected source of infection -- suspect infection, source unknown  -GJ       Indicate SIRS criteria -- Tachycardia > 90 bpm;Tachypnea > 20 resp per min;Leukocytosis (WBC > 78814 IJL) OR Leukopenia (WBC <4000 IJL) OR Bandemia (WBC >10% bands)  -GJ       Are two or more of the above signs & symptoms of infection both present and new to the patient? -- Yes (Proceed)  -GJ          If the answer is yes to both above questions, suspicion of sepsis is present. Proceed with algorithm to determine if severe sepsis or septic shock criteria are met.    Assess for evidence of organ dysfunction: Are any of the below criteria present within 6 hours of suspected infection and SIRS criteria that are NOT considered to be chronic conditions? -- SBP < 90  -GJ          Based on the above information, the patient meets criteria for SEPTIC SHOCK. CALL A SEPSIS ALERT. Use sepsis order set. If the lactate is > or equal to 4, or the SBP is <90, give 30 ml/kg cystalloid IV fluid bolus or document exclusion criteria.    Date of presentation of septic shock -- 06/23/25  -GJ       Time of presentation of septic shock -- 0520 -GJ       Fluid Resuscitation: -- A lesser volume than 30 ml/kg IV fluid will be given  -GJ       The 30 mL/kg fluid bolus was not given to the patient despite having hypotension, a lactate of >= 4 mmol/L, or documentation of septic shock secondary to: -- Concern for fluid/volume overload  -GJ       Instead of the 30 ml/kg fluid bolus, the following volume of crystalloid fluid will be ordered: -- 1L  -GJ       Of the following fluid type: -- NSS  -GJ       Is the patient persistently hypotensive in the hour after fluid bolus administration? If yes, patient meets criteria for vasopressor use. NO  -GJ --                 User Key  (r) = Recorded By, (t) = Taken By, (c) = Cosigned By      Initials  "Name Provider Type    FRANCISCO Evans DO Physician                     Default Flowsheet Data (Last 720 Hours)       Sepsis Reassess       Row Name 06/23/25 1128 06/23/25 0628                Repeat Volume Status and Tissue Perfusion Assessment Performed    Date of Reassessment: 06/23/25  -KS 06/23/25  -       Time of Reassessment: 0930 -KS 0629  -       Sepsis Reassessment Note: Click \"NEXT\" below (NOT \"close\") to generate sepsis reassessment note. YES (proceed by clicking \"NEXT\")  -KS --       Repeat Volume Status and Tissue Perfusion Assessment Performed -- --                 User Key  (r) = Recorded By, (t) = Taken By, (c) = Cosigned By      Initials Name Provider Type    FRANCISCO Evans DO Physician    TWIN Rodriguez MD Resident                    Body mass index is 49.03 kg/m².  Wt Readings from Last 1 Encounters:   06/23/25 (!) 155 kg (341 lb 11.4 oz)     IBW (Ideal Body Weight): 73 kg    Ideal body weight: 73 kg (160 lb 15 oz)  Adjusted ideal body weight: 106 kg (233 lb 3.9 oz)    Doubtful sepsis condition for this patient  Elevated lactic acidosis can be secondary to hypotension  Hypotension is most likely secondary to dehydration  Patient is status post 2 L of fluids along with 1 dose of albumin  Will continue to monitor the patient and monitor blood pressure  If required we will get vasopressors to the patient  "

## 2025-06-23 NOTE — Clinical Note
Case was discussed with critical care and the patient's admission status was agreed to be inpatient to the service of Dr. Oh   Health Care Proxy (HCP)

## 2025-06-23 NOTE — ASSESSMENT & PLAN NOTE
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

## 2025-06-23 NOTE — PROCEDURES
Arterial Line Insertion    Date/Time: 6/23/2025 12:23 PM    Performed by: Alphonso Cuevas MD  Authorized by: Alphonso Cuevas MD    Patient location:  ICU  Consent:     Consent obtained:  Emergent situation  Universal protocol:     Immediately prior to procedure a time out was called: yes      Patient identity confirmed:  Arm band and hospital-assigned identification number  Indications:     Indications: hemodynamic monitoring and multiple ABGs    Pre-procedure details:     Skin preparation:  Chlorhexidine    Preparation: Patient was prepped and draped in sterile fashion    Anesthesia (see MAR for exact dosages):     Anesthesia method:  Local infiltration    Local anesthetic:  Lidocaine 1% w/o epi  Procedure details:   Line Type: Arterial Line    Location / Tip of Catheter:  Radial    Laterality:  Right    Wisam's test performed: yes      Wisam's test abnormal: no      Number of attempts:  1    Successful placement: no    Comments:      Patient was not cooperative and moving his arm repeatedly during procedure. His BP was improved so decision made to stop procedure at this time.

## 2025-06-24 NOTE — ASSESSMENT & PLAN NOTE
Lab Results   Component Value Date/Time    SODIUM 116 (LL) 06/24/2025 02:03 AM    SODIUM 116 (LL) 06/23/2025 11:27 PM    SODIUM 138 07/11/2024 04:04 AM    SODIUM 140 07/10/2024 05:18 AM     According to the EMS, multiple beer cans and bottles were found  Can be in the setting of potomania  Urine osmolality 211, urine sodium less than 10, serum osmolality 245  Serum cortisol, a.m. elevated at 24.3  Continue normal saline 1.8% 50 mL  Continue to monitor BMP every 6 hours  Nephrology consult

## 2025-06-24 NOTE — PROGRESS NOTES
06/24/25 1100   Sacramental Encounters   Communion Given Indicator Yes   Sacrament of Sick-Anointing Anointed

## 2025-06-24 NOTE — ASSESSMENT & PLAN NOTE
Lab Results   Component Value Date/Time    LACTICACID 1.9 06/24/2025 02:03 AM    LACTICACID 2.5 (H) 06/23/2025 11:27 PM     Lactic acid can be elevated in the setting of hypotension versus albuterol induced  Resolved

## 2025-06-24 NOTE — ASSESSMENT & PLAN NOTE
Lactic acid 8.6 on arrival, WBCs 16.39  Hypotensive with systolic blood pressure in 80s  Status post 2 L of fluid and 1 dose of albumin  CTA PE negative for pulmonary embolism  Patient is afebrile, leukocytosis most likely secondary to stress  Lactic acid most likely secondary to hypotension and less likely albuterol induced  Patient is less likely septic, most symptoms secondary to hyponatremia  Lactic acid resolved

## 2025-06-24 NOTE — PLAN OF CARE
Problem: Potential for Falls  Goal: Patient will remain free of falls  Description: INTERVENTIONS:  - Educate patient/family on patient safety including physical limitations  - Instruct patient to call for assistance with activity   - Consider consulting OT/PT to assist with strengthening/mobility based on AM PAC & JH-HLM score  - Consult OT/PT to assist with strengthening/mobility   - Keep Call bell within reach  - Keep bed low and locked with side rails adjusted as appropriate  - Keep care items and personal belongings within reach  - Initiate and maintain comfort rounds  - Make Fall Risk Sign visible to staff  - Offer Toileting every 2 Hours, in advance of need  - Initiate/Maintain bed alarm  - Obtain necessary fall risk management equipment: socks  - Apply yellow socks and bracelet for high fall risk patients  - Consider moving patient to room near nurses station  Outcome: Progressing     Problem: Nutrition/Hydration-ADULT  Goal: Nutrient/Hydration intake appropriate for improving, restoring or maintaining nutritional needs  Description: Monitor and assess patient's nutrition/hydration status for malnutrition. Collaborate with interdisciplinary team and initiate plan and interventions as ordered.  Monitor patient's weight and dietary intake as ordered or per policy. Utilize nutrition screening tool and intervene as necessary. Determine patient's food preferences and provide high-protein, high-caloric foods as appropriate.     INTERVENTIONS:  - Monitor oral intake, urinary output, labs, and treatment plans  - Assess nutrition and hydration status and recommend course of action  - Evaluate amount of meals eaten  - Assist patient with eating if necessary   - Allow adequate time for meals  - Recommend/ encourage appropriate diets, oral nutritional supplements, and vitamin/mineral supplements  - Order, calculate, and assess calorie counts as needed  - Recommend, monitor, and adjust tube feedings and TPN/PPN based  on assessed needs  - Assess need for intravenous fluids  - Provide specific nutrition/hydration education as appropriate  - Include patient/family/caregiver in decisions related to nutrition  Outcome: Progressing     Problem: PAIN - ADULT  Goal: Verbalizes/displays adequate comfort level or baseline comfort level  Description: Interventions:  - Encourage patient to monitor pain and request assistance  - Assess pain using appropriate pain scale  - Administer analgesics as ordered based on type and severity of pain and evaluate response  - Implement non-pharmacological measures as appropriate and evaluate response  - Consider cultural and social influences on pain and pain management  - Notify physician/advanced practitioner if interventions unsuccessful or patient reports new pain  - Educate patient/family on pain management process including their role and importance of  reporting pain   - Provide non-pharmacologic/complimentary pain relief interventions  Outcome: Progressing     Problem: INFECTION - ADULT  Goal: Absence or prevention of progression during hospitalization  Description: INTERVENTIONS:  - Assess and monitor for signs and symptoms of infection  - Monitor lab/diagnostic results  - Monitor all insertion sites, i.e. indwelling lines, tubes, and drains  - Monitor endotracheal if appropriate and nasal secretions for changes in amount and color  - [place] appropriate cooling/warming therapies per order  - Administer medications as ordered  - Instruct and encourage patient and family to use good hand hygiene technique  - Identify and instruct in appropriate isolation precautions for identified infection/condition  Outcome: Progressing  Goal: Absence of fever/infection during neutropenic period  Description: INTERVENTIONS:  - Monitor WBC  - Perform strict hand hygiene  - Limit to healthy visitors only  - No plants, dried, fresh or silk flowers with han in patient room  Outcome: Progressing     Problem:  SAFETY ADULT  Goal: Patient will remain free of falls  Description: INTERVENTIONS:  - Educate patient/family on patient safety including physical limitations  - Instruct patient to call for assistance with activity   - Consider consulting OT/PT to assist with strengthening/mobility based on AM PAC & JH-HLM score  - Consult OT/PT to assist with strengthening/mobility   - Keep Call bell within reach  - Keep bed low and locked with side rails adjusted as appropriate  - Keep care items and personal belongings within reach  - Initiate and maintain comfort rounds  - Make Fall Risk Sign visible to staff  - Offer Toileting every 2 Hours, in advance of need  - Initiate/Maintain bed alarm  - Obtain necessary fall risk management equipment: socks  - Apply yellow socks and bracelet for high fall risk patients  - Consider moving patient to room near nurses station  Outcome: Progressing  Goal: Maintain or return to baseline ADL function  Description: INTERVENTIONS:  -  Assess patient's ability to carry out ADLs; assess patient's baseline for ADL function and identify physical deficits which impact ability to perform ADLs (bathing, care of mouth/teeth, toileting, grooming, dressing, etc.)  - Assess/evaluate cause of self-care deficits   - Assess range of motion  - Assess patient's mobility; develop plan if impaired  - Assess patient's need for assistive devices and provide as appropriate  - Encourage maximum independence but intervene and supervise when necessary  - Involve family in performance of ADLs  - Assess for home care needs following discharge   - Consider OT consult to assist with ADL evaluation and planning for discharge  - Provide patient education as appropriate  - Monitor functional capacity and physical performance, use of AM PAC & JH-HLM   - Monitor gait, balance and fatigue with ambulation    Outcome: Progressing  Goal: Maintains/Returns to pre admission functional level  Description: INTERVENTIONS:  - Perform  AM-PAC 6 Click Basic Mobility/ Daily Activity assessment daily.  - Set and communicate daily mobility goal to care team and patient/family/caregiver.   - Collaborate with rehabilitation services on mobility goals if consulted  - Perform Range of Motion 3 times a day.  - Reposition patient every 2 hours.  - Dangle patient 3 times a day  - Stand patient 3 times a day  - Ambulate patient 3 times a day  - Out of bed to chair 3 times a day   - Out of bed for meals 3 times a day  - Out of bed for toileting  - Record patient progress and toleration of activity level   Outcome: Progressing     Problem: DISCHARGE PLANNING  Goal: Discharge to home or other facility with appropriate resources  Description: INTERVENTIONS:  - Identify barriers to discharge w/patient and caregiver  - Arrange for needed discharge resources and transportation as appropriate  - Identify discharge learning needs (meds, wound care, etc.)  - Arrange for interpretive services to assist at discharge as needed  - Refer to Case Management Department for coordinating discharge planning if the patient needs post-hospital services based on physician/advanced practitioner order or complex needs related to functional status, cognitive ability, or social support system  Outcome: Progressing     Problem: Knowledge Deficit  Goal: Patient/family/caregiver demonstrates understanding of disease process, treatment plan, medications, and discharge instructions  Description: Complete learning assessment and assess knowledge base.  Interventions:  - Provide teaching at level of understanding  - Provide teaching via preferred learning methods  Outcome: Progressing     Problem: Prexisting or High Potential for Compromised Skin Integrity  Goal: Skin integrity is maintained or improved  Description: INTERVENTIONS:  - Identify patients at risk for skin breakdown  - Assess and monitor skin integrity including under and around medical devices   - Assess and monitor nutrition  and hydration status  - Monitor labs  - Assess for incontinence   - Turn and reposition patient  - Assist with mobility/ambulation  - Relieve pressure over ryley prominences   - Avoid friction and shearing  - Provide appropriate hygiene as needed including keeping skin clean and dry  - Evaluate need for skin moisturizer/barrier cream  - Collaborate with interdisciplinary team  - Patient/family teaching  - Consider wound care consult    Assess:  - Review Marcso scale daily  - Clean and moisturize skin every shift  - Inspect skin when repositioning, toileting, and assisting with ADLS  - Assess under medical devices such as bp cuff every shift  - Assess extremities for adequate circulation and sensation     Bed Management:  - Have minimal linens on bed & keep smooth, unwrinkled  - Change linens as needed when moist or perspiring  - Avoid sitting or lying in one position for more than 2 hours while in bed?Keep HOB at 30 degrees   - Toileting:  - Offer bedside commode  - Assess for incontinence every 2 hours  - Use incontinent care products after each incontinent episode such as wipes    Activity:  - Mobilize patient 3 times a day  - Encourage activity and walks on unit  - Encourage or provide ROM exercises   - Turn and reposition patient every 2 Hours  - Use appropriate equipment to lift or move patient in bed  - Instruct/ Assist with weight shifting every 2 hours when out of bed in chair  - Consider limitation of chair time 2 hour intervals    Skin Care:  - Avoid use of baby powder, tape, friction and shearing, hot water or constrictive clothing  - Relieve pressure over bony prominences using pillows  - Do not massage red bony areas    Next Steps:  - Teach patient strategies to minimize risks such as turning  - Consider consults to  interdisciplinary teams such as wound  Outcome: Progressing     Problem: RESPIRATORY - ADULT  Goal: Achieves optimal ventilation and oxygenation  Description: INTERVENTIONS:  - Assess for  changes in respiratory status  - Assess for changes in mentation and behavior  - Position to facilitate oxygenation and minimize respiratory effort  - Oxygen administered by appropriate delivery if ordered  - Initiate smoking cessation education as indicated  - Encourage broncho-pulmonary hygiene including cough, deep breathe, Incentive Spirometry  - Assess the need for suctioning and aspirate as needed  - Assess and instruct to report SOB or any respiratory difficulty  - Respiratory Therapy support as indicated  Outcome: Progressing     Problem: GASTROINTESTINAL - ADULT  Goal: Maintains or returns to baseline bowel function  Description: INTERVENTIONS:  - Assess bowel function  - Encourage oral fluids to ensure adequate hydration  - Administer IV fluids if ordered to ensure adequate hydration  - Administer ordered medications as needed  - Encourage mobilization and activity  - Consider nutritional services referral to assist patient with adequate nutrition and appropriate food choices  Outcome: Progressing     Problem: METABOLIC, FLUID AND ELECTROLYTES - ADULT  Goal: Electrolytes maintained within normal limits  Description: INTERVENTIONS:  - Monitor labs and assess patient for signs and symptoms of electrolyte imbalances  - Administer electrolyte replacement as ordered  - Monitor response to electrolyte replacements, including repeat lab results as appropriate  - Instruct patient on fluid and nutrition as appropriate  Outcome: Progressing     Problem: SKIN/TISSUE INTEGRITY - ADULT  Goal: Skin Integrity remains intact(Skin Breakdown Prevention)  Description: Assess:  -Perform Marcos assessment every shift  -Clean and moisturize skin every shift  -Inspect skin when repositioning, toileting, and assisting with ADLS  -Assess under medical devices such as bp cuff every shift  -Assess extremities for adequate circulation and sensation     Bed Management:  -Have minimal linens on bed & keep smooth, unwrinkled  -Change  linens as needed when moist or perspiring  -Avoid sitting or lying in one position for more than 2 hours while in bed  -Keep HOB at 30 degrees     Toileting:  -Offer bedside commode  -Assess for incontinence every 2 hours  -Use incontinent care products after each incontinent episode such as wipes    Activity:  -Mobilize patient 3 times a day  -Encourage activity and walks on unit  -Encourage or provide ROM exercises   -Turn and reposition patient every 2 Hours  -Use appropriate equipment to lift or move patient in bed  -Instruct/ Assist with weight shifting every 2 hours when out of bed in chair  -Consider limitation of chair time 2 hour intervals    Skin Care:  -Avoid use of baby powder, tape, friction and shearing, hot water or constrictive clothing  -Relieve pressure over bony prominences using pillows  -Do not massage red bony areas    Next Steps:  -Teach patient strategies to minimize risks such as turning   -Consider consults to  interdisciplinary teams such as wound  Outcome: Progressing     Problem: HEMATOLOGIC - ADULT  Goal: Maintains hematologic stability  Description: INTERVENTIONS  - Assess for signs and symptoms of bleeding or hemorrhage  - Monitor labs  - Administer supportive blood products/factors as ordered and appropriate  Outcome: Progressing

## 2025-06-24 NOTE — ASSESSMENT & PLAN NOTE
Patient has ongoing dyspnea for months, since June 2024 however has been exacerbated in the past 2 to 3 days  Dyspnea can be multifactorial in nature including obesity, PRAKASH, smoking  Patient denies any history of COPD  Patient denies any noncompliance with medication at home  Patient does not wear oxygen at home, uses CPAP  Diffuse wheezing noted on physical examination, status post albuterol nebulizer, Xopenex and Solu-Medrol 125 Mg IV  Patient denies any chest pain, fevers or chills  Patient does endorse stuffy nose and upper respiratory symptoms  Flu RSV COVID-negative  CTA PE negative for acute pulmonary embolism    PLAN:  Continue Xopenex 3 times daily  Continue BiPAP  Continue prednisone 40 Mg daily

## 2025-06-24 NOTE — PROGRESS NOTES
Progress Note - Critical Care/ICU   Name: Torey Del Castillo Jr. 59 y.o. male I MRN: [id number]  Unit/Bed#: ICU 08 I Date of Admission: 6/23/2025   Date of Service: 6/24/2025 I Hospital Day: 1      Assessment & Plan  Hyponatremia  Lab Results   Component Value Date/Time    SODIUM 116 (LL) 06/24/2025 02:03 AM    SODIUM 116 (LL) 06/23/2025 11:27 PM    SODIUM 138 07/11/2024 04:04 AM    SODIUM 140 07/10/2024 05:18 AM     According to the EMS, multiple beer cans and bottles were found  Can be in the setting of potomania  Urine osmolality 211, urine sodium less than 10, serum osmolality 245  Serum cortisol, a.m. elevated at 24.3  Continue normal saline 1.8% 50 mL  Continue to monitor BMP every 6 hours  Nephrology consult    SIRS (systemic inflammatory response syndrome) (HCC)  Lactic acid 8.6 on arrival, WBCs 16.39  Hypotensive with systolic blood pressure in 80s  Status post 2 L of fluid and 1 dose of albumin  CTA PE negative for pulmonary embolism  Patient is afebrile, leukocytosis most likely secondary to stress  Lactic acid most likely secondary to hypotension and less likely albuterol induced  Patient is less likely septic, most symptoms secondary to hyponatremia  Lactic acid resolved  Dyspnea on exertion  Patient has ongoing dyspnea for months, since June 2024 however has been exacerbated in the past 2 to 3 days  Dyspnea can be multifactorial in nature including obesity, PRAKASH, smoking  Patient denies any history of COPD  Patient denies any noncompliance with medication at home  Patient does not wear oxygen at home, uses CPAP  Diffuse wheezing noted on physical examination, status post albuterol nebulizer, Xopenex and Solu-Medrol 125 Mg IV  Patient denies any chest pain, fevers or chills  Patient does endorse stuffy nose and upper respiratory symptoms  Flu RSV COVID-negative  CTA PE negative for acute pulmonary embolism    PLAN:  Continue Xopenex 3 times daily  Continue BiPAP  Continue prednisone 40 Mg daily  Elevated  lactic acid level  Lab Results   Component Value Date/Time    LACTICACID 1.9 06/24/2025 02:03 AM    LACTICACID 2.5 (H) 06/23/2025 11:27 PM     Lactic acid can be elevated in the setting of hypotension versus albuterol induced  Resolved    Constipation  Patient has not had a bowel movement in the past 6 days  Patient has past medical history of ileus  MiraLAX daily and senna twice daily  Suppository daily  H/o Atrial flutter (HCC)-rate controlled  History of atrial flutter status post cardioversion in 2018 and ablation 6/21/2024  Hold Cardizem 300 Mg daily in the setting of hypotension  Not on any anticoagulation according to the patient  Anxiety  Continue Zoloft 50 Mg daily  Hypertension  Hold lisinopril 40 Mg daily in the setting of hypotension  Prediabetes  Lab Results   Component Value Date/Time    HGBA1C 5.1 01/27/2025 03:08 PM    HGBA1C 4.8 10/31/2020 06:24 AM     Continue to monitor glucose levels  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 Yes  Need for changes to pain regimen? No   B: Both SAT/SAT  N/A   C: Choice of Sedation RASS Goal: 0 Alert and Calm  Need for changes to sedation or analgesia regimen? No   D: Delirium CAM-ICU: Negative   E: Early Mobility  Plan for early mobility? N/A   F: Family Engagement Plan for family engagement today? Yes       Review of Invasive Devices:    Marisela Plan: Continue for accurate I/O monitoring for 48 hours        Prophylaxis:  VTE VTE covered by:  enoxaparin,  Subcutaneous, 40 mg at 06/23/25 2150       Stress Ulcer  not orderedcovered byomeprazole (PriLOSEC) 40 MG capsule [[id number]] (Long-Term Med)         24 Hour Events : Patient's sodium continues to be low, was transition to normal saline 125 cc/h however sodium did not improve.  Patient was started on normal saline 1.8% at 50 cc/h overnight.  Subjective patient is alert oriented to time place and person.  Patient complains of abdominal pain, most likely secondary to constipation.  Review of Systems: See HPI for Review of Systems    Objective :                   Vitals I/O      Most Recent Min/Max in 24hrs   Temp 98.6 °F (37 °C) Temp  Min: 97.6 °F (36.4 °C)  Max: 98.6 °F (37 °C)   Pulse 87 Pulse  Min: 85  Max: 111   Resp 20 Resp  Min: 19  Max: 33   /53 BP  Min: 65/37  Max: 164/89   O2 Sat 95 % SpO2  Min: 91 %  Max: 99 %      Intake/Output Summary (Last 24 hours) at 6/24/2025 0653  Last data filed at 6/24/2025 0434  Gross per 24 hour   Intake 3710 ml   Output 3875 ml   Net -165 ml       Diet Cardiovascular; Sodium 2 GM; Sodium 2 GM    Invasive Monitoring           Physical Exam   Physical Exam  Eyes:      General: Vision grossly intact.      Extraocular Movements: Extraocular movements intact.      Conjunctiva/sclera: Conjunctivae normal.      Pupils: Pupils are equal, round, and reactive to light.   Skin:     General: Skin is warm.   HENT:      Head: Normocephalic and atraumatic.      Mouth/Throat:      Mouth: Mucous membranes are dry.   Neck:      Comments: Bilateral fat stranding around the neckCardiovascular:      Rate and Rhythm: Normal rate and regular rhythm.      Pulses: Normal pulses.   Musculoskeletal:      Right lower leg: Edema present.      Left lower leg: Edema present.   Abdominal: General: There is distension.     Palpations: Abdomen is soft.      Tenderness: There is abdominal tenderness.   Constitutional:       Appearance: He is morbidly obese.   Pulmonary:      Effort: Pulmonary effort is  normal.      Breath sounds: Wheezing present.   Neurological:      General: No focal deficit present.      Mental Status: He is alert and oriented to person, place and time. Mental status is at baseline.      Motor: Strength full and intact in all extremities.          Diagnostic Studies        Lab Results: I have reviewed the following results:     Medications:  Scheduled PRN   atorvastatin, 40 mg, Daily With Dinner  bisacodyl, 10 mg, Daily  [Held by provider] diltiazem, 300 mg, QAM  enoxaparin, 40 mg, Q12H ASIA  folic acid, 400 mcg, Daily  [Held by provider] isosorbide mononitrate, 60 mg, Daily  levalbuterol, 1.25 mg, TID  [Held by provider] lisinopril, 40 mg, Daily  polyethylene glycol, 17 g, Daily  predniSONE, 40 mg, Daily  QUEtiapine, 200 mg, HS  senna-docusate sodium, 2 tablet, BID  sertraline, 50 mg, HS  [Held by provider] spironolactone, 25 mg, Daily      albuterol, 2 puff, Q6H PRN  LORazepam, 1 mg, BID PRN       Continuous    sodium chloride (HYPERTONIC) infusion 1.8%, 50 mL/hr, Last Rate: 50 mL/hr (06/24/25 0430)         Labs:   CBC    Recent Labs     06/23/25  0500 06/23/25  0836 06/23/25 2041   WBC 16.39*  --  16.87*   HGB 12.7  --  12.3   HCT 34.6*  --  33.5*    131* 142*     BMP    Recent Labs     06/23/25  2327 06/24/25  0203   SODIUM 116* 116*   K 4.2 4.1   CL 79* 80*   CO2 26 28   AGAP 11 8   BUN 7 7   CREATININE 0.80 0.87   CALCIUM 8.0* 8.0*       Coags    Recent Labs     06/23/25  0500 06/23/25  2041   INR 1.24* 1.30*   PTT 37*  --         Additional Electrolytes  Recent Labs     06/23/25 2041   MG 1.8*   CAIONIZED 0.96*          Blood Gas    Recent Labs     06/23/25  1158   PHART 7.408   [id number] 34.6*   PO2ART 100.6   IMV6SIR 21.3*   BEART -2.7   SOURCE Line, Arterial     Recent Labs     06/23/25  1158 06/23/25 2041   PHVEN  --  7.464*   [id number]  --  37.8*   PO2VEN  --  62.3*   [id number]  --  26.5   BEVEN  --  2.8   [id number]  --  90.8*   SOURCE Line, Arterial  --     LFTs  Recent Labs      06/23/25  0500 06/23/25  2041   ALT 28 25   AST 47* 41*   ALKPHOS 174* 150*   ALB 3.1* 3.1*   TBILI 1.90* 1.36*       Infectious  Recent Labs     06/23/25  0500   PROCALCITONI 0.29*     Glucose  Recent Labs     06/23/25  1743 06/23/25  2041 06/23/25  2327 06/24/25  0203   GLUC 136 119 117 115

## 2025-06-24 NOTE — PLAN OF CARE
Problem: Potential for Falls  Goal: Patient will remain free of falls  Description: INTERVENTIONS:  - Educate patient/family on patient safety including physical limitations  - Instruct patient to call for assistance with activity   - Consider consulting OT/PT to assist with strengthening/mobility based on AM PAC & JH-HLM score  - Consult OT/PT to assist with strengthening/mobility   - Keep Call bell within reach  - Keep bed low and locked with side rails adjusted as appropriate  - Keep care items and personal belongings within reach  - Initiate and maintain comfort rounds  - Make Fall Risk Sign visible to staff  - Offer Toileting every 2 Hours, in advance of need  - Initiate/Maintain bed/chair alarm  - Obtain necessary fall risk management equipment: call bell within reach  - Apply yellow socks and bracelet for high fall risk patients  - Consider moving patient to room near nurses station  Outcome: Progressing     Problem: Nutrition/Hydration-ADULT  Goal: Nutrient/Hydration intake appropriate for improving, restoring or maintaining nutritional needs  Description: Monitor and assess patient's nutrition/hydration status for malnutrition. Collaborate with interdisciplinary team and initiate plan and interventions as ordered.  Monitor patient's weight and dietary intake as ordered or per policy. Utilize nutrition screening tool and intervene as necessary. Determine patient's food preferences and provide high-protein, high-caloric foods as appropriate.     INTERVENTIONS:  - Monitor oral intake, urinary output, labs, and treatment plans  - Assess nutrition and hydration status and recommend course of action  - Evaluate amount of meals eaten  - Assist patient with eating if necessary   - Allow adequate time for meals  - Recommend/ encourage appropriate diets, oral nutritional supplements, and vitamin/mineral supplements  - Order, calculate, and assess calorie counts as needed  - Recommend, monitor, and adjust tube  feedings and TPN/PPN based on assessed needs  - Assess need for intravenous fluids  - Provide specific nutrition/hydration education as appropriate  - Include patient/family/caregiver in decisions related to nutrition  Outcome: Progressing     Problem: PAIN - ADULT  Goal: Verbalizes/displays adequate comfort level or baseline comfort level  Description: Interventions:  - Encourage patient to monitor pain and request assistance  - Assess pain using appropriate pain scale  - Administer analgesics as ordered based on type and severity of pain and evaluate response  - Implement non-pharmacological measures as appropriate and evaluate response  - Consider cultural and social influences on pain and pain management  - Notify physician/advanced practitioner if interventions unsuccessful or patient reports new pain  - Educate patient/family on pain management process including their role and importance of  reporting pain   - Provide non-pharmacologic/complimentary pain relief interventions  Outcome: Progressing     Problem: INFECTION - ADULT  Goal: Absence or prevention of progression during hospitalization  Description: INTERVENTIONS:  - Assess and monitor for signs and symptoms of infection  - Monitor lab/diagnostic results  - Monitor all insertion sites, i.e. indwelling lines, tubes, and drains  - Monitor endotracheal if appropriate and nasal secretions for changes in amount and color  - [place] appropriate cooling/warming therapies per order  - Administer medications as ordered  - Instruct and encourage patient and family to use good hand hygiene technique  - Identify and instruct in appropriate isolation precautions for identified infection/condition  Outcome: Progressing     Problem: SAFETY ADULT  Goal: Patient will remain free of falls  Description: INTERVENTIONS:  - Educate patient/family on patient safety including physical limitations  - Instruct patient to call for assistance with activity   - Consider  consulting OT/PT to assist with strengthening/mobility based on AM PAC & JH-HLM score  - Consult OT/PT to assist with strengthening/mobility   - Keep Call bell within reach  - Keep bed low and locked with side rails adjusted as appropriate  - Keep care items and personal belongings within reach  - Initiate and maintain comfort rounds  - Make Fall Risk Sign visible to staff  - Offer Toileting every 2 Hours, in advance of need  - Initiate/Maintain bed/chair alarm  - Obtain necessary fall risk management equipment: call bell within reach  - Apply yellow socks and bracelet for high fall risk patients  - Consider moving patient to room near nurses station  Outcome: Progressing     Problem: DISCHARGE PLANNING  Goal: Discharge to home or other facility with appropriate resources  Description: INTERVENTIONS:  - Identify barriers to discharge w/patient and caregiver  - Arrange for needed discharge resources and transportation as appropriate  - Identify discharge learning needs (meds, wound care, etc.)  - Arrange for interpretive services to assist at discharge as needed  - Refer to Case Management Department for coordinating discharge planning if the patient needs post-hospital services based on physician/advanced practitioner order or complex needs related to functional status, cognitive ability, or social support system  Outcome: Progressing     Problem: Knowledge Deficit  Goal: Patient/family/caregiver demonstrates understanding of disease process, treatment plan, medications, and discharge instructions  Description: Complete learning assessment and assess knowledge base.  Interventions:  - Provide teaching at level of understanding  - Provide teaching via preferred learning methods  Outcome: Progressing

## 2025-06-24 NOTE — CONSULTS
"Consultation - Nephrology   Name: Torey Del Castillo Jr. 59 y.o. male I MRN: [id number]  Unit/Bed#: ICU 08 I Date of Admission: 6/23/2025   Date of Service: 6/24/2025 I Hospital Day: 1   Inpatient consult to Nephrology  Consult performed by: Antonio Crespo MD  Consult ordered by: Avtar Rodriguez MD        Physician Requesting Evaluation: Percy Jay MD   Reason for Evaluation / Principal Problem: Hyponatremia    Assessment & Plan  Hyponatremia  Etiology: Compatible with \"beer Potomania \"with decreased solute intake especially in the setting of nausea vomiting.  Also, mildly volume depleted especially with low urine sodium please see below, possible SIADH with nausea and vomiting or other etiology    Workup:  - Urine studies: Urine sodium less than 10/urine osmolality 211  - Serum osmolality 245 compatible true hypotonic hyponatremia/cortisol acceptable at 24.3/normal TSH  - CT of the chest without any acute pathology    Recommend completing workup with the following  - Uric acid  - CT of the abdomen pelvis rule out other causes of SIADH especially with the abdominal discomfort    Treatment:  - Fluid restriction 1000 mL  - Nutritional supplements for increasing solute  - 1.8% saline for now monitor sodium  - Potential use of tolvaptan 7.5 mg if he does not respond to 1.8% saline    Goal is increasing sodium no faster than 8 mill equivalents per day in the first 48 hours to avoid osmotic demyelinating syndrome, therefore no faster than achieving a sodium of 123 or so by tomorrow      Hypertension  Typically on Imdur 30 mg daily/spironolactone  Now low normal so I would avoid initiating  H/o Atrial flutter (HCC)-rate controlled  Per critical care medicine on Lovenox  Dyspnea on exertion  Per critical care medicine some degree of chronicity  Elevated lactic acid level  Was elevated now improved possibly related to SIRS please see below  SIRS (systemic inflammatory response syndrome) (HCC)  Negative evidence of PE  No " evidence of sepsis  Per critical care medicine      I have reviewed the nephrology recommendations including treatment for hyponatremia, with critical care medicine, and we are in agreement with renal plan including the information outlined above.     History of Present Illness   Torey Del Castillo Jr. is a 59 y.o. male with PMH of CAD/diabetes mellitus type 2/atrial fibrillation/asthma/GERD/hypertension who was admitted to [facility] after presenting with 1 week history of nausea vomiting abdominal pain, dyspnea on exertion for 2 to 3 days.  Found to have many beer bottles around him though he states he had not drank alcohol for a few days.  Patient found to be hypotensive in the ER.  Also hyponatremic.. A renal consultation is requested today for assistance in the management of hyponatremia      Patient denies any prior history of hyponatremia  Sodium from 2025 139  Sodium on admission from  at 5 AM: 114  Sodium this mornin on 1.8% saline  He drinks about 2 L of fluid a day  He states he does not drink more than 3 to 4 glasses of wine denies beer however please see HPI  No weight loss, only minimal left lower extremity edema which resolved per the patient  No NSAID use.    Review of Systems  General: GI symptoms for 1 week please see HPI, no fevers or chills no weight loss  Skin:  No acute rash  GI: See HPI, positive nausea and vomiting for 1 week no diarrhea and diffuse abdominal pain  :  Please see HPI  Cardiac:  No chest pain, some /moderate chronic shortness of breath, mild left lower extremity edema resolved according to the patient  Pulmonary: Chronic cough intermittently productive of sputum  Neuro:  No headaches    The rest review of systems is otherwise completely reviewed with the patient are negative    Historical Information   Past Medical History[1]  Past Surgical History[2]  Social History[3]  E-Cigarette/Vaping    E-Cigarette Use Never User      E-Cigarette/Vaping Substances     Nicotine No     THC No     CBD No     Flavoring No     Other No     Unknown No        Social History[4]    Current Facility-Administered Medications:     albuterol (PROVENTIL HFA,VENTOLIN HFA) inhaler 2 puff, Q6H PRN    atorvastatin (LIPITOR) tablet 40 mg, Daily With Dinner    bisacodyl (DULCOLAX) rectal suppository 10 mg, Daily    [Held by provider] diltiazem (CARDIZEM CD) 24 hr capsule 300 mg, QAM    enoxaparin (LOVENOX) subcutaneous injection 40 mg, Q12H ASIA    folic acid (FOLVITE) tablet 400 mcg, Daily    [Held by provider] isosorbide mononitrate (IMDUR) 24 hr tablet 60 mg, Daily    levalbuterol (XOPENEX) inhalation solution 1.25 mg, TID    [Held by provider] lisinopril (ZESTRIL) tablet 40 mg, Daily    LORazepam (ATIVAN) tablet 1 mg, BID PRN    polyethylene glycol (MIRALAX) packet 17 g, Daily    predniSONE tablet 40 mg, Daily    QUEtiapine (SEROquel) tablet 200 mg, HS    senna-docusate sodium (SENOKOT S) 8.6-50 mg per tablet 2 tablet, BID    sertraline (ZOLOFT) tablet 50 mg, HS    sodium chloride (HYPERTONIC) infusion 1.8%, Continuous, Last Rate: 50 mL/hr (06/24/25 0430)    [Held by provider] spironolactone (ALDACTONE) tablet 25 mg, Daily  Prior to Admission Medications   Prescriptions Last Dose Informant Patient Reported? Taking?   LORazepam (ATIVAN) 1 mg tablet  Self No Yes   Sig: Take 1 tablet (1 mg total) by mouth 2 (two) times a day as needed for anxiety   QUEtiapine (SEROquel) 300 mg tablet   No Yes   Sig: TAKE ONE TABLET BY MOUTH EVERY DAY FOR SLEEP/MOOD   QUEtiapine (SEROquel) 50 mg tablet  Self Yes Yes   albuterol (PROVENTIL HFA,VENTOLIN HFA) 90 mcg/act inhaler   No Yes   Sig: INHALE 2 PUFFS BY MOUTH EVERY 6 HOURS AS NEEDED FOR SHORTNESS OF BREATH   atorvastatin (LIPITOR) 40 mg tablet Unknown  No No   Sig: TAKE ONE TABLET BY MOUTH EVERY DAY   diltiazem (CARDIZEM CD) 300 mg 24 hr capsule   No Yes   Sig: TAKE ONE CAPSULE BY MOUTH EVERY MORNING   folic acid (FOLVITE) 400 mcg tablet   No No   Sig: TAKE  ONE TABLET BY MOUTH EVERY DAY   isosorbide dinitrate (ISORDIL) 10 mg tablet Unknown  Yes No   Sig: Take 10 mg by mouth in the morning and 10 mg at noon and 10 mg in the evening.   isosorbide mononitrate (IMDUR) 60 mg 24 hr tablet   No Yes   Sig: TAKE ONE TABLET BY MOUTH ONCE DAILY   lisinopril (ZESTRIL) 40 mg tablet   No Yes   Sig: TAKE ONE TABLET BY MOUTH EVERY DAY   nicotine (NICODERM CQ) 14 mg/24hr TD 24 hr patch   No No   Sig: Place 1 patch on the skin over 24 hours every 24 hours   omeprazole (PriLOSEC) 40 MG capsule   No Yes   Sig: Take 1 capsule (40 mg total) by mouth daily   rivaroxaban (Xarelto) 20 mg tablet   No No   Sig: Take 1 tablet (20 mg total) by mouth daily with breakfast   sertraline (ZOLOFT) 50 mg tablet  Self Yes Yes   Sig: Take 50 mg by mouth daily at bedtime   spironolactone (ALDACTONE) 25 mg tablet Unknown  No No   Sig: Take 1 tablet (25 mg total) by mouth daily   vitamin B-12 (VITAMIN B-12) 1,000 mcg tablet  Self No No   Sig: TAKE ONE TABLET BY MOUTH EVERY DAY      Facility-Administered Medications: None     Patient has no known allergies.    Objective :  Temp:  [97.6 °F (36.4 °C)-98.6 °F (37 °C)] 97.8 °F (36.6 °C)  HR:  [] 104  BP: ()/(31-89) 119/68  Resp:  [19-45] 45  SpO2:  [91 %-98 %] 91 %  O2 Device: None (Room air)  Nasal Cannula O2 Flow Rate (L/min):  [5 L/min] 5 L/min    Current Weight: Weight - Scale: (!) 160 kg (352 lb 4.7 oz)  First Weight: Weight - Scale: (!) 155 kg (341 lb 11.4 oz)  I/O         06/22 0701 06/23 0700 06/23 0701  06/24 0700 06/24 0701 06/25 0700    P.O.  600     I.V. (mL/kg) 300 (1.9) 2260 (14.1)     IV Piggyback 1100 850     Total Intake(mL/kg) 1400 (9) 3710 (23.2)     Urine (mL/kg/hr)  3875 (1)     Stool  0     Total Output  3875     Net +1400 -165            Unmeasured Stool Occurrence  0 x           Physical Exam  General: Well-developed well-nourished, no acute distress/morbidly obese  Skin:  No acute rash  Eyes: No scleral icterus and  "noninjected  ENT: Normocephalic/atraumatic moist mucous membranes  Neck:  Supple, no jugular venous distention, trachea midline, overall appearance is normal; no  carotid bruits, 1+ carotid upstroke  Back: No CVA tenderness   Chest:  Clear to auscultation and percussion, good respiratory effort no use of accessory respiratory muscles  CVS:  Regular rate and rhythm, without a rub or gallops or murmurs, normal S3 and S4  Abdomen: Morbidly obese, normal bowel sounds, soft and nontender and perhaps mildly distended; no overt hepatosplenomegaly or bruits appreciable  Extremities:  No clubbing, cyanosis 1+ pedal edema bilaterally; 1+ dorsalis pedis pulse, no femoral bruits and no significant arthritic changes  Neuro:  No gross focality  Psych:  Alert and oriented and appropriate   Medications:  Current Medications[5]      Lab Results: I have reviewed the following results:  Results from last 7 days   Lab Units 06/24/25  0748 06/24/25  0203 06/23/25  2327 06/23/25  2041 06/23/25  1743 06/23/25  1113 06/23/25  0836 06/23/25  0555 06/23/25  0500   WBC Thousand/uL 11.54*  --   --  16.87*  --   --   --   --  16.39*   HEMOGLOBIN g/dL 12.1  --   --  12.3  --   --   --   --  12.7   HEMATOCRIT % 33.1*  --   --  33.5*  --   --   --   --  34.6*   PLATELETS Thousands/uL 117*  --   --  142*  --   --  131*  --  150   POTASSIUM mmol/L 4.2 4.1 4.2 4.0 3.9 3.8  --  3.0* 3.2*   CHLORIDE mmol/L 82* 80* 79* 79* 78* 78*  --  76* 74*   CO2 mmol/L 29 28 26 26 26 24  --  25 21   BUN mg/dL 8 7 7 7 6 5  --  5 5   CREATININE mg/dL 0.85 0.87 0.80 0.88 0.92 0.89  --  0.89 0.88   CALCIUM mg/dL 8.1* 8.0* 8.0* 7.8* 7.6* 7.5*  --  7.0* 7.5*   MAGNESIUM mg/dL  --   --   --  1.8*  --   --   --   --   --    ALBUMIN g/dL  --   --   --  3.1*  --   --   --   --  3.1*       Administrative Statements     Portions of the record may have been created with voice recognition software. Occasional wrong word or \"sound a like\" substitutions may have occurred due to the " inherent limitations of voice recognition software. Read the chart carefully and recognize, using context, where substitutions have occurred.If you have any questions, please contact the dictating provider.       [1]   Past Medical History:  Diagnosis Date    A-fib (HCC)     Anxiety     Asthma     Colon polyp     Coronary artery disease 11/5/2021    Diabetes mellitus (HCC)     GERD (gastroesophageal reflux disease)     History of ileus     Hypertension     Insomnia     Kidney stone     Lumbar herniated disc     last assessed: 3/27/2015    MDD (major depressive disorder)     MI, old     Patella fracture     last assessed: 3/27/2015    Pneumonia     Tobacco abuse    [2]   Past Surgical History:  Procedure Laterality Date    BACK SURGERY  2015    Lumbar    CARDIAC CATHETERIZATION N/A 11/05/2021    Procedure: CARDIAC CATHETERIZATION;  Surgeon: Herbie Castaneda MD;  Location: [facility];  Service: Cardiology    CARDIAC ELECTROPHYSIOLOGY PROCEDURE N/A 06/21/2024    Procedure: Cardiac eps/aflutter ablation;  Surgeon: Nilay Lopez MD;  Location: [facility];  Service: Cardiology    CARDIAC ELECTROPHYSIOLOGY PROCEDURE N/A 01/30/2025    Procedure: Cardiac loop recorder implant;  Surgeon: Wendy Emmanuel MD;  Location: [facility];  Service: Cardiology    COLONOSCOPY      HERNIA REPAIR      KNEE SURGERY      right    LUMBAR FUSION      last assessed: 3/27/2015    VA RPR UMBILICAL HRNA 5 YRS/> REDUCIBLE N/A 09/14/2022    Procedure: REPAIR HERNIA UMBILICAL w/ mesh;  Surgeon: Edna Siddiqui DO;  Location: [facility];  Service: General    VA TX TIBL SHFT FX IMED IMPLT W/WO SCREWS&/CERCLA Left 03/16/2017    Procedure: INSERTION NAIL IM TIBIA;  Surgeon: Edna Abrams DO;  Location: [facility];  Service: Orthopedics    SPINE SURGERY     [3]   Social History  Tobacco Use    Smoking status: Former     Current packs/day: 0.00     Average packs/day: 1.5 packs/day for 34.0 years (51.0 ttl pk-yrs)     Types:  Cigarettes     Start date: 1987     Quit date:      Years since quittin.4    Smokeless tobacco: Never    Tobacco comments:     Pt not interested at this time;   Vaping Use    Vaping status: Never Used   Substance and Sexual Activity    Alcohol use: Yes     Alcohol/week: 3.0 standard drinks of alcohol     Types: 3 Glasses of wine per week     Comment: ; reports wine every other night; approx 3 glasses    Drug use: No    Sexual activity: Yes     Partners: Female     Birth control/protection: None   [4]   Social History  Tobacco Use    Smoking status: Former     Current packs/day: 0.00     Average packs/day: 1.5 packs/day for 34.0 years (51.0 ttl pk-yrs)     Types: Cigarettes     Start date: 1987     Quit date:      Years since quittin.4    Smokeless tobacco: Never    Tobacco comments:     Pt not interested at this time;   Vaping Use    Vaping status: Never Used   Substance and Sexual Activity    Alcohol use: Yes     Alcohol/week: 3.0 standard drinks of alcohol     Types: 3 Glasses of wine per week     Comment: ; reports wine every other night; approx 3 glasses    Drug use: No    Sexual activity: Yes     Partners: Female     Birth control/protection: None   [5]   Current Facility-Administered Medications:     albuterol (PROVENTIL HFA,VENTOLIN HFA) inhaler 2 puff, 2 puff, Inhalation, Q6H PRN, Avtar Rodriguez MD, 2 puff at 25 1737    atorvastatin (LIPITOR) tablet 40 mg, 40 mg, Oral, Daily With Dinner, Avtar Rodriguez MD, 40 mg at 25 1625    bisacodyl (DULCOLAX) rectal suppository 10 mg, 10 mg, Rectal, Daily, Avtar Rodriguez MD    [Held by provider] diltiazem (CARDIZEM CD) 24 hr capsule 300 mg, 300 mg, Oral, QAM, Avtar Rodriguez MD    enoxaparin (LOVENOX) subcutaneous injection 40 mg, 40 mg, Subcutaneous, Q12H ASIA, Avtar Rodriguez MD, 40 mg at 25 0826    folic acid (FOLVITE) tablet 400 mcg, 400 mcg, Oral, Daily, Avtar Rodriguez MD, 400 mcg at 25 0825    [Held by  provider] isosorbide mononitrate (IMDUR) 24 hr tablet 60 mg, 60 mg, Oral, Daily, Avtar Rodriguez MD    levalbuterol (XOPENEX) inhalation solution 1.25 mg, 1.25 mg, Nebulization, TID, Avtar Rodriguez MD, 1.25 mg at 06/24/25 0737    [Held by provider] lisinopril (ZESTRIL) tablet 40 mg, 40 mg, Oral, Daily, Avtar Rodriguez MD    LORazepam (ATIVAN) tablet 1 mg, 1 mg, Oral, BID PRN, Anusha Huffman PA-C, 1 mg at 06/24/25 0831    polyethylene glycol (MIRALAX) packet 17 g, 17 g, Oral, Daily, Avtar Rodriguez MD, 17 g at 06/24/25 0825    predniSONE tablet 40 mg, 40 mg, Oral, Daily, Avtar Rodriguez MD, 40 mg at 06/24/25 0825    QUEtiapine (SEROquel) tablet 200 mg, 200 mg, Oral, HS, Anusha Huffman PA-C, 200 mg at 06/23/25 2258    senna-docusate sodium (SENOKOT S) 8.6-50 mg per tablet 2 tablet, 2 tablet, Oral, BID, Tiffanie Christine MD, 2 tablet at 06/24/25 0825    sertraline (ZOLOFT) tablet 50 mg, 50 mg, Oral, HS, Avtar Rodriguez MD, 50 mg at 06/23/25 2150    sodium chloride (HYPERTONIC) infusion 1.8%, 50 mL/hr, Intravenous, Continuous, Anusha Huffman PA-C, Last Rate: 50 mL/hr at 06/24/25 0430, 50 mL/hr at 06/24/25 0430    [Held by provider] spironolactone (ALDACTONE) tablet 25 mg, 25 mg, Oral, Daily, Avtar Rodriguez MD

## 2025-06-24 NOTE — UTILIZATION REVIEW
Initial Clinical Review    Admission: Date/Time/Statement:   Admission Orders (From admission, onward)       Ordered        06/23/25 0700  INPATIENT ADMISSION  Once                          Orders Placed This Encounter   Procedures    INPATIENT ADMISSION     Standing Status:   Standing     Number of Occurrences:   1     Level of Care:   Level 1 Stepdown [13]     Estimated length of stay:   More than 2 Midnights     Certification:   I certify that inpatient services are medically necessary for this patient for a duration of greater than two midnights. See H&P and MD Progress Notes for additional information about the patient's course of treatment.     ED Arrival Information       Expected   -    Arrival   6/23/2025 04:41    Acuity   Emergent              Means of arrival   Ambulance    Escorted by   [facility] ([facility])    Service   Critical Care/ICU    Admission type   Emergency              Arrival complaint   Medical Problem             Chief Complaint   Patient presents with    Shortness of Breath     Pt arrives via EMS from home, c/o increased dyspnea with exertion, difficulty ambulating and completing ADLs due to dyspnea, also c/o intermittent tingling in extremities, and constipation x 1 week. Unclear onset and duration of symptoms. Pt reports living alone, taking some of his prescribed medications and not others due to financial cost. EMS reports home was unkempt, patient found sitting in chair, noted bottles of alcohol near patient. Pt denies heavy alcohol use, reports drinking some days.        Initial Presentation: 59 y.o. male to ED by ems admitted Inpatient d/t SIRS. Does not have identified source of infection.  past medical history of atrial flutter status post cardioversion in 2018 and ablation in 2024, obesity, hypertension, cardiac arrest, hyperlipidemia. Dyspnea with evolving acute hypoxemic respiratory failure requiring BiPAP.Acute metabolic encephalopathy. Elevated lactate.    Hyponatremia, severe. history of atrial flutter, currently controlled. Morbid obesity with obstructive sleep apnea and suspected component of obesity hypoventilation. Source of decompensation unclear, possible occult sepsis without source identified to this point. presented to the ED secondary to not feeling well and dyspnea on exertion which has been exacerbated in the past 2 to 3 days. Patient was found by the EMS surrounded by beer bottles and cans.  did have desaturation and required BiPAP, became more somnolent. Status post 2 L of fluid and 1 dose of albumin. Hypotensive with systolic blood pressure in 80s.  afebrile, leukocytosis. CTA PE negative for pulmonary embolism. Flu RSV COVID-negative. Lactic acid 8.6 on arrival, WBCs 16.39 CRP 43.2. . K 3.1. CHLOR 76. CA 7.0..PROCALCITONIN 0.29.Straight cath for urinalysis .urine drug screen .urine sodium and osmolarity.severe constipation. ICU. NPO.albuterol nebulizer, Xopenex and IV Solu-Medrol.CPAP.prednisone. MiraLAX daily and senna. Hold cardizem and lisinopril.        Date: 6/24   Day 2: alert oriented to time place and person. Patient complains of abdominal pain, most likely secondary to constipation. Lactic acid resolved. WBC 11.54. ..CA 8.1.Urine osmolality 211, urine sodium less than 10, serum osmolality 245.Serum cortisol, a.m. elevated at 24.3.Continue normal saline 1.8% 50 mL.BIPAP.Nephrology consult.ICU. NPO.albuterol nebulizer, Xopenex and IV Solu-Medrol.CPAP.prednisone. MiraLAX daily and senna. Hold cardizem and lisinopril.      ED Treatment-Medication Administration from 06/23/2025 0441 to 06/23/2025 0803         Date/Time Order Dose Route Action     06/23/2025 0518 ceftriaxone (ROCEPHIN) 2 g/50 mL in dextrose IVPB 2,000 mg Intravenous New Bag     06/23/2025 0501 sodium chloride 0.9 % bolus 1,000 mL 1,000 mL Intravenous New Bag     06/23/2025 0513 albuterol inhalation solution 10 mg 10 mg Nebulization Given     06/23/2025  0513 ipratropium (ATROVENT) 0.02 % inhalation solution 1 mg 1 mg Nebulization Given     06/23/2025 0513 sodium chloride 0.9 % inhalation solution 12 mL 12 mL Nebulization Given     06/23/2025 0516 methylPREDNISolone sodium succinate (Solu-MEDROL) injection 125 mg 125 mg Intravenous Given     06/23/2025 0531 magnesium sulfate 2 g/50 mL IVPB (premix) 2 g 2 g Intravenous New Bag     06/23/2025 0614 multi-electrolyte (Plasmalyte-A/Isolyte-S PH 7.4/Normosol-R) IV bolus 1,000 mL 1,000 mL Intravenous New Bag              06/23/2025 0635 potassium chloride 20 mEq IVPB (premix) 20 mEq Intravenous New Bag     06/23/2025 0647 multi-electrolyte (Plasmalyte-A/Isolyte-S PH 7.4/Normosol-R) IV bolus 1,000 mL 1,000 mL Intravenous New Bag          I&O  SCD  MARIBEL  CARDIAC DIET NA 2 GM, FLUID RESTRICTION 1000ML      Scheduled Medications:  atorvastatin, 40 mg, Oral, Daily With Dinner  bisacodyl, 10 mg, Rectal, Daily  [Held by provider] diltiazem, 300 mg, Oral, QAM  enoxaparin, 40 mg, Subcutaneous, Q12H ASIA  folic acid, 400 mcg, Oral, Daily  [Held by provider] isosorbide mononitrate, 60 mg, Oral, Daily  levalbuterol, 1.25 mg, Nebulization, TID  [Held by provider] lisinopril, 40 mg, Oral, Daily  polyethylene glycol, 17 g, Oral, Daily  predniSONE, 40 mg, Oral, Daily  QUEtiapine, 200 mg, Oral, HS  senna-docusate sodium, 2 tablet, Oral, BID  sertraline, 50 mg, Oral, HS  [Held by provider] spironolactone, 25 mg, Oral, Daily      Continuous IV Infusions:  sodium chloride (HYPERTONIC) infusion 1.8%, 50 mL/hr, Intravenous, Continuous      PRN Meds:  albuterol, 2 puff, Inhalation, Q6H PRN 6/23 X 1, 6/24 X 1  LORazepam, 1 mg, Oral, BID PRN 6/24 X 1      ED Triage Vitals   Temperature Pulse Respirations Blood Pressure SpO2 Pain Score   06/23/25 0506 06/23/25 0448 06/23/25 0448 06/23/25 0448 06/23/25 0448 06/23/25 0715   97.8 °F (36.6 °C) (!) 110 (!) 30 (!) 78/52 96 % 7     Weight (last 2 days)       Date/Time Weight    06/23/25 1500 160 (352.3)     06/23/25 0458 155 (341.71)            Vital Signs (last 3 days)       Date/Time Temp Pulse Resp BP MAP (mmHg) SpO2 Calculated FIO2 (%) - Nasal Cannula O2 Flow Rate (L/min) Nasal Cannula O2 Flow Rate (L/min) O2 Device O2 Interface Device [place] Coma Scale Score Pain    06/24/25 0800 -- 104 45 119/68 86 91 % -- -- -- -- -- 15 No Pain    06/24/25 0739 -- 97 26 114/56 80 95 % -- -- -- None (Room air) -- -- --    06/24/25 0700 97.8 °F (36.6 °C) 94 24 126/58 83 95 % -- -- -- -- -- -- --    06/24/25 0600 -- 87 20 106/53 76 95 % -- -- -- -- -- -- --    06/24/25 0500 -- 85 21 103/50 72 96 % -- -- -- -- -- -- --    06/24/25 0430 -- -- -- -- -- 96 % -- -- -- -- Full face mask -- --    06/24/25 0400 -- 87 20 96/54 72 94 % -- -- -- -- -- 15 No Pain    06/24/25 0300 98.6 °F (37 °C) 90 21 106/55 77 95 % -- -- -- BiPAP -- -- No Pain    06/24/25 0200 -- 89 21 112/55 78 95 % -- -- -- -- -- -- --    06/24/25 0115 -- 92 22 99/52 73 94 % -- -- -- -- -- -- --    06/24/25 0100 -- 92 22 84/47 61 94 % -- -- -- -- -- -- --    06/24/25 0000 -- 93 20 108/59 80 96 % -- -- -- BiPAP -- 15 No Pain    06/23/25 2310 -- -- -- -- -- 96 % -- -- -- -- Full face mask -- --    06/23/25 2300 -- 101 24 105/53 76 92 % -- -- -- -- -- -- --    06/23/25 2258 97.9 °F (36.6 °C) 98 23 115/56 78 91 % -- -- -- None (Room air) -- -- No Pain    06/23/25 2200 -- 98 21 102/53 74 92 % -- -- -- -- -- -- --    06/23/25 2100 -- 100 29 82/46 58 92 % -- -- -- -- -- -- --    06/23/25 2000 -- 97 23 96/49 70 91 % -- -- -- None (Room air) -- 15 No Pain    06/23/25 1914 -- 97 22 102/60 73 92 % -- -- -- None (Room air) -- -- --    06/23/25 1900 98.4 °F (36.9 °C) 97 21 98/52 72 92 % -- -- -- None (Room air) -- -- No Pain    06/23/25 1825 -- 98 24 -- -- 92 % -- -- -- -- -- -- --    06/23/25 1816 -- 111 23 138/88 108 93 % -- -- -- -- -- -- --    06/23/25 1730 -- 98 22 109/51 74 93 % -- -- -- -- -- -- --    06/23/25 1630 -- -- -- -- -- -- -- -- -- -- -- -- No Pain    06/23/25 1600  -- 101 22 114/56 77 95 % -- -- -- -- -- -- --    06/23/25 1501 97.6 °F (36.4 °C) -- -- -- -- -- -- -- -- -- -- -- --    06/23/25 1500 -- 101 22 105/59 76 94 % -- -- -- -- -- -- --    06/23/25 1445 -- 100 21 108/55 77 92 % -- -- -- -- -- -- --    06/23/25 1416 -- 100 20 103/55 75 93 % -- -- -- -- -- -- --    06/23/25 1401 -- 100 25 110/54 78 93 % -- -- -- -- -- -- --    06/23/25 1345 -- 98 23 126/59 85 93 % -- -- -- -- -- -- --    06/23/25 1330 -- 100 22 105/52 75 93 % -- -- -- -- -- -- --    06/23/25 1315 -- 100 24 108/54 78 94 % -- -- -- -- -- -- --    06/23/25 1300 -- 97 27 119/59 85 96 % -- -- -- -- -- -- --    06/23/25 1259 -- -- -- -- -- -- 40 -- 5 L/min Nasal cannula -- -- --    06/23/25 1245 -- 100 32 114/73 89 97 % -- -- -- -- -- -- --    06/23/25 1230 -- 98 24 126/56 85 97 % -- -- -- -- -- -- --    06/23/25 1216 -- 100 30 154/70 101 98 % -- -- -- -- -- 10 No Pain    06/23/25 1200 -- 93 26 97/56 73 97 % -- -- -- -- -- -- --    06/23/25 1145 -- 92 25 95/51 72 96 % -- -- -- -- -- -- --    06/23/25 1136 -- 91 23 105/54 77 96 % -- -- -- -- -- -- --    06/23/25 1131 -- 94 26 164/89 121 95 % -- -- -- -- -- -- --    06/23/25 1100 98.6 °F (37 °C) 96 29 67/31 45 95 % -- -- -- -- -- -- --    06/23/25 1056 -- 95 26 65/37 46 96 % -- -- -- -- -- -- --    06/23/25 1045 -- 96 22 74/35 49 96 % -- -- -- -- -- -- --    06/23/25 1030 -- 98 33 94/55 73 97 % -- -- -- -- -- -- --    06/23/25 1015 -- 94 25 107/55 78 97 % -- -- -- -- -- -- --    06/23/25 1000 -- 93 23 109/53 77 98 % -- -- -- -- -- -- --    06/23/25 0945 -- 96 19 106/51 74 98 % -- -- -- -- -- -- --    06/23/25 0931 -- 99 26 89/44 64 98 % -- -- -- -- -- -- --    06/23/25 0925 -- -- -- -- -- -- -- -- -- -- Full face mask -- --    06/23/25 0915 -- 96 25 113/53 77 98 % -- -- -- -- -- -- --    06/23/25 0907 -- 98 23 111/59 79 98 % -- 10 L/min -- Non-rebreather mask -- -- --    06/23/25 0900 -- 93 25 111/59 79 99 % -- -- -- -- -- -- --    06/23/25 0852 -- 92 22 106/55 77  99 % -- -- -- -- -- -- --    06/23/25 0830 -- 96 23 107/52 75 91 % -- -- -- -- -- 15 No Pain    06/23/25 0812 -- 98 20 98/52 73 93 % -- -- -- -- -- -- --    06/23/25 0800 -- 98 -- -- -- 96 % -- -- -- -- -- -- --    06/23/25 0747 -- 94 -- 91/51 61 -- -- -- -- -- -- -- --    06/23/25 0745 -- 96 29 91/51 61 96 % -- -- -- -- -- -- --    06/23/25 0730 -- 98 28 92/56 67 95 % -- -- -- -- -- -- --    06/23/25 0715 -- 97 30 82/55 -- 94 % -- -- -- None (Room air) -- -- 7    06/23/25 0700 -- 106 30 84/48 -- 95 % -- -- -- -- -- -- --    06/23/25 0645 97.6 °F (36.4 °C) 110 -- 88/52 -- 96 % -- -- -- -- -- -- --    06/23/25 0630 -- 106 34 90/50 -- 95 % -- -- -- -- -- -- --    06/23/25 0614 -- 95 30 98/58 -- 91 % -- -- -- -- -- -- --    06/23/25 0600 -- 107 30 84/42 -- 94 % -- -- -- -- -- -- --    06/23/25 0545 -- 109 32 82/42 -- 92 % -- -- -- -- -- -- --    06/23/25 0530 -- 110 32 -- -- 95 % -- -- -- None (Room air) -- 15 --    06/23/25 0515 -- 114 34 72/48 -- 95 % -- -- -- -- -- -- --    06/23/25 0513 -- -- -- -- -- 95 % -- -- -- -- -- -- --    06/23/25 0506 97.8 °F (36.6 °C) -- -- -- -- -- -- -- -- -- -- -- --    06/23/25 0458 -- 114 32 75/45 55 96 % -- -- -- -- -- -- --    06/23/25 0448 -- 110 30 78/52 -- 96 % -- -- -- -- -- -- --              Pertinent Labs/Diagnostic Test Results:   Radiology:  CTA chest pe study   Final Interpretation by Oniel Moran MD (06/23 0717)      No acute pulmonary embolus is seen.                        Workstation performed: [id number]           Cardiology:  ECG 12 lead   Final Result by Balwinder Reyes MD (06/23 0840)   Age and gender specific ECG analysis    Sinus tachycardia   Low voltage QRS   Nonspecific T wave abnormality   Abnormal ECG   When compared with ECG of 23-Jun-2025 04:52, (unconfirmed)   Premature supraventricular complexes are no longer Present   Inverted T waves have replaced nonspecific T wave abnormality in Lateral    leads   Confirmed by Balwinder Reyes ([id number])  on 6/23/2025 8:13:27 AM      ECG 12 lead   Final Result by Balwinder Reyes MD (06/23 0813)   Age and gender specific ECG analysis    Sinus tachycardia with Premature supraventricular complexes   Low voltage QRS   Nonspecific T wave abnormality   Abnormal ECG   When compared with ECG of 27-Jan-2025 13:12,   Sinus rhythm has replaced Junctional rhythm   Nonspecific T wave abnormality now evident in Lateral leads   Confirmed by Balwinder Reyes ([id number]) on 6/23/2025 8:13:44 AM                  Results from last 7 days   Lab Units 06/24/25  0748 06/23/25 2041 06/23/25  0836 06/23/25  0500   WBC Thousand/uL 11.54* 16.87*  --  16.39*   HEMOGLOBIN g/dL 12.1 12.3  --  12.7   HEMATOCRIT % 33.1* 33.5*  --  34.6*   PLATELETS Thousands/uL 117* 142* 131* 150   TOTAL NEUT ABS Thousands/µL  --   --   --  15.17*         Results from last 7 days   Lab Units 06/24/25  0748 06/24/25  0203 06/23/25  2327 06/23/25 2041 06/23/25  1743   SODIUM mmol/L 118* 116* 116* 116* 116*   POTASSIUM mmol/L 4.2 4.1 4.2 4.0 3.9   CHLORIDE mmol/L 82* 80* 79* 79* 78*   CO2 mmol/L 29 28 26 26 26   ANION GAP mmol/L 7 8 11 11 12   BUN mg/dL 8 7 7 7 6   CREATININE mg/dL 0.85 0.87 0.80 0.88 0.92   EGFR ml/min/1.73sq m 96 95 98 94 91   CALCIUM mg/dL 8.1* 8.0* 8.0* 7.8* 7.6*   CALCIUM, IONIZED mmol/L  --   --   --  0.96*  --    MAGNESIUM mg/dL  --   --   --  1.8*  --      Results from last 7 days   Lab Units 06/23/25 2041 06/23/25  0500   AST U/L 41* 47*   ALT U/L 25 28   ALK PHOS U/L 150* 174*   TOTAL PROTEIN g/dL 5.6* 5.4*   ALBUMIN g/dL 3.1* 3.1*   TOTAL BILIRUBIN mg/dL 1.36* 1.90*         Results from last 7 days   Lab Units 06/24/25  0748 06/24/25  0203 06/23/25  2327 06/23/25  2041 06/23/25  1743 06/23/25  1113 06/23/25  0555 06/23/25  0500   GLUCOSE RANDOM mg/dL 108 115 117 119 136 146* 124 120     Results from last 7 days   Lab Units 06/23/25  0836   OSMOLALITY, SERUM mmol/*       Results from last 7 days   Lab Units 06/23/25  1158   PH  ART  7.408   PCO2 ART mm Hg 34.6*   PO2 ART mm Hg 100.6   HCO3 ART mmol/L 21.3*   BASE EXC ART mmol/L -2.7   O2 CONTENT ART mL/dL 17.4   O2 HGB, ARTERIAL % 95.5   ABG SOURCE  Line, Arterial     Results from last 7 days   Lab Units 06/23/25 2041 06/23/25  0500   PH [place]  7.464* 7.406*   PCO2 [place] mm Hg 37.8* 37.3*   PO2 [place] mm Hg 62.3* 31.8*   HCO3 [place] mmol/L 26.5 22.9*   BASE EXC [place] mmol/L 2.8 -1.4   O2 CONTENT [place] ml/dL 16.7 11.8   O2 HGB, VENOUS % 90.8* 62.1         Results from last 7 days   Lab Units 06/23/25 2041   CK TOTAL U/L 299     Results from last 7 days   Lab Units 06/23/25  0842 06/23/25  0658 06/23/25  0500   HS TNI 0HR ng/L  --   --  24   HS TNI 2HR ng/L  --  25  --    HSTNI D2 ng/L  --  1  --    HS TNI 4HR ng/L 24  --   --    HSTNI D4 ng/L 0  --   --          Results from last 7 days   Lab Units 06/23/25 2041 06/23/25  0500   PROTIME seconds 16.3* 15.8*   INR  1.30* 1.24*   PTT seconds  --  37*     Results from last 7 days   Lab Units 06/23/25  0842   TSH 3RD GENERATON uIU/mL 3.680     Results from last 7 days   Lab Units 06/23/25  0500   PROCALCITONIN ng/ml 0.29*     Results from last 7 days   Lab Units 06/24/25  0203 06/23/25  2327 06/23/25  2041 06/23/25  0658 06/23/25  0500   LACTIC ACID mmol/L 1.9 2.5* 4.3* 5.5* 8.6*             Results from last 7 days   Lab Units 06/23/25  0500   BNP pg/mL 49       Results from last 7 days   Lab Units 06/23/25  0500   CRP mg/L 43.2*         Results from last 7 days   Lab Units 06/23/25  1120 06/23/25  0836   OSMOLALITY, SERUM mmol/KG  --  245*   OSMO UR mmol/*  --      Results from last 7 days   Lab Units 06/23/25  1120 06/23/25  1119   CLARITY UA   --  Clear   COLOR UA   --  Yellow   SPEC GRAV UA   --  1.023   PH UA   --  6.0   GLUCOSE UA mg/dl  --  Negative   KETONES UA mg/dl  --  Negative   BLOOD UA   --  Negative   PROTEIN UA mg/dl  --  Negative   NITRITE UA   --  Negative   BILIRUBIN UA   --  Negative   UROBILINOGEN UA (BE) mg/dl  --  <2.0    LEUKOCYTES UA   --  Negative   SODIUM UR mmol/L <10.0  --        Results from last 7 days   Lab Units 06/23/25  0502 06/23/25  0500   BLOOD CULTURE  Received in Microbiology Lab. Culture in Progress. Received in Microbiology Lab. Culture in Progress.       Past Medical History[1]  Present on Admission:   Anxiety   Dyspnea on exertion   H/o Atrial flutter (HCC)-rate controlled   Hypertension   Mixed hyperlipidemia   Prediabetes      Admitting Diagnosis: Acute respiratory distress [R06.03]  Lactic acidosis [E87.20]  Hyponatremia [E87.1]  SOB (shortness of breath) [R06.02]  Age/Sex: 59 y.o. male    Network Utilization Review Department  ATTENTION: Please call with any questions or concerns to [phone] and carefully listen to the prompts so that you are directed to the right person. All voicemails are confidential.   For Discharge needs, contact Care Management DC Support Team at [phone] opt. [phone]  Send all requests for admission clinical reviews, approved or denied determinations and any other requests to dedicated fax number below belonging to the campus where the patient is receiving treatment. List of dedicated fax numbers for the Facilities:  FACILITY NAME UR FAX NUMBER   ADMISSION DENIALS (Administrative/Medical Necessity) [phone]   DISCHARGE SUPPORT TEAM (NETWORK) [phone]   PARENT CHILD HEALTH (Maternity/NICU/Pediatrics) [phone]   [facility] [phone]   [facility] [phone]   [facility] [phone]   [facility] [phone]   [facility] [phone]   [facility] [phone]   [facility] [phone]   [facility] [phone]   [facility] [phone]   [facility] [phone]   [facility], [facility]  [facility] [phone]   [facility] [place] [phone]              [1]   Past Medical History:  Diagnosis Date    A-fib (HCC)     Anxiety     Asthma     Colon polyp     Coronary artery disease 11/5/2021    Diabetes mellitus (HCC)     GERD (gastroesophageal reflux disease)     History of ileus     Hypertension     Insomnia     Kidney stone     Lumbar herniated disc     last assessed: 3/27/2015    MDD (major depressive disorder)     MI, old     Patella fracture     last assessed: 3/27/2015    Pneumonia     Tobacco abuse

## 2025-06-24 NOTE — ASSESSMENT & PLAN NOTE
Lab Results   Component Value Date/Time    HGBA1C 5.1 01/27/2025 03:08 PM    HGBA1C 4.8 10/31/2020 06:24 AM     Continue to monitor glucose levels

## 2025-06-24 NOTE — ASSESSMENT & PLAN NOTE
Patient has not had a bowel movement in the past 6 days  Patient has past medical history of ileus  MiraLAX daily and senna twice daily  Suppository daily

## 2025-06-24 NOTE — ASSESSMENT & PLAN NOTE
"Etiology: Compatible with \"beer Potomania \"with decreased solute intake especially in the setting of nausea vomiting.  Also, mildly volume depleted especially with low urine sodium please see below, possible SIADH with nausea and vomiting or other etiology    Workup:  - Urine studies: Urine sodium less than 10/urine osmolality 211  - Serum osmolality 245 compatible true hypotonic hyponatremia/cortisol acceptable at 24.3/normal TSH  - CT of the chest without any acute pathology    Recommend completing workup with the following  - Uric acid  - CT of the abdomen pelvis rule out other causes of SIADH especially with the abdominal discomfort    Treatment:  - Fluid restriction 1000 mL  - Nutritional supplements for increasing solute  - 1.8% saline for now monitor sodium  - Potential use of tolvaptan 7.5 mg if he does not respond to 1.8% saline    Goal is increasing sodium no faster than 8 mill equivalents per day in the first 48 hours to avoid osmotic demyelinating syndrome, therefore no faster than achieving a sodium of 123 or so by tomorrow      "

## 2025-06-24 NOTE — CASE MANAGEMENT
Case Management Assessment & Discharge Planning Note    Patient name Torey Del Castillo Jr.  Location ICU 08/ICU 08 MRN [id number]  : 1965 Date 2025       Current Admission Date: 2025  Current Admission Diagnosis:Hyponatremia   Patient Active Problem List    Diagnosis Date Noted    Elevated lactic acid level 2025    Hyponatremia 2025    Constipation 2025    SIRS (systemic inflammatory response syndrome) ([facility]) 2025    METABOLIC ENCEPHALOPATHY 2025    Abscess 2025    History of cardiac arrest 2025    Mixed hyperlipidemia 2025    Chest pain, atypical 2025    Weight loss 2024    Sleep trouble 2024    Syncope 2024    Hypothyroidism 2024    Pulmonary congestion 2024    Other specified hypothyroidism 10/05/2023    Prediabetes 10/05/2023    Abnormal hematocrit 10/05/2023    Spasm of muscle of lower back 2023    Skin lesion 2023    Intentional drug overdose, initial encounter ([facility]) 2023    Left leg pain 2023    Dyspnea on exertion 2023    Obstructive sleep apnea     Blood in left ear canal 10/31/2022    Hyperpigmented skin lesion 10/31/2022    Preop examination 2022    Financial difficulties 2022    Hip pain 2022    H/o Atrial flutter ([facility])-rate controlled 2022    Anemia 2021    Thrombocytopenia ([facility]) 2021    Alcohol dependence ([facility]) 2021    Cardiac arrest ([facility]) 2021    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[facility]) 2021    Obesity, Class III, BMI 40-49.9 (morbid obesity) 10/20/2021    Excessive daytime sleepiness 10/20/2021    Hyperlipidemia 06/10/2021    Elevated alkaline phosphatase level 06/10/2021    Elevated hemoglobin (HCC) 06/10/2021    Hypertension 2021    Asthma 2020    Moderate episode of recurrent major depressive disorder ([facility]) 2019    History of MRSA infection of lungs 2019    Anxiety     GERD (gastroesophageal reflux  disease)     MDD (major depressive disorder)     Adenoma of left adrenal gland 05/20/2018    Closed right ankle fracture 03/16/2017    Tobacco use disorder     History of ileus     Umbilical hernia without obstruction and without gangrene 02/01/2016    Gallbladder calculus without cholecystitis 02/01/2016    Kidney stone on right side 01/31/2016    Ileus (HCC) 01/31/2016    Other insomnia     Depression with anxiety       LOS (days): 1  Geometric Mean LOS (GMLOS) (days):   Days to GMLOS:     OBJECTIVE:    Risk of Unplanned Readmission Score: 22.61         Current admission status: Inpatient     Preferred Pharmacy:   [facility] [address] [place] [address] Road  [address] [place] [id number]  Phone: [phone] Fax: [phone]    Primary Care Provider: Tran Rodríguez MD    Primary Insurance: [place] [employer] [place]  Secondary Insurance:     ASSESSMENT:  Active Health Care Proxies       Abundio MCDONALDTorey [facility] Care Representative - Son   Primary Phone: [phone] (Mobile)                 Advance Directives  Does patient have a Health Care POA?: No  Was patient offered paperwork?: Yes (declined)  Does patient currently have a Health Care decision maker?: Yes, please see Health Care Proxy section  Does patient have Advance Directives?: No  Was patient offered paperwork?: Yes (declined)  Primary Contact: Torey Del Castillo II (son) [phone]       Readmission Root Cause  30 Day Readmission: No    Patient Information  Admitted from:: Home  Mental Status: Alert  During Assessment patient was accompanied by: Not accompanied during assessment  Assessment information provided by:: Patient  Primary Caregiver: Self  Support Systems: Daughter, Children  County of Residence: [place]  What city do you live in?: [place]  Home entry access options. Select all that apply.: Stairs  Number of steps to enter home.: One Flight  Do the steps have railings?: Yes  Type of Current Residence: 2 [address] home  Upon  entering residence, is there a bedroom on the main floor (no further steps)?: No  A bedroom is located on the following floor levels of residence (select all that apply):: 2nd Floor  Upon entering residence, is there a bathroom on the main floor (no further steps)?: No  Indicate which floors of current residence have a bathroom (select all the apply):: 2nd Floor  Number of steps to 2nd floor from main floor: One Flight  Living Arrangements: Lives Alone  Is patient a ?: Yes  Is [facility] active with [facility] ( [email or web address])?: No    Activities of Daily Living Prior to Admission  Functional Status: Independent  Completes ADLs independently?: Yes  Ambulates independently?: Yes  Does patient use assisted devices?: Yes  Assisted Devices (DME) used: Walker  Does patient currently own DME?: Yes  What DME does the patient currently own?: Walker, Straight Cane  Does patient have a history of Outpatient Therapy (PT/OT)?: Yes  Does the patient have a history of Short-Term Rehab?: No  Does patient have a history of HHC?: No  Does patient currently have HHC?: No         Patient Information Continued  Income Source: SSI/SSD  Does patient have prescription coverage?: Yes ([place] medical assistance [place])  Can the patient afford their medications and any related supplies (such as glucometers or test strips)?: N/A  Does patient receive dialysis treatments?: No  Does patient have a history of substance abuse?: No  Does patient have a history of Mental Health Diagnosis?: Yes (anxiety depression)  Is patient receiving treatment for mental health?: Yes (medication management,)  Has patient received inpatient treatment related to mental health in the last 2 years?: No       Means of Transportation  Means of Transport to Appts:: Family transport      DISCHARGE DETAILS:    Discharge planning discussed with:: patient  Freedom of Choice: Yes  Comments - Freedom of Choice: Home with OP follow up  CM contacted family/caregiver?: No- see  comments  Were Treatment Team discharge recommendations reviewed with patient/caregiver?: Yes  Did patient/caregiver verbalize understanding of patient care needs?: Yes  Were patient/caregiver advised of the risks associated with not following Treatment Team discharge recommendations?: Yes    Contacts  Patient Contacts: son Torey Del Castillo II  Reason/Outcome: Continuity of Care, Emergency Contact, Discharge Planning, Referral    Requested Home Health Care         Is the patient interested in HHC at discharge?: No    DME Referral Provided  Referral made for DME?: No    Other Referral/Resources/Interventions Provided:  Financial Resources Provided: Financial Counselor  Interventions: FindHelp       Treatment Team Recommendation: Home  Expected Discharge Disposition: Home or Self Care     Transport at Discharge : Family         Additional Comments: CM met with the patient at the bedside for intake assessment and discharge planning. CM introduced self and reviewed role. The patient confirmed he lives alone in a two-story home. The bathroom and bedroom are on the second floor but the patient sleeps on the first floor on his sofa. Patient verbalized transportation issues but can make most of his medical appointment and is usually transported by his daughter and son. CM provided FindHelp Resources and referred for BARB with OPCM. CM will continue to follow patient for discharge planning.

## 2025-06-25 NOTE — ASSESSMENT & PLAN NOTE
The patient had hyponatremia on presentation and was given some hypertonic fluid with 1.8% saline and sodium concentration increased to 126 mEq/L.  It appears that the hypertonic saline was stopped provided some D5W and sodium concentration this morning is 126 mEq/L and stable.    Looking at urine studies urine sodium was extremely low and urine osmolality was relatively dilute compared to the serum osmolality.  This would indicate or suggest low solute intake.  Especially with the response to the hypertonic saline which provide solute obligating you urine volume and increasing water excretion in urine output was 3.3 L as recorded.    At this point we will discontinue D5W  And implement fluid restriction  Monitor on oral intake of food  Monitor sodium concentration.    I was contacted by the nurse the sodium concentration was 125 mill equivalents per liter.  I am going to give 500 cc of 1.8% saline over 10 hours repeat BMP in AM.

## 2025-06-25 NOTE — ASSESSMENT & PLAN NOTE
Patient has not had a bowel movement in the past 6 days  Patient has past medical history of ileus  MiraLAX daily and senna twice daily  Suppository daily  Patient had a bowel movement yesterday

## 2025-06-25 NOTE — PROGRESS NOTES
Progress Note - Nephrology   Name: Torey Del Castillo Jr. 59 y.o. male I MRN: [id number]  Unit/Bed#: ICU 08 I Date of Admission: 6/23/2025   Date of Service: 6/25/2025 I Hospital Day: 2     Assessment & Plan  Hyponatremia    The patient had hyponatremia on presentation and was given some hypertonic fluid with 1.8% saline and sodium concentration increased to 126 mEq/L.  It appears that the hypertonic saline was stopped provided some D5W and sodium concentration this morning is 126 mEq/L and stable.    Looking at urine studies urine sodium was extremely low and urine osmolality was relatively dilute compared to the serum osmolality.  This would indicate or suggest low solute intake.  Especially with the response to the hypertonic saline which provide solute obligating you urine volume and increasing water excretion in urine output was 3.3 L as recorded.    At this point we will discontinue D5W  And implement fluid restriction  Monitor on oral intake of food  Monitor sodium concentration.    I was contacted by the nurse the sodium concentration was 125 mill equivalents per liter.  I am going to give 500 cc of 1.8% saline over 10 hours repeat BMP in AM.        Subjective     The patient is awake alert sitting up in the chair he just generally feels weak but says he is feeling little bit better.  Denied any confusion.    No fever chills  No nausea vomiting diarrhea  No cough or shortness of breath  Antonio catheter in.        Objective :  Temp:  [98.3 °F (36.8 °C)-99.9 °F (37.7 °C)] 98.6 °F (37 °C)  HR:  [] 103  BP: ()/(44-79) 106/57  Resp:  [16-47] 47  SpO2:  [91 %-99 %] 94 %  O2 Device: None (Room air)    Current Weight: Weight - Scale: (!) 160 kg (352 lb 4.7 oz)  First Weight: Weight - Scale: (!) 155 kg (341 lb 11.4 oz)  I/O         06/23 0701 06/24 0700 06/24 0701 06/25 0700 06/25 0701 06/26 0700    P.O. 600 600 120    I.V. (mL/kg) 2260 (14.1) 724.2 (4.5) 250 (1.6)    IV Piggyback 850 500     Total  Intake(mL/kg) 3710 (23.2) 1824.2 (11.4) 370 (2.3)    Urine (mL/kg/hr) 3875 (1) 3350 (0.9)     Stool 0 0     Total Output 3875 3350     Net -165 -1525.8 +370           Unmeasured Stool Occurrence 0 x 1 x 1 x          Physical Exam  Constitutional:       General: He is not in acute distress.     Appearance: He is not toxic-appearing.   HENT:      Head: Normocephalic and atraumatic.      Mouth/Throat:      Mouth: Mucous membranes are dry.     Eyes:      Extraocular Movements: Extraocular movements intact.       Cardiovascular:      Rate and Rhythm: Normal rate. Rhythm irregular.      Heart sounds:      No gallop.   Pulmonary:      Effort: Pulmonary effort is normal. No respiratory distress.      Breath sounds: No wheezing or rales.   Abdominal:      General: There is no distension.      Palpations: Abdomen is soft.      Tenderness: There is no abdominal tenderness.     Neurological:      Mental Status: He is alert and oriented to person, place, and time.     Psychiatric:         Mood and Affect: Mood normal.         Behavior: Behavior normal.         Medications:  Current Medications[1]      Lab Results: I have reviewed the following results:  Results from last 7 days   Lab Units 06/25/25  0614 06/25/25  0015 06/24/25  1736 06/24/25  1139 06/24/25  0748 06/24/25  0203 06/23/25  2327 06/23/25  2041 06/23/25  1113 06/23/25  0836 06/23/25  0555 06/23/25  0500   WBC Thousand/uL 8.10  --   --   --  11.54*  --   --  16.87*  --   --   --  16.39*   HEMOGLOBIN g/dL 12.2  --   --   --  12.1  --   --  12.3  --   --   --  12.7   HEMATOCRIT % 34.8*  --   --   --  33.1*  --   --  33.5*  --   --   --  34.6*   PLATELETS Thousands/uL 121*  --   --   --  117*  --   --  142*  --  131*  --  150   POTASSIUM mmol/L 3.9  3.9 4.0 4.3 4.3 4.2 4.1 4.2 4.0   < >  --    < > 3.2*   CHLORIDE mmol/L 88*  89* 89* 86* 83* 82* 80* 79* 79*   < >  --    < > 74*   CO2 mmol/L 29  28 29 25 28 29 28 26 26   < >  --    < > 21   BUN mg/dL 13  13 12 11 9 8  "7 7 7   < >  --    < > 5   CREATININE mg/dL 0.86  0.85 0.86 0.93 0.82 0.85 0.87 0.80 0.88   < >  --    < > 0.88   CALCIUM mg/dL 7.8*  7.7* 7.8* 7.7* 8.0* 8.1* 8.0* 8.0* 7.8*   < >  --    < > 7.5*   MAGNESIUM mg/dL  --   --   --   --   --   --   --  1.8*  --   --   --   --    ALBUMIN g/dL  --   --   --   --   --   --   --  3.1*  --   --   --  3.1*    < > = values in this interval not displayed.       Administrative Statements     Portions of the record may have been created with voice recognition software. Occasional wrong word or \"sound a like\" substitutions may have occurred due to the inherent limitations of voice recognition software. Read the chart carefully and recognize, using context, where substitutions have occurred.If you have any questions, please contact the dictating provider.         [1]   Current Facility-Administered Medications:     albuterol (PROVENTIL HFA,VENTOLIN HFA) inhaler 2 puff, 2 puff, Inhalation, Q6H PRN, Avtar Rodriguez MD, 2 puff at 06/24/25 2154    atorvastatin (LIPITOR) tablet 40 mg, 40 mg, Oral, Daily With Dinner, Avtar Rodriguez MD, 40 mg at 06/24/25 1706    bisacodyl (DULCOLAX) rectal suppository 10 mg, 10 mg, Rectal, Daily, Avtar Rodriguez MD    [Held by provider] diltiazem (CARDIZEM CD) 24 hr capsule 300 mg, 300 mg, Oral, QAM, Avtar Rodriguez MD    enoxaparin (LOVENOX) subcutaneous injection 40 mg, 40 mg, Subcutaneous, Q12H ASIA, Avtar Rodriguez MD, 40 mg at 06/25/25 0813    folic acid (FOLVITE) tablet 400 mcg, 400 mcg, Oral, Daily, Avtar Rodriguez MD, 400 mcg at 06/25/25 0813    [Held by provider] isosorbide mononitrate (IMDUR) 24 hr tablet 60 mg, 60 mg, Oral, Daily, Avtar Rodriguez MD    levalbuterol (XOPENEX) inhalation solution 1.25 mg, 1.25 mg, Nebulization, TID, Avtar Rodriguez MD, 1.25 mg at 06/25/25 0739    [Held by provider] lisinopril (ZESTRIL) tablet 40 mg, 40 mg, Oral, Daily, Avtar Rodriguez MD    LORazepam (ATIVAN) tablet 1 mg, 1 mg, Oral, BID PRN, " Aunsha Huffman PA-C, 1 mg at 06/25/25 0813    polyethylene glycol (MIRALAX) packet 17 g, 17 g, Oral, Daily, Avtar Rodriguez MD, 17 g at 06/25/25 0813    predniSONE tablet 40 mg, 40 mg, Oral, Daily, Avtar Rodriguez MD, 40 mg at 06/25/25 0813    QUEtiapine (SEROquel) tablet 200 mg, 200 mg, Oral, HS, Anusha Huffman PA-C, 200 mg at 06/24/25 2154    senna-docusate sodium (SENOKOT S) 8.6-50 mg per tablet 2 tablet, 2 tablet, Oral, BID, Tiffanie Christine MD, 2 tablet at 06/25/25 0813    sertraline (ZOLOFT) tablet 50 mg, 50 mg, Oral, HS, Avtar Rodriguez MD, 50 mg at 06/24/25 2154    [Held by provider] spironolactone (ALDACTONE) tablet 25 mg, 25 mg, Oral, Daily, Avtar Rodriguez MD

## 2025-06-25 NOTE — DISCHARGE INSTR - OTHER ORDERS
Skin Care Plan:   1-Remedy Silicone Cream to bilateral heels twice daily and as needed.  2-Elevate heels off of bed/chair surface to offload pressure.  3-Bariatric offloading air cushion in chair when out of bed.  4-Apply lotion to body daily and as needed.  5-Turn/reposition every 2 hours for pressure re-distribution on skin.   6-Medial buttocks/gluteal cleft--cleanse with soap and water, pat dry.  Apply Remedy Zinc Oxide/Calazime paste two times daily and as needed.  7-Groin folds--cleanse with soap and water, pat dry.  Apply Nystatin powder twice daily.

## 2025-06-25 NOTE — PROGRESS NOTES
Progress Note - Critical Care/ICU   Name: Torey Del Castillo Jr. 59 y.o. male I MRN: [id number]  Unit/Bed#: ICU 08 I Date of Admission: 6/23/2025   Date of Service: 6/25/2025 I Hospital Day: 2      Critical Care Interval Transfer Note:    Brief Hospital Summary: 59-year-old male with past medical history of atrial flutter, obesity, hypertension, cardiac arrest, hyperlipidemia presented to the ED after not feeling well and dyspnea on exertion which has been exacerbated in the past 2 to 3 days.  As per EMS patient was found surrounded by beer bottles and cans.  However patient says his last alcohol drink was on Saturday.  Patient was profoundly wheezing in the ED and was given 1 dose of antibiotics along with Solu-Medrol 125 Mg.  Patient was found to have hyponatremia of 114 and was given fluids.  Patient also complaining of constipation, last bowel movement 6 days ago before arriving to the ED.  Patient's sodium was managed by nephrology by giving 1.8% normal saline along with fluids.  Patient's sodium went from 114-126 today.  Patient was on D5 overnight which is stopped now.  Patient is currently stable for [facility] level of care.    Barriers to discharge:   Hyponatremia  Constipation  Antonio catheter  Case management consultation for CPAP machine at home     Consults: IP CONSULT TO CASE MANAGEMENT  IP CONSULT TO NEPHROLOGY    Recommended to review admission imaging for incidental findings and document in discharge navigator: Chart reviewed, no known incidental findings noted at this time.      Discharge Plan: Discharge will be decided by the [facility] team.  Antonio Plan: Continue for accurate I/O monitoring for 48 hours       Patient seen and evaluated by Critical Care today and deemed to be appropriate for transfer to Med Surg. Spoke to Dr. Monahan from [facility] to accept transfer. Critical care can be contacted via SecureChat with any questions or concerns. Please use the Critical Care AP Role in Secure Chat for any provider  inquires until the patient is transferred out of the ICU or until tomorrow at 0600.

## 2025-06-25 NOTE — PLAN OF CARE
Problem: Potential for Falls  Goal: Patient will remain free of falls  Description: INTERVENTIONS:  - Educate patient/family on patient safety including physical limitations  - Instruct patient to call for assistance with activity   - Consider consulting OT/PT to assist with strengthening/mobility based on AM PAC & JH-HLM score  - Consult OT/PT to assist with strengthening/mobility   - Keep Call bell within reach  - Keep bed low and locked with side rails adjusted as appropriate  - Keep care items and personal belongings within reach  - Initiate and maintain comfort rounds  - Make Fall Risk Sign visible to staff  - Offer Toileting every 2 Hours, in advance of need  - Initiate/Maintain bed alarm  - Obtain necessary fall risk management equipment: socks  - Apply yellow socks and bracelet for high fall risk patients  - Consider moving patient to room near nurses station  Outcome: Progressing     Problem: Nutrition/Hydration-ADULT  Goal: Nutrient/Hydration intake appropriate for improving, restoring or maintaining nutritional needs  Description: Monitor and assess patient's nutrition/hydration status for malnutrition. Collaborate with interdisciplinary team and initiate plan and interventions as ordered.  Monitor patient's weight and dietary intake as ordered or per policy. Utilize nutrition screening tool and intervene as necessary. Determine patient's food preferences and provide high-protein, high-caloric foods as appropriate.     INTERVENTIONS:  - Monitor oral intake, urinary output, labs, and treatment plans  - Assess nutrition and hydration status and recommend course of action  - Evaluate amount of meals eaten  - Assist patient with eating if necessary   - Allow adequate time for meals  - Recommend/ encourage appropriate diets, oral nutritional supplements, and vitamin/mineral supplements  - Order, calculate, and assess calorie counts as needed  - Recommend, monitor, and adjust tube feedings and TPN/PPN based  on assessed needs  - Assess need for intravenous fluids  - Provide specific nutrition/hydration education as appropriate  - Include patient/family/caregiver in decisions related to nutrition  Outcome: Progressing     Problem: PAIN - ADULT  Goal: Verbalizes/displays adequate comfort level or baseline comfort level  Description: Interventions:  - Encourage patient to monitor pain and request assistance  - Assess pain using appropriate pain scale  - Administer analgesics as ordered based on type and severity of pain and evaluate response  - Implement non-pharmacological measures as appropriate and evaluate response  - Consider cultural and social influences on pain and pain management  - Notify physician/advanced practitioner if interventions unsuccessful or patient reports new pain  - Educate patient/family on pain management process including their role and importance of  reporting pain   - Provide non-pharmacologic/complimentary pain relief interventions  Outcome: Progressing     Problem: INFECTION - ADULT  Goal: Absence or prevention of progression during hospitalization  Description: INTERVENTIONS:  - Assess and monitor for signs and symptoms of infection  - Monitor lab/diagnostic results  - Monitor all insertion sites, i.e. indwelling lines, tubes, and drains  - Monitor endotracheal if appropriate and nasal secretions for changes in amount and color  - [place] appropriate cooling/warming therapies per order  - Administer medications as ordered  - Instruct and encourage patient and family to use good hand hygiene technique  - Identify and instruct in appropriate isolation precautions for identified infection/condition  Outcome: Progressing  Goal: Absence of fever/infection during neutropenic period  Description: INTERVENTIONS:  - Monitor WBC  - Perform strict hand hygiene  - Limit to healthy visitors only  - No plants, dried, fresh or silk flowers with han in patient room  Outcome: Progressing     Problem:  SAFETY ADULT  Goal: Patient will remain free of falls  Description: INTERVENTIONS:  - Educate patient/family on patient safety including physical limitations  - Instruct patient to call for assistance with activity   - Consider consulting OT/PT to assist with strengthening/mobility based on AM PAC & JH-HLM score  - Consult OT/PT to assist with strengthening/mobility   - Keep Call bell within reach  - Keep bed low and locked with side rails adjusted as appropriate  - Keep care items and personal belongings within reach  - Initiate and maintain comfort rounds  - Make Fall Risk Sign visible to staff  - Offer Toileting every 2 Hours, in advance of need  - Initiate/Maintain bed alarm  - Obtain necessary fall risk management equipment: socks  - Apply yellow socks and bracelet for high fall risk patients  - Consider moving patient to room near nurses station  Outcome: Progressing  Goal: Maintain or return to baseline ADL function  Description: INTERVENTIONS:  -  Assess patient's ability to carry out ADLs; assess patient's baseline for ADL function and identify physical deficits which impact ability to perform ADLs (bathing, care of mouth/teeth, toileting, grooming, dressing, etc.)  - Assess/evaluate cause of self-care deficits   - Assess range of motion  - Assess patient's mobility; develop plan if impaired  - Assess patient's need for assistive devices and provide as appropriate  - Encourage maximum independence but intervene and supervise when necessary  - Involve family in performance of ADLs  - Assess for home care needs following discharge   - Consider OT consult to assist with ADL evaluation and planning for discharge  - Provide patient education as appropriate  - Monitor functional capacity and physical performance, use of AM PAC & JH-HLM   - Monitor gait, balance and fatigue with ambulation    Outcome: Progressing  Goal: Maintains/Returns to pre admission functional level  Description: INTERVENTIONS:  - Perform  AM-PAC 6 Click Basic Mobility/ Daily Activity assessment daily.  - Set and communicate daily mobility goal to care team and patient/family/caregiver.   - Collaborate with rehabilitation services on mobility goals if consulted  - Perform Range of Motion 3 times a day.  - Reposition patient every 2 hours.  - Dangle patient 3 times a day  - Stand patient 3 times a day  - Ambulate patient 3 times a day  - Out of bed to chair 3 times a day   - Out of bed for meals 3 times a day  - Out of bed for toileting  - Record patient progress and toleration of activity level   Outcome: Progressing     Problem: DISCHARGE PLANNING  Goal: Discharge to home or other facility with appropriate resources  Description: INTERVENTIONS:  - Identify barriers to discharge w/patient and caregiver  - Arrange for needed discharge resources and transportation as appropriate  - Identify discharge learning needs (meds, wound care, etc.)  - Arrange for interpretive services to assist at discharge as needed  - Refer to Case Management Department for coordinating discharge planning if the patient needs post-hospital services based on physician/advanced practitioner order or complex needs related to functional status, cognitive ability, or social support system  Outcome: Progressing     Problem: Knowledge Deficit  Goal: Patient/family/caregiver demonstrates understanding of disease process, treatment plan, medications, and discharge instructions  Description: Complete learning assessment and assess knowledge base.  Interventions:  - Provide teaching at level of understanding  - Provide teaching via preferred learning methods  Outcome: Progressing     Problem: Prexisting or High Potential for Compromised Skin Integrity  Goal: Skin integrity is maintained or improved  Description: INTERVENTIONS:  - Identify patients at risk for skin breakdown  - Assess and monitor skin integrity including under and around medical devices   - Assess and monitor nutrition  and hydration status  - Monitor labs  - Assess for incontinence   - Turn and reposition patient  - Assist with mobility/ambulation  - Relieve pressure over ryley prominences   - Avoid friction and shearing  - Provide appropriate hygiene as needed including keeping skin clean and dry  - Evaluate need for skin moisturizer/barrier cream  - Collaborate with interdisciplinary team  - Patient/family teaching  - Consider wound care consult    Assess:  - Review Marcos scale daily  - Clean and moisturize skin every shift  - Inspect skin when repositioning, toileting, and assisting with ADLS  - Assess under medical devices such as bp cuff every shift  - Assess extremities for adequate circulation and sensation     Bed Management:  - Have minimal linens on bed & keep smooth, unwrinkled  - Change linens as needed when moist or perspiring  - Avoid sitting or lying in one position for more than 2 hours while in bed?Keep HOB at 30 degrees   - Toileting:  - Offer bedside commode  - Assess for incontinence every 2 hours  - Use incontinent care products after each incontinent episode such as wipes    Activity:  - Mobilize patient 3 times a day  - Encourage activity and walks on unit  - Encourage or provide ROM exercises   - Turn and reposition patient every 2 Hours  - Use appropriate equipment to lift or move patient in bed  - Instruct/ Assist with weight shifting every 2 hours when out of bed in chair  - Consider limitation of chair time 2 hour intervals    Skin Care:  - Avoid use of baby powder, tape, friction and shearing, hot water or constrictive clothing  - Relieve pressure over bony prominences using pillows  - Do not massage red bony areas    Next Steps:  - Teach patient strategies to minimize risks such as turning  - Consider consults to  interdisciplinary teams such as wound  Outcome: Progressing     Problem: RESPIRATORY - ADULT  Goal: Achieves optimal ventilation and oxygenation  Description: INTERVENTIONS:  - Assess for  changes in respiratory status  - Assess for changes in mentation and behavior  - Position to facilitate oxygenation and minimize respiratory effort  - Oxygen administered by appropriate delivery if ordered  - Initiate smoking cessation education as indicated  - Encourage broncho-pulmonary hygiene including cough, deep breathe, Incentive Spirometry  - Assess the need for suctioning and aspirate as needed  - Assess and instruct to report SOB or any respiratory difficulty  - Respiratory Therapy support as indicated  Outcome: Progressing     Problem: GASTROINTESTINAL - ADULT  Goal: Maintains or returns to baseline bowel function  Description: INTERVENTIONS:  - Assess bowel function  - Encourage oral fluids to ensure adequate hydration  - Administer IV fluids if ordered to ensure adequate hydration  - Administer ordered medications as needed  - Encourage mobilization and activity  - Consider nutritional services referral to assist patient with adequate nutrition and appropriate food choices  Outcome: Progressing     Problem: METABOLIC, FLUID AND ELECTROLYTES - ADULT  Goal: Electrolytes maintained within normal limits  Description: INTERVENTIONS:  - Monitor labs and assess patient for signs and symptoms of electrolyte imbalances  - Administer electrolyte replacement as ordered  - Monitor response to electrolyte replacements, including repeat lab results as appropriate  - Instruct patient on fluid and nutrition as appropriate  Outcome: Progressing     Problem: SKIN/TISSUE INTEGRITY - ADULT  Goal: Skin Integrity remains intact(Skin Breakdown Prevention)  Description: Assess:  -Perform Marcos assessment every shift  -Clean and moisturize skin every shift  -Inspect skin when repositioning, toileting, and assisting with ADLS  -Assess under medical devices such as bp cuff every shift  -Assess extremities for adequate circulation and sensation     Bed Management:  -Have minimal linens on bed & keep smooth, unwrinkled  -Change  linens as needed when moist or perspiring  -Avoid sitting or lying in one position for more than 2 hours while in bed  -Keep HOB at 30 degrees     Toileting:  -Offer bedside commode  -Assess for incontinence every 2 hours  -Use incontinent care products after each incontinent episode such as wipes    Activity:  -Mobilize patient 3 times a day  -Encourage activity and walks on unit  -Encourage or provide ROM exercises   -Turn and reposition patient every 2 Hours  -Use appropriate equipment to lift or move patient in bed  -Instruct/ Assist with weight shifting every 2 hours when out of bed in chair  -Consider limitation of chair time 2 hour intervals    Skin Care:  -Avoid use of baby powder, tape, friction and shearing, hot water or constrictive clothing  -Relieve pressure over bony prominences using pillows  -Do not massage red bony areas    Next Steps:  -Teach patient strategies to minimize risks such as turning   -Consider consults to  interdisciplinary teams such as wound  Outcome: Progressing     Problem: HEMATOLOGIC - ADULT  Goal: Maintains hematologic stability  Description: INTERVENTIONS  - Assess for signs and symptoms of bleeding or hemorrhage  - Monitor labs  - Administer supportive blood products/factors as ordered and appropriate  Outcome: Progressing

## 2025-06-25 NOTE — CASE MANAGEMENT
Case Management Discharge Planning Note    Patient name Torey Del Castillo Jr.  Location ICU 08/ICU 08 MRN [id number]  : 1965 Date 2025       Current Admission Date: 2025  Current Admission Diagnosis:Hyponatremia   Patient Active Problem List    Diagnosis Date Noted    Elevated lactic acid level 2025    Hyponatremia 2025    Constipation 2025    SIRS (systemic inflammatory response syndrome) (HCC) 2025    METABOLIC ENCEPHALOPATHY 2025    Abscess 2025    History of cardiac arrest 2025    Mixed hyperlipidemia 2025    Chest pain, atypical 2025    Weight loss 2024    Sleep trouble 2024    Syncope 2024    Hypothyroidism 2024    Pulmonary congestion 2024    Other specified hypothyroidism 10/05/2023    Prediabetes 10/05/2023    Abnormal hematocrit 10/05/2023    Spasm of muscle of lower back 2023    Skin lesion 2023    Intentional drug overdose, initial encounter ([facility]) 2023    Left leg pain 2023    Dyspnea on exertion 2023    Obstructive sleep apnea     Blood in left ear canal 10/31/2022    Hyperpigmented skin lesion 10/31/2022    Preop examination 2022    Financial difficulties 2022    Hip pain 2022    H/o Atrial flutter (HCC)-rate controlled 2022    Anemia 2021    Thrombocytopenia ([facility]) 2021    Alcohol dependence ([facility]) 2021    Cardiac arrest ([facility]) 2021    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    Obesity, Class III, BMI 40-49.9 (morbid obesity) 10/20/2021    Excessive daytime sleepiness 10/20/2021    Hyperlipidemia 06/10/2021    Elevated alkaline phosphatase level 06/10/2021    Elevated hemoglobin (HCC) 06/10/2021    Hypertension 2021    Asthma 2020    Moderate episode of recurrent major depressive disorder (HCC) 2019    History of MRSA infection of lungs 2019    Anxiety     GERD (gastroesophageal reflux disease)     MDD  (major depressive disorder)     Adenoma of left adrenal gland 05/20/2018    Closed right ankle fracture 03/16/2017    Tobacco use disorder     History of ileus     Umbilical hernia without obstruction and without gangrene 02/01/2016    Gallbladder calculus without cholecystitis 02/01/2016    Kidney stone on right side 01/31/2016    Ileus (HCC) 01/31/2016    Other insomnia     Depression with anxiety       LOS (days): 2  Geometric Mean LOS (GMLOS) (days):   Days to GMLOS:     OBJECTIVE:  Risk of Unplanned Readmission Score: 21.83         Current admission status: Inpatient   Preferred Pharmacy:   [facility] [address] [place] [address]  [address] [place] [id number]  Phone: [phone] Fax: [phone]    Primary Care Provider: Tran Rodríguez MD    Primary Insurance: [place] [employer]  Secondary Insurance:     DISCHARGE DETAILS:    Discharge planning discussed with:: patient  Freedom of Choice: Yes     CM contacted family/caregiver?: No- see comments  Were Treatment Team discharge recommendations reviewed with patient/caregiver?: Yes  Did patient/caregiver verbalize understanding of patient care needs?: Yes  Were patient/caregiver advised of the risks associated with not following Treatment Team discharge recommendations?: Yes    Contacts  Patient Contacts: juhi Del Castillo II  Reason/Outcome: Continuity of Care, Emergency Contact, Discharge Planning, Referral    Other Referral/Resources/Interventions Provided:  Interventions: Mik[place]    Treatment Team Recommendation: Home  Expected Discharge Disposition: Home or Self Care         Additional Comments: CM provided patient with the [employer] resources as discussed. CM addressed patient's questions and concerns. CM will continue to follow for discharge planning. Patient will need PT/OT evaluations and recs for discharge.

## 2025-06-25 NOTE — PROGRESS NOTES
Progress Note - Critical Care/ICU   Name: Torey Del Castillo Jr. 59 y.o. male I MRN: [id number]  Unit/Bed#: ICU 08 I Date of Admission: 6/23/2025   Date of Service: 6/25/2025 I Hospital Day: 2      Assessment & Plan  Hyponatremia  Lab Results   Component Value Date/Time    SODIUM 126 (L) 06/25/2025 06:14 AM    SODIUM 126 (L) 06/25/2025 06:14 AM    SODIUM 138 07/11/2024 04:04 AM    SODIUM 140 07/10/2024 05:18 AM     According to the EMS, multiple beer cans and bottles were found  Can be in the setting of potomania  Urine osmolality 211, urine sodium less than 10, serum osmolality 245  Serum cortisol, a.m. elevated at 24.3  Discontinue 1.8% normal saline  Start D5 50 mL/h  Continue to monitor sodium  Nephrology consulted, appreciate recommendations    SIRS (systemic inflammatory response syndrome) (HCC)  Lactic acid 8.6 on arrival, WBCs 16.39  Hypotensive with systolic blood pressure in 80s  Status post 2 L of fluid and 1 dose of albumin  CTA PE negative for pulmonary embolism  Patient is afebrile, leukocytosis most likely secondary to stress  Lactic acid most likely secondary to hypotension and less likely albuterol induced  Patient is less likely septic, most symptoms secondary to hyponatremia  Lactic acid resolved  Dyspnea on exertion  Patient has ongoing dyspnea for months, since June 2024 however has been exacerbated in the past 2 to 3 days  Dyspnea can be multifactorial in nature including obesity, PRAKASH, smoking  Patient denies any history of COPD  Patient denies any noncompliance with medication at home  Patient does not wear oxygen at home, uses CPAP  Diffuse wheezing noted on physical examination, status post albuterol nebulizer, Xopenex and Solu-Medrol 125 Mg IV  Patient denies any chest pain, fevers or chills  Patient does endorse stuffy nose and upper respiratory symptoms  Flu RSV COVID-negative  CTA PE negative for acute pulmonary embolism    PLAN:  Continue Xopenex 3 times daily  Continue BiPAP  Continue  prednisone 40 Mg daily  Elevated lactic acid level  Lab Results   Component Value Date/Time    LACTICACID 1.9 06/24/2025 02:03 AM    LACTICACID 2.5 (H) 06/23/2025 11:27 PM     Lactic acid can be elevated in the setting of hypotension versus albuterol induced  Resolved    Constipation  Patient has not had a bowel movement in the past 6 days  Patient has past medical history of ileus  MiraLAX daily and senna twice daily  Suppository daily  Patient had a bowel movement yesterday  H/o Atrial flutter (HCC)-rate controlled  History of atrial flutter status post cardioversion in 2018 and ablation 6/21/2024  Hold Cardizem 300 Mg daily in the setting of hypotension  Not on any anticoagulation according to the patient  Anxiety  Continue Zoloft 50 Mg daily  Hypertension  Hold lisinopril 40 Mg daily in the setting of hypotension  Prediabetes  Lab Results   Component Value Date/Time    HGBA1C 5.1 01/27/2025 03:08 PM    HGBA1C 4.8 10/31/2020 06:24 AM     Continue to monitor glucose levels  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 Yes  Need for changes to pain regimen? No   B: Both SAT/SAT  N/A   C: Choice of Sedation RASS Goal: 0 Alert and Calm  Need for changes to sedation or analgesia regimen? No   D: Delirium CAM-ICU: Negative   E: Early Mobility  Plan for early mobility? N/A   F: Family Engagement Plan for family engagement today? Yes       Review of Invasive Devices:    Marisela Plan: Continue for accurate I/O  monitoring for 48 hours        Prophylaxis:  VTE VTE covered by:  enoxaparin, Subcutaneous, 40 mg at 06/24/25 2154       Stress Ulcer  not orderedcovered byomeprazole (PriLOSEC) 40 MG capsule [[id number]] (Long-Term Med)         24 Hour Events : Patient sodium improved to 126 overnight.  Patient was started on D5 at 50 cc/h.    Subjective patient is alert oriented to time place and person.  Patient's abdominal pain has improved.    Review of Systems: See HPI for Review of Systems    Objective :                   Vitals I/O      Most Recent Min/Max in 24hrs   Temp 98.7 °F (37.1 °C) Temp  Min: 97.8 °F (36.6 °C)  Max: 99.9 °F (37.7 °C)   Pulse 88 Pulse  Min: 88  Max: 104   Resp 18 Resp  Min: 16  Max: 45   /55 BP  Min: 89/44  Max: 129/60   O2 Sat 99 % SpO2  Min: 91 %  Max: 99 %      Intake/Output Summary (Last 24 hours) at 6/25/2025 0640  Last data filed at 6/25/2025 0200  Gross per 24 hour   Intake 980 ml   Output 3075 ml   Net -2095 ml       Diet Cardiovascular; Sodium 2 GM; Sodium 2 GM, Fluid Restriction 1000 ML    Invasive Monitoring           Physical Exam   Physical Exam  Eyes:      General: Vision grossly intact.      Extraocular Movements: Extraocular movements intact.      Conjunctiva/sclera: Conjunctivae normal.      Pupils: Pupils are equal, round, and reactive to light.   Skin:     General: Skin is warm.   HENT:      Head: Normocephalic and atraumatic.      Mouth/Throat:      Mouth: Mucous membranes are dry.   Neck:      Comments: Bilateral fat stranding around the neckCardiovascular:      Rate and Rhythm: Normal rate and regular rhythm.      Pulses: Normal pulses.   Musculoskeletal:      Right lower leg: Edema present.      Left lower leg: Edema present.   Abdominal: General: There is distension.     Palpations: Abdomen is soft.   Constitutional:       Appearance: He is morbidly obese.   Pulmonary:      Effort: Pulmonary effort is normal.   Neurological:      General: No focal deficit present.       Mental Status: He is alert and oriented to person, place and time. Mental status is at baseline.      Motor: Strength full and intact in all extremities.          Diagnostic Studies        Lab Results: I have reviewed the following results:     Medications:  Scheduled PRN   atorvastatin, 40 mg, Daily With Dinner  bisacodyl, 10 mg, Daily  [Held by provider] diltiazem, 300 mg, QAM  enoxaparin, 40 mg, Q12H ASIA  folic acid, 400 mcg, Daily  [Held by provider] isosorbide mononitrate, 60 mg, Daily  levalbuterol, 1.25 mg, TID  [Held by provider] lisinopril, 40 mg, Daily  polyethylene glycol, 17 g, Daily  predniSONE, 40 mg, Daily  QUEtiapine, 200 mg, HS  senna-docusate sodium, 2 tablet, BID  sertraline, 50 mg, HS  [Held by provider] spironolactone, 25 mg, Daily      albuterol, 2 puff, Q6H PRN  LORazepam, 1 mg, BID PRN       Continuous    dextrose, 50 mL/hr, Last Rate: 50 mL/hr (06/25/25 0224)         Labs:   CBC    Recent Labs     06/23/25 2041 06/24/25  0748   WBC 16.87* 11.54*   HGB 12.3 12.1   HCT 33.5* 33.1*   * 117*     BMP    Recent Labs     06/25/25  0015 06/25/25  0614   SODIUM 126* 126*  126*   K 4.0 3.9  3.9   CL 89* 88*  89*   CO2 29 29  28   AGAP 8 9  9   BUN 12 13  13   CREATININE 0.86 0.86  0.85   CALCIUM 7.8* 7.8*  7.7*       Coags    Recent Labs     06/23/25  2041   INR 1.30*        Additional Electrolytes  Recent Labs     06/23/25 2041   MG 1.8*   CAIONIZED 0.96*          Blood Gas    Recent Labs     06/23/25  1158   PHART 7.408   JYY4ALL 34.6*   PO2ART 100.6   KJT9NCW 21.3*   BEART -2.7   SOURCE Line, Arterial     Recent Labs     06/23/25  1158 06/23/25 2041   PHVEN  --  7.464*   [id number]  --  37.8*   PO2VEN  --  62.3*   FZF4SMI  --  26.5   BEVEN  --  2.8   [id number]  --  90.8*   SOURCE Line, Arterial  --     LFTs  Recent Labs     06/23/25  2041   ALT 25   AST 41*   ALKPHOS 150*   ALB 3.1*   TBILI 1.36*       Infectious  No recent results    Glucose  Recent Labs     06/24/25  1139  06/24/25  1736 06/25/25  0015 06/25/25  0614   GLUC 113 157* 111 101  102

## 2025-06-25 NOTE — WOUND OSTOMY CARE
Consult Note - Wound   Torey Del Castillo Jr. 59 y.o. male MRN: [id number]  Unit/Bed#: [place] [address] -02 Encounter: [id number]      History and Present Illness:  59 year old male presented to the hospital with dyspnea on exertion.    Patient history significant for obesity, HTN, cardiac arrest, tobacco use, atrial flutter.    Assessment Findings:   Patient agreeable to assessment.  Able to turn/reposition independently in bed.  Sitting up in the chair, able to stand with minimal assist x1 and walker.  Antonio catheter in place, continent of stool.    Nutrition team following.  Bilateral heels, buttocks, and sacrum intact and blanchable.  Medial buttocks pink and blanchable (see media photo)--at risk for moisture damage secondary to perspiration.  Right posterior thigh with intact, tan, hyperpigmented scar tissue--patient states he had a boil in the area that spontaneously ruptured and required packing, has been healed for several months (see media photo).    Bridge of nose intact and blanchable with preventative hydrocolloid in place--patient wearing Bipap PRN per nursing team.  Irritant contact dermatitis to bilateral groin folds secondary to perspiration--pink intact epidermis with scaling.  No open or draining areas at this time.  Mild foul odor.  Likely with fungal component.    See flowsheet for wound details.    Skin Care Plan:   1-Remedy Silicone Cream to bilateral heels twice daily and as needed.  2-Elevate heels off of bed/chair surface to offload pressure.  3-Bariatric offloading air cushion in chair when out of bed.  4-Apply moisturizing skin cream to body daily and as needed.  5-Turn/reposition every 2 hours for pressure re-distribution on skin.   6-Medial buttocks/gluteal cleft--cleanse with soap and water, pat dry.  Apply Zinc Oxide/Calazime paste two times daily and as needed.  7-Groin folds--cleanse with soap and water, pat dry.  Apply Nystatin powder twice daily.    Wound care team will sign-off at this  time.  Plan of care reviewed with primary RN.    Wound 06/23/25 Irritant Contact Dermatitis Perspiration Groin Right (Active)   Wound Image   06/25/25 0938   Wound Description Pink 06/25/25 0938   Cassie-wound Assessment Scaly 06/25/25 0938   Dressing Open to air 06/25/25 0938       Wound 06/25/25 Irritant Contact Dermatitis Perspiration Groin Left (Active)   Wound Image   06/25/25 0940   Wound Description Pink 06/25/25 0940   Cassie-wound Assessment Scaly 06/25/25 0940   Dressing Open to air 06/25/25 0940       Bibiana Avila RN, BSN, CWON

## 2025-06-25 NOTE — ASSESSMENT & PLAN NOTE
Lab Results   Component Value Date/Time    SODIUM 126 (L) 06/25/2025 06:14 AM    SODIUM 126 (L) 06/25/2025 06:14 AM    SODIUM 138 07/11/2024 04:04 AM    SODIUM 140 07/10/2024 05:18 AM     According to the EMS, multiple beer cans and bottles were found  Can be in the setting of potomania  Urine osmolality 211, urine sodium less than 10, serum osmolality 245  Serum cortisol, a.m. elevated at 24.3  Discontinue 1.8% normal saline  Start D5 50 mL/h  Continue to monitor sodium  Nephrology consulted, appreciate recommendations

## 2025-06-25 NOTE — PLAN OF CARE
Problem: Potential for Falls  Goal: Patient will remain free of falls  Description: INTERVENTIONS:  - Educate patient/family on patient safety including physical limitations  - Instruct patient to call for assistance with activity   - Consider consulting OT/PT to assist with strengthening/mobility based on AM PAC & JH-HLM score  - Consult OT/PT to assist with strengthening/mobility   - Keep Call bell within reach  - Keep bed low and locked with side rails adjusted as appropriate  - Keep care items and personal belongings within reach  - Initiate and maintain comfort rounds  - Make Fall Risk Sign visible to staff  - Offer Toileting every 2 Hours, in advance of need  - Initiate/Maintain bed alarm  - Obtain necessary fall risk management equipment: socks  - Apply yellow socks and bracelet for high fall risk patients  - Consider moving patient to room near nurses station  Outcome: Progressing     Problem: PAIN - ADULT  Goal: Verbalizes/displays adequate comfort level or baseline comfort level  Description: Interventions:  - Encourage patient to monitor pain and request assistance  - Assess pain using appropriate pain scale  - Administer analgesics as ordered based on type and severity of pain and evaluate response  - Implement non-pharmacological measures as appropriate and evaluate response  - Consider cultural and social influences on pain and pain management  - Notify physician/advanced practitioner if interventions unsuccessful or patient reports new pain  - Educate patient/family on pain management process including their role and importance of  reporting pain   - Provide non-pharmacologic/complimentary pain relief interventions  Outcome: Progressing     Problem: INFECTION - ADULT  Goal: Absence or prevention of progression during hospitalization  Description: INTERVENTIONS:  - Assess and monitor for signs and symptoms of infection  - Monitor lab/diagnostic results  - Monitor all insertion sites, i.e. indwelling  lines, tubes, and drains  - Monitor endotracheal if appropriate and nasal secretions for changes in amount and color  - [place] appropriate cooling/warming therapies per order  - Administer medications as ordered  - Instruct and encourage patient and family to use good hand hygiene technique  - Identify and instruct in appropriate isolation precautions for identified infection/condition  Outcome: Progressing     Problem: SAFETY ADULT  Goal: Patient will remain free of falls  Description: INTERVENTIONS:  - Educate patient/family on patient safety including physical limitations  - Instruct patient to call for assistance with activity   - Consider consulting OT/PT to assist with strengthening/mobility based on AM PAC & JH-HLM score  - Consult OT/PT to assist with strengthening/mobility   - Keep Call bell within reach  - Keep bed low and locked with side rails adjusted as appropriate  - Keep care items and personal belongings within reach  - Initiate and maintain comfort rounds  - Make Fall Risk Sign visible to staff  - Offer Toileting every 2 Hours, in advance of need  - Initiate/Maintain bed alarm  - Obtain necessary fall risk management equipment: socks  - Apply yellow socks and bracelet for high fall risk patients  - Consider moving patient to room near nurses station  Outcome: Progressing     Problem: RESPIRATORY - ADULT  Goal: Achieves optimal ventilation and oxygenation  Description: INTERVENTIONS:  - Assess for changes in respiratory status  - Assess for changes in mentation and behavior  - Position to facilitate oxygenation and minimize respiratory effort  - Oxygen administered by appropriate delivery if ordered  - Initiate smoking cessation education as indicated  - Encourage broncho-pulmonary hygiene including cough, deep breathe, Incentive Spirometry  - Assess the need for suctioning and aspirate as needed  - Assess and instruct to report SOB or any respiratory difficulty  - Respiratory Therapy support as  indicated  Outcome: Progressing     Problem: METABOLIC, FLUID AND ELECTROLYTES - ADULT  Goal: Electrolytes maintained within normal limits  Description: INTERVENTIONS:  - Monitor labs and assess patient for signs and symptoms of electrolyte imbalances  - Administer electrolyte replacement as ordered  - Monitor response to electrolyte replacements, including repeat lab results as appropriate  - Instruct patient on fluid and nutrition as appropriate  Outcome: Progressing     Problem: SKIN/TISSUE INTEGRITY - ADULT  Goal: Skin Integrity remains intact(Skin Breakdown Prevention)  Description: Assess:  -Perform Marcos assessment every shift  -Clean and moisturize skin every shift  -Inspect skin when repositioning, toileting, and assisting with ADLS  -Assess under medical devices such as bp cuff every shift  -Assess extremities for adequate circulation and sensation     Bed Management:  -Have minimal linens on bed & keep smooth, unwrinkled  -Change linens as needed when moist or perspiring  -Avoid sitting or lying in one position for more than 2 hours while in bed  -Keep HOB at 30 degrees     Toileting:  -Offer bedside commode  -Assess for incontinence every 2 hours  -Use incontinent care products after each incontinent episode such as wipes    Activity:  -Mobilize patient 3 times a day  -Encourage activity and walks on unit  -Encourage or provide ROM exercises   -Turn and reposition patient every 2 Hours  -Use appropriate equipment to lift or move patient in bed  -Instruct/ Assist with weight shifting every 2 hours when out of bed in chair  -Consider limitation of chair time 2 hour intervals    Skin Care:  -Avoid use of baby powder, tape, friction and shearing, hot water or constrictive clothing  -Relieve pressure over bony prominences using pillows  -Do not massage red bony areas    Next Steps:  -Teach patient strategies to minimize risks such as turning   -Consider consults to  interdisciplinary teams such as  wound  Outcome: Progressing

## 2025-06-26 NOTE — RAPID RESPONSE
Rapid Response Note  Torey Del Castillo Jr. 59 y.o. male MRN: [id number]  Unit/Bed#: [place] [address] -02 Encounter: [id number]    Rapid Response Notification(s):   Response called date/time:  2025 1:19 AM  Response team arrival date/time:  2025 1:19 AM  Response end date/time:  2025 1:49 AM  [facility]  Rapid response location:  [facility] unit  Primary reason for rapid response call:  Other (comment) (unresponsive)    Rapid Response Intervention(s):   Airway:  Other (comment) (BVM)  Breathing:  Oxygen  Circulation:  ACLS protocol, chest compressions and defibrillation  Fluids administered:  None  Medications administered:  D50, epinephrine and sodium bicarbonate       Assessment:   Cardiac arrest, code blue called    Plan:   ACLS     Rapid Response Outcome:   Transfer:  Other (comment) ()  Code Status: Level 1 (Full Code)      Family notified: Yes, Name of Family member contacted Taylor Del Castillo          Background/Situation:   See code documentation     Review of Systems   Reason unable to perform ROS: clinical condition.       Objective:   Vitals:    25 0143 25 0146 25 0149 25 0315   BP:       BP Location:       Pulse: (!) 0 (!) 0 (!) 0 (!) 0   Resp: (!) 0 (!) 0 (!) 0 (!) 0   Temp:       TempSrc:       SpO2:       Weight:       Height:         Physical Exam  Constitutional:       General: He is in acute distress.      Appearance: He is obese. He is toxic-appearing.   HENT:      Head: Normocephalic and atraumatic.     Eyes:      Comments: Pupils fixed and dilated     Cardiovascular:      Comments: asystolic    Neurological:      Comments: unresponsive

## 2025-06-26 NOTE — DEATH NOTE
INPATIENT DEATH NOTE  Torey Del Castillo Jr. 59 y.o. male MRN: [id number]  Unit/Bed#: [place] [address] -02 Encounter: [id number]  Date, Time and Cause of Death    Date of Death: 25  Time of Death:  1:49 AM  Preliminary Cause of Death: Cardiac arrhythmia, unspecified  Entered by: Jonathon Mckenzie PA-C[[email or web address]]       Attribution       JE1.1 Korey Mckenzie PA-C 25 02:03           Patient's Information  Pronounced by: Jonathon Mckenzie PA-C  Did the patient's death occur in the ED?: No  Did the patient's death occur in the OR?: No  Did the patient's death occur less than 10 days post-op?: No  Did the patient's death occur within 24 hours of admission?: No  Was code status DNR at the time of death?: No    Physical Exam: Unresponsive to noxious stimuli, Spontaneous respirations absent, Breath sounds absent, Carotid pulse absent, Heart sounds absent, Pupillary light reflex absent, and Corneal blink reflex absent    Medical Examiner notification criteria: Other Sudden or Unexpected death   Medical Examiner's office notified?: Yes   NOTE: Cases related to accidents, trauma, overdose, or crimes must be reported regardless of the time lapse between the incident and death.    Medical Examiner accepted case?:  No    Family Notification  Was the family notified?: Yes  Date Notified: 25  Time Notified: 020  Notified by: Jonathon Mckenzie  Name of Family Notified of Death: Az Del Castillo   Relationship to Patient: Other (Comment) (nephew)  Family Notification Route: Telephone  Was the family told to contact a  home?: Yes  Name of  Home:: TBD    Autopsy Options: Unknown/Undecided  NOTE: Autopsy Authorization Form Next-of kin should be notified that final autopsy reports take up to 90 working days.     Primary Service Attending Physician notified?:  yes - Attending:  Ryley Monahan MD    Physician/Resident responsible for completing Discharge Summary:  Jw Dean

## 2025-06-26 NOTE — PLAN OF CARE
Problem: Potential for Falls  Goal: Patient will remain free of falls  Description: INTERVENTIONS:  - Educate patient/family on patient safety including physical limitations  - Instruct patient to call for assistance with activity   - Consider consulting OT/PT to assist with strengthening/mobility based on AM PAC & JH-HLM score  - Consult OT/PT to assist with strengthening/mobility   - Keep Call bell within reach  - Keep bed low and locked with side rails adjusted as appropriate  - Keep care items and personal belongings within reach  - Initiate and maintain comfort rounds  - Make Fall Risk Sign visible to staff  - Offer Toileting every 2 Hours, in advance of need  - Initiate/Maintain bed alarm  - Obtain necessary fall risk management equipment: socks  - Apply yellow socks and bracelet for high fall risk patients  - Consider moving patient to room near nurses station  Outcome: Progressing     Problem: Nutrition/Hydration-ADULT  Goal: Nutrient/Hydration intake appropriate for improving, restoring or maintaining nutritional needs  Description: Monitor and assess patient's nutrition/hydration status for malnutrition. Collaborate with interdisciplinary team and initiate plan and interventions as ordered.  Monitor patient's weight and dietary intake as ordered or per policy. Utilize nutrition screening tool and intervene as necessary. Determine patient's food preferences and provide high-protein, high-caloric foods as appropriate.     INTERVENTIONS:  - Monitor oral intake, urinary output, labs, and treatment plans  - Assess nutrition and hydration status and recommend course of action  - Evaluate amount of meals eaten  - Assist patient with eating if necessary   - Allow adequate time for meals  - Recommend/ encourage appropriate diets, oral nutritional supplements, and vitamin/mineral supplements  - Order, calculate, and assess calorie counts as needed  - Recommend, monitor, and adjust tube feedings and TPN/PPN based  on assessed needs  - Assess need for intravenous fluids  - Provide specific nutrition/hydration education as appropriate  - Include patient/family/caregiver in decisions related to nutrition  Outcome: Progressing     Problem: PAIN - ADULT  Goal: Verbalizes/displays adequate comfort level or baseline comfort level  Description: Interventions:  - Encourage patient to monitor pain and request assistance  - Assess pain using appropriate pain scale  - Administer analgesics as ordered based on type and severity of pain and evaluate response  - Implement non-pharmacological measures as appropriate and evaluate response  - Consider cultural and social influences on pain and pain management  - Notify physician/advanced practitioner if interventions unsuccessful or patient reports new pain  - Educate patient/family on pain management process including their role and importance of  reporting pain   - Provide non-pharmacologic/complimentary pain relief interventions  Outcome: Progressing     Problem: INFECTION - ADULT  Goal: Absence or prevention of progression during hospitalization  Description: INTERVENTIONS:  - Assess and monitor for signs and symptoms of infection  - Monitor lab/diagnostic results  - Monitor all insertion sites, i.e. indwelling lines, tubes, and drains  - Monitor endotracheal if appropriate and nasal secretions for changes in amount and color  - [place] appropriate cooling/warming therapies per order  - Administer medications as ordered  - Instruct and encourage patient and family to use good hand hygiene technique  - Identify and instruct in appropriate isolation precautions for identified infection/condition  Outcome: Progressing  Goal: Absence of fever/infection during neutropenic period  Description: INTERVENTIONS:  - Monitor WBC  - Perform strict hand hygiene  - Limit to healthy visitors only  - No plants, dried, fresh or silk flowers with han in patient room  Outcome: Progressing     Problem:  SAFETY ADULT  Goal: Patient will remain free of falls  Description: INTERVENTIONS:  - Educate patient/family on patient safety including physical limitations  - Instruct patient to call for assistance with activity   - Consider consulting OT/PT to assist with strengthening/mobility based on AM PAC & JH-HLM score  - Consult OT/PT to assist with strengthening/mobility   - Keep Call bell within reach  - Keep bed low and locked with side rails adjusted as appropriate  - Keep care items and personal belongings within reach  - Initiate and maintain comfort rounds  - Make Fall Risk Sign visible to staff  - Offer Toileting every 2 Hours, in advance of need  - Initiate/Maintain bed alarm  - Obtain necessary fall risk management equipment: socks  - Apply yellow socks and bracelet for high fall risk patients  - Consider moving patient to room near nurses station  Outcome: Progressing  Goal: Maintain or return to baseline ADL function  Description: INTERVENTIONS:  -  Assess patient's ability to carry out ADLs; assess patient's baseline for ADL function and identify physical deficits which impact ability to perform ADLs (bathing, care of mouth/teeth, toileting, grooming, dressing, etc.)  - Assess/evaluate cause of self-care deficits   - Assess range of motion  - Assess patient's mobility; develop plan if impaired  - Assess patient's need for assistive devices and provide as appropriate  - Encourage maximum independence but intervene and supervise when necessary  - Involve family in performance of ADLs  - Assess for home care needs following discharge   - Consider OT consult to assist with ADL evaluation and planning for discharge  - Provide patient education as appropriate  - Monitor functional capacity and physical performance, use of AM PAC & JH-HLM   - Monitor gait, balance and fatigue with ambulation    Outcome: Progressing  Goal: Maintains/Returns to pre admission functional level  Description: INTERVENTIONS:  - Perform  AM-PAC 6 Click Basic Mobility/ Daily Activity assessment daily.  - Set and communicate daily mobility goal to care team and patient/family/caregiver.   - Collaborate with rehabilitation services on mobility goals if consulted  - Perform Range of Motion 3 times a day.  - Reposition patient every 2 hours.  - Dangle patient 3 times a day  - Stand patient 3 times a day  - Ambulate patient 3 times a day  - Out of bed to chair 3 times a day   - Out of bed for meals 3 times a day  - Out of bed for toileting  - Record patient progress and toleration of activity level   Outcome: Progressing     Problem: DISCHARGE PLANNING  Goal: Discharge to home or other facility with appropriate resources  Description: INTERVENTIONS:  - Identify barriers to discharge w/patient and caregiver  - Arrange for needed discharge resources and transportation as appropriate  - Identify discharge learning needs (meds, wound care, etc.)  - Arrange for interpretive services to assist at discharge as needed  - Refer to Case Management Department for coordinating discharge planning if the patient needs post-hospital services based on physician/advanced practitioner order or complex needs related to functional status, cognitive ability, or social support system  Outcome: Progressing     Problem: Knowledge Deficit  Goal: Patient/family/caregiver demonstrates understanding of disease process, treatment plan, medications, and discharge instructions  Description: Complete learning assessment and assess knowledge base.  Interventions:  - Provide teaching at level of understanding  - Provide teaching via preferred learning methods  Outcome: Progressing     Problem: Prexisting or High Potential for Compromised Skin Integrity  Goal: Skin integrity is maintained or improved  Description: INTERVENTIONS:  - Identify patients at risk for skin breakdown  - Assess and monitor skin integrity including under and around medical devices   - Assess and monitor nutrition  and hydration status  - Monitor labs  - Assess for incontinence   - Turn and reposition patient  - Assist with mobility/ambulation  - Relieve pressure over ryley prominences   - Avoid friction and shearing  - Provide appropriate hygiene as needed including keeping skin clean and dry  - Evaluate need for skin moisturizer/barrier cream  - Collaborate with interdisciplinary team  - Patient/family teaching  - Consider wound care consult    Assess:  - Review Marcos scale daily  - Clean and moisturize skin every shift  - Inspect skin when repositioning, toileting, and assisting with ADLS  - Assess under medical devices such as bp cuff every shift  - Assess extremities for adequate circulation and sensation     Bed Management:  - Have minimal linens on bed & keep smooth, unwrinkled  - Change linens as needed when moist or perspiring  - Avoid sitting or lying in one position for more than 2 hours while in bed?Keep HOB at 30 degrees   - Toileting:  - Offer bedside commode  - Assess for incontinence every 2 hours  - Use incontinent care products after each incontinent episode such as wipes    Activity:  - Mobilize patient 3 times a day  - Encourage activity and walks on unit  - Encourage or provide ROM exercises   - Turn and reposition patient every 2 Hours  - Use appropriate equipment to lift or move patient in bed  - Instruct/ Assist with weight shifting every 2 hours when out of bed in chair  - Consider limitation of chair time 2 hour intervals    Skin Care:  - Avoid use of baby powder, tape, friction and shearing, hot water or constrictive clothing  - Relieve pressure over bony prominences using pillows  - Do not massage red bony areas    Next Steps:  - Teach patient strategies to minimize risks such as turning  - Consider consults to  interdisciplinary teams such as wound  Outcome: Progressing     Problem: RESPIRATORY - ADULT  Goal: Achieves optimal ventilation and oxygenation  Description: INTERVENTIONS:  - Assess for  changes in respiratory status  - Assess for changes in mentation and behavior  - Position to facilitate oxygenation and minimize respiratory effort  - Oxygen administered by appropriate delivery if ordered  - Initiate smoking cessation education as indicated  - Encourage broncho-pulmonary hygiene including cough, deep breathe, Incentive Spirometry  - Assess the need for suctioning and aspirate as needed  - Assess and instruct to report SOB or any respiratory difficulty  - Respiratory Therapy support as indicated  Outcome: Progressing     Problem: GASTROINTESTINAL - ADULT  Goal: Maintains or returns to baseline bowel function  Description: INTERVENTIONS:  - Assess bowel function  - Encourage oral fluids to ensure adequate hydration  - Administer IV fluids if ordered to ensure adequate hydration  - Administer ordered medications as needed  - Encourage mobilization and activity  - Consider nutritional services referral to assist patient with adequate nutrition and appropriate food choices  Outcome: Progressing     Problem: METABOLIC, FLUID AND ELECTROLYTES - ADULT  Goal: Electrolytes maintained within normal limits  Description: INTERVENTIONS:  - Monitor labs and assess patient for signs and symptoms of electrolyte imbalances  - Administer electrolyte replacement as ordered  - Monitor response to electrolyte replacements, including repeat lab results as appropriate  - Instruct patient on fluid and nutrition as appropriate  Outcome: Progressing     Problem: SKIN/TISSUE INTEGRITY - ADULT  Goal: Skin Integrity remains intact(Skin Breakdown Prevention)  Description: Assess:  -Perform Marcos assessment every shift  -Clean and moisturize skin every shift  -Inspect skin when repositioning, toileting, and assisting with ADLS  -Assess under medical devices such as bp cuff every shift  -Assess extremities for adequate circulation and sensation     Bed Management:  -Have minimal linens on bed & keep smooth, unwrinkled  -Change  linens as needed when moist or perspiring  -Avoid sitting or lying in one position for more than 2 hours while in bed  -Keep HOB at 30 degrees     Toileting:  -Offer bedside commode  -Assess for incontinence every 2 hours  -Use incontinent care products after each incontinent episode such as wipes    Activity:  -Mobilize patient 3 times a day  -Encourage activity and walks on unit  -Encourage or provide ROM exercises   -Turn and reposition patient every 2 Hours  -Use appropriate equipment to lift or move patient in bed  -Instruct/ Assist with weight shifting every 2 hours when out of bed in chair  -Consider limitation of chair time 2 hour intervals    Skin Care:  -Avoid use of baby powder, tape, friction and shearing, hot water or constrictive clothing  -Relieve pressure over bony prominences using pillows  -Do not massage red bony areas    Next Steps:  -Teach patient strategies to minimize risks such as turning   -Consider consults to  interdisciplinary teams such as wound  Outcome: Progressing     Problem: HEMATOLOGIC - ADULT  Goal: Maintains hematologic stability  Description: INTERVENTIONS  - Assess for signs and symptoms of bleeding or hemorrhage  - Monitor labs  - Administer supportive blood products/factors as ordered and appropriate  Outcome: Progressing

## 2025-06-26 NOTE — CODE DOCUMENTATION
Code Documentation  Time of Code: 0120    Location: [facility]    Background: 60 y/o M hx aflutter not on a/c, anxiety, HTN, cardiac arrest due to coronary vasospasm, HLD who presented on 6/23 w/ SOB and was admitted for SIRS without a clear souce of infection and acute hypoxic respiratory failure requiring BIPAP. On admission he was found to be hyponatremic likely due to beer potomania at 114. Na was corrected appropriately with nephrology following. His mental status improved and he was transitioned off continuous BiPAP. He was transferred to the [facility] 6/25.     Situation: Upon arrival to pts room at 0119 pt was unresponsive sitting in the chair. He reportedly had moved from bed to chair ~15 minutes prior and nursing had been in his room almost immediately prior. He had no palpable pulse and chest compressions were started at 0120 while he was in the chair. During the first pulse check he was transferred from chair to bed and ACLS continued. First rhythm check appeared to be fine vifb vs. Asystole w/ artifact. Decision was made to shock and he received shock x 1 (200J). Second rhythm check demonstrated PEA w/ junctional bradycardia and CPR was continued. Further rhythm checks demonstrated asystole. 2 attempts were made at intubation with the first being unsuccessful due to his jaw being clenched down. Rocuronium 150mg was administered and a second attempt was made with Mac 4 laryngoscope but was unsuccessful due to inability to visualize cords and redundant oropharyngeal tissue. An iStat (VBG) was completed showing pH 7.10, Pco2 29, Po2 20, BE -18, K < 2, and hgb undetectable.  He received total 9mg epi, 2 amps bicarb, 1 amp D50, 2 amps CaCl, and 10 rhythm checks. An attempt was made to use POC cardiac US to identify and rule out cardiac tamponade but no windows were obtained due to his habitus. Other possible etiologies included a primary cardiac arrhythmia/coronary vasospasm vs. PE. There was no identifiable  source of bleeding. The code was continued for 29 minutes until termination at 0149. I personally called the pt's nephew, Az DelC astillo, who had visited him earlier in the day to notify him of the events and I spoke directly with the pt's son, Manuel Del Castillo.

## 2025-06-26 NOTE — DISCHARGE SUMMARY
Discharge Summary - Hospitalist   Name: Torey Del Castillo Jr. 59 y.o. male I MRN: [id number]  Unit/Bed#: [place] [address] -02 I Date of Admission: 6/23/2025   Date of Service: 6/26/2025 I Hospital Day: 3     Assessment & Plan  Hyponatremia    Anxiety    Hypertension    H/o Atrial flutter (HCC)-rate controlled    Dyspnea on exertion    Prediabetes    History of cardiac arrest    Mixed hyperlipidemia    Elevated lactic acid level    Constipation    SIRS (systemic inflammatory response syndrome) (HCC)    METABOLIC ENCEPHALOPATHY       Medical Problems       Resolved Problems  Date Reviewed: 2/13/2025   None       Discharging Physician / Practitioner: SYEDA Chu  PCP: Tran Rodríguez MD  Admission Date:   Admission Orders (From admission, onward)       Ordered        06/23/25 0700  INPATIENT ADMISSION  Once                          Discharge Date: 06/26/25    Consultations During Hospital Stay:  Nephrology    Procedures Performed:   None    Significant Findings / Test Results:   Hyponatremia        Hospital Course:   Torey Del Castillo Jr. is a 59 y.o. male patient who originally presented to the hospital on 6/23/2025 due to general malaise, dyspnea on exertion over the last 2 to 3 days prior to admission.  On admission he met SIRS criteria, however no source was ever identified.  His lactic acidosis was corrected.  He was found to have a severe hyponatremia with a admission sodium of 114.  He was initially admitted under critical care service and transferred out of the ICU on 6/25.  In the early morning of 6/26 patient being began reporting some chest pain that he attributed to indigestion, took himself off the BiPAP and got into the bedside chair.  Maalox was given and patient reported minor relief.  Only moments after patient's nurse had last seen him she was alerted by strange sound coming from his room, on entering the room she found him to be agonal he breathing in his recliner and slumped over.  Rapid  response was initially called however after a quick assessment CODE BLUE was called.  Critical care team arrived and CPR was initiated.  After 29 minutes, 1 shock and multiple rounds of epinephrine the code was called and time of death was 01:49.        The patient, initially admitted to the hospital as inpatient, was discharged earlier than expected given the following: Unexpected death.  Please see above list of diagnoses and related plan for additional information.         Discussion with Family: Updated  (nephew) via phone.  Korey Mckenzie PA-C from [facility] called.    Discharge instructions/Information to patient and family:   See after visit summary for information provided to patient and family.      Provisions for Follow-Up Care:  See after visit summary for information related to follow-up care and any pertinent home health orders.      Disposition:   Other: To [facility]    Planned Readmission: No      **Please Note: This note may have been constructed using a voice recognition system**

## 2025-06-26 NOTE — NURSING NOTE
Belongings taken by family. No  home identified at this time. Son will call supervisor with  home information once obtained.

## 2025-06-27 NOTE — UTILIZATION REVIEW
URGENT/EMERGENT  INPATIENT/SPU AUTHORIZATION REQUEST    Date: 06/27/25            # Pages in this Request:     x New Request   Additional Information for PA#:     Office Contact Name:  Lisa Neal Title: Utilization Review, Registed Nurse     Phone: [phone]  Ext.     Availability (Date/Time): M-F 8-4    Type of Review:    Inpatient Review    Late Pick-Up    For Late Pick-up Cases:  How your facility was first notified of the Late Pick-up: PROMISE/JOSTIN  When your facility was first notified of the Late Pick-up (date): 6/25    Was the recipient a prisoner at the time of admission? no         RECIPIENT INFORMATION    Recipient ID#: [id number]    Recipient Name: Torey Del Castillo Jr.      YOB: 1965  59 y.o. Recipient Alias:     Gender:  x Male  Female Medicaid Eligibility (HCB Package):          INSURANCE INFORMATION    (All other private or governmental health insurance benefits must be utilized prior to billing the [facility] Program)    Was this admission the result of an MVA, other accident, assault, injury, fall, gunshot, bite etc.?  no   If yes, provide a brief description of the incident.      Does the recipient have other insurance coverage?  no  Insurance Company Name:    Policy #:  Did that insurance pay on this claim?    Yes  No     Did that insurance deny this claim?   Yes  No     If yes, reason for denial:      Does the recipient have Medicare?  no  Did Medicare exhaust prior to this admission?    Yes  No     Did Medicare partially pay this claim?   Yes  No     Did Medicare deny this claim?   Yes  No   If yes, reason for denial:      Was the recipient a prisoner at the time of admission?  no        PROVIDER INFORMATION    Hospital Name: [facility]   [facility] Provider ID#: [id number]     Admitting Physician:  [facility]               [facility] Provider ID#: [id number]        ADMISSION INFORMATION    Type of Admission: (please choose one)  ED    Admission Floor or  "Unit Type: CRITICAL CARE      Dates/Times:        ED Date/Time: 6/23/2025  4:42 AM        Observation Date/Time: NA         IP Admission Date/Time: 6/23/25  7:00 AM        Discharge or Transfer Date/Time: 6/26/2025  1:14 PM        DIAGNOSIS/PROCEDURE CODES    Primary Diagnosis Code/Primary Diagnosis Code description:  Elevated lactic acid level R79.89   6/23/2025       Hyponatremia E87.1       SIRS (systemic inflammatory response syndrome) (Former[facility]) R65.10       METABOLIC ENCEPHALOPATHY G93.41       Additional Diagnosis Code(s) and Description(s)-(up to 3 additional codes):      Procedure Code (1) and description:        CLINICAL INFORMATION - PRIOR ADMISSION ONLY    Is there a prior admission with a discharge date within 30 days of the date of this admission?    x No (Proceed to the next section - \"Clinical Information - General Review Checklist\")       Yes (Provide the following information)           CLINICAL INFORMATION - GENERAL REVIEW CHECKLIST    EMERGENCY DEPARTMENT: (Proceed to \"ADMISSION\" if Direct Admission)    presents with vitals notable for tachycardia, tachypnea, hypotension.   Presenting Signs/Symptoms:  Chief Complaint   Patient presents with    Shortness of Breath     Pt arrives via EMS from home, c/o increased dyspnea with exertion, difficulty ambulating and completing ADLs due to dyspnea, also c/o intermittent tingling in extremities, and constipation x 1 week. Unclear onset and duration of symptoms. Pt reports living alone, taking some of his prescribed medications and not others due to financial cost. EMS reports home was unkempt, patient found sitting in chair, noted bottles of alcohol near patient. Pt denies heavy alcohol use, reports drinking some days.        Medication/treatment prior to arrival in the ED:    Past Medical History:  has a past medical history of A-fib ([facility]), Anxiety, Asthma, Colon polyp, Coronary artery disease (11/5/2021), Diabetes mellitus ([facility]), GERD (gastroesophageal reflux " disease), History of ileus, Hypertension, Insomnia, Kidney stone, Lumbar herniated disc, MDD (major depressive disorder), MI, old, Patella fracture, Pneumonia, and Tobacco abuse, cardiac arrest    Clinical Exam:  Constitutional:       Appearance: He is obese. He is ill-appearing.      Cardiovascular:      Rate and Rhythm: Tachycardia present. Rhythm irregular.   Pulmonary:      Effort: Tachypnea and accessory muscle usage present.      Breath sounds: Wheezing present.   Abdominal:      General: Abdomen is flat.      Palpations: Abdomen is soft.      Tenderness: There is no abdominal tenderness.      Musculoskeletal:      Right lower leg: Edema present.      Left lower leg: Edema present.      Neurological:      General: No focal deficit present.        Initial Vital Signs: (Temp, Pulse, Resp, and BP)   ED Triage Vitals   Temperature Pulse Respirations Blood Pressure SpO2   06/23/25 0506 06/23/25 0448 06/23/25 0448 06/23/25 0448 06/23/25 0448   97.8 °F (36.6 °C) (!) 110 (!) 30 (!) 78/52 96 %      Temp Source Heart Rate Source Patient Position - Orthostatic VS BP Location FiO2 (%)   06/23/25 0506 06/24/25 0700 06/24/25 0700 06/23/25 0515 --   Oral Monitor Lying Left arm       Pain Score       06/23/25 0715       7           Pertinent Repeat Vital Signs: (include times they were obtained)  Vital Signs (last 3 days)         Date/Time Temp Pulse Resp BP MAP (mmHg) SpO2 Calculated FIO2 (%) - Nasal Cannula O2 Flow Rate (L/min) Nasal Cannula O2 Flow Rate (L/min) O2 Device O2 Interface Device [place] Coma Scale Score Pain     06/24/25 0300 98.6 °F (37 °C) 90 21 106/55 77 95 % -- -- -- BiPAP -- -- No Pain     06/24/25 0200 -- 89 21 112/55 78 95 % -- -- -- -- -- -- --     06/24/25 0115 -- 92 22 99/52 73 94 % -- -- -- -- -- -- --     06/24/25 0100 -- 92 22 84/47 61 94 % -- -- -- -- -- -- --     06/24/25 0000 -- 93 20 108/59 80 96 % -- -- -- BiPAP -- 15 No Pain     06/23/25 3370 -- -- -- -- -- 96 % -- -- -- -- Full face mask --  --     06/23/25 2300 -- 101 24 105/53 76 92 % -- -- -- -- -- -- --     06/23/25 2258 97.9 °F (36.6 °C) 98 23 115/56 78 91 % -- -- -- None (Room air) -- -- No Pain     06/23/25 2200 -- 98 21 102/53 74 92 % -- -- -- -- -- -- --     06/23/25 2100 -- 100 29 82/46 58 92 % -- -- -- -- -- -- --     06/23/25 2000 -- 97 23 96/49 70 91 % -- -- -- None (Room air) -- 15 No Pain     06/23/25 1914 -- 97 22 102/60 73 92 % -- -- -- None (Room air) -- -- --     06/23/25 1500 -- 101 22 105/59 76 94 % -- -- -- -- -- -- --     06/23/25 1445 -- 100 21 108/55 77 92 % -- -- -- -- -- -- --     06/23/25 1416 -- 100 20 103/55 75 93 % -- -- -- -- -- -- --     06/23/25 1401 -- 100 25 110/54 78 93 % -- -- -- -- -- -- --     06/23/25 1345 -- 98 23 126/59 85 93 % -- -- -- -- -- -- --     06/23/25 1330 -- 100 22 105/52 75 93 % -- -- -- -- -- -- --     06/23/25 1315 -- 100 24 108/54 78 94 % -- -- -- -- -- -- --     06/23/25 1300 -- 97 27 119/59 85 96 % -- -- -- -- -- -- --     06/23/25 1259 -- -- -- -- -- -- 40 -- 5 L/min Nasal cannula -- -- --     06/23/25 1245 -- 100 32 114/73 89 97 % -- -- -- -- -- -- --     06/23/25 1230 -- 98 24 126/56 85 97 % -- -- -- -- -- -- --     06/23/25 1216 -- 100 30 154/70 101 98 % -- -- -- -- -- 10 No Pain     06/23/25 1000 -- 93 23 109/53 77 98 % -- -- -- -- -- -- --     06/23/25 0945 -- 96 19 106/51 74 98 % -- -- -- -- -- -- --     06/23/25 0931 -- 99 26 89/44 64 98 % -- -- -- -- -- -- --     06/23/25 0925 -- -- -- -- -- -- -- -- -- -- Full face mask -- --     06/23/25 0915 -- 96 25 113/53 77 98 % -- -- -- -- -- -- --     06/23/25 0907 -- 98 23 111/59 79 98 % -- 10 L/min -- Non-rebreather mask -- -- --     06/23/25 0900 -- 93 25 111/59 79 99 % -- -- -- -- -- -- --     06/23/25 0852 -- 92 22 106/55 77 99 % -- -- -- -- -- -- --     06/23/25 0830 -- 96 23 107/52 75 91 % -- -- -- -- -- 15 No Pain     06/23/25 0812 -- 98 20 98/52 73 93 % -- -- -- -- -- -- --     06/23/25 0800 -- 98 -- -- -- 96 % -- -- -- -- -- -- --      06/23/25 0747 -- 94 -- 91/51 61 -- -- -- -- -- -- -- --     06/23/25 0745 -- 96 29 91/51 61 96 % -- -- -- -- -- -- --     06/23/25 0730 -- 98 28 92/56 67 95 % -- -- -- -- -- -- --     06/23/25 0515 -- 114 34 72/48 -- 95 % -- -- -- -- -- -- --     06/23/25 0513 -- -- -- -- -- 95 % -- -- -- -- -- -- --     06/23/25 0506 97.8 °F (36.6 °C) -- -- -- -- -- -- -- -- -- -- -- --     06/23/25 0458 -- 114 32 75/45 55 96 % -- -- -- -- -- -- --     06/23/25 0448 -- 110 30 78/52 -- 96 % -- -- -- --        Pertinent Sustained Findings: (include times they were obtained)  Results from last 7 days   Lab Units 06/24/25  0748 06/23/25 2041 06/23/25  0836 06/23/25  0500   WBC Thousand/uL 11.54* 16.87*  --  16.39*   HEMOGLOBIN g/dL 12.1 12.3  --  12.7   HEMATOCRIT % 33.1* 33.5*  --  34.6*   PLATELETS Thousands/uL 117* 142* 131* 150   TOTAL NEUT ABS Thousands/µL  --   --   --  15.17*                   Results from last 7 days   Lab Units 06/24/25  0748 06/24/25  0203 06/23/25  2327 06/23/25  2041 06/23/25  1743   SODIUM mmol/L 118* 116* 116* 116* 116*   POTASSIUM mmol/L 4.2 4.1 4.2 4.0 3.9   CHLORIDE mmol/L 82* 80* 79* 79* 78*   CO2 mmol/L 29 28 26 26 26   ANION GAP mmol/L 7 8 11 11 12   BUN mg/dL 8 7 7 7 6   CREATININE mg/dL 0.85 0.87 0.80 0.88 0.92   EGFR ml/min/1.73sq m 96 95 98 94 91   CALCIUM mg/dL 8.1* 8.0* 8.0* 7.8* 7.6*   CALCIUM, IONIZED mmol/L  --   --   --  0.96*  --    MAGNESIUM mg/dL  --   --   --  1.8*  --             Results from last 7 days   Lab Units 06/23/25  2041 06/23/25  0500   AST U/L 41* 47*   ALT U/L 25 28   ALK PHOS U/L 150* 174*   TOTAL PROTEIN g/dL 5.6* 5.4*   ALBUMIN g/dL 3.1* 3.1*   TOTAL BILIRUBIN mg/dL 1.36* 1.90*                      Results from last 7 days   Lab Units 06/24/25  0748 06/24/25  0203 06/23/25  2327 06/23/25  2041 06/23/25  1743 06/23/25  1113 06/23/25  0555 06/23/25  0500   GLUCOSE RANDOM mg/dL 108 115 117 119 136 146* 124 120           Results from last 7 days   Lab Units 06/23/25  0836    OSMOLALITY, SERUM mmol/*              Results from last 7 days   Lab Units 06/23/25  1158   PH ART   7.408   PCO2 ART mm Hg 34.6*   PO2 ART mm Hg 100.6   HCO3 ART mmol/L 21.3*   BASE EXC ART mmol/L -2.7   O2 CONTENT ART mL/dL 17.4   O2 HGB, ARTERIAL % 95.5   ABG SOURCE   Line, Arterial            Results from last 7 days   Lab Units 06/23/25 2041 06/23/25  0500   PH [place]   7.464* 7.406*   PCO2 [place] mm Hg 37.8* 37.3*   PO2 [place] mm Hg 62.3* 31.8*   HCO3 [place] mmol/L 26.5 22.9*   BASE EXC [place] mmol/L 2.8 -1.4   O2 CONTENT [place] ml/dL 16.7 11.8   O2 HGB, VENOUS % 90.8* 62.1               Results from last 7 days   Lab Units 06/23/25  2041   CK TOTAL U/L 299             Results from last 7 days   Lab Units 06/23/25  0842 06/23/25  0658 06/23/25  0500   HS TNI 0HR ng/L  --   --  24   HS TNI 2HR ng/L  --  25  --    HSTNI D2 ng/L  --  1  --    HS TNI 4HR ng/L 24  --   --    HSTNI D4 ng/L 0  --   --                 Results from last 7 days   Lab Units 06/23/25 2041 06/23/25  0500   PROTIME seconds 16.3* 15.8*   INR   1.30* 1.24*   PTT seconds  --  37*           Results from last 7 days   Lab Units 06/23/25  0842   TSH 3RD GENERATON uIU/mL 3.680           Results from last 7 days   Lab Units 06/23/25  0500   PROCALCITONIN ng/ml 0.29*               Results from last 7 days   Lab Units 06/24/25  0203 06/23/25  2327 06/23/25 2041 06/23/25  0658 06/23/25  0500   LACTIC ACID mmol/L 1.9 2.5* 4.3* 5.5* 8.6*                   Results from last 7 days   Lab Units 06/23/25  0500   BNP pg/mL 49              Results from last 7 days   Lab Units 06/23/25  0500   CRP mg/L 43.2*                Results from last 7 days   Lab Units 06/23/25  1120 06/23/25  0836   OSMOLALITY, SERUM mmol/KG  --  245*   OSMO UR mmol/*  --             Results from last 7 days   Lab Units 06/23/25  1120 06/23/25  1119   CLARITY UA    --  Clear   COLOR UA    --  Yellow   SPEC GRAV UA    --  1.023   PH UA    --  6.0   GLUCOSE UA mg/dl  --  Negative    KETONES UA mg/dl  --  Negative   BLOOD UA    --  Negative   PROTEIN UA mg/dl  --  Negative   NITRITE UA    --  Negative   BILIRUBIN UA    --  Negative   UROBILINOGEN UA (BE) mg/dl  --  <2.0   LEUKOCYTES UA    --  Negative   SODIUM UR mmol/L <10.0  --                Results from last 7 days   Lab Units 06/23/25  0502 06/23/25  0500   BLOOD CULTURE   Received in Microbiology Lab. Culture in Progress. Received in Microbiology Lab. Culture in Progress.      Weight in Kilograms:   Wt Readings from Last 1 Encounters:   06/23/25 (!) 160 kg (352 lb 4.7 oz)       Pertinent Labs (results):    Radiology (results):    EKG (results):   6/23 EKG:   Age and gender specific ECG analysis    Sinus tachycardia   Low voltage QRS   Nonspecific T wave abnormality   Abnormal ECG   When compared with ECG of 23-Jun-2025 04:52, (unconfirmed)   Premature supraventricular complexes are no longer Present   Inverted T waves have replaced nonspecific T wave abnormality in Lateral    leads     Other tests (results):    Tests pending final results:    Treatment in the ED:   Medication Administration from 06/23/2025 0441 to 06/23/2025 0803         Date/Time Order Dose Route Action Comments     06/23/2025 0518 EDT ceftriaxone (ROCEPHIN) 2 g/50 mL in dextrose IVPB 2,000 mg Intravenous New Bag --     06/23/2025 0501 EDT sodium chloride 0.9 % bolus 1,000 mL 1,000 mL Intravenous New Bag --     06/23/2025 0513 EDT albuterol inhalation solution 10 mg 10 mg Nebulization Given --     06/23/2025 0513 EDT ipratropium (ATROVENT) 0.02 % inhalation solution 1 mg 1 mg Nebulization Given --     06/23/2025 0513 EDT sodium chloride 0.9 % inhalation solution 12 mL 12 mL Nebulization Given --     06/23/2025 0516 EDT methylPREDNISolone sodium succinate (Solu-MEDROL) injection 125 mg 125 mg Intravenous Given --     06/23/2025 0531 EDT magnesium sulfate 2 g/50 mL IVPB (premix) 2 g 2 g Intravenous New Bag --     06/23/2025 0614 EDT multi-electrolyte  (Plasmalyte-A/Isolyte-S PH 7.4/Normosol-R) IV bolus 1,000 mL 1,000 mL Intravenous New Bag --     06/23/2025 0635 EDT potassium chloride 20 mEq IVPB (premix) 20 mEq Intravenous New Bag --     06/23/2025 0647 EDT multi-electrolyte (Plasmalyte-A/Isolyte-S PH 7.4/Normosol-R) IV bolus 1,000 mL 1,000 mL Intravenous New Bag --             Other treatments:      Change in condition while in the ED:     Response to ED Treatment:  Admitted to [facility] for hyponatremia despite iv fluids.       ADMISSION:  Dyspnea with evolving acute hypoxemic respiratory failure requiring BiPAP.Acute metabolic encephalopathy. Elevated lactate.   Hyponatremia, severe. history of atrial flutter, currently controlled. Morbid obesity with obstructive sleep apnea and suspected component of obesity hypoventilation. Source of decompensation unclear, possible occult sepsis without source identified to this point. presented to the ED secondary to not feeling well and dyspnea on exertion which has been exacerbated in the past 2 to 3 days. Patient was found by the EMS surrounded by beer bottles and cans.  did have desaturation and required BiPAP, became more somnolent. Status post 2 L of fluid and 1 dose of albumin. Hypotensive with systolic blood pressure in 80s.  afebrile, leukocytosis. CTA PE negative for pulmonary embolism. Flu RSV COVID-negative. Lactic acid 8.6 on arrival, WBCs 16.39 CRP 43.2. . K 3.1. CHLOR 76. CA 7.0..PROCALCITONIN 0.29.Straight cath for urinalysis .urine drug screen .urine sodium and osmolarity.severe constipation. ICU. albuterol nebulizer, Xopenex and IV Solu-Medrol.CPAP.prednisone. MiraLAX daily and senna. Hold cardizem and lisinopril.   ALL Admissions:    Admission Orders and Other Orders written within the first 24 hrs after admission  Scheduled Medications:  atorvastatin, 40 mg, Oral, Daily With Dinner  bisacodyl, 10 mg, Rectal, Daily  [Held by provider] diltiazem, 300 mg, Oral, QAM  enoxaparin, 40 mg,  Subcutaneous, Q12H ASIA  folic acid, 400 mcg, Oral, Daily  [Held by provider] isosorbide mononitrate, 60 mg, Oral, Daily  levalbuterol, 1.25 mg, Nebulization, TID  [Held by provider] lisinopril, 40 mg, Oral, Daily  polyethylene glycol, 17 g, Oral, Daily  predniSONE, 40 mg, Oral, Daily  QUEtiapine, 200 mg, Oral, HS  senna-docusate sodium, 2 tablet, Oral, BID  sertraline, 50 mg, Oral, HS  [Held by provider] spironolactone, 25 mg, Oral, Daily        Continuous IV Infusions:  sodium chloride (HYPERTONIC) infusion 1.8%, 50 mL/hr, Intravenous, Continuous        PRN Meds:  albuterol, 2 puff, Inhalation, Q6H PRN 6/23 X 1, 6/24 X 1  LORazepam, 1 mg, Oral, BID PRN 6/24 X 1    Labs, imaging, other:      Consults and findings:  6/23 H/P:    ssessment & Plan  SIRS (systemic inflammatory response syndrome) (HCC)  Lactic acid 8.6 on arrival, WBCs 16.39  Hypotensive with systolic blood pressure in 80s  Status post 2 L of fluid and 1 dose of albumin  CTA PE negative for pulmonary embolism  Patient is afebrile, leukocytosis most likely secondary to stress  Lactic acid most likely secondary to hypotension and less likely albuterol induced  Patient is less likely septic, most symptoms secondary to hyponatremia  Dyspnea on exertion  Patient has ongoing dyspnea for months, since June 2024 however has been exacerbated in the past 2 to 3 days  Dyspnea can be multifactorial in nature including obesity, PRAKASH, smoking  Patient denies any history of COPD  Patient denies any noncompliance with medication at home  Patient does not wear oxygen at home, uses CPAP  Diffuse wheezing noted on physical examination, status post albuterol nebulizer, Xopenex and Solu-Medrol 125 Mg IV  Patient denies any chest pain, fevers or chills  Patient does endorse stuffy nose and upper respiratory symptoms  Flu RSV COVID-negative  CTA PE negative for acute pulmonary embolism     PLAN:  Continue Xopenex 3 times daily  Continue CPAP at night  Start prednisone 40 Mg  "daily  Elevated lactic acid level        Lab Results   Component Value Date/Time     LACTICACID 5.5 (HH) 06/23/2025 06:58 AM     LACTICACID 8.6 (HH) 06/23/2025 05:00 AM      Lactic acid can be elevated in the setting of hypotension versus albuterol induced  Continue to monitor lactic acid     Hyponatremia        Lab Results   Component Value Date/Time     SODIUM 114 (LL) 06/23/2025 05:55 AM     SODIUM 138 07/11/2024 04:04 AM      According to the EMS, multiple beer cans and bottles were found  Can be in the setting of potomania  Follow-up with urine osmolality, serum osmolality and urine sodium  Follow-up with serum cortisol, a.m.  Monitor BMP every 6 hours     Constipation  Patient has not had a bowel movement in the past 6 days  Patient has past medical history of ileus  MiraLAX daily and senna twice daily  H/o Atrial flutter (HCC)-rate controlled  History of atrial flutter status post cardioversion in 2018 and ablation 6/21/2024  Hold Cardizem 300 Mg daily in the setting of hypotension  Not on any anticoagulation according to the patient  Anxiety  Continue Zoloft 50 Mg daily  Hypertension  Hold lisinopril 40 Mg daily in the setting of hypotension  Prediabetes        Lab Results   Component Value Date/Time     HGBA1C 5.1 01/27/2025 03:08 PM     HGBA1C 4.8 10/31/2020 06:24 AM      Continue to monitor glucose levels  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  Also, mildly volume " depleted especially with low urine sodium please see below, possible SIADH with nausea and vomiting or other etiology     Workup:  - Urine studies: Urine sodium less than 10/urine osmolality 211  - Serum osmolality 245 compatible true hypotonic hyponatremia/cortisol acceptable at 24.3/normal TSH  - CT of the chest without any acute pathology     Recommend completing workup with the following  - Uric acid  - CT of the abdomen pelvis rule out other causes of SIADH especially with the abdominal discomfort     Treatment:  - Fluid restriction 1000 mL  - Nutritional supplements for increasing solute  - 1.8% saline for now monitor sodium  - Potential use of tolvaptan 7.5 mg if he does not respond to 1.8% saline     Goal is increasing sodium no faster than 8 mill equivalents per day in the first 48 hours to avoid osmotic demyelinating syndrome, therefore no faster than achieving a sodium of 123 or so by tomorrow        Hypertension  Typically on Imdur 30 mg daily/spironolactone  Now low normal so I would avoid initiating  H/o Atrial flutter (HCC)-rate controlled  Per critical care medicine on Lovenox  Dyspnea on exertion  Per critical care medicine some degree of chronicity  Elevated lactic acid level  Was elevated now improved possibly related to SIRS please see below  SIRS (systemic inflammatory response syndrome) (HCC)  Negative evidence of PE  No evidence of sepsis  Per critical care medicine     Test Results after admission  Pertinent Lab tests (dates/results):    Culture results (blood, urine, spinal, wound, respiratory, etc.):  Imaging tests (dates/results):    EKG (dates/results):    Other test (dates/results):  Tests pending (dates/results):        Response to Treatment, Major Change in Condition, Major Charge in Treatment anytime during admission  6/25 UPdate from Nephrology:  The patient had hyponatremia on presentation and was given some hypertonic fluid with 1.8% saline and sodium concentration increased to 126  mEq/L.  It appears that the hypertonic saline was stopped provided some D5W and sodium concentration this morning is 126 mEq/L and stable.     Looking at urine studies urine sodium was extremely low and urine osmolality was relatively dilute compared to the serum osmolality.  This would indicate or suggest low solute intake.  Especially with the response to the hypertonic saline which provide solute obligating you urine volume and increasing water excretion in urine output was 3.3 L as recorded.     At this point we will discontinue D5W  And implement fluid restriction  Monitor on oral intake of food  Monitor sodium concentration.     I was contacted by the nurse the sodium concentration was 125 mill equivalents per liter.  I am going to give 500 cc of 1.8% saline over 10 hours repeat BMP in AM.      Disposition on Discharge  Home, Rehab, SNF, LTC, Shelter, etc.:     Cease to Breathe (CTB)  If a patient expires during an admission, in addition to the above information, please include:    Summary/timeline of the patient's decline in condition: RAPID RESPONSE  for unresponsiveness while sitting in chair.      Medications and treatment: CPR started.     Patient response to treatment:  First rhythm check appeared to be fine vifb vs. Asystole w/ artifact. Decision was made to shock and he received shock x 1 (200J). Second rhythm check demonstrated PEA w/ junctional bradycardia and CPR was continued. Further rhythm checks demonstrated asystole. 2 attempts were made at intubation with the first being unsuccessful due to his jaw being clenched down. Rocuronium 150mg was administered and a second attempt was made with Mac 4 laryngoscope but was unsuccessful due to inability to visualize cords and redundant oropharyngeal tissue. An iStat (VBG) was completed showing pH 7.10, Pco2 29, Po2 20, BE -18, K < 2, and hgb undetectable.  He received total 9mg epi, 2 amps bicarb, 1 amp D50, 2 amps CaCl, and 10 rhythm checks.  An attempt was made to use POC cardiac US to identify and rule out cardiac tamponade but no windows were obtained due to his habitus.     Date and time patient ceased to breathe:6/26/25@01:49 AM          Is there a Readmission that follows this admission? no  If yes, provide dates:      InterQual Review    InterQual Criteria Met:   no    Please include the InterQual Review, InterQual year/version used, and the criteria selected:   Criteria Review   REVIEW SUMMARY     InterQual® Review Status: In Primary  Review Type: Admission  Criteria Status: Critical Met  Day of review: Episode Day 1  Condition Specific: Yes        REVIEW DETAILS     Product: LOC:Acute Adult  Subset: Infection: Sepsis            [X] Select Day, One:          [X] Episode Day 1, One:              [X] CRITICAL, One:                  [X] Sepsis, actual or suspected, >= One:                      [X] NIPPV and, >= One:                          [X] Arterial Po2 <= 39 mmHg(5.2 kPa)        Version: CHNL® 2025, Mar. 2025 Release           PLEASE SUBMIT THE COMPLETED FORM TO THE DEPARTMENT OF HUMAN SERVICES - DIVISION OF CLINICAL  REVIEW VIA FAX -[phone] or VIA E-MAIL TO [email or web address]    Signature: Lisa Val Date:  06/27/25    Confidentiality Notice: The documents accompanying this telecopy may contain confidential information belonging to the sender.  The information is intended only for the use of the individual named above. If you are not the intended recipient, you are hereby notified  That any disclosure, copying, distribution or taking of any telecopy is strictly prohibited.

## 2025-06-28 LAB
BACTERIA BLD CULT: NORMAL
BACTERIA BLD CULT: NORMAL

## (undated) DEVICE — PADDING CAST 6IN COTTON STRL

## (undated) DEVICE — CATH GUIDE LAUNCHER 6FR JR4 110CM

## (undated) DEVICE — PINNACLE INTRODUCER SHEATH: Brand: PINNACLE

## (undated) DEVICE — REF PATCH ENSITE X

## (undated) DEVICE — CATH EP ADVISOR HD GRID MAPPING 8F

## (undated) DEVICE — TUBING SUCTION 5MM X 12 FT

## (undated) DEVICE — DRAPE C-ARMOUR

## (undated) DEVICE — DRAPE C-ARM X-RAY

## (undated) DEVICE — 2.5MM REAMING ROD WITH BALL TIP/950MM-STERILE

## (undated) DEVICE — CATH IVUS EAGLE EYE PLATINUM 5FR 150CM

## (undated) DEVICE — PACK TOTAL KNEE PBDS

## (undated) DEVICE — SUT MONOCRYL 4-0 PS-2 18 IN Y496G

## (undated) DEVICE — 3000CC GUARDIAN II: Brand: GUARDIAN

## (undated) DEVICE — INTENDED FOR TISSUE SEPARATION, AND OTHER PROCEDURES THAT REQUIRE A SHARP SURGICAL BLADE TO PUNCTURE OR CUT.: Brand: BARD-PARKER SAFETY BLADES SIZE 15, STERILE

## (undated) DEVICE — 10FR FRAZIER SUCTION HANDLE: Brand: CARDINAL HEALTH

## (undated) DEVICE — BINDER ABDOMINAL 63-74 IN

## (undated) DEVICE — 4.2MM THREE-FLUTED DRILL BIT QC/330MM/100MM CALIB-STERILE

## (undated) DEVICE — SUT VICRYL 3-0 SH 27 IN J416H

## (undated) DEVICE — SHEATH STEERABLE FARADRIVE

## (undated) DEVICE — BULB SYRINGE,IRRIGATION WITH PROTECTIVE CAP: Brand: DOVER

## (undated) DEVICE — SPLINT ORTHO-GLASS 4IN X 15FT

## (undated) DEVICE — ANTIBACTERIAL UNDYED BRAIDED (POLYGLACTIN 910), SYNTHETIC ABSORBABLE SUTURE: Brand: COATED VICRYL

## (undated) DEVICE — INTENDED FOR TISSUE SEPARATION, AND OTHER PROCEDURES THAT REQUIRE A SHARP SURGICAL BLADE TO PUNCTURE OR CUT.: Brand: BARD-PARKER ® CARBON RIB-BACK BLADES

## (undated) DEVICE — OCCLUSIVE GAUZE STRIP,3% BISMUTH TRIBROMOPHENATE IN PETROLATUM BLEND: Brand: XEROFORM

## (undated) DEVICE — GLOVE SRG BIOGEL 6.5

## (undated) DEVICE — IMPERVIOUS STOCKINETTE: Brand: DEROYAL

## (undated) DEVICE — DGW .035 FC J3MM 150CM TEF: Brand: EMERALD

## (undated) DEVICE — SUT MONOCRYL 3-0 PS-2 27 IN Y427H

## (undated) DEVICE — GLOVE INDICATOR PI UNDERGLOVE SZ 7 BLUE

## (undated) DEVICE — CABLE CATH CONNECTION FARASTAR

## (undated) DEVICE — ADHESIVE SKIN HIGH VISCOSITY EXOFIN 1ML

## (undated) DEVICE — NEEDLE 25G X 1 1/2

## (undated) DEVICE — 4.2MM THREE-FLUTED DRILL BIT QC/NEEDLE POINT/145MM

## (undated) DEVICE — PLUMEPEN PRO 10FT

## (undated) DEVICE — SUT VICRYL 2-0 CT-2 27 IN J269H

## (undated) DEVICE — STOCKINETTE REGULAR

## (undated) DEVICE — CATH ULTRASOUND ACUNAV ICE 8FR 90CM GE VIVID-I

## (undated) DEVICE — RUNTHROUGH NS EXTRA FLOPPY PTCA GUIDEWIRE: Brand: RUNTHROUGH

## (undated) DEVICE — CATH EP 7FR DI-DIR 10-POLE DEFL CS 2-8-2 FJ

## (undated) DEVICE — SUT VICRYL 2-0 REEL 54 IN J286G

## (undated) DEVICE — SPLINT ORTHO-GLASS 3IN X 15FT

## (undated) DEVICE — CHLORAPREP HI-LITE 26ML ORANGE

## (undated) DEVICE — GAUZE SPONGES,16 PLY: Brand: CURITY

## (undated) DEVICE — 3M™ STERI-DRAPE™ U-DRAPE 1015: Brand: STERI-DRAPE™

## (undated) DEVICE — INTRO SHEATH 8FR 24CM ARROW-FLEX

## (undated) DEVICE — WEBRIL 6 IN UNSTERILE

## (undated) DEVICE — CATH ABLATION FARAWAVE PULSED FIELD 31MM

## (undated) DEVICE — COBAN 4 IN STERILE

## (undated) DEVICE — CATH DIAG 5FR IMPULSE 110CM PIG

## (undated) DEVICE — GLOVE RADIATION 7 1/2 PF

## (undated) DEVICE — PROXIMATE PLUS MD MULTI-DIRECTIONAL RELEASE SKIN STAPLERS CONTAINS 35 STAINLESS STEEL STAPLES APPROXIMATE CLOSED DIMENSIONS: 6.9MM X 3.9MM WIDE: Brand: PROXIMATE

## (undated) DEVICE — GLOVE SRG BIOGEL 5.5

## (undated) DEVICE — SCD SEQUENTIAL COMPRESSION COMFORT SLEEVE MEDIUM KNEE LENGTH: Brand: KENDALL SCD

## (undated) DEVICE — 3.2MM GUIDE WIRE 400MM

## (undated) DEVICE — GLOVE INDICATOR PI UNDERGLOVE SZ 6.5 BLUE

## (undated) DEVICE — SUT VICRYL 1 CT-1 27 IN J261H

## (undated) DEVICE — SKIN MARKER DUAL TIP WITH RULER CAP, FLEXIBLE RULER AND LABELS: Brand: DEVON

## (undated) DEVICE — PROXIMATE SKIN STAPLERS (35 WIDE) CONTAINS 35 STAINLESS STEEL STAPLES (FIXED HEAD): Brand: PROXIMATE

## (undated) DEVICE — CATH EP 5FR QUAD SUPREME CRD

## (undated) DEVICE — CUFF TOURNIQUET 34 X 4 IN QUICK CONNECT DISP 1BLA

## (undated) DEVICE — Device

## (undated) DEVICE — BETHLEHEM UNIVERSAL MINOR GEN: Brand: CARDINAL HEALTH